# Patient Record
Sex: MALE | Race: WHITE | NOT HISPANIC OR LATINO | Employment: FULL TIME | URBAN - METROPOLITAN AREA
[De-identification: names, ages, dates, MRNs, and addresses within clinical notes are randomized per-mention and may not be internally consistent; named-entity substitution may affect disease eponyms.]

---

## 2018-05-07 ENCOUNTER — OFFICE VISIT (OUTPATIENT)
Dept: OCCUPATIONAL MEDICINE | Age: 38
End: 2018-05-07

## 2018-05-07 DIAGNOSIS — Z00.8 HEALTH EXAMINATION IN POPULATION SURVEY: Primary | ICD-10-CM

## 2018-05-07 PROCEDURE — OH035 DOT/N-DOT DS COLLECTION ONLY (ALL TYPES): Performed by: PHYSICIAN ASSISTANT

## 2018-10-23 ENCOUNTER — HOSPITAL ENCOUNTER (EMERGENCY)
Age: 38
Discharge: HOME | End: 2018-10-23
Attending: STUDENT IN AN ORGANIZED HEALTH CARE EDUCATION/TRAINING PROGRAM

## 2018-10-23 VITALS
WEIGHT: 250 LBS | BODY MASS INDEX: 31.08 KG/M2 | SYSTOLIC BLOOD PRESSURE: 151 MMHG | OXYGEN SATURATION: 97 % | TEMPERATURE: 97.4 F | HEART RATE: 88 BPM | HEIGHT: 75 IN | DIASTOLIC BLOOD PRESSURE: 99 MMHG | RESPIRATION RATE: 16 BRPM

## 2018-10-23 DIAGNOSIS — R11.2 NAUSEA AND VOMITING, INTRACTABILITY OF VOMITING NOT SPECIFIED, UNSPECIFIED VOMITING TYPE: Primary | ICD-10-CM

## 2018-10-23 LAB
ALBUMIN SERPL-MCNC: 4.1 G/DL (ref 3.6–5.1)
ALP SERPL-CCNC: 95 UNITS/L (ref 45–117)
ALT SERPL-CCNC: 107 UNITS/L
ANION GAP BLD CALC-SCNC: 20 MMOL/L
APPEARANCE UR: ABNORMAL
AST SERPL-CCNC: 274 UNITS/L
BILIRUB CONJ SERPL-MCNC: 0.3 MG/DL (ref 0–0.2)
BILIRUB SERPL-MCNC: 0.8 MG/DL (ref 0.2–1)
BILIRUB UR QL STRIP: NEGATIVE
BUN BLD-MCNC: 13 MG/DL (ref 6–20)
CA-I BLD ISE-SCNC: 1.06 MMOL/L (ref 1.15–1.29)
CHLORIDE BLD-SCNC: 100 MMOL/L (ref 98–107)
CO2 BLD-SCNC: 23 MMOL/L (ref 19–24)
COLOR UR: YELLOW
CREAT BLD-MCNC: 1.1 MG/DL (ref 0.67–1.17)
CREAT BLD-MCNC: >90 MG/DL
ETHANOL SERPL-MCNC: 133 MG/DL
GLUCOSE BLD-MCNC: 108 MG/DL (ref 65–99)
GLUCOSE UR STRIP-MCNC: NEGATIVE MG/DL
HCT VFR BLD CALC: 49 % (ref 39–51)
HGB BLD-MCNC: 16.7 G/DL (ref 13–17)
HGB UR QL STRIP: NEGATIVE
KETONES UR STRIP-MCNC: ABNORMAL MG/DL
LEUKOCYTE ESTERASE UR QL STRIP: NEGATIVE
LIPASE SERPL-CCNC: 109 UNITS/L (ref 73–393)
NITRITE UR QL STRIP: NEGATIVE
PH UR STRIP: 5.5 UNITS (ref 5–7)
POTASSIUM BLD-SCNC: 4 MMOL/L (ref 3.4–5.1)
PROT SERPL-MCNC: 8.1 G/DL (ref 6.4–8.2)
PROT UR STRIP-MCNC: NEGATIVE MG/DL
SODIUM BLD-SCNC: 138 MMOL/L (ref 135–145)
SP GR UR STRIP: 1.02 (ref 1–1.03)
SPECIMEN SOURCE: ABNORMAL
TROPONIN I BLD-MCNC: <0.1 NG/ML
UROBILINOGEN UR STRIP-MCNC: 0.2 MG/DL (ref 0–1)

## 2018-10-23 PROCEDURE — 93005 ELECTROCARDIOGRAM TRACING: CPT | Performed by: PHYSICIAN ASSISTANT

## 2018-10-23 PROCEDURE — 80076 HEPATIC FUNCTION PANEL: CPT

## 2018-10-23 PROCEDURE — C9113 INJ PANTOPRAZOLE SODIUM, VIA: HCPCS | Performed by: PHYSICIAN ASSISTANT

## 2018-10-23 PROCEDURE — 80320 DRUG SCREEN QUANTALCOHOLS: CPT

## 2018-10-23 PROCEDURE — 81003 URINALYSIS AUTO W/O SCOPE: CPT

## 2018-10-23 PROCEDURE — 10002800 HB RX 250 W HCPCS: Performed by: PHYSICIAN ASSISTANT

## 2018-10-23 PROCEDURE — 96361 HYDRATE IV INFUSION ADD-ON: CPT

## 2018-10-23 PROCEDURE — 10004651 HB RX, NO CHARGE ITEM: Performed by: STUDENT IN AN ORGANIZED HEALTH CARE EDUCATION/TRAINING PROGRAM

## 2018-10-23 PROCEDURE — 83690 ASSAY OF LIPASE: CPT

## 2018-10-23 PROCEDURE — 10002807 HB RX 258: Performed by: PHYSICIAN ASSISTANT

## 2018-10-23 PROCEDURE — 96374 THER/PROPH/DIAG INJ IV PUSH: CPT

## 2018-10-23 PROCEDURE — 99284 EMERGENCY DEPT VISIT MOD MDM: CPT

## 2018-10-23 PROCEDURE — 96375 TX/PRO/DX INJ NEW DRUG ADDON: CPT

## 2018-10-23 PROCEDURE — 80047 BASIC METABLC PNL IONIZED CA: CPT

## 2018-10-23 PROCEDURE — 84484 ASSAY OF TROPONIN QUANT: CPT

## 2018-10-23 RX ORDER — PANTOPRAZOLE SODIUM 40 MG/10ML
40 INJECTION, POWDER, LYOPHILIZED, FOR SOLUTION INTRAVENOUS ONCE
Status: COMPLETED | OUTPATIENT
Start: 2018-10-23 | End: 2018-10-23

## 2018-10-23 RX ORDER — ONDANSETRON 4 MG/1
4 TABLET, ORALLY DISINTEGRATING ORAL EVERY 8 HOURS PRN
Qty: 20 TABLET | Refills: 0 | Status: SHIPPED | OUTPATIENT
Start: 2018-10-23

## 2018-10-23 RX ORDER — ACETAMINOPHEN 500 MG
1000 TABLET ORAL ONCE
Status: COMPLETED | OUTPATIENT
Start: 2018-10-23 | End: 2018-10-23

## 2018-10-23 RX ORDER — ONDANSETRON 2 MG/ML
4 INJECTION INTRAMUSCULAR; INTRAVENOUS ONCE
Status: COMPLETED | OUTPATIENT
Start: 2018-10-23 | End: 2018-10-23

## 2018-10-23 RX ADMIN — SODIUM CHLORIDE 1000 ML: 9 INJECTION, SOLUTION INTRAVENOUS at 20:25

## 2018-10-23 RX ADMIN — ACETAMINOPHEN 1000 MG: 500 TABLET ORAL at 21:04

## 2018-10-23 RX ADMIN — ONDANSETRON 4 MG: 2 INJECTION INTRAMUSCULAR; INTRAVENOUS at 20:25

## 2018-10-23 RX ADMIN — PANTOPRAZOLE SODIUM 40 MG: 40 INJECTION, POWDER, FOR SOLUTION INTRAVENOUS at 20:25

## 2018-10-23 SDOH — HEALTH STABILITY: MENTAL HEALTH: HOW OFTEN DO YOU HAVE A DRINK CONTAINING ALCOHOL?: NEVER

## 2018-10-23 ASSESSMENT — ENCOUNTER SYMPTOMS
SHORTNESS OF BREATH: 0
FATIGUE: 0
CHILLS: 0
NAUSEA: 1
ABDOMINAL PAIN: 1
VOMITING: 1
EYE REDNESS: 0
WEAKNESS: 0
FEVER: 0
DIARRHEA: 0
SORE THROAT: 0
HEADACHES: 0
COUGH: 1
EYE PAIN: 0
COLOR CHANGE: 0
UNEXPECTED WEIGHT CHANGE: 1

## 2018-10-23 ASSESSMENT — PAIN SCALES - GENERAL
PAINLEVEL_OUTOF10: 8
PAINLEVEL_OUTOF10: 4
PAINLEVEL_OUTOF10: 8
PAINLEVEL_OUTOF10: 8
PAINLEVEL_OUTOF10: 7
PAINLEVEL_OUTOF10: 0
PAINLEVEL_OUTOF10: 0

## 2018-10-24 LAB
ATRIAL RATE (BPM): 98
DIASTOLIC BLOOD PRESSURE: 126
P AXIS (DEGREES): 55
PR-INTERVAL (MSEC): 148
QRS-INTERVAL (MSEC): 86
QT-INTERVAL (MSEC): 350
QTC: 446
R AXIS (DEGREES): 73
REPORT TEXT: NORMAL
SYSTOLIC BLOOD PRESSURE: 174
T AXIS (DEGREES): 75
VENTRICULAR RATE EKG/MIN (BPM): 98

## 2019-07-10 ENCOUNTER — WALK IN (OUTPATIENT)
Dept: URGENT CARE | Age: 39
End: 2019-07-10

## 2019-07-10 VITALS
DIASTOLIC BLOOD PRESSURE: 86 MMHG | RESPIRATION RATE: 12 BRPM | HEART RATE: 85 BPM | TEMPERATURE: 98.7 F | SYSTOLIC BLOOD PRESSURE: 137 MMHG

## 2019-07-10 DIAGNOSIS — B00.1 HERPES LABIALIS: ICD-10-CM

## 2019-07-10 DIAGNOSIS — K13.0 ANGULAR CHEILITIS: Primary | ICD-10-CM

## 2019-07-10 PROCEDURE — 99202 OFFICE O/P NEW SF 15 MIN: CPT | Performed by: FAMILY MEDICINE

## 2019-07-10 RX ORDER — CLOTRIMAZOLE AND BETAMETHASONE DIPROPIONATE 10; .64 MG/G; MG/G
CREAM TOPICAL
Qty: 60 G | Refills: 0 | Status: SHIPPED | OUTPATIENT
Start: 2019-07-10

## 2019-07-10 RX ORDER — VALACYCLOVIR HYDROCHLORIDE 1 G/1
1000 TABLET, FILM COATED ORAL EVERY 12 HOURS
Qty: 4 TABLET | Refills: 0 | Status: SHIPPED | OUTPATIENT
Start: 2019-07-10 | End: 2019-07-12

## 2019-07-10 SDOH — HEALTH STABILITY: MENTAL HEALTH: HOW OFTEN DO YOU HAVE A DRINK CONTAINING ALCOHOL?: NEVER

## 2019-07-23 ENCOUNTER — APPOINTMENT (OUTPATIENT)
Dept: FAMILY MEDICINE | Age: 39
End: 2019-07-23

## 2021-12-02 ENCOUNTER — APPOINTMENT (OUTPATIENT)
Dept: RADIOLOGY | Facility: MEDICAL CENTER | Age: 41
DRG: 894 | End: 2021-12-02
Attending: EMERGENCY MEDICINE
Payer: MEDICAID

## 2021-12-02 ENCOUNTER — HOSPITAL ENCOUNTER (INPATIENT)
Facility: MEDICAL CENTER | Age: 41
LOS: 1 days | DRG: 894 | End: 2021-12-03
Attending: EMERGENCY MEDICINE | Admitting: STUDENT IN AN ORGANIZED HEALTH CARE EDUCATION/TRAINING PROGRAM
Payer: MEDICAID

## 2021-12-02 PROBLEM — F10.939 ALCOHOL WITHDRAWAL SYNDROME WITH COMPLICATION (HCC): Status: ACTIVE | Noted: 2021-12-02

## 2021-12-02 LAB
ALBUMIN SERPL BCP-MCNC: 3.5 G/DL (ref 3.2–4.9)
ALBUMIN/GLOB SERPL: 0.9 G/DL
ALP SERPL-CCNC: 213 U/L (ref 30–99)
ALT SERPL-CCNC: 40 U/L (ref 2–50)
AMMONIA PLAS-SCNC: 48 UMOL/L (ref 11–45)
AMPHET UR QL SCN: NEGATIVE
ANION GAP SERPL CALC-SCNC: 13 MMOL/L (ref 7–16)
AST SERPL-CCNC: 167 U/L (ref 12–45)
BARBITURATES UR QL SCN: NEGATIVE
BASOPHILS # BLD AUTO: 0.4 % (ref 0–1.8)
BASOPHILS # BLD: 0.02 K/UL (ref 0–0.12)
BENZODIAZ UR QL SCN: NEGATIVE
BILIRUB SERPL-MCNC: 3.8 MG/DL (ref 0.1–1.5)
BUN SERPL-MCNC: 4 MG/DL (ref 8–22)
BZE UR QL SCN: NEGATIVE
CALCIUM SERPL-MCNC: 8.7 MG/DL (ref 8.5–10.5)
CANNABINOIDS UR QL SCN: NEGATIVE
CHLORIDE SERPL-SCNC: 101 MMOL/L (ref 96–112)
CO2 SERPL-SCNC: 25 MMOL/L (ref 20–33)
COMMENT 1642: NORMAL
CREAT SERPL-MCNC: 0.51 MG/DL (ref 0.5–1.4)
EKG IMPRESSION: NORMAL
EOSINOPHIL # BLD AUTO: 0 K/UL (ref 0–0.51)
EOSINOPHIL NFR BLD: 0 % (ref 0–6.9)
ERYTHROCYTE [DISTWIDTH] IN BLOOD BY AUTOMATED COUNT: 54.9 FL (ref 35.9–50)
ETHANOL BLD-MCNC: 31.2 MG/DL (ref 0–10)
FLUAV RNA SPEC QL NAA+PROBE: NEGATIVE
FLUBV RNA SPEC QL NAA+PROBE: NEGATIVE
GLOBULIN SER CALC-MCNC: 3.7 G/DL (ref 1.9–3.5)
GLUCOSE SERPL-MCNC: 117 MG/DL (ref 65–99)
HCT VFR BLD AUTO: 38.6 % (ref 42–52)
HGB BLD-MCNC: 13.5 G/DL (ref 14–18)
IMM GRANULOCYTES # BLD AUTO: 0.01 K/UL (ref 0–0.11)
IMM GRANULOCYTES NFR BLD AUTO: 0.2 % (ref 0–0.9)
INR PPP: 1.46 (ref 0.87–1.13)
LIPASE SERPL-CCNC: 36 U/L (ref 11–82)
LYMPHOCYTES # BLD AUTO: 0.91 K/UL (ref 1–4.8)
LYMPHOCYTES NFR BLD: 20.1 % (ref 22–41)
MAGNESIUM SERPL-MCNC: 1.3 MG/DL (ref 1.5–2.5)
MCH RBC QN AUTO: 32.4 PG (ref 27–33)
MCHC RBC AUTO-ENTMCNC: 35 G/DL (ref 33.7–35.3)
MCV RBC AUTO: 92.6 FL (ref 81.4–97.8)
METHADONE UR QL SCN: NEGATIVE
MONOCYTES # BLD AUTO: 0.42 K/UL (ref 0–0.85)
MONOCYTES NFR BLD AUTO: 9.3 % (ref 0–13.4)
MORPHOLOGY BLD-IMP: NORMAL
NEUTROPHILS # BLD AUTO: 3.16 K/UL (ref 1.82–7.42)
NEUTROPHILS NFR BLD: 70 % (ref 44–72)
NRBC # BLD AUTO: 0 K/UL
NRBC BLD-RTO: 0 /100 WBC
OPIATES UR QL SCN: NEGATIVE
OXYCODONE UR QL SCN: NEGATIVE
PCP UR QL SCN: NEGATIVE
PLATELET # BLD AUTO: 23 K/UL (ref 164–446)
PLATELETS.RETICULATED NFR BLD AUTO: 12.1 K/UL (ref 0.6–13.1)
PMV BLD AUTO: 13.3 FL (ref 9–12.9)
POTASSIUM SERPL-SCNC: 3.4 MMOL/L (ref 3.6–5.5)
PROPOXYPH UR QL SCN: NEGATIVE
PROT SERPL-MCNC: 7.2 G/DL (ref 6–8.2)
PROTHROMBIN TIME: 17.3 SEC (ref 12–14.6)
RBC # BLD AUTO: 4.17 M/UL (ref 4.7–6.1)
RSV RNA SPEC QL NAA+PROBE: NEGATIVE
SARS-COV-2 RNA RESP QL NAA+PROBE: NOTDETECTED
SODIUM SERPL-SCNC: 139 MMOL/L (ref 135–145)
SPECIMEN SOURCE: NORMAL
TROPONIN T SERPL-MCNC: 12 NG/L (ref 6–19)
WBC # BLD AUTO: 4.5 K/UL (ref 4.8–10.8)

## 2021-12-02 PROCEDURE — 99223 1ST HOSP IP/OBS HIGH 75: CPT | Performed by: STUDENT IN AN ORGANIZED HEALTH CARE EDUCATION/TRAINING PROGRAM

## 2021-12-02 PROCEDURE — A9270 NON-COVERED ITEM OR SERVICE: HCPCS | Performed by: STUDENT IN AN ORGANIZED HEALTH CARE EDUCATION/TRAINING PROGRAM

## 2021-12-02 PROCEDURE — 0241U HCHG SARS-COV-2 COVID-19 NFCT DS RESP RNA 4 TRGT MIC: CPT

## 2021-12-02 PROCEDURE — 700111 HCHG RX REV CODE 636 W/ 250 OVERRIDE (IP): Performed by: EMERGENCY MEDICINE

## 2021-12-02 PROCEDURE — 93005 ELECTROCARDIOGRAM TRACING: CPT | Performed by: EMERGENCY MEDICINE

## 2021-12-02 PROCEDURE — 94760 N-INVAS EAR/PLS OXIMETRY 1: CPT

## 2021-12-02 PROCEDURE — 85610 PROTHROMBIN TIME: CPT

## 2021-12-02 PROCEDURE — 96375 TX/PRO/DX INJ NEW DRUG ADDON: CPT

## 2021-12-02 PROCEDURE — 82077 ASSAY SPEC XCP UR&BREATH IA: CPT

## 2021-12-02 PROCEDURE — 96367 TX/PROPH/DG ADDL SEQ IV INF: CPT

## 2021-12-02 PROCEDURE — C9803 HOPD COVID-19 SPEC COLLECT: HCPCS | Performed by: STUDENT IN AN ORGANIZED HEALTH CARE EDUCATION/TRAINING PROGRAM

## 2021-12-02 PROCEDURE — 82140 ASSAY OF AMMONIA: CPT

## 2021-12-02 PROCEDURE — 85025 COMPLETE CBC W/AUTO DIFF WBC: CPT

## 2021-12-02 PROCEDURE — 71275 CT ANGIOGRAPHY CHEST: CPT

## 2021-12-02 PROCEDURE — 700102 HCHG RX REV CODE 250 W/ 637 OVERRIDE(OP): Performed by: STUDENT IN AN ORGANIZED HEALTH CARE EDUCATION/TRAINING PROGRAM

## 2021-12-02 PROCEDURE — 99285 EMERGENCY DEPT VISIT HI MDM: CPT

## 2021-12-02 PROCEDURE — 96366 THER/PROPH/DIAG IV INF ADDON: CPT

## 2021-12-02 PROCEDURE — 80307 DRUG TEST PRSMV CHEM ANLYZR: CPT

## 2021-12-02 PROCEDURE — 700117 HCHG RX CONTRAST REV CODE 255: Performed by: EMERGENCY MEDICINE

## 2021-12-02 PROCEDURE — 80053 COMPREHEN METABOLIC PANEL: CPT

## 2021-12-02 PROCEDURE — 700101 HCHG RX REV CODE 250: Performed by: EMERGENCY MEDICINE

## 2021-12-02 PROCEDURE — 96376 TX/PRO/DX INJ SAME DRUG ADON: CPT

## 2021-12-02 PROCEDURE — 83690 ASSAY OF LIPASE: CPT

## 2021-12-02 PROCEDURE — 83735 ASSAY OF MAGNESIUM: CPT

## 2021-12-02 PROCEDURE — 96365 THER/PROPH/DIAG IV INF INIT: CPT

## 2021-12-02 PROCEDURE — 700111 HCHG RX REV CODE 636 W/ 250 OVERRIDE (IP): Performed by: STUDENT IN AN ORGANIZED HEALTH CARE EDUCATION/TRAINING PROGRAM

## 2021-12-02 PROCEDURE — 71045 X-RAY EXAM CHEST 1 VIEW: CPT

## 2021-12-02 PROCEDURE — HZ2ZZZZ DETOXIFICATION SERVICES FOR SUBSTANCE ABUSE TREATMENT: ICD-10-PCS | Performed by: EMERGENCY MEDICINE

## 2021-12-02 PROCEDURE — 84484 ASSAY OF TROPONIN QUANT: CPT

## 2021-12-02 PROCEDURE — 93005 ELECTROCARDIOGRAM TRACING: CPT

## 2021-12-02 PROCEDURE — 770020 HCHG ROOM/CARE - TELE (206)

## 2021-12-02 PROCEDURE — 85055 RETICULATED PLATELET ASSAY: CPT

## 2021-12-02 RX ORDER — AMOXICILLIN 250 MG
2 CAPSULE ORAL 2 TIMES DAILY
Status: DISCONTINUED | OUTPATIENT
Start: 2021-12-02 | End: 2021-12-03 | Stop reason: HOSPADM

## 2021-12-02 RX ORDER — LORAZEPAM 1 MG/1
1 TABLET ORAL EVERY 4 HOURS PRN
Status: DISCONTINUED | OUTPATIENT
Start: 2021-12-02 | End: 2021-12-03

## 2021-12-02 RX ORDER — GAUZE BANDAGE 2" X 2"
100 BANDAGE TOPICAL DAILY
Status: DISCONTINUED | OUTPATIENT
Start: 2021-12-03 | End: 2021-12-03 | Stop reason: HOSPADM

## 2021-12-02 RX ORDER — LORAZEPAM 2 MG/ML
4 INJECTION INTRAMUSCULAR ONCE
Status: COMPLETED | OUTPATIENT
Start: 2021-12-02 | End: 2021-12-02

## 2021-12-02 RX ORDER — PROCHLORPERAZINE EDISYLATE 5 MG/ML
5-10 INJECTION INTRAMUSCULAR; INTRAVENOUS EVERY 4 HOURS PRN
Status: DISCONTINUED | OUTPATIENT
Start: 2021-12-02 | End: 2021-12-03 | Stop reason: HOSPADM

## 2021-12-02 RX ORDER — LORAZEPAM 0.5 MG/1
0.5 TABLET ORAL EVERY 4 HOURS PRN
Status: DISCONTINUED | OUTPATIENT
Start: 2021-12-02 | End: 2021-12-03

## 2021-12-02 RX ORDER — DIAZEPAM 5 MG/1
10 TABLET ORAL EVERY 6 HOURS
Status: DISCONTINUED | OUTPATIENT
Start: 2021-12-02 | End: 2021-12-03 | Stop reason: HOSPADM

## 2021-12-02 RX ORDER — POTASSIUM CHLORIDE 1.5 G/1.58G
20 POWDER, FOR SOLUTION ORAL 2 TIMES DAILY
Status: SHIPPED | COMMUNITY
End: 2021-12-02

## 2021-12-02 RX ORDER — POTASSIUM CHLORIDE 20 MEQ/1
40 TABLET, EXTENDED RELEASE ORAL ONCE
Status: COMPLETED | OUTPATIENT
Start: 2021-12-02 | End: 2021-12-02

## 2021-12-02 RX ORDER — LORAZEPAM 2 MG/1
4 TABLET ORAL
Status: DISCONTINUED | OUTPATIENT
Start: 2021-12-02 | End: 2021-12-03 | Stop reason: HOSPADM

## 2021-12-02 RX ORDER — LORAZEPAM 2 MG/ML
1 INJECTION INTRAMUSCULAR
Status: DISCONTINUED | OUTPATIENT
Start: 2021-12-02 | End: 2021-12-03

## 2021-12-02 RX ORDER — LACTULOSE 10 G/15ML
20 SOLUTION ORAL 2 TIMES DAILY
Status: SHIPPED | COMMUNITY
End: 2021-12-02

## 2021-12-02 RX ORDER — PROMETHAZINE HYDROCHLORIDE 25 MG/1
12.5-25 SUPPOSITORY RECTAL EVERY 4 HOURS PRN
Status: DISCONTINUED | OUTPATIENT
Start: 2021-12-02 | End: 2021-12-03 | Stop reason: HOSPADM

## 2021-12-02 RX ORDER — DIAZEPAM 5 MG/1
5 TABLET ORAL EVERY 6 HOURS
Status: DISCONTINUED | OUTPATIENT
Start: 2021-12-03 | End: 2021-12-03 | Stop reason: HOSPADM

## 2021-12-02 RX ORDER — LORAZEPAM 2 MG/ML
1.5 INJECTION INTRAMUSCULAR
Status: DISCONTINUED | OUTPATIENT
Start: 2021-12-02 | End: 2021-12-03

## 2021-12-02 RX ORDER — FOLIC ACID 1 MG/1
1 TABLET ORAL DAILY
Status: DISCONTINUED | OUTPATIENT
Start: 2021-12-03 | End: 2021-12-03 | Stop reason: HOSPADM

## 2021-12-02 RX ORDER — ACETAMINOPHEN 325 MG/1
650 TABLET ORAL EVERY 6 HOURS PRN
Status: DISCONTINUED | OUTPATIENT
Start: 2021-12-02 | End: 2021-12-03 | Stop reason: HOSPADM

## 2021-12-02 RX ORDER — LORAZEPAM 2 MG/ML
1.5 INJECTION INTRAMUSCULAR
Status: DISCONTINUED | OUTPATIENT
Start: 2021-12-02 | End: 2021-12-03 | Stop reason: HOSPADM

## 2021-12-02 RX ORDER — POLYETHYLENE GLYCOL 3350 17 G/17G
1 POWDER, FOR SOLUTION ORAL
Status: DISCONTINUED | OUTPATIENT
Start: 2021-12-02 | End: 2021-12-03 | Stop reason: HOSPADM

## 2021-12-02 RX ORDER — LABETALOL HYDROCHLORIDE 5 MG/ML
10 INJECTION, SOLUTION INTRAVENOUS
Status: DISCONTINUED | OUTPATIENT
Start: 2021-12-02 | End: 2021-12-03 | Stop reason: HOSPADM

## 2021-12-02 RX ORDER — LORAZEPAM 2 MG/ML
0.5 INJECTION INTRAMUSCULAR EVERY 4 HOURS PRN
Status: DISCONTINUED | OUTPATIENT
Start: 2021-12-02 | End: 2021-12-03 | Stop reason: HOSPADM

## 2021-12-02 RX ORDER — LORAZEPAM 2 MG/ML
2 INJECTION INTRAMUSCULAR
Status: DISCONTINUED | OUTPATIENT
Start: 2021-12-02 | End: 2021-12-03

## 2021-12-02 RX ORDER — PROMETHAZINE HYDROCHLORIDE 25 MG/1
12.5-25 TABLET ORAL EVERY 4 HOURS PRN
Status: DISCONTINUED | OUTPATIENT
Start: 2021-12-02 | End: 2021-12-03 | Stop reason: HOSPADM

## 2021-12-02 RX ORDER — LORAZEPAM 2 MG/1
2 TABLET ORAL
Status: DISCONTINUED | OUTPATIENT
Start: 2021-12-02 | End: 2021-12-03 | Stop reason: HOSPADM

## 2021-12-02 RX ORDER — LORAZEPAM 1 MG/1
1 TABLET ORAL EVERY 4 HOURS PRN
Status: DISCONTINUED | OUTPATIENT
Start: 2021-12-02 | End: 2021-12-03 | Stop reason: HOSPADM

## 2021-12-02 RX ORDER — MAGNESIUM SULFATE HEPTAHYDRATE 40 MG/ML
4 INJECTION, SOLUTION INTRAVENOUS ONCE
Status: COMPLETED | OUTPATIENT
Start: 2021-12-02 | End: 2021-12-03

## 2021-12-02 RX ORDER — LORAZEPAM 0.5 MG/1
0.5 TABLET ORAL EVERY 4 HOURS PRN
Status: DISCONTINUED | OUTPATIENT
Start: 2021-12-02 | End: 2021-12-03 | Stop reason: HOSPADM

## 2021-12-02 RX ORDER — LACTULOSE 20 G/30ML
30 SOLUTION ORAL 3 TIMES DAILY
Status: DISCONTINUED | OUTPATIENT
Start: 2021-12-03 | End: 2021-12-03 | Stop reason: HOSPADM

## 2021-12-02 RX ORDER — FOLIC ACID 1 MG/1
1 TABLET ORAL DAILY
Status: DISCONTINUED | OUTPATIENT
Start: 2021-12-03 | End: 2021-12-02

## 2021-12-02 RX ORDER — LORAZEPAM 2 MG/1
4 TABLET ORAL
Status: DISCONTINUED | OUTPATIENT
Start: 2021-12-02 | End: 2021-12-03

## 2021-12-02 RX ORDER — LORAZEPAM 2 MG/ML
1 INJECTION INTRAMUSCULAR
Status: DISCONTINUED | OUTPATIENT
Start: 2021-12-02 | End: 2021-12-03 | Stop reason: HOSPADM

## 2021-12-02 RX ORDER — ONDANSETRON 2 MG/ML
4 INJECTION INTRAMUSCULAR; INTRAVENOUS EVERY 4 HOURS PRN
Status: DISCONTINUED | OUTPATIENT
Start: 2021-12-02 | End: 2021-12-03 | Stop reason: HOSPADM

## 2021-12-02 RX ORDER — LORAZEPAM 2 MG/1
2 TABLET ORAL
Status: DISCONTINUED | OUTPATIENT
Start: 2021-12-02 | End: 2021-12-03

## 2021-12-02 RX ORDER — BISACODYL 10 MG
10 SUPPOSITORY, RECTAL RECTAL
Status: DISCONTINUED | OUTPATIENT
Start: 2021-12-02 | End: 2021-12-03 | Stop reason: HOSPADM

## 2021-12-02 RX ORDER — LORAZEPAM 2 MG/ML
0.5 INJECTION INTRAMUSCULAR EVERY 4 HOURS PRN
Status: DISCONTINUED | OUTPATIENT
Start: 2021-12-02 | End: 2021-12-03

## 2021-12-02 RX ORDER — LORAZEPAM 2 MG/ML
2 INJECTION INTRAMUSCULAR
Status: DISCONTINUED | OUTPATIENT
Start: 2021-12-02 | End: 2021-12-03 | Stop reason: HOSPADM

## 2021-12-02 RX ORDER — ONDANSETRON 4 MG/1
4 TABLET, ORALLY DISINTEGRATING ORAL EVERY 4 HOURS PRN
Status: DISCONTINUED | OUTPATIENT
Start: 2021-12-02 | End: 2021-12-03 | Stop reason: HOSPADM

## 2021-12-02 RX ADMIN — POTASSIUM CHLORIDE 40 MEQ: 1500 TABLET, EXTENDED RELEASE ORAL at 22:53

## 2021-12-02 RX ADMIN — MAGNESIUM SULFATE HEPTAHYDRATE 4 G: 40 INJECTION, SOLUTION INTRAVENOUS at 22:53

## 2021-12-02 RX ADMIN — DOCUSATE SODIUM 50 MG AND SENNOSIDES 8.6 MG 2 TABLET: 8.6; 5 TABLET, FILM COATED ORAL at 22:53

## 2021-12-02 RX ADMIN — LORAZEPAM 1.5 MG: 2 INJECTION INTRAMUSCULAR; INTRAVENOUS at 20:48

## 2021-12-02 RX ADMIN — THIAMINE HYDROCHLORIDE: 100 INJECTION, SOLUTION INTRAMUSCULAR; INTRAVENOUS at 19:57

## 2021-12-02 RX ADMIN — LORAZEPAM 1 MG: 2 INJECTION INTRAMUSCULAR; INTRAVENOUS at 21:57

## 2021-12-02 RX ADMIN — LORAZEPAM 4 MG: 2 INJECTION INTRAMUSCULAR; INTRAVENOUS at 19:54

## 2021-12-02 RX ADMIN — DIAZEPAM 10 MG: 5 TABLET ORAL at 22:52

## 2021-12-02 RX ADMIN — IOHEXOL 100 ML: 350 INJECTION, SOLUTION INTRAVENOUS at 20:39

## 2021-12-02 RX ADMIN — POTASSIUM CHLORIDE 40 MEQ: 1500 TABLET, EXTENDED RELEASE ORAL at 19:57

## 2021-12-02 ASSESSMENT — LIFESTYLE VARIABLES
PAROXYSMAL SWEATS: NO SWEAT VISIBLE
PAROXYSMAL SWEATS: NO SWEAT VISIBLE
AUDITORY DISTURBANCES: NOT PRESENT
PAROXYSMAL SWEATS: NO SWEAT VISIBLE
AUDITORY DISTURBANCES: NOT PRESENT
ANXIETY: EQUIVALENT TO ACUTE PANIC STATES AS OCCUR IN SEVERE DELIRIUM OR ACUTE SCHIZOPHRENIC REACTIONS
HEADACHE, FULLNESS IN HEAD: MODERATELY SEVERE
AGITATION: SOMEWHAT MORE THAN NORMAL ACTIVITY
ORIENTATION AND CLOUDING OF SENSORIUM: ORIENTED AND CAN DO SERIAL ADDITIONS
AGITATION: NORMAL ACTIVITY
ORIENTATION AND CLOUDING OF SENSORIUM: ORIENTED AND CAN DO SERIAL ADDITIONS
TREMOR: TREMOR NOT VISIBLE BUT CAN BE FELT, FINGERTIP TO FINGERTIP
TOTAL SCORE: 13
AGITATION: NORMAL ACTIVITY
ANXIETY: MODERATELY ANXIOUS OR GUARDED, SO ANXIETY IS INFERRED
VISUAL DISTURBANCES: NOT PRESENT
HEADACHE, FULLNESS IN HEAD: SEVERE
NAUSEA AND VOMITING: INTERMITTENT NAUSEA WITH DRY HEAVES
ANXIETY: MODERATELY ANXIOUS OR GUARDED, SO ANXIETY IS INFERRED
NAUSEA AND VOMITING: NO NAUSEA AND NO VOMITING
VISUAL DISTURBANCES: NOT PRESENT
TREMOR: MODERATE TREMOR WITH ARMS EXTENDED
VISUAL DISTURBANCES: NOT PRESENT
ORIENTATION AND CLOUDING OF SENSORIUM: ORIENTED AND CAN DO SERIAL ADDITIONS
HEADACHE, FULLNESS IN HEAD: SEVERE
TOTAL SCORE: 16
TOTAL SCORE: 14
NAUSEA AND VOMITING: NO NAUSEA AND NO VOMITING
TREMOR: MODERATE TREMOR WITH ARMS EXTENDED
AUDITORY DISTURBANCES: NOT PRESENT

## 2021-12-03 ENCOUNTER — HOSPITAL ENCOUNTER (INPATIENT)
Facility: MEDICAL CENTER | Age: 41
LOS: 1 days | DRG: 894 | End: 2021-12-04
Attending: EMERGENCY MEDICINE | Admitting: INTERNAL MEDICINE
Payer: MEDICAID

## 2021-12-03 ENCOUNTER — APPOINTMENT (OUTPATIENT)
Dept: RADIOLOGY | Facility: MEDICAL CENTER | Age: 41
DRG: 894 | End: 2021-12-03
Attending: EMERGENCY MEDICINE
Payer: MEDICAID

## 2021-12-03 VITALS
RESPIRATION RATE: 17 BRPM | OXYGEN SATURATION: 96 % | WEIGHT: 270.28 LBS | SYSTOLIC BLOOD PRESSURE: 146 MMHG | DIASTOLIC BLOOD PRESSURE: 95 MMHG | TEMPERATURE: 98 F | HEIGHT: 75 IN | HEART RATE: 82 BPM | BODY MASS INDEX: 33.61 KG/M2

## 2021-12-03 DIAGNOSIS — R42 DIZZINESS: ICD-10-CM

## 2021-12-03 DIAGNOSIS — F10.930 ALCOHOL WITHDRAWAL SYNDROME WITHOUT COMPLICATION (HCC): ICD-10-CM

## 2021-12-03 DIAGNOSIS — D69.6 THROMBOCYTOPENIA (HCC): ICD-10-CM

## 2021-12-03 PROBLEM — D68.9 COAGULOPATHY (HCC): Status: ACTIVE | Noted: 2021-12-03

## 2021-12-03 PROBLEM — E83.42 HYPOMAGNESEMIA: Status: ACTIVE | Noted: 2021-12-03

## 2021-12-03 PROBLEM — F10.931 ALCOHOL WITHDRAWAL DELIRIUM (HCC): Status: ACTIVE | Noted: 2021-12-03

## 2021-12-03 PROBLEM — D61.818 PANCYTOPENIA (HCC): Status: ACTIVE | Noted: 2021-12-03

## 2021-12-03 PROBLEM — E87.6 HYPOKALEMIA: Status: ACTIVE | Noted: 2021-12-03

## 2021-12-03 PROBLEM — K70.30 ALCOHOLIC CIRRHOSIS (HCC): Status: ACTIVE | Noted: 2021-12-03

## 2021-12-03 LAB
ALBUMIN SERPL BCP-MCNC: 2.9 G/DL (ref 3.2–4.9)
ALBUMIN SERPL BCP-MCNC: 3.6 G/DL (ref 3.2–4.9)
ALBUMIN/GLOB SERPL: 1 G/DL
ALBUMIN/GLOB SERPL: 1.2 G/DL
ALP SERPL-CCNC: 175 U/L (ref 30–99)
ALP SERPL-CCNC: 201 U/L (ref 30–99)
ALT SERPL-CCNC: 32 U/L (ref 2–50)
ALT SERPL-CCNC: 36 U/L (ref 2–50)
ANION GAP SERPL CALC-SCNC: 12 MMOL/L (ref 7–16)
ANION GAP SERPL CALC-SCNC: 9 MMOL/L (ref 7–16)
AST SERPL-CCNC: 138 U/L (ref 12–45)
AST SERPL-CCNC: 145 U/L (ref 12–45)
BASOPHILS # BLD AUTO: 0.2 % (ref 0–1.8)
BASOPHILS # BLD AUTO: 0.3 % (ref 0–1.8)
BASOPHILS # BLD: 0.01 K/UL (ref 0–0.12)
BASOPHILS # BLD: 0.01 K/UL (ref 0–0.12)
BILIRUB SERPL-MCNC: 3.8 MG/DL (ref 0.1–1.5)
BILIRUB SERPL-MCNC: 5.2 MG/DL (ref 0.1–1.5)
BUN SERPL-MCNC: 3 MG/DL (ref 8–22)
BUN SERPL-MCNC: 4 MG/DL (ref 8–22)
CALCIUM SERPL-MCNC: 7.8 MG/DL (ref 8.5–10.5)
CALCIUM SERPL-MCNC: 8 MG/DL (ref 8.5–10.5)
CHLORIDE SERPL-SCNC: 102 MMOL/L (ref 96–112)
CHLORIDE SERPL-SCNC: 103 MMOL/L (ref 96–112)
CO2 SERPL-SCNC: 21 MMOL/L (ref 20–33)
CO2 SERPL-SCNC: 25 MMOL/L (ref 20–33)
CREAT SERPL-MCNC: 0.47 MG/DL (ref 0.5–1.4)
CREAT SERPL-MCNC: 0.5 MG/DL (ref 0.5–1.4)
EKG IMPRESSION: NORMAL
EOSINOPHIL # BLD AUTO: 0.02 K/UL (ref 0–0.51)
EOSINOPHIL # BLD AUTO: 0.02 K/UL (ref 0–0.51)
EOSINOPHIL NFR BLD: 0.5 % (ref 0–6.9)
EOSINOPHIL NFR BLD: 0.5 % (ref 0–6.9)
ERYTHROCYTE [DISTWIDTH] IN BLOOD BY AUTOMATED COUNT: 55.6 FL (ref 35.9–50)
ERYTHROCYTE [DISTWIDTH] IN BLOOD BY AUTOMATED COUNT: 57.8 FL (ref 35.9–50)
GLOBULIN SER CALC-MCNC: 3 G/DL (ref 1.9–3.5)
GLOBULIN SER CALC-MCNC: 3.1 G/DL (ref 1.9–3.5)
GLUCOSE SERPL-MCNC: 122 MG/DL (ref 65–99)
GLUCOSE SERPL-MCNC: 191 MG/DL (ref 65–99)
HCT VFR BLD AUTO: 33.7 % (ref 42–52)
HCT VFR BLD AUTO: 38.5 % (ref 42–52)
HGB BLD-MCNC: 11.6 G/DL (ref 14–18)
HGB BLD-MCNC: 13.3 G/DL (ref 14–18)
IMM GRANULOCYTES # BLD AUTO: 0.01 K/UL (ref 0–0.11)
IMM GRANULOCYTES # BLD AUTO: 0.02 K/UL (ref 0–0.11)
IMM GRANULOCYTES NFR BLD AUTO: 0.3 % (ref 0–0.9)
IMM GRANULOCYTES NFR BLD AUTO: 0.5 % (ref 0–0.9)
LYMPHOCYTES # BLD AUTO: 0.89 K/UL (ref 1–4.8)
LYMPHOCYTES # BLD AUTO: 1.14 K/UL (ref 1–4.8)
LYMPHOCYTES NFR BLD: 21.1 % (ref 22–41)
LYMPHOCYTES NFR BLD: 28.8 % (ref 22–41)
MAGNESIUM SERPL-MCNC: 1.8 MG/DL (ref 1.5–2.5)
MAGNESIUM SERPL-MCNC: 2.1 MG/DL (ref 1.5–2.5)
MCH RBC QN AUTO: 31.8 PG (ref 27–33)
MCH RBC QN AUTO: 32.5 PG (ref 27–33)
MCHC RBC AUTO-ENTMCNC: 34.4 G/DL (ref 33.7–35.3)
MCHC RBC AUTO-ENTMCNC: 34.5 G/DL (ref 33.7–35.3)
MCV RBC AUTO: 92.3 FL (ref 81.4–97.8)
MCV RBC AUTO: 94.1 FL (ref 81.4–97.8)
MONOCYTES # BLD AUTO: 0.43 K/UL (ref 0–0.85)
MONOCYTES # BLD AUTO: 0.48 K/UL (ref 0–0.85)
MONOCYTES NFR BLD AUTO: 10.2 % (ref 0–13.4)
MONOCYTES NFR BLD AUTO: 12.1 % (ref 0–13.4)
NEUTROPHILS # BLD AUTO: 2.3 K/UL (ref 1.82–7.42)
NEUTROPHILS # BLD AUTO: 2.84 K/UL (ref 1.82–7.42)
NEUTROPHILS NFR BLD: 58 % (ref 44–72)
NEUTROPHILS NFR BLD: 67.5 % (ref 44–72)
NRBC # BLD AUTO: 0 K/UL
NRBC # BLD AUTO: 0 K/UL
NRBC BLD-RTO: 0 /100 WBC
NRBC BLD-RTO: 0 /100 WBC
PHOSPHATE SERPL-MCNC: 2.2 MG/DL (ref 2.5–4.5)
PLATELET # BLD AUTO: 19 K/UL (ref 164–446)
PLATELET # BLD AUTO: 20 K/UL (ref 164–446)
PMV BLD AUTO: 12.1 FL (ref 9–12.9)
PMV BLD AUTO: 14.3 FL (ref 9–12.9)
POTASSIUM SERPL-SCNC: 3.5 MMOL/L (ref 3.6–5.5)
POTASSIUM SERPL-SCNC: 3.5 MMOL/L (ref 3.6–5.5)
PROT SERPL-MCNC: 5.9 G/DL (ref 6–8.2)
PROT SERPL-MCNC: 6.7 G/DL (ref 6–8.2)
RBC # BLD AUTO: 3.65 M/UL (ref 4.7–6.1)
RBC # BLD AUTO: 4.09 M/UL (ref 4.7–6.1)
SODIUM SERPL-SCNC: 136 MMOL/L (ref 135–145)
SODIUM SERPL-SCNC: 136 MMOL/L (ref 135–145)
TROPONIN T SERPL-MCNC: 10 NG/L (ref 6–19)
WBC # BLD AUTO: 4 K/UL (ref 4.8–10.8)
WBC # BLD AUTO: 4.2 K/UL (ref 4.8–10.8)

## 2021-12-03 PROCEDURE — 96365 THER/PROPH/DIAG IV INF INIT: CPT

## 2021-12-03 PROCEDURE — 84484 ASSAY OF TROPONIN QUANT: CPT

## 2021-12-03 PROCEDURE — 700102 HCHG RX REV CODE 250 W/ 637 OVERRIDE(OP): Performed by: STUDENT IN AN ORGANIZED HEALTH CARE EDUCATION/TRAINING PROGRAM

## 2021-12-03 PROCEDURE — 99223 1ST HOSP IP/OBS HIGH 75: CPT | Performed by: INTERNAL MEDICINE

## 2021-12-03 PROCEDURE — 700111 HCHG RX REV CODE 636 W/ 250 OVERRIDE (IP): Performed by: INTERNAL MEDICINE

## 2021-12-03 PROCEDURE — 85025 COMPLETE CBC W/AUTO DIFF WBC: CPT

## 2021-12-03 PROCEDURE — 94760 N-INVAS EAR/PLS OXIMETRY 1: CPT

## 2021-12-03 PROCEDURE — 93005 ELECTROCARDIOGRAM TRACING: CPT

## 2021-12-03 PROCEDURE — A9270 NON-COVERED ITEM OR SERVICE: HCPCS | Performed by: STUDENT IN AN ORGANIZED HEALTH CARE EDUCATION/TRAINING PROGRAM

## 2021-12-03 PROCEDURE — 700105 HCHG RX REV CODE 258: Performed by: INTERNAL MEDICINE

## 2021-12-03 PROCEDURE — 96375 TX/PRO/DX INJ NEW DRUG ADDON: CPT

## 2021-12-03 PROCEDURE — 73562 X-RAY EXAM OF KNEE 3: CPT | Mod: LT

## 2021-12-03 PROCEDURE — 71045 X-RAY EXAM CHEST 1 VIEW: CPT

## 2021-12-03 PROCEDURE — 93005 ELECTROCARDIOGRAM TRACING: CPT | Performed by: EMERGENCY MEDICINE

## 2021-12-03 PROCEDURE — 99239 HOSP IP/OBS DSCHRG MGMT >30: CPT | Performed by: INTERNAL MEDICINE

## 2021-12-03 PROCEDURE — 99285 EMERGENCY DEPT VISIT HI MDM: CPT

## 2021-12-03 PROCEDURE — HZ2ZZZZ DETOXIFICATION SERVICES FOR SUBSTANCE ABUSE TREATMENT: ICD-10-PCS | Performed by: INTERNAL MEDICINE

## 2021-12-03 PROCEDURE — 80053 COMPREHEN METABOLIC PANEL: CPT | Mod: 91

## 2021-12-03 PROCEDURE — 83735 ASSAY OF MAGNESIUM: CPT

## 2021-12-03 PROCEDURE — 84100 ASSAY OF PHOSPHORUS: CPT

## 2021-12-03 PROCEDURE — 85025 COMPLETE CBC W/AUTO DIFF WBC: CPT | Mod: 91

## 2021-12-03 PROCEDURE — 36415 COLL VENOUS BLD VENIPUNCTURE: CPT

## 2021-12-03 PROCEDURE — 700105 HCHG RX REV CODE 258: Performed by: EMERGENCY MEDICINE

## 2021-12-03 PROCEDURE — 700101 HCHG RX REV CODE 250: Performed by: INTERNAL MEDICINE

## 2021-12-03 PROCEDURE — 770020 HCHG ROOM/CARE - TELE (206)

## 2021-12-03 PROCEDURE — 700102 HCHG RX REV CODE 250 W/ 637 OVERRIDE(OP): Performed by: INTERNAL MEDICINE

## 2021-12-03 PROCEDURE — 700111 HCHG RX REV CODE 636 W/ 250 OVERRIDE (IP): Performed by: EMERGENCY MEDICINE

## 2021-12-03 PROCEDURE — 80053 COMPREHEN METABOLIC PANEL: CPT

## 2021-12-03 PROCEDURE — A9270 NON-COVERED ITEM OR SERVICE: HCPCS | Performed by: INTERNAL MEDICINE

## 2021-12-03 PROCEDURE — 83735 ASSAY OF MAGNESIUM: CPT | Mod: 91

## 2021-12-03 PROCEDURE — 700111 HCHG RX REV CODE 636 W/ 250 OVERRIDE (IP): Performed by: STUDENT IN AN ORGANIZED HEALTH CARE EDUCATION/TRAINING PROGRAM

## 2021-12-03 RX ORDER — POLYETHYLENE GLYCOL 3350 17 G/17G
1 POWDER, FOR SOLUTION ORAL
Status: DISCONTINUED | OUTPATIENT
Start: 2021-12-03 | End: 2021-12-03

## 2021-12-03 RX ORDER — LORAZEPAM 2 MG/ML
1 INJECTION INTRAMUSCULAR ONCE
Status: COMPLETED | OUTPATIENT
Start: 2021-12-03 | End: 2021-12-03

## 2021-12-03 RX ORDER — LORAZEPAM 2 MG/ML
1 INJECTION INTRAMUSCULAR
Status: DISCONTINUED | OUTPATIENT
Start: 2021-12-03 | End: 2021-12-04 | Stop reason: HOSPADM

## 2021-12-03 RX ORDER — AMOXICILLIN 250 MG
2 CAPSULE ORAL 2 TIMES DAILY
Status: DISCONTINUED | OUTPATIENT
Start: 2021-12-03 | End: 2021-12-03

## 2021-12-03 RX ORDER — LORAZEPAM 2 MG/1
4 TABLET ORAL
Status: DISCONTINUED | OUTPATIENT
Start: 2021-12-03 | End: 2021-12-03

## 2021-12-03 RX ORDER — CLONIDINE HYDROCHLORIDE 0.1 MG/1
0.1 TABLET ORAL EVERY 6 HOURS PRN
Status: DISCONTINUED | OUTPATIENT
Start: 2021-12-03 | End: 2021-12-04 | Stop reason: HOSPADM

## 2021-12-03 RX ORDER — LORAZEPAM 1 MG/1
1 TABLET ORAL EVERY 4 HOURS PRN
Status: DISCONTINUED | OUTPATIENT
Start: 2021-12-03 | End: 2021-12-03

## 2021-12-03 RX ORDER — DIAZEPAM 5 MG/1
5 TABLET ORAL EVERY 6 HOURS
Status: DISCONTINUED | OUTPATIENT
Start: 2021-12-03 | End: 2021-12-04 | Stop reason: HOSPADM

## 2021-12-03 RX ORDER — ONDANSETRON 2 MG/ML
4 INJECTION INTRAMUSCULAR; INTRAVENOUS EVERY 4 HOURS PRN
Status: DISCONTINUED | OUTPATIENT
Start: 2021-12-03 | End: 2021-12-04 | Stop reason: HOSPADM

## 2021-12-03 RX ORDER — ONDANSETRON 4 MG/1
4 TABLET, ORALLY DISINTEGRATING ORAL EVERY 4 HOURS PRN
Status: DISCONTINUED | OUTPATIENT
Start: 2021-12-03 | End: 2021-12-04 | Stop reason: HOSPADM

## 2021-12-03 RX ORDER — ACETAMINOPHEN 325 MG/1
650 TABLET ORAL EVERY 6 HOURS PRN
Status: DISCONTINUED | OUTPATIENT
Start: 2021-12-03 | End: 2021-12-04 | Stop reason: HOSPADM

## 2021-12-03 RX ORDER — LORAZEPAM 2 MG/ML
0.5 INJECTION INTRAMUSCULAR EVERY 4 HOURS PRN
Status: DISCONTINUED | OUTPATIENT
Start: 2021-12-03 | End: 2021-12-03

## 2021-12-03 RX ORDER — ENALAPRILAT 1.25 MG/ML
1.25 INJECTION INTRAVENOUS EVERY 6 HOURS PRN
Status: DISCONTINUED | OUTPATIENT
Start: 2021-12-03 | End: 2021-12-04 | Stop reason: HOSPADM

## 2021-12-03 RX ORDER — PROMETHAZINE HYDROCHLORIDE 25 MG/1
12.5-25 SUPPOSITORY RECTAL EVERY 4 HOURS PRN
Status: DISCONTINUED | OUTPATIENT
Start: 2021-12-03 | End: 2021-12-04 | Stop reason: HOSPADM

## 2021-12-03 RX ORDER — PROMETHAZINE HYDROCHLORIDE 25 MG/1
12.5-25 TABLET ORAL EVERY 4 HOURS PRN
Status: DISCONTINUED | OUTPATIENT
Start: 2021-12-03 | End: 2021-12-04 | Stop reason: HOSPADM

## 2021-12-03 RX ORDER — FOLIC ACID 1 MG/1
1 TABLET ORAL DAILY
Status: DISCONTINUED | OUTPATIENT
Start: 2021-12-03 | End: 2021-12-03

## 2021-12-03 RX ORDER — LORAZEPAM 2 MG/1
4 TABLET ORAL
Status: DISCONTINUED | OUTPATIENT
Start: 2021-12-03 | End: 2021-12-04 | Stop reason: HOSPADM

## 2021-12-03 RX ORDER — LORAZEPAM 2 MG/ML
1.5 INJECTION INTRAMUSCULAR
Status: DISCONTINUED | OUTPATIENT
Start: 2021-12-03 | End: 2021-12-03

## 2021-12-03 RX ORDER — BISACODYL 10 MG
10 SUPPOSITORY, RECTAL RECTAL
Status: DISCONTINUED | OUTPATIENT
Start: 2021-12-03 | End: 2021-12-03

## 2021-12-03 RX ORDER — LORAZEPAM 2 MG/1
2 TABLET ORAL
Status: DISCONTINUED | OUTPATIENT
Start: 2021-12-03 | End: 2021-12-03

## 2021-12-03 RX ORDER — LABETALOL HYDROCHLORIDE 5 MG/ML
10 INJECTION, SOLUTION INTRAVENOUS EVERY 4 HOURS PRN
Status: DISCONTINUED | OUTPATIENT
Start: 2021-12-03 | End: 2021-12-04 | Stop reason: HOSPADM

## 2021-12-03 RX ORDER — SODIUM CHLORIDE 9 MG/ML
1000 INJECTION, SOLUTION INTRAVENOUS ONCE
Status: COMPLETED | OUTPATIENT
Start: 2021-12-03 | End: 2021-12-03

## 2021-12-03 RX ORDER — GAUZE BANDAGE 2" X 2"
100 BANDAGE TOPICAL DAILY
Status: DISCONTINUED | OUTPATIENT
Start: 2021-12-03 | End: 2021-12-03

## 2021-12-03 RX ORDER — LORAZEPAM 2 MG/ML
1.5 INJECTION INTRAMUSCULAR
Status: DISCONTINUED | OUTPATIENT
Start: 2021-12-03 | End: 2021-12-04 | Stop reason: HOSPADM

## 2021-12-03 RX ORDER — LORAZEPAM 0.5 MG/1
0.5 TABLET ORAL EVERY 4 HOURS PRN
Status: DISCONTINUED | OUTPATIENT
Start: 2021-12-03 | End: 2021-12-04 | Stop reason: HOSPADM

## 2021-12-03 RX ORDER — PROCHLORPERAZINE EDISYLATE 5 MG/ML
5-10 INJECTION INTRAMUSCULAR; INTRAVENOUS EVERY 4 HOURS PRN
Status: DISCONTINUED | OUTPATIENT
Start: 2021-12-03 | End: 2021-12-04 | Stop reason: HOSPADM

## 2021-12-03 RX ORDER — OMEPRAZOLE 20 MG/1
20 CAPSULE, DELAYED RELEASE ORAL DAILY
Status: DISCONTINUED | OUTPATIENT
Start: 2021-12-03 | End: 2021-12-04 | Stop reason: HOSPADM

## 2021-12-03 RX ORDER — LORAZEPAM 2 MG/ML
2 INJECTION INTRAMUSCULAR
Status: DISCONTINUED | OUTPATIENT
Start: 2021-12-03 | End: 2021-12-03

## 2021-12-03 RX ORDER — LORAZEPAM 2 MG/ML
2 INJECTION INTRAMUSCULAR
Status: DISCONTINUED | OUTPATIENT
Start: 2021-12-03 | End: 2021-12-04 | Stop reason: HOSPADM

## 2021-12-03 RX ORDER — LORAZEPAM 2 MG/ML
1 INJECTION INTRAMUSCULAR
Status: DISCONTINUED | OUTPATIENT
Start: 2021-12-03 | End: 2021-12-03

## 2021-12-03 RX ORDER — LORAZEPAM 2 MG/1
2 TABLET ORAL
Status: DISCONTINUED | OUTPATIENT
Start: 2021-12-03 | End: 2021-12-04 | Stop reason: HOSPADM

## 2021-12-03 RX ORDER — LACTULOSE 20 G/30ML
30 SOLUTION ORAL 3 TIMES DAILY
Status: DISCONTINUED | OUTPATIENT
Start: 2021-12-03 | End: 2021-12-04 | Stop reason: HOSPADM

## 2021-12-03 RX ORDER — LABETALOL HYDROCHLORIDE 5 MG/ML
10 INJECTION, SOLUTION INTRAVENOUS
Status: DISCONTINUED | OUTPATIENT
Start: 2021-12-03 | End: 2021-12-03

## 2021-12-03 RX ORDER — OMEPRAZOLE 20 MG/1
20 CAPSULE, DELAYED RELEASE ORAL DAILY
Status: DISCONTINUED | OUTPATIENT
Start: 2021-12-03 | End: 2021-12-03 | Stop reason: HOSPADM

## 2021-12-03 RX ORDER — GAUZE BANDAGE 2" X 2"
100 BANDAGE TOPICAL DAILY
Status: DISCONTINUED | OUTPATIENT
Start: 2021-12-04 | End: 2021-12-04 | Stop reason: HOSPADM

## 2021-12-03 RX ORDER — DIAZEPAM 5 MG/1
10 TABLET ORAL EVERY 6 HOURS
Status: COMPLETED | OUTPATIENT
Start: 2021-12-03 | End: 2021-12-03

## 2021-12-03 RX ORDER — FOLIC ACID 1 MG/1
1 TABLET ORAL DAILY
Status: DISCONTINUED | OUTPATIENT
Start: 2021-12-04 | End: 2021-12-04 | Stop reason: HOSPADM

## 2021-12-03 RX ORDER — POTASSIUM CHLORIDE 20 MEQ/1
20 TABLET, EXTENDED RELEASE ORAL 3 TIMES DAILY
Status: COMPLETED | OUTPATIENT
Start: 2021-12-03 | End: 2021-12-04

## 2021-12-03 RX ORDER — LORAZEPAM 1 MG/1
0.5 TABLET ORAL EVERY 4 HOURS PRN
Status: DISCONTINUED | OUTPATIENT
Start: 2021-12-03 | End: 2021-12-03

## 2021-12-03 RX ADMIN — LACTULOSE 30 ML: 20 SOLUTION ORAL at 11:10

## 2021-12-03 RX ADMIN — LORAZEPAM 2 MG: 1 TABLET ORAL at 00:07

## 2021-12-03 RX ADMIN — DIAZEPAM 10 MG: 5 TABLET ORAL at 18:05

## 2021-12-03 RX ADMIN — DIAZEPAM 10 MG: 5 TABLET ORAL at 11:09

## 2021-12-03 RX ADMIN — PHYTONADIONE 10 MG: 10 INJECTION, EMULSION INTRAMUSCULAR; INTRAVENOUS; SUBCUTANEOUS at 18:48

## 2021-12-03 RX ADMIN — LORAZEPAM 2 MG: 2 TABLET ORAL at 20:25

## 2021-12-03 RX ADMIN — LACTULOSE 30 ML: 20 SOLUTION ORAL at 18:06

## 2021-12-03 RX ADMIN — LORAZEPAM 0.5 MG: 0.5 TABLET ORAL at 08:41

## 2021-12-03 RX ADMIN — OMEPRAZOLE 20 MG: 20 CAPSULE, DELAYED RELEASE ORAL at 18:05

## 2021-12-03 RX ADMIN — LORAZEPAM 0.5 MG: 0.5 TABLET ORAL at 05:12

## 2021-12-03 RX ADMIN — Medication 400 MG: at 18:05

## 2021-12-03 RX ADMIN — LACTULOSE 30 ML: 20 SOLUTION ORAL at 05:12

## 2021-12-03 RX ADMIN — OMEPRAZOLE 20 MG: 20 CAPSULE, DELAYED RELEASE ORAL at 05:12

## 2021-12-03 RX ADMIN — THIAMINE HYDROCHLORIDE: 100 INJECTION, SOLUTION INTRAMUSCULAR; INTRAVENOUS at 18:06

## 2021-12-03 RX ADMIN — SODIUM CHLORIDE 1000 ML: 9 INJECTION, SOLUTION INTRAVENOUS at 17:16

## 2021-12-03 RX ADMIN — FOLIC ACID 1 MG: 1 TABLET ORAL at 05:12

## 2021-12-03 RX ADMIN — ONDANSETRON 4 MG: 4 TABLET, ORALLY DISINTEGRATING ORAL at 00:07

## 2021-12-03 RX ADMIN — DIAZEPAM 5 MG: 5 TABLET ORAL at 22:42

## 2021-12-03 RX ADMIN — Medication 400 MG: at 05:12

## 2021-12-03 RX ADMIN — THERA TABS 1 TABLET: TAB at 05:12

## 2021-12-03 RX ADMIN — LORAZEPAM 1.5 MG: 2 INJECTION INTRAMUSCULAR; INTRAVENOUS at 01:09

## 2021-12-03 RX ADMIN — LORAZEPAM 1 MG: 2 INJECTION INTRAMUSCULAR; INTRAVENOUS at 17:15

## 2021-12-03 RX ADMIN — POTASSIUM CHLORIDE 20 MEQ: 1500 TABLET, EXTENDED RELEASE ORAL at 18:05

## 2021-12-03 RX ADMIN — DIAZEPAM 10 MG: 5 TABLET ORAL at 05:12

## 2021-12-03 RX ADMIN — Medication 100 MG: at 05:12

## 2021-12-03 RX ADMIN — LORAZEPAM 1 MG: 1 TABLET ORAL at 02:13

## 2021-12-03 ASSESSMENT — ENCOUNTER SYMPTOMS
NAUSEA: 1
EYE PAIN: 0
INSOMNIA: 0
FALLS: 1
STRIDOR: 0
SORE THROAT: 0
TREMORS: 1
NAUSEA: 0
HEMOPTYSIS: 0
PHOTOPHOBIA: 0
DIZZINESS: 1
DOUBLE VISION: 0
DIZZINESS: 0
BLURRED VISION: 0
BRUISES/BLEEDS EASILY: 0
MYALGIAS: 0
WEAKNESS: 1
HEADACHES: 0
WEIGHT LOSS: 0
FOCAL WEAKNESS: 0
DEPRESSION: 0
ABDOMINAL PAIN: 0
FEVER: 0
NECK PAIN: 0
NECK PAIN: 0
HEMOPTYSIS: 0
BACK PAIN: 0
CHILLS: 1
NERVOUS/ANXIOUS: 1
NERVOUS/ANXIOUS: 0
PALPITATIONS: 0
COUGH: 0
SHORTNESS OF BREATH: 1
PALPITATIONS: 1
VOMITING: 1
COUGH: 0
FEVER: 0
VOMITING: 0

## 2021-12-03 ASSESSMENT — LIFESTYLE VARIABLES
HAVE PEOPLE ANNOYED YOU BY CRITICIZING YOUR DRINKING: YES
ANXIETY: *
ORIENTATION AND CLOUDING OF SENSORIUM: ORIENTED AND CAN DO SERIAL ADDITIONS
ANXIETY: *
VISUAL DISTURBANCES: NOT PRESENT
ANXIETY: NO ANXIETY (AT EASE)
HAVE YOU EVER FELT YOU SHOULD CUT DOWN ON YOUR DRINKING: YES
AUDITORY DISTURBANCES: NOT PRESENT
AGITATION: SOMEWHAT MORE THAN NORMAL ACTIVITY
AGITATION: NORMAL ACTIVITY
TREMOR: *
AUDITORY DISTURBANCES: NOT PRESENT
HEADACHE, FULLNESS IN HEAD: NOT PRESENT
HEADACHE, FULLNESS IN HEAD: MILD
TOTAL SCORE: 7
VISUAL DISTURBANCES: NOT PRESENT
ANXIETY: NO ANXIETY (AT EASE)
PAROXYSMAL SWEATS: BARELY PERCEPTIBLE SWEATING. PALMS MOIST
TREMOR: TREMOR NOT VISIBLE BUT CAN BE FELT, FINGERTIP TO FINGERTIP
TOTAL SCORE: VERY MILD ITCHING, PINS AND NEEDLES SENSATION, BURNING OR NUMBNESS
TOTAL SCORE: 3
AGITATION: NORMAL ACTIVITY
NAUSEA AND VOMITING: NO NAUSEA AND NO VOMITING
TOTAL SCORE: 4
AUDITORY DISTURBANCES: NOT PRESENT
ON A TYPICAL DAY WHEN YOU DRINK ALCOHOL HOW MANY DRINKS DO YOU HAVE: 5
PAROXYSMAL SWEATS: BARELY PERCEPTIBLE SWEATING. PALMS MOIST
HOW MANY TIMES IN THE PAST YEAR HAVE YOU HAD 5 OR MORE DRINKS IN A DAY: 150
TREMOR: NO TREMOR
AUDITORY DISTURBANCES: NOT PRESENT
NAUSEA AND VOMITING: NO NAUSEA AND NO VOMITING
TOTAL SCORE: 4
ORIENTATION AND CLOUDING OF SENSORIUM: ORIENTED AND CAN DO SERIAL ADDITIONS
CONSUMPTION TOTAL: POSITIVE
TOTAL SCORE: 4
TREMOR: TREMOR NOT VISIBLE BUT CAN BE FELT, FINGERTIP TO FINGERTIP
AUDITORY DISTURBANCES: NOT PRESENT
HEADACHE, FULLNESS IN HEAD: MODERATELY SEVERE
VISUAL DISTURBANCES: MODERATE SENSITIVITY
DOES PATIENT WANT TO TALK TO SOMEONE ABOUT QUITTING: YES
TREMOR: *
PAROXYSMAL SWEATS: BARELY PERCEPTIBLE SWEATING. PALMS MOIST
TOTAL SCORE: 10
ANXIETY: MILDLY ANXIOUS
ANXIETY: NO ANXIETY (AT EASE)
NAUSEA AND VOMITING: *
PAROXYSMAL SWEATS: BARELY PERCEPTIBLE SWEATING. PALMS MOIST
ORIENTATION AND CLOUDING OF SENSORIUM: CANNOT DO SERIAL ADDITIONS OR IS UNCERTAIN ABOUT DATE
TOTAL SCORE: 12
HEADACHE, FULLNESS IN HEAD: MILD
AGITATION: NORMAL ACTIVITY
AUDITORY DISTURBANCES: NOT PRESENT
NAUSEA AND VOMITING: NO NAUSEA AND NO VOMITING
AGITATION: NORMAL ACTIVITY
TREMOR: *
VISUAL DISTURBANCES: VERY MILD SENSITIVITY
PAROXYSMAL SWEATS: BARELY PERCEPTIBLE SWEATING. PALMS MOIST
ORIENTATION AND CLOUDING OF SENSORIUM: CANNOT DO SERIAL ADDITIONS OR IS UNCERTAIN ABOUT DATE
ORIENTATION AND CLOUDING OF SENSORIUM: DATE DISORIENTATION BY MORE THAN TWO CALENDAR DAYS
NAUSEA AND VOMITING: *
AUDITORY DISTURBANCES: NOT PRESENT
HEADACHE, FULLNESS IN HEAD: MODERATELY SEVERE
ORIENTATION AND CLOUDING OF SENSORIUM: ORIENTED AND CAN DO SERIAL ADDITIONS
HEADACHE, FULLNESS IN HEAD: NOT PRESENT
VISUAL DISTURBANCES: NOT PRESENT
TREMOR: *
VISUAL DISTURBANCES: NOT PRESENT
ORIENTATION AND CLOUDING OF SENSORIUM: ORIENTED AND CAN DO SERIAL ADDITIONS
TOTAL SCORE: 7
ALCOHOL_USE: YES
PAROXYSMAL SWEATS: BARELY PERCEPTIBLE SWEATING. PALMS MOIST
TOTAL SCORE: 4
AGITATION: SOMEWHAT MORE THAN NORMAL ACTIVITY
EVER HAD A DRINK FIRST THING IN THE MORNING TO STEADY YOUR NERVES TO GET RID OF A HANGOVER: YES
HEADACHE, FULLNESS IN HEAD: MODERATELY SEVERE
AGITATION: NORMAL ACTIVITY
EVER FELT BAD OR GUILTY ABOUT YOUR DRINKING: YES
NAUSEA AND VOMITING: NO NAUSEA AND NO VOMITING
SUBSTANCE_ABUSE: 1
ANXIETY: NO ANXIETY (AT EASE)
PAROXYSMAL SWEATS: BARELY PERCEPTIBLE SWEATING. PALMS MOIST
AVERAGE NUMBER OF DAYS PER WEEK YOU HAVE A DRINK CONTAINING ALCOHOL: 4
VISUAL DISTURBANCES: NOT PRESENT
NAUSEA AND VOMITING: NO NAUSEA AND NO VOMITING
TOTAL SCORE: 16
DOES PATIENT WANT TO STOP DRINKING: YES

## 2021-12-03 ASSESSMENT — COGNITIVE AND FUNCTIONAL STATUS - GENERAL
WALKING IN HOSPITAL ROOM: A LITTLE
SUGGESTED CMS G CODE MODIFIER DAILY ACTIVITY: CI
DAILY ACTIVITIY SCORE: 23
DAILY ACTIVITIY SCORE: 24
MOBILITY SCORE: 21
DRESSING REGULAR LOWER BODY CLOTHING: A LITTLE
SUGGESTED CMS G CODE MODIFIER DAILY ACTIVITY: CH
SUGGESTED CMS G CODE MODIFIER MOBILITY: CJ
CLIMB 3 TO 5 STEPS WITH RAILING: A LITTLE
WALKING IN HOSPITAL ROOM: A LITTLE
STANDING UP FROM CHAIR USING ARMS: A LITTLE

## 2021-12-03 ASSESSMENT — FIBROSIS 4 INDEX
FIB4 SCORE: 44.77
FIB4 SCORE: 49.61
FIB4 SCORE: 47.57

## 2021-12-03 ASSESSMENT — PATIENT HEALTH QUESTIONNAIRE - PHQ9
1. LITTLE INTEREST OR PLEASURE IN DOING THINGS: NOT AT ALL
2. FEELING DOWN, DEPRESSED, IRRITABLE, OR HOPELESS: NOT AT ALL
SUM OF ALL RESPONSES TO PHQ9 QUESTIONS 1 AND 2: 0

## 2021-12-03 NOTE — ED TRIAGE NOTES
"Chief Complaint   Patient presents with   • Chest Pain   • Palpitations   • Shortness of Breath       42 yo male to triage for above complaint. Patient reports last night substernal chest pain started, non radiating. Patient endorses associated SOB and states he had palpitations as well. Patient states \"I just do not feel good\". Patient states he has hx of liver disease and has not taken meds x5 months. Patient states he does not have liver specialist in [place].    Pt is alert and oriented, speaking in full sentences, follows commands and responds appropriately to questions.     Patient placed back in lobby and educated on triage process. Asked to inform RN of any changes.    BP (!) 183/113   Pulse (!) 110   Temp 36.2 °C (97.1 °F) (Temporal)   Resp 16   Wt 120 kg (263 lb 14.3 oz)   SpO2 95%     "

## 2021-12-03 NOTE — CARE PLAN
Problem: Pain - Standard  Goal: Alleviation of pain or a reduction in pain to the patient’s comfort goal  Outcome: Progressing     Problem: Optimal Care for Alcohol Withdrawal  Goal: Optimal Care for the alcohol withdrawal patient  Outcome: Progressing     Problem: Seizure Precautions  Goal: Implementation of seizure precautions  Outcome: Progressing     Problem: Fall Risk  Goal: Patient will remain free from falls  Outcome: Progressing   The patient is Watcher - Medium risk of patient condition declining or worsening         Progress made toward(s) clinical / shift goals:  Seizure and fall precautions in place.  Ciwa protocol in use, platelets being monitored, chest pain improving    Patient is not progressing towards the following goals: Platelets are critically low and still decreasing

## 2021-12-03 NOTE — PROGRESS NOTES
Pt up to floor with ACLS RN at appx. 0030. Tele box in use, monitor room notified. Assumed care of PT A&O 4. Pt resting in bed with no signs of labored breathing. On RA. Call light within reach, bed in lowest position, upper bed rails up. Pt was updated on plan of care for the day . Will continue to monitor.

## 2021-12-03 NOTE — PROGRESS NOTES
Girlfriend, Marleni, updated via phone. Pt stated he will call her once he is on the floor. Marleni aware.

## 2021-12-03 NOTE — CARE PLAN
Problem: Seizure Precautions  Goal: Implementation of seizure precautions  Outcome: Progressing     Problem: Psychosocial  Goal: Patient's level of anxiety will decrease  Outcome: Progressing  Flowsheets (Taken 12/3/2021 7997)  Decrease Anxiety Level: Pharmacologic management per MD order  Patient Behaviors: Anxious  Note: Patient is assessed appropriately. Patient is helped to identify factors for stress and anxiety secondary to ETOH withdrawl. Patient is taught to deep breathe and other forms of stress/anxiety management. Current medical management of CIWA per scoring. Patient verbalizes understanding. Will continue to monitor.      Problem: Fall Risk  Goal: Patient will remain free from falls  Outcome: Progressing  Note: Pt is A&Ox4. Bed alarm in place. Treaded slipper socks on pt. Educated to call light. Personal belongings within reach. Remains free from falls at this time.    The patient is Watcher - Medium risk of patient condition declining or worsening    Shift Goals  Clinical Goals: CIWA protocol, seizure prevetion, electrolyte repletion  Patient Goals: rest    Progress made toward(s) clinical / shift goals:  ongoing    Patient is not progressing towards the following goals:

## 2021-12-03 NOTE — PROGRESS NOTES
Pt spoke with significant other, Maria Victoria, using RN's phone and updated her on the situation.

## 2021-12-03 NOTE — PROGRESS NOTES
Handoff report received. Assumed patient care. PT is reclined in bed, AAOx 4, complaints of knee discomfort secondary to fall prior to hospital admit. POC discussed with PT and all questions addressed. Safety precautions in place. Call light and personal belongings within reach. Educated to call for assistance if needed.     Covid 19 Surge/Crisis charting in effect

## 2021-12-03 NOTE — ASSESSMENT & PLAN NOTE
MELDNa 16  Does not take any medications, does not follow with any GI or PCP.  Continues to drink.  No history of hematemesis or hematochezia.  Lactulose.

## 2021-12-03 NOTE — PROGRESS NOTES
Patient has chosen to leave the hospital against medical advice. The attending physician has not discharged the patient. Patient is not a risk to himself or others. I have discussed with the patient the following:  Physician has not determined patient is ready for discharge, Risks and consequences of leaving the hospital too soon and Benefit of continued hospitalization.      Discharge against medical advice form has been Patient refused to sign.      Attending physician has been notified.

## 2021-12-03 NOTE — ED PROVIDER NOTES
ED Provider Note    CHIEF COMPLAINT  Chief Complaint   Patient presents with   • Chest Pain   • Palpitations   • Shortness of Breath       HPI  Jalen Vaughn is a 41 y.o. male who presents with chest pain and shortness of breath.  He states that last night around 10 PM he was sitting down resting and experienced 9 out of 10 midsternal nonradiating, nonexertional nonpleuritic chest pain.  He states that chest pain would come and go denied any exacerbating or alleviating factors.  He denied any abdominal pain or acid reflux.  He stated that since morning he began to experience some shortness of breath and chills. He denied any cough or recent sick contacts.  He also stated that he had an episode of vomiting after drinking water today and has not eaten in the past 2 days.  He also reports poor sleep as he has a 1 1/2-year-old at home.  He denied any hematemesis, abdominal pain, diarrhea, constipation, melena, hematochezia.  Said his last bowel movement was this morning.  He states that he was hospitalized several months ago for several symptoms of chest pain shortness of breath and was told that troponins were negative.  He was diagnosed with alcohol induced cirrhosis 1 year ago.  He currently has 3-4 drinks per day and was treated for withdrawal in the past however denies any history of withdrawal seizure. He was prescribed lactulose and Lasix, however has not taking any medications and does not have a PCP or GI at this time.  He denies any history of hematemesis, hemoptysis or any history of paracentesis. Patient also reports orthostatic dizziness, tremor, chills, poor balance and recent fall several days ago.  He does note a bruise along his right calf however denies any unilateral leg swelling or calf pain.  He does ambulate without assistance denies any recent trauma other than a small abrasion to his left knee and denies any recent surgeries.  He denies any known history of DVT or PE.       REVIEW OF  SYSTEMS  ROS       See HPI for further details. All other systems are negative.     PAST MEDICAL HISTORY   has a past medical history of Liver disease.  Prediabetes, alcohol use disorder, hypertension    Family history notable for heart disease in his father.  States his father had a massive MI at age 44    SOCIAL HISTORY  Social History     Tobacco Use   • Smoking status: Never Smoker   • Smokeless tobacco: Current User     Types: Chew   Vaping Use   • Vaping Use: Never used   Substance and Sexual Activity   • Alcohol use: Yes     Comment: daily   • Drug use: Not Currently   • Sexual activity: Not on file     Drinks 3-4 drinks per day last drink was noon today    Patient lives at home with his girlfriend who is currently pregnant and their child.     He works operating heavy machinery.    SURGICAL HISTORY  patient denies any surgical history    CURRENT MEDICATIONS  Home Medications     Reviewed by Reina Dueñas R.N. (Registered Nurse) on 12/02/21 at 1715  Med List Status: Not Addressed   Medication Last Dose Status   lactulose 10 GM/15ML Solution  Active   potassium chloride (KLOR-CON) 20 MEQ Pack  Active                ALLERGIES  No Known Allergies    PHYSICAL EXAM  Vitals:    12/02/21 1700   BP: (!) 183/113   Pulse: (!) 110   Resp: 16   Temp: 36.2 °C (97.1 °F)   SpO2: 95%       Physical Exam  HENT:      Head: Normocephalic and atraumatic.   Eyes:      Extraocular Movements: Extraocular movements intact.      Pupils: Pupils are equal, round, and reactive to light.      Comments: nystagmus when looking laterally   Neck:      Thyroid: No thyromegaly.   Cardiovascular:      Rate and Rhythm: Normal rate.      Heart sounds: No murmur heard.      Pulmonary:      Effort: Pulmonary effort is normal. No respiratory distress.      Breath sounds: Normal breath sounds. No wheezing or rales.   Abdominal:      General: Bowel sounds are normal.      Palpations: Abdomen is soft. There is hepatomegaly. There is no fluid  wave.      Tenderness: There is no abdominal tenderness. There is no guarding.   Musculoskeletal:      Comments: 1+ edema to midshin bilaterally  Bruise along left calf, no varicose veins, no calf tenderness   Skin:     Comments: Abrasion to left knee   Neurological:      Comments: Nystagmus present  Cranial nerves otherwise intact  Intention tremor bilaterally   Psychiatric:         Mood and Affect: Mood is anxious.      Comments: CIWA 13           DIAGNOSTIC STUDIES / PROCEDURES    EKG  EKG Interpretation    Interpreted by emergency department physician    Rhythm: sinus  Rate: 109  Axis: P 67 QRS 50 T52  Ectopy: baseline wander in leads II III AVF  Conduction: no intraventricular conduction delay  ST Segments: No ST abnormalities  T Waves: No T wave abnormalities  Q Waves: No Q waves    Clinical Impression: Sinus tachycardia        LABS  Labs notable for hemoglobin 13.5  WBC count 4.5 platelet count 23, creatinine within normal at 0.51, , ALT 40, alkaline phosphatase 213, T bili 3.8,  albumin 3.5, globulin 3.7, troponin of 12      RADIOLOGY  Chest x-ray with no signs of infiltrate or pneumothorax      COURSE & MEDICAL DECISION MAKING  Pertinent Labs & Imaging studies reviewed. (See chart for details)    Patient presented with left calf bruising sudden onset chest pain shortness of breath with history of cirrhosis.  LA does have thrombocytopenia he is likely in a hypercoagulable state, with high risk for PE.  With no signs of pneumothorax or infiltrate.  Troponins 12, EKG with sinus rhythm and no ST changes, chest pain is nonexertional making cardiac etiology less likely.  CTPE negative for PE.     Pending Covid 19 PCR      Labs notable for mild alcoholic hepatitis and cirrhosis.    CIWA 13 on admission.  Patient was given 4 mg of Ativan and started on CIWA.  He was also given detox bag.     The patient will not drink alcohol nor drive with prescribed medications. The patient will return for worsening  symptoms and is stable at the time of discharge. The patient verbalizes understanding and will comply.    FINAL IMPRESSION  1.  Atypical chest pain  2.  Alcohol use disorder with withdrawal  3.  Mild alcoholic hepatitis in the setting alcohol induced cirrhosis  4.  Thrombocytopenia  5.  Hypokalemia     No signs of gross ascites on exam requiring paracentesis.  Patient will be admitted to telemetry to monitor for hepatic encephalopathy and signs of withdrawal        Electronically signed by: Mann Villegas M.D., 12/2/2021 7:31 PM      Patient here with symptoms that appear most consistent with alcohol withdrawal. Patient with tremor on my exam. Patient with chest pain that is began shortly after spending time with his girlfriend who was hospitalized, this caused patient to drink less, and he only had 1 beer today. Patient's shortness of breath and chest pain do appear likely related to this. Patient troponin is unremarkable, EKG x2 fails to reveal any evidence of acute ischemia. Patient is tachycardic with chest pain, therefore a CTA was checked this is unremarkable for any evidence of pulmonary embolus. Patient's labs are very concerning for developing liver failure. Patient is interested in quitting alcohol, given that he has an alcohol withdrawal that is relatively severe I do believe that admission for further care is necessary. Patient is significantly thrombocytopenic but does not have any bleeding complaints.

## 2021-12-03 NOTE — DISCHARGE SUMMARY
Discharge Summary    CHIEF COMPLAINT ON ADMISSION  Chief Complaint   Patient presents with   • Chest Pain   • Palpitations   • Shortness of Breath       Reason for Admission  CP     Admission Date  12/2/2021    CODE STATUS  Full Code    HPI & HOSPITAL COURSE  This is a 41 y.o. male here with Chest Pain, Palpitations, and Shortness of Breath  Please review Dr. Ruben Gonzalez M.D. notes for further details of history of present illness, past medical/social/family histories, allergies and medications.  He is a current alcohol depnendent and has a history of alcoholism, alcoholic cirrhosis. He continues to drink and has not followed PCP or GI. He complained of chest pain and dizziness. HE had similar presentations in the past but was told his work-up was reassuring.   At the ED, he is tachycardic, tachypneic, hypertensive.   CT chest performed negative for PE, reveals irregular hepatic contour as well as perisplenic varices.    Labs consistent with cirrhosis, pancytopenia, elevated T bili and liver enzymes, elevated INR, diagnostic alcohol 31.2, urine drug screen negative troponin T 12, EKG with sinus rhythm with atrial premature complex, prolonged QTC, no ST T-segment elevation or depression.   He was started on CIWA Ativan protocol. He was still scoring 8-9 on CIWA. He decided to leave AGAINST MEDICAL ADVICE. I have already told him that if he leaves he is at risk for relapsing and dying from alcoholism and cirrhosis. If he does not take alcohol he is also at risk of dying from DTs or alcoholic withdrawal seizures. After noting these risks, he decided to leave AGAINST MEDICAL ADVICE.    Discharge Physical Exam  Still tremulous  Wants to leave AMA    Imaging  CT-CTA CHEST PULMONARY ARTERY W/ RECONS   Final Result         1.  No pulmonary embolus appreciated.   2.  Irregular hepatic contour, compatible changes of cirrhosis. There are perisplenic varices seen.   3.  Low-density hepatic changes, appearance most  compatible with hepatic steatosis.   4.  Atherosclerosis and atherosclerotic coronary artery disease.      DX-CHEST-PORTABLE (1 VIEW)   Final Result         No acute cardiac or pulmonary abnormality is identified.                  Discharge Date  12/3/2021    FOLLOW UP ITEMS POST DISCHARGE      DISCHARGE DIAGNOSES  Principal Problem:    Alcohol withdrawal syndrome with complication, cirrhosis (HCC) POA: Yes  Active Problems:    Alcoholic cirrhosis (HCC) POA: Yes    Pancytopenia (HCC) POA: Yes    Hypokalemia POA: Yes    Hypomagnesemia POA: Yes  Resolved Problems:    * No resolved hospital problems. *      FOLLOW UP    MEDICATIONS ON DISCHARGE     Medication List      You have not been prescribed any medications.         Allergies  No Known Allergies    DIET  Orders Placed This Encounter   Procedures   • Diet Order Diet: Regular     Standing Status:   Standing     Number of Occurrences:   1     Order Specific Question:   Diet:     Answer:   Regular [1]       ACTIVITY    CONSULTATIONS      PROCEDURES  DX-CHEST-PORTABLE (1 VIEW)    Result Date: 12/2/2021 12/2/2021 6:48 PM HISTORY/REASON FOR EXAM:  Chest Pain Shortness of breath TECHNIQUE/EXAM DESCRIPTION AND NUMBER OF VIEWS: Single portable view of the chest. COMPARISON: None FINDINGS: Heart size is within normal limits. No focal infiltrates or consolidations are identified in the lungs. No pleural fluid collections are identified. No pneumothorax is appreciated.     No acute cardiac or pulmonary abnormality is identified.    CT-CTA CHEST PULMONARY ARTERY W/ RECONS    Result Date: 12/2/2021 12/2/2021 8:11 PM HISTORY/REASON FOR EXAM:  Shortness of breath TECHNIQUE/EXAM DESCRIPTION:  CT angiogram of the chest with contrast. Transaxial MDCT scan of chest post bolus 100 cc Omnipaque 350 IV administration. MIP reconstructed images were created and reviewed. Low dose optimization technique was utilized for this CT exam including automated exposure control and adjustment  of the mA and/or kV according to patient size. COMPARISON: None FINDINGS: The visualized portion of the thyroid appear within normal limits.  The trachea and main stem airways are normal in caliber. There are no pathologically enlarged mediastinal lymph nodes. The heart and pericardium appear within normal limits.  Atherosclerotic changes are seen including atherosclerotic coronary artery calcifications. The aorta and its main branch vessels are normal in caliber and configuration.  The pulmonary arteries are well opacified without visualized filling defect. The pulmonary parenchyma demonstrates no acute abnormality. Limited views of the abdomen demonstrate enlargement and diffuse low-attenuation of the liver. Irregular hepatic contour is seen. Perisplenic varices are seen. The bony structures are age appropriate.     1.  No pulmonary embolus appreciated. 2.  Irregular hepatic contour, compatible changes of cirrhosis. There are perisplenic varices seen. 3.  Low-density hepatic changes, appearance most compatible with hepatic steatosis. 4.  Atherosclerosis and atherosclerotic coronary artery disease.      LABORATORY  Lab Results   Component Value Date    SODIUM 136 12/03/2021    POTASSIUM 3.5 (L) 12/03/2021    CHLORIDE 102 12/03/2021    CO2 25 12/03/2021    GLUCOSE 122 (H) 12/03/2021    BUN 4 (L) 12/03/2021    CREATININE 0.47 (L) 12/03/2021        Lab Results   Component Value Date    WBC 4.0 (L) 12/03/2021    HEMOGLOBIN 11.6 (L) 12/03/2021    HEMATOCRIT 33.7 (L) 12/03/2021    PLATELETCT 20 (LL) 12/03/2021        Total time of the discharge process exceeds 35 minutes.

## 2021-12-03 NOTE — ASSESSMENT & PLAN NOTE
[facility] protocol with serial neuro exams  MVI, folate, thiamine   replace magnesium, potassium, phosphorus  Patient expresses desire to quit drinking, social service consult for community resources.

## 2021-12-03 NOTE — ED TRIAGE NOTES
Vitals:    12/03/21 1443   BP: 156/111   Pulse: (!) 104   Resp: 17   Temp: 37.2 °C (99 °F)   SpO2: 92%     Chief Complaint   Patient presents with   • Dizziness   • Tremors   • Chest Pain     Pt left AMA from the telemetry floor for chest pain and alcohol withdrawal. He presents back to the ED for dizziness, tremors, intermittent chest pain, and weakness. Pt is calm and cooperative, alert and oriented x 4. Pt wheeled to and from triage for the above complaints.

## 2021-12-03 NOTE — ASSESSMENT & PLAN NOTE
Likely secondary to continued alcohol abuse and cirrhosis.  Platelets 23 no signs or symptoms of bleeding, transfuse if less than 10,000 or if starting to bleed and less than 50,000.  Continue to monitor

## 2021-12-03 NOTE — ED NOTES
from Lab called with critical result of platelet 23 at 1858. Critical lab result read back to .   Dr. Villegas notified of critical lab result at 1859.  Critical lab result read back by Dr. Villegas.
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
## 2021-12-03 NOTE — PROGRESS NOTES
Monitor Summary     Rhythm ST  HR Range 101  Ectopy None  Measurements 0.14 / 0.09 / 0.33

## 2021-12-03 NOTE — H&P
Hospital Medicine History & Physical Note    Date of Service  12/2/2021    Primary Care Physician  No primary care provider on file.    Consultants  None    Code Status  Full Code    Chief Complaint  Chief Complaint   Patient presents with   • Chest Pain   • Palpitations   • Shortness of Breath       History of Presenting Illness  Jalen Vaughn is a 41 y.o. male past medical history of alcoholic cirrhosis, continued alcohol dependence drinking about a sixpack per day, who presented 12/2/2021 with chest pain, palpitations.  Mr. Vaughn has known alcoholic cirrhosis and continues to drink, he takes no daily medications does not follow with any GI or PCP.  He states evening prior to presentation he experienced midsternal chest pain while resting rated as 9 out of 10 described as pressure-like.  He denied any diaphoresis.  He was experiencing some dizziness and had been vomiting prior to the pain, complained of mild shortness of breath.  Chest pressure has been intermittent and due to the continued nature patient presented for evaluation.  Denies any headache or vision changes, denies recent fevers or chills, cough, abdominal pain, dysuria, hematemesis hematochezia or melena.  He has had similar chest pains in the past and was told that cardiac enzymes and EKG were reassuring.  In the ED the patient was tachycardic, tachypneic, hypertensive, tremulous.  Labs consistent with cirrhosis, pancytopenia, elevated T bili and liver enzymes, elevated INR, diagnostic alcohol 31.2, urine drug screen negative troponin T 12, EKG with sinus rhythm with atrial premature complex, prolonged QTC, no ST T-segment elevation or depression.  CT chest performed negative for PE, reveals irregular hepatic contour as well as perisplenic varices.  Patient was treated for alcohol withdrawal subsequently referred to hospitalist for admission.    I discussed the plan of care with bedside RN, pharmacy and ERP.    Review of Systems  Review of  Systems   Constitutional: Positive for chills. Negative for fever.   HENT: Negative for congestion and nosebleeds.    Eyes: Negative for photophobia and pain.   Respiratory: Positive for shortness of breath. Negative for cough and hemoptysis.    Cardiovascular: Positive for chest pain and palpitations.   Gastrointestinal: Positive for nausea and vomiting. Negative for abdominal pain.   Genitourinary: Negative for dysuria and urgency.   Musculoskeletal: Positive for falls. Negative for back pain and neck pain.   Neurological: Positive for dizziness. Negative for focal weakness.   Psychiatric/Behavioral: Positive for substance abuse. The patient is not nervous/anxious.        Past Medical History   has a past medical history of Liver disease.    Surgical History   has no past surgical history on file.     Family History  Family history reviewed with patient. There is no family history that is pertinent to the chief complaint.     Social History   reports that he has never smoked. His smokeless tobacco use includes chew. He reports current alcohol use. He reports previous drug use.    Allergies  No Known Allergies    Medications  None       Physical Exam  Temp:  [36.2 °C (97.1 °F)-37.6 °C (99.6 °F)] 37.6 °C (99.6 °F)  Pulse:  [] 109  Resp:  [15-32] 22  BP: (147-183)/() 160/105  SpO2:  [90 %-97 %] 91 %  Blood Pressure: 160/105   Temperature: 37.6 °C (99.6 °F)   Pulse: (!) 109   Respiration: (!) 22   Pulse Oximetry: 91 %       Physical Exam  Vitals and nursing note reviewed.   Constitutional:       General: He is not in acute distress.     Appearance: He is obese.      Comments: 41-year-old male appears older than stated age, intoxicated, alert and conversant, tremulous, no acute distress   HENT:      Head: Normocephalic and atraumatic.      Nose: Nose normal.      Mouth/Throat:      Mouth: Mucous membranes are moist.      Pharynx: Oropharynx is clear.   Eyes:      Extraocular Movements: Extraocular movements  intact.      Conjunctiva/sclera: Conjunctivae normal.      Pupils: Pupils are equal, round, and reactive to light.   Cardiovascular:      Rate and Rhythm: Regular rhythm. Tachycardia present.      Pulses: Normal pulses.      Heart sounds: Murmur (soft systolic) heard.       Pulmonary:      Effort: Pulmonary effort is normal.      Breath sounds: No wheezing, rhonchi or rales.   Abdominal:      General: Bowel sounds are normal.      Palpations: Abdomen is soft.      Tenderness: There is no abdominal tenderness. There is no guarding or rebound.   Musculoskeletal:         General: No tenderness. Normal range of motion.      Cervical back: Normal range of motion and neck supple.   Skin:     General: Skin is warm and dry.      Capillary Refill: Capillary refill takes less than 2 seconds.   Neurological:      General: No focal deficit present.      Mental Status: He is alert and oriented to person, place, and time. Mental status is at baseline.      Cranial Nerves: No cranial nerve deficit.      Sensory: No sensory deficit.      Motor: No weakness.   Psychiatric:      Comments: Anxious         Laboratory:  Recent Labs     12/02/21 1812   WBC 4.5*   RBC 4.17*   HEMOGLOBIN 13.5*   HEMATOCRIT 38.6*   MCV 92.6   MCH 32.4   MCHC 35.0   RDW 54.9*   PLATELETCT 23*   MPV 13.3*     Recent Labs     12/02/21  1812   SODIUM 139   POTASSIUM 3.4*   CHLORIDE 101   CO2 25   GLUCOSE 117*   BUN 4*   CREATININE 0.51   CALCIUM 8.7     Recent Labs     12/02/21  1812 12/02/21  1936   ALTSGPT 40  --    ASTSGOT 167*  --    ALKPHOSPHAT 213*  --    TBILIRUBIN 3.8*  --    LIPASE  --  36   GLUCOSE 117*  --      Recent Labs     12/02/21  1936   INR 1.46*     No results for input(s): NTPROBNP in the last 72 hours.      Recent Labs     12/02/21  1812   TROPONINT 12       Imaging:  CT-CTA CHEST PULMONARY ARTERY W/ RECONS   Final Result         1.  No pulmonary embolus appreciated.   2.  Irregular hepatic contour, compatible changes of cirrhosis. There  are perisplenic varices seen.   3.  Low-density hepatic changes, appearance most compatible with hepatic steatosis.   4.  Atherosclerosis and atherosclerotic coronary artery disease.      DX-CHEST-PORTABLE (1 VIEW)   Final Result         No acute cardiac or pulmonary abnormality is identified.          X-Ray:  I have personally reviewed the images and compared with prior images. and My impression is: Clear chest x-ray no consolidations or effusions, no pneumothorax  EKG:  I have personally reviewed the images and compared with prior images. and My impression is: Sinus rhythm, PACs, prolonged QTC, no ST-T segment elevation or depression    Assessment/Plan:  I anticipate this patient will require at least two midnights for appropriate medical management, necessitating inpatient admission.    * Alcohol withdrawal syndrome with complication (HCC)- (present on admission)  Assessment & Plan  [facility] protocol with serial neuro exams  MVI, folate, thiamine   replace magnesium, potassium, phosphorus  Patient expresses desire to quit drinking, social service consult for community resources.    Hypomagnesemia- (present on admission)  Assessment & Plan  Repleted in ED.  Continue to monitor    Hypokalemia- (present on admission)  Assessment & Plan  Repleted in ED.  Continue to monitor    Pancytopenia (HCC)- (present on admission)  Assessment & Plan  Likely secondary to continued alcohol abuse and cirrhosis.  Platelets 23 no signs or symptoms of bleeding, transfuse if less than 10,000 or if starting to bleed and less than 50,000.  Continue to monitor    Alcoholic cirrhosis (HCC)- (present on admission)  Assessment & Plan  MELDNa 16  Does not take any medications, does not follow with any GI or PCP.  Continues to drink.  No history of hematemesis or hematochezia.  Lactulose.          VTE prophylaxis: SCDs/TEDs and enoxaparin ppx

## 2021-12-03 NOTE — PROGRESS NOTES
Lab called with critical Platelet level of 20 at 0331.  Critical lab value read back to .  MD Delgado and MD Gonzalez both notified at 0338.  No new orders at this time, will continue to monitor platelets.

## 2021-12-03 NOTE — PROGRESS NOTES
4 Eyes Skin Assessment Completed by GABRIELE Quiroga and GABRIELE Mena.    Head WDL  Ears WDL  Nose WDL  Mouth WDL  Neck WDL  Breast/Chest WDL  Shoulder Blades WDL  Spine WDL  (R) Arm/Elbow/Hand WDL  (L) Arm/Elbow/Hand WDL  Abdomen WDL  Groin WDL  Scrotum/Coccyx/Buttocks WDL  (R) Leg WDL  (L) Leg Redness, Scab and Swelling, scab with surrounding redness and warmth  (R) Heel/Foot/Toe WDL  (L) Heel/Foot/Toe WDL          Devices In Places Tele Box and Pulse Ox      Interventions In Place N/A    Possible Skin Injury Yes    Pictures Uploaded Into Epic Yes  Wound Consult Placed Yes  RN Wound Prevention Protocol Ordered No

## 2021-12-03 NOTE — ED NOTES
Med Rec completed: per Pt at bedside. Pt reports that he should be taking medications for his blood pressure, but has not been compliant for months. Reports that his physician left for another practice and could not get his prescriptions refilled.      In addition to the medications removed from med rec, pt reports that he should be taking lisinopril of unknown strength.    No ORAL antibiotics in last 30 days    Preferred Pharmacy: [facility]     Pt confirmed following allergies:  No Known Allergies     Pt's home medications:   No current facility-administered medications on file prior to encounter.     Removed medications:   Medication Removal Reason   • [DISCONTINUED] lactulose 10 GM/15ML Solution Pt reports he should be taking this, but has no refills    • [DISCONTINUED] potassium chloride (KLOR-CON) 20 MEQ Pack Pt reports he should be taking this, but has no refills

## 2021-12-04 VITALS
HEART RATE: 96 BPM | RESPIRATION RATE: 18 BRPM | HEIGHT: 75 IN | BODY MASS INDEX: 33.06 KG/M2 | WEIGHT: 265.88 LBS | TEMPERATURE: 99.4 F | DIASTOLIC BLOOD PRESSURE: 104 MMHG | OXYGEN SATURATION: 93 % | SYSTOLIC BLOOD PRESSURE: 160 MMHG

## 2021-12-04 PROBLEM — E66.09 CLASS 1 OBESITY DUE TO EXCESS CALORIES WITHOUT SERIOUS COMORBIDITY WITH BODY MASS INDEX (BMI) OF 33.0 TO 33.9 IN ADULT: Status: ACTIVE | Noted: 2021-12-04

## 2021-12-04 PROBLEM — E66.811 CLASS 1 OBESITY DUE TO EXCESS CALORIES WITHOUT SERIOUS COMORBIDITY WITH BODY MASS INDEX (BMI) OF 33.0 TO 33.9 IN ADULT: Status: ACTIVE | Noted: 2021-12-04

## 2021-12-04 PROBLEM — R74.01 TRANSAMINITIS: Status: ACTIVE | Noted: 2021-12-04

## 2021-12-04 LAB
ALBUMIN SERPL BCP-MCNC: 3 G/DL (ref 3.2–4.9)
ALP SERPL-CCNC: 183 U/L (ref 30–99)
ALT SERPL-CCNC: 29 U/L (ref 2–50)
ANION GAP SERPL CALC-SCNC: 9 MMOL/L (ref 7–16)
AST SERPL-CCNC: 117 U/L (ref 12–45)
BILIRUB CONJ SERPL-MCNC: 2 MG/DL (ref 0.1–0.5)
BILIRUB INDIRECT SERPL-MCNC: 3.1 MG/DL (ref 0–1)
BILIRUB SERPL-MCNC: 5.1 MG/DL (ref 0.1–1.5)
BUN SERPL-MCNC: 2 MG/DL (ref 8–22)
CALCIUM SERPL-MCNC: 7.5 MG/DL (ref 8.5–10.5)
CHLORIDE SERPL-SCNC: 106 MMOL/L (ref 96–112)
CO2 SERPL-SCNC: 21 MMOL/L (ref 20–33)
CREAT SERPL-MCNC: 0.44 MG/DL (ref 0.5–1.4)
ERYTHROCYTE [DISTWIDTH] IN BLOOD BY AUTOMATED COUNT: 57.3 FL (ref 35.9–50)
ERYTHROCYTE [DISTWIDTH] IN BLOOD BY AUTOMATED COUNT: 58.5 FL (ref 35.9–50)
GLUCOSE SERPL-MCNC: 81 MG/DL (ref 65–99)
HCT VFR BLD AUTO: 34.8 % (ref 42–52)
HCT VFR BLD AUTO: 37.5 % (ref 42–52)
HGB BLD-MCNC: 12 G/DL (ref 14–18)
HGB BLD-MCNC: 12.6 G/DL (ref 14–18)
MCH RBC QN AUTO: 31.7 PG (ref 27–33)
MCH RBC QN AUTO: 32.3 PG (ref 27–33)
MCHC RBC AUTO-ENTMCNC: 33.6 G/DL (ref 33.7–35.3)
MCHC RBC AUTO-ENTMCNC: 34.5 G/DL (ref 33.7–35.3)
MCV RBC AUTO: 93.5 FL (ref 81.4–97.8)
MCV RBC AUTO: 94.5 FL (ref 81.4–97.8)
PHOSPHATE SERPL-MCNC: 1.9 MG/DL (ref 2.5–4.5)
PLATELET # BLD AUTO: 19 K/UL (ref 164–446)
PLATELET # BLD AUTO: 23 K/UL (ref 164–446)
PMV BLD AUTO: 10.4 FL (ref 9–12.9)
POTASSIUM SERPL-SCNC: 3.3 MMOL/L (ref 3.6–5.5)
PROT SERPL-MCNC: 5.7 G/DL (ref 6–8.2)
RBC # BLD AUTO: 3.72 M/UL (ref 4.7–6.1)
RBC # BLD AUTO: 3.97 M/UL (ref 4.7–6.1)
SODIUM SERPL-SCNC: 136 MMOL/L (ref 135–145)
WBC # BLD AUTO: 4.5 K/UL (ref 4.8–10.8)
WBC # BLD AUTO: 4.6 K/UL (ref 4.8–10.8)

## 2021-12-04 PROCEDURE — 36415 COLL VENOUS BLD VENIPUNCTURE: CPT

## 2021-12-04 PROCEDURE — 700102 HCHG RX REV CODE 250 W/ 637 OVERRIDE(OP): Performed by: INTERNAL MEDICINE

## 2021-12-04 PROCEDURE — 80048 BASIC METABOLIC PNL TOTAL CA: CPT

## 2021-12-04 PROCEDURE — 85027 COMPLETE CBC AUTOMATED: CPT | Mod: 91

## 2021-12-04 PROCEDURE — 80076 HEPATIC FUNCTION PANEL: CPT

## 2021-12-04 PROCEDURE — 84100 ASSAY OF PHOSPHORUS: CPT

## 2021-12-04 PROCEDURE — 99239 HOSP IP/OBS DSCHRG MGMT >30: CPT | Performed by: STUDENT IN AN ORGANIZED HEALTH CARE EDUCATION/TRAINING PROGRAM

## 2021-12-04 PROCEDURE — A9270 NON-COVERED ITEM OR SERVICE: HCPCS | Performed by: INTERNAL MEDICINE

## 2021-12-04 RX ORDER — POTASSIUM CHLORIDE 20 MEQ/1
40 TABLET, EXTENDED RELEASE ORAL ONCE
Status: DISCONTINUED | OUTPATIENT
Start: 2021-12-04 | End: 2021-12-04 | Stop reason: HOSPADM

## 2021-12-04 RX ADMIN — FOLIC ACID 1 MG: 1 TABLET ORAL at 05:38

## 2021-12-04 RX ADMIN — POTASSIUM CHLORIDE 20 MEQ: 1500 TABLET, EXTENDED RELEASE ORAL at 05:39

## 2021-12-04 RX ADMIN — Medication 100 MG: at 05:38

## 2021-12-04 RX ADMIN — POTASSIUM CHLORIDE 20 MEQ: 1500 TABLET, EXTENDED RELEASE ORAL at 11:32

## 2021-12-04 RX ADMIN — Medication 400 MG: at 05:39

## 2021-12-04 RX ADMIN — LACTULOSE 30 ML: 20 SOLUTION ORAL at 05:38

## 2021-12-04 RX ADMIN — THERA TABS 1 TABLET: TAB at 05:39

## 2021-12-04 RX ADMIN — DIAZEPAM 5 MG: 5 TABLET ORAL at 05:39

## 2021-12-04 RX ADMIN — DIAZEPAM 5 MG: 5 TABLET ORAL at 11:32

## 2021-12-04 RX ADMIN — LORAZEPAM 0.5 MG: 0.5 TABLET ORAL at 09:43

## 2021-12-04 RX ADMIN — OMEPRAZOLE 20 MG: 20 CAPSULE, DELAYED RELEASE ORAL at 05:39

## 2021-12-04 ASSESSMENT — PATIENT HEALTH QUESTIONNAIRE - PHQ9
2. FEELING DOWN, DEPRESSED, IRRITABLE, OR HOPELESS: NOT AT ALL
SUM OF ALL RESPONSES TO PHQ9 QUESTIONS 1 AND 2: 0
1. LITTLE INTEREST OR PLEASURE IN DOING THINGS: NOT AT ALL

## 2021-12-04 ASSESSMENT — LIFESTYLE VARIABLES
DOES PATIENT WANT TO TALK TO SOMEONE ABOUT QUITTING: YES
NAUSEA AND VOMITING: NO NAUSEA AND NO VOMITING
TOTAL SCORE: 4
HAVE YOU EVER FELT YOU SHOULD CUT DOWN ON YOUR DRINKING: YES
AVERAGE NUMBER OF DAYS PER WEEK YOU HAVE A DRINK CONTAINING ALCOHOL: 7
TOTAL SCORE: 4
HEADACHE, FULLNESS IN HEAD: VERY MILD
VISUAL DISTURBANCES: NOT PRESENT
PAROXYSMAL SWEATS: NO SWEAT VISIBLE
ORIENTATION AND CLOUDING OF SENSORIUM: ORIENTED AND CAN DO SERIAL ADDITIONS
AUDITORY DISTURBANCES: NOT PRESENT
TREMOR: NO TREMOR
AGITATION: NORMAL ACTIVITY
HEADACHE, FULLNESS IN HEAD: NOT PRESENT
PAROXYSMAL SWEATS: BARELY PERCEPTIBLE SWEATING. PALMS MOIST
PAROXYSMAL SWEATS: NO SWEAT VISIBLE
AUDITORY DISTURBANCES: NOT PRESENT
HOW MANY TIMES IN THE PAST YEAR HAVE YOU HAD 5 OR MORE DRINKS IN A DAY: 365
AUDITORY DISTURBANCES: NOT PRESENT
AGITATION: NORMAL ACTIVITY
TREMOR: NO TREMOR
TOTAL SCORE: 4
VISUAL DISTURBANCES: NOT PRESENT
DOES PATIENT WANT TO STOP DRINKING: YES
ANXIETY: *
TOTAL SCORE: 4
ORIENTATION AND CLOUDING OF SENSORIUM: ORIENTED AND CAN DO SERIAL ADDITIONS
ANXIETY: *
SUBSTANCE_ABUSE: 1
ALCOHOL_USE: YES
TOTAL SCORE: 7
NAUSEA AND VOMITING: NO NAUSEA AND NO VOMITING
CONSUMPTION TOTAL: POSITIVE
EVER HAD A DRINK FIRST THING IN THE MORNING TO STEADY YOUR NERVES TO GET RID OF A HANGOVER: YES
ANXIETY: *
ON A TYPICAL DAY WHEN YOU DRINK ALCOHOL HOW MANY DRINKS DO YOU HAVE: 6
HEADACHE, FULLNESS IN HEAD: MILD
TOTAL SCORE: 4
EVER FELT BAD OR GUILTY ABOUT YOUR DRINKING: YES
HAVE PEOPLE ANNOYED YOU BY CRITICIZING YOUR DRINKING: YES
ORIENTATION AND CLOUDING OF SENSORIUM: ORIENTED AND CAN DO SERIAL ADDITIONS
VISUAL DISTURBANCES: NOT PRESENT
NAUSEA AND VOMITING: NO NAUSEA AND NO VOMITING
TREMOR: TREMOR NOT VISIBLE BUT CAN BE FELT, FINGERTIP TO FINGERTIP
AGITATION: *

## 2021-12-04 ASSESSMENT — ENCOUNTER SYMPTOMS
CHILLS: 0
NAUSEA: 0
VOMITING: 0
ABDOMINAL PAIN: 0
TREMORS: 1
FEVER: 0
SHORTNESS OF BREATH: 0

## 2021-12-04 NOTE — ASSESSMENT & PLAN NOTE
Supportive care, education, outpatient GI follow-up  Reports that he has been evaluated in the past for liver transplant however because of his alcohol use he is not a candidate.  Patient requesting ammonia level be ordered since he feels slightly confused.

## 2021-12-04 NOTE — ED NOTES
Med Rec completed per patient   Allergies reviewed  No ORAL antibiotics in last 30 days    Patient states that he is not currently taking any daily medications at this time

## 2021-12-04 NOTE — PROGRESS NOTES
Hospital Medicine Daily Progress Note    Date of Service  12/4/2021    Chief Complaint  Jalen Vaughn is a 41 y.o. male admitted 12/3/2021 with alcohol withdrawal    Hospital Course  Jalen Vaughn is a 41 y.o. male with history of alcohol dependence, thrombocytopenia and alcohol withdrawal who presented 12/3/2021 just 6 hours after leaving AGAINST MEDICAL ADVICE with alcohol withdrawal now presenting with difficulty walking, tremor and Palpitations.   Labs reveal thrombocytopenia, LFT elevation and coagulopathy.  Vitals include hypertension and tachycardia.  He receives Ativan and referred to the hospitalist for admission.  At bedside he is tachycardic and sedated.    Interval Problem Update  12/4: Patient seen examined at bedside.  Axial at this morning was 7.  Patient reports that he plans to quit drinking and would like help with medications.  He reports that he does not plan on leaving AGAINST MEDICAL ADVICE this time.  Vitals are stable.  Potassium repleted.  Thrombocytopenia is stable.    I have personally seen and examined the patient at bedside. I discussed the plan of care with patient and bedside RN.    Consultants/Specialty  none    Code Status  Full Code    Disposition  Patient is not medically cleared.   Anticipate discharge to to home with close outpatient follow-up.  I have placed the appropriate orders for post-discharge needs.    Review of Systems  Review of Systems   Constitutional: Positive for malaise/fatigue. Negative for chills and fever.   Respiratory: Negative for shortness of breath.    Cardiovascular: Negative for chest pain and leg swelling.   Gastrointestinal: Negative for abdominal pain, nausea and vomiting.   Neurological: Positive for tremors.   Psychiatric/Behavioral: Positive for substance abuse (Alcohol).   All other systems reviewed and are negative.       Physical Exam  Temp:  [36.6 °C (97.8 °F)-37.2 °C (99 °F)] 37 °C (98.6 °F)  Pulse:  [] 100  Resp:  [17-20]  18  BP: (133-158)/() 140/97  SpO2:  [89 %-95 %] 91 %    Physical Exam  Vitals and nursing note reviewed.   Constitutional:       General: He is not in acute distress.     Appearance: Normal appearance.   HENT:      Head: Normocephalic and atraumatic.   Eyes:      General: No scleral icterus.        Right eye: No discharge.         Left eye: No discharge.   Cardiovascular:      Rate and Rhythm: Normal rate and regular rhythm.      Heart sounds: Normal heart sounds. No murmur heard.  No gallop.    Pulmonary:      Effort: Pulmonary effort is normal. No respiratory distress.      Breath sounds: Normal breath sounds. No wheezing.   Abdominal:      General: Abdomen is flat. Bowel sounds are normal. There is no distension.      Tenderness: There is no abdominal tenderness. There is no guarding.   Musculoskeletal:         General: No tenderness.      Right lower leg: No edema.      Left lower leg: No edema.   Skin:     General: Skin is warm and dry.      Coloration: Skin is not jaundiced.   Neurological:      Mental Status: He is alert and oriented to person, place, and time.      Cranial Nerves: No cranial nerve deficit.      Motor: Tremor (mild) present. No weakness.   Psychiatric:         Mood and Affect: Mood normal.         Behavior: Behavior normal.         Fluids  No intake or output data in the 24 hours ending 12/04/21 1337    Laboratory  Recent Labs     12/03/21  0246 12/03/21  0246 12/03/21  1538 12/04/21  0104 12/04/21  0922   WBC 4.0*   < > 4.2* 4.6* 4.5*   RBC 3.65*   < > 4.09* 3.72* 3.97*   HEMOGLOBIN 11.6*   < > 13.3* 12.0* 12.6*   HEMATOCRIT 33.7*   < > 38.5* 34.8* 37.5*   MCV 92.3   < > 94.1 93.5 94.5   MCH 31.8   < > 32.5 32.3 31.7   MCHC 34.4   < > 34.5 34.5 33.6*   RDW 55.6*   < > 57.8* 57.3* 58.5*   PLATELETCT 20*   < > 19* 19* 23*   MPV 12.1  --  14.3*  --  10.4    < > = values in this interval not displayed.     Recent Labs     12/03/21  0246 12/03/21  1538 12/04/21  0104   SODIUM 136 136 136    POTASSIUM 3.5* 3.5* 3.3*   CHLORIDE 102 103 106   CO2 25 21 21   GLUCOSE 122* 191* 81   BUN 4* 3* 2*   CREATININE 0.47* 0.50 0.44*   CALCIUM 7.8* 8.0* 7.5*     Recent Labs     12/02/21  1936   INR 1.46*               Imaging  DX-KNEE 3 VIEWS LEFT   Final Result      1.  No left knee fracture, dislocation, or joint effusion.      2.  Soft tissue swelling in the subpatellar region.      DX-CHEST-PORTABLE (1 VIEW)   Final Result      No acute cardiopulmonary abnormality identified.           Assessment/Plan  * Alcohol withdrawal delirium (HCC)- (present on admission)  Assessment & Plan  [facility] protocol  Patient reports that he plans on quitting drinking.  Last alcoholic beverage was yesterday.    Class 1 obesity due to excess calories without serious comorbidity with body mass index (BMI) of 33.0 to 33.9 in adult  Assessment & Plan  Diet, exercise and weight loss.    Transaminitis  Assessment & Plan  Due to ETOH  Cessation counseling    Coagulopathy (HCC)  Assessment & Plan  Secondary to alcoholic cirrhosis, trial vitamin K, follow-up INR    Hypomagnesemia- (present on admission)  Assessment & Plan  Secondary to alcoholism, magnesium repletion, follow-up phosphorus, potassium and magnesium    Hypokalemia- (present on admission)  Assessment & Plan  Secondary to alcoholism and hypomagnesemia  Potassium chloride ordered    Pancytopenia (HCC)- (present on admission)  Assessment & Plan  Secondary to alcoholism, follow-up CBC  stable    Alcoholic cirrhosis (HCC)- (present on admission)  Assessment & Plan  Supportive care, education, outpatient GI follow-up  Reports that he has been evaluated in the past for liver transplant however because of his alcohol use he is not a candidate.  Patient requesting ammonia level be ordered since he feels slightly confused.         VTE prophylaxis: SCDs/TEDs and pharmacologic prophylaxis contraindicated due to Thrombocytopenia    I have performed a physical exam and reviewed and updated ROS  and Plan today (12/4/2021). In review of yesterday's note (12/3/2021), there are no changes except as documented above.

## 2021-12-04 NOTE — ED NOTES
Attempted to call report-- RN unavailable. UC asked to notify receiving RN pt is ready to be transported upstairs

## 2021-12-04 NOTE — PROGRESS NOTES
Dr. Delgado notified of critical lab result platelets 19.  New order received to recheck CBC at 0900.

## 2021-12-04 NOTE — H&P
Hospital Medicine History & Physical Note    Date of Service  12/3/2021    Primary Care Physician  No primary care provider on file.      Code Status  Full Code    Chief Complaint  Chief Complaint   Patient presents with   • Dizziness   • Tremors   • Chest Pain       History of Presenting Illness  Jalen Vaughn is a 41 y.o. male with history of alcohol dependence, thrombocytopenia and alcohol withdrawal who presented 12/3/2021 just 6 hours after leaving AGAINST MEDICAL ADVICE with alcohol withdrawal now presenting with difficulty walking, tremor and Palpitations.   Labs reveal thrombocytopenia, LFT elevation and coagulopathy.  Vitals include hypertension and tachycardia.  He receives Ativan and referred to the hospitalist for admission.  At bedside he is tachycardic and sedated.    I discussed the plan of care with patient.    Review of Systems  Review of Systems   Constitutional: Negative for fever, malaise/fatigue and weight loss.   HENT: Negative for sore throat and tinnitus.    Eyes: Negative for blurred vision and double vision.   Respiratory: Negative for cough, hemoptysis and stridor.    Cardiovascular: Negative for chest pain and palpitations.   Gastrointestinal: Negative for nausea and vomiting.   Genitourinary: Negative for dysuria and urgency.   Musculoskeletal: Negative for myalgias and neck pain.   Skin: Negative for itching and rash.   Neurological: Positive for tremors and weakness. Negative for dizziness and headaches.   Endo/Heme/Allergies: Does not bruise/bleed easily.   Psychiatric/Behavioral: Negative for depression. The patient is nervous/anxious. The patient does not have insomnia.        Past Medical History   has a past medical history of Liver disease.    Surgical History   has no past surgical history on file.     Family History  family history is not on file.   Family history reviewed with patient. There is no family history that is pertinent to the chief complaint.     Social  History   reports that he has never smoked. His smokeless tobacco use includes chew. He reports current alcohol use. He reports previous drug use.    Allergies  No Known Allergies    Medications  None       Physical Exam  Temp:  [36.7 °C (98 °F)-37.6 °C (99.6 °F)] 37.2 °C (99 °F)  Pulse:  [] 91  Resp:  [15-32] 17  BP: (146-161)/() 157/95  SpO2:  [90 %-97 %] 95 %  Blood Pressure: 157/95   Temperature: 37.2 °C (99 °F)   Pulse: 91   Respiration: 17   Pulse Oximetry: 95 %       Physical Exam  Vitals and nursing note reviewed.   Constitutional:       General: He is in acute distress.      Appearance: Normal appearance. He is obese. He is ill-appearing. He is not toxic-appearing.   HENT:      Head: Normocephalic and atraumatic.      Nose: Nose normal. No congestion or rhinorrhea.      Mouth/Throat:      Mouth: Mucous membranes are moist.      Pharynx: Oropharynx is clear.   Eyes:      Extraocular Movements: Extraocular movements intact.      Conjunctiva/sclera: Conjunctivae normal.      Pupils: Pupils are equal, round, and reactive to light.   Neck:      Vascular: No carotid bruit.   Cardiovascular:      Rate and Rhythm: Normal rate and regular rhythm.      Pulses: Normal pulses.      Heart sounds: Normal heart sounds. No murmur heard.  No gallop.    Pulmonary:      Effort: No respiratory distress.      Breath sounds: Normal breath sounds. No wheezing or rales.   Abdominal:      General: Abdomen is flat. Bowel sounds are normal. There is no distension.      Palpations: Abdomen is soft. There is no mass.      Tenderness: There is no abdominal tenderness.      Hernia: No hernia is present.   Musculoskeletal:         General: No tenderness or signs of injury.      Cervical back: Normal range of motion and neck supple. No muscular tenderness.   Lymphadenopathy:      Cervical: No cervical adenopathy.   Skin:     Capillary Refill: Capillary refill takes less than 2 seconds.      Coloration: Skin is pale. Skin is not  jaundiced.      Findings: No bruising.   Neurological:      General: No focal deficit present.      Mental Status: He is alert. Mental status is at baseline.      Cranial Nerves: No cranial nerve deficit.      Motor: No weakness.      Coordination: Coordination normal.   Psychiatric:         Mood and Affect: Mood normal.         Laboratory:  Recent Labs     12/02/21  1812 12/03/21  0246 12/03/21  1538   WBC 4.5* 4.0* 4.2*   RBC 4.17* 3.65* 4.09*   HEMOGLOBIN 13.5* 11.6* 13.3*   HEMATOCRIT 38.6* 33.7* 38.5*   MCV 92.6 92.3 94.1   MCH 32.4 31.8 32.5   MCHC 35.0 34.4 34.5   RDW 54.9* 55.6* 57.8*   PLATELETCT 23* 20* 19*   MPV 13.3* 12.1 14.3*     Recent Labs     12/02/21  1812 12/03/21  0246 12/03/21  1538   SODIUM 139 136 136   POTASSIUM 3.4* 3.5* 3.5*   CHLORIDE 101 102 103   CO2 25 25 21   GLUCOSE 117* 122* 191*   BUN 4* 4* 3*   CREATININE 0.51 0.47* 0.50   CALCIUM 8.7 7.8* 8.0*     Recent Labs     12/02/21  1812 12/02/21  1936 12/03/21  0246 12/03/21  1538   ALTSGPT 40  --  32 36   ASTSGOT 167*  --  138* 145*   ALKPHOSPHAT 213*  --  175* 201*   TBILIRUBIN 3.8*  --  3.8* 5.2*   LIPASE  --  36  --   --    GLUCOSE 117*  --  122* 191*     Recent Labs     12/02/21  1936   INR 1.46*     No results for input(s): NTPROBNP in the last 72 hours.      Recent Labs     12/02/21  1812 12/03/21  1538   TROPONINT 12 10       Imaging:  DX-KNEE 3 VIEWS LEFT   Final Result      1.  No left knee fracture, dislocation, or joint effusion.      2.  Soft tissue swelling in the subpatellar region.      DX-CHEST-PORTABLE (1 VIEW)   Final Result      No acute cardiopulmonary abnormality identified.          X-Ray:  I have personally reviewed the images and compared with prior images.    Assessment/Plan:  I anticipate this patient will require at least two midnights for appropriate medical management, necessitating inpatient admission.    * Alcohol withdrawal delirium (HCC)- (present on admission)  Assessment & Plan  CIWA  protocol    Coagulopathy (HCC)  Assessment & Plan  Secondary to alcoholic cirrhosis, trial vitamin K, follow-up INR    Hypomagnesemia- (present on admission)  Assessment & Plan  Secondary to alcoholism, magnesium repletion, follow-up phosphorus, potassium and magnesium    Hypokalemia- (present on admission)  Assessment & Plan  Secondary to alcoholism and hypomagnesemia, replete potassium, magnesium and reevaluate chemistry    Pancytopenia (HCC)- (present on admission)  Assessment & Plan  Secondary to alcoholism, follow-up CBC    Alcoholic cirrhosis (HCC)- (present on admission)  Assessment & Plan  Supportive care, education, outpatient GI follow-up      VTE prophylaxis: SCDs/TEDs

## 2021-12-04 NOTE — ED NOTES
Bedside report to Alton SUAZO.   Pt transported to the floor via gurney by NOHEMY RN on cardiac monitor. Pt alert and oriented at this time. Pt belongings and paper work sent with patient.

## 2021-12-04 NOTE — PROGRESS NOTES
Report received from GABRIELE Reece. Patient is A & O x4, room air, ST with  on the monitor. CIWA protocol in place. Fall precautions in place. Fall risk education provided. POC discussed. Questions answered. Call light is in reach.

## 2021-12-04 NOTE — ED PROVIDER NOTES
"ED Provider Note    CHIEF COMPLAINT  Chief Complaint   Patient presents with   • Dizziness   • Tremors   • Chest Pain       HPI  Jalen Vaughn is a 41 y.o. male who returns with alcohol withdrawal.  He was admitted to the hospital overnight and left AMA a couple hours ago.  He got home and has been unable to walk, he has been vomiting, has been very shaky so his significant other brings him back here for readmission and treatment.  The patient admits that he drank a very small amount of alcohol when he got home.  He states he has had no ongoing chest pain since leaving the hospital.  Otherwise reports that he injured his left knee a couple days ago and has ongoing pain.  No other acute complaints, states this time he will stay in the hospital for treatment.  It was documented that at the time he left AMA his CIWA score was still between 8 and 9    REVIEW OF SYSTEMS  Negative for fever, rash, chest pain, dyspnea headache, focal weakness, focal numbness, focal tingling, back pain. All other systems are negative.     PAST MEDICAL HISTORY  Past Medical History:   Diagnosis Date   • Liver disease        FAMILY HISTORY  No family history on file.    SOCIAL HISTORY  Social History     Tobacco Use   • Smoking status: Never Smoker   • Smokeless tobacco: Current User     Types: Chew   Vaping Use   • Vaping Use: Never used   Substance Use Topics   • Alcohol use: Yes     Comment: daily   • Drug use: Not Currently       SURGICAL HISTORY  History reviewed. No pertinent surgical history.    CURRENT MEDICATIONS  I personally reviewed the medication list in the charting documentation.     ALLERGIES  No Known Allergies    MEDICAL RECORD  I have reviewed patient's medical record and pertinent results are listed above.      PHYSICAL EXAM  VITAL SIGNS: /95   Pulse 101   Temp 37.2 °C (99 °F) (Temporal)   Resp 17   Ht 1.905 m (6' 3\")   Wt 122 kg (270 lb)   SpO2 95%   BMI 33.75 kg/m²    Constitutional: Awake and alert, " tremulous  HENT: Normocephalic, no obvious evidence of acute trauma.  Eyes: No scleral icterus. Normal conjunctiva   Neck: Comfortable movement without any obvious restriction in the range of motion.  Cardiovascular: Upon ascultation I appreciate a regular heart rhythm and a tachycardic rate  Thorax & Lungs: Normal nonlabored respirations.  Upon application of the stethoscope for auscultation I find there to be no associated chest wall tenderness.  I appreciate no wheezing, rhonchi or rales. There is normal air movement.    Abdomen: The abdomen is not visibly distended. Upon palpation, I find it to be without tenderness.  No mass appreciated.  Skin: The exposed portions of skin reveal no obvious rash or other abnormalities.  Extremities/Musculoskeletal: No obvious sign of acute trauma. No asymmetric calf tenderness or edema.   Neurologic: Alert & oriented. No focal deficits observed.   Psychiatric: Anxious appearing    DIAGNOSTIC STUDIES / PROCEDURES    LABS/EKGs  Results for orders placed or performed during the hospital encounter of 12/03/21   CBC with Differential   Result Value Ref Range    WBC 4.2 (L) 4.8 - 10.8 K/uL    RBC 4.09 (L) 4.70 - 6.10 M/uL    Hemoglobin 13.3 (L) 14.0 - 18.0 g/dL    Hematocrit 38.5 (L) 42.0 - 52.0 %    MCV 94.1 81.4 - 97.8 fL    MCH 32.5 27.0 - 33.0 pg    MCHC 34.5 33.7 - 35.3 g/dL    RDW 57.8 (H) 35.9 - 50.0 fL    Platelet Count 19 (LL) 164 - 446 K/uL    MPV 14.3 (H) 9.0 - 12.9 fL    Neutrophils-Polys 67.50 44.00 - 72.00 %    Lymphocytes 21.10 (L) 22.00 - 41.00 %    Monocytes 10.20 0.00 - 13.40 %    Eosinophils 0.50 0.00 - 6.90 %    Basophils 0.20 0.00 - 1.80 %    Immature Granulocytes 0.50 0.00 - 0.90 %    Nucleated RBC 0.00 /100 WBC    Neutrophils (Absolute) 2.84 1.82 - 7.42 K/uL    Lymphs (Absolute) 0.89 (L) 1.00 - 4.80 K/uL    Monos (Absolute) 0.43 0.00 - 0.85 K/uL    Eos (Absolute) 0.02 0.00 - 0.51 K/uL    Baso (Absolute) 0.01 0.00 - 0.12 K/uL    Immature Granulocytes (abs) 0.02  0.00 - 0.11 K/uL    NRBC (Absolute) 0.00 K/uL   Complete Metabolic Panel (CMP)   Result Value Ref Range    Sodium 136 135 - 145 mmol/L    Potassium 3.5 (L) 3.6 - 5.5 mmol/L    Chloride 103 96 - 112 mmol/L    Co2 21 20 - 33 mmol/L    Anion Gap 12.0 7.0 - 16.0    Glucose 191 (H) 65 - 99 mg/dL    Bun 3 (L) 8 - 22 mg/dL    Creatinine 0.50 0.50 - 1.40 mg/dL    Calcium 8.0 (L) 8.5 - 10.5 mg/dL    AST(SGOT) 145 (H) 12 - 45 U/L    ALT(SGPT) 36 2 - 50 U/L    Alkaline Phosphatase 201 (H) 30 - 99 U/L    Total Bilirubin 5.2 (H) 0.1 - 1.5 mg/dL    Albumin 3.6 3.2 - 4.9 g/dL    Total Protein 6.7 6.0 - 8.2 g/dL    Globulin 3.1 1.9 - 3.5 g/dL    A-G Ratio 1.2 g/dL   Troponin   Result Value Ref Range    Troponin T 10 6 - 19 ng/L   ESTIMATED GFR   Result Value Ref Range    GFR If African American >60 >60 mL/min/1.73 m 2    GFR If Non African American >60 >60 mL/min/1.73 m 2   EKG (NOW)   Result Value Ref Range    Report       [facility] Emergency Dept.    Test Date:  2021  Pt Name:    ARSENIO RODGERS               Department: ER  MRN:        [id number]                      Room:  Gender:     Male                         Technician: [id number]  :        1980                   Requested By:ER TRIAGE PROTOCOL  Order #:    [id number]                    Reading MD: SHERRI FELIZ MD    Measurements  Intervals                                Axis  Rate:       97                           P:  ID:                                      QRS:        50  QRSD:       82                           T:          55  QT:         388  QTc:        493    Interpretive Statements  12 Lead EKG interpreted by me to show: -- Rate 97 -- Rhythm: Normal sinus  rhythm  -- Axis: Normal -- ID and QRS Intervals: Normal -- T waves: No acute changes  --  ST segments: No acute changes -- Ectopy: None. My impression of this EKG:  Does  not indicate acute ischemia at this time.  Electronically Signed On 12-3-2021 16:46 :58 PST by SHERRI ALARCON  MD LAILA        COURSE & MEDICAL DECISION MAKING  I have reviewed any medical record information, laboratory studies and radiographic results as noted above.    Encounter Summary: This is a very pleasant 41 y.o. male who unfortunately required evaluation in the emergency department today with acute alcohol withdrawal, left the hospital AGAINST MEDICAL ADVICE a couple hours ago, returns with evidence of ongoing withdrawal, states he drank some alcohol albeit a very small quantity after he left here.  He is tremulous on exam, he is tachycardic.  Will be treated with Ativan and will be admitted to the hospital for ongoing care.      DISPOSITION: Admit in guarded condition      FINAL IMPRESSION  1. Alcohol withdrawal syndrome without complication (HCC)    2. Thrombocytopenia (HCC)    3. Dizziness           This dictation was created using voice recognition software. The accuracy of the dictation is limited to the abilities of the software. I expect there may be some errors of grammar and possibly content. The nursing notes were reviewed and certain aspects of this information were incorporated into this note.    Electronically signed by: Gary Barnett M.D., 12/3/2021 4:47 PM

## 2021-12-04 NOTE — ASSESSMENT & PLAN NOTE
[facility] protocol  Patient reports that he plans on quitting drinking.  Last alcoholic beverage was yesterday.

## 2021-12-04 NOTE — PROGRESS NOTES
4 Eyes Skin Assessment Completed by GABRIELE Osullivan and GABRIELE Dickinson.    Head WDL  Ears Redness and Blanching  Nose WDL  Mouth WDL  Neck WDL  Breast/Chest Scab (scratch on right anterior shoulder)  Shoulder Blades WDL  Spine WDL  (R) Arm/Elbow/Hand WDL  (L) Arm/Elbow/Hand Edema  Abdomen WDL  Groin WDL  Scrotum/Coccyx/Buttocks Redness and Blanching  (R) Leg WDL  (L) Leg WDL, knee Redness, Swelling and Abrasion  (R) Heel/Foot/Toe WDL  (L) Heel/Foot/Toe WDL          Devices In Places Tele Box, Blood Pressure Cuff, Pulse Ox and Nasal Cannula      Interventions In Place Gray Ear Foams and Pillows    Possible Skin Injury Yes (knee abrasion)    Pictures Uploaded Into Epic Yes  Wound Consult Placed N/A  RN Wound Prevention Protocol Ordered No

## 2021-12-04 NOTE — CARE PLAN
The patient is Stable - Low risk of patient condition declining or worsening    Shift Goals  Clinical Goals: CIWA, maintain safety  Patient Goals: Sleep    Progress made toward(s) clinical / shift goals:    Problem: Optimal Care for Alcohol Withdrawal  Goal: Optimal Care for the alcohol withdrawal patient  Outcome: Progressing     Problem: Seizure Precautions  Goal: Implementation of seizure precautions  Outcome: Progressing     Problem: Lifestyle Changes  Goal: Patient's ability to identify lifestyle changes and available resources to help reduce recurrence of condition will improve  Outcome: Progressing     Problem: Psychosocial  Goal: Patient's level of anxiety will decrease  Outcome: Progressing     Problem: Risk for Aspiration  Goal: Patient's risk for aspiration will be absent or decrease  Outcome: Progressing     Problem: Pain - Standard  Goal: Alleviation of pain or a reduction in pain to the patient’s comfort goal  Outcome: Progressing     Problem: Knowledge Deficit - Standard  Goal: Patient and family/care givers will demonstrate understanding of plan of care, disease process/condition, diagnostic tests and medications  Outcome: Progressing     Problem: Fall Risk  Goal: Patient will remain free from falls  Outcome: Progressing       Patient is not progressing towards the following goals:

## 2021-12-05 NOTE — PROGRESS NOTES
Jalen Vaughn patient has chosen to leave the hospital against medical advice. The attending physician has not discharged the patient. Patient is not a risk to himself or others. I have discussed with the patient the following:  Physician has not determined patient is ready for discharge.      Discharge against medical advice form has been Patient refused to sign.

## 2021-12-05 NOTE — DISCHARGE SUMMARY
Discharge Summary    CHIEF COMPLAINT ON ADMISSION  Chief Complaint   Patient presents with   • Dizziness   • Tremors   • Chest Pain       Reason for Admission  Dizziness     Admission Date  12/3/2021    CODE STATUS  Prior    HPI & HOSPITAL COURSE  Jalen Vaughn is a 41 y.o. male with history of alcohol dependence, thrombocytopenia and alcohol withdrawal who presented 12/3/2021 just 6 hours after leaving AGAINST MEDICAL ADVICE with alcohol withdrawal now presenting with difficulty walking, tremor and Palpitations.   Labs reveal thrombocytopenia, LFT elevation and coagulopathy.  Vitals include hypertension and tachycardia.  He receives Ativan and referred to the hospitalist for admission.  At bedside he is tachycardic and sedated.    12/4: Patient seen examined at bedside.  Axial at this morning was 7.  Patient reports that he plans to quit drinking and would like help with medications.  He reports that he does not plan on leaving AGAINST MEDICAL ADVICE this time.  Vitals are stable.  Potassium repleted.  Thrombocytopenia is stable.    Later in the evening on 12/4 patient opted to leave AGAINST MEDICAL ADVICE.  He was encouraged to stay for further treatment however he decided not to.     Therefore, he is discharged in guarded and stable condition against medcial advice.        Discharge Date  12/4/2021    FOLLOW UP ITEMS POST DISCHARGE  Alcohol abuse  Alcohol withdrawal    DISCHARGE DIAGNOSES  Principal Problem:    Alcohol withdrawal delirium (HCC) POA: Yes  Active Problems:    Alcoholic cirrhosis (HCC) POA: Yes    Pancytopenia (HCC) POA: Yes    Hypokalemia POA: Yes    Hypomagnesemia POA: Yes    Coagulopathy (HCC) POA: Unknown    Transaminitis POA: Unknown    Class 1 obesity due to excess calories without serious comorbidity with body mass index (BMI) of 33.0 to 33.9 in adult POA: Unknown  Resolved Problems:    * No resolved hospital problems. *      FOLLOW UP  No future appointments.  No follow-up provider  specified.    MEDICATIONS ON DISCHARGE     Medication List      You have not been prescribed any medications.         Allergies  No Known Allergies    DIET  No orders of the defined types were placed in this encounter.      ACTIVITY  As tolerated.  Weight bearing as tolerated    CONSULTATIONS  none    PROCEDURES  none    LABORATORY  Lab Results   Component Value Date    SODIUM 136 12/04/2021    POTASSIUM 3.3 (L) 12/04/2021    CHLORIDE 106 12/04/2021    CO2 21 12/04/2021    GLUCOSE 81 12/04/2021    BUN 2 (L) 12/04/2021    CREATININE 0.44 (L) 12/04/2021        Lab Results   Component Value Date    WBC 4.5 (L) 12/04/2021    HEMOGLOBIN 12.6 (L) 12/04/2021    HEMATOCRIT 37.5 (L) 12/04/2021    PLATELETCT 23 (LL) 12/04/2021        Total time of the discharge process exceeds 34 minutes.

## 2021-12-14 ENCOUNTER — OFFICE VISIT (OUTPATIENT)
Dept: MEDICAL GROUP | Facility: CLINIC | Age: 41
End: 2021-12-14
Payer: MEDICAID

## 2021-12-14 VITALS
OXYGEN SATURATION: 94 % | DIASTOLIC BLOOD PRESSURE: 85 MMHG | HEIGHT: 75 IN | RESPIRATION RATE: 18 BRPM | TEMPERATURE: 97.4 F | HEART RATE: 107 BPM | SYSTOLIC BLOOD PRESSURE: 132 MMHG | BODY MASS INDEX: 33.07 KG/M2 | WEIGHT: 266 LBS

## 2021-12-14 DIAGNOSIS — D61.818 PANCYTOPENIA (HCC): ICD-10-CM

## 2021-12-14 DIAGNOSIS — R74.01 TRANSAMINITIS: ICD-10-CM

## 2021-12-14 DIAGNOSIS — E83.39 HYPOPHOSPHATASIA: ICD-10-CM

## 2021-12-14 DIAGNOSIS — E87.6 HYPOKALEMIA: ICD-10-CM

## 2021-12-14 DIAGNOSIS — K70.30 ALCOHOLIC CIRRHOSIS OF LIVER WITHOUT ASCITES (HCC): ICD-10-CM

## 2021-12-14 DIAGNOSIS — F41.9 ANXIETY: ICD-10-CM

## 2021-12-14 DIAGNOSIS — F32.89 OTHER DEPRESSION: ICD-10-CM

## 2021-12-14 DIAGNOSIS — G47.00 INSOMNIA, UNSPECIFIED TYPE: ICD-10-CM

## 2021-12-14 DIAGNOSIS — F10.182: ICD-10-CM

## 2021-12-14 PROCEDURE — 99205 OFFICE O/P NEW HI 60 MIN: CPT | Mod: GC | Performed by: STUDENT IN AN ORGANIZED HEALTH CARE EDUCATION/TRAINING PROGRAM

## 2021-12-14 RX ORDER — FUROSEMIDE 20 MG/1
20 TABLET ORAL DAILY
Qty: 30 TABLET | Refills: 0 | Status: SHIPPED | OUTPATIENT
Start: 2021-12-14 | End: 2021-12-28 | Stop reason: SDUPTHER

## 2021-12-14 RX ORDER — POTASSIUM CHLORIDE 20 MEQ/1
20 TABLET, EXTENDED RELEASE ORAL DAILY
Qty: 7 TABLET | Refills: 0 | Status: SHIPPED | OUTPATIENT
Start: 2021-12-14 | End: 2021-12-28

## 2021-12-14 RX ORDER — LACTULOSE 10 G/15ML
20 SOLUTION ORAL 2 TIMES DAILY
Qty: 1800 ML | Refills: 0 | Status: SHIPPED | OUTPATIENT
Start: 2021-12-14 | End: 2021-12-28 | Stop reason: SDUPTHER

## 2021-12-14 RX ORDER — CHLORDIAZEPOXIDE HYDROCHLORIDE 10 MG/1
CAPSULE, GELATIN COATED ORAL
Qty: 38 CAPSULE | Refills: 0 | Status: SHIPPED | OUTPATIENT
Start: 2021-12-14 | End: 2021-12-18

## 2021-12-14 RX ORDER — SPIRONOLACTONE 50 MG/1
50 TABLET, FILM COATED ORAL DAILY
Qty: 30 TABLET | Refills: 0 | Status: SHIPPED | OUTPATIENT
Start: 2021-12-14 | End: 2021-12-28 | Stop reason: SDUPTHER

## 2021-12-14 ASSESSMENT — PATIENT HEALTH QUESTIONNAIRE - PHQ9
5. POOR APPETITE OR OVEREATING: 3 - NEARLY EVERY DAY
SUM OF ALL RESPONSES TO PHQ QUESTIONS 1-9: 24
CLINICAL INTERPRETATION OF PHQ2 SCORE: 3

## 2021-12-14 ASSESSMENT — ANXIETY QUESTIONNAIRES
1. FEELING NERVOUS, ANXIOUS, OR ON EDGE: NEARLY EVERY DAY
7. FEELING AFRAID AS IF SOMETHING AWFUL MIGHT HAPPEN: NEARLY EVERY DAY
5. BEING SO RESTLESS THAT IT IS HARD TO SIT STILL: NEARLY EVERY DAY
3. WORRYING TOO MUCH ABOUT DIFFERENT THINGS: NEARLY EVERY DAY
GAD7 TOTAL SCORE: 21
4. TROUBLE RELAXING: NEARLY EVERY DAY
2. NOT BEING ABLE TO STOP OR CONTROL WORRYING: NEARLY EVERY DAY
6. BECOMING EASILY ANNOYED OR IRRITABLE: NEARLY EVERY DAY

## 2021-12-14 ASSESSMENT — FIBROSIS 4 INDEX: FIB4 SCORE: 36.84

## 2021-12-14 NOTE — LETTER
Chlordiazepoxide: Oral: Day 1: 50 mg every 6 to 12 hours; Day 2: 25 mg every 6 hours; Day 3: 25 mg twice a day; Day 4: 25 mg at bedtime

## 2021-12-15 ENCOUNTER — TELEPHONE (OUTPATIENT)
Dept: SCHEDULING | Facility: IMAGING CENTER | Age: 41
End: 2021-12-15

## 2021-12-15 NOTE — PROGRESS NOTES
"[facility] FAMILY MEDICINE OFFICE VISIT    Date: 12/14/2021    MRN: [id number]  Patient ID: Jalen Vaughn    SUBJECTIVE:  Jalen Vaughn is a 41 y.o. man here to establish care.  Patient was recently discharged from [facility] after leaving AGAINST MEDICAL ADVICE multiple times for alcohol withdrawal.  Patient comes to this visit requesting assistance in cessation of alcohol use.  Patient states that he left AMA due to perceived poor responsiveness of medical providers, at one time having to wait \"5 hours\" for medication.  Patient states that he drinks approximately 9 alcoholic beverages daily.  This confirmed by physician questioning.  Patient states that one time he was able to quit spontaneously, \"cold turkey,\" however this was many months back when patient was involuntarily admitted to [facility].  Patient was able to stay sober following his discharge from correction, however notes that several months ago his father passed away which resulted in patient resuming drinking.  Patient reports significant anxiety and depression which are predominant source for his drinking.  Took fluoxetine in the distant past for one year which he did not feel helped with his symptoms.  Patient notes that in the emergency department, he was told he had multiple lab abnormalities which required treatment.  Patient states he is not willing to return to the emergency department despite repeated physician advised that it may be in his best interest.    PHQ-9 Screening 12/14/2021   Little interest or pleasure in doing things 0 - not at all   Feeling down, depressed, or hopeless 3 - nearly every day   Trouble falling or staying asleep, or sleeping too much 3 - nearly every day   Feeling tired or having little energy 3 - nearly every day   Poor appetite or overeating 3 - nearly every day   Feeling bad about yourself - or that you are a failure or have let yourself or your family down 3 - nearly every day   Trouble concentrating on things, such " as reading the newspaper or watching television 3 - nearly every day   Moving or speaking so slowly that other people could have noticed. Or the opposite - being so fidgety or restless that you have been moving around a lot more than usual 3 - nearly every day   Thoughts that you would be better off dead, or of hurting yourself in some way 3 - nearly every day   PHQ-2 Total Score 3   PHQ-9 Total Score 24       Interpretation of PHQ-9 Total Score   Score Severity   1-4 No Depression   5-9 Mild Depression   10-14 Moderate Depression   15-19 Moderately Severe Depression   20-27 Severe Depression    LISA 7 12/14/2021   LISA-7 Total Score 21       Interpretation of LISA 7 Total Score   Score Severity:  0-4 No Anxiety   5-9 Mild Anxiety  10-14 Moderate Anxiety  15-21 Severe Anxiety    PMHx/PSHx:  Past Medical History:   Diagnosis Date   • Liver disease      Cholecystectomy, appendectomy, left hand surgery    Allergies: Patient has no known allergies.    Family Hx: Unknown    Social Hx: Lives with girlfriend and son. Former . Recently moved to [facility]. Alcohol use as discussed above.     OBJECTIVE:  Vitals:    12/14/21 1600   BP: 132/85   Pulse: (!) 107   Resp: 18   Temp: 36.3 °C (97.4 °F)   SpO2: 94%       Physical Examination:  General: Well appearing man in no acute distress, resting on arrival to room  HEENT: Normocephalic, atraumatic, EOMI, scleral icretus present  Cardiovascular: RRR, no murmurs, gallops, or rubs  Pulmonary: CTAB, symmetrical chest expansion, no rales, rhonchi, or wheezes  Abdominal: Normoactive bowel sounds, non-tender to palpation, no guarding, rigidity, or distension, hepatosplenomegaly   Extremities: Moves all spontaneously, bilateral calves non-tender to palpation, trace bilateral lower extremity edema  Neurological: Alert and oriented    ASSESSMENT & PLAN:  Jalen Vaughn is a 41 y.o. man here to establish care, found to have alcohol abuse disorder with resulting cirrhosis  and multiple medical conditions relating to this.    1. 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 Alcoholic cirrhosis of liver without ascites (HCC)  potassium chloride SA (KDUR) 20 MEQ Tab CR   3. Hypokalemia     4. Hypophosphatasia     5. Transaminitis     6. Anxiety  sertraline (ZOLOFT) 50 MG Tab   7. Other depression     8. Pancytopenia (HCC)     9. Insomnia, unspecified type  sertraline (ZOLOFT) 50 MG Tab       Orders Placed This Encounter   • chlordiazepoxide (LIBRIUM) 10 MG capsule   • spironolactone (ALDACTONE) 50 MG Tab   • furosemide (LASIX) 20 MG Tab   • lactulose 10 GM/15ML Solution   • sertraline (ZOLOFT) 50 MG Tab   • potassium chloride SA (KDUR) 20 MEQ Tab CR       #Alcohol abuse disorder  Patient reporting approximately 9 alcoholic beverages per day.  Reviewed all labs/imaging from [facility] indicative of this ongoing problem.  Discussed with patient that he has very high risk for withdrawal, and would best benefit from treatment at a inpatient setting.  Patient REFUSED HOSPITALIZATION AGAINST MEDICAL ADVICE.  As alcohol abuse disorder is contributing to significant risk for further self injury, at this time will start chlordiazepoxide taper.  Provided patient with written instructions stating taper of 50 mg every 6 hours for first day, 25 mg every 6 hours for second day, 25 mg every 12 hours for third day, and 25 mg once at bedtime on fourth day.  Physician scheduled appointment with patient for 3 days from now to review efficacy of this medication and progression of taper.  Advised patient that he is extremely high risk for withdrawal symptoms during this time.  Discussed strict precautions that would require him to seek emergency department care.  Physician would like to start naltrexone and  acamprosate for continued management and prevention of alcohol use, however we will have to await total cessation/taper as well as repeat of liver enzyme lab values to ensure no ongoing damage which would prevent use of these medications.  We will follow up with patient for these in 3 days.  Patient verbalized agreement and understanding with plan of care.    #Alcoholic cirrhosis of the liver  Reviewed imaging from [facility] which revealed cirrhosis of the liver with splenic varicosities.  Patient with mild fluid overload on his legs.  Patient was advised to start spironolactone, furosemide, and lactulose during his most recent hospitalization.  However left AGAINST MEDICAL ADVICE prior to this being given.  At this time, physician will start furosemide 20 mg p.o. daily, spironolactone 50 mg daily, lactulose 20 g p.o. twice daily, and potassium chloride 20 mEq daily.  MELD score 14, 6% 3-month mortality. Patient is ultimately going to require referral to gastroenterology for management of this as well as endoscopy.  Patient very hesitant to speak to other physicians at this time.  We will discuss need for further management at his next visit.  We will also need to repeat labs due to starting these medications.    #Hypokalemia/hypophosphatemia/transaminitis/pancytopenia  Reviewed patient's most recent laboratory results from [facility] which revealed multiple laboratory abnormalities.  Discussed with patient that many of these are very significant, and could likely progress to serious illness or death if not treated.  Patient REFUSED HOSPITALIZATION despite physician insistence that he seek care.  Discussed with patient that he should seek emergency care aid immediately with any change in mentation, heart palpitations, chest pain, change in behavior, tremors, headaches, or other symptoms.  Patient will ultimately require reevaluation by laboratory testing, though is hesitant to do this today.  Advised patient that we will  need to repeat this at his next visit in 3 days.  Patient will likely also require thiamine and B12 testing at that time.  Patient is extremely high risk for poor outcomes.  Discussed this with patient who refused further hospitalization.  We will follow up with these issues at his next visit.    #Anxiety/depression  Patient reporting chronic history of anxiety and depression, most recently worsened by death of a parent.  Patient denies suicidal ideation at this time.  Discussed with patient that he would likely benefit from initiation of SSRI therapy, and at this time will start sertraline 50 mg p.o. daily.  Discussed with patient this medication takes time for efficacy.  Suspect the patient would benefit greatly from referral to psychiatry, however as patient is very hesitant for any treatment of than that listed above, will wait at this time.  We will discuss this further with patient his next visit.    #Insomnia  Patient reporting sleep difficulties at night.  Very difficult to ascertain etiology for this given concurrent alcohol use disorder, anxiety, depression.  Discussed with patient that he will need to be completely withdrawn from alcohol in order to determine best course of treatment for this.  Suspect that patient would benefit from referral to psychiatry, however at this time patient is very hesitant to proceed with any treatment other than that listed above.  We will discuss this further with patient at his next visit.    Mann Johnson M.D.  Family Medicine Resident  PGY-3

## 2021-12-16 ENCOUNTER — TELEPHONE (OUTPATIENT)
Dept: MEDICAL GROUP | Facility: CLINIC | Age: 41
End: 2021-12-16

## 2021-12-16 NOTE — TELEPHONE ENCOUNTER
Dr. Johnson, the pharmacy is not accepting the sig on the Librium sent in on 12/14. Could you please edit and re-send? The patient is calling very angry. Thank you!

## 2021-12-17 ENCOUNTER — OFFICE VISIT (OUTPATIENT)
Dept: MEDICAL GROUP | Facility: CLINIC | Age: 41
End: 2021-12-17
Payer: MEDICAID

## 2021-12-17 VITALS
RESPIRATION RATE: 18 BRPM | TEMPERATURE: 96.6 F | HEART RATE: 66 BPM | HEIGHT: 75 IN | SYSTOLIC BLOOD PRESSURE: 138 MMHG | BODY MASS INDEX: 32.33 KG/M2 | WEIGHT: 260 LBS | DIASTOLIC BLOOD PRESSURE: 89 MMHG | OXYGEN SATURATION: 90 %

## 2021-12-17 DIAGNOSIS — F10.939 ALCOHOL WITHDRAWAL SYNDROME WITH COMPLICATION (HCC): ICD-10-CM

## 2021-12-17 DIAGNOSIS — I70.90 ATHEROSCLEROSIS: ICD-10-CM

## 2021-12-17 DIAGNOSIS — D61.818 PANCYTOPENIA (HCC): Primary | ICD-10-CM

## 2021-12-17 DIAGNOSIS — I25.118 ATHEROSCLEROSIS OF CORONARY ARTERY OF NATIVE HEART WITH OTHER FORM OF ANGINA PECTORIS, UNSPECIFIED VESSEL OR LESION TYPE (HCC): ICD-10-CM

## 2021-12-17 DIAGNOSIS — R74.01 TRANSAMINITIS: ICD-10-CM

## 2021-12-17 DIAGNOSIS — R07.89 OTHER CHEST PAIN: ICD-10-CM

## 2021-12-17 PROCEDURE — 99214 OFFICE O/P EST MOD 30 MIN: CPT | Mod: GC | Performed by: STUDENT IN AN ORGANIZED HEALTH CARE EDUCATION/TRAINING PROGRAM

## 2021-12-17 PROCEDURE — 93000 ELECTROCARDIOGRAM COMPLETE: CPT | Performed by: FAMILY MEDICINE

## 2021-12-17 RX ORDER — OMEPRAZOLE 20 MG/1
20 CAPSULE, DELAYED RELEASE ORAL 2 TIMES DAILY
Qty: 60 CAPSULE | Refills: 0 | Status: SHIPPED | OUTPATIENT
Start: 2021-12-17 | End: 2022-04-30

## 2021-12-17 RX ORDER — ACAMPROSATE CALCIUM 333 MG/1
666 TABLET, DELAYED RELEASE ORAL 3 TIMES DAILY
Qty: 90 TABLET | Refills: 5 | Status: SHIPPED | OUTPATIENT
Start: 2021-12-17 | End: 2022-02-04

## 2021-12-17 ASSESSMENT — FIBROSIS 4 INDEX: FIB4 SCORE: 36.84

## 2021-12-17 NOTE — TELEPHONE ENCOUNTER
Spoke with pharmacist at [facility] who states that RACHELLE Liu took care of this yesterday. Patient has appointment today, will discuss with him rosalind  Few hours and ensure he received this medication.     Mann Johnson M.D.  Family Medicine Resident  PGY-3

## 2021-12-18 ENCOUNTER — HOSPITAL ENCOUNTER (OUTPATIENT)
Dept: LAB | Facility: MEDICAL CENTER | Age: 41
End: 2021-12-18
Attending: STUDENT IN AN ORGANIZED HEALTH CARE EDUCATION/TRAINING PROGRAM
Payer: MEDICAID

## 2021-12-18 DIAGNOSIS — D61.818 PANCYTOPENIA (HCC): ICD-10-CM

## 2021-12-18 DIAGNOSIS — F10.939 ALCOHOL WITHDRAWAL SYNDROME WITH COMPLICATION (HCC): ICD-10-CM

## 2021-12-18 DIAGNOSIS — R74.01 TRANSAMINITIS: ICD-10-CM

## 2021-12-18 LAB
ALBUMIN SERPL BCP-MCNC: 3 G/DL (ref 3.2–4.9)
ALBUMIN/GLOB SERPL: 0.8 G/DL
ALP SERPL-CCNC: 160 U/L (ref 30–99)
ALT SERPL-CCNC: 31 U/L (ref 2–50)
ANION GAP SERPL CALC-SCNC: 13 MMOL/L (ref 7–16)
AST SERPL-CCNC: 116 U/L (ref 12–45)
BASOPHILS # BLD AUTO: 1 % (ref 0–1.8)
BASOPHILS # BLD: 0.05 K/UL (ref 0–0.12)
BILIRUB SERPL-MCNC: 2.2 MG/DL (ref 0.1–1.5)
BUN SERPL-MCNC: 10 MG/DL (ref 8–22)
CALCIUM SERPL-MCNC: 8.1 MG/DL (ref 8.5–10.5)
CHLORIDE SERPL-SCNC: 106 MMOL/L (ref 96–112)
CO2 SERPL-SCNC: 22 MMOL/L (ref 20–33)
CREAT SERPL-MCNC: 0.49 MG/DL (ref 0.5–1.4)
EOSINOPHIL # BLD AUTO: 0.05 K/UL (ref 0–0.51)
EOSINOPHIL NFR BLD: 1 % (ref 0–6.9)
ERYTHROCYTE [DISTWIDTH] IN BLOOD BY AUTOMATED COUNT: 59.3 FL (ref 35.9–50)
GLOBULIN SER CALC-MCNC: 3.6 G/DL (ref 1.9–3.5)
GLUCOSE SERPL-MCNC: 172 MG/DL (ref 65–99)
HCT VFR BLD AUTO: 39.6 % (ref 42–52)
HGB BLD-MCNC: 13.4 G/DL (ref 14–18)
IMM GRANULOCYTES # BLD AUTO: 0.01 K/UL (ref 0–0.11)
IMM GRANULOCYTES NFR BLD AUTO: 0.2 % (ref 0–0.9)
INR PPP: 1.35 (ref 0.87–1.13)
LYMPHOCYTES # BLD AUTO: 1.56 K/UL (ref 1–4.8)
LYMPHOCYTES NFR BLD: 32.4 % (ref 22–41)
MCH RBC QN AUTO: 32.4 PG (ref 27–33)
MCHC RBC AUTO-ENTMCNC: 33.8 G/DL (ref 33.7–35.3)
MCV RBC AUTO: 95.7 FL (ref 81.4–97.8)
MONOCYTES # BLD AUTO: 0.44 K/UL (ref 0–0.85)
MONOCYTES NFR BLD AUTO: 9.1 % (ref 0–13.4)
NEUTROPHILS # BLD AUTO: 2.71 K/UL (ref 1.82–7.42)
NEUTROPHILS NFR BLD: 56.3 % (ref 44–72)
NRBC # BLD AUTO: 0 K/UL
NRBC BLD-RTO: 0 /100 WBC
PLATELET # BLD AUTO: 75 K/UL (ref 164–446)
PMV BLD AUTO: 13.3 FL (ref 9–12.9)
POTASSIUM SERPL-SCNC: 4.5 MMOL/L (ref 3.6–5.5)
PROT SERPL-MCNC: 6.6 G/DL (ref 6–8.2)
PROTHROMBIN TIME: 16.2 SEC (ref 12–14.6)
RBC # BLD AUTO: 4.14 M/UL (ref 4.7–6.1)
SODIUM SERPL-SCNC: 141 MMOL/L (ref 135–145)
VIT B12 SERPL-MCNC: 1357 PG/ML (ref 211–911)
WBC # BLD AUTO: 4.8 K/UL (ref 4.8–10.8)

## 2021-12-18 PROCEDURE — 85610 PROTHROMBIN TIME: CPT

## 2021-12-18 PROCEDURE — 80053 COMPREHEN METABOLIC PANEL: CPT

## 2021-12-18 PROCEDURE — 82607 VITAMIN B-12: CPT

## 2021-12-18 PROCEDURE — 84425 ASSAY OF VITAMIN B-1: CPT

## 2021-12-18 PROCEDURE — 36415 COLL VENOUS BLD VENIPUNCTURE: CPT

## 2021-12-18 PROCEDURE — 85025 COMPLETE CBC W/AUTO DIFF WBC: CPT

## 2021-12-19 PROBLEM — I70.90 ATHEROSCLEROSIS: Status: ACTIVE | Noted: 2021-12-19

## 2021-12-20 ENCOUNTER — TELEPHONE (OUTPATIENT)
Dept: MEDICAL GROUP | Facility: CLINIC | Age: 41
End: 2021-12-20

## 2021-12-20 NOTE — TELEPHONE ENCOUNTER
Contacted Jalen by telephone after clinic notified physician that patient was having trouble with medication.  Jalen states he began to experience onset of dizziness following his most recent appointment with physician.  Is uncertain which medication has caused this, though states he believes it was the most recently started medication.  Patient states he is too dizzy to drive today and will be calling out sick.  Patient denies any heart palpitations, headaches, tremulousness at this time.    Discussed with patient that it is possible his acamprosate is making him dizzy, as this was most recently started medication.  Advised patient to cut dose by half to 333 mg 3 times daily to see whether this makes significant improvement in extent of dizziness.  Discussed with patient that alternatively this could be due to alcohol withdrawal, and discussed strict precautions that would indicate need to go to emergency department for evaluation.  Discussed with patient that should dizziness significantly worsen, this may also be reason to seek emergency department care.  Discussed with patient the physician has reviewed most of his labs, however awaiting final result of labs.  Discussed with patient that overall labs appear to be improving.  Patient verbalized understanding and agreement with plan of care.

## 2021-12-20 NOTE — PROGRESS NOTES
"[facility] FAMILY MEDICINE OFFICE VISIT    Date: 12/17/2021    MRN: [id number]  Patient ID: Jalen Vaughn    SUBJECTIVE:  Jalen Vaughn is a 41 y.o. man here for follow-up management of alcohol abuse.  Due to pharmacy issues, patient was unable to have his chlordiazepoxide filled until yesterday.  Patient reports he is presently on day 2 of his taper off alcohol though has continued to drink approximately 12 beers as of today.  Patient reports that he is having significant trouble with continued alcohol cravings, resulting in continued use of alcohol.  Patient states he desires medication to help him with his cravings.    Patient somewhat anxious regarding his multiple lab abnormalities that were seen at the time of his recent presentation to [facility] emergency department.  Patient continues to state that he refuses to return to the [facility] emergency department or any hospital for further care.    In passing, patient notes that he has had several months of ongoing chest pain.  Pain reported as 8 out of 10 in severity at present.  Pain described as a \"pressure\" located over mid to left chest.  Patient denies that this is related to acid reflux.  Patient states that pain occurs \"randomly\" with no association whatsoever with activity, positional changes, or food intake.  Pain is described as rather constant with intermittent fluctuations in extent of pain.  Patient reports he was advised in the past to have a cardiologist to evaluate him for a cardiac catheterization, however patient refused this in the past due to concerns about \"getting cut.\"    PMHx/PSHx:  Past Medical History:   Diagnosis Date   • Liver disease      Patient Active Problem List   Diagnosis   • Alcohol withdrawal syndrome with complication, cirrhosis (HCC)   • Alcoholic cirrhosis (HCC)   • Pancytopenia (HCC)   • Hypokalemia   • Hypomagnesemia   • Alcohol withdrawal delirium (HCC)   • Coagulopathy (HCC)   • Transaminitis   • Class 1 obesity due to " excess calories without serious comorbidity with body mass index (BMI) of 33.0 to 33.9 in adult   • Atherosclerosis     Cholecystectomy, appendectomy, hand surgery    Allergies: Patient has no known allergies.    OBJECTIVE:  Vitals:    12/17/21 1655   BP: 138/89   Pulse: 66   Resp: 18   Temp: 35.9 °C (96.6 °F)   SpO2: 90%       Physical Examination:  General: Well appearing man in no acute distress, resting on arrival to room  HEENT: Normocephalic, atraumatic, EOMI, nares patent  Cardiovascular: RRR, no murmurs, gallops, or rubs  Pulmonary: CTAB, symmetrical chest expansion, no rales, rhonchi, or wheezes  Extremities: Moves all spontaneously  Neurological: Alert and oriented, no asterixis or tremor present    ASSESSMENT & PLAN:  Jalen Vaughn is a 41 y.o. man here for follow-up evaluation of alcohol abuse, with multiple lab abnormalities requiring reevaluation as well as reported constant chest pain concerning for gastric ulceration versus stable angina.    1. Pancytopenia (HCC)  CBC WITH DIFFERENTIAL    Comp Metabolic Panel    Prothrombin Time    VITAMIN B1    VITAMIN B12   2. Alcohol withdrawal syndrome with complication, cirrhosis (HCC)  CBC WITH DIFFERENTIAL    Comp Metabolic Panel    Prothrombin Time    VITAMIN B1    VITAMIN B12    Referral to Gastroenterology    REFERRAL TO CARDIOLOGY    acamprosate (CAMPRAL) 333 MG tablet   3. Transaminitis  CBC WITH DIFFERENTIAL    Comp Metabolic Panel    Prothrombin Time    VITAMIN B1    VITAMIN B12   4. Other chest pain  REFERRAL TO CARDIOLOGY    omeprazole (PRILOSEC) 20 MG delayed-release capsule   5. Atherosclerosis of coronary artery of native heart with other form of angina pectoris, unspecified vessel or lesion type (HCC)  REFERRAL TO CARDIOLOGY       Orders Placed This Encounter   • CBC WITH DIFFERENTIAL   • Comp Metabolic Panel   • Prothrombin Time   • VITAMIN B1   • VITAMIN B12   • Referral to Gastroenterology   • REFERRAL TO CARDIOLOGY   • acamprosate  (CAMPRAL) 333 MG tablet   • omeprazole (PRILOSEC) 20 MG delayed-release capsule     #Pancytopenia  Patient with known pancytopenia during most recent [facility] lab check.  Patient's platelet count low enough to require transfusion, however patient continues to refuse hospitalization at this time.  Advised patient that we will recheck CBC, CMP, INR at this time.  Provided patient with laboratory slips and discussed with patient the physician will contact him with all lab results within 1 week of having these performed, and to contact the office if he does not hear back on his results to ensure they were received.  Advised patient to schedule follow-up visit in the next 3 days for review of labs.  Patient verbalized understanding.    #Alcohol withdrawal syndrome  Discussed with patient that current medication regimen certainly does not treat for cravings, and that there are multiple medications available to patient for treatment of this.  Discussed with patient that regrettably his multiple lab abnormalities and known cirrhosis creates significant difficulties for starting some of these medications.  Discussed with patient that once new labs are obtained, may be possible to start new medication.  Discussed with patient that acamprosate 666 mg p.o. 3 times daily is safe to start given no known renal disease, and sent to pharmacy at this time for patient.  Physician also ordered thiamine and B12 levels to ensure no complications related to chronic vitamin deficiencies.  Advised patient to continue his chlordiazepoxide taper, and to cease all use of alcohol.  Advised patient to schedule a follow-up visit in the next 3 days once taper completes.  Strict precautions given.  Patient verbalized agreement and understanding with plan of care.    #Transaminitis  Ordered repeat CMP and INR at this time for recheck.    #Other chest pain  Patient with constant chest pressure, 8 out of 10 in severity which is not associated with any  food, positional, or exertional changes.  Unclear etiology, potentially multifactorial.  Patient does have significant alcohol abuse which places him at high risk for chronic gastric ulceration.  Also has atherosclerotic disease (see below) which places him at risk for stable angina.  Performed in-house EKG which revealed sinus rhythm with modestly high T waves, with minimally intense P wave activity.  At this time will start omeprazole 20 mg p.o. twice daily to rule out gastric ulceration or acid reflux as cause for pain.  Physician also placed referral to cardiology at this time for consideration of cardiac catheterization versus stress test.  Discussed referrals process with patient.  Strict precautions given to patient prior to leaving clinic.  Patient verbalized agreement and understanding with plan of care.    #Coronary atherosclerosis  Atherosclerotic disease visualized on CTA performed at [facility] on 12/2.  Patient would benefit from starting high intensity statin, however due to multiple lab abnormalities, unable to begin this medication at this time.  Discussed with patient that ultimately he will benefit from this.  We will send medication to pharmacy pending review of laboratory data when patient completes (see above).  Physician also referring to cardiology at this time for further management.    Mann Johnson M.D.  Family Medicine Resident  PGY-3

## 2021-12-23 LAB — VIT B1 BLD-MCNC: 113 NMOL/L (ref 70–180)

## 2021-12-28 ENCOUNTER — OFFICE VISIT (OUTPATIENT)
Dept: MEDICAL GROUP | Facility: CLINIC | Age: 41
End: 2021-12-28
Payer: MEDICAID

## 2021-12-28 VITALS
WEIGHT: 257 LBS | OXYGEN SATURATION: 94 % | TEMPERATURE: 98.6 F | SYSTOLIC BLOOD PRESSURE: 136 MMHG | HEIGHT: 75 IN | DIASTOLIC BLOOD PRESSURE: 86 MMHG | BODY MASS INDEX: 31.95 KG/M2 | HEART RATE: 103 BPM | RESPIRATION RATE: 20 BRPM

## 2021-12-28 DIAGNOSIS — G47.00 INSOMNIA, UNSPECIFIED TYPE: ICD-10-CM

## 2021-12-28 DIAGNOSIS — R74.01 TRANSAMINITIS: ICD-10-CM

## 2021-12-28 DIAGNOSIS — D69.59 THROMBOCYTOPENIA DUE TO HYPERSPLENISM: ICD-10-CM

## 2021-12-28 DIAGNOSIS — D73.1 THROMBOCYTOPENIA DUE TO HYPERSPLENISM: ICD-10-CM

## 2021-12-28 DIAGNOSIS — Z13.220 SCREENING FOR LIPID DISORDERS: ICD-10-CM

## 2021-12-28 DIAGNOSIS — E87.6 HYPOKALEMIA: ICD-10-CM

## 2021-12-28 DIAGNOSIS — F10.939 ALCOHOL WITHDRAWAL SYNDROME WITH COMPLICATION (HCC): ICD-10-CM

## 2021-12-28 PROBLEM — F10.931 ALCOHOL WITHDRAWAL DELIRIUM (HCC): Status: RESOLVED | Noted: 2021-12-03 | Resolved: 2021-12-28

## 2021-12-28 PROCEDURE — 99214 OFFICE O/P EST MOD 30 MIN: CPT | Mod: GC | Performed by: STUDENT IN AN ORGANIZED HEALTH CARE EDUCATION/TRAINING PROGRAM

## 2021-12-28 RX ORDER — LACTULOSE 10 G/15ML
20 SOLUTION ORAL DAILY
Qty: 900 ML | Refills: 4 | Status: SHIPPED | OUTPATIENT
Start: 2021-12-28 | End: 2022-02-04 | Stop reason: SDUPTHER

## 2021-12-28 RX ORDER — SPIRONOLACTONE 50 MG/1
50 TABLET, FILM COATED ORAL DAILY
Qty: 30 TABLET | Refills: 4 | Status: SHIPPED | OUTPATIENT
Start: 2021-12-28 | End: 2022-02-04 | Stop reason: SDUPTHER

## 2021-12-28 RX ORDER — TRAZODONE HYDROCHLORIDE 50 MG/1
50 TABLET ORAL
Qty: 30 TABLET | Refills: 4 | Status: SHIPPED | OUTPATIENT
Start: 2021-12-28 | End: 2022-02-04

## 2021-12-28 RX ORDER — FUROSEMIDE 20 MG/1
20 TABLET ORAL DAILY
Qty: 30 TABLET | Refills: 4 | Status: SHIPPED | OUTPATIENT
Start: 2021-12-28 | End: 2022-02-04 | Stop reason: SDUPTHER

## 2021-12-28 ASSESSMENT — FIBROSIS 4 INDEX: FIB4 SCORE: 10.83

## 2021-12-28 ASSESSMENT — PAIN SCALES - GENERAL: PAINLEVEL: NO PAIN

## 2021-12-28 NOTE — PROGRESS NOTES
[facility] FAMILY MEDICINE OFFICE VISIT    Date: 12/28/2021    MRN: [id number]  Patient ID: Jalen Vaughn    SUBJECTIVE:  Jalen Vaughn is a 41 y.o. man here for follow-up of alcohol cessation. Patient has noted a mild tremor since ceasing alcohol. Last drink was once week ago. Patient reports significantly diminished cravings for alcohol with acamprosate use.     Patient reports the primary concern during this visit is insomnia.  Reports that he did not sleep last night.  Reports with difficulty falling and staying asleep.  Patient reports that he previously used to drink in order to fall asleep.  Has not tried medications for this in the past.  Patient reports environment which is dark when sleeping, uses bedroom only for sleep, no television in room.  No other distractions in room which might result in sleep latency.    PMHx/PSHx:  Past Medical History:   Diagnosis Date   • Liver disease      Past Surgical History:   Procedure Laterality Date   • APPENDECTOMY     • CHOLECYSTECTOMY         Allergies: Patient has no known allergies.    OBJECTIVE:  Vitals:    12/28/21 1030   BP: 136/86   Pulse: (!) 103   Resp: 20   Temp: 37 °C (98.6 °F)   SpO2: 94%       Physical Examination:  General: Well appearing man in no acute distress, resting on arrival to room  HEENT: Normocephalic, atraumatic, EOMI  Cardiovascular: RRR, no murmurs, gallops, or rubs  Pulmonary: CTAB, symmetrical chest expansion, no rales, rhonchi, or wheezes  Extremities: Moves all spontaneously  Neurological: Alert and oriented, mild resting tremor, tremor accentuated with rapid alternating movements and intention    ASSESSMENT & PLAN:  Jalen Vaughn is a 41 y.o. man undergoing treatment for alcohol abuse disorder, currently in remission, with insomnia.    1. Insomnia, unspecified type  traZODone (DESYREL) 50 MG Tab   2. Hypokalemia  Comp Metabolic Panel   3. Transaminitis  Comp Metabolic Panel   4. Screening for lipid disorders  Lipid Profile    5. Thrombocytopenia due to hypersplenism  CBC WITHOUT DIFFERENTIAL   6. Alcohol withdrawal syndrome with complication, cirrhosis (HCC)  furosemide (LASIX) 20 MG Tab    lactulose 10 GM/15ML Solution    spironolactone (ALDACTONE) 50 MG Tab       Orders Placed This Encounter   • CBC WITHOUT DIFFERENTIAL   • Comp Metabolic Panel   • Lipid Profile   • furosemide (LASIX) 20 MG Tab   • lactulose 10 GM/15ML Solution   • spironolactone (ALDACTONE) 50 MG Tab   • traZODone (DESYREL) 50 MG Tab       #Insomnia  Discussed general sleep hygiene and recommendations regarding exercise for adults with patient which could result in improvement in sleep.  Discussed with patient that at this time he may benefit from trial of trazodone, though we will have to start low and titrate upwards due to fact that patient has poor hepatic function.  Discussed risks to medication including strict precautions, and that concurrent use of sertraline could predispose him to these conditions more than some.  At this time will start trazodone 50 mg p.o. q. bedtime.  Advised patient to schedule follow-up visit in the next 2 weeks to determine efficacy of medication.    #Hypokalemia/transaminitis/thrombocytopenia  Reviewed past labs from 1 week ago which revealed the above listed conditions.  These do generally appear to be improving.  Discussed with patient that his he has now completed his oral potassium regimen, and is on both Lasix and spironolactone, we will need to monitor his potassium function closely.  Ordered CBC, CMP for evaluation and advised patient to have labs performed in the next few days.  Patient verbalized agreement and understanding of plan of care.    #Screening for lipid disorders  Ordered lipid panel for patient.  Discussed with patient that physician will contact him with all lab results within 1 week of having these performed, and to contact the office if he does not hear back on his results during that time.  Patient  verbalized agreement understanding with plan of care.    #Alcohol withdrawal syndrome with cirrhosis  At this time we ordered furosemide, lactulose, and spironolactone for patient.  Sent these medications to pharmacy.    Mann Johnson M.D.  Family Medicine Resident  PGY-3

## 2022-01-08 ENCOUNTER — HOSPITAL ENCOUNTER (EMERGENCY)
Facility: MEDICAL CENTER | Age: 42
End: 2022-01-08
Attending: EMERGENCY MEDICINE
Payer: MEDICAID

## 2022-01-08 ENCOUNTER — APPOINTMENT (OUTPATIENT)
Dept: RADIOLOGY | Facility: MEDICAL CENTER | Age: 42
End: 2022-01-08
Attending: EMERGENCY MEDICINE
Payer: MEDICAID

## 2022-01-08 VITALS
DIASTOLIC BLOOD PRESSURE: 72 MMHG | RESPIRATION RATE: 16 BRPM | WEIGHT: 249 LBS | OXYGEN SATURATION: 93 % | SYSTOLIC BLOOD PRESSURE: 101 MMHG | BODY MASS INDEX: 30.96 KG/M2 | TEMPERATURE: 97.2 F | HEIGHT: 75 IN | HEART RATE: 86 BPM

## 2022-01-08 DIAGNOSIS — H53.8 BLURRED VISION: ICD-10-CM

## 2022-01-08 DIAGNOSIS — R07.9 CHEST PAIN, UNSPECIFIED TYPE: ICD-10-CM

## 2022-01-08 LAB
ALBUMIN SERPL BCP-MCNC: 3.4 G/DL (ref 3.2–4.9)
ALBUMIN/GLOB SERPL: 0.9 G/DL
ALP SERPL-CCNC: 169 U/L (ref 30–99)
ALT SERPL-CCNC: 32 U/L (ref 2–50)
ANION GAP SERPL CALC-SCNC: 15 MMOL/L (ref 7–16)
AST SERPL-CCNC: 111 U/L (ref 12–45)
BASOPHILS # BLD AUTO: 1.3 % (ref 0–1.8)
BASOPHILS # BLD: 0.08 K/UL (ref 0–0.12)
BILIRUB SERPL-MCNC: 1.9 MG/DL (ref 0.1–1.5)
BUN SERPL-MCNC: 6 MG/DL (ref 8–22)
CALCIUM SERPL-MCNC: 7.9 MG/DL (ref 8.5–10.5)
CHLORIDE SERPL-SCNC: 105 MMOL/L (ref 96–112)
CO2 SERPL-SCNC: 24 MMOL/L (ref 20–33)
CREAT SERPL-MCNC: 0.59 MG/DL (ref 0.5–1.4)
EKG IMPRESSION: NORMAL
EOSINOPHIL # BLD AUTO: 0.15 K/UL (ref 0–0.51)
EOSINOPHIL NFR BLD: 2.4 % (ref 0–6.9)
ERYTHROCYTE [DISTWIDTH] IN BLOOD BY AUTOMATED COUNT: 57 FL (ref 35.9–50)
GLOBULIN SER CALC-MCNC: 3.6 G/DL (ref 1.9–3.5)
GLUCOSE BLD-MCNC: 90 MG/DL (ref 65–99)
GLUCOSE SERPL-MCNC: 93 MG/DL (ref 65–99)
HCT VFR BLD AUTO: 44.8 % (ref 42–52)
HGB BLD-MCNC: 15.3 G/DL (ref 14–18)
IMM GRANULOCYTES # BLD AUTO: 0.01 K/UL (ref 0–0.11)
IMM GRANULOCYTES NFR BLD AUTO: 0.2 % (ref 0–0.9)
LYMPHOCYTES # BLD AUTO: 2.91 K/UL (ref 1–4.8)
LYMPHOCYTES NFR BLD: 46.3 % (ref 22–41)
MCH RBC QN AUTO: 33.3 PG (ref 27–33)
MCHC RBC AUTO-ENTMCNC: 34.2 G/DL (ref 33.7–35.3)
MCV RBC AUTO: 97.6 FL (ref 81.4–97.8)
MONOCYTES # BLD AUTO: 0.5 K/UL (ref 0–0.85)
MONOCYTES NFR BLD AUTO: 8 % (ref 0–13.4)
NEUTROPHILS # BLD AUTO: 2.63 K/UL (ref 1.82–7.42)
NEUTROPHILS NFR BLD: 41.8 % (ref 44–72)
NRBC # BLD AUTO: 0 K/UL
NRBC BLD-RTO: 0 /100 WBC
PLATELET # BLD AUTO: 81 K/UL (ref 164–446)
PMV BLD AUTO: 12 FL (ref 9–12.9)
POTASSIUM SERPL-SCNC: 4 MMOL/L (ref 3.6–5.5)
PROT SERPL-MCNC: 7 G/DL (ref 6–8.2)
RBC # BLD AUTO: 4.59 M/UL (ref 4.7–6.1)
SODIUM SERPL-SCNC: 144 MMOL/L (ref 135–145)
TROPONIN T SERPL-MCNC: 16 NG/L (ref 6–19)
WBC # BLD AUTO: 6.3 K/UL (ref 4.8–10.8)

## 2022-01-08 PROCEDURE — 99284 EMERGENCY DEPT VISIT MOD MDM: CPT

## 2022-01-08 PROCEDURE — 80053 COMPREHEN METABOLIC PANEL: CPT

## 2022-01-08 PROCEDURE — 93005 ELECTROCARDIOGRAM TRACING: CPT | Performed by: EMERGENCY MEDICINE

## 2022-01-08 PROCEDURE — 84484 ASSAY OF TROPONIN QUANT: CPT

## 2022-01-08 PROCEDURE — 85025 COMPLETE CBC W/AUTO DIFF WBC: CPT

## 2022-01-08 PROCEDURE — 82962 GLUCOSE BLOOD TEST: CPT

## 2022-01-08 PROCEDURE — 71045 X-RAY EXAM CHEST 1 VIEW: CPT

## 2022-01-08 ASSESSMENT — FIBROSIS 4 INDEX: FIB4 SCORE: 10.83

## 2022-01-09 NOTE — ED PROVIDER NOTES
"ER Provider Note     Scribed for Martinez Allen M.D. by Luis Wyatt. 1/8/2022, 6:06 PM.    Primary Care Provider: Mann Johnson M.D.  Means of Arrival: Walk in    History obtained from: Patient  History limited by: Alcohol Intoxication     CHIEF COMPLAINT  Chief Complaint   Patient presents with    Chest Pain     central chest pain, pt not answering questions about severity or radiation due to intoxication    Blurred Vision     Pt states \"I think I was told I have diabetes but now I can't see for shit. I saw an eye doctor the other day and she said I have diabetes.\"    Alcohol Intoxication     Pt slurring his words and demonstrating a poor attention span. [facility] checked in triage 90 and pt admits to alochol consumption today.       HPI  Jalen Vaughn is a 41 y.o. male who presents to the Emergency Department for evaluation of chronic bilateral blurred vision onset 6 months. Patient reports that he was seen by an optometrist recently where he was told he has diabetes which is what he thinks is contributing to his vision changes. He adds that he is not on medication for diabetes. Patient has been using prescribed eye drops with no alleviation. He presents associated symptoms of blurred vision for 6 months, alcohol intoxication today, shortness of breath, palpitations, nausea, edema in bilateral lower extremities for the last 6 months, weight loss, constant headache, dizziness, and trouble with ambulation. Per nursing notes, patient is slurring his words and is not attentive to questions. Patient's last drink was at 3 PM today.  Per past medical records from 12/03/21, patient was admitted to the hospital for alcohol withdrawal. He adds that he drinks \"2 beers a day for 20 years.\" Patient denies use of drugs recently. In the ED, patient is complaining of medial chest pain for the last 6 months but is unable to answer questions pertaining to his pain. Denies diarrhea, fever, chills, abdominal pain or " "constipation. Patient has not been working with the last 1.5 years because he \"has been sick.\" Patient has additional history of cirrhosis and takes lactulose and lasix.     HPI limited by alcohol intoxication    REVIEW OF SYSTEMS  See HPI for further details.    ROS limited by alcohol intoxication.     PAST MEDICAL HISTORY   has a past medical history of Liver disease.    SURGICAL HISTORY   has a past surgical history that includes appendectomy and cholecystectomy.    SOCIAL HISTORY  Social History     Tobacco Use    Smoking status: Never Smoker    Smokeless tobacco: Current User     Types: Chew   Vaping Use    Vaping Use: Never used   Substance Use Topics    Alcohol use: Yes     Comment: daily    Drug use: Not Currently      Social History     Substance and Sexual Activity   Drug Use Not Currently       FAMILY HISTORY  None reported     CURRENT MEDICATIONS  Current Outpatient Medications   Medication Instructions    acamprosate (CAMPRAL) 666 mg, Oral, 3 TIMES DAILY    furosemide (LASIX) 20 mg, Oral, DAILY    lactulose 20 g, Oral, DAILY    omeprazole (PRILOSEC) 20 mg, Oral, 2 TIMES DAILY    sertraline (ZOLOFT) 50 mg, Oral, DAILY    spironolactone (ALDACTONE) 50 mg, Oral, DAILY    traZODone (DESYREL) 50 mg, Oral, EVERY BEDTIME       ALLERGIES  No Known Allergies    PHYSICAL EXAM  VITAL SIGNS: /71   Pulse 87   Temp 36.4 °C (97.5 °F) (Temporal)   Resp 16   Ht 1.905 m (6' 3\")   Wt 113 kg (249 lb)   SpO2 92%   BMI 31.12 kg/m²      Constitutional: Alert and intoxicated, slurring words    HENT: No signs of trauma, Bilateral external ears normal, Nose normal.   Eyes: Horizantal Nystagmus,  Conjunctiva normal, Non-icteric.   Neck: Normal range of motion, No tenderness, Supple, No stridor.   Lymphatic: No lymphadenopathy noted.   Cardiovascular: Regular rate and rhythm, no palpable thrill  Thorax & Lungs: No respiratory distress,  No chest tenderness.  CTAB  Abdomen: Bowel sounds normal, Soft, No tenderness, No " masses, No pulsatile masses. No peritoneal signs.  Skin: Warm, Dry, No erythema, No rash.   Back: No bony tenderness, No CVA tenderness.   Extremities: Intact distal pulses, No edema, No tenderness, No cyanosis.  Musculoskeletal: Good range of motion in all major joints. No tenderness to palpation or major deformities noted.   Neurologic: Alert, intoxicated, slurring words,   Psychiatric: Intoxicated, slurring words.     DIAGNOSTIC STUDIES / PROCEDURES    EKG Interpretation:  Interpreted by me    12 Lead EKG interpreted by me to show:  Normal sinus rhythm  Rate 64  Axis: Normal  Intervals: Normal  Normal T waves with no inversion   Benign ST elevation seen from prior  My impression of this EKG: Does not indicate ischemia or arrhythmia at this time.     LABS  Labs Reviewed   CBC WITH DIFFERENTIAL - Abnormal; Notable for the following components:       Result Value    RBC 4.59 (*)     MCH 33.3 (*)     RDW 57.0 (*)     Platelet Count 81 (*)     Neutrophils-Polys 41.80 (*)     Lymphocytes 46.30 (*)     All other components within normal limits    Narrative:     Collected By:   COMP METABOLIC PANEL - Abnormal; Notable for the following components:    Bun 6 (*)     Calcium 7.9 (*)     AST(SGOT) 111 (*)     Alkaline Phosphatase 169 (*)     Total Bilirubin 1.9 (*)     Globulin 3.6 (*)     All other components within normal limits    Narrative:     Collected By:   TROPONIN    Narrative:     Collected By:   ESTIMATED GFR    Narrative:     Collected By:   POCT GLUCOSE DEVICE RESULTS     All labs reviewed by me.    RADIOLOGY  DX-CHEST-PORTABLE (1 VIEW)   Final Result      No acute cardiopulmonary process is seen.         The radiologist's interpretation of all radiological studies have been reviewed by me.    COURSE & MEDICAL DECISION MAKING  Pertinent Labs & Imaging studies reviewed. (See chart for details)    This is a 41 y.o. male that presents with multiple complaints.  All of the complaints appear to be chronic.  The  "vision complaints appear to be going on for 6 months or longer and I will have the patient follow-up with ophthalmology.  Chest pain seems to be intermittent and longstanding therefore I will get basic labs to assure there is no myocardial ischemia.  I also evaluate for pneumonia pneumothorax and then reassess..     6:06 PM - Patient evaluated at bedside. Ordered DX-chest, CBC with diff., CMP, Troponin, GFR, And EKG for further evaluation. Discussed with patient about administering basic labs and imaging for further evaluation. Patient verbalizes understanding and agreement to this plan of care.     7:47 PM Patient was re-evaluated at bedside. Patient feels improved. Discussed labs and radiology as shown above. I discussed with patient care of plan following discharge. Patient was given opportunity to ask questions at this time. Counseled patient on proper OTC medication dosages for pain control and relief. Patient verbalizes understanding and agrees to care of plan.  Patient will be discharged at this time. Patient will be discharged with a referral to Opthalmology. His vital signs prior to discharge: /71   Pulse 87   Temp 36.4 °C (97.5 °F) (Temporal)   Resp 16   Ht 1.905 m (6' 3\")   Wt 113 kg (249 lb)   SpO2 92%   BMI 31.12 kg/m²     The patient will return for new or worsening symptoms and is stable at the time of discharge.    The patient has had a negative chest x-ray and negative troponin.  He has no significant exercise arrangements.  He has no significant anemia.  She was well-appearing at this time.  He is demonstrating ability to read and has at times no difficulty with his vision.  This does seem to be inconsistent.  The patient felt ophthalmology no given strict return precautions and follow-up.    The patient is referred to a primary physician for blood pressure management, diabetic screening, and for all other preventative health concerns.    DISPOSITION:  Patient will be discharged home " in stable condition.    FOLLOW UP:  Saeed Medeiros M.D.  [address] [place] [id number]  [phone]    In 2 days      FINAL IMPRESSION  1. Chest pain, unspecified type    2. Blurred vision          Luis ARIAS (Scribe), am scribing for, and in the presence of, Martinez Allen M.D..    Electronically signed by: Luis Wyatt (Scribe), 1/8/2022    IMartinez M.D. personally performed the services described in this documentation, as scribed by Luis Wyatt in my presence, and it is both accurate and complete.     C    The note accurately reflects work and decisions made by me.  Martinez Allen M.D.  1/8/2022  11:59 PM

## 2022-01-09 NOTE — ED TRIAGE NOTES
"Chief Complaint   Patient presents with   • Chest Pain     central chest pain, pt not answering questions about severity or radiation due to intoxication   • Blurred Vision     Pt states \"I think I was told I have diabetes but now I can't see for shit. I saw an eye doctor the other day and she said I have diabetes.\"   • Alcohol Intoxication     Pt slurring his words and demonstrating a poor attention span. [facility] checked in triage 90 and pt admits to alochol consumption today.       Wheelchair to triage for above complaint.   Educated on triage process, encourage to inform staff of any changes.     /71   Pulse 87   Temp 36.4 °C (97.5 °F) (Temporal)   Resp 16   Ht 1.905 m (6' 3\")   Wt 113 kg (249 lb)   SpO2 92%   BMI 31.12 kg/m²     "

## 2022-01-09 NOTE — ED NOTES
"Pt refusing to get into gown for dr assessment. Pt denies to me chest pain and is c/o \"can't see shit, like I see that trash can there but I can't see it\" pt can not describe his visual disturbance clearly.   "

## 2022-01-09 NOTE — ED NOTES
Patient discharged in stable condition per orders. Wristband removed per protocol. Patient verbalized understanding of all discharge instructions. All belongings accounted for. Pt ambulatory to , wheeled out to lobby by staff.

## 2022-01-10 ENCOUNTER — HOSPITAL ENCOUNTER (OUTPATIENT)
Dept: LAB | Facility: MEDICAL CENTER | Age: 42
End: 2022-01-10
Attending: STUDENT IN AN ORGANIZED HEALTH CARE EDUCATION/TRAINING PROGRAM
Payer: MEDICAID

## 2022-01-10 ENCOUNTER — OFFICE VISIT (OUTPATIENT)
Dept: MEDICAL GROUP | Facility: CLINIC | Age: 42
End: 2022-01-10
Payer: MEDICAID

## 2022-01-10 VITALS
DIASTOLIC BLOOD PRESSURE: 75 MMHG | BODY MASS INDEX: 32.95 KG/M2 | TEMPERATURE: 96.8 F | SYSTOLIC BLOOD PRESSURE: 128 MMHG | RESPIRATION RATE: 16 BRPM | OXYGEN SATURATION: 91 % | HEIGHT: 75 IN | HEART RATE: 73 BPM | WEIGHT: 265 LBS

## 2022-01-10 DIAGNOSIS — H53.8 VISION BLURRING: ICD-10-CM

## 2022-01-10 LAB
EST. AVERAGE GLUCOSE BLD GHB EST-MCNC: 94 MG/DL
HBA1C MFR BLD: 4.9 % (ref 4–5.6)

## 2022-01-10 PROCEDURE — 99213 OFFICE O/P EST LOW 20 MIN: CPT | Mod: GE | Performed by: STUDENT IN AN ORGANIZED HEALTH CARE EDUCATION/TRAINING PROGRAM

## 2022-01-10 PROCEDURE — 36415 COLL VENOUS BLD VENIPUNCTURE: CPT

## 2022-01-10 PROCEDURE — 83036 HEMOGLOBIN GLYCOSYLATED A1C: CPT

## 2022-01-10 ASSESSMENT — FIBROSIS 4 INDEX: FIB4 SCORE: 9.45

## 2022-01-11 ENCOUNTER — TELEPHONE (OUTPATIENT)
Dept: MEDICAL GROUP | Facility: CLINIC | Age: 42
End: 2022-01-11
Payer: MEDICAID

## 2022-01-11 NOTE — PROGRESS NOTES
"Subjective:     CC: Blurry vision    HPI:   Jalen presents today with chief concern of blurry vision.  Patient notes that this has been going on since summer 2021.  Was in care home at that time.  He describes it as trouble seeing near and far and that his left eye is better than his right.  He denies any pain with eye movement.  He denies any \"curtain falling down\".  No trauma to head.  He was seen in the ED on 1/8 and was referred to ophthalmology at that time.    Problem   Vision Blurring       Current Outpatient Medications Ordered in Epic   Medication Sig Dispense Refill   • furosemide (LASIX) 20 MG Tab Take 1 Tablet by mouth every day. 30 Tablet 4   • lactulose 10 GM/15ML Solution Take 30 mL by mouth every day. 900 mL 4   • spironolactone (ALDACTONE) 50 MG Tab Take 1 Tablet by mouth every day. 30 Tablet 4   • traZODone (DESYREL) 50 MG Tab Take 1 Tablet by mouth at bedtime. 30 Tablet 4   • acamprosate (CAMPRAL) 333 MG tablet Take 2 Tablets by mouth 3 times a day. 90 Tablet 5   • omeprazole (PRILOSEC) 20 MG delayed-release capsule Take 1 Capsule by mouth 2 times a day. 60 Capsule 0   • sertraline (ZOLOFT) 50 MG Tab Take 1 Tablet by mouth every day. 30 Tablet 11     No current [facility]-ordered facility-administered medications on file.     ROS:  Gen: no fevers/chills, no changes in weight  Eyes: yes blurry vision, no pain with eye movement  ENT: no sore throat, no hearing loss, no bloody nose  Pulm: no sob, no cough  CV: no chest pain, no palpitations  GI: yes decreased appetite, no nausea/vomiting, no diarrhea  : no dysuria  MSk: no myalgias  Skin: no rash  Neuro: no headaches, no numbness/tingling  Heme/Lymph: no easy bruising      Objective:     Exam:  /75 (BP Location: Right arm, Patient Position: Sitting, BP Cuff Size: Large adult)   Pulse 73   Temp 36 °C (96.8 °F) (Tympanic)   Resp 16   Ht 1.905 m (6' 3\")   Wt 120 kg (265 lb)   SpO2 91%   BMI 33.12 kg/m²  Body mass index is 33.12 kg/m².    Gen: Alert " and oriented, No apparent distress.  HEENT: difficulty with peripheral vision, EOMI, PERRLA  Neck: Neck is supple without lymphadenopathy.  Lungs: Normal effort, CTA bilaterally, no wheezes, rhonchi, or rales  CV: Regular rate and rhythm. No murmurs, rubs, or gallops.  Ext: No clubbing, cyanosis, edema.  Neuro: CN 2-12 grossly intact, 5/5 strength in upper and lower extremities      Assessment & Plan:     41 y.o. male with the following -     Problem List Items Addressed This Visit     Vision blurring     Chronic, uncontrolled  Patient's chief concern today is blurry vision.  Patient has been bothered by this since summer 2021 when he was in retirement.  He denies any acute worsening of this concern.  He was seen in the ED 2 days ago with same concern.  He was referred to ophthalmology at that time.  Likely secondary to diabetes versus alcohol history.  Ophthalmology referral is appropriate and we will follow-up recommendations.  We will check a hemoglobin A1c and if appropriate we will start oral medications over the phone.  Follow-up in 1 month.         Relevant Orders    HEMOGLOBIN A1C          No follow-ups on file.

## 2022-01-11 NOTE — ASSESSMENT & PLAN NOTE
Chronic, uncontrolled  Patient's chief concern today is blurry vision.  Patient has been bothered by this since summer 2021 when he was in long-term.  He denies any acute worsening of this concern.  He was seen in the ED 2 days ago with same concern.  He was referred to ophthalmology at that time.  Likely secondary to diabetes versus alcohol history.  Ophthalmology referral is appropriate and we will follow-up recommendations.  We will check a hemoglobin A1c and if appropriate we will start oral medications over the phone.  Follow-up in 1 month.

## 2022-01-12 NOTE — TELEPHONE ENCOUNTER
STAT    Dr. Guerra patient called and is anxious to know his results from lab work yesterday. Please call pt at [phone] as lab results are in chart now.  Thank you

## 2022-01-14 ENCOUNTER — TELEPHONE (OUTPATIENT)
Dept: CARDIOLOGY | Facility: MEDICAL CENTER | Age: 42
End: 2022-01-14

## 2022-01-14 NOTE — TELEPHONE ENCOUNTER
Called patient to request records for NP appointment with Dr. Fritz. Called to confirm if this will be first time patient is seeing a cardiologist as well as verify all records including labs, imaging and any other cardiac testing. No answer, left voicemail to call back.

## 2022-01-17 ENCOUNTER — TELEPHONE (OUTPATIENT)
Dept: SCHEDULING | Facility: IMAGING CENTER | Age: 42
End: 2022-01-17
Payer: MEDICAID

## 2022-01-17 NOTE — TELEPHONE ENCOUNTER
PACO      Pt was calling back about chart prep. Pt had no prior Cardiology doctor. Pt had a recent EKG completed at [place], [place] at [facility] in September/October 2021. Pt only had lab work done at [facility].      Thank you,  Tatyana STEARNS

## 2022-01-31 ENCOUNTER — TELEPHONE (OUTPATIENT)
Dept: MEDICAL GROUP | Facility: CLINIC | Age: 42
End: 2022-01-31

## 2022-01-31 NOTE — TELEPHONE ENCOUNTER
Patient called stating his medication that was prescribed for alcoholism was making him sick and not working. He is not sure which one it is, but he is requesting a different medication be sent.   He states he received a medication while he was incarcerated which helped but he is not sure what it is called. Pt states he poured his medication down the toilet and he is not taking any of the medications prescribed, only the water pills.

## 2022-02-02 NOTE — TELEPHONE ENCOUNTER
I also received a message from this pt. I called him today to speak with him, he states he would like a new medication to be prescribed.

## 2022-02-04 ENCOUNTER — OFFICE VISIT (OUTPATIENT)
Dept: MEDICAL GROUP | Facility: CLINIC | Age: 42
End: 2022-02-04
Payer: MEDICAID

## 2022-02-04 VITALS
WEIGHT: 253 LBS | HEIGHT: 75 IN | BODY MASS INDEX: 31.46 KG/M2 | HEART RATE: 73 BPM | DIASTOLIC BLOOD PRESSURE: 78 MMHG | RESPIRATION RATE: 16 BRPM | OXYGEN SATURATION: 93 % | SYSTOLIC BLOOD PRESSURE: 122 MMHG | TEMPERATURE: 97.7 F

## 2022-02-04 DIAGNOSIS — F10.939 ALCOHOL WITHDRAWAL SYNDROME WITH COMPLICATION (HCC): ICD-10-CM

## 2022-02-04 DIAGNOSIS — K92.0 HEMATEMESIS WITH NAUSEA: ICD-10-CM

## 2022-02-04 DIAGNOSIS — F10.20 ALCOHOL USE DISORDER, SEVERE, DEPENDENCE (HCC): ICD-10-CM

## 2022-02-04 DIAGNOSIS — R07.9 CHEST PAIN, UNSPECIFIED TYPE: ICD-10-CM

## 2022-02-04 DIAGNOSIS — F41.9 ANXIETY: ICD-10-CM

## 2022-02-04 DIAGNOSIS — F10.920 ALCOHOLIC INTOXICATION WITHOUT COMPLICATION (HCC): ICD-10-CM

## 2022-02-04 DIAGNOSIS — K70.31 ALCOHOLIC CIRRHOSIS OF LIVER WITH ASCITES (HCC): ICD-10-CM

## 2022-02-04 DIAGNOSIS — F10.982 ALCOHOL-INDUCED INSOMNIA (HCC): ICD-10-CM

## 2022-02-04 PROCEDURE — 99214 OFFICE O/P EST MOD 30 MIN: CPT | Mod: GC | Performed by: STUDENT IN AN ORGANIZED HEALTH CARE EDUCATION/TRAINING PROGRAM

## 2022-02-04 RX ORDER — ESCITALOPRAM OXALATE 20 MG/1
10 TABLET ORAL DAILY
Qty: 30 TABLET | Refills: 2 | Status: SHIPPED | OUTPATIENT
Start: 2022-02-04 | End: 2022-02-25

## 2022-02-04 RX ORDER — LACTULOSE 10 G/15ML
20 SOLUTION ORAL DAILY
Qty: 900 ML | Refills: 5 | Status: SHIPPED | OUTPATIENT
Start: 2022-02-04 | End: 2022-04-30

## 2022-02-04 RX ORDER — FUROSEMIDE 20 MG/1
20 TABLET ORAL DAILY
Qty: 90 TABLET | Refills: 2 | Status: SHIPPED | OUTPATIENT
Start: 2022-02-04 | End: 2022-02-25

## 2022-02-04 RX ORDER — TRAZODONE HYDROCHLORIDE 150 MG/1
150 TABLET ORAL NIGHTLY
Qty: 30 TABLET | Refills: 3 | Status: SHIPPED | OUTPATIENT
Start: 2022-02-04 | End: 2022-04-30

## 2022-02-04 RX ORDER — SPIRONOLACTONE 50 MG/1
50 TABLET, FILM COATED ORAL DAILY
Qty: 90 TABLET | Refills: 2 | Status: SHIPPED | OUTPATIENT
Start: 2022-02-04 | End: 2022-02-25

## 2022-02-04 RX ORDER — HYDROXYZINE HYDROCHLORIDE 25 MG/1
25 TABLET, FILM COATED ORAL 3 TIMES DAILY PRN
Qty: 30 TABLET | Refills: 0 | Status: SHIPPED | OUTPATIENT
Start: 2022-02-04 | End: 2022-02-25

## 2022-02-04 ASSESSMENT — FIBROSIS 4 INDEX: FIB4 SCORE: 9.69

## 2022-02-04 NOTE — TELEPHONE ENCOUNTER
Pt called today stating he is waiting for his new medication to be sent to his pharmacy. He stated the  medication made him very sick so he does not have it any longer. I explained that Dr Johnson would like to see him for an appt to discuss. He said he cannot wait because he is feeling like he is going to start drinking beer again.  I am able to get him on the schedule today with Dr Johnson at 3:20.

## 2022-02-05 NOTE — ASSESSMENT & PLAN NOTE
Chronic.  Unclear etiology of anxiety. Could be related to alcohol withdrawal, but patient also has had acute stressors including recent death of father. States that he feels anxious every day and occasionally has what seems like panic attacks. States that the Zoloft was making him sick. Would like to try something new. Does not want to speak to a psychologist at this time.  Plan:  -Stop Zoloft  -Start Escitalopram  -Follow-up in 1 month  -Atarax as needed for acute anxiety.

## 2022-02-05 NOTE — ASSESSMENT & PLAN NOTE
Chronic.  Patient also states that he has had insomnia. States that he has difficulty initiating and maintaining sleep. States that the trazodone 50 mg was not effective.  Plan:  -Increase trazodone to 150 mg every night.  -Follow-up in 1 month

## 2022-02-05 NOTE — ASSESSMENT & PLAN NOTE
"Chronic.  Patient states that he used to drink a 12 pack a day and he has since cut back to three 24 ounce beers a day. Per chart review, patient has been unable to get a liver transplant in the past secondary to inability to abstain from alcohol. At this visit, patient states he drank \"2 beers\" prior to his arrival and appeared acutely intoxicated during interview.  Plan:  -Continue to encourage abstinence from alcohol  -Stop acamprosate as patient states making him sick  -Given history of heavy alcohol use and reported symptoms today of alcohol withdrawal when he tries to sustain from drinking, patient was advised to present to the ER when he tries to abstain as acute hospitalization can help the patient withdrawn a controlled setting and then he can start fresh after that.  "

## 2022-02-05 NOTE — ASSESSMENT & PLAN NOTE
"Chronic.  Patient presents for medication follow-up. States that about 5 days ago he flushed his acamprosate and sertraline. States that it was \"making him sick\". He states that he is still been taking his Lasix, spironolactone, and lactulose. He states he has not noticed any additional abdominal distention. He also states that he has not followed up with GI.  Plan:  -Continue Lasix  -Continue spironolactone  -Continue lactulose  -Follow-up with GI, another referral placed today.  "

## 2022-02-05 NOTE — ASSESSMENT & PLAN NOTE
"Chronic.  Patient states that he has been feeling chest pain intermittently for 2 years. He describes this chest pain as \"getting punched in the chest\". He states he has radiation down both his arms. He was feeling chest pain at this visit. Described it as a 5/10. Was supposed to see cardiology for this matter but has been unable to make his appointments. EKG ordered today and showed no acute changes. Interpretation is as follows: Rate 71, regular rhythm, normal CA interval, normal QT, sinus, no T wave inversion, no ST changes, normal axis, normal ECG. Patient has the following risk factors for ACS: Family history of premature ACS, tobacco use, alcohol use, poor diet, and cirrhosis.  Plan:  -Follow-up with cardiology  "

## 2022-02-05 NOTE — ASSESSMENT & PLAN NOTE
Acute.  Patient states that he has been having small bouts of hematemesis over the past 3 days. He is unsure if this is truly blood or if it is the red drinks that he has been consuming. States this is not happened before. Does have a history of esophageal varices discovered on CT.  Plan:  -Patient advised to go to the ER if had another bout of hematemesis  -Follow-up with GI for potential scope and banding

## 2022-02-05 NOTE — PROGRESS NOTES
"[facility]    Chief Complaint   Patient presents with   • Office Visit     F/U medications       HISTORY OF PRESENT ILLNESS: Patient is a 42 y.o. male established patient who presents today to discuss the medical issues below    #Anorexia   - He states he went 3 days without eating, ate today     #Medications   -He states these are making him ill   -He states that he starting feeling ill the 2nd day after taking the medication  -He is still taking the diuretics and the lactulose     #Chest pain   -States \"it feels like somebody punched him\"   -Feels like an achy, pressure   -Associated symptoms: SOB  -States that he has not seen cardiology yet - \"Did not know there was an appointment\"   -He has had intermittent chest pain for 2 years   -Dad had heart attack at 44 years old, Uncle  from heart disease at 71, Mother had heart attack at 49  -No smoking, but chews tobacco - goes through a can every 3 days     - Been doing this since high school       #Alcohol use   -States that he drinks beer   -Used to drink a 12 pack a day - down to 3, 24 ounce cans  -States he went a month without drinking, had 2 beers today     #Bloody emesis  -States that it has happened intermittently over the last 3 days. No episodes today.       Patient Active Problem List    Diagnosis Date Noted   • Alcohol use disorder, severe, dependence (HCC) 2022   • Chest pain 2022   • Anxiety 2022   • Alcohol-induced insomnia (HCC) 2022   • Alcoholic intoxication without complication (HCC) 2022   • Hematemesis with nausea 2022   • Vision blurring 01/10/2022   • Atherosclerosis 2021   • Transaminitis 2021   • Class 1 obesity due to excess calories without serious comorbidity with body mass index (BMI) of 33.0 to 33.9 in adult 2021   • Alcoholic cirrhosis (HCC) 2021   • Pancytopenia (HCC) 2021   • Hypokalemia 2021   • Hypomagnesemia 2021   • Coagulopathy (HCC) " "12/03/2021   • Alcohol withdrawal syndrome with complication, cirrhosis (HCC) 12/02/2021       Allergies:Patient has no known allergies.    Current Outpatient Medications   Medication Sig Dispense Refill   • furosemide (LASIX) 20 MG Tab Take 1 Tablet by mouth every day. 90 Tablet 2   • spironolactone (ALDACTONE) 50 MG Tab Take 1 Tablet by mouth every day. 90 Tablet 2   • lactulose 10 GM/15ML Solution Take 30 mL by mouth every day. 900 mL 5   • traZODone (DESYREL) 150 MG Tab Take 1 Tablet by mouth every evening. 30 Tablet 3   • escitalopram (LEXAPRO) 20 MG tablet Take 0.5 Tablets by mouth every day. 30 Tablet 2   • hydrOXYzine HCl (ATARAX) 25 MG Tab Take 1 Tablet by mouth 3 times a day as needed for Anxiety. 30 Tablet 0   • omeprazole (PRILOSEC) 20 MG delayed-release capsule Take 1 Capsule by mouth 2 times a day. 60 Capsule 0     No current facility-administered medications for this visit.         Past Medical History:   Diagnosis Date   • Liver disease        Social History     Tobacco Use   • Smoking status: Never Smoker   • Smokeless tobacco: Current User     Types: Chew   Vaping Use   • Vaping Use: Never used   Substance Use Topics   • Alcohol use: Yes     Comment: daily   • Drug use: Not Currently       No family status information on file.   History reviewed. No pertinent family history.    ROS:  Negative except as stated above.       Exam:    /78 (BP Location: Right arm, Patient Position: Sitting, BP Cuff Size: Large adult)   Pulse 73   Temp 36.5 °C (97.7 °F) (Tympanic)   Resp 16   Ht 1.905 m (6' 3\")   Wt 115 kg (253 lb)   SpO2 93%  Body mass index is 31.62 kg/m².  General:  Well nourished, well developed male in NAD.   HENT: Normocephalic, bilateral TMs are intact, nasal and oral mucosa with no lesions,   Neck: Supple without bruit. Thyroid is not enlarged, no nodules palpated. Scleral icterus.   Pulmonary: Clear to ausculation.  Normal effort. No rales, rhonchi, or wheezing.  Cardiovascular: " "Regular rate and rhythm without murmur.   Abdomen: Normal bowel sounds, soft and nontender, no palpable liver, spleen, or masses. Mildly distended abdomen  Extremities: No LE edema noted. 5/5 strength in all extremities  Neuro: Grossly nonfocal.  Psych: Alert and oriented to person, place, and time. Appropriate mood and conversation.            Assessment/Plan:    1. Alcoholic cirrhosis of liver with ascites (HCC)  REFERRAL TO CARDIOLOGY    Referral to Gastroenterology   2. Alcohol use disorder, severe, dependence (HCC)  REFERRAL TO CARDIOLOGY   3. Chest pain, unspecified type  REFERRAL TO CARDIOLOGY   4. Anxiety  escitalopram (LEXAPRO) 20 MG tablet    hydrOXYzine HCl (ATARAX) 25 MG Tab   5. Alcohol-induced insomnia (HCC)  traZODone (DESYREL) 150 MG Tab   6. Alcohol withdrawal syndrome with complication, cirrhosis (HCC)  furosemide (LASIX) 20 MG Tab    spironolactone (ALDACTONE) 50 MG Tab    lactulose 10 GM/15ML Solution   7. Alcoholic intoxication without complication (HCC)     8. Hematemesis with nausea         Orders Placed This Encounter   • REFERRAL TO CARDIOLOGY   • Referral to Gastroenterology   • furosemide (LASIX) 20 MG Tab   • spironolactone (ALDACTONE) 50 MG Tab   • lactulose 10 GM/15ML Solution   • traZODone (DESYREL) 150 MG Tab   • escitalopram (LEXAPRO) 20 MG tablet   • hydrOXYzine HCl (ATARAX) 25 MG Tab       Alcoholic cirrhosis (HCC)  Chronic.  Patient presents for medication follow-up. States that about 5 days ago he flushed his acamprosate and sertraline. States that it was \"making him sick\". He states that he is still been taking his Lasix, spironolactone, and lactulose. He states he has not noticed any additional abdominal distention. He also states that he has not followed up with GI.  Plan:  -Continue Lasix  -Continue spironolactone  -Continue lactulose  -Follow-up with GI, another referral placed today.    Alcohol use disorder, severe, dependence (HCC)  Chronic.  Patient states that he used to " "drink a 12 pack a day and he has since cut back to three 24 ounce beers a day. Per chart review, patient has been unable to get a liver transplant in the past secondary to inability to abstain from alcohol. At this visit, patient states he drank \"2 beers\" prior to his arrival and appeared acutely intoxicated during interview.  Plan:  -Continue to encourage abstinence from alcohol  -Stop acamprosate as patient states making him sick  -Given history of heavy alcohol use and reported symptoms today of alcohol withdrawal when he tries to sustain from drinking, patient was advised to present to the ER when he tries to abstain as acute hospitalization can help the patient withdrawn a controlled setting and then he can start fresh after that.    Chest pain  Chronic.  Patient states that he has been feeling chest pain intermittently for 2 years. He describes this chest pain as \"getting punched in the chest\". He states he has radiation down both his arms. He was feeling chest pain at this visit. Described it as a 5/10. Was supposed to see cardiology for this matter but has been unable to make his appointments. EKG ordered today and showed no acute changes. Interpretation is as follows: Rate 71, regular rhythm, normal WA interval, normal QT, sinus, no T wave inversion, no ST changes, normal axis, normal ECG. Patient has the following risk factors for ACS: Family history of premature ACS, tobacco use, alcohol use, poor diet, and cirrhosis.  Plan:  -Follow-up with cardiology    Anxiety  Chronic.  Unclear etiology of anxiety. Could be related to alcohol withdrawal, but patient also has had acute stressors including recent death of father. States that he feels anxious every day and occasionally has what seems like panic attacks. States that the Zoloft was making him sick. Would like to try something new. Does not want to speak to a psychologist at this time.  Plan:  -Stop Zoloft  -Start Escitalopram  -Follow-up in 1 " month  -Atarax as needed for acute anxiety.    Alcohol-induced insomnia (HCC)  Chronic.  Patient also states that he has had insomnia. States that he has difficulty initiating and maintaining sleep. States that the trazodone 50 mg was not effective.  Plan:  -Increase trazodone to 150 mg every night.  -Follow-up in 1 month    Hematemesis with nausea  Acute.  Patient states that he has been having small bouts of hematemesis over the past 3 days. He is unsure if this is truly blood or if it is the red drinks that he has been consuming. States this is not happened before. Does have a history of esophageal varices discovered on CT.  Plan:  -Patient advised to go to the ER if had another bout of hematemesis  -Follow-up with GI for potential scope and banding    Alcoholic intoxication without complication (HCC)  Acute.  Patient states that he had 2 beers prior to today's visit. Appeared acutely intoxicated.      Followup: Return in about 1 month (around 3/4/2022).     Benigno Ribeiro MD   [facility]   PGY-2

## 2022-02-15 ENCOUNTER — HOSPITAL ENCOUNTER (INPATIENT)
Facility: MEDICAL CENTER | Age: 42
LOS: 1 days | DRG: 894 | End: 2022-02-17
Attending: EMERGENCY MEDICINE | Admitting: FAMILY MEDICINE
Payer: MEDICAID

## 2022-02-15 ENCOUNTER — APPOINTMENT (OUTPATIENT)
Dept: RADIOLOGY | Facility: MEDICAL CENTER | Age: 42
DRG: 894 | End: 2022-02-15
Attending: EMERGENCY MEDICINE
Payer: MEDICAID

## 2022-02-15 DIAGNOSIS — E87.6 HYPOKALEMIA: ICD-10-CM

## 2022-02-15 DIAGNOSIS — F10.930 ALCOHOL WITHDRAWAL SYNDROME WITHOUT COMPLICATION (HCC): ICD-10-CM

## 2022-02-15 DIAGNOSIS — D69.6 THROMBOCYTOPENIA (HCC): ICD-10-CM

## 2022-02-15 DIAGNOSIS — R56.9 SEIZURE (HCC): ICD-10-CM

## 2022-02-15 LAB
BASOPHILS # BLD AUTO: 0.6 % (ref 0–1.8)
BASOPHILS # BLD: 0.04 K/UL (ref 0–0.12)
COMMENT 1642: NORMAL
EKG IMPRESSION: NORMAL
EOSINOPHIL # BLD AUTO: 0.01 K/UL (ref 0–0.51)
EOSINOPHIL NFR BLD: 0.1 % (ref 0–6.9)
ERYTHROCYTE [DISTWIDTH] IN BLOOD BY AUTOMATED COUNT: 51.4 FL (ref 35.9–50)
HCT VFR BLD AUTO: 42.4 % (ref 42–52)
HGB BLD-MCNC: 15.5 G/DL (ref 14–18)
IMM GRANULOCYTES # BLD AUTO: 0.07 K/UL (ref 0–0.11)
IMM GRANULOCYTES NFR BLD AUTO: 1 % (ref 0–0.9)
LYMPHOCYTES # BLD AUTO: 1.14 K/UL (ref 1–4.8)
LYMPHOCYTES NFR BLD: 15.9 % (ref 22–41)
MCH RBC QN AUTO: 33.3 PG (ref 27–33)
MCHC RBC AUTO-ENTMCNC: 36.6 G/DL (ref 33.7–35.3)
MCV RBC AUTO: 91 FL (ref 81.4–97.8)
MONOCYTES # BLD AUTO: 0.8 K/UL (ref 0–0.85)
MONOCYTES NFR BLD AUTO: 11.1 % (ref 0–13.4)
MORPHOLOGY BLD-IMP: NORMAL
NEUTROPHILS # BLD AUTO: 5.13 K/UL (ref 1.82–7.42)
NEUTROPHILS NFR BLD: 71.3 % (ref 44–72)
NRBC # BLD AUTO: 0.02 K/UL
NRBC BLD-RTO: 0.3 /100 WBC
PLATELET # BLD AUTO: 20 K/UL (ref 164–446)
PLATELET BLD QL SMEAR: NORMAL
PLATELETS.RETICULATED NFR BLD AUTO: 22.4 K/UL (ref 0.6–13.1)
RBC # BLD AUTO: 4.66 M/UL (ref 4.7–6.1)
RBC BLD AUTO: NORMAL
WBC # BLD AUTO: 7.2 K/UL (ref 4.8–10.8)

## 2022-02-15 PROCEDURE — 71045 X-RAY EXAM CHEST 1 VIEW: CPT

## 2022-02-15 PROCEDURE — 700101 HCHG RX REV CODE 250: Performed by: EMERGENCY MEDICINE

## 2022-02-15 PROCEDURE — 96366 THER/PROPH/DIAG IV INF ADDON: CPT

## 2022-02-15 PROCEDURE — 70450 CT HEAD/BRAIN W/O DYE: CPT

## 2022-02-15 PROCEDURE — 96375 TX/PRO/DX INJ NEW DRUG ADDON: CPT

## 2022-02-15 PROCEDURE — 96365 THER/PROPH/DIAG IV INF INIT: CPT

## 2022-02-15 PROCEDURE — 85025 COMPLETE CBC W/AUTO DIFF WBC: CPT

## 2022-02-15 PROCEDURE — 99285 EMERGENCY DEPT VISIT HI MDM: CPT

## 2022-02-15 PROCEDURE — 93005 ELECTROCARDIOGRAM TRACING: CPT | Performed by: EMERGENCY MEDICINE

## 2022-02-15 PROCEDURE — 96376 TX/PRO/DX INJ SAME DRUG ADON: CPT

## 2022-02-15 PROCEDURE — 700111 HCHG RX REV CODE 636 W/ 250 OVERRIDE (IP): Performed by: EMERGENCY MEDICINE

## 2022-02-15 PROCEDURE — 36415 COLL VENOUS BLD VENIPUNCTURE: CPT

## 2022-02-15 PROCEDURE — 85055 RETICULATED PLATELET ASSAY: CPT

## 2022-02-15 RX ORDER — ONDANSETRON 2 MG/ML
4 INJECTION INTRAMUSCULAR; INTRAVENOUS ONCE
Status: COMPLETED | OUTPATIENT
Start: 2022-02-15 | End: 2022-02-15

## 2022-02-15 RX ORDER — LORAZEPAM 2 MG/ML
1 INJECTION INTRAMUSCULAR ONCE
Status: COMPLETED | OUTPATIENT
Start: 2022-02-15 | End: 2022-02-15

## 2022-02-15 RX ORDER — LORAZEPAM 2 MG/ML
2 INJECTION INTRAMUSCULAR ONCE
Status: COMPLETED | OUTPATIENT
Start: 2022-02-15 | End: 2022-02-15

## 2022-02-15 RX ADMIN — ONDANSETRON 4 MG: 2 INJECTION INTRAMUSCULAR; INTRAVENOUS at 21:26

## 2022-02-15 RX ADMIN — LORAZEPAM 2 MG: 2 INJECTION INTRAMUSCULAR; INTRAVENOUS at 21:53

## 2022-02-15 RX ADMIN — LORAZEPAM 2 MG: 2 INJECTION INTRAMUSCULAR; INTRAVENOUS at 21:23

## 2022-02-15 RX ADMIN — FENTANYL CITRATE 50 MCG: 50 INJECTION, SOLUTION INTRAMUSCULAR; INTRAVENOUS at 23:29

## 2022-02-15 RX ADMIN — LORAZEPAM 1 MG: 2 INJECTION INTRAMUSCULAR; INTRAVENOUS at 23:29

## 2022-02-15 RX ADMIN — THIAMINE HYDROCHLORIDE: 100 INJECTION, SOLUTION INTRAMUSCULAR; INTRAVENOUS at 21:27

## 2022-02-15 ASSESSMENT — LIFESTYLE VARIABLES
DOES PATIENT WANT TO TALK TO SOMEONE ABOUT QUITTING: YES
TREMOR: SEVERE TREMOR, EVEN WITH ARMS NOT EXTENDED
EVER FELT BAD OR GUILTY ABOUT YOUR DRINKING: YES
HAVE PEOPLE ANNOYED YOU BY CRITICIZING YOUR DRINKING: YES
HOW MANY TIMES IN THE PAST YEAR HAVE YOU HAD 5 OR MORE DRINKS IN A DAY: 5
TOTAL SCORE: 4
HAVE YOU EVER FELT YOU SHOULD CUT DOWN ON YOUR DRINKING: YES
TOTAL SCORE: 27
AUDITORY DISTURBANCES: NOT PRESENT
CONSUMPTION TOTAL: POSITIVE
PAROXYSMAL SWEATS: DRENCHING SWEATS
AGITATION: MODERATELY FIDGETY AND RESTLESS
HEADACHE, FULLNESS IN HEAD: MODERATELY SEVERE
ON A TYPICAL DAY WHEN YOU DRINK ALCOHOL HOW MANY DRINKS DO YOU HAVE: 9
TOTAL SCORE: 4
ORIENTATION AND CLOUDING OF SENSORIUM: ORIENTED AND CAN DO SERIAL ADDITIONS
EVER HAD A DRINK FIRST THING IN THE MORNING TO STEADY YOUR NERVES TO GET RID OF A HANGOVER: YES
NAUSEA AND VOMITING: INTERMITTENT NAUSEA WITH DRY HEAVES
TOTAL SCORE: 4
DO YOU DRINK ALCOHOL: YES
ANXIETY: MILDLY ANXIOUS
DOES PATIENT WANT TO STOP DRINKING: YES
AVERAGE NUMBER OF DAYS PER WEEK YOU HAVE A DRINK CONTAINING ALCOHOL: 7
VISUAL DISTURBANCES: NOT PRESENT

## 2022-02-15 ASSESSMENT — FIBROSIS 4 INDEX: FIB4 SCORE: 9.69

## 2022-02-16 PROBLEM — E87.1 HYPONATREMIA: Status: ACTIVE | Noted: 2022-02-16

## 2022-02-16 PROBLEM — D69.6 THROMBOCYTOPENIA (HCC): Status: ACTIVE | Noted: 2022-02-16

## 2022-02-16 PROBLEM — F10.139 ALCOHOL ABUSE WITH WITHDRAWAL (HCC): Status: ACTIVE | Noted: 2022-02-16

## 2022-02-16 PROBLEM — H04.123 BILATERAL DRY EYES: Status: ACTIVE | Noted: 2022-02-16

## 2022-02-16 LAB
ALBUMIN SERPL BCP-MCNC: 3 G/DL (ref 3.2–4.9)
ALBUMIN/GLOB SERPL: 0.9 G/DL
ALP SERPL-CCNC: 180 U/L (ref 30–99)
ALT SERPL-CCNC: 31 U/L (ref 2–50)
ANION GAP SERPL CALC-SCNC: 11 MMOL/L (ref 7–16)
AST SERPL-CCNC: 131 U/L (ref 12–45)
BILIRUB SERPL-MCNC: 4.8 MG/DL (ref 0.1–1.5)
BUN SERPL-MCNC: 6 MG/DL (ref 8–22)
CALCIUM SERPL-MCNC: 7.8 MG/DL (ref 8.5–10.5)
CHLORIDE SERPL-SCNC: 98 MMOL/L (ref 96–112)
CO2 SERPL-SCNC: 22 MMOL/L (ref 20–33)
CREAT SERPL-MCNC: 0.59 MG/DL (ref 0.5–1.4)
ETHANOL BLD-MCNC: <10.1 MG/DL (ref 0–10)
GLOBULIN SER CALC-MCNC: 3.2 G/DL (ref 1.9–3.5)
GLUCOSE SERPL-MCNC: 199 MG/DL (ref 65–99)
INR PPP: 1.74 (ref 0.87–1.13)
MAGNESIUM SERPL-MCNC: 1.8 MG/DL (ref 1.5–2.5)
POTASSIUM SERPL-SCNC: 3.2 MMOL/L (ref 3.6–5.5)
PROT SERPL-MCNC: 6.2 G/DL (ref 6–8.2)
PROTHROMBIN TIME: 19.8 SEC (ref 12–14.6)
SODIUM SERPL-SCNC: 131 MMOL/L (ref 135–145)
TROPONIN T SERPL-MCNC: 15 NG/L (ref 6–19)

## 2022-02-16 PROCEDURE — 85610 PROTHROMBIN TIME: CPT

## 2022-02-16 PROCEDURE — A9270 NON-COVERED ITEM OR SERVICE: HCPCS

## 2022-02-16 PROCEDURE — A9270 NON-COVERED ITEM OR SERVICE: HCPCS | Performed by: STUDENT IN AN ORGANIZED HEALTH CARE EDUCATION/TRAINING PROGRAM

## 2022-02-16 PROCEDURE — 82077 ASSAY SPEC XCP UR&BREATH IA: CPT

## 2022-02-16 PROCEDURE — 80053 COMPREHEN METABOLIC PANEL: CPT

## 2022-02-16 PROCEDURE — HZ2ZZZZ DETOXIFICATION SERVICES FOR SUBSTANCE ABUSE TREATMENT: ICD-10-PCS | Performed by: FAMILY MEDICINE

## 2022-02-16 PROCEDURE — 99222 1ST HOSP IP/OBS MODERATE 55: CPT | Mod: GC | Performed by: FAMILY MEDICINE

## 2022-02-16 PROCEDURE — 84484 ASSAY OF TROPONIN QUANT: CPT

## 2022-02-16 PROCEDURE — A9270 NON-COVERED ITEM OR SERVICE: HCPCS | Performed by: FAMILY MEDICINE

## 2022-02-16 PROCEDURE — 700102 HCHG RX REV CODE 250 W/ 637 OVERRIDE(OP): Performed by: STUDENT IN AN ORGANIZED HEALTH CARE EDUCATION/TRAINING PROGRAM

## 2022-02-16 PROCEDURE — 700102 HCHG RX REV CODE 250 W/ 637 OVERRIDE(OP)

## 2022-02-16 PROCEDURE — 83735 ASSAY OF MAGNESIUM: CPT

## 2022-02-16 PROCEDURE — 96372 THER/PROPH/DIAG INJ SC/IM: CPT

## 2022-02-16 PROCEDURE — 36415 COLL VENOUS BLD VENIPUNCTURE: CPT

## 2022-02-16 PROCEDURE — 770001 HCHG ROOM/CARE - MED/SURG/GYN PRIV*

## 2022-02-16 PROCEDURE — 700105 HCHG RX REV CODE 258: Performed by: STUDENT IN AN ORGANIZED HEALTH CARE EDUCATION/TRAINING PROGRAM

## 2022-02-16 PROCEDURE — 700102 HCHG RX REV CODE 250 W/ 637 OVERRIDE(OP): Performed by: FAMILY MEDICINE

## 2022-02-16 PROCEDURE — 700111 HCHG RX REV CODE 636 W/ 250 OVERRIDE (IP): Performed by: STUDENT IN AN ORGANIZED HEALTH CARE EDUCATION/TRAINING PROGRAM

## 2022-02-16 RX ORDER — LORAZEPAM 2 MG/ML
1 INJECTION INTRAMUSCULAR
Status: DISCONTINUED | OUTPATIENT
Start: 2022-02-16 | End: 2022-02-17 | Stop reason: HOSPADM

## 2022-02-16 RX ORDER — LORAZEPAM 2 MG/1
2 TABLET ORAL
Status: DISCONTINUED | OUTPATIENT
Start: 2022-02-16 | End: 2022-02-17 | Stop reason: HOSPADM

## 2022-02-16 RX ORDER — SODIUM CHLORIDE, SODIUM LACTATE, POTASSIUM CHLORIDE, CALCIUM CHLORIDE 600; 310; 30; 20 MG/100ML; MG/100ML; MG/100ML; MG/100ML
INJECTION, SOLUTION INTRAVENOUS CONTINUOUS
Status: DISCONTINUED | OUTPATIENT
Start: 2022-02-16 | End: 2022-02-17 | Stop reason: HOSPADM

## 2022-02-16 RX ORDER — LORAZEPAM 0.5 MG/1
0.5 TABLET ORAL EVERY 4 HOURS PRN
Status: DISCONTINUED | OUTPATIENT
Start: 2022-02-16 | End: 2022-02-17 | Stop reason: HOSPADM

## 2022-02-16 RX ORDER — LORAZEPAM 2 MG/ML
2 INJECTION INTRAMUSCULAR
Status: DISCONTINUED | OUTPATIENT
Start: 2022-02-16 | End: 2022-02-17 | Stop reason: HOSPADM

## 2022-02-16 RX ORDER — CARBOXYMETHYLCELLULOSE SODIUM 5 MG/ML
1 SOLUTION/ DROPS OPHTHALMIC
Status: DISCONTINUED | OUTPATIENT
Start: 2022-02-16 | End: 2022-02-17 | Stop reason: HOSPADM

## 2022-02-16 RX ORDER — LACTULOSE 20 G/30ML
30 SOLUTION ORAL DAILY
Status: DISCONTINUED | OUTPATIENT
Start: 2022-02-17 | End: 2022-02-17 | Stop reason: HOSPADM

## 2022-02-16 RX ORDER — NICOTINE 21 MG/24HR
21 PATCH, TRANSDERMAL 24 HOURS TRANSDERMAL
Status: DISCONTINUED | OUTPATIENT
Start: 2022-02-17 | End: 2022-02-17 | Stop reason: HOSPADM

## 2022-02-16 RX ORDER — LORAZEPAM 2 MG/ML
1.5 INJECTION INTRAMUSCULAR
Status: DISCONTINUED | OUTPATIENT
Start: 2022-02-16 | End: 2022-02-17 | Stop reason: HOSPADM

## 2022-02-16 RX ORDER — POTASSIUM CHLORIDE 20 MEQ/1
TABLET, EXTENDED RELEASE ORAL
Status: COMPLETED
Start: 2022-02-16 | End: 2022-02-16

## 2022-02-16 RX ORDER — AMOXICILLIN 250 MG
2 CAPSULE ORAL 2 TIMES DAILY
Status: DISCONTINUED | OUTPATIENT
Start: 2022-02-16 | End: 2022-02-17 | Stop reason: HOSPADM

## 2022-02-16 RX ORDER — FUROSEMIDE 40 MG/1
20 TABLET ORAL DAILY
Status: DISCONTINUED | OUTPATIENT
Start: 2022-02-16 | End: 2022-02-16

## 2022-02-16 RX ORDER — LORAZEPAM 1 MG/1
1 TABLET ORAL EVERY 4 HOURS PRN
Status: DISCONTINUED | OUTPATIENT
Start: 2022-02-16 | End: 2022-02-17 | Stop reason: HOSPADM

## 2022-02-16 RX ORDER — LORAZEPAM 2 MG/1
4 TABLET ORAL
Status: DISCONTINUED | OUTPATIENT
Start: 2022-02-16 | End: 2022-02-17 | Stop reason: HOSPADM

## 2022-02-16 RX ORDER — POTASSIUM CHLORIDE 20 MEQ/1
20 TABLET, EXTENDED RELEASE ORAL 2 TIMES DAILY
Status: COMPLETED | OUTPATIENT
Start: 2022-02-16 | End: 2022-02-17

## 2022-02-16 RX ORDER — BISACODYL 10 MG
10 SUPPOSITORY, RECTAL RECTAL
Status: DISCONTINUED | OUTPATIENT
Start: 2022-02-16 | End: 2022-02-17 | Stop reason: HOSPADM

## 2022-02-16 RX ORDER — POLYETHYLENE GLYCOL 3350 17 G/17G
1 POWDER, FOR SOLUTION ORAL
Status: DISCONTINUED | OUTPATIENT
Start: 2022-02-16 | End: 2022-02-17 | Stop reason: HOSPADM

## 2022-02-16 RX ORDER — LORAZEPAM 2 MG/ML
0.5 INJECTION INTRAMUSCULAR EVERY 4 HOURS PRN
Status: DISCONTINUED | OUTPATIENT
Start: 2022-02-16 | End: 2022-02-17 | Stop reason: HOSPADM

## 2022-02-16 RX ORDER — OMEPRAZOLE 20 MG/1
20 CAPSULE, DELAYED RELEASE ORAL EVERY EVENING
Status: DISCONTINUED | OUTPATIENT
Start: 2022-02-16 | End: 2022-02-17

## 2022-02-16 RX ORDER — GAUZE BANDAGE 2" X 2"
100 BANDAGE TOPICAL DAILY
Status: DISCONTINUED | OUTPATIENT
Start: 2022-02-16 | End: 2022-02-17 | Stop reason: HOSPADM

## 2022-02-16 RX ORDER — ACETAMINOPHEN 500 MG
1000 TABLET ORAL EVERY 6 HOURS PRN
Status: DISCONTINUED | OUTPATIENT
Start: 2022-02-16 | End: 2022-02-17 | Stop reason: HOSPADM

## 2022-02-16 RX ORDER — SPIRONOLACTONE 25 MG/1
50 TABLET ORAL DAILY
Status: DISCONTINUED | OUTPATIENT
Start: 2022-02-16 | End: 2022-02-17 | Stop reason: HOSPADM

## 2022-02-16 RX ADMIN — LORAZEPAM 0.5 MG: 1 TABLET ORAL at 19:47

## 2022-02-16 RX ADMIN — POTASSIUM CHLORIDE 40 MEQ: 20 TABLET, EXTENDED RELEASE ORAL at 02:27

## 2022-02-16 RX ADMIN — ACETAMINOPHEN 1000 MG: 500 TABLET ORAL at 06:14

## 2022-02-16 RX ADMIN — NICOTINE POLACRILEX 2 MG: 2 GUM, CHEWING BUCCAL at 20:02

## 2022-02-16 RX ADMIN — LORAZEPAM 0.5 MG: 1 TABLET ORAL at 12:01

## 2022-02-16 RX ADMIN — ENOXAPARIN SODIUM 40 MG: 40 INJECTION SUBCUTANEOUS at 06:14

## 2022-02-16 RX ADMIN — OMEPRAZOLE 20 MG: 20 CAPSULE, DELAYED RELEASE ORAL at 15:40

## 2022-02-16 RX ADMIN — LORAZEPAM 2 MG: 2 TABLET ORAL at 05:48

## 2022-02-16 RX ADMIN — LORAZEPAM 1 MG: 1 TABLET ORAL at 07:51

## 2022-02-16 RX ADMIN — CARBOXYMETHYLCELLULOSE SODIUM 1 DROP: 5 SOLUTION/ DROPS OPHTHALMIC at 06:14

## 2022-02-16 RX ADMIN — SODIUM CHLORIDE, POTASSIUM CHLORIDE, SODIUM LACTATE AND CALCIUM CHLORIDE: 600; 310; 30; 20 INJECTION, SOLUTION INTRAVENOUS at 15:39

## 2022-02-16 RX ADMIN — Medication 100 MG: at 15:40

## 2022-02-16 RX ADMIN — SPIRONOLACTONE 50 MG: 50 TABLET ORAL at 16:07

## 2022-02-16 RX ADMIN — SERTRALINE 50 MG: 50 TABLET, FILM COATED ORAL at 16:06

## 2022-02-16 RX ADMIN — POTASSIUM CHLORIDE 20 MEQ: 1500 TABLET, EXTENDED RELEASE ORAL at 17:46

## 2022-02-16 RX ADMIN — LORAZEPAM 0.5 MG: 1 TABLET ORAL at 23:37

## 2022-02-16 ASSESSMENT — LIFESTYLE VARIABLES
TOTAL SCORE: 5
NAUSEA AND VOMITING: NO NAUSEA AND NO VOMITING
NAUSEA AND VOMITING: NO NAUSEA AND NO VOMITING
ORIENTATION AND CLOUDING OF SENSORIUM: ORIENTED AND CAN DO SERIAL ADDITIONS
PAROXYSMAL SWEATS: BARELY PERCEPTIBLE SWEATING. PALMS MOIST
TREMOR: TREMOR NOT VISIBLE BUT CAN BE FELT, FINGERTIP TO FINGERTIP
VISUAL DISTURBANCES: NOT PRESENT
HEADACHE, FULLNESS IN HEAD: NOT PRESENT
NAUSEA AND VOMITING: NO NAUSEA AND NO VOMITING
ANXIETY: *
AUDITORY DISTURBANCES: NOT PRESENT
NAUSEA AND VOMITING: NO NAUSEA AND NO VOMITING
TOTAL SCORE: VERY MILD ITCHING, PINS AND NEEDLES SENSATION, BURNING OR NUMBNESS
TACTILE DISTURBANCES: VERY MILD ITCHING, PINS AND NEEDLES SENSATION, BURNING OR NUMBNESS
VISUAL DISTURBANCES: NOT PRESENT
ANXIETY: *
ANXIETY: MILDLY ANXIOUS
AUDITORY DISTURBANCES: NOT PRESENT
HEADACHE, FULLNESS IN HEAD: NOT PRESENT
PAROXYSMAL SWEATS: NO SWEAT VISIBLE
TREMOR: *
AGITATION: SOMEWHAT MORE THAN NORMAL ACTIVITY
VISUAL DISTURBANCES: NOT PRESENT
TREMOR: *
VISUAL DISTURBANCES: NOT PRESENT
AUDITORY DISTURBANCES: NOT PRESENT
TOTAL SCORE: 11
AGITATION: SOMEWHAT MORE THAN NORMAL ACTIVITY
PAROXYSMAL SWEATS: NO SWEAT VISIBLE
ORIENTATION AND CLOUDING OF SENSORIUM: ORIENTED AND CAN DO SERIAL ADDITIONS
VISUAL DISTURBANCES: NOT PRESENT
NAUSEA AND VOMITING: NO NAUSEA AND NO VOMITING
ORIENTATION AND CLOUDING OF SENSORIUM: ORIENTED AND CAN DO SERIAL ADDITIONS
TREMOR: *
TOTAL SCORE: 5
TOTAL SCORE: 5
PAROXYSMAL SWEATS: NO SWEAT VISIBLE
ORIENTATION AND CLOUDING OF SENSORIUM: CANNOT DO SERIAL ADDITIONS OR IS UNCERTAIN ABOUT DATE
AUDITORY DISTURBANCES: NOT PRESENT
AUDITORY DISTURBANCES: NOT PRESENT
AGITATION: SOMEWHAT MORE THAN NORMAL ACTIVITY
AGITATION: SOMEWHAT MORE THAN NORMAL ACTIVITY
ORIENTATION AND CLOUDING OF SENSORIUM: ORIENTED AND CAN DO SERIAL ADDITIONS
TOTAL SCORE: 8
ANXIETY: MILDLY ANXIOUS
HEADACHE, FULLNESS IN HEAD: NOT PRESENT
TREMOR: *
ORIENTATION AND CLOUDING OF SENSORIUM: ORIENTED AND CAN DO SERIAL ADDITIONS
ANXIETY: *
TREMOR: TREMOR NOT VISIBLE BUT CAN BE FELT, FINGERTIP TO FINGERTIP
AGITATION: NORMAL ACTIVITY
HEADACHE, FULLNESS IN HEAD: NOT PRESENT
PAROXYSMAL SWEATS: NO SWEAT VISIBLE
VISUAL DISTURBANCES: NOT PRESENT
NAUSEA AND VOMITING: NO NAUSEA AND NO VOMITING
HEADACHE, FULLNESS IN HEAD: NOT PRESENT
TOTAL SCORE: 1
AUDITORY DISTURBANCES: NOT PRESENT
ANXIETY: NO ANXIETY (AT EASE)
AGITATION: SOMEWHAT MORE THAN NORMAL ACTIVITY
HEADACHE, FULLNESS IN HEAD: SEVERE
PAROXYSMAL SWEATS: BARELY PERCEPTIBLE SWEATING. PALMS MOIST

## 2022-02-16 ASSESSMENT — PAIN DESCRIPTION - PAIN TYPE: TYPE: ACUTE PAIN

## 2022-02-16 NOTE — ASSESSMENT & PLAN NOTE
-Patient reports drinking 12 beers a day  -Reportedly there were 2 witnessed seizures but it was only 24 hours after his last drink  -Head CT in ER negative  -Alcohol level negative in ED  -Received detox bag in ED  Plan:  -Continue CIWA protocol  -Thiamine 100 mg daily  -LR at 75 mL's per hour

## 2022-02-16 NOTE — ASSESSMENT & PLAN NOTE
-Platelets 20 upon admission to ED  -No signs of active bleeding at this time  -In the setting of cirrhosis, this is the likely etiology  Plan:  -Repeat CBC in a.m.  -Replace platelets if drops below 15

## 2022-02-16 NOTE — ASSESSMENT & PLAN NOTE
-He is complaining of dry eyes that burns  -Upon physical exam he does have dry and erythematous skin around his eyes  Plan:  -Trial of petroleum jelly for barrier and moisture

## 2022-02-16 NOTE — ASSESSMENT & PLAN NOTE
-Patient has history of cirrhosis and reportedly takes Lasix, spironolactone, and lactulose at home  -Has hyponatremia of 131, T bili of 4.8, and INR 1.74.  LFTs are not overly elevated, but this is concerning for possible long-term effects of cirrhosis.  -Overall his meld score is 21  -Child Castro release class B, borderline class C  Overall his cirrhosis seems stable, and his vital signs are reassuring.  Plan:  -Continue home Lasix 20 mg daily  -Continue home spironolactone 50 mg daily  -Continue lactulose 20 mg daily

## 2022-02-16 NOTE — PROGRESS NOTES
[facility] FAMILY MEDICINE PROGRESS NOTE     Attending: Bruke Krishnan M.D.  Senior Resident: Kam Quick M.D. (PGY-3)  Tobin Resident: Carmen Wagner M.D. (PGY-1)  PATIENT: Jalen Vaughn; [id number]; 1980    ID: 42 y.o. male with cirrhosis and history of alcohol abuse admitted for acute alcohol withdrawal with possible seizures.     Subjective: Patient reports feeling little bit better than he did when he came in.  He denies any recent seizures.  He also denies any pain, nausea, or vomiting since arriving to the ER.  He does report that he has some burning pain around his eyes.    OBJECTIVE:  Temp:  [36.2 °C (97.1 °F)-37.4 °C (99.4 °F)] 37.3 °C (99.1 °F)  Pulse:  [] 90  Resp:  [11-19] 19  BP: (138-159)/(75-95) 138/92  SpO2:  [87 %-99 %] 92 %    Intake/Output Summary (Last 24 hours) at 2/16/2022 1337  Last data filed at 2/16/2022 0002  Gross per 24 hour   Intake 1000 ml   Output --   Net 1000 ml       PE:  Gen: NAD, laying on the gurney  HEENT: NCAT, EOMI; dry erythematous skin around eyes bilaterally  Pulm: CTA bilaterally, no respiratory distress   Cardio: RRR, normal S1 and S2, no murmurs  Abdom: soft, non-tender, non-distended, normal BS  Ext: No edema, 2+ pulses     LABS:  Recent Labs     02/15/22  2115   WBC 7.2   RBC 4.66*   HEMOGLOBIN 15.5   HEMATOCRIT 42.4   MCV 91.0   MCH 33.3*   RDW 51.4*   PLATELETCT 20*   NEUTSPOLYS 71.30   LYMPHOCYTES 15.90*   MONOCYTES 11.10   EOSINOPHILS 0.10   BASOPHILS 0.60   RBCMORPHOLO #CRI     Recent Labs     02/16/22  0038   SODIUM 131*   POTASSIUM 3.2*   CHLORIDE 98   CO2 22   BUN 6*   CREATININE 0.59   CALCIUM 7.8*   MAGNESIUM 1.8   ALBUMIN 3.0*     Estimated GFR/CRCL = Estimated Creatinine Clearance: 222 mL/min (by C-G formula based on SCr of 0.59 mg/dL).  Recent Labs     02/16/22 0038   GLUCOSE 199*     Recent Labs     02/16/22 0038 02/16/22  0803   ASTSGOT 131*  --    ALTSGPT 31  --    TBILIRUBIN 4.8*  --    ALKPHOSPHAT 180*  --    GLOBULIN 3.2  --    INR  --   1.74*             Recent Labs     02/16/22  0803   INR 1.74*       IMAGING:   CT-HEAD W/O   Final Result      No acute intracranial abnormality.         DX-CHEST-PORTABLE (1 VIEW)   Final Result      1.  No acute cardiac or pulmonary abnormalities are identified.          MEDS:  Current Facility-Administered Medications   Medication Last Admin   • senna-docusate (PERICOLACE or SENOKOT S) 8.6-50 MG per tablet 2 Tablet      And   • polyethylene glycol/lytes (MIRALAX) PACKET 1 Packet      And   • magnesium hydroxide (MILK OF MAGNESIA) suspension 30 mL      And   • bisacodyl (DULCOLAX) suppository 10 mg     • enoxaparin (LOVENOX) inj 40 mg 40 mg at 02/16/22 0614   • LORazepam (ATIVAN) tablet 0.5 mg 0.5 mg at 02/16/22 1201   • LORazepam (ATIVAN) tablet 1 mg 1 mg at 02/16/22 0751    Or   • LORazepam (ATIVAN) injection 0.5 mg     • LORazepam (ATIVAN) tablet 2 mg 2 mg at 02/16/22 0548    Or   • LORazepam (ATIVAN) injection 1 mg     • LORazepam (ATIVAN) tablet 3 mg      Or   • LORazepam (ATIVAN) injection 1.5 mg     • LORazepam (ATIVAN) tablet 4 mg      Or   • LORazepam (ATIVAN) injection 2 mg     • acetaminophen (TYLENOL) tablet 1,000 mg 1,000 mg at 02/16/22 0614   • carboxymethylcellulose (REFRESH TEARS) 0.5 % ophthalmic drops 1 Drop 1 Drop at 02/16/22 0614   • sertraline (Zoloft) tablet 50 mg     • furosemide (LASIX) tablet 20 mg     • spironolactone (ALDACTONE) tablet 50 mg     • [START ON 2/17/2022] lactulose 20 GM/30ML solution 30 mL     • omeprazole (PRILOSEC) capsule 20 mg     • thiamine (Vitamin B-1) tablet 100 mg     • lactated ringers infusion       Current Outpatient Medications   Medication   • sertraline (ZOLOFT) 50 MG Tab   • furosemide (LASIX) 20 MG Tab   • spironolactone (ALDACTONE) 50 MG Tab   • lactulose 10 GM/15ML Solution   • traZODone (DESYREL) 150 MG Tab   • escitalopram (LEXAPRO) 20 MG tablet   • hydrOXYzine HCl (ATARAX) 25 MG Tab   • omeprazole (PRILOSEC) 20 MG delayed-release capsule       PROBLEM  LIST:  Problem Noted   Alcohol Abuse With Withdrawal (Hcc) 2/16/2022   Thrombocytopenia (Hcc) 2/16/2022   Alcoholic Cirrhosis (Hcc) 12/3/2021   Hyponatremia 2/16/2022   Bilateral Dry Eyes 2/16/2022   Hypokalemia 12/3/2021     ASSESSMENT/PLAN: 42 y.o. male with cirrhosis and history of alcohol abuse admitted for acute alcohol withdrawal with possible seizures.     Alcohol abuse with withdrawal (HCC)  -Patient reports drinking 12 beers a day  -Reportedly there were 2 witnessed seizures but it was only 24 hours after his last drink  -Head CT in ER negative  -Alcohol level negative in ED  -Received detox bag in ED  Plan:  -Continue CIWA protocol  -Thiamine 100 mg daily  -LR at 75 mL's per hour    Alcoholic cirrhosis (HCC)  -Patient has history of cirrhosis and reportedly takes Lasix, spironolactone, and lactulose at home  -Has hyponatremia of 131, T bili of 4.8, and INR 1.74.  LFTs are not overly elevated, but this is concerning for possible long-term effects of cirrhosis.  -Overall his meld score is 21  -Child Castro release class B, borderline class C  Overall his cirrhosis seems stable, and his vital signs are reassuring.  Plan:  -Continue home Lasix 20 mg daily  -Continue home spironolactone 50 mg daily  -Continue lactulose 20 mg daily    Thrombocytopenia (HCC)  -Platelets 20 upon admission to ED  -No signs of active bleeding at this time  -In the setting of cirrhosis, this is the likely etiology  Plan:  -Repeat CBC in a.m.  -Replace platelets if drops below 15    Hypokalemia  -Potassium 3.2 upon admission to ED  -Did receive some potassium as well as a detox bag in the ED  Plan:  -Repeat BMP in a.m.    Hyponatremia  -Sodium of 131 upon admission  -This is likely due to cirrhosis, but also could be complicated by amount of beer drinking  Plan:  -Rehydration with IV fluids  -Repeat BMP in a.m.    Bilateral dry eyes  -He is complaining of dry eyes that burns  -Upon physical exam he does have dry and erythematous skin  around his eyes  Plan:  -Trial of petroleum jelly for barrier and moisture      #Disposition    #Core Measures   VTE PPx: None as INR is 1.74  Abx: None  Lines/Tubes: PIV  Fluids: LR at 75ml/hr  Diet: Regular as tolerated  Code Status: Full      Kam Quick M.D.   PGY-3  [facility]   [phone]

## 2022-02-16 NOTE — ED NOTES
Lab called needing another green top as it hemolyzed    Second call from lab; anibal, called of critical platelet result of 20. RN and MD notified

## 2022-02-16 NOTE — ED PROVIDER NOTES
ED Provider Note      ER PROVIDER NOTE        CHIEF COMPLAINT  Chief Complaint   Patient presents with   • Seizure     witnessed x2 at 2030, denies hx   • ETOH Withdrawal     x24 hours, 9 - 12 oz beers daily   • Chest Pain     non radiating L sided CP, intermittent x1 month   • N/V   • T-5000 GLF     reports 2 weeks ago, hit head, -LOC   • Tremors       HPI  Jalen Vaughn is a 42 y.o. male who presents to the emergency department complaining of seizure and alcohol withdrawal.  Patient reports he has history of cirrhosis, and was trying to stop drinking, his last drink was 1 day ago, 24 hours.  He usually drinks 12 beers per day.  He reports has been feeling increasingly anxious and shaky as well as nauseated and has had several episodes of nonbloody emesis.  Then today he had witnessed seizure around 8:00, 2 times lasted for a few minutes each.  Patient does report his head has hurt after the seizure and he thinks he hit his head, additionally fell and hit his head 2 weeks ago.  He reports no focal weakness numbness or tingling.  No recent fevers chills cough or congestion.  He has had some intermittent chest pain to his left side over the last month, describes it more as a tightness, no real alleviating or aggravating factors does not seem to be positional or exertional or pleuritic.  There is no radiation.  No shortness of breath.  No leg pain or swelling.    No prior history of seizure    REVIEW OF SYSTEMS  Pertinent positives include seizure, vomiting. Pertinent negatives include no fever. See HPI for details. All other systems reviewed and are negative.    PAST MEDICAL HISTORY   has a past medical history of Liver disease.    SURGICAL HISTORY   has a past surgical history that includes appendectomy and cholecystectomy.    FAMILY HISTORY  History reviewed. No pertinent family history.    SOCIAL HISTORY  Social History     Socioeconomic History   • Marital status: Single   Tobacco Use   • Smoking status:  "Never Smoker   • Smokeless tobacco: Current User     Types: Chew   Vaping Use   • Vaping Use: Never used   Substance and Sexual Activity   • Alcohol use: Yes     Comment: daily 9 12 oz beers daily   • Drug use: Not Currently      Social History     Substance and Sexual Activity   Drug Use Not Currently       CURRENT MEDICATIONS  Home Medications     Reviewed by Jazmin Carrasco R.N. (Registered Nurse) on 02/15/22 at 2106  Med List Status: Partial   Medication Last Dose Status   escitalopram (LEXAPRO) 20 MG tablet  Active   furosemide (LASIX) 20 MG Tab  Active   hydrOXYzine HCl (ATARAX) 25 MG Tab  Active   lactulose 10 GM/15ML Solution  Active   omeprazole (PRILOSEC) 20 MG delayed-release capsule  Active   spironolactone (ALDACTONE) 50 MG Tab  Active   traZODone (DESYREL) 150 MG Tab  Active                ALLERGIES  No Known Allergies    PHYSICAL EXAM  VITAL SIGNS: /87   Pulse 84   Temp 36.2 °C (97.1 °F) (Oral)   Resp (!) 11   Ht 1.905 m (6' 3\")   Wt 109 kg (240 lb)   SpO2 93%   BMI 30.00 kg/m²   Pulse ox interpretation: I interpret this pulse ox as normal.    Constitutional: Alert, anxious, tremulous, ill-appearing  HENT: No signs of external trauma, Bilateral external ears normal, Nose normal.  Bilateral abrasions to tongue, no lacerations  Eyes: Pupils are equal and reactive, Conjunctiva normal, Non-icteric.   Neck: Normal range of motion, No tenderness, Supple, No stridor.    Cardiovascular: Tachycardic, regular, no murmurs.   Thorax & Lungs: Normal breath sounds, No respiratory distress, No wheezing, No chest tenderness.   Abdomen: Bowel sounds normal, Soft, mild epigastric tenderness, No masses, No pulsatile masses. No peritoneal signs.  Skin: Warm, Dry, No erythema, No rash.   Back: No bony tenderness, No CVA tenderness.   Extremities: Intact distal pulses, No edema, No tenderness, No cyanosis, Negative Genaro's sign.  Musculoskeletal: Good range of motion in all major joints. No tenderness to " palpation or major deformities noted.   Neurologic: Alert , tremulous, tongue fasciculations noted, no drift, equal  strength, sensation is intact to light touch throughout normal motor function, Normal sensory function, Psychiatric: Anxious    DIAGNOSTIC STUDIES / PROCEDURES    Results for orders placed or performed during the hospital encounter of 02/15/22   CBC with Differential   Result Value Ref Range    WBC 7.2 4.8 - 10.8 K/uL    RBC 4.66 (L) 4.70 - 6.10 M/uL    Hemoglobin 15.5 14.0 - 18.0 g/dL    Hematocrit 42.4 42.0 - 52.0 %    MCV 91.0 81.4 - 97.8 fL    MCH 33.3 (H) 27.0 - 33.0 pg    MCHC 36.6 (H) 33.7 - 35.3 g/dL    RDW 51.4 (H) 35.9 - 50.0 fL    Platelet Count 20 (LL) 164 - 446 K/uL    Neutrophils-Polys 71.30 44.00 - 72.00 %    Lymphocytes 15.90 (L) 22.00 - 41.00 %    Monocytes 11.10 0.00 - 13.40 %    Eosinophils 0.10 0.00 - 6.90 %    Basophils 0.60 0.00 - 1.80 %    Immature Granulocytes 1.00 (H) 0.00 - 0.90 %    Nucleated RBC 0.30 /100 WBC    Neutrophils (Absolute) 5.13 1.82 - 7.42 K/uL    Lymphs (Absolute) 1.14 1.00 - 4.80 K/uL    Monos (Absolute) 0.80 0.00 - 0.85 K/uL    Eos (Absolute) 0.01 0.00 - 0.51 K/uL    Baso (Absolute) 0.04 0.00 - 0.12 K/uL    Immature Granulocytes (abs) 0.07 0.00 - 0.11 K/uL    NRBC (Absolute) 0.02 K/uL   PERIPHERAL SMEAR REVIEW   Result Value Ref Range    Peripheral Smear Review see below    IMMATURE PLT FRACTION   Result Value Ref Range    Imm. Plt Fraction 22.4 (H) 0.6 - 13.1 K/uL   MORPHOLOGY   Result Value Ref Range    RBC Morphology #CRI    PLATELET ESTIMATE   Result Value Ref Range    Plt Estimation Marked Decrease    DIFFERENTIAL COMMENT   Result Value Ref Range    Comments-Diff see below    Comp Metabolic Panel   Result Value Ref Range    Sodium 131 (L) 135 - 145 mmol/L    Potassium 3.2 (L) 3.6 - 5.5 mmol/L    Chloride 98 96 - 112 mmol/L    Co2 22 20 - 33 mmol/L    Anion Gap 11.0 7.0 - 16.0    Glucose 199 (H) 65 - 99 mg/dL    Bun 6 (L) 8 - 22 mg/dL    Creatinine  0.59 0.50 - 1.40 mg/dL    Calcium 7.8 (L) 8.5 - 10.5 mg/dL    AST(SGOT) 131 (H) 12 - 45 U/L    ALT(SGPT) 31 2 - 50 U/L    Alkaline Phosphatase 180 (H) 30 - 99 U/L    Total Bilirubin 4.8 (H) 0.1 - 1.5 mg/dL    Albumin 3.0 (L) 3.2 - 4.9 g/dL    Total Protein 6.2 6.0 - 8.2 g/dL    Globulin 3.2 1.9 - 3.5 g/dL    A-G Ratio 0.9 g/dL   TROPONIN   Result Value Ref Range    Troponin T 15 6 - 19 ng/L   DIAGNOSTIC ALCOHOL   Result Value Ref Range    Diagnostic Alcohol <10.1 0.0 - 10.0 mg/dL   MAGNESIUM   Result Value Ref Range    Magnesium 1.8 1.5 - 2.5 mg/dL   ESTIMATED GFR   Result Value Ref Range    GFR If African American >60 >60 mL/min/1.73 m 2    GFR If Non African American >60 >60 mL/min/1.73 m 2   EKG   Result Value Ref Range    Report       [facility] Emergency Dept.    Test Date:  2022-02-15  Pt Name:    ARSENIO RODGERS               Department: ER  MRN:        [id number]                      Room:       GR 24  Gender:     Male                         Technician: [id number]  :        1980                   Requested By:ER TRIAGE PROTOCOL  Order #:    [id number]                    Reading MD: JOANNE PEREZ MD    Measurements  Intervals                                Axis  Rate:       170                          P:  RI:                                      QRS:        49  QRSD:       214                          T:  QT:         424  QTc:        714    Interpretive Statements  Sinus tachycardia rhythm with wandering baseline due to tremor  no ischemia  Electronically Signed On 2- 23:53:54 PST by JOANNE PEREZ MD     EKG (NOW)   Result Value Ref Range    Report       [facility] Emergency Dept.    Test Date:  2022-02-15  Pt Name:    ARSENIO RODGERS               Department: ER  MRN:        [id number]                      Room:       GR 24  Gender:     Male                         Technician: [id number]  :        1980                   Requested By:JOANNE PEREZ  Order  #:    [id number]                    Reading MD:    Measurements  Intervals                                Axis  Rate:       94                           P:          58  IA:         196                          QRS:        40  QRSD:       92                           T:          39  QT:         408  QTc:        511    Interpretive Statements  SINUS RHYTHM  BORDERLINE AV CONDUCTION DELAY  PROLONGED QT INTERVAL  Compared to ECG 02/15/2022 21:43:16  Prolonged QT interval now present  Atrial fibrillation no longer present  Ventricular premature complex(es) no longer present  Right bundle-branch block no longer present           RADIOLOGY  CT-HEAD W/O   Final Result      No acute intracranial abnormality.         DX-CHEST-PORTABLE (1 VIEW)   Final Result      1.  No acute cardiac or pulmonary abnormalities are identified.        The radiologist's interpretation of all radiological studies have been reviewed and images independently viewed by me.    COURSE & MEDICAL DECISION MAKING  Nursing notes, VS, PMSFHx reviewed in chart.    9:15 PM Patient seen and examined at bedside. Patient will be treated with 2 mg IV Ativan, Zofran, detox bag. Ordered for CBC, CMP, lipase, magnesium, troponin, ECG and chest x-ray as well as CT head to evaluate his symptoms.     9:54 PM  patient is reevaluated, he is improved from his nausea, no more vomiting, still tremulous, order for additional Ativan    11:06 PM  Patient reevaluated, heart rate 98, resting comfortably, pending results    11:54 PM  Patient did become tachycardic again, do additional 1 mg of Ativan as well as some fentanyl for his headache    1:11 AM  Patient is reevaluated, resting comfortably, heart rate 89    1:25 AM metabolic panel has returned, ordered 40 mEq of oral potassium,    [facility] case with [facility]    HYDRATION: Based on the patient's presentation of Acute Vomiting and Tachycardia the patient was given IV fluids. IV Hydration was used because  oral hydration was not as rapid as required. Upon recheck following hydration, the patient was improved.    Decision Making:  This is a 42 y.o. male presenting after seizure x2.  This does appear secondary to alcohol withdrawal.  He does have a significant history of alcohol abuse and was trying to stop given his cirrhosis.  He did require benzodiazepines for control of his withdrawal here in the emergency department.  Patient additionally was complain of some headache and did hit his head, CT was obtained which shows no evidence of intracranial bleed.  His platelets are quite low at 20 although there is no evidence of active bleeding.  ECG shows no arrhythmia.  He did have a potassium of 3.2 and was given oral repletion for this    Patient is admitted in guarded condition    This patient counter was during epic downtime for our computer system so Times are approximate    FINAL IMPRESSION  1. Alcohol withdrawal syndrome without complication (HCC)    2. Seizure (HCC)    3. Thrombocytopenia (HCC)    4. Hypokalemia      The total critical care time spent on this patient was 40 minutes, resuscitating patient, speaking with admitting physician, and interpreting test results. This 40 minutes is exclusive of separately billable procedures.         The note accurately reflects work and decisions made by me.  Mann Erickson M.D.  2/16/2022  3:03 AM

## 2022-02-16 NOTE — ED NOTES
Patient medicated per ERP orders, see MAR.  IVF infusing. Patient resting on gurney, respirations even and unlabored.      Report to GABRIELE Mg.

## 2022-02-16 NOTE — ED NOTES
EKG was repeated. Pt was given water and is watching tv. Pt was medicated for tremors and headache, bed is padded for seizures. Will continue to monitor.

## 2022-02-16 NOTE — ASSESSMENT & PLAN NOTE
-Sodium of 131 upon admission  -This is likely due to cirrhosis, but also could be complicated by amount of beer drinking  Plan:  -Rehydration with IV fluids  -Repeat BMP in a.m.

## 2022-02-16 NOTE — ASSESSMENT & PLAN NOTE
-Potassium 3.2 upon admission to ED  -Did receive some potassium as well as a detox bag in the ED  Plan:  -Repeat BMP in a.m.

## 2022-02-16 NOTE — ED TRIAGE NOTES
Chief Complaint   Patient presents with   • Seizure     witnessed x2 at 2030, denies hx   • ETOH Withdrawal     x24 hours, 9 - 12 oz beers daily   • Chest Pain     non radiating L sided CP, intermittent x1 month   • N/V   • T-5000 GLF     reports 2 weeks ago, hit head, -LOC   • Tremors     Patient EMS from home with above complaints, onarrival patient elert to self only.  PTA PIV placed, 250 ml NS and phenergan 12.5 mg administered.     Patient A&O on arrival, answers questions appropriately, cooperative.     Patient denies anticoagulation.  Protocol initiated.     Chart up for ERP.

## 2022-02-16 NOTE — H&P
[facility] MEDICINE HISTORY AND PHYSICAL     PATIENT ID:  NAME:  Jalen Vaughn  MRN:               [id number]  YOB: 1980    Date of Admission: 2/15/2022     Attending: Burke Krishnan MD    Resident: Abdelrahman Guerra DO    Primary Care Physician:  Mann Johnson MD    CC:  Alcohol withdrawal    HPI: Jalen Vaughn is a 42 y.o. male with past medical history of EtOH abuse who presented with seizure and EtOH withdrawal. Patient notes that his last drink was about 24 hours ago. Typically drinks 12 beers per day. Also feels like he bumped his head 2 weeks and was told that he had two seizures today.     ERCourse:  In the ED, was noted to have elevated heart rate. Sodium was low at 131, K was low at 3.2. CXR was unremarkable. ECG showed sinus rhythm. CT head showed no acute abnormalities.     REVIEW OF SYSTEMS:   Ten systems reviewed and were negative except as noted in the HPI.                PAST MEDICAL HISTORY:  Past Medical History:   Diagnosis Date   • Liver disease        PAST SURGICAL HISTORY:  Past Surgical History:   Procedure Laterality Date   • APPENDECTOMY     • CHOLECYSTECTOMY         FAMILY HISTORY:  History reviewed. No pertinent family history.    SOCIAL HISTORY:   Pt lives: at home, has wife and young son  Tobacco use: yes  Etoh use: yes  Drug use: no      ALLERGIES:  No Known Allergies    OUTPATIENT MEDICATIONS:  No current facility-administered medications on file prior to encounter.     Current Outpatient Medications on File Prior to Encounter   Medication Sig Dispense Refill   • sertraline (ZOLOFT) 50 MG Tab Take 50 mg by mouth every day.     • furosemide (LASIX) 20 MG Tab Take 1 Tablet by mouth every day. 90 Tablet 2   • spironolactone (ALDACTONE) 50 MG Tab Take 1 Tablet by mouth every day. 90 Tablet 2   • lactulose 10 GM/15ML Solution Take 30 mL by mouth every day. 900 mL 5   • traZODone (DESYREL) 150 MG Tab Take 1 Tablet by mouth every evening. 30 Tablet 3   • escitalopram  (LEXAPRO) 20 MG tablet Take 0.5 Tablets by mouth every day. 30 Tablet 2   • hydrOXYzine HCl (ATARAX) 25 MG Tab Take 1 Tablet by mouth 3 times a day as needed for Anxiety. 30 Tablet 0   • omeprazole (PRILOSEC) 20 MG delayed-release capsule Take 1 Capsule by mouth 2 times a day. 60 Capsule 0       PHYSICAL EXAM:  Vitals:    02/15/22 2356 22 0000 22 0259 22 0336   BP:  150/87 141/86    Pulse: 83 84 92 97   Resp:       Temp:       TempSrc:       SpO2: 93%  94% 94%   Weight:       Height:       , Temp (24hrs), Av.2 °C (97.1 °F), Min:36.2 °C (97.1 °F), Max:36.2 °C (97.1 °F)  , Pulse Oximetry: 94 %, O2 (LPM): 0, O2 Delivery Device: None - Room Air    General: Pt resting in NAD, fatigued, tremulous  Skin:  Pink, warm and dry.  No rashes  HEENT: NC/AT. PERRL. EOMI. MMM. No nasal discharge. Oropharynx nonerythematous without exudate/plaques  Neck:  Supple without lymphadenopathy or rigidity. No JVD   Lungs:  Symmetrical.  CTAB with no W/R/R.  Good air movement   Cardiovascular:  Tachycardic, no murmur, S1S2  Abdomen:  Abdomen is soft NT/ND.  Extremities:  Full range of motion. No gross deformities noted. 2+ pulses in all extremities. Slight resting tremor bilateral upper extremities    Spine:  Straight without vertebral anomalies.  CNS: Cranial nerves II-XII grossly intact. 2+ DTRs.         LAB TESTS:   Results for orders placed or performed during the hospital encounter of 02/15/22   CBC with Differential   Result Value Ref Range    WBC 7.2 4.8 - 10.8 K/uL    RBC 4.66 (L) 4.70 - 6.10 M/uL    Hemoglobin 15.5 14.0 - 18.0 g/dL    Hematocrit 42.4 42.0 - 52.0 %    MCV 91.0 81.4 - 97.8 fL    MCH 33.3 (H) 27.0 - 33.0 pg    MCHC 36.6 (H) 33.7 - 35.3 g/dL    RDW 51.4 (H) 35.9 - 50.0 fL    Platelet Count 20 (LL) 164 - 446 K/uL    Neutrophils-Polys 71.30 44.00 - 72.00 %    Lymphocytes 15.90 (L) 22.00 - 41.00 %    Monocytes 11.10 0.00 - 13.40 %    Eosinophils 0.10 0.00 - 6.90 %    Basophils 0.60 0.00 - 1.80 %     Immature Granulocytes 1.00 (H) 0.00 - 0.90 %    Nucleated RBC 0.30 /100 WBC    Neutrophils (Absolute) 5.13 1.82 - 7.42 K/uL    Lymphs (Absolute) 1.14 1.00 - 4.80 K/uL    Monos (Absolute) 0.80 0.00 - 0.85 K/uL    Eos (Absolute) 0.01 0.00 - 0.51 K/uL    Baso (Absolute) 0.04 0.00 - 0.12 K/uL    Immature Granulocytes (abs) 0.07 0.00 - 0.11 K/uL    NRBC (Absolute) 0.02 K/uL   PERIPHERAL SMEAR REVIEW   Result Value Ref Range    Peripheral Smear Review see below    IMMATURE PLT FRACTION   Result Value Ref Range    Imm. Plt Fraction 22.4 (H) 0.6 - 13.1 K/uL   MORPHOLOGY   Result Value Ref Range    RBC Morphology #CRI    PLATELET ESTIMATE   Result Value Ref Range    Plt Estimation Marked Decrease    DIFFERENTIAL COMMENT   Result Value Ref Range    Comments-Diff see below    Comp Metabolic Panel   Result Value Ref Range    Sodium 131 (L) 135 - 145 mmol/L    Potassium 3.2 (L) 3.6 - 5.5 mmol/L    Chloride 98 96 - 112 mmol/L    Co2 22 20 - 33 mmol/L    Anion Gap 11.0 7.0 - 16.0    Glucose 199 (H) 65 - 99 mg/dL    Bun 6 (L) 8 - 22 mg/dL    Creatinine 0.59 0.50 - 1.40 mg/dL    Calcium 7.8 (L) 8.5 - 10.5 mg/dL    AST(SGOT) 131 (H) 12 - 45 U/L    ALT(SGPT) 31 2 - 50 U/L    Alkaline Phosphatase 180 (H) 30 - 99 U/L    Total Bilirubin 4.8 (H) 0.1 - 1.5 mg/dL    Albumin 3.0 (L) 3.2 - 4.9 g/dL    Total Protein 6.2 6.0 - 8.2 g/dL    Globulin 3.2 1.9 - 3.5 g/dL    A-G Ratio 0.9 g/dL   TROPONIN   Result Value Ref Range    Troponin T 15 6 - 19 ng/L   DIAGNOSTIC ALCOHOL   Result Value Ref Range    Diagnostic Alcohol <10.1 0.0 - 10.0 mg/dL   MAGNESIUM   Result Value Ref Range    Magnesium 1.8 1.5 - 2.5 mg/dL   ESTIMATED GFR   Result Value Ref Range    GFR If African American >60 >60 mL/min/1.73 m 2    GFR If Non African American >60 >60 mL/min/1.73 m 2   EKG   Result Value Ref Range    Report       [facility] Emergency Dept.    Test Date:  2022-02-15  Pt Name:    ARSENIO RODGERS               Department: ER  MRN:         [id number]                      Room:       GR 24  Gender:     Male                         Technician: [id number]  :        1980                   Requested By:ER TRIAGE PROTOCOL  Order #:    [id number]                    Reading MD: JOANNE PEREZ MD    Measurements  Intervals                                Axis  Rate:       170                          P:  OH:                                      QRS:        49  QRSD:       214                          T:  QT:         424  QTc:        714    Interpretive Statements  Sinus tachycardia rhythm with wandering baseline due to tremor  no ischemia  Electronically Signed On 2- 23:53:54 PST by JOANNE PEREZ MD     EKG (NOW)   Result Value Ref Range    Report       [facility] Emergency Dept.    Test Date:  2022-02-15  Pt Name:    ARSENIO RODGERS               Department: ER  MRN:        [id number]                      Room:       GR 24  Gender:     Male                         Technician: [id number]  :        1980                   Requested By:JOANNE PEREZ  Order #:    [id number]                    Reading MD:    Measurements  Intervals                                Axis  Rate:       94                           P:          58  OH:         196                          QRS:        40  QRSD:       92                           T:          39  QT:         408  QTc:        511    Interpretive Statements  SINUS RHYTHM  BORDERLINE AV CONDUCTION DELAY  PROLONGED QT INTERVAL  Compared to ECG 02/15/2022 21:43:16  Prolonged QT interval now present  Atrial fibrillation no longer present  Ventricular premature complex(es) no longer present  Right bundle-branch block no longer present         CULTURES:   Results     ** No results found for the last 168 hours. **          IMAGES:  CT-HEAD W/O   Final Result      No acute intracranial abnormality.         DX-CHEST-PORTABLE (1 VIEW)   Final Result      1.  No acute cardiac or pulmonary abnormalities are  identified.          CONSULTS:   none    ASSESSMENT/PLAN: 42 y.o. male admitted for EtOH withdrawal.    # EtOH withdrawal  # seizure  Patient apparently had two witsness seizure at home. He has not yet had any in the [facility] ED. He denies every withdrawing in the past and has never had seizures. We will continue CIWA for now.     # fall  Patient had a fall a few weeks ago and hit his head.   - CT head had no acute adnomrmailities.     # hyponatremia  May be secondary to alcohol prior to admission. Repeat labs in the morning.     # thrombocytopenia  Unclear etiology at this time. Continue to monitior    # dispo- inpatient for EtOH withdrawal

## 2022-02-17 ENCOUNTER — APPOINTMENT (OUTPATIENT)
Dept: RADIOLOGY | Facility: MEDICAL CENTER | Age: 42
DRG: 894 | End: 2022-02-17
Attending: FAMILY MEDICINE
Payer: MEDICAID

## 2022-02-17 VITALS
BODY MASS INDEX: 29.84 KG/M2 | WEIGHT: 240 LBS | OXYGEN SATURATION: 93 % | DIASTOLIC BLOOD PRESSURE: 92 MMHG | TEMPERATURE: 98.7 F | SYSTOLIC BLOOD PRESSURE: 143 MMHG | RESPIRATION RATE: 18 BRPM | HEIGHT: 75 IN | HEART RATE: 88 BPM

## 2022-02-17 LAB
ALBUMIN SERPL BCP-MCNC: 3.4 G/DL (ref 3.2–4.9)
ALBUMIN/GLOB SERPL: 1.1 G/DL
ALP SERPL-CCNC: 169 U/L (ref 30–99)
ALT SERPL-CCNC: 31 U/L (ref 2–50)
ANION GAP SERPL CALC-SCNC: 12 MMOL/L (ref 7–16)
AST SERPL-CCNC: 170 U/L (ref 12–45)
BILIRUB SERPL-MCNC: 5.1 MG/DL (ref 0.1–1.5)
BUN SERPL-MCNC: 5 MG/DL (ref 8–22)
CALCIUM SERPL-MCNC: 7.8 MG/DL (ref 8.5–10.5)
CHLORIDE SERPL-SCNC: 104 MMOL/L (ref 96–112)
CO2 SERPL-SCNC: 20 MMOL/L (ref 20–33)
CREAT SERPL-MCNC: 0.49 MG/DL (ref 0.5–1.4)
ERYTHROCYTE [DISTWIDTH] IN BLOOD BY AUTOMATED COUNT: 51 FL (ref 35.9–50)
GLOBULIN SER CALC-MCNC: 3 G/DL (ref 1.9–3.5)
GLUCOSE SERPL-MCNC: 92 MG/DL (ref 65–99)
HCT VFR BLD AUTO: 38.9 % (ref 42–52)
HGB BLD-MCNC: 14.1 G/DL (ref 14–18)
MCH RBC QN AUTO: 32.9 PG (ref 27–33)
MCHC RBC AUTO-ENTMCNC: 36.2 G/DL (ref 33.7–35.3)
MCV RBC AUTO: 90.9 FL (ref 81.4–97.8)
MORPHOLOGY BLD-IMP: NORMAL
PLATELET # BLD AUTO: 23 K/UL (ref 164–446)
PLATELETS.RETICULATED NFR BLD AUTO: 24.3 K/UL (ref 0.6–13.1)
POTASSIUM SERPL-SCNC: 3.6 MMOL/L (ref 3.6–5.5)
PROT SERPL-MCNC: 6.4 G/DL (ref 6–8.2)
RBC # BLD AUTO: 4.28 M/UL (ref 4.7–6.1)
SODIUM SERPL-SCNC: 136 MMOL/L (ref 135–145)
WBC # BLD AUTO: 5.6 K/UL (ref 4.8–10.8)

## 2022-02-17 PROCEDURE — 700102 HCHG RX REV CODE 250 W/ 637 OVERRIDE(OP): Performed by: STUDENT IN AN ORGANIZED HEALTH CARE EDUCATION/TRAINING PROGRAM

## 2022-02-17 PROCEDURE — 36415 COLL VENOUS BLD VENIPUNCTURE: CPT

## 2022-02-17 PROCEDURE — 700111 HCHG RX REV CODE 636 W/ 250 OVERRIDE (IP): Performed by: STUDENT IN AN ORGANIZED HEALTH CARE EDUCATION/TRAINING PROGRAM

## 2022-02-17 PROCEDURE — A9270 NON-COVERED ITEM OR SERVICE: HCPCS | Performed by: STUDENT IN AN ORGANIZED HEALTH CARE EDUCATION/TRAINING PROGRAM

## 2022-02-17 PROCEDURE — 85027 COMPLETE CBC AUTOMATED: CPT

## 2022-02-17 PROCEDURE — 85055 RETICULATED PLATELET ASSAY: CPT

## 2022-02-17 PROCEDURE — 99238 HOSP IP/OBS DSCHRG MGMT 30/<: CPT | Mod: GC | Performed by: FAMILY MEDICINE

## 2022-02-17 PROCEDURE — 80053 COMPREHEN METABOLIC PANEL: CPT

## 2022-02-17 RX ORDER — OMEPRAZOLE 20 MG/1
20 CAPSULE, DELAYED RELEASE ORAL EVERY MORNING
Status: DISCONTINUED | OUTPATIENT
Start: 2022-02-18 | End: 2022-02-17 | Stop reason: HOSPADM

## 2022-02-17 RX ADMIN — SPIRONOLACTONE 50 MG: 50 TABLET ORAL at 05:53

## 2022-02-17 RX ADMIN — LORAZEPAM 0.5 MG: 2 INJECTION INTRAMUSCULAR; INTRAVENOUS at 06:52

## 2022-02-17 RX ADMIN — NICOTINE TRANSDERMAL SYSTEM 21 MG: 21 PATCH, EXTENDED RELEASE TRANSDERMAL at 05:55

## 2022-02-17 RX ADMIN — LACTULOSE 30 ML: 20 SOLUTION ORAL at 05:55

## 2022-02-17 RX ADMIN — DOCUSATE SODIUM 50 MG AND SENNOSIDES 8.6 MG 2 TABLET: 8.6; 5 TABLET, FILM COATED ORAL at 05:54

## 2022-02-17 RX ADMIN — SERTRALINE 50 MG: 50 TABLET, FILM COATED ORAL at 05:51

## 2022-02-17 RX ADMIN — LORAZEPAM 1 MG: 1 TABLET ORAL at 11:15

## 2022-02-17 RX ADMIN — LORAZEPAM 0.5 MG: 1 TABLET ORAL at 03:26

## 2022-02-17 RX ADMIN — LORAZEPAM 2 MG: 2 TABLET ORAL at 15:06

## 2022-02-17 RX ADMIN — Medication 100 MG: at 05:51

## 2022-02-17 RX ADMIN — POTASSIUM CHLORIDE 20 MEQ: 1500 TABLET, EXTENDED RELEASE ORAL at 05:59

## 2022-02-17 RX ADMIN — NICOTINE POLACRILEX 2 MG: 2 GUM, CHEWING BUCCAL at 08:18

## 2022-02-17 RX ADMIN — ENOXAPARIN SODIUM 40 MG: 40 INJECTION SUBCUTANEOUS at 05:54

## 2022-02-17 RX ADMIN — NICOTINE POLACRILEX 2 MG: 2 GUM, CHEWING BUCCAL at 09:27

## 2022-02-17 ASSESSMENT — LIFESTYLE VARIABLES
AUDITORY DISTURBANCES: NOT PRESENT
TOTAL SCORE: 10
AGITATION: MODERATELY FIDGETY AND RESTLESS
AUDITORY DISTURBANCES: NOT PRESENT
NAUSEA AND VOMITING: NO NAUSEA AND NO VOMITING
NAUSEA AND VOMITING: NO NAUSEA AND NO VOMITING
HEADACHE, FULLNESS IN HEAD: VERY MILD
AGITATION: MODERATELY FIDGETY AND RESTLESS
TOTAL SCORE: 13
TOTAL SCORE: 14
TOTAL SCORE: 5
PAROXYSMAL SWEATS: BARELY PERCEPTIBLE SWEATING. PALMS MOIST
VISUAL DISTURBANCES: NOT PRESENT
AUDITORY DISTURBANCES: NOT PRESENT
TREMOR: *
PAROXYSMAL SWEATS: BARELY PERCEPTIBLE SWEATING. PALMS MOIST
AUDITORY DISTURBANCES: NOT PRESENT
ORIENTATION AND CLOUDING OF SENSORIUM: ORIENTED AND CAN DO SERIAL ADDITIONS
VISUAL DISTURBANCES: NOT PRESENT
NAUSEA AND VOMITING: NO NAUSEA AND NO VOMITING
TREMOR: *
VISUAL DISTURBANCES: NOT PRESENT
HEADACHE, FULLNESS IN HEAD: NOT PRESENT
ANXIETY: MODERATELY ANXIOUS OR GUARDED, SO ANXIETY IS INFERRED
HEADACHE, FULLNESS IN HEAD: VERY MILD
VISUAL DISTURBANCES: NOT PRESENT
PAROXYSMAL SWEATS: NO SWEAT VISIBLE
TREMOR: *
VISUAL DISTURBANCES: NOT PRESENT
PAROXYSMAL SWEATS: *
TOTAL SCORE: VERY MILD ITCHING, PINS AND NEEDLES SENSATION, BURNING OR NUMBNESS
NAUSEA AND VOMITING: NO NAUSEA AND NO VOMITING
ORIENTATION AND CLOUDING OF SENSORIUM: ORIENTED AND CAN DO SERIAL ADDITIONS
TOTAL SCORE: 10
TOTAL SCORE: VERY MILD ITCHING, PINS AND NEEDLES SENSATION, BURNING OR NUMBNESS
HEADACHE, FULLNESS IN HEAD: NOT PRESENT
AGITATION: MODERATELY FIDGETY AND RESTLESS
AGITATION: SOMEWHAT MORE THAN NORMAL ACTIVITY
NAUSEA AND VOMITING: NO NAUSEA AND NO VOMITING
HEADACHE, FULLNESS IN HEAD: VERY MILD
ANXIETY: *
ORIENTATION AND CLOUDING OF SENSORIUM: ORIENTED AND CAN DO SERIAL ADDITIONS
ANXIETY: *
TREMOR: *
TREMOR: *
AUDITORY DISTURBANCES: NOT PRESENT
TOTAL SCORE: VERY MILD ITCHING, PINS AND NEEDLES SENSATION, BURNING OR NUMBNESS
AGITATION: MODERATELY FIDGETY AND RESTLESS
PAROXYSMAL SWEATS: NO SWEAT VISIBLE
ORIENTATION AND CLOUDING OF SENSORIUM: ORIENTED AND CAN DO SERIAL ADDITIONS
ANXIETY: *
ORIENTATION AND CLOUDING OF SENSORIUM: ORIENTED AND CAN DO SERIAL ADDITIONS
ANXIETY: MILDLY ANXIOUS

## 2022-02-17 NOTE — PROGRESS NOTES
Assumed care of patient at 1900 from Estephania SUAZO. Patient is A&O x4, states pain level is 0/10. Bed locked in lowest position with 2 rail up. Call light  in place, belongings at bedside. Hourly rounding is in place.Labs reviewed.

## 2022-02-17 NOTE — PROGRESS NOTES
4 Eyes Skin Assessment Completed by GABRIELE Shields and GABRIELE Gaitan.    Head WDL  Ears WDL  Nose WDL  Mouth WDL  Neck WDL  Breast/Chest WDL  Shoulder Blades WDL  Spine WDL  (R) Arm/Elbow/Hand WDL  (L) Arm/Elbow/Hand WDL  Abdomen WDL  Groin WDL  Scrotum/Coccyx/Buttocks WDL  (R) Leg WDL  (L) Leg Scab, Scab left knee  (R) Heel/Foot/Toe WDL  (L) Heel/Foot/Toe WDL          Devices In Places none      Interventions In Place N/A    Possible Skin Injury No    Pictures Uploaded Into Epic N/A  Wound Consult Placed N/A  RN Wound Prevention Protocol Ordered No

## 2022-02-17 NOTE — PROGRESS NOTES
Jordan from Lab called with critical result of platelet of 23 at 0259. Critical lab result read back to Jordan.   Dr. Ribeiro notified of critical lab result at 0303.  Critical lab result read back by Dr. Ribeiro.

## 2022-02-17 NOTE — PROGRESS NOTES
Patient pulled IV tubing in half and is becoming more agitated and impulsive. Pt scored 10 on CIWA scale. RN administer 0.5 IV ativan per mar. Will continue to monitor.

## 2022-02-17 NOTE — PROGRESS NOTES
Assumed care at 0700. Patient A&Ox4. Patient denies pain. Patient stated that he was going to go home and didn't want to remain in the hospital anymore. Dr. Quick updated and came to bedside. After discussion, patient now okay with staying in the hospital. CIWA protocol in place. Bed alarm on. Call light and belongings within reach.

## 2022-02-18 NOTE — PROGRESS NOTES
Jalen Vaughn patient has chosen to leave the hospital against medical advice. The attending physician has not discharged the patient. Patient is not a risk to himself or others. I have discussed with the patient the following:  Physician has not determined patient is ready for discharge, Risks and consequences of leaving the hospital too soon and Benefit of continued hospitalization.      Discharge against medical advice form has been Patient left abruptly - no chance to sign.        Attending physician has been notified.

## 2022-02-18 NOTE — DISCHARGE SUMMARY
"  [facility] FAMILY MEDICINE ADULT DISCHARGE SUMMARY     Admit Date:  2/15/2022       Discharge Date:   2/17/2022 (Left Against Medical Advice)    Service:   [facility] Family Medicine Inpatient Team  Attending Physician(s):   Burke Krishnan MD       Senior Resident(s):   Kam Quick MD  Tobin Resident(s):   Carmen Wagner MD     Primary Care Physician: Mann Johnson MD    Discharge Diagnoses:  Acute alcohol withdrawal  Alcoholic cirrhosis  Tobacco abuse  Severe thrombocytopenia, stable    HPI:     Per admission H&P:  \"Jalen Vaughn is a 42 y.o. male with past medical history of EtOH abuse who presented with seizure and EtOH withdrawal. Patient notes that his last drink was about 24 hours ago. Typically drinks 12 beers per day. Also feels like he bumped his head 2 weeks and was told that he had two seizures today. \"    Hospital Course:  In the ED, was noted to have elevated heart rate. Sodium was low at 131, K was low at 3.2. CXR was unremarkable. ECG showed sinus rhythm. CT head showed no acute abnormalities. He was given a detox bag and he was admitted to the hospital to be started on CIWA protocol.  The first day of admission he was quite tired and slept most of the day.  He was given IV fluids to maintain hydration and was given Ativan as needed for alcohol withdrawal symptoms.  On his day of leaving the hospital, he was anxious and on several occasions reported he is going to leave.  2 attempts by staff and MDs were successful to have him stay in the hospital, but he ultimately decided to leave against our medical advice.  I did have zaid discussions with him about the seriousness of his cirrhosis and the consequences of continued alcohol.  We desired him to withdraw in the hospital where we could manage his symptoms, hopefully allowing him to maintain sobriety upon leaving the hospital.  Ultimately he made the decision to leave despite knowing that consequences.    Consultants:      None    Procedures:      "   None    Labs:  Results for orders placed or performed during the hospital encounter of 02/15/22   CBC with Differential   Result Value Ref Range    WBC 7.2 4.8 - 10.8 K/uL    RBC 4.66 (L) 4.70 - 6.10 M/uL    Hemoglobin 15.5 14.0 - 18.0 g/dL    Hematocrit 42.4 42.0 - 52.0 %    MCV 91.0 81.4 - 97.8 fL    MCH 33.3 (H) 27.0 - 33.0 pg    MCHC 36.6 (H) 33.7 - 35.3 g/dL    RDW 51.4 (H) 35.9 - 50.0 fL    Platelet Count 20 (LL) 164 - 446 K/uL    Neutrophils-Polys 71.30 44.00 - 72.00 %    Lymphocytes 15.90 (L) 22.00 - 41.00 %    Monocytes 11.10 0.00 - 13.40 %    Eosinophils 0.10 0.00 - 6.90 %    Basophils 0.60 0.00 - 1.80 %    Immature Granulocytes 1.00 (H) 0.00 - 0.90 %    Nucleated RBC 0.30 /100 WBC    Neutrophils (Absolute) 5.13 1.82 - 7.42 K/uL    Lymphs (Absolute) 1.14 1.00 - 4.80 K/uL    Monos (Absolute) 0.80 0.00 - 0.85 K/uL    Eos (Absolute) 0.01 0.00 - 0.51 K/uL    Baso (Absolute) 0.04 0.00 - 0.12 K/uL    Immature Granulocytes (abs) 0.07 0.00 - 0.11 K/uL    NRBC (Absolute) 0.02 K/uL   PERIPHERAL SMEAR REVIEW   Result Value Ref Range    Peripheral Smear Review see below    IMMATURE PLT FRACTION   Result Value Ref Range    Imm. Plt Fraction 22.4 (H) 0.6 - 13.1 K/uL   MORPHOLOGY   Result Value Ref Range    RBC Morphology #CRI    PLATELET ESTIMATE   Result Value Ref Range    Plt Estimation Marked Decrease    DIFFERENTIAL COMMENT   Result Value Ref Range    Comments-Diff see below    Comp Metabolic Panel   Result Value Ref Range    Sodium 131 (L) 135 - 145 mmol/L    Potassium 3.2 (L) 3.6 - 5.5 mmol/L    Chloride 98 96 - 112 mmol/L    Co2 22 20 - 33 mmol/L    Anion Gap 11.0 7.0 - 16.0    Glucose 199 (H) 65 - 99 mg/dL    Bun 6 (L) 8 - 22 mg/dL    Creatinine 0.59 0.50 - 1.40 mg/dL    Calcium 7.8 (L) 8.5 - 10.5 mg/dL    AST(SGOT) 131 (H) 12 - 45 U/L    ALT(SGPT) 31 2 - 50 U/L    Alkaline Phosphatase 180 (H) 30 - 99 U/L    Total Bilirubin 4.8 (H) 0.1 - 1.5 mg/dL    Albumin 3.0 (L) 3.2 - 4.9 g/dL    Total Protein 6.2 6.0 -  8.2 g/dL    Globulin 3.2 1.9 - 3.5 g/dL    A-G Ratio 0.9 g/dL   TROPONIN   Result Value Ref Range    Troponin T 15 6 - 19 ng/L   DIAGNOSTIC ALCOHOL   Result Value Ref Range    Diagnostic Alcohol <10.1 0.0 - 10.0 mg/dL   MAGNESIUM   Result Value Ref Range    Magnesium 1.8 1.5 - 2.5 mg/dL   ESTIMATED GFR   Result Value Ref Range    GFR If African American >60 >60 mL/min/1.73 m 2    GFR If Non African American >60 >60 mL/min/1.73 m 2   Prothrombin Time   Result Value Ref Range    PT 19.8 (H) 12.0 - 14.6 sec    INR 1.74 (H) 0.87 - 1.13   CBC without Differential   Result Value Ref Range    WBC 5.6 4.8 - 10.8 K/uL    RBC 4.28 (L) 4.70 - 6.10 M/uL    Hemoglobin 14.1 14.0 - 18.0 g/dL    Hematocrit 38.9 (L) 42.0 - 52.0 %    MCV 90.9 81.4 - 97.8 fL    MCH 32.9 27.0 - 33.0 pg    MCHC 36.2 (H) 33.7 - 35.3 g/dL    RDW 51.0 (H) 35.9 - 50.0 fL    Platelet Count 23 (LL) 164 - 446 K/uL   Comp Metabolic Panel (CMP)   Result Value Ref Range    Sodium 136 135 - 145 mmol/L    Potassium 3.6 3.6 - 5.5 mmol/L    Chloride 104 96 - 112 mmol/L    Co2 20 20 - 33 mmol/L    Anion Gap 12.0 7.0 - 16.0    Glucose 92 65 - 99 mg/dL    Bun 5 (L) 8 - 22 mg/dL    Creatinine 0.49 (L) 0.50 - 1.40 mg/dL    Calcium 7.8 (L) 8.5 - 10.5 mg/dL    AST(SGOT) 170 (H) 12 - 45 U/L    ALT(SGPT) 31 2 - 50 U/L    Alkaline Phosphatase 169 (H) 30 - 99 U/L    Total Bilirubin 5.1 (H) 0.1 - 1.5 mg/dL    Albumin 3.4 3.2 - 4.9 g/dL    Total Protein 6.4 6.0 - 8.2 g/dL    Globulin 3.0 1.9 - 3.5 g/dL    A-G Ratio 1.1 g/dL   ESTIMATED GFR   Result Value Ref Range    GFR If African American >60 >60 mL/min/1.73 m 2    GFR If Non African American >60 >60 mL/min/1.73 m 2   PERIPHERAL SMEAR REVIEW   Result Value Ref Range    Peripheral Smear Review see below    IMMATURE PLT FRACTION   Result Value Ref Range    Imm. Plt Fraction 24.3 (H) 0.6 - 13.1 K/uL   EKG   Result Value Ref Range    Report       [facility] Emergency Dept.    Test Date:  2022-02-15  Pt Name:     ARSENIO RODGERS               Department: ER  MRN:        [id number]                      Room:       GR 24  Gender:     Male                         Technician: [id number]  :        1980                   Requested By:ER TRIAGE PROTOCOL  Order #:    [id number]                    Reading MD: JOANNE PEREZ MD    Measurements  Intervals                                Axis  Rate:       170                          P:  AR:                                      QRS:        49  QRSD:       214                          T:  QT:         424  QTc:        714    Interpretive Statements  Sinus tachycardia rhythm with wandering baseline due to tremor  no ischemia  Electronically Signed On 2- 23:53:54 PST by JOANNE PEREZ MD     EKG (NOW)   Result Value Ref Range    Report       [facility] Emergency Dept.    Test Date:  2022-02-15  Pt Name:    ARSENIO RODGERS               Department: ER  MRN:        [id number]                      Room:       GR 24  Gender:     Male                         Technician: [id number]  :        1980                   Requested By:JOANNE PEREZ  Order #:    [id number]                    Reading MD:    Measurements  Intervals                                Axis  Rate:       94                           P:          58  AR:         196                          QRS:        40  QRSD:       92                           T:          39  QT:         408  QTc:        511    Interpretive Statements  SINUS RHYTHM  BORDERLINE AV CONDUCTION DELAY  PROLONGED QT INTERVAL  Compared to ECG 02/15/2022 21:43:16  Prolonged QT interval now present  Atrial fibrillation no longer present  Ventricular premature complex(es) no longer present  Right bundle-branch block no longer present       Imaging/Testing:      IR-US GUIDED PIV   Final Result    Ultrasound-guided PERIPHERAL IV INSERTION performed by    qualified nursing staff as above.      CT-HEAD W/O   Final Result      No acute intracranial  abnormality.         DX-CHEST-PORTABLE (1 VIEW)   Final Result      1.  No acute cardiac or pulmonary abnormalities are identified.          Discharge Medications:        Current Outpatient Medications   Medication Instructions   • escitalopram (LEXAPRO) 10 mg, Oral, DAILY   • furosemide (LASIX) 20 mg, Oral, DAILY   • hydrOXYzine HCl (ATARAX) 25 mg, Oral, 3 TIMES DAILY PRN   • lactulose 20 g, Oral, DAILY   • omeprazole (PRILOSEC) 20 mg, Oral, 2 TIMES DAILY   • sertraline (ZOLOFT) 50 mg, Oral, DAILY   • spironolactone (ALDACTONE) 50 mg, Oral, DAILY   • traZODone (DESYREL) 150 mg, Oral, NIGHTLY         Disposition:   Left against medical advice  Condition: Fair  Destination: Home  Services: None    Diet:   No ralcohol    Activity:   As tolerated    Instructions:      The patient was instructed to return to the ER in the event of worsening symptoms. I have counseled the patient on the importance of compliance and the patient has agreed to proceed with all medical recommendations and follow up plan indicated above.   The patient understands that all medications come with benefits and risks. Risks may include permanent injury or death and these risks can be minimized with close reassessment and monitoring.        Please CC: Mann Johnson M.D.    Follow up appointment details :      Mann Johnson MD on 03/11/2022    Pending Studies:        None

## 2022-02-18 NOTE — PROGRESS NOTES
[facility] [place] FAMILY MEDICINE PROGRESS NOTE     Attending: Burke Krishnan M.D.  Senior Resident: Kam Quick M.D. (PGY-3)  Tobin Resident: Carmen Wagner M.D. (PGY-1)  PATIENT: Jalen Vaughn; [id number]; 1980    ID: 42 y.o. male with cirrhosis and history of alcohol abuse admitted for acute alcohol withdrawal with possible seizures.     Overnight Events: No acute events overnight     Subjective: Today he really wants to leave.  But the same time he says he is motivated to quit drinking.  He denies any pain, nausea, vomiting, or shortness of breath.  He does feel anxious and feels like he is tremulous.    OBJECTIVE:  Temp:  [36.6 °C (97.8 °F)-37.4 °C (99.3 °F)] 37.1 °C (98.7 °F)  Pulse:  [] 88  Resp:  [18-20] 18  BP: (120-145)/() 143/92  SpO2:  [93 %-96 %] 93 %    Intake/Output Summary (Last 24 hours) at 2/17/2022 1711  Last data filed at 2/17/2022 1300  Gross per 24 hour   Intake 685 ml   Output --   Net 685 ml       PE:  Gen: Intermittently agitated today, up walking around  HEENT: NCAT, EOMI; dry and erythematous skin around eyes bilaterally  Pulm: CTA bilaterally, no respiratory distress   Cardio: RRR, normal S1 and S2, no murmurs  Abdom: soft, non-tender, non-distended, normal BS  Ext: No edema, 2+ pulses     LABS:  Recent Labs     02/15/22  2115 02/17/22  0203   WBC 7.2 5.6   RBC 4.66* 4.28*   HEMOGLOBIN 15.5 14.1   HEMATOCRIT 42.4 38.9*   MCV 91.0 90.9   MCH 33.3* 32.9   RDW 51.4* 51.0*   PLATELETCT 20* 23*   NEUTSPOLYS 71.30  --    LYMPHOCYTES 15.90*  --    MONOCYTES 11.10  --    EOSINOPHILS 0.10  --    BASOPHILS 0.60  --    RBCMORPHOLO #CRI  --      Recent Labs     02/16/22  0038 02/17/22  0203   SODIUM 131* 136   POTASSIUM 3.2* 3.6   CHLORIDE 98 104   CO2 22 20   BUN 6* 5*   CREATININE 0.59 0.49*   CALCIUM 7.8* 7.8*   MAGNESIUM 1.8  --    ALBUMIN 3.0* 3.4     Estimated GFR/CRCL = Estimated Creatinine Clearance: 267.3 mL/min (A) (by C-G formula based on SCr of 0.49 mg/dL (L)).  Recent Labs      02/16/22  0038 02/17/22  0203   GLUCOSE 199* 92     Recent Labs     02/16/22  0038 02/16/22  0803 02/17/22  0203   ASTSGOT 131*  --  170*   ALTSGPT 31  --  31   TBILIRUBIN 4.8*  --  5.1*   ALKPHOSPHAT 180*  --  169*   GLOBULIN 3.2  --  3.0   INR  --  1.74*  --              Recent Labs     02/16/22  0803   INR 1.74*       IMAGING: No new imaging    MEDS:  Current Facility-Administered Medications   Medication Last Admin   • [START ON 2/18/2022] omeprazole (PRILOSEC) capsule 20 mg     • senna-docusate (PERICOLACE or SENOKOT S) 8.6-50 MG per tablet 2 Tablet 2 Tablet at 02/17/22 0554    And   • polyethylene glycol/lytes (MIRALAX) PACKET 1 Packet      And   • magnesium hydroxide (MILK OF MAGNESIA) suspension 30 mL      And   • bisacodyl (DULCOLAX) suppository 10 mg     • enoxaparin (LOVENOX) inj 40 mg 40 mg at 02/17/22 0554   • LORazepam (ATIVAN) tablet 0.5 mg 0.5 mg at 02/17/22 0326   • LORazepam (ATIVAN) tablet 1 mg 1 mg at 02/17/22 1115    Or   • LORazepam (ATIVAN) injection 0.5 mg 0.5 mg at 02/17/22 0652   • LORazepam (ATIVAN) tablet 2 mg 2 mg at 02/17/22 1506    Or   • LORazepam (ATIVAN) injection 1 mg     • LORazepam (ATIVAN) tablet 3 mg      Or   • LORazepam (ATIVAN) injection 1.5 mg     • LORazepam (ATIVAN) tablet 4 mg      Or   • LORazepam (ATIVAN) injection 2 mg     • acetaminophen (TYLENOL) tablet 1,000 mg 1,000 mg at 02/16/22 0614   • carboxymethylcellulose (REFRESH TEARS) 0.5 % ophthalmic drops 1 Drop 1 Drop at 02/16/22 0614   • sertraline (Zoloft) tablet 50 mg 50 mg at 02/17/22 0551   • spironolactone (ALDACTONE) tablet 50 mg 50 mg at 02/17/22 0553   • lactulose 20 GM/30ML solution 30 mL 30 mL at 02/17/22 0555   • thiamine (Vitamin B-1) tablet 100 mg 100 mg at 02/17/22 0551   • lactated ringers infusion Stopped at 02/17/22 1338   • nicotine (NICODERM) 21 MG/24HR 21 mg 21 mg at 02/17/22 0555    And   • nicotine polacrilex (NICORETTE) 2 MG piece 2 mg 2 mg at 02/17/22 0921       PROBLEM LIST:  Problem Noted    Alcohol Abuse With Withdrawal (Hcc) 2/16/2022   Thrombocytopenia (Hcc) 2/16/2022   Alcoholic Cirrhosis (Hcc) 12/3/2021   Hyponatremia 2/16/2022   Bilateral Dry Eyes 2/16/2022   Hypokalemia 12/3/2021     ASSESSMENT/PLAN: 42 y.o. male with cirrhosis and history of alcohol abuse admitted for acute alcohol withdrawal with possible seizures.      Alcohol abuse with withdrawal (HCC)  -Patient reports drinking 12 beers a day  -Reportedly there were 2 witnessed seizures but it was only 24 hours after his last drink  -Head CT in ER negative  -Alcohol level negative in ED  -Received detox bag in ED  -Last for CIWA scores have been 10, 5, 5, and 5  -Received total of 3 mg Ativan last 24 hours  Plan:  -Continue CIWA protocol  -Thiamine 100 mg daily  -LR at 75 mL's per hour  -Patient states she is motivated to quit, but threatened to leave AMA multiple times today.  I had a zaid discussion with him, and he agreed to stay.  I also told him that rehab and upon moving forward will be important for him to stay sober.     Alcoholic cirrhosis (HCC)  -Patient has history of cirrhosis and reportedly takes Lasix, spironolactone, and lactulose at home  -On admission had hyponatremia of 131, T bili of 4.8, and INR 1.74.  LFTs are not overly elevated, but this is concerning for possible long-term effects of cirrhosis.  -Overall his meld score is 21  -Child Castro release class B, borderline class C  Overall his cirrhosis seems stable, and his vital signs are reassuring.  Plan:  -Continue home Lasix 20 mg daily  -Continue home spironolactone 50 mg daily  -Continue lactulose 20 mg daily  -I had a serious discussion with him about his prognosis if he does not stop drinking.  Potentially he could be a candidate for liver transplant 6 months in the future if he does stop.     Thrombocytopenia (HCC)  -Platelets 20 upon admission to ED  -No signs of active bleeding at this time  -In the setting of cirrhosis, this is the likely etiology  -Platelet  count 23 today.  Continues to be no signs of active bleeding.  Plan:  -Continue to monitor  -Replace platelets if drops below 15     Hypokalemia, resolved  -Potassium 3.2 upon admission to ED  -Did receive some potassium as well as a detox bag in the ED  -Potassium 3.6 today     Hyponatremia, resolved  -Sodium of 131 upon admission  -This is likely due to cirrhosis, but also could be complicated by amount of beer drinking  -Sodium up to 136 today  Plan:  -Continue hydration with low maintenance fluids and oral intake     Bilateral dry eyes  -He is complaining of dry eyes that burns  -Upon physical exam he does have dry and erythematous skin around his eyes  Plan:  -Moisturizing lotion as needed for skin around eyes        #Disposition     #Core Measures   VTE PPx: None as INR is 1.74  Abx: None  Lines/Tubes: PIV  Fluids: LR at 75ml/hr  Diet: Regular as tolerated  Code Status: Full      Kam Quick M.D.   PGY-3  [facility]   [phone]

## 2022-02-18 NOTE — CARE PLAN
Called and lm on  to call me back.   The patient is Stable - Low risk of patient condition declining or worsening    Shift Goals  Clinical Goals: CIWA scoring  Patient Goals: rest, dc home  Family Goals: n/a    Progress made toward(s) clinical / shift goals:    Problem: Knowledge Deficit - Standard  Goal: Patient and family/care givers will demonstrate understanding of plan of care, disease process/condition, diagnostic tests and medications  Outcome: Progressing     Problem: Seizure Precautions  Goal: Implementation of seizure precautions  Outcome: Progressing     Problem: Psychosocial  Goal: Patient's level of anxiety will decrease  Outcome: Progressing     Problem: Risk for Aspiration  Goal: Patient's risk for aspiration will be absent or decrease  Outcome: Progressing       Patient is not progressing towards the following goals:      Problem: Optimal Care for Alcohol Withdrawal  Goal: Optimal Care for the alcohol withdrawal patient  Outcome: Not Met   Patient wanting to leave AMA two times during the day and not understanding alcohol withdrawal process.   Problem: Lifestyle Changes  Goal: Patient's ability to identify lifestyle changes and available resources to help reduce recurrence of condition will improve  Outcome: Not Met

## 2022-02-22 ENCOUNTER — TELEPHONE (OUTPATIENT)
Dept: SCHEDULING | Facility: IMAGING CENTER | Age: 42
End: 2022-02-22
Payer: MEDICAID

## 2022-02-23 ENCOUNTER — PATIENT OUTREACH (OUTPATIENT)
Dept: HEALTH INFORMATION MANAGEMENT | Facility: OTHER | Age: 42
End: 2022-02-23
Payer: MEDICAID

## 2022-02-23 NOTE — TELEPHONE ENCOUNTER
Please notify Brian that unfortunately I do not have any available appointment times until his scheduled visit. He can try to schedule a visit with a different provider if he would like to be seen sooner. If Brian is having seizures, I would strongly recommend that he return to the hospital for care. Our team is better able to care for his acute withdrawals and complications related to his drinking there than in the clinic setting.     Mann Johnson M.D.  Family Medicine Resident  PGY-3

## 2022-02-23 NOTE — PROGRESS NOTES
CHW Vinson called the patient who states he has been attempting to reach Dr. Johnson for 3 days in regards to a prescription. GRETEL sent a volte message to Dr. Johnson requesting a call to the patient. GRETEL has also asked a CCM RN to reach out to this patient.     ROBERTW will follow up again later this week.    03-Oct-2019 16:34

## 2022-02-23 NOTE — TELEPHONE ENCOUNTER
Caller Name: Brian  Call Back Number: [phone]    How would the patient prefer to be contacted with a response: Phone call OK to leave a detailed message    Pt called and is asking if Dr. Johnson can get him in sooner for an apt or if he can prescribe him the medication he needs so he can stop drinking and not have seizures. He had two episodes of them and had to go to the hospital by ambulance. He states he has talked to provider about this medication before he just does not remember what it is called but it is suppose to help him. Please advice.

## 2022-02-23 NOTE — PROGRESS NOTES
Community Health Worker Lamine attempted to reach the patient after discharge from the hospital to follow up. Patient did not answer and CHW left a detailed voicemail requesting a call back.     [facility] Rodriguez Raman will attempt again at a later date.

## 2022-02-23 NOTE — PROGRESS NOTES
Patient outreach per request from GRETEL Motta, that patient has a question about his medications.  When talking with Brian he stated that he went to the hospital because he was having seizures related to his ETOH and when tying to place an IV to assist with his withdrawal he stated that after several unsuccessful tries he got up and left.  He stated that the doctor told him he needed to be on Ativan before he left but was not given a prescription.  During the call he was asking for a prescription to be given for ativan as he does not want to go through seizures again and he wants to get off alcohol.  Explained that Ativan is a prescription medication and a controlled substance and he will need to be in the care of a physician while taking this medication whether it be through a rehab program or his PCP.  He stated that he has called his PCP with no response and he has been through a rehab program before, he doesn't wan to do another as he explained he has a family has has to care for and cannot go to rehab. He just needs a prescription.  Assisted with calls, calls were made by this RN and GRETEL Motta to his PCP but only messages were left as the PCP is out of the office today.  Reassured patient that it has been over 3 days of withdrawing that he is mostly through the worst of it and he stated that he started drinking again once he got home to avoid another seizure.  Suggested that he go back to ER to assist with his withdrawals he stated he will not be going back to the hospital.  Informed him the he will have to wait for his PCP to call him back to assist him with his needs.  He stated that he is trying to get off alcohol and no one is helping him.  Explained the kind of help he needs right now needs to come from a doctor and this RN cannot write a prescription for the medication he needs to avoid another seizure during withdrawals.   Patient agreed to wait for a response from the PCP.

## 2022-02-25 ENCOUNTER — OFFICE VISIT (OUTPATIENT)
Dept: MEDICAL GROUP | Facility: CLINIC | Age: 42
End: 2022-02-25
Payer: MEDICAID

## 2022-02-25 VITALS
OXYGEN SATURATION: 95 % | BODY MASS INDEX: 31.83 KG/M2 | DIASTOLIC BLOOD PRESSURE: 95 MMHG | WEIGHT: 256 LBS | HEIGHT: 75 IN | HEART RATE: 90 BPM | SYSTOLIC BLOOD PRESSURE: 141 MMHG | RESPIRATION RATE: 18 BRPM

## 2022-02-25 DIAGNOSIS — F10.939 ALCOHOL WITHDRAWAL SYNDROME WITH COMPLICATION (HCC): ICD-10-CM

## 2022-02-25 DIAGNOSIS — F10.930 ALCOHOL WITHDRAWAL SYNDROME WITHOUT COMPLICATION (HCC): ICD-10-CM

## 2022-02-25 PROCEDURE — 99213 OFFICE O/P EST LOW 20 MIN: CPT | Mod: GE | Performed by: STUDENT IN AN ORGANIZED HEALTH CARE EDUCATION/TRAINING PROGRAM

## 2022-02-25 RX ORDER — CHLORDIAZEPOXIDE HYDROCHLORIDE 25 MG/1
CAPSULE, GELATIN COATED ORAL
Qty: 15 CAPSULE | Refills: 0 | Status: SHIPPED | OUTPATIENT
Start: 2022-02-25 | End: 2022-03-01

## 2022-02-25 ASSESSMENT — FIBROSIS 4 INDEX: FIB4 SCORE: 53.1

## 2022-02-25 ASSESSMENT — PATIENT HEALTH QUESTIONNAIRE - PHQ9: CLINICAL INTERPRETATION OF PHQ2 SCORE: 0

## 2022-02-25 NOTE — ASSESSMENT & PLAN NOTE
"Patient reports he wants to quit drinking.  He tried quitting \"cold turkey\" a few weeks ago and had a seizure at home. He was hospitalized after this and left AMA after multiple failed IV attempts.  I discussed that he is very high risk for repeat seizures and he should go through withdrawal in the hospital setting. He adamantly declines this and states multiple times that he refuses to go to the hospital at this time.   He is requesting ativan. I discussed with him that is a high risk medication that we can use safely in the hospital, but not in the outpatient setting.  I offered a taper with librium outpatient. We will do a 4 day taper of 50mg 4x daily for one day, then 25mg 4x daily for one day, then 25mg 2x daily for one day, and finally 25mg once daily.  Follow up early last week.  If he starts to feel worse, I instructed him to go to the ER.  He will follow up early next week in clinic.   "

## 2022-02-25 NOTE — PROGRESS NOTES
"Subjective:     CC: seizures after stopping drinking     HPI:   Jalen presents today with questions about quitting drinking.    He reports he stopped drinking \"cold turkey\" and he had a seizure a few weeks ago. He was hospitalized at that time and left AMA after he reports that they tried 12 times to get an IV. He wants to stop drinking without being hospitalized. He states he was told that ativan would be helpful for him. He refuses to go to the hospital. He does not have a plan for after he stops drinking and states that he does not want to go to .     He states he has not been drinking much lately and has only had 2 beers in the past 24 hours.     Current Outpatient Medications Ordered in Epic   Medication Sig Dispense Refill   • chlordiazePOXIDE (LIBRIUM) 25 MG Cap Take 2 tabs PO every 6 hours for 1 day, then take 1 tab every 6 hours for 1 day, then take 1 tab every 12 hours for 1 day, then take 1 tab for 1 day. 4 day supply. 15 Capsule 0   • lactulose 10 GM/15ML Solution Take 30 mL by mouth every day. 900 mL 5   • traZODone (DESYREL) 150 MG Tab Take 1 Tablet by mouth every evening. 30 Tablet 3   • omeprazole (PRILOSEC) 20 MG delayed-release capsule Take 1 Capsule by mouth 2 times a day. 60 Capsule 0     No current Epic-ordered facility-administered medications on file.     ROS:  Gen: no fevers/chills, no changes in weight  Pulm: no sob, no cough  Objective:   Exam:  /95 (BP Location: Right arm, Patient Position: Sitting, BP Cuff Size: Adult)   Pulse 90   Resp 18   Ht 1.905 m (6' 3\")   Wt 116 kg (256 lb)   SpO2 95%   BMI 32.00 kg/m²  Body mass index is 32 kg/m².    Gen: Alert and oriented, No apparent distress.  Neck: Neck is supple without lymphadenopathy.  Lungs: Normal effort, CTA bilaterally, no wheezes, rhonchi, or rales  CV: Regular rate and rhythm. No murmurs, rubs, or gallops.  Ext: No clubbing, cyanosis, edema.      Assessment & Plan:     42 y.o. male with the following -     Problem List " "Items Addressed This Visit     Alcohol withdrawal syndrome with complication, cirrhosis (HCC)     Patient reports he wants to quit drinking.  He tried quitting \"cold turkey\" a few weeks ago and had a seizure at home. He was hospitalized after this and left AMA after multiple failed IV attempts.  I discussed that he is very high risk for repeat seizures and he should go through withdrawal in the hospital setting. He adamantly declines this and states multiple times that he refuses to go to the hospital at this time.   He is requesting ativan. I discussed with him that is a high risk medication that we can use safely in the hospital, but not in the outpatient setting.  I offered a taper with librium outpatient. We will do a 4 day taper of 50mg 4x daily for one day, then 25mg 4x daily for one day, then 25mg 2x daily for one day, and finally 25mg once daily.  Follow up early last week.  If he starts to feel worse, I instructed him to go to the ER.  He will follow up early next week in clinic.          Alcohol withdrawal syndrome without complication (HCC)    Relevant Medications    chlordiazePOXIDE (LIBRIUM) 25 MG Cap    Other Relevant Orders    Controlled Substance Treatment Agreement        Return in about 5 days (around 3/2/2022) for Short.    "

## 2022-02-28 ENCOUNTER — PATIENT OUTREACH (OUTPATIENT)
Dept: HEALTH INFORMATION MANAGEMENT | Facility: OTHER | Age: 42
End: 2022-02-28
Payer: MEDICAID

## 2022-02-28 NOTE — PROGRESS NOTES
"CHW Vinson called the patient to follow up after last conversation. Patient states that he will attend his primary care appointment tomorrow and has no concerns about his health at this moment. Patient was making inappropriate remarks over the phone including \"are you single\", \"will my doctors jodie little nurse be there\", and stating that he has no further concerns as \"you are not single\".     CHW will not follow at this time.   "

## 2022-03-01 ENCOUNTER — TELEPHONE (OUTPATIENT)
Dept: CARDIOLOGY | Facility: MEDICAL CENTER | Age: 42
End: 2022-03-01

## 2022-03-01 NOTE — TELEPHONE ENCOUNTER
Requested medical records to [facility]   Phone number [phone]  FAX # [phone]

## 2022-03-08 ENCOUNTER — TELEPHONE (OUTPATIENT)
Dept: CARDIOLOGY | Facility: MEDICAL CENTER | Age: 42
End: 2022-03-08
Payer: MEDICAID

## 2022-03-08 NOTE — TELEPHONE ENCOUNTER
3/8/22 called pt to r/s missed appt w/Dr. Fritz from 3/7/22 but patient will be moving to [place] and didn't want to reschedule at this time. vw

## 2022-04-23 ENCOUNTER — APPOINTMENT (OUTPATIENT)
Dept: RADIOLOGY | Facility: MEDICAL CENTER | Age: 42
End: 2022-04-23
Attending: EMERGENCY MEDICINE
Payer: MEDICAID

## 2022-04-23 ENCOUNTER — HOSPITAL ENCOUNTER (EMERGENCY)
Facility: MEDICAL CENTER | Age: 42
End: 2022-04-23
Attending: EMERGENCY MEDICINE
Payer: MEDICAID

## 2022-04-23 VITALS
BODY MASS INDEX: 30.16 KG/M2 | WEIGHT: 235 LBS | DIASTOLIC BLOOD PRESSURE: 69 MMHG | RESPIRATION RATE: 18 BRPM | OXYGEN SATURATION: 93 % | HEART RATE: 90 BPM | SYSTOLIC BLOOD PRESSURE: 127 MMHG | TEMPERATURE: 98.5 F | HEIGHT: 74 IN

## 2022-04-23 DIAGNOSIS — R10.11 RIGHT UPPER QUADRANT ABDOMINAL PAIN: ICD-10-CM

## 2022-04-23 DIAGNOSIS — E86.0 DEHYDRATION: ICD-10-CM

## 2022-04-23 DIAGNOSIS — F10.920 ALCOHOLIC INTOXICATION WITHOUT COMPLICATION (HCC): ICD-10-CM

## 2022-04-23 LAB
ALBUMIN SERPL BCP-MCNC: 3.1 G/DL (ref 3.2–4.9)
ALBUMIN/GLOB SERPL: 1 G/DL
ALP SERPL-CCNC: 149 U/L (ref 30–99)
ALT SERPL-CCNC: 26 U/L (ref 2–50)
ANION GAP SERPL CALC-SCNC: 10 MMOL/L (ref 7–16)
APPEARANCE UR: ABNORMAL
APTT PPP: 30.9 SEC (ref 24.7–36)
AST SERPL-CCNC: 57 U/L (ref 12–45)
BACTERIA #/AREA URNS HPF: NEGATIVE /HPF
BASOPHILS # BLD AUTO: 0.4 % (ref 0–1.8)
BASOPHILS # BLD: 0.03 K/UL (ref 0–0.12)
BILIRUB SERPL-MCNC: 1.5 MG/DL (ref 0.1–1.5)
BILIRUB UR QL STRIP.AUTO: ABNORMAL
BUN SERPL-MCNC: 10 MG/DL (ref 8–22)
CALCIUM SERPL-MCNC: 8.2 MG/DL (ref 8.5–10.5)
CAOX CRY #/AREA URNS HPF: ABNORMAL /HPF
CHLORIDE SERPL-SCNC: 114 MMOL/L (ref 96–112)
CO2 SERPL-SCNC: 23 MMOL/L (ref 20–33)
COLOR UR: ABNORMAL
CREAT SERPL-MCNC: 0.53 MG/DL (ref 0.5–1.4)
EOSINOPHIL # BLD AUTO: 0.08 K/UL (ref 0–0.51)
EOSINOPHIL NFR BLD: 1 % (ref 0–6.9)
EPI CELLS #/AREA URNS HPF: NEGATIVE /HPF
ERYTHROCYTE [DISTWIDTH] IN BLOOD BY AUTOMATED COUNT: 56.4 FL (ref 35.9–50)
ETHANOL BLD-MCNC: 340.8 MG/DL (ref 0–10)
GFR SERPLBLD CREATININE-BSD FMLA CKD-EPI: 130 ML/MIN/1.73 M 2
GLOBULIN SER CALC-MCNC: 3.2 G/DL (ref 1.9–3.5)
GLUCOSE SERPL-MCNC: 129 MG/DL (ref 65–99)
GLUCOSE UR STRIP.AUTO-MCNC: NEGATIVE MG/DL
HCT VFR BLD AUTO: 40 % (ref 42–52)
HGB BLD-MCNC: 13.7 G/DL (ref 14–18)
HYALINE CASTS #/AREA URNS LPF: ABNORMAL /LPF
IMM GRANULOCYTES # BLD AUTO: 0.01 K/UL (ref 0–0.11)
IMM GRANULOCYTES NFR BLD AUTO: 0.1 % (ref 0–0.9)
INR PPP: 1.27 (ref 0.87–1.13)
KETONES UR STRIP.AUTO-MCNC: 15 MG/DL
LEUKOCYTE ESTERASE UR QL STRIP.AUTO: ABNORMAL
LIPASE SERPL-CCNC: 73 U/L (ref 11–82)
LYMPHOCYTES # BLD AUTO: 3.33 K/UL (ref 1–4.8)
LYMPHOCYTES NFR BLD: 43.6 % (ref 22–41)
MAGNESIUM SERPL-MCNC: 1.8 MG/DL (ref 1.5–2.5)
MCH RBC QN AUTO: 34.5 PG (ref 27–33)
MCHC RBC AUTO-ENTMCNC: 34.3 G/DL (ref 33.7–35.3)
MCV RBC AUTO: 100.8 FL (ref 81.4–97.8)
MICRO URNS: ABNORMAL
MONOCYTES # BLD AUTO: 0.9 K/UL (ref 0–0.85)
MONOCYTES NFR BLD AUTO: 11.8 % (ref 0–13.4)
MUCOUS THREADS #/AREA URNS HPF: ABNORMAL /HPF
NEUTROPHILS # BLD AUTO: 3.28 K/UL (ref 1.82–7.42)
NEUTROPHILS NFR BLD: 43.1 % (ref 44–72)
NITRITE UR QL STRIP.AUTO: NEGATIVE
NRBC # BLD AUTO: 0 K/UL
NRBC BLD-RTO: 0 /100 WBC
PH UR STRIP.AUTO: 6 [PH] (ref 5–8)
PLATELET # BLD AUTO: 72 K/UL (ref 164–446)
PMV BLD AUTO: 11 FL (ref 9–12.9)
POTASSIUM SERPL-SCNC: 4 MMOL/L (ref 3.6–5.5)
PROT SERPL-MCNC: 6.3 G/DL (ref 6–8.2)
PROT UR QL STRIP: 30 MG/DL
PROTHROMBIN TIME: 15.5 SEC (ref 12–14.6)
RBC # BLD AUTO: 3.97 M/UL (ref 4.7–6.1)
RBC # URNS HPF: ABNORMAL /HPF
RBC UR QL AUTO: ABNORMAL
SODIUM SERPL-SCNC: 147 MMOL/L (ref 135–145)
SP GR UR STRIP.AUTO: 1.04
UROBILINOGEN UR STRIP.AUTO-MCNC: 1 MG/DL
WBC # BLD AUTO: 7.6 K/UL (ref 4.8–10.8)
WBC #/AREA URNS HPF: ABNORMAL /HPF

## 2022-04-23 PROCEDURE — 80053 COMPREHEN METABOLIC PANEL: CPT

## 2022-04-23 PROCEDURE — 96365 THER/PROPH/DIAG IV INF INIT: CPT | Mod: XU

## 2022-04-23 PROCEDURE — 36415 COLL VENOUS BLD VENIPUNCTURE: CPT

## 2022-04-23 PROCEDURE — 83690 ASSAY OF LIPASE: CPT

## 2022-04-23 PROCEDURE — 87086 URINE CULTURE/COLONY COUNT: CPT

## 2022-04-23 PROCEDURE — 83735 ASSAY OF MAGNESIUM: CPT

## 2022-04-23 PROCEDURE — 700117 HCHG RX CONTRAST REV CODE 255: Performed by: EMERGENCY MEDICINE

## 2022-04-23 PROCEDURE — 81001 URINALYSIS AUTO W/SCOPE: CPT

## 2022-04-23 PROCEDURE — 99285 EMERGENCY DEPT VISIT HI MDM: CPT

## 2022-04-23 PROCEDURE — 82077 ASSAY SPEC XCP UR&BREATH IA: CPT

## 2022-04-23 PROCEDURE — 85730 THROMBOPLASTIN TIME PARTIAL: CPT

## 2022-04-23 PROCEDURE — 74177 CT ABD & PELVIS W/CONTRAST: CPT

## 2022-04-23 PROCEDURE — 85025 COMPLETE CBC W/AUTO DIFF WBC: CPT

## 2022-04-23 PROCEDURE — 700101 HCHG RX REV CODE 250: Performed by: EMERGENCY MEDICINE

## 2022-04-23 PROCEDURE — 96366 THER/PROPH/DIAG IV INF ADDON: CPT

## 2022-04-23 PROCEDURE — 85610 PROTHROMBIN TIME: CPT

## 2022-04-23 RX ORDER — FAMOTIDINE 20 MG/1
20 TABLET, FILM COATED ORAL ONCE
Status: DISCONTINUED | OUTPATIENT
Start: 2022-04-23 | End: 2022-04-23 | Stop reason: HOSPADM

## 2022-04-23 RX ADMIN — IOHEXOL 90 ML: 350 INJECTION, SOLUTION INTRAVENOUS at 16:00

## 2022-04-23 RX ADMIN — THIAMINE HYDROCHLORIDE: 100 INJECTION, SOLUTION INTRAMUSCULAR; INTRAVENOUS at 14:30

## 2022-04-23 ASSESSMENT — LIFESTYLE VARIABLES
AVERAGE NUMBER OF DAYS PER WEEK YOU HAVE A DRINK CONTAINING ALCOHOL: 7
EVER FELT BAD OR GUILTY ABOUT YOUR DRINKING: YES
EVER HAD A DRINK FIRST THING IN THE MORNING TO STEADY YOUR NERVES TO GET RID OF A HANGOVER: YES
TOTAL SCORE: 4
CONSUMPTION TOTAL: POSITIVE
HOW MANY TIMES IN THE PAST YEAR HAVE YOU HAD 5 OR MORE DRINKS IN A DAY: 365
DO YOU DRINK ALCOHOL: YES
TOTAL SCORE: 4
TOTAL SCORE: 4
HAVE PEOPLE ANNOYED YOU BY CRITICIZING YOUR DRINKING: YES
HAVE YOU EVER FELT YOU SHOULD CUT DOWN ON YOUR DRINKING: YES
DOES PATIENT WANT TO STOP DRINKING: NO
ON A TYPICAL DAY WHEN YOU DRINK ALCOHOL HOW MANY DRINKS DO YOU HAVE: 10

## 2022-04-23 ASSESSMENT — FIBROSIS 4 INDEX: FIB4 SCORE: 53.1

## 2022-04-23 NOTE — ED NOTES
Pt rounded on, provided extra blankets per request. Pt expressing desire to elope again. PT educated that eloping from ER while intoxicated is a safety risk. Pt convinced to stay longer.

## 2022-04-23 NOTE — ED NOTES
PT requesting to leave AMA. Pt is A&O x4, GCS 15, with a strong and steady gait. ERP to re-assess.

## 2022-04-23 NOTE — ED PROVIDER NOTES
"ED Provider Note    Scribed for Annabelle Lindo M.D. by Josue Cortez-Reyes. 4/23/2022  2:05 PM    Primary care provider: Mann Johnson M.D.  Means of arrival: EMS  History obtained from: Patient.  History limited by: None  CHIEF COMPLAINT  Chief Complaint   Patient presents with   • Alcohol Intoxication     Pt endorses history of extensive heavy ETOH use. Currently intoxicated.   • Abdominal Pain     PT c/o diffuse abdominal pain for 3 days that radiates around to his back. Pt states, \"I drank to numb the pain.\" Given 150 ml of PIV fluids PTA by [facility]. Pt states history of \"CHF or kidney issues, I don't know which one.\" Pt feels like his extremities are swollen at this time.       HPI  Jalen Vaughn is a 42 y.o. male who presents to the ED complaining of abdominal pain onset 3 years ago. He reports that his pain increased in severity over the past 3 days prompting him to present to the ED. He notes that the pain radiates to his \"kidneys\". The patient endorses additional swelling in hands, mild burning with urination, and light headedness but denies any nausea, vomiting, diarrhea, or bloody stools. He endorses a history of alcohol abuse and initially declared that he was sober for 6 months up until today when he started drinking again.  However, after discussing that the patient was admitted in February for alcohol withdrawal he states that he consumed alcohol today for the first time in 2 months. He denies history of pancreatitis. Patient states that he is homeless and adds that he has not followed up with GI for his known cirrhosis.  Pain is predominantly right-sided.  No fevers or chills.  No cough or chest pain.  The patient has been evaluated for alcohol intoxication in the past and states that he left because \"all the questions irritate me\".  Patient is a very poor historian.    REVIEW OF SYSTEMS  Pertinent positives include abdominal pain, alcohol intoxication,  swelling in arms, swelling in legs, mild " "burning with urination, and light headedness.   Pertinent negatives include no nausea, vomiting, diarrhea, or bloody stools.   See HPI for further details. All other systems are negative.    PAST MEDICAL HISTORY  Past Medical History:   Diagnosis Date   • Liver disease    • Seizure (HCC)        FAMILY HISTORY  Family History   Problem Relation Age of Onset   • Heart Disease Mother    • Kidney Disease Mother    • Heart Disease Father    • Kidney Disease Father        SOCIAL HISTORY  Social History     Tobacco Use   • Smoking status: Never Smoker   • Smokeless tobacco: Current User     Types: Chew   Vaping Use   • Vaping Use: Never used   Substance Use Topics   • Alcohol use: Yes     Comment: daily 9 12 oz beers daily   • Drug use: Not Currently      Social History     Substance and Sexual Activity   Drug Use Not Currently       SURGICAL HISTORY  Past Surgical History:   Procedure Laterality Date   • APPENDECTOMY     • CHOLECYSTECTOMY         CURRENT MEDICATIONS  Home Medications     Reviewed by Tatiana Nayak R.N. (Registered Nurse) on 04/23/22 at 1358  Med List Status: <None>   Medication Last Dose Status   lactulose 10 GM/15ML Solution  Active   omeprazole (PRILOSEC) 20 MG delayed-release capsule  Active   traZODone (DESYREL) 150 MG Tab  Active                ALLERGIES  No Known Allergies    PHYSICAL EXAM  VITAL SIGNS: /104   Pulse 96   Temp 36.9 °C (98.5 °F) (Temporal)   Resp 18   Ht 1.88 m (6' 2\")   Wt 107 kg (235 lb)   SpO2 94%   BMI 30.17 kg/m²      Constitutional: Well developed, well nourished; No acute distress; Disheveled and unkempt, Smells of alcoholic ketones  HENT: Normocephalic, atraumatic; Bilateral external ears normal; Oropharyngeal exam deferred to COVID-19 outbreak and lack of oropharyngeal complaints.  Eyes: PERRL, EOMI, Conjunctiva normal. No discharge.   Neck:  Supple, nontender midline; No stridor; No nuchal rigidity.   Lymphatic: No cervical lymphadenopathy noted. "   Cardiovascular: Regular rate and rhythm without murmurs, rubs, or gallop.   Thorax & Lungs: No respiratory distress, Occasional course breath sound which cleared with deep breathing, without wheezing, rales or rhonchi. Nontender chest wall. No crepitus or subcutaneous air  Abdomen: Soft, Mild inconsistent tenderness in mid epigastrium and right mid abdomen, bowel sounds normal. No obvious masses; No pulsatile masses; no rebound, guarding, or peritoneal signs.   Skin: Good color; warm and dry without rash or petechia.  Back: Nontender, No CVA tenderness.   Extremities: Distal radial, dorsalis pedis, posterior tibial pulses are equal bilaterally; No edema; Nontender calves or saphenous, No cyanosis, No clubbing.   Musculoskeletal: Good range of motion in all major joints. Trace pretibial edema, No tenderness to palpation or major deformities noted.   Neurologic: Alert & oriented x 4, clear speech.     LABS/RADIOLOGY/PROCEDURES  Results for orders placed or performed during the hospital encounter of 04/23/22   CBC WITH DIFFERENTIAL   Result Value Ref Range    WBC 7.6 4.8 - 10.8 K/uL    RBC 3.97 (L) 4.70 - 6.10 M/uL    Hemoglobin 13.7 (L) 14.0 - 18.0 g/dL    Hematocrit 40.0 (L) 42.0 - 52.0 %    .8 (H) 81.4 - 97.8 fL    MCH 34.5 (H) 27.0 - 33.0 pg    MCHC 34.3 33.7 - 35.3 g/dL    RDW 56.4 (H) 35.9 - 50.0 fL    Platelet Count 72 (L) 164 - 446 K/uL    MPV 11.0 9.0 - 12.9 fL    Neutrophils-Polys 43.10 (L) 44.00 - 72.00 %    Lymphocytes 43.60 (H) 22.00 - 41.00 %    Monocytes 11.80 0.00 - 13.40 %    Eosinophils 1.00 0.00 - 6.90 %    Basophils 0.40 0.00 - 1.80 %    Immature Granulocytes 0.10 0.00 - 0.90 %    Nucleated RBC 0.00 /100 WBC    Neutrophils (Absolute) 3.28 1.82 - 7.42 K/uL    Lymphs (Absolute) 3.33 1.00 - 4.80 K/uL    Monos (Absolute) 0.90 (H) 0.00 - 0.85 K/uL    Eos (Absolute) 0.08 0.00 - 0.51 K/uL    Baso (Absolute) 0.03 0.00 - 0.12 K/uL    Immature Granulocytes (abs) 0.01 0.00 - 0.11 K/uL    NRBC  (Absolute) 0.00 K/uL   COMP METABOLIC PANEL   Result Value Ref Range    Sodium 147 (H) 135 - 145 mmol/L    Potassium 4.0 3.6 - 5.5 mmol/L    Chloride 114 (H) 96 - 112 mmol/L    Co2 23 20 - 33 mmol/L    Anion Gap 10.0 7.0 - 16.0    Glucose 129 (H) 65 - 99 mg/dL    Bun 10 8 - 22 mg/dL    Creatinine 0.53 0.50 - 1.40 mg/dL    Calcium 8.2 (L) 8.5 - 10.5 mg/dL    AST(SGOT) 57 (H) 12 - 45 U/L    ALT(SGPT) 26 2 - 50 U/L    Alkaline Phosphatase 149 (H) 30 - 99 U/L    Total Bilirubin 1.5 0.1 - 1.5 mg/dL    Albumin 3.1 (L) 3.2 - 4.9 g/dL    Total Protein 6.3 6.0 - 8.2 g/dL    Globulin 3.2 1.9 - 3.5 g/dL    A-G Ratio 1.0 g/dL   LIPASE   Result Value Ref Range    Lipase 73 11 - 82 U/L   URINALYSIS    Specimen: Urine   Result Value Ref Range    Color DK Yellow     Character Cloudy (A)     Specific Gravity 1.036 <1.035    Ph 6.0 5.0 - 8.0    Glucose Negative Negative mg/dL    Ketones 15 (A) Negative mg/dL    Protein 30 (A) Negative mg/dL    Bilirubin Moderate (A) Negative    Urobilinogen, Urine 1.0 Negative    Nitrite Negative Negative    Leukocyte Esterase Trace (A) Negative    Occult Blood Small (A) Negative    Micro Urine Req Microscopic    PROTHROMBIN TIME (INR)   Result Value Ref Range    PT 15.5 (H) 12.0 - 14.6 sec    INR 1.27 (H) 0.87 - 1.13   APTT   Result Value Ref Range    APTT 30.9 24.7 - 36.0 sec   DIAGNOSTIC ALCOHOL   Result Value Ref Range    Diagnostic Alcohol 340.8 (H) 0.0 - 10.0 mg/dL   MAGNESIUM   Result Value Ref Range    Magnesium 1.8 1.5 - 2.5 mg/dL   URINE MICROSCOPIC (W/UA)   Result Value Ref Range    WBC 2-5 (A) /hpf    RBC 2-5 (A) /hpf    Bacteria Negative None /hpf    Epithelial Cells Negative /hpf    Mucous Threads Moderate /hpf    Ca Oxalate Crystal Many /hpf    Hyaline Cast 3-5 (A) /lpf   ESTIMATED GFR   Result Value Ref Range    GFR (CKD-EPI) 130 >60 mL/min/1.73 m 2           CT-ABDOMEN-PELVIS WITH   Final Result      1.  Findings consistent with hepatic cirrhosis and portal hypertension.   2.   "Minimal fluid and adjacent stranding in the RIGHT paracolic gutter.   3.  No bowel obstruction or perforation.   4.  No focal mesenteric inflammatory process.   5.  Multiple subacute RIGHT lower fractures.          COURSE & MEDICAL DECISION MAKING  Pertinent Labs & Imaging studies reviewed. (See chart for details)    Reviewed patient's old medical records which showed that the patient was last admitted in February for alcohol withdrawal and alcoholic cirrhosis, the patient left AMA at that time. He was admitted in December of 2021 for the same and also left AMA at that time.    2:05 PM - Patient seen and examined at bedside. Discussed plan of care, including plan to obtain the labs noted above. Patient agrees to the plan of care. The patient will be treated with detox IV. Ordered for labs as shown above to evaluate his symptoms.     2:45 PM - Patient expressing frustration with care and at risk for elopement. Patient provided extensive education about elopement and unsafe discharge while intoxicated. Pt accepting of IV fluids. IV fluids started.     4:45 PM - I reevaluated the patient at bedside. I had a very long talk regarding his results as outlined above. He was very anxious to leave stating that he wanted to go back to his tent at [facility]. He insists that he is sober stating \"I know your numbers don't say I'm sober but I know I'm sober\". After a very long discussion the patient agreed to stay for a few more hours and sober up. He was agreeable to take a breathalizer at that time to ensure that he was safe for discharge. I called Janet () at this time who agreed to have peer recovery consult the patient. I offered the patient detox several times but he reports that he does not wish to stop drinking, adding that if he wishes to do so he could detox on his own.     4:55 PM - Once we had an understanding that the patient would stay, within a couple minutes nursing staff reported that " "patient was not in his room. He eloped from the ER and was seen walking out of the ER by registration who notes that he was accompanied by a women. It is unsure who the women was as the patient stated that he had no friends. Janet our  was going to give the patient a bus pass and the patient had the ability to call a cab if he so wished.  He ate a sandwich without any difficulty.      Patient presents to the ER complaining of abdominal pain for the last 3 years, worse over the last 3 days. He is an alcoholic.  Initially said he been sober for 6 months until today when he started drinking again.  However, review of the records reveal that he was here in February with alcohol intoxication.  The patient had some mild inconsistent tenderness in the midepigastrium and right mid abdomen.  He is a poor historian.  He tells me he \"gets irritated when people ask him questions.\"  Tried to explain the patient that in order to help him I need to ask him questions.  After obtaining history and physical, I ordered blood work and a CT scan of the abdomen pelvis.  Patient was threatening to leave the ER on multiple occasions.  Nursing staff tried very hard to encourage the patient to stay every time he threatened to leave/walk out.  Ultimately he ended up getting his CT scan.  CT scan is remarkable for cirrhosis and portal hypertension.  There was some mild amount of fluid in the paracolic gutter on the right.  Patient has known cirrhosis.  He says he has not followed up with GI despite his [facility] family medicine doctors suggesting he do so.  I have also suggested the patient follow-up with GI.  Labs are fairly unremarkable.  His sodium and chloride are up a little bit.  This could possibly be due to dehydration.  Patient was given a rally bag here in the ER.  When I went back into speak with the patient about the results of his CAT scan, he was already dressed.  He was sitting in the chair.  Eating a turkey sandwich.  He " said he wanted to leave.  I told him that he was intoxicated and  he either needed to call a sober ride/friend to come get him, or he could stay and hang out with us for a few more hours and then go when he was more sober.  The patient insisted he was sober.  However, after quite a lot of time trying to encourage the patient to stay in trying to explain why we were asking him to stay (for his own safety), he agreed to stay here in the ER and watch some TV for a few hours.  As soon as he agreed to stay for a few hours, I left the room to take care of other patients.  Within a few minutes the patient had eloped from the emergency department.  Nursing staff ran out to the front of the department and to the triage and registration area, but the patient was nowhere to be found.      DISPOSITION:  Patient eloped from the ED.    FINAL IMPRESSION  1. Alcoholic intoxication without complication (HCC) Acute   2. Right upper quadrant abdominal pain Acute   3. Dehydration Acute         I, Josue Cortez-Reyes (Scribe), am scribing for, and in the presence of, Annabelle Lindo M.D..    Electronically signed by: Josue Cortez-Reyes (Scribe), 4/23/2022    IAnnabelle M.D. personally performed the services described in this documentation, as scribed by Josue Cortez-Reyes in my presence, and it is both accurate and complete.    This dictation has been created using voice recognition software. The accuracy of the dictation is limited by the abilities of the software. I expect there may be some errors of grammar and possibly content. I made every attempt to manually correct the errors within my dictation. However, errors related to voice recognition software may still exist and should be interpreted within the appropriate context.    The note accurately reflects work and decisions made by me.  Annabelle Lindo M.D.  4/23/2022  8:26 PM

## 2022-04-23 NOTE — ED TRIAGE NOTES
"Chief Complaint   Patient presents with   • Alcohol Intoxication     Pt endorses history of extensive heavy ETOH use. Currently intoxicated.   • Abdominal Pain     PT c/o diffuse abdominal pain for 3 days that radiates around to his back. Pt states, \"I drank to numb the pain.\" Given 150 ml of PIV fluids PTA by [facility]. Pt states history of \"CHF or kidney issues, I don't know which one.\" Pt feels like his extremities are swollen at this time.       /104   Pulse 96   Temp 36.9 °C (98.5 °F) (Temporal)   Resp 18   Ht 1.88 m (6' 2\")   Wt 107 kg (235 lb)   SpO2 94%   BMI 30.17 kg/m²     "

## 2022-04-23 NOTE — ED NOTES
PT expressing frustration with care and at risk for elopement. Pt provided extensive education about elopement and unsafe discharge while intoxicated. Pt accepting of IV fluids. IV fluids started. ERP updated on pt risk for elopement.

## 2022-04-23 NOTE — ED NOTES
Per ERP pt noted to be agreeable to resources and staying to sober up. Verbal order received for repeat breathalyzer from ERP. Pt rounded on for repeat breathalyzer and pt noted not to be in room. Pt checked for in parking lot, back hallways, triage, and rest of ER. Per ER tech, pt noted at 1655 to be walking out of ER with a woman and demonstrating a strong and steady gait. ERP updated on pt elopement from ER. ED charge notified of pt elopement. No action required at this time due to previous documentation of clinical sobriety (neuro status as A&Ox4, GCS 15, and numerous demonstrations of strong and steady gait at 1527).

## 2022-04-26 ENCOUNTER — TELEPHONE (OUTPATIENT)
Dept: MEDICAL GROUP | Facility: CLINIC | Age: 42
End: 2022-04-26
Payer: MEDICAID

## 2022-04-26 LAB
BACTERIA UR CULT: NORMAL
SIGNIFICANT IND 70042: NORMAL
SITE SITE: NORMAL
SOURCE SOURCE: NORMAL

## 2022-04-26 NOTE — TELEPHONE ENCOUNTER
----- Message from Mann Johnson M.D. sent at 4/26/2022  8:04 AM PDT -----  Can we see if Brian would like to schedule an appointment? I saw his recent ED chart and would like to discuss some of his results.

## 2022-04-26 NOTE — TELEPHONE ENCOUNTER
Caller Name: Jaeln Vaughn    Call Back Number: [phone] (home)     How would the patient prefer to be contacted with a response: Phone call do NOT leave a detailed message    Tried to call patient twice to schedule an appointment but the line was busy both times and I was unable to leave a V/M.

## 2022-04-30 ENCOUNTER — HOSPITAL ENCOUNTER (INPATIENT)
Facility: MEDICAL CENTER | Age: 42
LOS: 1 days | DRG: 894 | End: 2022-05-01
Attending: EMERGENCY MEDICINE | Admitting: FAMILY MEDICINE
Payer: MEDICAID

## 2022-04-30 ENCOUNTER — APPOINTMENT (OUTPATIENT)
Dept: RADIOLOGY | Facility: MEDICAL CENTER | Age: 42
DRG: 894 | End: 2022-04-30
Attending: EMERGENCY MEDICINE
Payer: MEDICAID

## 2022-04-30 DIAGNOSIS — R07.9 EXERTIONAL CHEST PAIN: ICD-10-CM

## 2022-04-30 DIAGNOSIS — R06.09 EXERTIONAL DYSPNEA: ICD-10-CM

## 2022-04-30 DIAGNOSIS — K76.9 HEPATIC DISEASE: ICD-10-CM

## 2022-04-30 PROBLEM — F10.10 ALCOHOL ABUSE: Status: ACTIVE | Noted: 2022-04-30

## 2022-04-30 PROBLEM — S22.39XA RIB FRACTURE: Status: ACTIVE | Noted: 2022-04-30

## 2022-04-30 PROBLEM — R10.30 LOWER ABDOMINAL PAIN: Status: ACTIVE | Noted: 2022-04-30

## 2022-04-30 LAB
ALBUMIN SERPL BCP-MCNC: 3.5 G/DL (ref 3.2–4.9)
ALBUMIN/GLOB SERPL: 1.1 G/DL
ALP SERPL-CCNC: 168 U/L (ref 30–99)
ALT SERPL-CCNC: 47 U/L (ref 2–50)
AMMONIA PLAS-SCNC: 124 UMOL/L (ref 11–45)
ANION GAP SERPL CALC-SCNC: 11 MMOL/L (ref 7–16)
AST SERPL-CCNC: 130 U/L (ref 12–45)
BASOPHILS # BLD AUTO: 0.6 % (ref 0–1.8)
BASOPHILS # BLD: 0.04 K/UL (ref 0–0.12)
BILIRUB SERPL-MCNC: 2.6 MG/DL (ref 0.1–1.5)
BUN SERPL-MCNC: 5 MG/DL (ref 8–22)
CALCIUM SERPL-MCNC: 7.9 MG/DL (ref 8.5–10.5)
CHLORIDE SERPL-SCNC: 105 MMOL/L (ref 96–112)
CO2 SERPL-SCNC: 29 MMOL/L (ref 20–33)
CREAT SERPL-MCNC: 0.51 MG/DL (ref 0.5–1.4)
EKG IMPRESSION: NORMAL
EOSINOPHIL # BLD AUTO: 0.04 K/UL (ref 0–0.51)
EOSINOPHIL NFR BLD: 0.6 % (ref 0–6.9)
ERYTHROCYTE [DISTWIDTH] IN BLOOD BY AUTOMATED COUNT: 53.2 FL (ref 35.9–50)
GFR SERPLBLD CREATININE-BSD FMLA CKD-EPI: 131 ML/MIN/1.73 M 2
GLOBULIN SER CALC-MCNC: 3.2 G/DL (ref 1.9–3.5)
GLUCOSE SERPL-MCNC: 116 MG/DL (ref 65–99)
HCT VFR BLD AUTO: 41.9 % (ref 42–52)
HGB BLD-MCNC: 14.1 G/DL (ref 14–18)
IMM GRANULOCYTES # BLD AUTO: 0.01 K/UL (ref 0–0.11)
IMM GRANULOCYTES NFR BLD AUTO: 0.2 % (ref 0–0.9)
LIPASE SERPL-CCNC: 39 U/L (ref 11–82)
LIPASE SERPL-CCNC: 40 U/L (ref 11–82)
LYMPHOCYTES # BLD AUTO: 2.71 K/UL (ref 1–4.8)
LYMPHOCYTES NFR BLD: 43.4 % (ref 22–41)
MAGNESIUM SERPL-MCNC: 1.8 MG/DL (ref 1.5–2.5)
MCH RBC QN AUTO: 33.7 PG (ref 27–33)
MCHC RBC AUTO-ENTMCNC: 33.7 G/DL (ref 33.7–35.3)
MCV RBC AUTO: 100.2 FL (ref 81.4–97.8)
MONOCYTES # BLD AUTO: 0.59 K/UL (ref 0–0.85)
MONOCYTES NFR BLD AUTO: 9.5 % (ref 0–13.4)
NEUTROPHILS # BLD AUTO: 2.85 K/UL (ref 1.82–7.42)
NEUTROPHILS NFR BLD: 45.7 % (ref 44–72)
NRBC # BLD AUTO: 0 K/UL
NRBC BLD-RTO: 0 /100 WBC
NT-PROBNP SERPL IA-MCNC: 18 PG/ML (ref 0–125)
PLATELET # BLD AUTO: 55 K/UL (ref 164–446)
PMV BLD AUTO: 12.2 FL (ref 9–12.9)
POTASSIUM SERPL-SCNC: 3.6 MMOL/L (ref 3.6–5.5)
PROT SERPL-MCNC: 6.7 G/DL (ref 6–8.2)
RBC # BLD AUTO: 4.18 M/UL (ref 4.7–6.1)
SODIUM SERPL-SCNC: 145 MMOL/L (ref 135–145)
TROPONIN T SERPL-MCNC: 16 NG/L (ref 6–19)
WBC # BLD AUTO: 6.2 K/UL (ref 4.8–10.8)

## 2022-04-30 PROCEDURE — 93005 ELECTROCARDIOGRAM TRACING: CPT | Performed by: EMERGENCY MEDICINE

## 2022-04-30 PROCEDURE — A9270 NON-COVERED ITEM OR SERVICE: HCPCS | Performed by: STUDENT IN AN ORGANIZED HEALTH CARE EDUCATION/TRAINING PROGRAM

## 2022-04-30 PROCEDURE — 700102 HCHG RX REV CODE 250 W/ 637 OVERRIDE(OP): Performed by: EMERGENCY MEDICINE

## 2022-04-30 PROCEDURE — 83735 ASSAY OF MAGNESIUM: CPT

## 2022-04-30 PROCEDURE — 700101 HCHG RX REV CODE 250: Performed by: STUDENT IN AN ORGANIZED HEALTH CARE EDUCATION/TRAINING PROGRAM

## 2022-04-30 PROCEDURE — 99222 1ST HOSP IP/OBS MODERATE 55: CPT | Mod: GC | Performed by: FAMILY MEDICINE

## 2022-04-30 PROCEDURE — 700102 HCHG RX REV CODE 250 W/ 637 OVERRIDE(OP): Performed by: STUDENT IN AN ORGANIZED HEALTH CARE EDUCATION/TRAINING PROGRAM

## 2022-04-30 PROCEDURE — 700111 HCHG RX REV CODE 636 W/ 250 OVERRIDE (IP): Performed by: STUDENT IN AN ORGANIZED HEALTH CARE EDUCATION/TRAINING PROGRAM

## 2022-04-30 PROCEDURE — 83690 ASSAY OF LIPASE: CPT

## 2022-04-30 PROCEDURE — 770020 HCHG ROOM/CARE - TELE (206)

## 2022-04-30 PROCEDURE — A9270 NON-COVERED ITEM OR SERVICE: HCPCS

## 2022-04-30 PROCEDURE — 96366 THER/PROPH/DIAG IV INF ADDON: CPT

## 2022-04-30 PROCEDURE — HZ2ZZZZ DETOXIFICATION SERVICES FOR SUBSTANCE ABUSE TREATMENT: ICD-10-PCS | Performed by: STUDENT IN AN ORGANIZED HEALTH CARE EDUCATION/TRAINING PROGRAM

## 2022-04-30 PROCEDURE — 71045 X-RAY EXAM CHEST 1 VIEW: CPT

## 2022-04-30 PROCEDURE — A9270 NON-COVERED ITEM OR SERVICE: HCPCS | Performed by: EMERGENCY MEDICINE

## 2022-04-30 PROCEDURE — 82140 ASSAY OF AMMONIA: CPT

## 2022-04-30 PROCEDURE — 96365 THER/PROPH/DIAG IV INF INIT: CPT

## 2022-04-30 PROCEDURE — 85025 COMPLETE CBC W/AUTO DIFF WBC: CPT

## 2022-04-30 PROCEDURE — 80053 COMPREHEN METABOLIC PANEL: CPT

## 2022-04-30 PROCEDURE — 96375 TX/PRO/DX INJ NEW DRUG ADDON: CPT

## 2022-04-30 PROCEDURE — 36415 COLL VENOUS BLD VENIPUNCTURE: CPT

## 2022-04-30 PROCEDURE — 83880 ASSAY OF NATRIURETIC PEPTIDE: CPT

## 2022-04-30 PROCEDURE — 700102 HCHG RX REV CODE 250 W/ 637 OVERRIDE(OP)

## 2022-04-30 PROCEDURE — 99285 EMERGENCY DEPT VISIT HI MDM: CPT

## 2022-04-30 PROCEDURE — 700102 HCHG RX REV CODE 250 W/ 637 OVERRIDE(OP): Performed by: FAMILY MEDICINE

## 2022-04-30 PROCEDURE — 84484 ASSAY OF TROPONIN QUANT: CPT

## 2022-04-30 PROCEDURE — A9270 NON-COVERED ITEM OR SERVICE: HCPCS | Performed by: FAMILY MEDICINE

## 2022-04-30 PROCEDURE — 94760 N-INVAS EAR/PLS OXIMETRY 1: CPT

## 2022-04-30 RX ORDER — GAUZE BANDAGE 2" X 2"
100 BANDAGE TOPICAL DAILY
Status: DISCONTINUED | OUTPATIENT
Start: 2022-04-30 | End: 2022-05-01 | Stop reason: HOSPADM

## 2022-04-30 RX ORDER — OXYCODONE HYDROCHLORIDE 5 MG/1
5 TABLET ORAL EVERY 4 HOURS PRN
Status: DISCONTINUED | OUTPATIENT
Start: 2022-04-30 | End: 2022-05-01

## 2022-04-30 RX ORDER — LACTULOSE 20 G/30ML
30 SOLUTION ORAL 3 TIMES DAILY
Status: DISCONTINUED | OUTPATIENT
Start: 2022-04-30 | End: 2022-05-01 | Stop reason: HOSPADM

## 2022-04-30 RX ORDER — ASPIRIN 81 MG/1
324 TABLET, CHEWABLE ORAL ONCE
Status: COMPLETED | OUTPATIENT
Start: 2022-04-30 | End: 2022-04-30

## 2022-04-30 RX ORDER — CALCIUM CARBONATE 500 MG/1
500 TABLET, CHEWABLE ORAL
Status: DISCONTINUED | OUTPATIENT
Start: 2022-04-30 | End: 2022-05-01 | Stop reason: HOSPADM

## 2022-04-30 RX ORDER — ENOXAPARIN SODIUM 100 MG/ML
40 INJECTION SUBCUTANEOUS DAILY
Status: DISCONTINUED | OUTPATIENT
Start: 2022-04-30 | End: 2022-04-30

## 2022-04-30 RX ORDER — ASPIRIN 300 MG/1
300 SUPPOSITORY RECTAL ONCE
Status: COMPLETED | OUTPATIENT
Start: 2022-04-30 | End: 2022-04-30

## 2022-04-30 RX ORDER — OXYCODONE HYDROCHLORIDE 5 MG/1
5 TABLET ORAL
Status: DISCONTINUED | OUTPATIENT
Start: 2022-04-30 | End: 2022-04-30

## 2022-04-30 RX ORDER — POLYETHYLENE GLYCOL 3350 17 G/17G
1 POWDER, FOR SOLUTION ORAL
Status: DISCONTINUED | OUTPATIENT
Start: 2022-04-30 | End: 2022-05-01 | Stop reason: HOSPADM

## 2022-04-30 RX ORDER — ACETAMINOPHEN 325 MG/1
650 TABLET ORAL EVERY 6 HOURS PRN
Status: DISCONTINUED | OUTPATIENT
Start: 2022-04-30 | End: 2022-05-01

## 2022-04-30 RX ORDER — HYDROMORPHONE HYDROCHLORIDE 1 MG/ML
0.25 INJECTION, SOLUTION INTRAMUSCULAR; INTRAVENOUS; SUBCUTANEOUS
Status: DISCONTINUED | OUTPATIENT
Start: 2022-04-30 | End: 2022-04-30

## 2022-04-30 RX ORDER — LORAZEPAM 2 MG/ML
1 INJECTION INTRAMUSCULAR
Status: DISCONTINUED | OUTPATIENT
Start: 2022-04-30 | End: 2022-05-01 | Stop reason: HOSPADM

## 2022-04-30 RX ORDER — LORAZEPAM 2 MG/1
2 TABLET ORAL
Status: DISCONTINUED | OUTPATIENT
Start: 2022-04-30 | End: 2022-05-01 | Stop reason: HOSPADM

## 2022-04-30 RX ORDER — LORAZEPAM 2 MG/ML
1.5 INJECTION INTRAMUSCULAR
Status: DISCONTINUED | OUTPATIENT
Start: 2022-04-30 | End: 2022-05-01 | Stop reason: HOSPADM

## 2022-04-30 RX ORDER — LORAZEPAM 2 MG/ML
1 INJECTION INTRAMUSCULAR ONCE
Status: COMPLETED | OUTPATIENT
Start: 2022-04-30 | End: 2022-04-30

## 2022-04-30 RX ORDER — BISACODYL 10 MG
10 SUPPOSITORY, RECTAL RECTAL
Status: DISCONTINUED | OUTPATIENT
Start: 2022-04-30 | End: 2022-05-01 | Stop reason: HOSPADM

## 2022-04-30 RX ORDER — LORAZEPAM 1 MG/1
1 TABLET ORAL EVERY 4 HOURS PRN
Status: DISCONTINUED | OUTPATIENT
Start: 2022-04-30 | End: 2022-05-01 | Stop reason: HOSPADM

## 2022-04-30 RX ORDER — FOLIC ACID 1 MG/1
1 TABLET ORAL DAILY
Status: DISCONTINUED | OUTPATIENT
Start: 2022-04-30 | End: 2022-05-01 | Stop reason: HOSPADM

## 2022-04-30 RX ORDER — AMOXICILLIN 250 MG
2 CAPSULE ORAL 2 TIMES DAILY
Status: DISCONTINUED | OUTPATIENT
Start: 2022-04-30 | End: 2022-05-01 | Stop reason: HOSPADM

## 2022-04-30 RX ORDER — CELECOXIB 200 MG/1
200 CAPSULE ORAL 2 TIMES DAILY PRN
Status: DISCONTINUED | OUTPATIENT
Start: 2022-05-05 | End: 2022-05-01

## 2022-04-30 RX ORDER — LORAZEPAM 2 MG/1
4 TABLET ORAL
Status: DISCONTINUED | OUTPATIENT
Start: 2022-04-30 | End: 2022-05-01 | Stop reason: HOSPADM

## 2022-04-30 RX ORDER — LORAZEPAM 0.5 MG/1
0.5 TABLET ORAL EVERY 4 HOURS PRN
Status: DISCONTINUED | OUTPATIENT
Start: 2022-04-30 | End: 2022-05-01 | Stop reason: HOSPADM

## 2022-04-30 RX ORDER — PERMETHRIN 50 MG/G
CREAM TOPICAL ONCE
Status: COMPLETED | OUTPATIENT
Start: 2022-04-30 | End: 2022-04-30

## 2022-04-30 RX ORDER — LORAZEPAM 2 MG/ML
0.5 INJECTION INTRAMUSCULAR EVERY 4 HOURS PRN
Status: DISCONTINUED | OUTPATIENT
Start: 2022-04-30 | End: 2022-05-01 | Stop reason: HOSPADM

## 2022-04-30 RX ORDER — CELECOXIB 200 MG/1
200 CAPSULE ORAL 2 TIMES DAILY
Status: DISCONTINUED | OUTPATIENT
Start: 2022-04-30 | End: 2022-05-01

## 2022-04-30 RX ORDER — OXYCODONE HYDROCHLORIDE 5 MG/1
2.5 TABLET ORAL
Status: DISCONTINUED | OUTPATIENT
Start: 2022-04-30 | End: 2022-04-30

## 2022-04-30 RX ORDER — LORAZEPAM 2 MG/ML
2 INJECTION INTRAMUSCULAR
Status: DISCONTINUED | OUTPATIENT
Start: 2022-04-30 | End: 2022-05-01 | Stop reason: HOSPADM

## 2022-04-30 RX ADMIN — ACETAMINOPHEN 650 MG: 325 TABLET, FILM COATED ORAL at 19:31

## 2022-04-30 RX ADMIN — PERMETHRIN CREAM 5% W/W: 50 CREAM TOPICAL at 20:56

## 2022-04-30 RX ADMIN — CELECOXIB 200 MG: 200 CAPSULE ORAL at 17:47

## 2022-04-30 RX ADMIN — LORAZEPAM 2 MG: 2 TABLET ORAL at 22:21

## 2022-04-30 RX ADMIN — LORAZEPAM 1 MG: 2 INJECTION INTRAMUSCULAR; INTRAVENOUS at 12:03

## 2022-04-30 RX ADMIN — CALCIUM CARBONATE 500 MG: 500 TABLET, CHEWABLE ORAL at 21:07

## 2022-04-30 RX ADMIN — LIDOCAINE HYDROCHLORIDE 15 ML: 20 SOLUTION OROPHARYNGEAL at 15:05

## 2022-04-30 RX ADMIN — ASPIRIN 81 MG CHEWABLE TABLET 324 MG: 81 TABLET CHEWABLE at 08:45

## 2022-04-30 RX ADMIN — OXYCODONE 5 MG: 5 TABLET ORAL at 20:51

## 2022-04-30 RX ADMIN — LORAZEPAM 3 MG: 1 TABLET ORAL at 19:31

## 2022-04-30 RX ADMIN — LACTULOSE 30 ML: 20 SOLUTION ORAL at 18:16

## 2022-04-30 RX ADMIN — THIAMINE HYDROCHLORIDE: 100 INJECTION, SOLUTION INTRAMUSCULAR; INTRAVENOUS at 12:04

## 2022-04-30 RX ADMIN — LORAZEPAM 0.5 MG: 0.5 TABLET ORAL at 15:05

## 2022-04-30 RX ADMIN — LORAZEPAM 3 MG: 1 TABLET ORAL at 20:53

## 2022-04-30 RX ADMIN — Medication 100 MG: at 12:06

## 2022-04-30 RX ADMIN — FOLIC ACID 1 MG: 1 TABLET ORAL at 12:15

## 2022-04-30 ASSESSMENT — COGNITIVE AND FUNCTIONAL STATUS - GENERAL
SUGGESTED CMS G CODE MODIFIER MOBILITY: CH
SUGGESTED CMS G CODE MODIFIER DAILY ACTIVITY: CH
MOBILITY SCORE: 24
DAILY ACTIVITIY SCORE: 24

## 2022-04-30 ASSESSMENT — LIFESTYLE VARIABLES
HEADACHE, FULLNESS IN HEAD: MODERATE
TOTAL SCORE: MILD ITCHING, PINS AND NEEDLES SENSATION, BURNING OR NUMBNESS
PAROXYSMAL SWEATS: NO SWEAT VISIBLE
TREMOR: *
TOTAL SCORE: 4
AGITATION: NORMAL ACTIVITY
ALCOHOL_USE: YES
ORIENTATION AND CLOUDING OF SENSORIUM: ORIENTED AND CAN DO SERIAL ADDITIONS
AUDITORY DISTURBANCES: MODERATE HARSHNESS OR ABILITY TO FRIGHTEN
AVERAGE NUMBER OF DAYS PER WEEK YOU HAVE A DRINK CONTAINING ALCOHOL: 7
AGITATION: NORMAL ACTIVITY
TREMOR: *
ORIENTATION AND CLOUDING OF SENSORIUM: ORIENTED AND CAN DO SERIAL ADDITIONS
PAROXYSMAL SWEATS: NO SWEAT VISIBLE
TREMOR: *
EVER HAD A DRINK FIRST THING IN THE MORNING TO STEADY YOUR NERVES TO GET RID OF A HANGOVER: YES
HEADACHE, FULLNESS IN HEAD: MODERATELY SEVERE
EVER FELT BAD OR GUILTY ABOUT YOUR DRINKING: YES
TREMOR: *
TOTAL SCORE: 4
ON A TYPICAL DAY WHEN YOU DRINK ALCOHOL HOW MANY DRINKS DO YOU HAVE: 2
TOTAL SCORE: 15
TOTAL SCORE: MILD ITCHING, PINS AND NEEDLES SENSATION, BURNING OR NUMBNESS
ORIENTATION AND CLOUDING OF SENSORIUM: ORIENTED AND CAN DO SERIAL ADDITIONS
DOES PATIENT WANT TO TALK TO SOMEONE ABOUT QUITTING: YES
PAROXYSMAL SWEATS: NO SWEAT VISIBLE
ORIENTATION AND CLOUDING OF SENSORIUM: ORIENTED AND CAN DO SERIAL ADDITIONS
ANXIETY: *
TOTAL SCORE: MILD ITCHING, PINS AND NEEDLES SENSATION, BURNING OR NUMBNESS
ANXIETY: *
TOTAL SCORE: 15
HAVE PEOPLE ANNOYED YOU BY CRITICIZING YOUR DRINKING: YES
VISUAL DISTURBANCES: NOT PRESENT
AUDITORY DISTURBANCES: NOT PRESENT
DOES PATIENT WANT TO STOP DRINKING: YES
TOTAL SCORE: 4
PAROXYSMAL SWEATS: *
AUDITORY DISTURBANCES: VERY MILD HARSHNESS OR ABILITY TO FRIGHTEN
TOTAL SCORE: 7
NAUSEA AND VOMITING: *
NAUSEA AND VOMITING: NO NAUSEA AND NO VOMITING
VISUAL DISTURBANCES: NOT PRESENT
AGITATION: NORMAL ACTIVITY
CONSUMPTION TOTAL: POSITIVE
ANXIETY: *
TOTAL SCORE: 13
AUDITORY DISTURBANCES: MODERATE HARSHNESS OR ABILITY TO FRIGHTEN
AGITATION: NORMAL ACTIVITY
NAUSEA AND VOMITING: *
NAUSEA AND VOMITING: *
HEADACHE, FULLNESS IN HEAD: MODERATELY SEVERE
ANXIETY: *
VISUAL DISTURBANCES: NOT PRESENT
VISUAL DISTURBANCES: NOT PRESENT
HOW MANY TIMES IN THE PAST YEAR HAVE YOU HAD 5 OR MORE DRINKS IN A DAY: 365
HEADACHE, FULLNESS IN HEAD: MILD
HAVE YOU EVER FELT YOU SHOULD CUT DOWN ON YOUR DRINKING: YES

## 2022-04-30 ASSESSMENT — PATIENT HEALTH QUESTIONNAIRE - PHQ9
9. THOUGHTS THAT YOU WOULD BE BETTER OFF DEAD, OR OF HURTING YOURSELF: NEARLY EVERY DAY
SUM OF ALL RESPONSES TO PHQ9 QUESTIONS 1 AND 2: 6
6. FEELING BAD ABOUT YOURSELF - OR THAT YOU ARE A FAILURE OR HAVE LET YOURSELF OR YOUR FAMILY DOWN: NEARLY EVERY DAY
4. FEELING TIRED OR HAVING LITTLE ENERGY: NEARLY EVERY DAY
2. FEELING DOWN, DEPRESSED, IRRITABLE, OR HOPELESS: NEARLY EVERY DAY
SUM OF ALL RESPONSES TO PHQ QUESTIONS 1-9: 26
7. TROUBLE CONCENTRATING ON THINGS, SUCH AS READING THE NEWSPAPER OR WATCHING TELEVISION: NEARLY EVERY DAY
8. MOVING OR SPEAKING SO SLOWLY THAT OTHER PEOPLE COULD HAVE NOTICED. OR THE OPPOSITE, BEING SO FIGETY OR RESTLESS THAT YOU HAVE BEEN MOVING AROUND A LOT MORE THAN USUAL: NEARLY EVERY DAY
1. LITTLE INTEREST OR PLEASURE IN DOING THINGS: NEARLY EVERY DAY
3. TROUBLE FALLING OR STAYING ASLEEP OR SLEEPING TOO MUCH: MORE THAN HALF THE DAYS
5. POOR APPETITE OR OVEREATING: NEARLY EVERY DAY

## 2022-04-30 ASSESSMENT — FIBROSIS 4 INDEX
FIB4 SCORE: 13.79
FIB4 SCORE: 6.21

## 2022-04-30 NOTE — ED PROVIDER NOTES
"ED Provider  Scribed for Alfredito Kruger D.O. by Aurelia Kelly. 4/30/2022  8:17 AM    Means of arrival: Walk in  History obtained from: Patient  History limited by: None    CHIEF COMPLAINT  Chief Complaint   Patient presents with    Abdominal Pain     Hx of cirrhosis, pt reports drinking \"two beers\" throughout the night.     Other     Pt requesting resources for alcohol detox.       HPI  Jalen Vaughn is a 42 y.o. male with a history of cirrhosis, hypertension, and diabetes who presents to the Emergency Department for evaluation of dyspnea on exertion onset one week ago. The patient states that he also experiences upper back pain and dyspnea on exertion, which he states has been worsening over the past week. The patient states he also experiences abdominal pain, which he has been evaluated for multiple times and states that the pain today is similar to previous visits. He also reports that he drank alcohol last night. The patient's father was 44 years old when he had a myocardial infarction. He experiences associated cough and chills. He denies associated fever.     REVIEW OF SYSTEMS  See HPI for further details. All other systems are negative.     PAST MEDICAL HISTORY   has a past medical history of Liver disease and Seizure (HCC).    SOCIAL HISTORY  Social History     Tobacco Use    Smoking status: Never Smoker    Smokeless tobacco: Current User     Types: Chew   Vaping Use    Vaping Use: Never used   Substance and Sexual Activity    Alcohol use: Yes     Comment: pt reports two beers daily    Drug use: Not Currently    Sexual activity: Not on file       SURGICAL HISTORY   has a past surgical history that includes appendectomy and cholecystectomy.    CURRENT MEDICATIONS  Home Medications       Reviewed by Francine Encinas, Pharmacy Intern (Pharmacy Intern) on 04/30/22 at 1050  Med List Status: Complete     Medication Last Dose Status        Patient Robert Taking any Medications                     " "      ALLERGIES  No Known Allergies    PHYSICAL EXAM  VITAL SIGNS: BP (!) 170/100   Pulse 95   Temp 36 °C (96.8 °F) (Temporal)   Resp 16   Ht 1.905 m (6' 3\")   Wt 111 kg (245 lb 9.5 oz)   SpO2 92%   BMI 30.70 kg/m²   Constitutional: Alert in no apparent distress.  HENT: No signs of trauma, mucous membranes are moist  Eyes: Conjunctiva normal, Non-icteric.   Neck: Normal range of motion, No tenderness, Supple.  Lymphatic: No lymphadenopathy noted.   Cardiovascular: Regular rate and rhythm, no murmurs.   Thorax & Lungs: Normal breath sounds, No respiratory distress, No wheezing, No chest tenderness.   Abdomen: Bowel sounds normal, Soft, No tenderness, No masses, No pulsatile masses. No peritoneal signs.  Skin: Warm, Dry, normal color.   Back: No bony tenderness, No CVA tenderness.   Extremities: No edema, No tenderness, No cyanosis  Musculoskeletal: Good range of motion in all major joints. No tenderness to palpation or major deformities noted.   Neurologic: Alert and oriented x4, Normal motor function, Normal sensory function, No focal deficits noted.   Psychiatric: Affect normal, Judgment normal, Mood normal.     DIAGNOSTIC STUDIES / PROCEDURES    EKG  12 Lead EKG interpreted by me shown below.     LABS  Results for orders placed or performed during the hospital encounter of 04/30/22   CBC WITH DIFFERENTIAL   Result Value Ref Range    WBC 6.2 4.8 - 10.8 K/uL    RBC 4.18 (L) 4.70 - 6.10 M/uL    Hemoglobin 14.1 14.0 - 18.0 g/dL    Hematocrit 41.9 (L) 42.0 - 52.0 %    .2 (H) 81.4 - 97.8 fL    MCH 33.7 (H) 27.0 - 33.0 pg    MCHC 33.7 33.7 - 35.3 g/dL    RDW 53.2 (H) 35.9 - 50.0 fL    Platelet Count 55 (L) 164 - 446 K/uL    MPV 12.2 9.0 - 12.9 fL    Neutrophils-Polys 45.70 44.00 - 72.00 %    Lymphocytes 43.40 (H) 22.00 - 41.00 %    Monocytes 9.50 0.00 - 13.40 %    Eosinophils 0.60 0.00 - 6.90 %    Basophils 0.60 0.00 - 1.80 %    Immature Granulocytes 0.20 0.00 - 0.90 %    Nucleated RBC 0.00 /100 WBC    " Neutrophils (Absolute) 2.85 1.82 - 7.42 K/uL    Lymphs (Absolute) 2.71 1.00 - 4.80 K/uL    Monos (Absolute) 0.59 0.00 - 0.85 K/uL    Eos (Absolute) 0.04 0.00 - 0.51 K/uL    Baso (Absolute) 0.04 0.00 - 0.12 K/uL    Immature Granulocytes (abs) 0.01 0.00 - 0.11 K/uL    NRBC (Absolute) 0.00 K/uL   COMP METABOLIC PANEL   Result Value Ref Range    Sodium 145 135 - 145 mmol/L    Potassium 3.6 3.6 - 5.5 mmol/L    Chloride 105 96 - 112 mmol/L    Co2 29 20 - 33 mmol/L    Anion Gap 11.0 7.0 - 16.0    Glucose 116 (H) 65 - 99 mg/dL    Bun 5 (L) 8 - 22 mg/dL    Creatinine 0.51 0.50 - 1.40 mg/dL    Calcium 7.9 (L) 8.5 - 10.5 mg/dL    AST(SGOT) 130 (H) 12 - 45 U/L    ALT(SGPT) 47 2 - 50 U/L    Alkaline Phosphatase 168 (H) 30 - 99 U/L    Total Bilirubin 2.6 (H) 0.1 - 1.5 mg/dL    Albumin 3.5 3.2 - 4.9 g/dL    Total Protein 6.7 6.0 - 8.2 g/dL    Globulin 3.2 1.9 - 3.5 g/dL    A-G Ratio 1.1 g/dL   LIPASE   Result Value Ref Range    Lipase 40 11 - 82 U/L   ESTIMATED GFR   Result Value Ref Range    GFR (CKD-EPI) 131 >60 mL/min/1.73 m 2   Troponin STAT   Result Value Ref Range    Troponin T 16 6 - 19 ng/L   Lipase   Result Value Ref Range    Lipase 39 11 - 82 U/L   Magnesium   Result Value Ref Range    Magnesium 1.8 1.5 - 2.5 mg/dL   proBrain Natriuretic Peptide, NT   Result Value Ref Range    NT-proBNP 18 0 - 125 pg/mL   EKG   Result Value Ref Range    Report       [facility] Emergency [facility].    Test Date:  2022  Pt Name:    ARESNIO RODGERS               Department: ER  MRN:        [id number]                      Room:        23  Gender:     Male                         Technician: [id number]  :        1980                   Requested By:RASHI ÉPREZ  Order #:    [id number]                    Reading MD: RASHI PÉREZ D.O.    Measurements  Intervals                                Axis  Rate:       82                           P:          71  NY:         176                          QRS:         78  QRSD:       94                           T:          67  QT:         420  QTc:        491    Interpretive Statements  SINUS RHYTHM  Compared to ECG 02/15/2022 23:14:15  No significant changes  Electronically Signed On 4- 10:02:14 PDT by RASHI PÉREZ D.O.        All labs reviewed by me.    RADIOLOGY  DX-CHEST-PORTABLE (1 VIEW)   Final Result         2.  Age-indeterminate right lateral ninth rib fracture. Correlate with point tenderness.      EC-ECHOCARDIOGRAM COMPLETE W/O CONT    (Results Pending)     The radiologist's interpretations of all radiological studies have been reviewed by me.    Films have been independently by me      COURSE  Pertinent Labs & Imaging studies reviewed. (See chart for details)    8:17 AM - Patient seen and examined at bedside. Discussed plan of care. The patient will be medicated with aspirin 300 mg and 324 mg. Ordered for DX-Chest, EKG, Troponin, Lipase, CMP, CBC w/ Diff, and Estimated GFR to evaluate his symptoms.     10:01 AM - Paged Hospitalist     10:27 AM - I discussed the patient's case and the above findings with [facility] who agrees to evaluate the patient for hospitalization.     MEDICAL DECISION MAKING  This is a 42 y.o. male who presents with complaints of recurrent/chronic right abdominal pain.  He is also complaining of exertional dyspnea and exertional chest pain.  He is homeless and he walks a lot but now he cannot walk more than several yards before he has to rest.  He does have a history of hypertension he has a strong family history of cardiac disease he has had no cardiac work-up back in fine my reading his chart so the patient will be admitted for further evaluation.    At this time is EKG shows no STEMI.  There is no signs of acute coronary syndrome or heart injury on troponin.      DISPOSITION:  Patient will be hospitalized by [facility] in guarded condition.     FINAL IMPRESSION  1. Exertional dyspnea    2. Exertional chest pain     3. Hepatic disease         Aurelia ARIAS (Scribe), am scribing for, and in the presence of, Alfredito Kruger D.O..    Electronically signed by: Aurelia Kelly (Joanieibtianna), 4/30/2022    Alfredito ARIAS D.O. personally performed the services described in this documentation, as scribed by Aurelia Kelly in my presence, and it is both accurate and complete. C.     The note accurately reflects work and decisions made by me.  Alfredito Kruger D.O.  4/30/2022  3:22 PM

## 2022-04-30 NOTE — ASSESSMENT & PLAN NOTE
Patient desires to quit, presents with macrocytic anemia    Plan:  Continue thiamine supplementation  Monitor CBC and CMP daily  CIWA protocol in place

## 2022-04-30 NOTE — H&P
[facility] MEDIC[facility] HISTORY AND PHYSICAL     PATIENT ID:  NAME:  Jalen Vaughn  MRN:               [id number]  YOB: 1980    Date of Admission: 4/30/2022     Attending: MD Lisseth    Resident: MD Pramod PhD    Primary Care Physician: MD Alex    CC: Right upper quadrant pain, alcohol intoxication and dehydration    HPI: Jalen Vaughn is a 42 y.o. male who presented to the emergency department complaining of abdominal pain, non acute, has been ongoing for the past 3 years however has worsened over the last couple days.  Abdominal pain is associated with right shoulder pain as well as lower extremity pain and whole body itchiness.  Patient is a significant alcohol abuser and is currently drinking.  Patient states he was sober for 7 months however started drinking again, following his divorce and estrangement from his children.  Also his father passed away last year from kidney failure.  Patient states that he has come to the ED today to seek help, he no longer wants to drink but feels he needs help in doing so.  We discussed CIWA protocol, patient amenable and agreeable to start alcohol detoxification in a safe environment.  Patient states he has withdrawn from alcohol before in the past and subsequently had seizures following detoxification, this was outside the hospital setting.    No associated nausea vomiting or diarrhea.  No associated chest pain on my assessment and evaluation.  Patient started on lactulose while in [facility], however has not been taking since his release.  Patient does not appear encephalopathic at this time.    REVIEW OF SYSTEMS:   Ten systems reviewed and were negative except as noted in the HPI.                PAST MEDICAL HISTORY:  Past Medical History:   Diagnosis Date   • Liver disease    • Seizure (HCC)        PAST SURGICAL HISTORY:  Past Surgical History:   Procedure Laterality Date   • APPENDECTOMY     • CHOLECYSTECTOMY         FAMILY HISTORY:  Family History    Problem Relation Age of Onset   • Heart Disease Mother    • Kidney Disease Mother    • Heart Disease Father    • Kidney Disease Father        SOCIAL HISTORY:   Pt is currently homeless.  Patient does not smoke.  Patient is a current alcohol user, would like to withdrawal at this time.  Patient does not use any other drugs.    DIET:   Orders Placed This Encounter   Procedures   • Diet Order Diet: Regular     Standing Status:   Standing     Number of Occurrences:   1     Order Specific Question:   Diet:     Answer:   Regular [1]       ALLERGIES:  No Known Allergies    OUTPATIENT MEDICATIONS:    Current Facility-Administered Medications:   •  senna-docusate (PERICOLACE or SENOKOT S) 8.6-50 MG per tablet 2 Tablet, 2 Tablet, Oral, BID **AND** polyethylene glycol/lytes (MIRALAX) PACKET 1 Packet, 1 Packet, Oral, QDAY PRN **AND** magnesium hydroxide (MILK OF MAGNESIA) suspension 30 mL, 30 mL, Oral, QDAY PRN **AND** bisacodyl (DULCOLAX) suppository 10 mg, 10 mg, Rectal, QDAY PRN, Nuris Benavidez M.D.  •  acetaminophen (Tylenol) tablet 650 mg, 650 mg, Oral, Q6HRS PRN, Nuris Benavidez M.D.  •  LORazepam (ATIVAN) tablet 0.5 mg, 0.5 mg, Oral, Q4HRS PRN, Isabella Montanez M.D., 0.5 mg at 04/30/22 1505  •  LORazepam (ATIVAN) tablet 1 mg, 1 mg, Oral, Q4HRS PRN **OR** LORazepam (ATIVAN) injection 0.5 mg, 0.5 mg, Intravenous, Q4HRS PRN, Isabella Montanez M.D.  •  LORazepam (ATIVAN) tablet 2 mg, 2 mg, Oral, Q2HRS PRN **OR** LORazepam (ATIVAN) injection 1 mg, 1 mg, Intravenous, Q2HRS PRN, Isabella Montanez M.D.  •  LORazepam (ATIVAN) tablet 3 mg, 3 mg, Oral, Q HOUR PRN **OR** LORazepam (ATIVAN) injection 1.5 mg, 1.5 mg, Intravenous, Q HOUR PRN, Isabella Montanez M.D.  •  LORazepam (ATIVAN) tablet 4 mg, 4 mg, Oral, Q15 MIN PRN **OR** LORazepam (ATIVAN) injection 2 mg, 2 mg, Intravenous, Q15 MIN PRN, Isabella Montanez M.D.  •  permethrin (ELIMITE) 5 % cream, , Topical, Once, Elliot Nguyễn M.D.  •  thiamine (Vitamin B-1)  tablet 100 mg, 100 mg, Oral, DAILY, Elliot Nguyễn M.D., 100 mg at 22 1206  •  folic acid (FOLVITE) tablet 1 mg, 1 mg, Oral, DAILY, Elliot Nguyễn M.D., 1 mg at 22 1215  •  Pharmacy Consult Request ...Pain Management Review 1 Each, 1 Each, Other, PHARMACY TO DOSE, Elliot Nguyễn M.D.  •  celecoxib (CELEBREX) capsule 200 mg, 200 mg, Oral, BID **FOLLOWED BY** [START ON 2022] celecoxib (CELEBREX) capsule 200 mg, 200 mg, Oral, BID PRN, Elliot Nguyễn M.D.  •  oxyCODONE immediate-release (ROXICODONE) tablet 5 mg, 5 mg, Oral, Q4HRS PRN, Elliot Nguyễn M.D.    PHYSICAL EXAM:  Vitals:    22 1146 22 1206 22 1309 22 1400   BP: 147/80 149/87 (!) 167/94 153/95   Pulse: 97 89 76 92   Resp:  15 19 20   Temp:       TempSrc:       SpO2: 92% 92% 100% 94%   Weight:       Height:       , Temp (24hrs), Av °C (96.8 °F), Min:36 °C (96.8 °F), Max:36 °C (96.8 °F)  , Pulse Oximetry: 94 %, O2 (LPM): 0, O2 Delivery Device: Nasal Cannula    General: Pt resting in NAD, cooperative.  Endorses whole body itchiness.  Skin:  Pink, warm and dry.  No rashes  HEENT: NC/AT. PERRL. EOMI. MMM. No nasal discharge. Oropharynx nonerythematous without exudate/plaques  Neck:  Supple without lymphadenopathy or rigidity.  Lungs:  Symmetrical.  CTAB with no adventitious breath sounds.  Good air movement   Cardiovascular:  Normal S1/S2, RRR without M/R/G.  Abdomen:  BS+, Soft, NT/ND. No masses noted.  Extremities:  Full range of motion. No gross deformities noted. 2+ pulses in all extremities.  Mild edema, +1 present in bilateral lower extremities.  CNS:  A&Ox4, follows commands, Strength 5/5 in all extremities.         LAB TESTS:   Recent Labs     22  0602   WBC 6.2   RBC 4.18*   HEMOGLOBIN 14.1   HEMATOCRIT 41.9*   .2*   MCH 33.7*   RDW 53.2*   PLATELETCT 55*   MPV 12.2   NEUTSPOLYS 45.70   LYMPHOCYTES 43.40*   MONOCYTES 9.50   EOSINOPHILS 0.60   BASOPHILS 0.60         Recent Labs     22  0602  04/30/22  0905   SODIUM 145  --    POTASSIUM 3.6  --    CHLORIDE 105  --    CO2 29  --    BUN 5*  --    CREATININE 0.51  --    CALCIUM 7.9*  --    MAGNESIUM  --  1.8   ALBUMIN 3.5  --        IMAGES:  CT abdomen pelvis reveals  1.  Findings consistent with hepatic cirrhosis and portal hypertension.  2.  Minimal fluid and adjacent stranding in the RIGHT paracolic gutter.  3.  No bowel obstruction or perforation.  4.  No focal mesenteric inflammatory process.  5.  Multiple subacute RIGHT lower fractures.    Chest x-ray reveals   1.Cardiomediastinal silhouette is normal.   2. No focal consolidation, pleural effusion, pulmonary edema or pneumothorax. Left costophrenic angle is incompletely visualized.  3. Age-indeterminate right lateral ninth rib fracture was not seen on the prior study and could be acute or subacute         ASSESSMENT/PLAN: 42 y.o. male admitted for alcohol withdrawal, with seizure precautions.    Alcohol withdrawal syndrome with complication, cirrhosis (HCC)  Assessment & Plan  Patient currently drinking  Desires to withdraw safely from alcohol  Discussed our plan of care    Plan:  CIWA protocol in place with seizure precautions  Ativan as needed as needed based on CIWA scoring  Given history of seizures with previous withdrawal, seizure precautions in place  Following successful withdrawal; will initiate guideline directed medical therapy for cirrhosis in outpatient setting  Monitoring management to be continued with close outpatient following, provide resources as necessary, patient will be a good candidate for acarbose      * Alcohol abuse  Assessment & Plan  Patient desires to quit, presents with macrocytic anemia    Plan:  Thiamine supplementation  Monitor CBC and CMP daily  CIWA protocol in place      Rib fracture  Assessment & Plan  Incidental finding on CT abdomen pelvis  Appear subacute, right lower rib  This problem is stable, nothing to follow    Lower abdominal pain  Assessment &  Plan  Associated with pain in other palpated parts of the body  CT imaging of pelvis and abdomen did not reveal any significant etiology causing his pain  We will continue to monitor  Tylenol as needed added 2 MAR      CODE STATUS: Full      Nuris Benavidez MD PhD  PGY1  [facility]

## 2022-04-30 NOTE — ASSESSMENT & PLAN NOTE
Incidental finding on CT abdomen pelvis  X-ray reveals an age indeterminant right lateral ninth rib fracture  Appear subacute, right lower rib  This problem is stable, nothing to follow

## 2022-04-30 NOTE — ASSESSMENT & PLAN NOTE
Associated with pain in other palpated parts of the body  CT imaging of pelvis and abdomen did not reveal any significant etiology causing his pain  We will continue to monitor  Tylenol as needed added to MAR

## 2022-04-30 NOTE — ASSESSMENT & PLAN NOTE
Patient currently drinking  Desires to withdraw safely from alcohol  Discussed our plan of care    Plan:  CIWA protocol in place with seizure precautions, in the last 24 hours has required 14 mg of Ativan  Continue Ativan as needed as needed based on CIWA scoring  Given history of seizures with previous withdrawal, seizure precautions in place  Following successful withdrawal; will initiate guideline directed medical therapy for cirrhosis in outpatient setting  Monitoring management to be continued with close outpatient following, provide resources as necessary, patient will be a good candidate for acarbose  Restart treatment with lactulose, ammonia levels high admission

## 2022-04-30 NOTE — PROGRESS NOTES
4 Eyes Skin Assessment Completed by GABRIELE Poole and GABRIELE Martinez.    Head WDL  Ears Redness and Blanching  Nose WDL  Mouth WDL  Neck WDL  Breast/Chest Redness  Shoulder Blades WDL  Spine WDL  (R) Arm/Elbow/Hand Redness and Blanching  (L) Arm/Elbow/Hand Redness and Blanching  Abdomen WDL  Groin Pt refusing assessment   Scrotum/Coccyx/Buttocks Redness and Blanching  (R) Leg Edema  (L) Leg Edema  (R) Heel/Foot/Toe Redness, dry   (L) Heel/Foot/Toe Redness, dry           Devices In Places Tele Box and Pulse Ox      Interventions In Place N/A    Possible Skin Injury No    Pictures Uploaded Into Epic N/A  Wound Consult Placed N/A  RN Wound Prevention Protocol Ordered No

## 2022-04-30 NOTE — ED NOTES
Pt sitting in the lobby the whole time and was incorrectly dismissed. Charge RN aware. Pt to room at this time.

## 2022-04-30 NOTE — CARE PLAN
The patient is Watcher - Medium risk of patient condition declining or worsening         Progress made toward(s) clinical / shift goals:  Progressing     Problem: Depression  Goal: Patient and family/caregiver will verbalize accurate information about at least two of the possible causes of depression, three-four of the signs and symptoms of depression  Outcome: Progressing  Note: Pt encouraged to voice feelings of depression and will understand signs and symptoms

## 2022-05-01 ENCOUNTER — APPOINTMENT (OUTPATIENT)
Dept: CARDIOLOGY | Facility: MEDICAL CENTER | Age: 42
DRG: 894 | End: 2022-05-01
Attending: STUDENT IN AN ORGANIZED HEALTH CARE EDUCATION/TRAINING PROGRAM
Payer: MEDICAID

## 2022-05-01 ENCOUNTER — APPOINTMENT (OUTPATIENT)
Dept: RADIOLOGY | Facility: MEDICAL CENTER | Age: 42
DRG: 894 | End: 2022-05-01
Attending: STUDENT IN AN ORGANIZED HEALTH CARE EDUCATION/TRAINING PROGRAM
Payer: MEDICAID

## 2022-05-01 VITALS
DIASTOLIC BLOOD PRESSURE: 98 MMHG | OXYGEN SATURATION: 94 % | RESPIRATION RATE: 18 BRPM | HEART RATE: 98 BPM | SYSTOLIC BLOOD PRESSURE: 139 MMHG | TEMPERATURE: 97.3 F | WEIGHT: 254.63 LBS | BODY MASS INDEX: 31.66 KG/M2 | HEIGHT: 75 IN

## 2022-05-01 LAB
ALBUMIN SERPL BCP-MCNC: 2.7 G/DL (ref 3.2–4.9)
ALBUMIN/GLOB SERPL: 1 G/DL
ALP SERPL-CCNC: 148 U/L (ref 30–99)
ALT SERPL-CCNC: 34 U/L (ref 2–50)
ANION GAP SERPL CALC-SCNC: 9 MMOL/L (ref 7–16)
APPEARANCE UR: CLEAR
AST SERPL-CCNC: 93 U/L (ref 12–45)
BACTERIA #/AREA URNS HPF: NEGATIVE /HPF
BILIRUB SERPL-MCNC: 1.8 MG/DL (ref 0.1–1.5)
BILIRUB UR QL STRIP.AUTO: NEGATIVE
BUN SERPL-MCNC: 7 MG/DL (ref 8–22)
CALCIUM SERPL-MCNC: 8 MG/DL (ref 8.5–10.5)
CHLORIDE SERPL-SCNC: 102 MMOL/L (ref 96–112)
CO2 SERPL-SCNC: 26 MMOL/L (ref 20–33)
COLOR UR: YELLOW
CREAT SERPL-MCNC: 0.38 MG/DL (ref 0.5–1.4)
D DIMER PPP IA.FEU-MCNC: 1.27 UG/ML (FEU) (ref 0–0.5)
EPI CELLS #/AREA URNS HPF: NEGATIVE /HPF
ERYTHROCYTE [DISTWIDTH] IN BLOOD BY AUTOMATED COUNT: 51.6 FL (ref 35.9–50)
GFR SERPLBLD CREATININE-BSD FMLA CKD-EPI: 143 ML/MIN/1.73 M 2
GLOBULIN SER CALC-MCNC: 2.7 G/DL (ref 1.9–3.5)
GLUCOSE SERPL-MCNC: 100 MG/DL (ref 65–99)
GLUCOSE UR STRIP.AUTO-MCNC: NEGATIVE MG/DL
HCT VFR BLD AUTO: 32.6 % (ref 42–52)
HGB BLD-MCNC: 11.4 G/DL (ref 14–18)
HYALINE CASTS #/AREA URNS LPF: ABNORMAL /LPF
KETONES UR STRIP.AUTO-MCNC: NEGATIVE MG/DL
LEUKOCYTE ESTERASE UR QL STRIP.AUTO: NEGATIVE
LV EJECT FRACT  99904: 55
LV EJECT FRACT MOD 2C 99903: 44.72
LV EJECT FRACT MOD 4C 99902: 59.39
LV EJECT FRACT MOD BP 99901: 54.26
MAGNESIUM SERPL-MCNC: 1.6 MG/DL (ref 1.5–2.5)
MCH RBC QN AUTO: 34.5 PG (ref 27–33)
MCHC RBC AUTO-ENTMCNC: 35 G/DL (ref 33.7–35.3)
MCV RBC AUTO: 98.8 FL (ref 81.4–97.8)
MICRO URNS: ABNORMAL
NITRITE UR QL STRIP.AUTO: NEGATIVE
PH UR STRIP.AUTO: 8 [PH] (ref 5–8)
PLATELET # BLD AUTO: 32 K/UL (ref 164–446)
PMV BLD AUTO: 12.6 FL (ref 9–12.9)
POTASSIUM SERPL-SCNC: 3.4 MMOL/L (ref 3.6–5.5)
PROT SERPL-MCNC: 5.4 G/DL (ref 6–8.2)
PROT UR QL STRIP: NEGATIVE MG/DL
RBC # BLD AUTO: 3.3 M/UL (ref 4.7–6.1)
RBC # URNS HPF: ABNORMAL /HPF
RBC UR QL AUTO: ABNORMAL
SODIUM SERPL-SCNC: 137 MMOL/L (ref 135–145)
SP GR UR STRIP.AUTO: 1.01
UROBILINOGEN UR STRIP.AUTO-MCNC: 1 MG/DL
WBC # BLD AUTO: 4.5 K/UL (ref 4.8–10.8)
WBC #/AREA URNS HPF: ABNORMAL /HPF

## 2022-05-01 PROCEDURE — A9270 NON-COVERED ITEM OR SERVICE: HCPCS | Performed by: STUDENT IN AN ORGANIZED HEALTH CARE EDUCATION/TRAINING PROGRAM

## 2022-05-01 PROCEDURE — 700102 HCHG RX REV CODE 250 W/ 637 OVERRIDE(OP): Performed by: STUDENT IN AN ORGANIZED HEALTH CARE EDUCATION/TRAINING PROGRAM

## 2022-05-01 PROCEDURE — 80053 COMPREHEN METABOLIC PANEL: CPT

## 2022-05-01 PROCEDURE — 700117 HCHG RX CONTRAST REV CODE 255: Performed by: STUDENT IN AN ORGANIZED HEALTH CARE EDUCATION/TRAINING PROGRAM

## 2022-05-01 PROCEDURE — 93306 TTE W/DOPPLER COMPLETE: CPT

## 2022-05-01 PROCEDURE — 83735 ASSAY OF MAGNESIUM: CPT

## 2022-05-01 PROCEDURE — 93306 TTE W/DOPPLER COMPLETE: CPT | Mod: 26 | Performed by: INTERNAL MEDICINE

## 2022-05-01 PROCEDURE — 81001 URINALYSIS AUTO W/SCOPE: CPT

## 2022-05-01 PROCEDURE — 99232 SBSQ HOSP IP/OBS MODERATE 35: CPT | Mod: GC | Performed by: FAMILY MEDICINE

## 2022-05-01 PROCEDURE — A9270 NON-COVERED ITEM OR SERVICE: HCPCS | Performed by: FAMILY MEDICINE

## 2022-05-01 PROCEDURE — 700102 HCHG RX REV CODE 250 W/ 637 OVERRIDE(OP): Performed by: FAMILY MEDICINE

## 2022-05-01 PROCEDURE — 71045 X-RAY EXAM CHEST 1 VIEW: CPT

## 2022-05-01 PROCEDURE — 36415 COLL VENOUS BLD VENIPUNCTURE: CPT

## 2022-05-01 PROCEDURE — 85027 COMPLETE CBC AUTOMATED: CPT

## 2022-05-01 PROCEDURE — 700111 HCHG RX REV CODE 636 W/ 250 OVERRIDE (IP): Performed by: STUDENT IN AN ORGANIZED HEALTH CARE EDUCATION/TRAINING PROGRAM

## 2022-05-01 PROCEDURE — 71275 CT ANGIOGRAPHY CHEST: CPT

## 2022-05-01 PROCEDURE — 85379 FIBRIN DEGRADATION QUANT: CPT

## 2022-05-01 PROCEDURE — 700111 HCHG RX REV CODE 636 W/ 250 OVERRIDE (IP): Performed by: FAMILY MEDICINE

## 2022-05-01 RX ORDER — MAGNESIUM SULFATE HEPTAHYDRATE 40 MG/ML
2 INJECTION, SOLUTION INTRAVENOUS ONCE
Status: COMPLETED | OUTPATIENT
Start: 2022-05-01 | End: 2022-05-01

## 2022-05-01 RX ORDER — SPIRONOLACTONE 25 MG/1
100 TABLET ORAL
Status: DISCONTINUED | OUTPATIENT
Start: 2022-05-01 | End: 2022-05-01 | Stop reason: HOSPADM

## 2022-05-01 RX ORDER — POTASSIUM CHLORIDE 20 MEQ/1
40 TABLET, EXTENDED RELEASE ORAL ONCE
Status: COMPLETED | OUTPATIENT
Start: 2022-05-01 | End: 2022-05-01

## 2022-05-01 RX ORDER — ACETAMINOPHEN 500 MG
500 TABLET ORAL 4 TIMES DAILY
Status: DISCONTINUED | OUTPATIENT
Start: 2022-05-01 | End: 2022-05-01

## 2022-05-01 RX ORDER — HYDROCODONE BITARTRATE AND ACETAMINOPHEN 5; 325 MG/1; MG/1
1 TABLET ORAL EVERY 6 HOURS PRN
Status: DISCONTINUED | OUTPATIENT
Start: 2022-05-01 | End: 2022-05-01 | Stop reason: HOSPADM

## 2022-05-01 RX ORDER — FUROSEMIDE 40 MG/1
40 TABLET ORAL
Status: DISCONTINUED | OUTPATIENT
Start: 2022-05-01 | End: 2022-05-01 | Stop reason: HOSPADM

## 2022-05-01 RX ORDER — ACETAMINOPHEN 500 MG
500 TABLET ORAL EVERY 6 HOURS PRN
Status: DISCONTINUED | OUTPATIENT
Start: 2022-05-06 | End: 2022-05-01

## 2022-05-01 RX ORDER — ENALAPRILAT 1.25 MG/ML
1.25 INJECTION INTRAVENOUS EVERY 6 HOURS PRN
Status: DISCONTINUED | OUTPATIENT
Start: 2022-05-01 | End: 2022-05-01 | Stop reason: HOSPADM

## 2022-05-01 RX ADMIN — LACTULOSE 30 ML: 20 SOLUTION ORAL at 05:04

## 2022-05-01 RX ADMIN — LORAZEPAM 1 MG: 1 TABLET ORAL at 14:28

## 2022-05-01 RX ADMIN — LORAZEPAM 1 MG: 1 TABLET ORAL at 07:52

## 2022-05-01 RX ADMIN — IOHEXOL 50 ML: 350 INJECTION, SOLUTION INTRAVENOUS at 16:42

## 2022-05-01 RX ADMIN — LACTULOSE 30 ML: 20 SOLUTION ORAL at 11:17

## 2022-05-01 RX ADMIN — SPIRONOLACTONE 100 MG: 25 TABLET, FILM COATED ORAL at 09:58

## 2022-05-01 RX ADMIN — CALCIUM CARBONATE 500 MG: 500 TABLET, CHEWABLE ORAL at 07:51

## 2022-05-01 RX ADMIN — ACETAMINOPHEN 500 MG: 500 TABLET ORAL at 09:58

## 2022-05-01 RX ADMIN — FUROSEMIDE 40 MG: 40 TABLET ORAL at 09:58

## 2022-05-01 RX ADMIN — LORAZEPAM 1.5 MG: 2 INJECTION INTRAMUSCULAR; INTRAVENOUS at 00:32

## 2022-05-01 RX ADMIN — LORAZEPAM 2 MG: 2 TABLET ORAL at 05:04

## 2022-05-01 RX ADMIN — OXYCODONE 5 MG: 5 TABLET ORAL at 00:32

## 2022-05-01 RX ADMIN — FOLIC ACID 1 MG: 1 TABLET ORAL at 05:03

## 2022-05-01 RX ADMIN — OXYCODONE 5 MG: 5 TABLET ORAL at 05:04

## 2022-05-01 RX ADMIN — LACTULOSE 30 ML: 20 SOLUTION ORAL at 16:14

## 2022-05-01 RX ADMIN — Medication 100 MG: at 05:04

## 2022-05-01 RX ADMIN — ACETAMINOPHEN 500 MG: 500 TABLET ORAL at 11:17

## 2022-05-01 RX ADMIN — CALCIUM CARBONATE 500 MG: 500 TABLET, CHEWABLE ORAL at 11:30

## 2022-05-01 RX ADMIN — MAGNESIUM SULFATE HEPTAHYDRATE 2 G: 40 INJECTION, SOLUTION INTRAVENOUS at 11:47

## 2022-05-01 RX ADMIN — POTASSIUM CHLORIDE 40 MEQ: 1500 TABLET, EXTENDED RELEASE ORAL at 11:17

## 2022-05-01 RX ADMIN — CALCIUM CARBONATE 500 MG: 500 TABLET, CHEWABLE ORAL at 16:13

## 2022-05-01 RX ADMIN — LORAZEPAM 2 MG: 2 TABLET ORAL at 18:18

## 2022-05-01 RX ADMIN — CELECOXIB 200 MG: 200 CAPSULE ORAL at 05:07

## 2022-05-01 ASSESSMENT — LIFESTYLE VARIABLES
AGITATION: NORMAL ACTIVITY
NAUSEA AND VOMITING: *
TOTAL SCORE: 16
TREMOR: MODERATE TREMOR WITH ARMS EXTENDED
PAROXYSMAL SWEATS: *
AUDITORY DISTURBANCES: VERY MILD HARSHNESS OR ABILITY TO FRIGHTEN
VISUAL DISTURBANCES: VERY MILD SENSITIVITY
HEADACHE, FULLNESS IN HEAD: MODERATE
TREMOR: *
TOTAL SCORE: MILD ITCHING, PINS AND NEEDLES SENSATION, BURNING OR NUMBNESS
TOTAL SCORE: VERY MILD ITCHING, PINS AND NEEDLES SENSATION, BURNING OR NUMBNESS
NAUSEA AND VOMITING: NO NAUSEA AND NO VOMITING
HEADACHE, FULLNESS IN HEAD: VERY MILD
ANXIETY: *
NAUSEA AND VOMITING: *
TOTAL SCORE: MILD ITCHING, PINS AND NEEDLES SENSATION, BURNING OR NUMBNESS
AUDITORY DISTURBANCES: VERY MILD HARSHNESS OR ABILITY TO FRIGHTEN
NAUSEA AND VOMITING: NO NAUSEA AND NO VOMITING
VISUAL DISTURBANCES: MILD SENSITIVITY
PAROXYSMAL SWEATS: BARELY PERCEPTIBLE SWEATING. PALMS MOIST
AUDITORY DISTURBANCES: VERY MILD HARSHNESS OR ABILITY TO FRIGHTEN
ANXIETY: MILDLY ANXIOUS
HEADACHE, FULLNESS IN HEAD: MODERATE
TOTAL SCORE: 10
AGITATION: NORMAL ACTIVITY
TOTAL SCORE: VERY MILD ITCHING, PINS AND NEEDLES SENSATION, BURNING OR NUMBNESS
HEADACHE, FULLNESS IN HEAD: VERY MILD
VISUAL DISTURBANCES: VERY MILD SENSITIVITY
TOTAL SCORE: 9
AGITATION: NORMAL ACTIVITY
NAUSEA AND VOMITING: NO NAUSEA AND NO VOMITING
TREMOR: TREMOR NOT VISIBLE BUT CAN BE FELT, FINGERTIP TO FINGERTIP
AUDITORY DISTURBANCES: MILD HARSHNESS OR ABILITY TO FRIGHTEN
ORIENTATION AND CLOUDING OF SENSORIUM: ORIENTED AND CAN DO SERIAL ADDITIONS
HEADACHE, FULLNESS IN HEAD: MILD
NAUSEA AND VOMITING: NO NAUSEA AND NO VOMITING
PAROXYSMAL SWEATS: *
ANXIETY: MILDLY ANXIOUS
TOTAL SCORE: 11
AUDITORY DISTURBANCES: VERY MILD HARSHNESS OR ABILITY TO FRIGHTEN
TREMOR: TREMOR NOT VISIBLE BUT CAN BE FELT, FINGERTIP TO FINGERTIP
ANXIETY: MILDLY ANXIOUS
ORIENTATION AND CLOUDING OF SENSORIUM: ORIENTED AND CAN DO SERIAL ADDITIONS
VISUAL DISTURBANCES: VERY MILD SENSITIVITY
ORIENTATION AND CLOUDING OF SENSORIUM: ORIENTED AND CAN DO SERIAL ADDITIONS
ORIENTATION AND CLOUDING OF SENSORIUM: ORIENTED AND CAN DO SERIAL ADDITIONS
TOTAL SCORE: VERY MILD ITCHING, PINS AND NEEDLES SENSATION, BURNING OR NUMBNESS
TOTAL SCORE: 7
ANXIETY: MILDLY ANXIOUS
TREMOR: MODERATE TREMOR WITH ARMS EXTENDED
ANXIETY: MILDLY ANXIOUS
PAROXYSMAL SWEATS: BARELY PERCEPTIBLE SWEATING. PALMS MOIST
ORIENTATION AND CLOUDING OF SENSORIUM: ORIENTED AND CAN DO SERIAL ADDITIONS
AGITATION: NORMAL ACTIVITY
VISUAL DISTURBANCES: MILD SENSITIVITY
PAROXYSMAL SWEATS: BARELY PERCEPTIBLE SWEATING. PALMS MOIST
HEADACHE, FULLNESS IN HEAD: VERY MILD
PAROXYSMAL SWEATS: BARELY PERCEPTIBLE SWEATING. PALMS MOIST
VISUAL DISTURBANCES: VERY MILD SENSITIVITY
AGITATION: NORMAL ACTIVITY
ORIENTATION AND CLOUDING OF SENSORIUM: ORIENTED AND CAN DO SERIAL ADDITIONS
TREMOR: *
AGITATION: NORMAL ACTIVITY
TOTAL SCORE: 14
AUDITORY DISTURBANCES: VERY MILD HARSHNESS OR ABILITY TO FRIGHTEN
TOTAL SCORE: VERY MILD ITCHING, PINS AND NEEDLES SENSATION, BURNING OR NUMBNESS

## 2022-05-01 NOTE — PROGRESS NOTES
Lab called with critical ammonia level 124, critical ammonia reported to Dr. Guerra. Will watch for new orders.

## 2022-05-01 NOTE — CARE PLAN
The patient is Watcher - Medium risk of patient condition declining or worsening         Progress made toward(s) clinical / shift goals:    Problem: Optimal Care for Alcohol Withdrawal  Goal: Optimal Care for the alcohol withdrawal patient  Outcome: Progressing  Note: Pt medicated prn per ciwa       Problem: Seizure Precautions  Goal: Implementation of seizure precautions  Outcome: Met     Problem: Lifestyle Changes  Goal: Patient's ability to identify lifestyle changes and available resources to help reduce recurrence of condition will improve  Outcome: Progressing     Problem: Psychosocial  Goal: Patient's level of anxiety will decrease  Outcome: Progressing     Problem: Risk for Aspiration  Goal: Patient's risk for aspiration will be absent or decrease  Outcome: Progressing  Note: Aspiration precautions in place       Problem: Knowledge Deficit - Standard  Goal: Patient and family/care givers will demonstrate understanding of plan of care, disease process/condition, diagnostic tests and medications  Outcome: Progressing  Note: White board updated with POC and care team information during bedside report.      Problem: Pain - Standard  Goal: Alleviation of pain or a reduction in pain to the patient’s comfort goal  Outcome: Progressing  Note: Pt assessed for pain regularly and medicated PRN per MAR.

## 2022-05-01 NOTE — NON-PROVIDER
[facility] [place] - Student Progress Note    Date of Service: 5/1/2022  Date of Admission: 4/30/2022     Attending:   Isabella Montanez MD    Resident:   MD Benigno Desir MD    Student:  Dorene Bui MS3    PATIENT: Jalen Vaughn; [id number]; 1980    ID: 42 y.o. male on hospital day 1 who was admitted for alcohol withdrawal.    SUBJECTIVE: Overnight, patient became more jittery. In the last 24 hours, he has required 14mg of ativan with his highest CIWA score of 16.    This morning, patient is alert and oriented. He continues to feel jittery. He is experiencing a little SOB but denies any chest pain. He does have some abdominal pain diffusely. Also mentioned left calf was tender.    OBJECTIVE:  Temp:  [36.8 °C (98.2 °F)-38.4 °C (101.1 °F)] 36.8 °C (98.2 °F)  Pulse:  [] 101  Resp:  [13-20] 16  BP: (120-173)/(74-98) 173/98  SpO2:  [90 %-100 %] 90 %    No intake or output data in the 24 hours ending 05/01/22 0735    PE:   General: Male in no acute distress, resting on arrival to room  HEENT: Normocephalic, atraumatic  Cardiovascular: Tachycardic, no murmurs, gallops, or rubs  Pulmonary: CTAB, symmetrical chest expansion  Abdominal: Bowel sounds heard, diffuse tenderness to palpation, no guarding, rigidity, or distension  Extremities: Moves all spontaneously, left calf tender to palpation, nonpitting pedal edema bilaterally  Neurological: Alert and oriented    LABS:  Recent Labs     04/30/22  0602 05/01/22  0234   WBC 6.2 4.5*   RBC 4.18* 3.30*   HEMOGLOBIN 14.1 11.4*   HEMATOCRIT 41.9* 32.6*   .2* 98.8*   MCH 33.7* 34.5*   RDW 53.2* 51.6*   PLATELETCT 55* 32*   MPV 12.2 12.6   NEUTSPOLYS 45.70  --    LYMPHOCYTES 43.40*  --    MONOCYTES 9.50  --    EOSINOPHILS 0.60  --    BASOPHILS 0.60  --      Recent Labs     04/30/22  0602 04/30/22  0905 05/01/22  0234   SODIUM 145  --  137   POTASSIUM 3.6  --  3.4*   CHLORIDE 105  --  102   CO2 29  --  26   BUN 5*  --  7*   CREATININE 0.51   --  0.38*   CALCIUM 7.9*  --  8.0*   MAGNESIUM  --  1.8  --    ALBUMIN 3.5  --  2.7*     Estimated GFR/CRCL = Estimated Creatinine Clearance: 354.9 mL/min (A) (by C-G formula based on SCr of 0.38 mg/dL (L)).  Recent Labs     04/30/22  0602 05/01/22  0234   GLUCOSE 116* 100*     Recent Labs     04/30/22  0602 04/30/22  1643 05/01/22  0234   ASTSGOT 130*  --  93*   ALTSGPT 47  --  34   TBILIRUBIN 2.6*  --  1.8*   ALKPHOSPHAT 168*  --  148*   GLOBULIN 3.2  --  2.7   AMMONIA  --  124*  --              No results for input(s): INR, APTT, FIBRINOGEN in the last 72 hours.    Invalid input(s): DIMER    MICROBIOLOGY:  Results     ** No results found for the last 168 hours. **          IMAGING:  DX-CHEST-PORTABLE (1 VIEW)   Final Result      Mild hypoinflation and left basilar atelectasis.      DX-CHEST-PORTABLE (1 VIEW)   Final Result         2.  Age-indeterminate right lateral ninth rib fracture. Correlate with point tenderness.      EC-ECHOCARDIOGRAM COMPLETE W/O CONT    (Results Pending)   US-EXTREMITY VENOUS LOWER UNILAT LEFT    (Results Pending)   CT-CTA CHEST PULMONARY ARTERY W/ RECONS    (Results Pending)       MEDS:  Current Facility-Administered Medications   Medication Last Admin   • senna-docusate (PERICOLACE or SENOKOT S) 8.6-50 MG per tablet 2 Tablet      And   • polyethylene glycol/lytes (MIRALAX) PACKET 1 Packet      And   • magnesium hydroxide (MILK OF MAGNESIA) suspension 30 mL      And   • bisacodyl (DULCOLAX) suppository 10 mg     • acetaminophen (Tylenol) tablet 650 mg 650 mg at 04/30/22 1931   • LORazepam (ATIVAN) tablet 0.5 mg 0.5 mg at 04/30/22 1505   • LORazepam (ATIVAN) tablet 1 mg      Or   • LORazepam (ATIVAN) injection 0.5 mg     • LORazepam (ATIVAN) tablet 2 mg 2 mg at 05/01/22 0504    Or   • LORazepam (ATIVAN) injection 1 mg     • LORazepam (ATIVAN) tablet 3 mg 3 mg at 04/30/22 2053    Or   • LORazepam (ATIVAN) injection 1.5 mg 1.5 mg at 05/01/22 0032   • LORazepam (ATIVAN) tablet 4 mg      Or    • LORazepam (ATIVAN) injection 2 mg     • thiamine (Vitamin B-1) tablet 100 mg 100 mg at 05/01/22 0504   • folic acid (FOLVITE) tablet 1 mg 1 mg at 05/01/22 0503   • Pharmacy Consult Request ...Pain Management Review 1 Each     • celecoxib (CELEBREX) capsule 200 mg 200 mg at 05/01/22 0507    Followed by   • [START ON 5/5/2022] celecoxib (CELEBREX) capsule 200 mg     • oxyCODONE immediate-release (ROXICODONE) tablet 5 mg 5 mg at 05/01/22 0504   • lactulose 20 GM/30ML solution 30 mL 30 mL at 05/01/22 0504   • calcium carbonate (TUMS) chewable tab 500 mg 500 mg at 04/30/22 2107       PROBLEM LIST:    Patient Active Problem List    Diagnosis Date Noted   • Alcohol abuse [F10.10] 04/30/2022     Assessment & Plan Note:     Patient desires to quit, presents with macrocytic anemia    Plan:  Continue thiamine supplementation  Monitor CBC and CMP daily  CIWA protocol in place       • Lower abdominal pain [R10.30] 04/30/2022     Assessment & Plan Note:     Associated with pain in other palpated parts of the body  CT imaging of pelvis and abdomen did not reveal any significant etiology causing his pain  We will continue to monitor  Tylenol as needed added to MAR     • Rib fracture [S22.39XA] 04/30/2022     Assessment & Plan Note:     Incidental finding on CT abdomen pelvis  X-ray reveals an age indeterminant right lateral ninth rib fracture  Appear subacute, right lower rib  This problem is stable, nothing to follow     • Alcohol withdrawal syndrome without complication (HCC) [F10.230] 02/16/2022   • Hyponatremia [E87.1] 02/16/2022   • Thrombocytopenia (HCC) [D69.6] 02/16/2022   • Bilateral dry eyes [H04.123] 02/16/2022   • Alcohol use disorder, severe, dependence (HCC) [F10.20] 02/04/2022   • Chest pain [R07.9] 02/04/2022   • Anxiety [F41.9] 02/04/2022   • Alcohol-induced insomnia (HCC) [F10.982] 02/04/2022   • Alcoholic intoxication without complication (HCC) [F10.920] 02/04/2022   • Hematemesis with nausea [K92.0]  02/04/2022   • Vision blurring [H53.8] 01/10/2022   • Atherosclerosis [I70.90] 12/19/2021   • Transaminitis [R74.01] 12/04/2021   • Class 1 obesity due to excess calories without serious comorbidity with body mass index (BMI) of 33.0 to 33.9 in adult [E66.09, Z68.33] 12/04/2021   • Alcoholic cirrhosis (HCC) [K70.30] 12/03/2021   • Pancytopenia (HCC) [D61.818] 12/03/2021   • Hypokalemia [E87.6] 12/03/2021   • Hypomagnesemia [E83.42] 12/03/2021   • Coagulopathy (HCC) [D68.9] 12/03/2021   • Alcohol withdrawal syndrome with complication, cirrhosis (HCC) [F10.239] 12/02/2021     Assessment & Plan Note:     Patient currently drinking  Desires to withdraw safely from alcohol  Discussed our plan of care    Plan:  CIWA protocol in place with seizure precautions, in the last 24 hours has required 9 mg of Ativan  Continue Ativan as needed as needed based on CIWA scoring  Given history of seizures with previous withdrawal, seizure precautions in place  Following successful withdrawal; will initiate guideline directed medical therapy for cirrhosis in outpatient setting  Monitoring management to be continued with close outpatient following, provide resources as necessary, patient will be a good candidate for acarbose  Restart treatment with lactulose, ammonia levels high admission            ASSESSMENT/PLAN: Jalen Vaughn is a 42 y.o. male with a PMHx of cirrhosis who was admitted for alcohol withdrawal.      #Alcohol withdrawal  #Cirrhosis  On admission, had been heavily drinking and wanted to withdraw safely.  -Continue CIWA protocol and seizure protocol  -Last night, became febrile at 101.1, if becomes febrile again will draw blood cultures. WBC normal this am but UA ordered and chest xray done showing mild hypoinflation and left basilar atelectasis.  -Continue continuous telemetry monitoring  -Ammonia elevated today at 124, restart lactulose 20mg/30ml TID  -Continue thiamine 100mg daily    #Left calf tenderness  LLE tender  to palpation in physical exam. Early this morning started requiring 2L of O2 and saturating 90-91%. Well's score indicated moderate risk  -D dimer elevated at 1.27 this am. A pulmonary CTA ordered  -LLE ultrasound ordered    #Rib fracture  Chest xray on admission showed right lateral ninth rib fracture  -Continue pain control with tylenol and celebrex    #Bilateral lower extremity edema  -Echo pending      #Disposition: Inpatient for alcohol withdrawal treatment and monitoring    Core Measures:  Fluids: n/a  Lines: peripheral IV  Abx: n/a  Diet: regular  PPX: SCDs    CODE STATUS: FULL    Dorene Bui, MS3  [facility]  Family Medicine [facility]

## 2022-05-01 NOTE — PROGRESS NOTES
[facility] MEDICINE PROGRESS NOTE     Attending: MD Lisseth     Resident: MD Pramod PhD    PATIENT: Jalen Vaughn; [id number]; 1980    ID: 42 y.o. male admitted for alcohol withdrawal, with seizure precautions.      SUBJECTIVE: 24-hour events significant for patient requiring 14mg of Ativan per [facility] protocol.  Ammonia levels elevated at 124.  This a.m. patient does appear confused he is A/O x4 however is slow to respond and sometimes cannot complete full sentences.  He denies any acute pain at this time.  Patient's boss informed of patient's hospitalization    OBJECTIVE:     Vitals:    04/30/22 1900 05/01/22 0006 05/01/22 0343 05/01/22 0731   BP:  141/86 159/83 (!) 173/98   Pulse:  98 95 (!) 101   Resp:  20 18 16   Temp:  37.3 °C (99.1 °F) 37.6 °C (99.7 °F) 36.8 °C (98.2 °F)   TempSrc:  Temporal Temporal Temporal   SpO2:  91% 90% 90%   Weight: 115 kg (254 lb 10.1 oz)      Height:         No intake or output data in the 24 hours ending 05/01/22 0718    PE:  General: No acute distress, resting comfortably in bed.  Slow to respond to questions.  HEENT: Normocephalic, atraumatic  Cardiovascular: Normal S1/S2, RRR, no m/r/g  Respiratory: Symmetric inspiratory effort. CTAB with no adventitious sounds  Abdomen: BS+, soft, NT/ND   EXT:  2+ pulses, no rashes, bruising, or bleeding.  Nonpitting edematous lower extremities  Neuro: Non focal with no numbness, tingling or changes in sensation    LABS:  Recent Labs     04/30/22  0602 05/01/22  0234   WBC 6.2 4.5*   RBC 4.18* 3.30*   HEMOGLOBIN 14.1 11.4*   HEMATOCRIT 41.9* 32.6*   .2* 98.8*   MCH 33.7* 34.5*   RDW 53.2* 51.6*   PLATELETCT 55* 32*   MPV 12.2 12.6   NEUTSPOLYS 45.70  --    LYMPHOCYTES 43.40*  --    MONOCYTES 9.50  --    EOSINOPHILS 0.60  --    BASOPHILS 0.60  --      Recent Labs     04/30/22  0602 04/30/22  0905 05/01/22  0234   SODIUM 145  --  137   POTASSIUM 3.6  --  3.4*   CHLORIDE 105  --  102   CO2 29  --  26   BUN 5*  --  7*   CREATININE  0.51  --  0.38*   CALCIUM 7.9*  --  8.0*   MAGNESIUM  --  1.8  --    ALBUMIN 3.5  --  2.7*     Estimated GFR/CRCL = Estimated Creatinine Clearance: 354.9 mL/min (A) (by C-G formula based on SCr of 0.38 mg/dL (L)).  Recent Labs     04/30/22  0602 05/01/22  0234   GLUCOSE 116* 100*     Recent Labs     04/30/22  0602 04/30/22  1643 05/01/22  0234   ASTSGOT 130*  --  93*   ALTSGPT 47  --  34   TBILIRUBIN 2.6*  --  1.8*   ALKPHOSPHAT 168*  --  148*   GLOBULIN 3.2  --  2.7   AMMONIA  --  124*  --              No results for input(s): INR, APTT, FIBRINOGEN in the last 72 hours.    Invalid input(s): DIMER     IMAGING:   DX-CHEST-PORTABLE (1 VIEW)   Final Result         2.  Age-indeterminate right lateral ninth rib fracture. Correlate with point tenderness.      EC-ECHOCARDIOGRAM COMPLETE W/O CONT    (Results Pending)   US-EXTREMITY VENOUS LOWER UNILAT LEFT    (Results Pending)       MEDS:  Current Facility-Administered Medications   Medication Last Admin   • acetaminophen (TYLENOL) tablet 500 mg 500 mg at 05/01/22 0958    Followed by   • [START ON 5/6/2022] acetaminophen (TYLENOL) tablet 500 mg     • enalaprilat (Vasotec) injection 1.25 mg 1 mL     • furosemide (LASIX) tablet 40 mg 40 mg at 05/01/22 0958   • spironolactone (ALDACTONE) tablet 100 mg 100 mg at 05/01/22 0958   • senna-docusate (PERICOLACE or SENOKOT S) 8.6-50 MG per tablet 2 Tablet      And   • polyethylene glycol/lytes (MIRALAX) PACKET 1 Packet      And   • magnesium hydroxide (MILK OF MAGNESIA) suspension 30 mL      And   • bisacodyl (DULCOLAX) suppository 10 mg     • LORazepam (ATIVAN) tablet 0.5 mg 0.5 mg at 04/30/22 1505   • LORazepam (ATIVAN) tablet 1 mg 1 mg at 05/01/22 0752    Or   • LORazepam (ATIVAN) injection 0.5 mg     • LORazepam (ATIVAN) tablet 2 mg 2 mg at 05/01/22 0504    Or   • LORazepam (ATIVAN) injection 1 mg     • LORazepam (ATIVAN) tablet 3 mg 3 mg at 04/30/22 2053    Or   • LORazepam (ATIVAN) injection 1.5 mg 1.5 mg at 05/01/22 0032   •  LORazepam (ATIVAN) tablet 4 mg      Or   • LORazepam (ATIVAN) injection 2 mg     • thiamine (Vitamin B-1) tablet 100 mg 100 mg at 05/01/22 0504   • folic acid (FOLVITE) tablet 1 mg 1 mg at 05/01/22 0503   • Pharmacy Consult Request ...Pain Management Review 1 Each     • lactulose 20 GM/30ML solution 30 mL 30 mL at 05/01/22 0504   • calcium carbonate (TUMS) chewable tab 500 mg 500 mg at 05/01/22 0751         ASSESSMENT/PLAN: 42 y.o. male admitted for alcohol withdrawal, with seizure precautions.      Alcohol withdrawal syndrome with complication, cirrhosis (HCC)  Assessment & Plan  Patient currently drinking  Desires to withdraw safely from alcohol  Discussed our plan of care    Plan:  CIWA protocol in place with seizure precautions, in the last 24 hours has required 14 mg of Ativan  Continue Ativan as needed as needed based on CIWA scoring  Given history of seizures with previous withdrawal, seizure precautions in place  Following successful withdrawal; will initiate guideline directed medical therapy for cirrhosis in outpatient setting  Monitoring management to be continued with close outpatient following, provide resources as necessary, patient will be a good candidate for acarbose  Restart treatment with lactulose, ammonia levels high admission      * Alcohol abuse  Assessment & Plan  Patient desires to quit, presents with macrocytic anemia    Plan:  Continue thiamine supplementation  Monitor CBC and CMP daily  CIWA protocol in place      Rib fracture  Assessment & Plan  Incidental finding on CT abdomen pelvis  X-ray reveals an age indeterminant right lateral ninth rib fracture  Appear subacute, right lower rib  This problem is stable, nothing to follow    Lower abdominal pain  Assessment & Plan  Associated with pain in other palpated parts of the body  CT imaging of pelvis and abdomen did not reveal any significant etiology causing his pain  We will continue to monitor  Tylenol as needed added to MAR      This  Patient is a Continuity patient - [facility] will continue to follow throughout hospitalization      CODE STATUS: Full  Nuris Benavidez MD PhD  PGY1  [facility]

## 2022-05-02 NOTE — PROGRESS NOTES
0710- beginning of shift patient requested to leave AMA saying he doesn't like it here, he received ativan at 1818 from the RN on Day shift. Doctor was notified and deemed the patient alert and able to leave. Patient packed up his belongings and was escorted out by Flex RN to the exit by his choice by 1945.

## 2022-05-02 NOTE — PROGRESS NOTES
7:32 PM    Called by RN. Patient requesting to leave AMA. Spoke to patient at bedside. He is alert and oriented. Discussed that the risks of leaving AMA include worsening of current medical conditions and is not limited to death. Patient verbalized understanding and requested to be discharged.     Patient left AMA and escorted out of the hospital. Attempted to call patient's significant other, Maria Victoria, listed in the chart but the phone number is incorrect.       Felipe Alvarez D.O.

## 2022-05-03 ENCOUNTER — APPOINTMENT (OUTPATIENT)
Dept: RADIOLOGY | Facility: MEDICAL CENTER | Age: 42
DRG: 897 | End: 2022-05-03
Attending: STUDENT IN AN ORGANIZED HEALTH CARE EDUCATION/TRAINING PROGRAM
Payer: MEDICAID

## 2022-05-03 ENCOUNTER — HOSPITAL ENCOUNTER (INPATIENT)
Facility: MEDICAL CENTER | Age: 42
LOS: 2 days | DRG: 897 | End: 2022-05-05
Attending: EMERGENCY MEDICINE | Admitting: FAMILY MEDICINE
Payer: MEDICAID

## 2022-05-03 DIAGNOSIS — F10.930 ALCOHOL WITHDRAWAL SYNDROME WITHOUT COMPLICATION (HCC): ICD-10-CM

## 2022-05-03 DIAGNOSIS — K74.60 CIRRHOSIS OF LIVER WITHOUT ASCITES, UNSPECIFIED HEPATIC CIRRHOSIS TYPE (HCC): ICD-10-CM

## 2022-05-03 DIAGNOSIS — M62.82 NON-TRAUMATIC RHABDOMYOLYSIS: Primary | ICD-10-CM

## 2022-05-03 LAB
ALBUMIN SERPL BCP-MCNC: 3.5 G/DL (ref 3.2–4.9)
ALBUMIN/GLOB SERPL: 1.1 G/DL
ALP SERPL-CCNC: 167 U/L (ref 30–99)
ALT SERPL-CCNC: 43 U/L (ref 2–50)
ANION GAP SERPL CALC-SCNC: 10 MMOL/L (ref 7–16)
ANION GAP SERPL CALC-SCNC: 14 MMOL/L (ref 7–16)
ANION GAP SERPL CALC-SCNC: 9 MMOL/L (ref 7–16)
ANISOCYTOSIS BLD QL SMEAR: ABNORMAL
APPEARANCE UR: CLEAR
APTT PPP: 32.8 SEC (ref 24.7–36)
AST SERPL-CCNC: 121 U/L (ref 12–45)
BACTERIA #/AREA URNS HPF: NEGATIVE /HPF
BASOPHILS # BLD AUTO: 0.3 % (ref 0–1.8)
BASOPHILS # BLD: 0.02 K/UL (ref 0–0.12)
BILIRUB SERPL-MCNC: 3.2 MG/DL (ref 0.1–1.5)
BILIRUB UR QL STRIP.AUTO: NEGATIVE
BUN SERPL-MCNC: 8 MG/DL (ref 8–22)
BUN SERPL-MCNC: 9 MG/DL (ref 8–22)
BUN SERPL-MCNC: 9 MG/DL (ref 8–22)
CALCIUM SERPL-MCNC: 8.1 MG/DL (ref 8.5–10.5)
CALCIUM SERPL-MCNC: 8.3 MG/DL (ref 8.5–10.5)
CALCIUM SERPL-MCNC: 9.1 MG/DL (ref 8.5–10.5)
CAOX CRY #/AREA URNS HPF: ABNORMAL /HPF
CHLORIDE SERPL-SCNC: 103 MMOL/L (ref 96–112)
CHLORIDE SERPL-SCNC: 105 MMOL/L (ref 96–112)
CHLORIDE SERPL-SCNC: 99 MMOL/L (ref 96–112)
CK SERPL-CCNC: 1597 U/L (ref 0–154)
CO2 SERPL-SCNC: 25 MMOL/L (ref 20–33)
COLOR UR: ABNORMAL
COMMENT 1642: NORMAL
CREAT SERPL-MCNC: 0.34 MG/DL (ref 0.5–1.4)
CREAT SERPL-MCNC: 0.46 MG/DL (ref 0.5–1.4)
CREAT SERPL-MCNC: 0.49 MG/DL (ref 0.5–1.4)
EKG IMPRESSION: NORMAL
EOSINOPHIL # BLD AUTO: 0.04 K/UL (ref 0–0.51)
EOSINOPHIL NFR BLD: 0.6 % (ref 0–6.9)
EPI CELLS #/AREA URNS HPF: NEGATIVE /HPF
ERYTHROCYTE [DISTWIDTH] IN BLOOD BY AUTOMATED COUNT: 51.3 FL (ref 35.9–50)
FLUAV RNA SPEC QL NAA+PROBE: NEGATIVE
FLUBV RNA SPEC QL NAA+PROBE: NEGATIVE
GFR SERPLBLD CREATININE-BSD FMLA CKD-EPI: 133 ML/MIN/1.73 M 2
GFR SERPLBLD CREATININE-BSD FMLA CKD-EPI: 135 ML/MIN/1.73 M 2
GFR SERPLBLD CREATININE-BSD FMLA CKD-EPI: 148 ML/MIN/1.73 M 2
GLOBULIN SER CALC-MCNC: 3.2 G/DL (ref 1.9–3.5)
GLUCOSE SERPL-MCNC: 105 MG/DL (ref 65–99)
GLUCOSE SERPL-MCNC: 172 MG/DL (ref 65–99)
GLUCOSE SERPL-MCNC: 85 MG/DL (ref 65–99)
GLUCOSE UR STRIP.AUTO-MCNC: NEGATIVE MG/DL
HCT VFR BLD AUTO: 37.7 % (ref 42–52)
HCV AB SER QL: NORMAL
HGB BLD-MCNC: 13.3 G/DL (ref 14–18)
HYALINE CASTS #/AREA URNS LPF: ABNORMAL /LPF
IMM GRANULOCYTES # BLD AUTO: 0.03 K/UL (ref 0–0.11)
IMM GRANULOCYTES NFR BLD AUTO: 0.4 % (ref 0–0.9)
INR PPP: 1.49 (ref 0.87–1.13)
KETONES UR STRIP.AUTO-MCNC: ABNORMAL MG/DL
LEUKOCYTE ESTERASE UR QL STRIP.AUTO: NEGATIVE
LIPASE SERPL-CCNC: 38 U/L (ref 11–82)
LYMPHOCYTES # BLD AUTO: 1.39 K/UL (ref 1–4.8)
LYMPHOCYTES NFR BLD: 19.9 % (ref 22–41)
MACROCYTES BLD QL SMEAR: ABNORMAL
MCH RBC QN AUTO: 35.2 PG (ref 27–33)
MCHC RBC AUTO-ENTMCNC: 35.3 G/DL (ref 33.7–35.3)
MCV RBC AUTO: 99.7 FL (ref 81.4–97.8)
MICRO URNS: ABNORMAL
MONOCYTES # BLD AUTO: 0.68 K/UL (ref 0–0.85)
MONOCYTES NFR BLD AUTO: 9.7 % (ref 0–13.4)
MORPHOLOGY BLD-IMP: NORMAL
NEUTROPHILS # BLD AUTO: 4.83 K/UL (ref 1.82–7.42)
NEUTROPHILS NFR BLD: 69.1 % (ref 44–72)
NITRITE UR QL STRIP.AUTO: NEGATIVE
NRBC # BLD AUTO: 0 K/UL
NRBC BLD-RTO: 0 /100 WBC
PH UR STRIP.AUTO: 6.5 [PH] (ref 5–8)
PLATELET # BLD AUTO: 43 K/UL (ref 164–446)
PLATELET BLD QL SMEAR: NORMAL
PLATELETS.RETICULATED NFR BLD AUTO: 15.8 K/UL (ref 0.6–13.1)
PMV BLD AUTO: 13.6 FL (ref 9–12.9)
POTASSIUM SERPL-SCNC: 3.4 MMOL/L (ref 3.6–5.5)
POTASSIUM SERPL-SCNC: 3.5 MMOL/L (ref 3.6–5.5)
POTASSIUM SERPL-SCNC: 3.6 MMOL/L (ref 3.6–5.5)
PROT SERPL-MCNC: 6.7 G/DL (ref 6–8.2)
PROT UR QL STRIP: NEGATIVE MG/DL
PROTHROMBIN TIME: 17.5 SEC (ref 12–14.6)
RBC # BLD AUTO: 3.78 M/UL (ref 4.7–6.1)
RBC # URNS HPF: ABNORMAL /HPF
RBC BLD AUTO: PRESENT
RBC UR QL AUTO: ABNORMAL
RSV RNA SPEC QL NAA+PROBE: NEGATIVE
SARS-COV-2 RNA RESP QL NAA+PROBE: NOTDETECTED
SODIUM SERPL-SCNC: 138 MMOL/L (ref 135–145)
SODIUM SERPL-SCNC: 138 MMOL/L (ref 135–145)
SODIUM SERPL-SCNC: 139 MMOL/L (ref 135–145)
SP GR UR STRIP.AUTO: 1.02
SPECIMEN SOURCE: NORMAL
TROPONIN T SERPL-MCNC: 16 NG/L (ref 6–19)
UROBILINOGEN UR STRIP.AUTO-MCNC: 1 MG/DL
WBC # BLD AUTO: 7 K/UL (ref 4.8–10.8)
WBC #/AREA URNS HPF: ABNORMAL /HPF

## 2022-05-03 PROCEDURE — 96372 THER/PROPH/DIAG INJ SC/IM: CPT

## 2022-05-03 PROCEDURE — 96375 TX/PRO/DX INJ NEW DRUG ADDON: CPT

## 2022-05-03 PROCEDURE — 82550 ASSAY OF CK (CPK): CPT

## 2022-05-03 PROCEDURE — 0241U HCHG SARS-COV-2 COVID-19 NFCT DS RESP RNA 4 TRGT MIC: CPT

## 2022-05-03 PROCEDURE — 700102 HCHG RX REV CODE 250 W/ 637 OVERRIDE(OP): Performed by: STUDENT IN AN ORGANIZED HEALTH CARE EDUCATION/TRAINING PROGRAM

## 2022-05-03 PROCEDURE — 36415 COLL VENOUS BLD VENIPUNCTURE: CPT

## 2022-05-03 PROCEDURE — 700105 HCHG RX REV CODE 258: Performed by: STUDENT IN AN ORGANIZED HEALTH CARE EDUCATION/TRAINING PROGRAM

## 2022-05-03 PROCEDURE — 80048 BASIC METABOLIC PNL TOTAL CA: CPT

## 2022-05-03 PROCEDURE — A9270 NON-COVERED ITEM OR SERVICE: HCPCS | Performed by: EMERGENCY MEDICINE

## 2022-05-03 PROCEDURE — 93005 ELECTROCARDIOGRAM TRACING: CPT | Performed by: EMERGENCY MEDICINE

## 2022-05-03 PROCEDURE — 81001 URINALYSIS AUTO W/SCOPE: CPT

## 2022-05-03 PROCEDURE — 86803 HEPATITIS C AB TEST: CPT

## 2022-05-03 PROCEDURE — 85055 RETICULATED PLATELET ASSAY: CPT

## 2022-05-03 PROCEDURE — A9270 NON-COVERED ITEM OR SERVICE: HCPCS | Performed by: STUDENT IN AN ORGANIZED HEALTH CARE EDUCATION/TRAINING PROGRAM

## 2022-05-03 PROCEDURE — 700102 HCHG RX REV CODE 250 W/ 637 OVERRIDE(OP): Performed by: EMERGENCY MEDICINE

## 2022-05-03 PROCEDURE — 85610 PROTHROMBIN TIME: CPT

## 2022-05-03 PROCEDURE — 93005 ELECTROCARDIOGRAM TRACING: CPT

## 2022-05-03 PROCEDURE — 96366 THER/PROPH/DIAG IV INF ADDON: CPT

## 2022-05-03 PROCEDURE — 80053 COMPREHEN METABOLIC PANEL: CPT

## 2022-05-03 PROCEDURE — 700101 HCHG RX REV CODE 250: Performed by: STUDENT IN AN ORGANIZED HEALTH CARE EDUCATION/TRAINING PROGRAM

## 2022-05-03 PROCEDURE — HZ2ZZZZ DETOXIFICATION SERVICES FOR SUBSTANCE ABUSE TREATMENT: ICD-10-PCS | Performed by: FAMILY MEDICINE

## 2022-05-03 PROCEDURE — 85025 COMPLETE CBC W/AUTO DIFF WBC: CPT

## 2022-05-03 PROCEDURE — 85730 THROMBOPLASTIN TIME PARTIAL: CPT

## 2022-05-03 PROCEDURE — 96365 THER/PROPH/DIAG IV INF INIT: CPT

## 2022-05-03 PROCEDURE — 83690 ASSAY OF LIPASE: CPT

## 2022-05-03 PROCEDURE — 99222 1ST HOSP IP/OBS MODERATE 55: CPT | Mod: GC | Performed by: FAMILY MEDICINE

## 2022-05-03 PROCEDURE — 99285 EMERGENCY DEPT VISIT HI MDM: CPT

## 2022-05-03 PROCEDURE — 700105 HCHG RX REV CODE 258: Performed by: EMERGENCY MEDICINE

## 2022-05-03 PROCEDURE — C9803 HOPD COVID-19 SPEC COLLECT: HCPCS | Performed by: EMERGENCY MEDICINE

## 2022-05-03 PROCEDURE — 700111 HCHG RX REV CODE 636 W/ 250 OVERRIDE (IP): Performed by: STUDENT IN AN ORGANIZED HEALTH CARE EDUCATION/TRAINING PROGRAM

## 2022-05-03 PROCEDURE — 84484 ASSAY OF TROPONIN QUANT: CPT

## 2022-05-03 PROCEDURE — 770020 HCHG ROOM/CARE - TELE (206)

## 2022-05-03 RX ORDER — POLYETHYLENE GLYCOL 3350 17 G/17G
1 POWDER, FOR SOLUTION ORAL
Status: DISCONTINUED | OUTPATIENT
Start: 2022-05-03 | End: 2022-05-05 | Stop reason: HOSPADM

## 2022-05-03 RX ORDER — PROMETHAZINE HYDROCHLORIDE 25 MG/1
12.5-25 SUPPOSITORY RECTAL EVERY 4 HOURS PRN
Status: DISCONTINUED | OUTPATIENT
Start: 2022-05-03 | End: 2022-05-05 | Stop reason: HOSPADM

## 2022-05-03 RX ORDER — ACETAMINOPHEN 325 MG/1
650 TABLET ORAL EVERY 6 HOURS PRN
Status: DISCONTINUED | OUTPATIENT
Start: 2022-05-03 | End: 2022-05-05 | Stop reason: HOSPADM

## 2022-05-03 RX ORDER — SODIUM CHLORIDE 9 MG/ML
1000 INJECTION, SOLUTION INTRAVENOUS ONCE
Status: COMPLETED | OUTPATIENT
Start: 2022-05-03 | End: 2022-05-03

## 2022-05-03 RX ORDER — GAUZE BANDAGE 2" X 2"
100 BANDAGE TOPICAL DAILY
Status: DISCONTINUED | OUTPATIENT
Start: 2022-05-04 | End: 2022-05-05 | Stop reason: HOSPADM

## 2022-05-03 RX ORDER — LORAZEPAM 2 MG/ML
0.5 INJECTION INTRAMUSCULAR EVERY 4 HOURS PRN
Status: DISCONTINUED | OUTPATIENT
Start: 2022-05-03 | End: 2022-05-05 | Stop reason: HOSPADM

## 2022-05-03 RX ORDER — LORAZEPAM 2 MG/1
2 TABLET ORAL
Status: DISCONTINUED | OUTPATIENT
Start: 2022-05-03 | End: 2022-05-05 | Stop reason: HOSPADM

## 2022-05-03 RX ORDER — PROCHLORPERAZINE EDISYLATE 5 MG/ML
5-10 INJECTION INTRAMUSCULAR; INTRAVENOUS EVERY 4 HOURS PRN
Status: DISCONTINUED | OUTPATIENT
Start: 2022-05-03 | End: 2022-05-05 | Stop reason: HOSPADM

## 2022-05-03 RX ORDER — LORAZEPAM 2 MG/1
4 TABLET ORAL
Status: DISCONTINUED | OUTPATIENT
Start: 2022-05-03 | End: 2022-05-05 | Stop reason: HOSPADM

## 2022-05-03 RX ORDER — DIAZEPAM 5 MG/1
5 TABLET ORAL ONCE
Status: COMPLETED | OUTPATIENT
Start: 2022-05-03 | End: 2022-05-03

## 2022-05-03 RX ORDER — SODIUM CHLORIDE 9 MG/ML
INJECTION, SOLUTION INTRAVENOUS CONTINUOUS
Status: DISCONTINUED | OUTPATIENT
Start: 2022-05-03 | End: 2022-05-05 | Stop reason: HOSPADM

## 2022-05-03 RX ORDER — LABETALOL HYDROCHLORIDE 5 MG/ML
10 INJECTION, SOLUTION INTRAVENOUS
Status: DISCONTINUED | OUTPATIENT
Start: 2022-05-03 | End: 2022-05-05 | Stop reason: HOSPADM

## 2022-05-03 RX ORDER — LABETALOL HYDROCHLORIDE 5 MG/ML
10 INJECTION, SOLUTION INTRAVENOUS EVERY 4 HOURS PRN
Status: DISCONTINUED | OUTPATIENT
Start: 2022-05-03 | End: 2022-05-03

## 2022-05-03 RX ORDER — PROMETHAZINE HYDROCHLORIDE 25 MG/1
12.5-25 TABLET ORAL EVERY 4 HOURS PRN
Status: DISCONTINUED | OUTPATIENT
Start: 2022-05-03 | End: 2022-05-05 | Stop reason: HOSPADM

## 2022-05-03 RX ORDER — LABETALOL 100 MG/1
200 TABLET, FILM COATED ORAL EVERY 6 HOURS PRN
Status: DISCONTINUED | OUTPATIENT
Start: 2022-05-03 | End: 2022-05-05 | Stop reason: HOSPADM

## 2022-05-03 RX ORDER — LORAZEPAM 2 MG/ML
2 INJECTION INTRAMUSCULAR
Status: DISCONTINUED | OUTPATIENT
Start: 2022-05-03 | End: 2022-05-05 | Stop reason: HOSPADM

## 2022-05-03 RX ORDER — LORAZEPAM 1 MG/1
1 TABLET ORAL EVERY 4 HOURS PRN
Status: DISCONTINUED | OUTPATIENT
Start: 2022-05-03 | End: 2022-05-05 | Stop reason: HOSPADM

## 2022-05-03 RX ORDER — LORAZEPAM 2 MG/ML
1.5 INJECTION INTRAMUSCULAR
Status: DISCONTINUED | OUTPATIENT
Start: 2022-05-03 | End: 2022-05-05 | Stop reason: HOSPADM

## 2022-05-03 RX ORDER — LORAZEPAM 2 MG/ML
1 INJECTION INTRAMUSCULAR
Status: DISCONTINUED | OUTPATIENT
Start: 2022-05-03 | End: 2022-05-05 | Stop reason: HOSPADM

## 2022-05-03 RX ORDER — AMOXICILLIN 250 MG
2 CAPSULE ORAL 2 TIMES DAILY
Status: DISCONTINUED | OUTPATIENT
Start: 2022-05-03 | End: 2022-05-05 | Stop reason: HOSPADM

## 2022-05-03 RX ORDER — BISACODYL 10 MG
10 SUPPOSITORY, RECTAL RECTAL
Status: DISCONTINUED | OUTPATIENT
Start: 2022-05-03 | End: 2022-05-05 | Stop reason: HOSPADM

## 2022-05-03 RX ORDER — FOLIC ACID 1 MG/1
1 TABLET ORAL DAILY
Status: DISCONTINUED | OUTPATIENT
Start: 2022-05-04 | End: 2022-05-05 | Stop reason: HOSPADM

## 2022-05-03 RX ORDER — ENOXAPARIN SODIUM 100 MG/ML
40 INJECTION SUBCUTANEOUS DAILY
Status: DISCONTINUED | OUTPATIENT
Start: 2022-05-03 | End: 2022-05-05 | Stop reason: HOSPADM

## 2022-05-03 RX ORDER — LORAZEPAM 0.5 MG/1
0.5 TABLET ORAL EVERY 4 HOURS PRN
Status: DISCONTINUED | OUTPATIENT
Start: 2022-05-03 | End: 2022-05-05 | Stop reason: HOSPADM

## 2022-05-03 RX ADMIN — DIAZEPAM 5 MG: 5 TABLET ORAL at 03:00

## 2022-05-03 RX ADMIN — ENOXAPARIN SODIUM 40 MG: 40 INJECTION SUBCUTANEOUS at 08:38

## 2022-05-03 RX ADMIN — LORAZEPAM 0.5 MG: 0.5 TABLET ORAL at 13:37

## 2022-05-03 RX ADMIN — ACETAMINOPHEN 650 MG: 325 TABLET, FILM COATED ORAL at 13:38

## 2022-05-03 RX ADMIN — SODIUM CHLORIDE 1000 ML: 9 INJECTION, SOLUTION INTRAVENOUS at 06:00

## 2022-05-03 RX ADMIN — ACETAMINOPHEN 650 MG: 325 TABLET, FILM COATED ORAL at 21:53

## 2022-05-03 RX ADMIN — THIAMINE HYDROCHLORIDE: 100 INJECTION, SOLUTION INTRAMUSCULAR; INTRAVENOUS at 08:27

## 2022-05-03 RX ADMIN — SODIUM CHLORIDE: 9 INJECTION, SOLUTION INTRAVENOUS at 08:30

## 2022-05-03 RX ADMIN — SENNOSIDES AND DOCUSATE SODIUM 2 TABLET: 50; 8.6 TABLET ORAL at 16:43

## 2022-05-03 ASSESSMENT — LIFESTYLE VARIABLES
ANXIETY: MILDLY ANXIOUS
PAROXYSMAL SWEATS: NO SWEAT VISIBLE
TREMOR: TREMOR NOT VISIBLE BUT CAN BE FELT, FINGERTIP TO FINGERTIP
VISUAL DISTURBANCES: VERY MILD SENSITIVITY
ORIENTATION AND CLOUDING OF SENSORIUM: ORIENTED AND CAN DO SERIAL ADDITIONS
AGITATION: NORMAL ACTIVITY
TREMOR: NO TREMOR
AGITATION: NORMAL ACTIVITY
DO YOU DRINK ALCOHOL: YES
PAROXYSMAL SWEATS: NO SWEAT VISIBLE
ORIENTATION AND CLOUDING OF SENSORIUM: ORIENTED AND CAN DO SERIAL ADDITIONS
PAROXYSMAL SWEATS: BARELY PERCEPTIBLE SWEATING. PALMS MOIST
HAVE PEOPLE ANNOYED YOU BY CRITICIZING YOUR DRINKING: YES
AUDITORY DISTURBANCES: NOT PRESENT
AUDITORY DISTURBANCES: NOT PRESENT
HEADACHE, FULLNESS IN HEAD: VERY MILD
TOTAL SCORE: 4
EVER FELT BAD OR GUILTY ABOUT YOUR DRINKING: YES
TOTAL SCORE: 4
AUDITORY DISTURBANCES: NOT PRESENT
AGITATION: NORMAL ACTIVITY
ANXIETY: MILDLY ANXIOUS
HAVE YOU EVER FELT YOU SHOULD CUT DOWN ON YOUR DRINKING: YES
VISUAL DISTURBANCES: VERY MILD SENSITIVITY
TOTAL SCORE: 4
TOTAL SCORE: 4
SUBSTANCE_ABUSE: 1
ORIENTATION AND CLOUDING OF SENSORIUM: ORIENTED AND CAN DO SERIAL ADDITIONS
HEADACHE, FULLNESS IN HEAD: VERY MILD
NAUSEA AND VOMITING: NO NAUSEA AND NO VOMITING
ON A TYPICAL DAY WHEN YOU DRINK ALCOHOL HOW MANY DRINKS DO YOU HAVE: 1
AVERAGE NUMBER OF DAYS PER WEEK YOU HAVE A DRINK CONTAINING ALCOHOL: 7
TOTAL SCORE: 5
TOTAL SCORE: 3
TREMOR: NO TREMOR
CONSUMPTION TOTAL: POSITIVE
DOES PATIENT WANT TO TALK TO SOMEONE ABOUT QUITTING: YES
ANXIETY: MILDLY ANXIOUS
NAUSEA AND VOMITING: NO NAUSEA AND NO VOMITING
HOW MANY TIMES IN THE PAST YEAR HAVE YOU HAD 5 OR MORE DRINKS IN A DAY: 50
NAUSEA AND VOMITING: NO NAUSEA AND NO VOMITING
DOES PATIENT WANT TO STOP DRINKING: YES
HEADACHE, FULLNESS IN HEAD: MILD
EVER HAD A DRINK FIRST THING IN THE MORNING TO STEADY YOUR NERVES TO GET RID OF A HANGOVER: YES
VISUAL DISTURBANCES: VERY MILD SENSITIVITY

## 2022-05-03 ASSESSMENT — ENCOUNTER SYMPTOMS
FEVER: 0
COUGH: 0
VOMITING: 0
SORE THROAT: 0
DIZZINESS: 0
CHILLS: 0
SHORTNESS OF BREATH: 0
DIARRHEA: 0
HEADACHES: 0
NAUSEA: 0
BLOOD IN STOOL: 0
ABDOMINAL PAIN: 1
CONSTIPATION: 0
FLANK PAIN: 0

## 2022-05-03 ASSESSMENT — COGNITIVE AND FUNCTIONAL STATUS - GENERAL
MOBILITY SCORE: 24
DAILY ACTIVITIY SCORE: 24
SUGGESTED CMS G CODE MODIFIER MOBILITY: CH
SUGGESTED CMS G CODE MODIFIER DAILY ACTIVITY: CH

## 2022-05-03 ASSESSMENT — PATIENT HEALTH QUESTIONNAIRE - PHQ9
5. POOR APPETITE OR OVEREATING: SEVERAL DAYS
4. FEELING TIRED OR HAVING LITTLE ENERGY: SEVERAL DAYS
6. FEELING BAD ABOUT YOURSELF - OR THAT YOU ARE A FAILURE OR HAVE LET YOURSELF OR YOUR FAMILY DOWN: NEARLY EVERY DAY
7. TROUBLE CONCENTRATING ON THINGS, SUCH AS READING THE NEWSPAPER OR WATCHING TELEVISION: NEARLY EVERY DAY
3. TROUBLE FALLING OR STAYING ASLEEP OR SLEEPING TOO MUCH: MORE THAN HALF THE DAYS
9. THOUGHTS THAT YOU WOULD BE BETTER OFF DEAD, OR OF HURTING YOURSELF: NEARLY EVERY DAY
SUM OF ALL RESPONSES TO PHQ QUESTIONS 1-9: 22
8. MOVING OR SPEAKING SO SLOWLY THAT OTHER PEOPLE COULD HAVE NOTICED. OR THE OPPOSITE, BEING SO FIGETY OR RESTLESS THAT YOU HAVE BEEN MOVING AROUND A LOT MORE THAN USUAL: NEARLY EVERY DAY
2. FEELING DOWN, DEPRESSED, IRRITABLE, OR HOPELESS: NEARLY EVERY DAY
SUM OF ALL RESPONSES TO PHQ9 QUESTIONS 1 AND 2: 6
1. LITTLE INTEREST OR PLEASURE IN DOING THINGS: NEARLY EVERY DAY

## 2022-05-03 ASSESSMENT — FIBROSIS 4 INDEX: FIB4 SCORE: 19.94

## 2022-05-03 ASSESSMENT — PAIN DESCRIPTION - PAIN TYPE
TYPE: ACUTE PAIN
TYPE: ACUTE PAIN

## 2022-05-03 NOTE — ED NOTES
Urinal emptied of 300ml urine.  Urinal now at BS, side rails up x2, cardiac & VS monitoring in progress; NSR.   Pt sleepy but resp even & unlabored, speech clear, able to speak in complete sentences.  New blankets provided.

## 2022-05-03 NOTE — ED NOTES
Pt asleep w/ side rails up x2.  Resp even & unlabored.  Monitoring continuing: NSR.  NS and Detox IV infusing via pumps.

## 2022-05-03 NOTE — ED NOTES
GABRIELE Soriano here to transport pt to T833-02.  Report called to GABRIELE Martinez. All belongings sent with pt.
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
## 2022-05-03 NOTE — CARE PLAN
The patient is Stable - Low risk of patient condition declining or worsening         Progress made toward(s) clinical / shift goals:  monitor for with drawls     Patient is not progressing towards the following goals:      Problem: Knowledge Deficit - Standard  Goal: Patient and family/care givers will demonstrate understanding of plan of care, disease process/condition, diagnostic tests and medications  Outcome: Progressing     Problem: Pain - Standard  Goal: Alleviation of pain or a reduction in pain to the patient’s comfort goal  Outcome: Progressing
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
## 2022-05-03 NOTE — ED NOTES
Emmy from Lab called with critical result of Platelet 43 at 0205. Critical lab result read back to Emmy.   Dr. de leon notified of critical lab result at 0206.  Critical lab result read back by Dr. De Leon.

## 2022-05-03 NOTE — ED NOTES
Pt awake alert and oriented. Pt reports wanting to detox. Pt reports he feels like his thinking is not clear, no active shaking or tremors noted. Pt denies nausea, but reports discomfort to RLQ/LLQ. Vitals currently stable. Pt cooperative, speech is clear. Pt reports only having 1 beer today.
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
## 2022-05-03 NOTE — ED NOTES
Pt asleep on gurney, resp even & unlabored, Cardiac & VS monitoring in progress, NS bolus infusing (started earlier per Kyle, GABRIELE), IV 20 G LT hand, side rails up x2.

## 2022-05-03 NOTE — ED TRIAGE NOTES
"Chief Complaint   Patient presents with   • Abdominal Pain     RLQ pain that is chronic and non-stop   • Detox     Pt had 1 beer at 1900. Usually drinks 18 beers a day.      Pt ambulated to triage for above complaint in addition to chest pain, SOB, tremors, and confusion.  Pt signed out of this facility AMA on 04/30 after being scared to detox.  Pt is back this evening in hopes to detox. Pt drinks about 18 beers a day and said his last drink was only 1 beer today at 1900.  EKG complete in triage.    Pt educated on triage process and told to alert staff of any change in condition or concerns.    /107   Pulse (!) 115   Temp 36.1 °C (97 °F) (Temporal)   Resp 15   Ht 1.905 m (6' 3\")   Wt 112 kg (247 lb 12.8 oz)   SpO2 97%   BMI 30.97 kg/m²             "

## 2022-05-04 ENCOUNTER — APPOINTMENT (OUTPATIENT)
Dept: RADIOLOGY | Facility: MEDICAL CENTER | Age: 42
DRG: 897 | End: 2022-05-04
Attending: STUDENT IN AN ORGANIZED HEALTH CARE EDUCATION/TRAINING PROGRAM
Payer: MEDICAID

## 2022-05-04 ENCOUNTER — PATIENT OUTREACH (OUTPATIENT)
Dept: HEALTH INFORMATION MANAGEMENT | Facility: OTHER | Age: 42
End: 2022-05-04
Payer: MEDICAID

## 2022-05-04 LAB
ALBUMIN SERPL BCP-MCNC: 2.7 G/DL (ref 3.2–4.9)
ALBUMIN/GLOB SERPL: 1 G/DL
ALP SERPL-CCNC: 132 U/L (ref 30–99)
ALT SERPL-CCNC: 30 U/L (ref 2–50)
ANION GAP SERPL CALC-SCNC: 9 MMOL/L (ref 7–16)
AST SERPL-CCNC: 72 U/L (ref 12–45)
BILIRUB SERPL-MCNC: 2.4 MG/DL (ref 0.1–1.5)
BUN SERPL-MCNC: 6 MG/DL (ref 8–22)
CALCIUM SERPL-MCNC: 7.8 MG/DL (ref 8.5–10.5)
CHLORIDE SERPL-SCNC: 107 MMOL/L (ref 96–112)
CK SERPL-CCNC: 512 U/L (ref 0–154)
CO2 SERPL-SCNC: 23 MMOL/L (ref 20–33)
CREAT SERPL-MCNC: 0.42 MG/DL (ref 0.5–1.4)
ERYTHROCYTE [DISTWIDTH] IN BLOOD BY AUTOMATED COUNT: 51.9 FL (ref 35.9–50)
GFR SERPLBLD CREATININE-BSD FMLA CKD-EPI: 139 ML/MIN/1.73 M 2
GLOBULIN SER CALC-MCNC: 2.8 G/DL (ref 1.9–3.5)
GLUCOSE SERPL-MCNC: 96 MG/DL (ref 65–99)
HCT VFR BLD AUTO: 35.1 % (ref 42–52)
HGB BLD-MCNC: 12.2 G/DL (ref 14–18)
MAGNESIUM SERPL-MCNC: 1.4 MG/DL (ref 1.5–2.5)
MCH RBC QN AUTO: 35.3 PG (ref 27–33)
MCHC RBC AUTO-ENTMCNC: 34.8 G/DL (ref 33.7–35.3)
MCV RBC AUTO: 101.4 FL (ref 81.4–97.8)
PHOSPHATE SERPL-MCNC: 3.9 MG/DL (ref 2.5–4.5)
PLATELET # BLD AUTO: 50 K/UL (ref 164–446)
PMV BLD AUTO: 12.4 FL (ref 9–12.9)
POTASSIUM SERPL-SCNC: 3.3 MMOL/L (ref 3.6–5.5)
PROT SERPL-MCNC: 5.5 G/DL (ref 6–8.2)
RBC # BLD AUTO: 3.46 M/UL (ref 4.7–6.1)
SODIUM SERPL-SCNC: 139 MMOL/L (ref 135–145)
WBC # BLD AUTO: 5.5 K/UL (ref 4.8–10.8)

## 2022-05-04 PROCEDURE — 84100 ASSAY OF PHOSPHORUS: CPT

## 2022-05-04 PROCEDURE — 700102 HCHG RX REV CODE 250 W/ 637 OVERRIDE(OP): Performed by: STUDENT IN AN ORGANIZED HEALTH CARE EDUCATION/TRAINING PROGRAM

## 2022-05-04 PROCEDURE — 71045 X-RAY EXAM CHEST 1 VIEW: CPT

## 2022-05-04 PROCEDURE — 97602 WOUND(S) CARE NON-SELECTIVE: CPT

## 2022-05-04 PROCEDURE — 85027 COMPLETE CBC AUTOMATED: CPT

## 2022-05-04 PROCEDURE — A9270 NON-COVERED ITEM OR SERVICE: HCPCS | Performed by: STUDENT IN AN ORGANIZED HEALTH CARE EDUCATION/TRAINING PROGRAM

## 2022-05-04 PROCEDURE — 71101 X-RAY EXAM UNILAT RIBS/CHEST: CPT | Mod: RT

## 2022-05-04 PROCEDURE — 83735 ASSAY OF MAGNESIUM: CPT

## 2022-05-04 PROCEDURE — 770020 HCHG ROOM/CARE - TELE (206)

## 2022-05-04 PROCEDURE — 80053 COMPREHEN METABOLIC PANEL: CPT

## 2022-05-04 PROCEDURE — 700105 HCHG RX REV CODE 258: Performed by: STUDENT IN AN ORGANIZED HEALTH CARE EDUCATION/TRAINING PROGRAM

## 2022-05-04 PROCEDURE — 82550 ASSAY OF CK (CPK): CPT

## 2022-05-04 PROCEDURE — 99232 SBSQ HOSP IP/OBS MODERATE 35: CPT | Mod: GC | Performed by: FAMILY MEDICINE

## 2022-05-04 PROCEDURE — 700111 HCHG RX REV CODE 636 W/ 250 OVERRIDE (IP): Performed by: STUDENT IN AN ORGANIZED HEALTH CARE EDUCATION/TRAINING PROGRAM

## 2022-05-04 RX ORDER — POTASSIUM CHLORIDE 20 MEQ/1
40 TABLET, EXTENDED RELEASE ORAL ONCE
Status: COMPLETED | OUTPATIENT
Start: 2022-05-04 | End: 2022-05-04

## 2022-05-04 RX ORDER — MAGNESIUM SULFATE HEPTAHYDRATE 40 MG/ML
4 INJECTION, SOLUTION INTRAVENOUS ONCE
Status: COMPLETED | OUTPATIENT
Start: 2022-05-04 | End: 2022-05-04

## 2022-05-04 RX ORDER — TRAZODONE HYDROCHLORIDE 50 MG/1
50 TABLET ORAL
Status: DISCONTINUED | OUTPATIENT
Start: 2022-05-04 | End: 2022-05-05 | Stop reason: HOSPADM

## 2022-05-04 RX ORDER — ACETAMINOPHEN 325 MG/1
650 TABLET ORAL EVERY 6 HOURS PRN
Qty: 30 TABLET | Refills: 0 | COMMUNITY
Start: 2022-05-04

## 2022-05-04 RX ADMIN — POTASSIUM CHLORIDE 40 MEQ: 1500 TABLET, EXTENDED RELEASE ORAL at 07:57

## 2022-05-04 RX ADMIN — THIAMINE HCL TAB 100 MG 100 MG: 100 TAB at 05:50

## 2022-05-04 RX ADMIN — MAGNESIUM SULFATE HEPTAHYDRATE 4 G: 40 INJECTION, SOLUTION INTRAVENOUS at 07:57

## 2022-05-04 RX ADMIN — THERA TABS 1 TABLET: TAB at 05:51

## 2022-05-04 RX ADMIN — FOLIC ACID 1 MG: 1 TABLET ORAL at 05:50

## 2022-05-04 RX ADMIN — ENOXAPARIN SODIUM 40 MG: 40 INJECTION SUBCUTANEOUS at 05:53

## 2022-05-04 RX ADMIN — TRAZODONE HYDROCHLORIDE 50 MG: 50 TABLET ORAL at 21:37

## 2022-05-04 RX ADMIN — SODIUM CHLORIDE: 9 INJECTION, SOLUTION INTRAVENOUS at 07:34

## 2022-05-04 SDOH — ECONOMIC STABILITY: FOOD INSECURITY: WITHIN THE PAST 12 MONTHS, THE FOOD YOU BOUGHT JUST DIDN'T LAST AND YOU DIDN'T HAVE MONEY TO GET MORE.: NEVER TRUE

## 2022-05-04 SDOH — ECONOMIC STABILITY: FOOD INSECURITY: WITHIN THE PAST 12 MONTHS, YOU WORRIED THAT YOUR FOOD WOULD RUN OUT BEFORE YOU GOT MONEY TO BUY MORE.: NEVER TRUE

## 2022-05-04 SDOH — ECONOMIC STABILITY: TRANSPORTATION INSECURITY
IN THE PAST 12 MONTHS, HAS LACK OF TRANSPORTATION KEPT YOU FROM MEETINGS, WORK, OR FROM GETTING THINGS NEEDED FOR DAILY LIVING?: YES

## 2022-05-04 SDOH — ECONOMIC STABILITY: TRANSPORTATION INSECURITY
IN THE PAST 12 MONTHS, HAS THE LACK OF TRANSPORTATION KEPT YOU FROM MEDICAL APPOINTMENTS OR FROM GETTING MEDICATIONS?: YES

## 2022-05-04 ASSESSMENT — LIFESTYLE VARIABLES
TOTAL SCORE: 2
TREMOR: NO TREMOR
NAUSEA AND VOMITING: NO NAUSEA AND NO VOMITING
ORIENTATION AND CLOUDING OF SENSORIUM: ORIENTED AND CAN DO SERIAL ADDITIONS
NAUSEA AND VOMITING: NO NAUSEA AND NO VOMITING
PAROXYSMAL SWEATS: BARELY PERCEPTIBLE SWEATING. PALMS MOIST
NAUSEA AND VOMITING: NO NAUSEA AND NO VOMITING
AGITATION: NORMAL ACTIVITY
VISUAL DISTURBANCES: NOT PRESENT
ORIENTATION AND CLOUDING OF SENSORIUM: ORIENTED AND CAN DO SERIAL ADDITIONS
ANXIETY: MILDLY ANXIOUS
AGITATION: NORMAL ACTIVITY
AGITATION: NORMAL ACTIVITY
NAUSEA AND VOMITING: NO NAUSEA AND NO VOMITING
HEADACHE, FULLNESS IN HEAD: NOT PRESENT
PAROXYSMAL SWEATS: NO SWEAT VISIBLE
VISUAL DISTURBANCES: VERY MILD SENSITIVITY
ANXIETY: NO ANXIETY (AT EASE)
TOTAL SCORE: 2
HEADACHE, FULLNESS IN HEAD: VERY MILD
VISUAL DISTURBANCES: NOT PRESENT
AUDITORY DISTURBANCES: NOT PRESENT
ANXIETY: MILDLY ANXIOUS
AUDITORY DISTURBANCES: NOT PRESENT
PAROXYSMAL SWEATS: NO SWEAT VISIBLE
VISUAL DISTURBANCES: NOT PRESENT
PAROXYSMAL SWEATS: NO SWEAT VISIBLE
HEADACHE, FULLNESS IN HEAD: MILD
TREMOR: NO TREMOR
TREMOR: NO TREMOR
VISUAL DISTURBANCES: NOT PRESENT
ORIENTATION AND CLOUDING OF SENSORIUM: ORIENTED AND CAN DO SERIAL ADDITIONS
TOTAL SCORE: 4
PAROXYSMAL SWEATS: NO SWEAT VISIBLE
AGITATION: NORMAL ACTIVITY
AGITATION: NORMAL ACTIVITY
TOTAL SCORE: 4
TOTAL SCORE: 3
NAUSEA AND VOMITING: NO NAUSEA AND NO VOMITING
ORIENTATION AND CLOUDING OF SENSORIUM: ORIENTED AND CAN DO SERIAL ADDITIONS
VISUAL DISTURBANCES: VERY MILD SENSITIVITY
TREMOR: *
HEADACHE, FULLNESS IN HEAD: MODERATE
ANXIETY: MILDLY ANXIOUS
TREMOR: NO TREMOR
HEADACHE, FULLNESS IN HEAD: NOT PRESENT
AUDITORY DISTURBANCES: NOT PRESENT
ANXIETY: MILDLY ANXIOUS
AUDITORY DISTURBANCES: NOT PRESENT
TREMOR: NO TREMOR
NAUSEA AND VOMITING: NO NAUSEA AND NO VOMITING
PAROXYSMAL SWEATS: BARELY PERCEPTIBLE SWEATING. PALMS MOIST
ORIENTATION AND CLOUDING OF SENSORIUM: ORIENTED AND CAN DO SERIAL ADDITIONS
HEADACHE, FULLNESS IN HEAD: MILD
TOTAL SCORE: 3
ORIENTATION AND CLOUDING OF SENSORIUM: ORIENTED AND CAN DO SERIAL ADDITIONS
ANXIETY: NO ANXIETY (AT EASE)
AGITATION: NORMAL ACTIVITY

## 2022-05-04 ASSESSMENT — SOCIAL DETERMINANTS OF HEALTH (SDOH): HOW HARD IS IT FOR YOU TO PAY FOR THE VERY BASICS LIKE FOOD, HOUSING, MEDICAL CARE, AND HEATING?: VERY HARD

## 2022-05-04 ASSESSMENT — PAIN DESCRIPTION - PAIN TYPE: TYPE: ACUTE PAIN

## 2022-05-04 ASSESSMENT — FIBROSIS 4 INDEX: FIB4 SCORE: 10.52
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
## 2022-05-04 NOTE — PROGRESS NOTES
Monitor Summary:     Rhythm: Sinus Rhythm    Rate: 87-81    Measurement: .14/.09/.45    Ectopy: none    12 hr cc
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
## 2022-05-04 NOTE — CARE PLAN
The patient is Stable - Low risk of patient condition declining or worsening    Shift Goals  Clinical Goals: CIWA, monitor labs  Patient Goals: RestRest, pain relief  Family Goals: n/a    Progress made toward(s) clinical / shift goals:    Problem: Knowledge Deficit - Standard  Goal: Patient and family/care givers will demonstrate understanding of plan of care, disease process/condition, diagnostic tests and medications     Problem: Pain - Standard  Goal: Alleviation of pain or a reduction in pain to the patient’s comfort goal      Patient is not progressing towards the following goals:
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
## 2022-05-04 NOTE — PROGRESS NOTES
Bedside report received from day RN, pt care assumed, assessment completed. Pt is A&O 4, pain 8/10 in abdomen, sinus rhythm on the monitor. Updated on POC, questions answered. Bed in lowest, locked position, treaded socks on, call light and belongings within reach. Fall precautions in place.

## 2022-05-04 NOTE — DISCHARGE PLANNING
Anticipated Discharge Disposition: Home    Action: pt pending medical clearance, pt in CIWA protocol. Pt on room air and 6 clicks are 24, no case management needs identified.    Barriers to Discharge: medical clearance    Plan: f/u with medical team and pt to discuss dc needs and barriers.    1415 - Spoke to pt at bedside and offered List of Affordable Housing Options in [facility] application form. Pt amenable, no questions or concerns, no other case management needs.

## 2022-05-04 NOTE — CARE PLAN
The patient is Watcher - Medium risk of patient condition declining or worsening    Shift Goals  Clinical Goals: Monitor pain/labs  Patient Goals: RestRest, pain relief  Family Goals: n/a    Progress made toward(s) clinical / shift goals:    Problem: Knowledge Deficit - Standard  Goal: Patient and family/care givers will demonstrate understanding of plan of care, disease process/condition, diagnostic tests and medications  Outcome: Progressing     Problem: Pain - Standard  Goal: Alleviation of pain or a reduction in pain to the patient’s comfort goal  Outcome: Progressing

## 2022-05-04 NOTE — PROGRESS NOTES
Received bedside report from NOC RN. Pt is A&Ox4. Pt is resting in bed, no signs of labored breathing or pain. Denies CP/SOB. Pt on RA. Call light & personal belongings within reach, bed in lowest position & locked, and bed alarm is on. Fall precautions in place and education provided on how to use call light, hourly rounding in place. Educated on calling before getting OOB. Pt updated on plan of care for the shift. Pt declines any additional needs at this time.
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
## 2022-05-05 VITALS
RESPIRATION RATE: 18 BRPM | BODY MASS INDEX: 30.81 KG/M2 | TEMPERATURE: 99.7 F | HEIGHT: 75 IN | SYSTOLIC BLOOD PRESSURE: 140 MMHG | DIASTOLIC BLOOD PRESSURE: 74 MMHG | WEIGHT: 247.8 LBS | HEART RATE: 76 BPM | OXYGEN SATURATION: 96 %

## 2022-05-05 PROCEDURE — 700105 HCHG RX REV CODE 258: Performed by: STUDENT IN AN ORGANIZED HEALTH CARE EDUCATION/TRAINING PROGRAM

## 2022-05-05 PROCEDURE — 700102 HCHG RX REV CODE 250 W/ 637 OVERRIDE(OP): Performed by: STUDENT IN AN ORGANIZED HEALTH CARE EDUCATION/TRAINING PROGRAM

## 2022-05-05 PROCEDURE — 99238 HOSP IP/OBS DSCHRG MGMT 30/<: CPT | Mod: GC | Performed by: FAMILY MEDICINE

## 2022-05-05 PROCEDURE — A9270 NON-COVERED ITEM OR SERVICE: HCPCS | Performed by: STUDENT IN AN ORGANIZED HEALTH CARE EDUCATION/TRAINING PROGRAM

## 2022-05-05 RX ADMIN — FOLIC ACID 1 MG: 1 TABLET ORAL at 04:27

## 2022-05-05 RX ADMIN — THIAMINE HCL TAB 100 MG 100 MG: 100 TAB at 04:27

## 2022-05-05 RX ADMIN — THERA TABS 1 TABLET: TAB at 04:27

## 2022-05-05 RX ADMIN — SODIUM CHLORIDE: 9 INJECTION, SOLUTION INTRAVENOUS at 02:08

## 2022-05-05 ASSESSMENT — LIFESTYLE VARIABLES
HEADACHE, FULLNESS IN HEAD: NOT PRESENT
ANXIETY: NO ANXIETY (AT EASE)
TOTAL SCORE: 1
AUDITORY DISTURBANCES: NOT PRESENT
NAUSEA AND VOMITING: NO NAUSEA AND NO VOMITING
NAUSEA AND VOMITING: NO NAUSEA AND NO VOMITING
VISUAL DISTURBANCES: NOT PRESENT
ANXIETY: NO ANXIETY (AT EASE)
AGITATION: NORMAL ACTIVITY
PAROXYSMAL SWEATS: NO SWEAT VISIBLE
TOTAL SCORE: 0
HEADACHE, FULLNESS IN HEAD: NOT PRESENT
AGITATION: NORMAL ACTIVITY
ORIENTATION AND CLOUDING OF SENSORIUM: ORIENTED AND CAN DO SERIAL ADDITIONS
AUDITORY DISTURBANCES: NOT PRESENT
TREMOR: TREMOR NOT VISIBLE BUT CAN BE FELT, FINGERTIP TO FINGERTIP
PAROXYSMAL SWEATS: NO SWEAT VISIBLE
TREMOR: NO TREMOR
ORIENTATION AND CLOUDING OF SENSORIUM: ORIENTED AND CAN DO SERIAL ADDITIONS
VISUAL DISTURBANCES: NOT PRESENT

## 2022-05-05 NOTE — PROGRESS NOTES
Patient discharged home. Patient AOX 4.  IV removed, discharge instructions provided to patient and reviewed with them. All questions answered, patient provided copy of discharge instructions and instructed on when to F/U with MD. Patient walked off unit. Patient discharged into the care of himself.

## 2022-05-05 NOTE — PROGRESS NOTES
Bedside report received from day RN, pt care assumed, assessment completed. Pt is A&O 4, pain at a 0, sinus rhythm on the monitor. Updated on POC, questions answered. Bed in lowest, locked position, treaded socks on, call light and belongings within reach. Fall precautions in place.

## 2022-05-05 NOTE — DISCHARGE INSTRUCTIONS
Discharge Instructions    Discharged to home by car with self. Discharged via wheelchair, hospital escort: Yes.  Special equipment needed: Not Applicable    Be sure to schedule a follow-up appointment with your primary care doctor or any specialists as instructed.     Discharge Plan:   Diet Plan: Discussed  Activity Level: Discussed  Confirmed Follow up Appointment: Patient to Call and Schedule Appointment  Confirmed Symptoms Management: Discussed  Medication Reconciliation Updated: Yes    I understand that a diet low in cholesterol, fat, and sodium is recommended for good health. Unless I have been given specific instructions below for another diet, I accept this instruction as my diet prescription.   Other diet: regular    Special Instructions: None    · Is patient discharged on Warfarin / Coumadin?   No     Depression / Suicide Risk    As you are discharged from this [facility] facility, it is important to learn how to keep safe from harming yourself.    Recognize the warning signs:  · Abrupt changes in personality, positive or negative- including increase in energy   · Giving away possessions  · Change in eating patterns- significant weight changes-  positive or negative  · Change in sleeping patterns- unable to sleep or sleeping all the time   · Unwillingness or inability to communicate  · Depression  · Unusual sadness, discouragement and loneliness  · Talk of wanting to die  · Neglect of personal appearance   · Rebelliousness- reckless behavior  · Withdrawal from people/activities they love  · Confusion- inability to concentrate     If you or a loved one observes any of these behaviors or has concerns about self-harm, here's what you can do:  · Talk about it- your feelings and reasons for harming yourself  · Remove any means that you might use to hurt yourself (examples: pills, rope, extension cords, firearm)  · Get professional help from the community (Mental Health, Substance Abuse, psychological  counseling)  · Do not be alone:Call your Safe Contact- someone whom you trust who will be there for you.  · Call your local CRISIS HOTLINE 622-2595 or 499-541-1837  · Call your local Children's Mobile Crisis Response Team [place] (561) 022-6635 or www.Tinychat  · Call the toll free National Suicide Prevention Hotlines   · National Suicide Prevention Lifeline 976-786-IWMQ (8578)  · National Hope Line Network 800-SUICIDE (087-3581)    Acetaminophen tablets or caplets  What is this medicine?  ACETAMINOPHEN (a set a JUSTO rai fen) is a pain reliever. It is used to treat mild pain and fever.  This medicine may be used for other purposes; ask your health care provider or pharmacist if you have questions.  COMMON BRAND NAME(S): Aceta, Actamin, Anacin Aspirin Free, Genapap, Genebs, Mapap, Pain & Fever, Pain and Fever, PAIN RELIEF, PAIN RELIEF Extra Strength, Pain Reliever, Panadol, PHARBETOL, Q-Pap, Q-Pap Extra Strength, Tylenol, Tylenol CrushableTablet, Tylenol Extra Strength, XS No Aspirin, XS Pain Reliever  What should I tell my health care provider before I take this medicine?  They need to know if you have any of these conditions:  · if you often drink alcohol  · liver disease  · an unusual or allergic reaction to acetaminophen, other medicines, foods, dyes, or preservatives  · pregnant or trying to get pregnant  · breast-feeding  How should I use this medicine?  Take this medicine by mouth with a glass of water. Follow the directions on the package or prescription label. Take your medicine at regular intervals. Do not take your medicine more often than directed.  Talk to your pediatrician regarding the use of this medicine in children. While this drug may be prescribed for children as young as 6 years of age for selected conditions, precautions do apply.  Overdosage: If you think you have taken too much of this medicine contact a poison control center or emergency room at once.  NOTE: This medicine is only  for you. Do not share this medicine with others.  What if I miss a dose?  If you miss a dose, take it as soon as you can. If it is almost time for your next dose, take only that dose. Do not take double or extra doses.  What may interact with this medicine?  · alcohol  · imatinib  · isoniazid  · other medicines with acetaminophen  This list may not describe all possible interactions. Give your health care provider a list of all the medicines, herbs, non-prescription drugs, or dietary supplements you use. Also tell them if you smoke, drink alcohol, or use illegal drugs. Some items may interact with your medicine.  What should I watch for while using this medicine?  Tell your doctor or health care professional if the pain lasts more than 10 days (5 days for children), if it gets worse, or if there is a new or different kind of pain. Also, check with your doctor if a fever lasts for more than 3 days.  Do not take other medicines that contain acetaminophen with this medicine. Always read labels carefully. If you have questions, ask your doctor or pharmacist.  If you take too much acetaminophen get medical help right away. Too much acetaminophen can be very dangerous and cause liver damage. Even if you do not have symptoms, it is important to get help right away.  What side effects may I notice from receiving this medicine?  Side effects that you should report to your doctor or health care professional as soon as possible:  · allergic reactions like skin rash, itching or hives, swelling of the face, lips, or tongue  · breathing problems  · fever or sore throat  · redness, blistering, peeling or loosening of the skin, including inside the mouth  · trouble passing urine or change in the amount of urine  · unusual bleeding or bruising  · unusually weak or tired  · yellowing of the eyes or skin  Side effects that usually do not require medical attention (report to your doctor or health care professional if they continue or  are bothersome):  · headache  · nausea, stomach upset  This list may not describe all possible side effects. Call your doctor for medical advice about side effects. You may report side effects to FDA at 7-030-SKX-9474.  Where should I keep my medicine?  Keep out of reach of children.  Store at room temperature between 20 and 25 degrees C (68 and 77 degrees F). Protect from moisture and heat. Throw away any unused medicine after the expiration date.  NOTE: This sheet is a summary. It may not cover all possible information. If you have questions about this medicine, talk to your doctor, pharmacist, or health care provider.  © 2020 [employer]/Gold Standard (2014-08-11 12:54:16)      Alcohol Abuse and Dependence Information, Adult  Alcohol is a widely available drug. People drink alcohol in different amounts. People who drink alcohol very often and in large amounts often have problems during and after drinking. They may develop what is called an alcohol use disorder. There are two main types of alcohol use disorders:  · Alcohol abuse. This is when you use alcohol too much or too often. You may use alcohol to make yourself feel happy or to reduce stress. You may have a hard time setting a limit on the amount you drink.  · Alcohol dependence. This is when you use alcohol consistently for a period of time, and your body changes as a result. This can make it hard to stop drinking because you may start to feel sick or feel different when you do not use alcohol. These symptoms are known as withdrawal.  How can alcohol abuse and dependence affect me?  Alcohol abuse and dependence can have a negative effect on your life. Drinking too much can lead to addiction. You may feel like you need alcohol to function normally. You may drink alcohol before work in the morning, during the day, or as soon as you get home from work in the evening. These actions can result in:  · Poor work performance.  · Job loss.  · Financial problems.  · Car  crashes or criminal charges from driving after drinking alcohol.  · Problems in your relationships with friends and family.  · Losing the trust and respect of coworkers, friends, and family.  Drinking heavily over a long period of time can permanently damage your body and brain, and can cause lifelong health issues, such as:  · Damage to your liver or pancreas.  · Heart problems, high blood pressure, or stroke.  · Certain cancers.  · Decreased ability to fight infections.  · Brain or nerve damage.  · Depression.  · Early (premature) death.  If you are careless or you crave alcohol, it is easy to drink more than your body can handle (overdose). Alcohol overdose is a serious situation that requires hospitalization. It may lead to permanent injuries or death.  What can increase my risk?  · Having a family history of alcohol abuse.  · Having depression or other mental health conditions.  · Beginning to drink at an early age.  · Binge drinking often.  · Experiencing trauma, stress, and an unstable home life during childhood.  · Spending time with people who drink often.  What actions can I take to prevent or manage alcohol abuse and dependence?  · Do not drink alcohol if:  ? Your health care provider tells you not to drink.  ? You are pregnant, may be pregnant, or are planning to become pregnant.  · If you drink alcohol:  ? Limit how much you use to:  § 0-1 drink a day for women.  § 0-2 drinks a day for men.  ? Be aware of how much alcohol is in your drink. In the [place] one drink equals one 12 oz bottle of beer (355 mL), one 5 oz glass of wine (148 mL), or one 1½ oz glass of hard liquor (44 mL).  · Stop drinking if you have been drinking too much. This can be very hard to do if you are used to abusing alcohol. If you begin to have withdrawal symptoms, talk with your health care provider or a person that you trust. These symptoms may include anxiety, shaky hands, headache, nausea, sweating, or not being able to  sleep.  · Choose to drink nonalcoholic beverages in social gatherings and places where there may be alcohol.  Activity  · Spend more time on activities that you enjoy that do not involve alcohol, like hobbies or exercise.  · Find healthy ways to cope with stress, such as exercise, meditation, or spending time with people you care about.  General information  · Talk to your family, coworkers, and friends about supporting you in your efforts to stop drinking. If they drink, ask them not to drink around you. Spend more time with people who do not drink alcohol.  · If you think that you have an alcohol dependency problem:  ? Tell friends or family about your concerns.  ? Talk with your health care provider or another health professional about where to get help.  ? Work with a therapist and a chemical dependency counselor.  ? Consider joining a support group for people who struggle with alcohol abuse and dependence.  Where to find support    · Your health care provider.  · SMART Book&Table: www.I'mOKrecClarisonicy.org  Therapy and support groups  · Local treatment centers or chemical dependency counselors.  · Local AA groups in your community: www.aa.org  Where to find more information  · Centers for Disease Control and Prevention: www.cdc.gov  · National [place] on Alcohol Abuse and Alcoholism: www.niaaa.nih.gov  · Alcoholics Anonymous (AA): www.aa.org  Contact a health care provider if:  · You drank more or for longer than you intended on more than one occasion.  · You tried to stop drinking or to cut back on how much you drink, but you were not able to.  · You often drink to the point of vomiting or passing out.  · You want to drink so badly that you cannot think about anything else.  · You have problems in your life due to drinking, but you continue to drink.  · You keep drinking even though you feel anxious, depressed, or have experienced memory loss.  · You have stopped doing the things you used to enjoy in order to  drink.  · You have to drink more than you used to in order to get the effect you want.  · You experience anxiety, sweating, nausea, shakiness, and trouble sleeping when you try to stop drinking.  Get help right away if:  · You have thoughts about hurting yourself or others.  · You have serious withdrawal symptoms, including:  ? Confusion.  ? Racing heart.  ? High blood pressure.  ? Fever.  If you ever feel like you may hurt yourself or others, or have thoughts about taking your own life, get help right away. You can go to your nearest emergency department or call:  · Your local emergency services (911 in the [place]  · A suicide crisis helpline, such as the National Suicide Prevention Lifeline at 1-432.655.6720. This is open 24 hours a day.  Summary  · Alcohol abuse and dependence can have a negative effect on your life. Drinking too much or too often can lead to addiction.  · If you drink alcohol, limit how much you use.  · If you are having trouble keeping your drinking under control, find ways to change your behavior. Hobbies, calming activities, exercise, or support groups can help.  · If you feel you need help with changing your drinking habits, talk with your health care provider, a good friend, or a therapist, or go to an AA group.  This information is not intended to replace advice given to you by your health care provider. Make sure you discuss any questions you have with your health care provider.  Document Released: 12/12/2017 Document Revised: 04/07/2020 Document Reviewed: 02/25/2020  Elsevier Patient Education © 2020 [employer].

## 2022-05-05 NOTE — PROGRESS NOTES
Monitor Summary:     Rhythm: Sinus Rhythm    Rate: 71-82    Measurement: .12/.09/.41    Ectopy: none    12 hr cc

## 2022-05-05 NOTE — CARE PLAN
The patient is Watcher - Medium risk of patient condition declining or worsening    Shift Goals  Clinical Goals: CIWA, monitor labs  Patient Goals: RestRest, pain relief  Family Goals: n/a    Progress made toward(s) clinical / shift goals:    Problem: Knowledge Deficit - Standard  Goal: Patient and family/care givers will demonstrate understanding of plan of care, disease process/condition, diagnostic tests and medications  Outcome: Progressing     Problem: Pain - Standard  Goal: Alleviation of pain or a reduction in pain to the patient’s comfort goal  Outcome: Progressing     Problem: Depression  Goal: Patient and family/caregiver will verbalize accurate information about at least two of the possible causes of depression, three-four of the signs and symptoms of depression  Outcome: Progressing       Patient has positive outlook on plan of care, pt has not had any pain during this shift.

## 2022-05-24 ENCOUNTER — TELEPHONE (OUTPATIENT)
Dept: MEDICAL GROUP | Facility: CLINIC | Age: 42
End: 2022-05-24
Payer: MEDICAID
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
## 2024-10-17 ENCOUNTER — APPOINTMENT (OUTPATIENT)
Dept: RADIOLOGY | Facility: MEDICAL CENTER | Age: 44
End: 2024-10-17
Attending: EMERGENCY MEDICINE
Payer: MEDICAID

## 2024-10-17 ENCOUNTER — HOSPITAL ENCOUNTER (INPATIENT)
Facility: MEDICAL CENTER | Age: 44
LOS: 10 days | End: 2024-10-27
Attending: EMERGENCY MEDICINE | Admitting: FAMILY MEDICINE
Payer: MEDICAID

## 2024-10-17 DIAGNOSIS — E87.1 HYPONATREMIA: ICD-10-CM

## 2024-10-17 DIAGNOSIS — R10.11 RIGHT UPPER QUADRANT PAIN: ICD-10-CM

## 2024-10-17 DIAGNOSIS — K76.82 HEPATIC ENCEPHALOPATHY (HCC): ICD-10-CM

## 2024-10-17 DIAGNOSIS — K59.04 CHRONIC IDIOPATHIC CONSTIPATION: ICD-10-CM

## 2024-10-17 DIAGNOSIS — F10.921 ALCOHOL INTOXICATION WITH DELIRIUM (HCC): ICD-10-CM

## 2024-10-17 DIAGNOSIS — D68.9 COAGULOPATHY (HCC): ICD-10-CM

## 2024-10-17 PROBLEM — I10 PRIMARY HYPERTENSION: Status: ACTIVE | Noted: 2024-10-17

## 2024-10-17 PROBLEM — G93.40 ENCEPHALOPATHY: Status: ACTIVE | Noted: 2024-10-17

## 2024-10-17 PROBLEM — R31.1 BENIGN ESSENTIAL MICROSCOPIC HEMATURIA: Status: ACTIVE | Noted: 2024-10-17

## 2024-10-17 LAB
ALBUMIN SERPL BCP-MCNC: 3.3 G/DL (ref 3.2–4.9)
ALBUMIN/GLOB SERPL: 1 G/DL
ALP SERPL-CCNC: 182 U/L (ref 30–99)
ALT SERPL-CCNC: 25 U/L (ref 2–50)
AMMONIA PLAS-SCNC: 86 UMOL/L (ref 11–45)
AMPHET UR QL SCN: NEGATIVE
ANION GAP SERPL CALC-SCNC: 14 MMOL/L (ref 7–16)
APPEARANCE UR: CLEAR
APTT PPP: 36.6 SEC (ref 24.7–36)
AST SERPL-CCNC: 54 U/L (ref 12–45)
BACTERIA #/AREA URNS HPF: NEGATIVE /HPF
BARBITURATES UR QL SCN: NEGATIVE
BASOPHILS # BLD AUTO: 0.3 % (ref 0–1.8)
BASOPHILS # BLD: 0.02 K/UL (ref 0–0.12)
BENZODIAZ UR QL SCN: NEGATIVE
BILIRUB SERPL-MCNC: 3.8 MG/DL (ref 0.1–1.5)
BILIRUB UR QL STRIP.AUTO: NEGATIVE
BUN SERPL-MCNC: 11 MG/DL (ref 8–22)
BZE UR QL SCN: NEGATIVE
CALCIUM ALBUM COR SERPL-MCNC: 9.8 MG/DL (ref 8.5–10.5)
CALCIUM SERPL-MCNC: 9.2 MG/DL (ref 8.5–10.5)
CANNABINOIDS UR QL SCN: NEGATIVE
CHLORIDE SERPL-SCNC: 98 MMOL/L (ref 96–112)
CO2 SERPL-SCNC: 18 MMOL/L (ref 20–33)
COLOR UR: YELLOW
CREAT SERPL-MCNC: 0.73 MG/DL (ref 0.5–1.4)
EKG IMPRESSION: NORMAL
EOSINOPHIL # BLD AUTO: 0.2 K/UL (ref 0–0.51)
EOSINOPHIL NFR BLD: 3.1 % (ref 0–6.9)
EPI CELLS #/AREA URNS HPF: NEGATIVE /HPF
ERYTHROCYTE [DISTWIDTH] IN BLOOD BY AUTOMATED COUNT: 55.3 FL (ref 35.9–50)
ETHANOL BLD-MCNC: 285.8 MG/DL
FENTANYL UR QL: NEGATIVE
GFR SERPLBLD CREATININE-BSD FMLA CKD-EPI: 116 ML/MIN/1.73 M 2
GLOBULIN SER CALC-MCNC: 3.3 G/DL (ref 1.9–3.5)
GLUCOSE SERPL-MCNC: 99 MG/DL (ref 65–99)
GLUCOSE UR STRIP.AUTO-MCNC: NEGATIVE MG/DL
HCT VFR BLD AUTO: 35.5 % (ref 42–52)
HGB BLD-MCNC: 12.5 G/DL (ref 14–18)
HYALINE CASTS #/AREA URNS LPF: ABNORMAL /LPF
IMM GRANULOCYTES # BLD AUTO: 0.02 K/UL (ref 0–0.11)
IMM GRANULOCYTES NFR BLD AUTO: 0.3 % (ref 0–0.9)
INR PPP: 1.55 (ref 0.87–1.13)
KETONES UR STRIP.AUTO-MCNC: NEGATIVE MG/DL
LACTATE SERPL-SCNC: 1.6 MMOL/L (ref 0.5–2)
LEUKOCYTE ESTERASE UR QL STRIP.AUTO: NEGATIVE
LIPASE SERPL-CCNC: 28 U/L (ref 11–82)
LYMPHOCYTES # BLD AUTO: 1.68 K/UL (ref 1–4.8)
LYMPHOCYTES NFR BLD: 25.8 % (ref 22–41)
MAGNESIUM SERPL-MCNC: 1.9 MG/DL (ref 1.5–2.5)
MCH RBC QN AUTO: 32.8 PG (ref 27–33)
MCHC RBC AUTO-ENTMCNC: 35.2 G/DL (ref 32.3–36.5)
MCV RBC AUTO: 93.2 FL (ref 81.4–97.8)
METHADONE UR QL SCN: NEGATIVE
MICRO URNS: ABNORMAL
MONOCYTES # BLD AUTO: 0.87 K/UL (ref 0–0.85)
MONOCYTES NFR BLD AUTO: 13.3 % (ref 0–13.4)
NEUTROPHILS # BLD AUTO: 3.73 K/UL (ref 1.82–7.42)
NEUTROPHILS NFR BLD: 57.2 % (ref 44–72)
NITRITE UR QL STRIP.AUTO: NEGATIVE
NRBC # BLD AUTO: 0 K/UL
NRBC BLD-RTO: 0 /100 WBC (ref 0–0.2)
OPIATES UR QL SCN: NEGATIVE
OXYCODONE UR QL SCN: NEGATIVE
PCP UR QL SCN: NEGATIVE
PH UR STRIP.AUTO: 6.5 [PH] (ref 5–8)
PLATELET # BLD AUTO: 80 K/UL (ref 164–446)
PLATELETS.RETICULATED NFR BLD AUTO: 3.4 % (ref 0.6–13.1)
PMV BLD AUTO: 10.8 FL (ref 9–12.9)
POTASSIUM SERPL-SCNC: 3.7 MMOL/L (ref 3.6–5.5)
PROPOXYPH UR QL SCN: NEGATIVE
PROT SERPL-MCNC: 6.6 G/DL (ref 6–8.2)
PROT UR QL STRIP: NEGATIVE MG/DL
PROTHROMBIN TIME: 18.6 SEC (ref 12–14.6)
RBC # BLD AUTO: 3.81 M/UL (ref 4.7–6.1)
RBC # URNS HPF: ABNORMAL /HPF
RBC UR QL AUTO: ABNORMAL
SODIUM SERPL-SCNC: 130 MMOL/L (ref 135–145)
SP GR UR STRIP.AUTO: 1
UROBILINOGEN UR STRIP.AUTO-MCNC: 0.2 MG/DL
WBC # BLD AUTO: 6.5 K/UL (ref 4.8–10.8)
WBC #/AREA URNS HPF: ABNORMAL /HPF

## 2024-10-17 PROCEDURE — 85730 THROMBOPLASTIN TIME PARTIAL: CPT

## 2024-10-17 PROCEDURE — 72125 CT NECK SPINE W/O DYE: CPT

## 2024-10-17 PROCEDURE — A9270 NON-COVERED ITEM OR SERVICE: HCPCS | Performed by: EMERGENCY MEDICINE

## 2024-10-17 PROCEDURE — 36415 COLL VENOUS BLD VENIPUNCTURE: CPT

## 2024-10-17 PROCEDURE — 71045 X-RAY EXAM CHEST 1 VIEW: CPT

## 2024-10-17 PROCEDURE — 700111 HCHG RX REV CODE 636 W/ 250 OVERRIDE (IP)

## 2024-10-17 PROCEDURE — 83735 ASSAY OF MAGNESIUM: CPT

## 2024-10-17 PROCEDURE — 93005 ELECTROCARDIOGRAM TRACING: CPT | Performed by: EMERGENCY MEDICINE

## 2024-10-17 PROCEDURE — 83605 ASSAY OF LACTIC ACID: CPT

## 2024-10-17 PROCEDURE — 80053 COMPREHEN METABOLIC PANEL: CPT

## 2024-10-17 PROCEDURE — 82077 ASSAY SPEC XCP UR&BREATH IA: CPT

## 2024-10-17 PROCEDURE — 99285 EMERGENCY DEPT VISIT HI MDM: CPT

## 2024-10-17 PROCEDURE — 770020 HCHG ROOM/CARE - TELE (206)

## 2024-10-17 PROCEDURE — 80307 DRUG TEST PRSMV CHEM ANLYZR: CPT

## 2024-10-17 PROCEDURE — 85025 COMPLETE CBC W/AUTO DIFF WBC: CPT

## 2024-10-17 PROCEDURE — 99223 1ST HOSP IP/OBS HIGH 75: CPT | Mod: GC | Performed by: STUDENT IN AN ORGANIZED HEALTH CARE EDUCATION/TRAINING PROGRAM

## 2024-10-17 PROCEDURE — 85055 RETICULATED PLATELET ASSAY: CPT

## 2024-10-17 PROCEDURE — 83690 ASSAY OF LIPASE: CPT

## 2024-10-17 PROCEDURE — 700102 HCHG RX REV CODE 250 W/ 637 OVERRIDE(OP)

## 2024-10-17 PROCEDURE — 74177 CT ABD & PELVIS W/CONTRAST: CPT

## 2024-10-17 PROCEDURE — 70450 CT HEAD/BRAIN W/O DYE: CPT

## 2024-10-17 PROCEDURE — A9270 NON-COVERED ITEM OR SERVICE: HCPCS

## 2024-10-17 PROCEDURE — 82140 ASSAY OF AMMONIA: CPT

## 2024-10-17 PROCEDURE — 700117 HCHG RX CONTRAST REV CODE 255: Mod: UD | Performed by: EMERGENCY MEDICINE

## 2024-10-17 PROCEDURE — 85610 PROTHROMBIN TIME: CPT

## 2024-10-17 PROCEDURE — 700102 HCHG RX REV CODE 250 W/ 637 OVERRIDE(OP): Performed by: EMERGENCY MEDICINE

## 2024-10-17 PROCEDURE — 81001 URINALYSIS AUTO W/SCOPE: CPT

## 2024-10-17 RX ORDER — LORAZEPAM 2 MG/1
2 TABLET ORAL
Status: DISCONTINUED | OUTPATIENT
Start: 2024-10-17 | End: 2024-10-18

## 2024-10-17 RX ORDER — NICOTINE 21 MG/24HR
21 PATCH, TRANSDERMAL 24 HOURS TRANSDERMAL
Status: DISCONTINUED | OUTPATIENT
Start: 2024-10-17 | End: 2024-10-27 | Stop reason: HOSPADM

## 2024-10-17 RX ORDER — OXYCODONE HYDROCHLORIDE 5 MG/1
5 TABLET ORAL EVERY 4 HOURS PRN
Status: DISCONTINUED | OUTPATIENT
Start: 2024-10-17 | End: 2024-10-22

## 2024-10-17 RX ORDER — NIFEDIPINE 30 MG
30 TABLET, EXTENDED RELEASE ORAL DAILY
Status: SHIPPED | COMMUNITY
Start: 2024-10-09 | End: 2024-10-17

## 2024-10-17 RX ORDER — BUMETANIDE 1 MG/1
2 TABLET ORAL DAILY
Status: DISCONTINUED | OUTPATIENT
Start: 2024-10-18 | End: 2024-10-17

## 2024-10-17 RX ORDER — LORAZEPAM 1 MG/1
1 TABLET ORAL EVERY 4 HOURS PRN
Status: DISCONTINUED | OUTPATIENT
Start: 2024-10-17 | End: 2024-10-18

## 2024-10-17 RX ORDER — CEFDINIR 300 MG/1
300 CAPSULE ORAL 2 TIMES DAILY
COMMUNITY
Start: 2024-10-09 | End: 2024-11-01

## 2024-10-17 RX ORDER — PANTOPRAZOLE SODIUM 40 MG/1
40 TABLET, DELAYED RELEASE ORAL DAILY
Status: ON HOLD | COMMUNITY
Start: 2024-10-09 | End: 2024-10-27

## 2024-10-17 RX ORDER — BUMETANIDE 2 MG/1
2 TABLET ORAL DAILY
Status: ON HOLD | COMMUNITY
Start: 2024-10-09 | End: 2024-10-27

## 2024-10-17 RX ORDER — HEPARIN SODIUM 5000 [USP'U]/ML
5000 INJECTION, SOLUTION INTRAVENOUS; SUBCUTANEOUS EVERY 8 HOURS
Status: DISCONTINUED | OUTPATIENT
Start: 2024-10-17 | End: 2024-10-19

## 2024-10-17 RX ORDER — SPIRONOLACTONE 100 MG/1
150 TABLET, FILM COATED ORAL DAILY
Status: ON HOLD | COMMUNITY
Start: 2024-09-16 | End: 2024-10-27

## 2024-10-17 RX ORDER — FOLIC ACID 1 MG/1
1 TABLET ORAL DAILY
Status: DISCONTINUED | OUTPATIENT
Start: 2024-10-18 | End: 2024-10-27 | Stop reason: HOSPADM

## 2024-10-17 RX ORDER — POTASSIUM CHLORIDE 1500 MG/1
20 TABLET, EXTENDED RELEASE ORAL DAILY
Status: ON HOLD | COMMUNITY
Start: 2024-10-09 | End: 2024-10-27

## 2024-10-17 RX ORDER — ACETAMINOPHEN 325 MG/1
650 TABLET ORAL EVERY 6 HOURS PRN
Status: DISCONTINUED | OUTPATIENT
Start: 2024-10-17 | End: 2024-10-27 | Stop reason: HOSPADM

## 2024-10-17 RX ORDER — LORAZEPAM 2 MG/ML
2 INJECTION INTRAMUSCULAR
Status: DISCONTINUED | OUTPATIENT
Start: 2024-10-17 | End: 2024-10-18

## 2024-10-17 RX ORDER — FOLIC ACID 1 MG/1
1 TABLET ORAL ONCE
Status: COMPLETED | OUTPATIENT
Start: 2024-10-17 | End: 2024-10-17

## 2024-10-17 RX ORDER — LORAZEPAM 0.5 MG/1
0.5 TABLET ORAL EVERY 4 HOURS PRN
Status: DISCONTINUED | OUTPATIENT
Start: 2024-10-17 | End: 2024-10-18

## 2024-10-17 RX ORDER — MULTIVITAMIN WITH IRON
1000 TABLET ORAL DAILY
Status: DISCONTINUED | OUTPATIENT
Start: 2024-10-18 | End: 2024-10-23

## 2024-10-17 RX ORDER — OMEPRAZOLE 20 MG/1
20 CAPSULE, DELAYED RELEASE ORAL DAILY
Status: DISCONTINUED | OUTPATIENT
Start: 2024-10-18 | End: 2024-10-27 | Stop reason: HOSPADM

## 2024-10-17 RX ORDER — NEOMYCIN/BACITRACIN/POLYMYXINB 3.5-400-5K
1 OINTMENT (GRAM) TOPICAL 2 TIMES DAILY PRN
Status: ON HOLD | COMMUNITY
End: 2024-10-27

## 2024-10-17 RX ORDER — GAUZE BANDAGE 2" X 2"
100 BANDAGE TOPICAL DAILY
Status: DISCONTINUED | OUTPATIENT
Start: 2024-10-18 | End: 2024-10-18

## 2024-10-17 RX ORDER — LORAZEPAM 2 MG/ML
1 INJECTION INTRAMUSCULAR
Status: DISCONTINUED | OUTPATIENT
Start: 2024-10-17 | End: 2024-10-18

## 2024-10-17 RX ORDER — FOLIC ACID 1 MG/1
1 TABLET ORAL DAILY
COMMUNITY
Start: 2024-10-09 | End: 2024-11-24

## 2024-10-17 RX ORDER — LORAZEPAM 2 MG/1
4 TABLET ORAL
Status: DISCONTINUED | OUTPATIENT
Start: 2024-10-17 | End: 2024-10-18

## 2024-10-17 RX ORDER — LACTULOSE 10 G/15ML
30 SOLUTION ORAL ONCE
Status: COMPLETED | OUTPATIENT
Start: 2024-10-17 | End: 2024-10-17

## 2024-10-17 RX ORDER — LABETALOL HYDROCHLORIDE 5 MG/ML
10 INJECTION, SOLUTION INTRAVENOUS EVERY 4 HOURS PRN
Status: DISCONTINUED | OUTPATIENT
Start: 2024-10-17 | End: 2024-10-27 | Stop reason: HOSPADM

## 2024-10-17 RX ORDER — ESCITALOPRAM OXALATE 10 MG/1
10 TABLET ORAL DAILY
Status: ON HOLD | COMMUNITY
End: 2024-10-27

## 2024-10-17 RX ORDER — NALTREXONE HYDROCHLORIDE 50 MG/1
50 TABLET, FILM COATED ORAL DAILY
Status: ON HOLD | COMMUNITY
Start: 2024-10-09 | End: 2024-11-18

## 2024-10-17 RX ORDER — LACTULOSE 10 G/15ML
30 SOLUTION ORAL 3 TIMES DAILY
Status: DISCONTINUED | OUTPATIENT
Start: 2024-10-18 | End: 2024-10-20

## 2024-10-17 RX ORDER — NIFEDIPINE 30 MG
30 TABLET, EXTENDED RELEASE ORAL DAILY
Status: ON HOLD | COMMUNITY
End: 2024-10-27

## 2024-10-17 RX ORDER — LORAZEPAM 2 MG/ML
0.5 INJECTION INTRAMUSCULAR EVERY 4 HOURS PRN
Status: DISCONTINUED | OUTPATIENT
Start: 2024-10-17 | End: 2024-10-18

## 2024-10-17 RX ORDER — NIFEDIPINE 30 MG/1
30 TABLET, EXTENDED RELEASE ORAL DAILY
Status: DISCONTINUED | OUTPATIENT
Start: 2024-10-18 | End: 2024-10-27 | Stop reason: HOSPADM

## 2024-10-17 RX ORDER — LORAZEPAM 2 MG/ML
1.5 INJECTION INTRAMUSCULAR
Status: DISCONTINUED | OUTPATIENT
Start: 2024-10-17 | End: 2024-10-18

## 2024-10-17 RX ORDER — GINSENG 100 MG
50 CAPSULE ORAL DAILY
Status: ON HOLD | COMMUNITY
End: 2024-10-27

## 2024-10-17 RX ORDER — POTASSIUM CHLORIDE 1500 MG/1
20 TABLET, EXTENDED RELEASE ORAL DAILY
Status: DISCONTINUED | OUTPATIENT
Start: 2024-10-18 | End: 2024-10-17

## 2024-10-17 RX ORDER — ESCITALOPRAM OXALATE 10 MG/1
10 TABLET ORAL DAILY
Status: DISCONTINUED | OUTPATIENT
Start: 2024-10-18 | End: 2024-10-27

## 2024-10-17 RX ADMIN — LORAZEPAM 1 MG: 1 TABLET ORAL at 22:20

## 2024-10-17 RX ADMIN — FOLIC ACID 1 MG: 1 TABLET ORAL at 19:06

## 2024-10-17 RX ADMIN — NICOTINE TRANSDERMAL SYSTEM 21 MG: 21 PATCH, EXTENDED RELEASE TRANSDERMAL at 22:54

## 2024-10-17 RX ADMIN — LACTULOSE 30 ML: 10 SOLUTION ORAL at 19:06

## 2024-10-17 RX ADMIN — HEPARIN SODIUM 5000 UNITS: 5000 INJECTION, SOLUTION INTRAVENOUS; SUBCUTANEOUS at 22:20

## 2024-10-17 RX ADMIN — OXYCODONE 5 MG: 5 TABLET ORAL at 22:54

## 2024-10-17 RX ADMIN — ACETAMINOPHEN 650 MG: 325 TABLET ORAL at 22:54

## 2024-10-17 RX ADMIN — IOHEXOL 100 ML: 350 INJECTION, SOLUTION INTRAVENOUS at 16:24

## 2024-10-17 SDOH — ECONOMIC STABILITY: TRANSPORTATION INSECURITY
IN THE PAST 12 MONTHS, HAS LACK OF RELIABLE TRANSPORTATION KEPT YOU FROM MEDICAL APPOINTMENTS, MEETINGS, WORK OR FROM GETTING THINGS NEEDED FOR DAILY LIVING?: YES

## 2024-10-17 ASSESSMENT — COGNITIVE AND FUNCTIONAL STATUS - GENERAL
STANDING UP FROM CHAIR USING ARMS: A LITTLE
SUGGESTED CMS G CODE MODIFIER MOBILITY: CK
HELP NEEDED FOR BATHING: A LITTLE
SUGGESTED CMS G CODE MODIFIER DAILY ACTIVITY: CJ
CLIMB 3 TO 5 STEPS WITH RAILING: A LOT
MOBILITY SCORE: 19
DRESSING REGULAR LOWER BODY CLOTHING: A LITTLE
MOVING TO AND FROM BED TO CHAIR: A LITTLE
WALKING IN HOSPITAL ROOM: A LITTLE
DAILY ACTIVITIY SCORE: 22

## 2024-10-17 ASSESSMENT — LIFESTYLE VARIABLES
ON A TYPICAL DAY WHEN YOU DRINK ALCOHOL HOW MANY DRINKS DO YOU HAVE: 6
TOTAL SCORE: 4
AGITATION: SOMEWHAT MORE THAN NORMAL ACTIVITY
AVERAGE NUMBER OF DAYS PER WEEK YOU HAVE A DRINK CONTAINING ALCOHOL: 7
VISUAL DISTURBANCES: NOT PRESENT
TOTAL SCORE: 8
AUDITORY DISTURBANCES: NOT PRESENT
PAROXYSMAL SWEATS: NO SWEAT VISIBLE
HEADACHE, FULLNESS IN HEAD: MODERATE
EVER FELT BAD OR GUILTY ABOUT YOUR DRINKING: YES
DOES PATIENT WANT TO TALK TO SOMEONE ABOUT QUITTING: YES
CONSUMPTION TOTAL: POSITIVE
TOTAL SCORE: 4
ORIENTATION AND CLOUDING OF SENSORIUM: ORIENTED AND CAN DO SERIAL ADDITIONS
ALCOHOL_USE: YES
DOES PATIENT WANT TO STOP DRINKING: YES
NAUSEA AND VOMITING: MILD NAUSEA WITH NO VOMITING
TREMOR: *
TOTAL SCORE: 4
HAVE PEOPLE ANNOYED YOU BY CRITICIZING YOUR DRINKING: YES
HAVE YOU EVER FELT YOU SHOULD CUT DOWN ON YOUR DRINKING: YES
EVER HAD A DRINK FIRST THING IN THE MORNING TO STEADY YOUR NERVES TO GET RID OF A HANGOVER: YES
HOW MANY TIMES IN THE PAST YEAR HAVE YOU HAD 5 OR MORE DRINKS IN A DAY: 300
ANXIETY: MILDLY ANXIOUS

## 2024-10-17 ASSESSMENT — PATIENT HEALTH QUESTIONNAIRE - PHQ9
5. POOR APPETITE OR OVEREATING: NOT AT ALL
SUM OF ALL RESPONSES TO PHQ QUESTIONS 1-9: 9
SUM OF ALL RESPONSES TO PHQ9 QUESTIONS 1 AND 2: 4
7. TROUBLE CONCENTRATING ON THINGS, SUCH AS READING THE NEWSPAPER OR WATCHING TELEVISION: NOT AT ALL
2. FEELING DOWN, DEPRESSED, IRRITABLE, OR HOPELESS: MORE THAN HALF THE DAYS
1. LITTLE INTEREST OR PLEASURE IN DOING THINGS: MORE THAN HALF THE DAYS
4. FEELING TIRED OR HAVING LITTLE ENERGY: SEVERAL DAYS
6. FEELING BAD ABOUT YOURSELF - OR THAT YOU ARE A FAILURE OR HAVE LET YOURSELF OR YOUR FAMILY DOWN: MORE THAN HALF THE DAYS
9. THOUGHTS THAT YOU WOULD BE BETTER OFF DEAD, OR OF HURTING YOURSELF: NOT AT ALL
3. TROUBLE FALLING OR STAYING ASLEEP OR SLEEPING TOO MUCH: MORE THAN HALF THE DAYS
8. MOVING OR SPEAKING SO SLOWLY THAT OTHER PEOPLE COULD HAVE NOTICED. OR THE OPPOSITE, BEING SO FIGETY OR RESTLESS THAT YOU HAVE BEEN MOVING AROUND A LOT MORE THAN USUAL: NOT AT ALL

## 2024-10-17 ASSESSMENT — SOCIAL DETERMINANTS OF HEALTH (SDOH)
WITHIN THE LAST YEAR, HAVE YOU BEEN AFRAID OF YOUR PARTNER OR EX-PARTNER?: NO
IN THE PAST 12 MONTHS, HAS THE ELECTRIC, GAS, OIL, OR WATER COMPANY THREATENED TO SHUT OFF SERVICE IN YOUR HOME?: ALREADY SHUT OFF
WITHIN THE LAST YEAR, HAVE TO BEEN RAPED OR FORCED TO HAVE ANY KIND OF SEXUAL ACTIVITY BY YOUR PARTNER OR EX-PARTNER?: NO
WITHIN THE LAST YEAR, HAVE YOU BEEN HUMILIATED OR EMOTIONALLY ABUSED IN OTHER WAYS BY YOUR PARTNER OR EX-PARTNER?: NO
WITHIN THE LAST YEAR, HAVE YOU BEEN KICKED, HIT, SLAPPED, OR OTHERWISE PHYSICALLY HURT BY YOUR PARTNER OR EX-PARTNER?: NO
WITHIN THE PAST 12 MONTHS, THE FOOD YOU BOUGHT JUST DIDN'T LAST AND YOU DIDN'T HAVE MONEY TO GET MORE: OFTEN TRUE
WITHIN THE PAST 12 MONTHS, YOU WORRIED THAT YOUR FOOD WOULD RUN OUT BEFORE YOU GOT THE MONEY TO BUY MORE: OFTEN TRUE

## 2024-10-17 ASSESSMENT — FIBROSIS 4 INDEX: FIB4 SCORE: 5.67

## 2024-10-17 ASSESSMENT — PAIN DESCRIPTION - PAIN TYPE: TYPE: ACUTE PAIN;CHRONIC PAIN

## 2024-10-18 PROBLEM — R17 ELEVATED BILIRUBIN: Status: ACTIVE | Noted: 2024-10-18

## 2024-10-18 PROBLEM — D64.9 NORMOCYTIC ANEMIA: Status: ACTIVE | Noted: 2024-10-18

## 2024-10-18 PROBLEM — R17 ELEVATED BILIRUBIN: Status: RESOLVED | Noted: 2024-10-18 | Resolved: 2024-10-18

## 2024-10-18 LAB
ALBUMIN SERPL BCP-MCNC: 2.2 G/DL (ref 3.2–4.9)
ALBUMIN/GLOB SERPL: 0.8 G/DL
ALP SERPL-CCNC: 138 U/L (ref 30–99)
ALT SERPL-CCNC: 21 U/L (ref 2–50)
ANION GAP SERPL CALC-SCNC: 8 MMOL/L (ref 7–16)
AST SERPL-CCNC: 49 U/L (ref 12–45)
BASOPHILS # BLD AUTO: 0.5 % (ref 0–1.8)
BASOPHILS # BLD: 0.02 K/UL (ref 0–0.12)
BILIRUB SERPL-MCNC: 2.4 MG/DL (ref 0.1–1.5)
BUN SERPL-MCNC: 11 MG/DL (ref 8–22)
CALCIUM ALBUM COR SERPL-MCNC: 9.6 MG/DL (ref 8.5–10.5)
CALCIUM SERPL-MCNC: 8.2 MG/DL (ref 8.5–10.5)
CHLORIDE SERPL-SCNC: 106 MMOL/L (ref 96–112)
CO2 SERPL-SCNC: 20 MMOL/L (ref 20–33)
CREAT SERPL-MCNC: 0.93 MG/DL (ref 0.5–1.4)
EOSINOPHIL # BLD AUTO: 0.09 K/UL (ref 0–0.51)
EOSINOPHIL NFR BLD: 2.1 % (ref 0–6.9)
ERYTHROCYTE [DISTWIDTH] IN BLOOD BY AUTOMATED COUNT: 56.7 FL (ref 35.9–50)
GFR SERPLBLD CREATININE-BSD FMLA CKD-EPI: 105 ML/MIN/1.73 M 2
GLOBULIN SER CALC-MCNC: 2.6 G/DL (ref 1.9–3.5)
GLUCOSE SERPL-MCNC: 122 MG/DL (ref 65–99)
HCT VFR BLD AUTO: 27.7 % (ref 42–52)
HGB BLD-MCNC: 9.7 G/DL (ref 14–18)
IMM GRANULOCYTES # BLD AUTO: 0.01 K/UL (ref 0–0.11)
IMM GRANULOCYTES NFR BLD AUTO: 0.2 % (ref 0–0.9)
LYMPHOCYTES # BLD AUTO: 0.99 K/UL (ref 1–4.8)
LYMPHOCYTES NFR BLD: 22.6 % (ref 22–41)
MCH RBC QN AUTO: 33 PG (ref 27–33)
MCHC RBC AUTO-ENTMCNC: 35 G/DL (ref 32.3–36.5)
MCV RBC AUTO: 94.2 FL (ref 81.4–97.8)
MONOCYTES # BLD AUTO: 0.65 K/UL (ref 0–0.85)
MONOCYTES NFR BLD AUTO: 14.8 % (ref 0–13.4)
NEUTROPHILS # BLD AUTO: 2.63 K/UL (ref 1.82–7.42)
NEUTROPHILS NFR BLD: 59.8 % (ref 44–72)
NRBC # BLD AUTO: 0 K/UL
NRBC BLD-RTO: 0 /100 WBC (ref 0–0.2)
PLATELET # BLD AUTO: 62 K/UL (ref 164–446)
PLATELETS.RETICULATED NFR BLD AUTO: 2 % (ref 0.6–13.1)
PMV BLD AUTO: 11.3 FL (ref 9–12.9)
POTASSIUM SERPL-SCNC: 3.9 MMOL/L (ref 3.6–5.5)
PROT SERPL-MCNC: 4.8 G/DL (ref 6–8.2)
RBC # BLD AUTO: 2.94 M/UL (ref 4.7–6.1)
SODIUM SERPL-SCNC: 134 MMOL/L (ref 135–145)
WBC # BLD AUTO: 4.4 K/UL (ref 4.8–10.8)

## 2024-10-18 PROCEDURE — 700101 HCHG RX REV CODE 250

## 2024-10-18 PROCEDURE — 700102 HCHG RX REV CODE 250 W/ 637 OVERRIDE(OP)

## 2024-10-18 PROCEDURE — 97602 WOUND(S) CARE NON-SELECTIVE: CPT

## 2024-10-18 PROCEDURE — 700102 HCHG RX REV CODE 250 W/ 637 OVERRIDE(OP): Performed by: EMERGENCY MEDICINE

## 2024-10-18 PROCEDURE — HZ2ZZZZ DETOXIFICATION SERVICES FOR SUBSTANCE ABUSE TREATMENT: ICD-10-PCS

## 2024-10-18 PROCEDURE — 80053 COMPREHEN METABOLIC PANEL: CPT

## 2024-10-18 PROCEDURE — 700111 HCHG RX REV CODE 636 W/ 250 OVERRIDE (IP): Performed by: INTERNAL MEDICINE

## 2024-10-18 PROCEDURE — A9270 NON-COVERED ITEM OR SERVICE: HCPCS

## 2024-10-18 PROCEDURE — 85025 COMPLETE CBC W/AUTO DIFF WBC: CPT

## 2024-10-18 PROCEDURE — 700111 HCHG RX REV CODE 636 W/ 250 OVERRIDE (IP)

## 2024-10-18 PROCEDURE — 99233 SBSQ HOSP IP/OBS HIGH 50: CPT | Mod: GC | Performed by: STUDENT IN AN ORGANIZED HEALTH CARE EDUCATION/TRAINING PROGRAM

## 2024-10-18 PROCEDURE — 700101 HCHG RX REV CODE 250: Performed by: INTERNAL MEDICINE

## 2024-10-18 PROCEDURE — 700105 HCHG RX REV CODE 258: Performed by: INTERNAL MEDICINE

## 2024-10-18 PROCEDURE — 770000 HCHG ROOM/CARE - INTERMEDIATE ICU *

## 2024-10-18 PROCEDURE — 85055 RETICULATED PLATELET ASSAY: CPT

## 2024-10-18 PROCEDURE — A9270 NON-COVERED ITEM OR SERVICE: HCPCS | Performed by: EMERGENCY MEDICINE

## 2024-10-18 RX ORDER — LORAZEPAM 2 MG/ML
2 INJECTION INTRAMUSCULAR EVERY 4 HOURS
Status: DISCONTINUED | OUTPATIENT
Start: 2024-10-18 | End: 2024-10-19

## 2024-10-18 RX ORDER — CHLORDIAZEPOXIDE HYDROCHLORIDE 25 MG/1
50 CAPSULE, GELATIN COATED ORAL EVERY 6 HOURS
Status: DISCONTINUED | OUTPATIENT
Start: 2024-10-18 | End: 2024-10-18

## 2024-10-18 RX ORDER — GAUZE BANDAGE 2" X 2"
500 BANDAGE TOPICAL DAILY
Status: DISCONTINUED | OUTPATIENT
Start: 2024-10-18 | End: 2024-10-18

## 2024-10-18 RX ORDER — SPIRONOLACTONE 100 MG/1
100 TABLET, FILM COATED ORAL
Status: DISCONTINUED | OUTPATIENT
Start: 2024-10-18 | End: 2024-10-27 | Stop reason: HOSPADM

## 2024-10-18 RX ORDER — FUROSEMIDE 40 MG/1
40 TABLET ORAL ONCE
Status: COMPLETED | OUTPATIENT
Start: 2024-10-18 | End: 2024-10-18

## 2024-10-18 RX ORDER — LIDOCAINE 4 G/G
1 PATCH TOPICAL EVERY 24 HOURS
Status: DISCONTINUED | OUTPATIENT
Start: 2024-10-18 | End: 2024-10-27 | Stop reason: HOSPADM

## 2024-10-18 RX ORDER — CHLORDIAZEPOXIDE HYDROCHLORIDE 25 MG/1
50 CAPSULE, GELATIN COATED ORAL EVERY 12 HOURS
Status: DISCONTINUED | OUTPATIENT
Start: 2024-10-20 | End: 2024-10-18

## 2024-10-18 RX ORDER — GAUZE BANDAGE 2" X 2"
100 BANDAGE TOPICAL DAILY
Status: COMPLETED | OUTPATIENT
Start: 2024-10-21 | End: 2024-10-24

## 2024-10-18 RX ORDER — DEXMEDETOMIDINE HYDROCHLORIDE 4 UG/ML
.1-1 INJECTION, SOLUTION INTRAVENOUS CONTINUOUS
Status: DISCONTINUED | OUTPATIENT
Start: 2024-10-18 | End: 2024-10-20

## 2024-10-18 RX ORDER — THIAMINE HYDROCHLORIDE 100 MG/ML
250 INJECTION, SOLUTION INTRAMUSCULAR; INTRAVENOUS 3 TIMES DAILY
Status: COMPLETED | OUTPATIENT
Start: 2024-10-18 | End: 2024-10-20

## 2024-10-18 RX ORDER — CHLORDIAZEPOXIDE HYDROCHLORIDE 25 MG/1
50 CAPSULE, GELATIN COATED ORAL EVERY 8 HOURS
Status: DISCONTINUED | OUTPATIENT
Start: 2024-10-19 | End: 2024-10-18

## 2024-10-18 RX ORDER — CHLORDIAZEPOXIDE HYDROCHLORIDE 25 MG/1
50 CAPSULE, GELATIN COATED ORAL NIGHTLY
Status: DISCONTINUED | OUTPATIENT
Start: 2024-10-21 | End: 2024-10-18

## 2024-10-18 RX ORDER — LORAZEPAM 2 MG/ML
1-2 INJECTION INTRAMUSCULAR
Status: DISCONTINUED | OUTPATIENT
Start: 2024-10-18 | End: 2024-10-19

## 2024-10-18 RX ADMIN — CHLORDIAZEPOXIDE HYDROCHLORIDE 50 MG: 25 CAPSULE ORAL at 10:30

## 2024-10-18 RX ADMIN — LORAZEPAM 2 MG: 2 INJECTION INTRAMUSCULAR; INTRAVENOUS at 17:46

## 2024-10-18 RX ADMIN — THIAMINE HYDROCHLORIDE 250 MG: 100 INJECTION, SOLUTION INTRAMUSCULAR; INTRAVENOUS at 20:39

## 2024-10-18 RX ADMIN — ESCITALOPRAM OXALATE 10 MG: 10 TABLET ORAL at 06:04

## 2024-10-18 RX ADMIN — NIFEDIPINE 30 MG: 30 TABLET, FILM COATED, EXTENDED RELEASE ORAL at 06:04

## 2024-10-18 RX ADMIN — CYANOCOBALAMIN TAB 500 MCG 1000 MCG: 500 TAB at 06:04

## 2024-10-18 RX ADMIN — FUROSEMIDE 40 MG: 40 TABLET ORAL at 09:27

## 2024-10-18 RX ADMIN — LORAZEPAM 2 MG: 2 INJECTION INTRAMUSCULAR; INTRAVENOUS at 17:32

## 2024-10-18 RX ADMIN — HEPARIN SODIUM 5000 UNITS: 5000 INJECTION, SOLUTION INTRAVENOUS; SUBCUTANEOUS at 13:47

## 2024-10-18 RX ADMIN — LORAZEPAM 3 MG: 2 TABLET ORAL at 09:27

## 2024-10-18 RX ADMIN — SPIRONOLACTONE 100 MG: 25 TABLET ORAL at 09:27

## 2024-10-18 RX ADMIN — OXYCODONE 5 MG: 5 TABLET ORAL at 06:04

## 2024-10-18 RX ADMIN — LACTULOSE 30 ML: 10 SOLUTION ORAL at 16:52

## 2024-10-18 RX ADMIN — LORAZEPAM 2 MG: 2 INJECTION INTRAMUSCULAR; INTRAVENOUS at 20:39

## 2024-10-18 RX ADMIN — HEPARIN SODIUM 5000 UNITS: 5000 INJECTION, SOLUTION INTRAVENOUS; SUBCUTANEOUS at 06:05

## 2024-10-18 RX ADMIN — LORAZEPAM 2 MG: 2 TABLET ORAL at 10:30

## 2024-10-18 RX ADMIN — LIDOCAINE 1 PATCH: 4 PATCH TOPICAL at 10:31

## 2024-10-18 RX ADMIN — DEXMEDETOMIDINE HYDROCHLORIDE 0.1 MCG/KG/HR: 100 INJECTION, SOLUTION, CONCENTRATE INTRAVENOUS at 20:46

## 2024-10-18 RX ADMIN — LORAZEPAM 2 MG: 2 INJECTION INTRAMUSCULAR; INTRAVENOUS at 16:56

## 2024-10-18 RX ADMIN — OMEPRAZOLE 20 MG: 20 CAPSULE, DELAYED RELEASE ORAL at 06:04

## 2024-10-18 RX ADMIN — LORAZEPAM 2 MG: 2 INJECTION INTRAMUSCULAR; INTRAVENOUS at 17:16

## 2024-10-18 RX ADMIN — FOLIC ACID 1 MG: 1 TABLET ORAL at 06:04

## 2024-10-18 RX ADMIN — LORAZEPAM 2 MG: 2 TABLET ORAL at 13:50

## 2024-10-18 RX ADMIN — LORAZEPAM 1.5 MG: 2 INJECTION INTRAMUSCULAR; INTRAVENOUS at 08:25

## 2024-10-18 RX ADMIN — LACTULOSE 30 ML: 10 SOLUTION ORAL at 06:04

## 2024-10-18 RX ADMIN — HEPARIN SODIUM 5000 UNITS: 5000 INJECTION, SOLUTION INTRAVENOUS; SUBCUTANEOUS at 22:25

## 2024-10-18 RX ADMIN — THIAMINE HYDROCHLORIDE 250 MG: 100 INJECTION, SOLUTION INTRAMUSCULAR; INTRAVENOUS at 14:34

## 2024-10-18 RX ADMIN — CHLORDIAZEPOXIDE HYDROCHLORIDE 50 MG: 25 CAPSULE ORAL at 16:53

## 2024-10-18 RX ADMIN — LACTULOSE 30 ML: 10 SOLUTION ORAL at 12:49

## 2024-10-18 RX ADMIN — LORAZEPAM 3 MG: 2 TABLET ORAL at 12:50

## 2024-10-18 RX ADMIN — LORAZEPAM 2 MG: 2 TABLET ORAL at 06:04

## 2024-10-18 RX ADMIN — LORAZEPAM 3 MG: 2 TABLET ORAL at 18:09

## 2024-10-18 RX ADMIN — Medication 100 MG: at 06:04

## 2024-10-18 ASSESSMENT — LIFESTYLE VARIABLES
HEADACHE, FULLNESS IN HEAD: MODERATELY SEVERE
AUDITORY DISTURBANCES: MILD HARSHNESS OR ABILITY TO FRIGHTEN
TOTAL SCORE: VERY MILD ITCHING, PINS AND NEEDLES SENSATION, BURNING OR NUMBNESS
TOTAL SCORE: 26
PAROXYSMAL SWEATS: BARELY PERCEPTIBLE SWEATING. PALMS MOIST
VISUAL DISTURBANCES: VERY MILD SENSITIVITY
ANXIETY: *
PAROXYSMAL SWEATS: NO SWEAT VISIBLE
NAUSEA AND VOMITING: NO NAUSEA AND NO VOMITING
TREMOR: *
ORIENTATION AND CLOUDING OF SENSORIUM: DISORIENTED FOR PLACE AND / OR PERSON
ORIENTATION AND CLOUDING OF SENSORIUM: ORIENTED AND CAN DO SERIAL ADDITIONS
AUDITORY DISTURBANCES: MODERATELY SEVERE HALLUCINATIONS
NAUSEA AND VOMITING: NO NAUSEA AND NO VOMITING
TREMOR: *
TREMOR: *
NAUSEA AND VOMITING: *
TOTAL SCORE: 19
HEADACHE, FULLNESS IN HEAD: MILD
TREMOR: MODERATE TREMOR WITH ARMS EXTENDED
AUDITORY DISTURBANCES: NOT PRESENT
AGITATION: *
AGITATION: NORMAL ACTIVITY
PAROXYSMAL SWEATS: BARELY PERCEPTIBLE SWEATING. PALMS MOIST
NAUSEA AND VOMITING: *
VISUAL DISTURBANCES: VERY MILD SENSITIVITY
ORIENTATION AND CLOUDING OF SENSORIUM: ORIENTED AND CAN DO SERIAL ADDITIONS
NAUSEA AND VOMITING: *
PAROXYSMAL SWEATS: BARELY PERCEPTIBLE SWEATING. PALMS MOIST
VISUAL DISTURBANCES: SEVERE HALLUCINATIONS
TOTAL SCORE: 14
TOTAL SCORE: 18
ORIENTATION AND CLOUDING OF SENSORIUM: DISORIENTED FOR PLACE AND / OR PERSON
TOTAL SCORE: VERY MILD ITCHING, PINS AND NEEDLES SENSATION, BURNING OR NUMBNESS
AUDITORY DISTURBANCES: SEVERE HALLUCINATIONS
ORIENTATION AND CLOUDING OF SENSORIUM: ORIENTED AND CAN DO SERIAL ADDITIONS
TOTAL SCORE: VERY MILD ITCHING, PINS AND NEEDLES SENSATION, BURNING OR NUMBNESS
TOTAL SCORE: 13
TREMOR: MODERATE TREMOR WITH ARMS EXTENDED
TREMOR: *
AUDITORY DISTURBANCES: MILD HARSHNESS OR ABILITY TO FRIGHTEN
ANXIETY: MILDLY ANXIOUS
TOTAL SCORE: 28
HEADACHE, FULLNESS IN HEAD: MODERATE
ORIENTATION AND CLOUDING OF SENSORIUM: DISORIENTED FOR PLACE AND / OR PERSON
ORIENTATION AND CLOUDING OF SENSORIUM: DISORIENTED FOR PLACE AND / OR PERSON
ORIENTATION AND CLOUDING OF SENSORIUM: DATE DISORIENTATION BY NO MORE THAN TWO CALENDAR DAYS
AGITATION: *
HEADACHE, FULLNESS IN HEAD: MODERATE
VISUAL DISTURBANCES: MODERATELY SEVERE HALLUCINATIONS
AUDITORY DISTURBANCES: SEVERE HALLUCINATIONS
TOTAL SCORE: 27
NAUSEA AND VOMITING: MILD NAUSEA WITH NO VOMITING
PAROXYSMAL SWEATS: BARELY PERCEPTIBLE SWEATING. PALMS MOIST
VISUAL DISTURBANCES: MODERATE SENSITIVITY
PAROXYSMAL SWEATS: NO SWEAT VISIBLE
NAUSEA AND VOMITING: NO NAUSEA AND NO VOMITING
AGITATION: *
AGITATION: *
TOTAL SCORE: 13
ANXIETY: *
ANXIETY: *
PAROXYSMAL SWEATS: BARELY PERCEPTIBLE SWEATING. PALMS MOIST
AGITATION: *
HEADACHE, FULLNESS IN HEAD: MODERATELY SEVERE
AGITATION: SOMEWHAT MORE THAN NORMAL ACTIVITY
ORIENTATION AND CLOUDING OF SENSORIUM: ORIENTED AND CAN DO SERIAL ADDITIONS
NAUSEA AND VOMITING: *
VISUAL DISTURBANCES: SEVERE HALLUCINATIONS
AUDITORY DISTURBANCES: MODERATE HARSHNESS OR ABILITY TO FRIGHTEN
TREMOR: TREMOR NOT VISIBLE BUT CAN BE FELT, FINGERTIP TO FINGERTIP
HEADACHE, FULLNESS IN HEAD: MODERATELY SEVERE
AGITATION: MODERATELY FIDGETY AND RESTLESS
ANXIETY: *
TREMOR: *
AGITATION: *
NAUSEA AND VOMITING: *
TOTAL SCORE: 25
HEADACHE, FULLNESS IN HEAD: MODERATE
HEADACHE, FULLNESS IN HEAD: MODERATE
TOTAL SCORE: 3
PAROXYSMAL SWEATS: *
VISUAL DISTURBANCES: VERY MILD SENSITIVITY
PAROXYSMAL SWEATS: NO SWEAT VISIBLE
HEADACHE, FULLNESS IN HEAD: MILD
TOTAL SCORE: VERY MILD ITCHING, PINS AND NEEDLES SENSATION, BURNING OR NUMBNESS
TREMOR: MODERATE TREMOR WITH ARMS EXTENDED
ANXIETY: *
VISUAL DISTURBANCES: SEVERE HALLUCINATIONS
ANXIETY: *
TREMOR: *
PAROXYSMAL SWEATS: BARELY PERCEPTIBLE SWEATING. PALMS MOIST
NAUSEA AND VOMITING: *
VISUAL DISTURBANCES: MILD SENSITIVITY
TOTAL SCORE: MILD ITCHING, PINS AND NEEDLES SENSATION, BURNING OR NUMBNESS
TOTAL SCORE: MILD ITCHING, PINS AND NEEDLES SENSATION, BURNING OR NUMBNESS
ANXIETY: *
ORIENTATION AND CLOUDING OF SENSORIUM: DISORIENTED FOR PLACE AND / OR PERSON
HEADACHE, FULLNESS IN HEAD: MODERATE
PAROXYSMAL SWEATS: BARELY PERCEPTIBLE SWEATING. PALMS MOIST
VISUAL DISTURBANCES: NOT PRESENT
ORIENTATION AND CLOUDING OF SENSORIUM: ORIENTED AND CAN DO SERIAL ADDITIONS
PAROXYSMAL SWEATS: NO SWEAT VISIBLE
AGITATION: *
TREMOR: TREMOR NOT VISIBLE BUT CAN BE FELT, FINGERTIP TO FINGERTIP
AGITATION: MODERATELY FIDGETY AND RESTLESS
TREMOR: *
NAUSEA AND VOMITING: NO NAUSEA AND NO VOMITING
ANXIETY: *
AUDITORY DISTURBANCES: NOT PRESENT
HEADACHE, FULLNESS IN HEAD: MILD
TOTAL SCORE: 21
AUDITORY DISTURBANCES: MILD HARSHNESS OR ABILITY TO FRIGHTEN
AUDITORY DISTURBANCES: NOT PRESENT
ANXIETY: NO ANXIETY (AT EASE)
VISUAL DISTURBANCES: VERY MILD SENSITIVITY
TREMOR: *
HEADACHE, FULLNESS IN HEAD: MODERATE
VISUAL DISTURBANCES: NOT PRESENT
NAUSEA AND VOMITING: NO NAUSEA AND NO VOMITING
TREMOR: MODERATE TREMOR WITH ARMS EXTENDED
ANXIETY: NO ANXIETY (AT EASE)
ORIENTATION AND CLOUDING OF SENSORIUM: DISORIENTED FOR PLACE AND / OR PERSON
AUDITORY DISTURBANCES: MILD HARSHNESS OR ABILITY TO FRIGHTEN
VISUAL DISTURBANCES: NOT PRESENT
NAUSEA AND VOMITING: MILD NAUSEA WITH NO VOMITING
HEADACHE, FULLNESS IN HEAD: MILD
AGITATION: MODERATELY FIDGETY AND RESTLESS
ANXIETY: *
AUDITORY DISTURBANCES: SEVERE HALLUCINATIONS
ANXIETY: MODERATELY ANXIOUS OR GUARDED, SO ANXIETY IS INFERRED
VISUAL DISTURBANCES: SEVERE HALLUCINATIONS
TOTAL SCORE: 22
TOTAL SCORE: 8
ORIENTATION AND CLOUDING OF SENSORIUM: ORIENTED AND CAN DO SERIAL ADDITIONS
ANXIETY: *
ORIENTATION AND CLOUDING OF SENSORIUM: ORIENTED AND CAN DO SERIAL ADDITIONS
TOTAL SCORE: 34
AGITATION: *
TOTAL SCORE: VERY MILD ITCHING, PINS AND NEEDLES SENSATION, BURNING OR NUMBNESS
PAROXYSMAL SWEATS: BARELY PERCEPTIBLE SWEATING. PALMS MOIST
HEADACHE, FULLNESS IN HEAD: MILD
AUDITORY DISTURBANCES: SEVERE HALLUCINATIONS
PAROXYSMAL SWEATS: BARELY PERCEPTIBLE SWEATING. PALMS MOIST
AGITATION: NORMAL ACTIVITY
NAUSEA AND VOMITING: NO NAUSEA AND NO VOMITING
AUDITORY DISTURBANCES: NOT PRESENT

## 2024-10-18 ASSESSMENT — PAIN DESCRIPTION - PAIN TYPE: TYPE: ACUTE PAIN

## 2024-10-18 ASSESSMENT — FIBROSIS 4 INDEX: FIB4 SCORE: 7.24

## 2024-10-19 PROBLEM — G93.40 ENCEPHALOPATHY: Status: RESOLVED | Noted: 2024-10-17 | Resolved: 2024-10-19

## 2024-10-19 PROBLEM — K76.82 HEPATIC ENCEPHALOPATHY (HCC): Status: ACTIVE | Noted: 2024-10-19

## 2024-10-19 PROCEDURE — 700102 HCHG RX REV CODE 250 W/ 637 OVERRIDE(OP)

## 2024-10-19 PROCEDURE — 700111 HCHG RX REV CODE 636 W/ 250 OVERRIDE (IP): Performed by: INTERNAL MEDICINE

## 2024-10-19 PROCEDURE — A9270 NON-COVERED ITEM OR SERVICE: HCPCS

## 2024-10-19 PROCEDURE — 99291 CRITICAL CARE FIRST HOUR: CPT | Performed by: HOSPITALIST

## 2024-10-19 PROCEDURE — 700101 HCHG RX REV CODE 250

## 2024-10-19 PROCEDURE — A9270 NON-COVERED ITEM OR SERVICE: HCPCS | Performed by: HOSPITALIST

## 2024-10-19 PROCEDURE — 700111 HCHG RX REV CODE 636 W/ 250 OVERRIDE (IP): Mod: JZ | Performed by: STUDENT IN AN ORGANIZED HEALTH CARE EDUCATION/TRAINING PROGRAM

## 2024-10-19 PROCEDURE — 700111 HCHG RX REV CODE 636 W/ 250 OVERRIDE (IP)

## 2024-10-19 PROCEDURE — 770000 HCHG ROOM/CARE - INTERMEDIATE ICU *

## 2024-10-19 PROCEDURE — 700102 HCHG RX REV CODE 250 W/ 637 OVERRIDE(OP): Performed by: HOSPITALIST

## 2024-10-19 PROCEDURE — 700102 HCHG RX REV CODE 250 W/ 637 OVERRIDE(OP): Performed by: EMERGENCY MEDICINE

## 2024-10-19 PROCEDURE — A9270 NON-COVERED ITEM OR SERVICE: HCPCS | Performed by: EMERGENCY MEDICINE

## 2024-10-19 RX ORDER — CHLORDIAZEPOXIDE HYDROCHLORIDE 25 MG/1
25 CAPSULE, GELATIN COATED ORAL EVERY 8 HOURS
Status: DISCONTINUED | OUTPATIENT
Start: 2024-10-19 | End: 2024-10-22

## 2024-10-19 RX ORDER — ONDANSETRON 2 MG/ML
4 INJECTION INTRAMUSCULAR; INTRAVENOUS EVERY 4 HOURS PRN
Status: DISCONTINUED | OUTPATIENT
Start: 2024-10-19 | End: 2024-10-27 | Stop reason: HOSPADM

## 2024-10-19 RX ADMIN — ESCITALOPRAM OXALATE 10 MG: 10 TABLET ORAL at 05:23

## 2024-10-19 RX ADMIN — ACETAMINOPHEN 650 MG: 325 TABLET ORAL at 17:10

## 2024-10-19 RX ADMIN — LORAZEPAM 2 MG: 2 INJECTION INTRAMUSCULAR; INTRAVENOUS at 01:15

## 2024-10-19 RX ADMIN — THIAMINE HYDROCHLORIDE 250 MG: 100 INJECTION, SOLUTION INTRAMUSCULAR; INTRAVENOUS at 21:13

## 2024-10-19 RX ADMIN — SPIRONOLACTONE 100 MG: 25 TABLET ORAL at 05:22

## 2024-10-19 RX ADMIN — NIFEDIPINE 30 MG: 30 TABLET, FILM COATED, EXTENDED RELEASE ORAL at 05:24

## 2024-10-19 RX ADMIN — LORAZEPAM 2 MG: 2 INJECTION INTRAMUSCULAR; INTRAVENOUS at 05:24

## 2024-10-19 RX ADMIN — LACTULOSE 30 ML: 10 SOLUTION ORAL at 05:25

## 2024-10-19 RX ADMIN — NICOTINE TRANSDERMAL SYSTEM 21 MG: 21 PATCH, EXTENDED RELEASE TRANSDERMAL at 05:25

## 2024-10-19 RX ADMIN — HEPARIN SODIUM 5000 UNITS: 5000 INJECTION, SOLUTION INTRAVENOUS; SUBCUTANEOUS at 05:24

## 2024-10-19 RX ADMIN — THIAMINE HYDROCHLORIDE 250 MG: 100 INJECTION, SOLUTION INTRAMUSCULAR; INTRAVENOUS at 05:24

## 2024-10-19 RX ADMIN — OMEPRAZOLE 20 MG: 20 CAPSULE, DELAYED RELEASE ORAL at 05:23

## 2024-10-19 RX ADMIN — OXYCODONE 5 MG: 5 TABLET ORAL at 09:43

## 2024-10-19 RX ADMIN — CHLORDIAZEPOXIDE HYDROCHLORIDE 25 MG: 25 CAPSULE ORAL at 21:13

## 2024-10-19 RX ADMIN — ONDANSETRON 4 MG: 2 INJECTION INTRAMUSCULAR; INTRAVENOUS at 22:39

## 2024-10-19 RX ADMIN — CYANOCOBALAMIN TAB 500 MCG 1000 MCG: 500 TAB at 05:22

## 2024-10-19 RX ADMIN — THIAMINE HYDROCHLORIDE 250 MG: 100 INJECTION, SOLUTION INTRAMUSCULAR; INTRAVENOUS at 13:38

## 2024-10-19 RX ADMIN — OXYCODONE 5 MG: 5 TABLET ORAL at 21:12

## 2024-10-19 RX ADMIN — LACTULOSE 30 ML: 10 SOLUTION ORAL at 13:38

## 2024-10-19 RX ADMIN — FOLIC ACID 1 MG: 1 TABLET ORAL at 05:23

## 2024-10-19 RX ADMIN — LACTULOSE 30 ML: 10 SOLUTION ORAL at 21:06

## 2024-10-19 RX ADMIN — CHLORDIAZEPOXIDE HYDROCHLORIDE 25 MG: 25 CAPSULE ORAL at 13:38

## 2024-10-19 RX ADMIN — LIDOCAINE 1 PATCH: 4 PATCH TOPICAL at 09:43

## 2024-10-19 ASSESSMENT — LIFESTYLE VARIABLES
NAUSEA AND VOMITING: MILD NAUSEA WITH NO VOMITING
AGITATION: SOMEWHAT MORE THAN NORMAL ACTIVITY
PAROXYSMAL SWEATS: NO SWEAT VISIBLE
TOTAL SCORE: VERY MILD ITCHING, PINS AND NEEDLES SENSATION, BURNING OR NUMBNESS
AUDITORY DISTURBANCES: NOT PRESENT
VISUAL DISTURBANCES: VERY MILD SENSITIVITY
TREMOR: *
NAUSEA AND VOMITING: NO NAUSEA AND NO VOMITING
AGITATION: SOMEWHAT MORE THAN NORMAL ACTIVITY
VISUAL DISTURBANCES: MILD SENSITIVITY
TOTAL SCORE: 14
TREMOR: *
HEADACHE, FULLNESS IN HEAD: MILD
TOTAL SCORE: 10
TREMOR: *
TOTAL SCORE: 14
VISUAL DISTURBANCES: VERY MILD SENSITIVITY
AUDITORY DISTURBANCES: NOT PRESENT
PAROXYSMAL SWEATS: NO SWEAT VISIBLE
AUDITORY DISTURBANCES: NOT PRESENT
TOTAL SCORE: 10
AGITATION: NORMAL ACTIVITY
TREMOR: *
TOTAL SCORE: 9
ORIENTATION AND CLOUDING OF SENSORIUM: DATE DISORIENTATION BY MORE THAN TWO CALENDAR DAYS
PAROXYSMAL SWEATS: NO SWEAT VISIBLE
NAUSEA AND VOMITING: NO NAUSEA AND NO VOMITING
ORIENTATION AND CLOUDING OF SENSORIUM: DISORIENTED FOR PLACE AND / OR PERSON
NAUSEA AND VOMITING: NO NAUSEA AND NO VOMITING
HEADACHE, FULLNESS IN HEAD: MILD
ORIENTATION AND CLOUDING OF SENSORIUM: DATE DISORIENTATION BY NO MORE THAN TWO CALENDAR DAYS
PAROXYSMAL SWEATS: BARELY PERCEPTIBLE SWEATING. PALMS MOIST
VISUAL DISTURBANCES: NOT PRESENT
ANXIETY: MILDLY ANXIOUS
NAUSEA AND VOMITING: NO NAUSEA AND NO VOMITING
AGITATION: NORMAL ACTIVITY
ANXIETY: NO ANXIETY (AT EASE)
HEADACHE, FULLNESS IN HEAD: NOT PRESENT
VISUAL DISTURBANCES: MILD SENSITIVITY
TREMOR: TREMOR NOT VISIBLE BUT CAN BE FELT, FINGERTIP TO FINGERTIP
HEADACHE, FULLNESS IN HEAD: MODERATELY SEVERE
HEADACHE, FULLNESS IN HEAD: MODERATE
ORIENTATION AND CLOUDING OF SENSORIUM: DISORIENTED FOR PLACE AND / OR PERSON
ANXIETY: MILDLY ANXIOUS
PAROXYSMAL SWEATS: BARELY PERCEPTIBLE SWEATING. PALMS MOIST
ANXIETY: NO ANXIETY (AT EASE)
AGITATION: SOMEWHAT MORE THAN NORMAL ACTIVITY
AUDITORY DISTURBANCES: VERY MILD HARSHNESS OR ABILITY TO FRIGHTEN
AUDITORY DISTURBANCES: NOT PRESENT
ORIENTATION AND CLOUDING OF SENSORIUM: DISORIENTED FOR PLACE AND / OR PERSON
ANXIETY: MILDLY ANXIOUS

## 2024-10-19 ASSESSMENT — COGNITIVE AND FUNCTIONAL STATUS - GENERAL
WALKING IN HOSPITAL ROOM: A LITTLE
HELP NEEDED FOR BATHING: A LITTLE
DAILY ACTIVITIY SCORE: 17
DRESSING REGULAR LOWER BODY CLOTHING: A LITTLE
CLIMB 3 TO 5 STEPS WITH RAILING: A LITTLE
SUGGESTED CMS G CODE MODIFIER MOBILITY: CK
STANDING UP FROM CHAIR USING ARMS: A LITTLE
SUGGESTED CMS G CODE MODIFIER DAILY ACTIVITY: CK
MOVING FROM LYING ON BACK TO SITTING ON SIDE OF FLAT BED: A LITTLE
EATING MEALS: A LITTLE
MOVING TO AND FROM BED TO CHAIR: A LITTLE
TOILETING: A LITTLE
DRESSING REGULAR UPPER BODY CLOTHING: A LITTLE
PERSONAL GROOMING: A LOT
TURNING FROM BACK TO SIDE WHILE IN FLAT BAD: A LITTLE
MOBILITY SCORE: 18

## 2024-10-19 ASSESSMENT — FIBROSIS 4 INDEX
FIB4 SCORE: 7.24
FIB4 SCORE: 7.24

## 2024-10-19 ASSESSMENT — PAIN DESCRIPTION - PAIN TYPE: TYPE: ACUTE PAIN

## 2024-10-20 LAB
ALBUMIN SERPL BCP-MCNC: 2.3 G/DL (ref 3.2–4.9)
ALBUMIN/GLOB SERPL: 0.9 G/DL
ALP SERPL-CCNC: 124 U/L (ref 30–99)
ALT SERPL-CCNC: 22 U/L (ref 2–50)
AMMONIA PLAS-SCNC: 134 UMOL/L (ref 11–45)
ANION GAP SERPL CALC-SCNC: 6 MMOL/L (ref 7–16)
AST SERPL-CCNC: 64 U/L (ref 12–45)
BASOPHILS # BLD AUTO: 0.2 % (ref 0–1.8)
BASOPHILS # BLD: 0.01 K/UL (ref 0–0.12)
BILIRUB SERPL-MCNC: 3.3 MG/DL (ref 0.1–1.5)
BUN SERPL-MCNC: 14 MG/DL (ref 8–22)
CALCIUM ALBUM COR SERPL-MCNC: 10.1 MG/DL (ref 8.5–10.5)
CALCIUM SERPL-MCNC: 8.7 MG/DL (ref 8.5–10.5)
CHLORIDE SERPL-SCNC: 108 MMOL/L (ref 96–112)
CO2 SERPL-SCNC: 23 MMOL/L (ref 20–33)
CREAT SERPL-MCNC: 1.06 MG/DL (ref 0.5–1.4)
EOSINOPHIL # BLD AUTO: 0.17 K/UL (ref 0–0.51)
EOSINOPHIL NFR BLD: 4 % (ref 0–6.9)
ERYTHROCYTE [DISTWIDTH] IN BLOOD BY AUTOMATED COUNT: 57.4 FL (ref 35.9–50)
GFR SERPLBLD CREATININE-BSD FMLA CKD-EPI: 89 ML/MIN/1.73 M 2
GLOBULIN SER CALC-MCNC: 2.7 G/DL (ref 1.9–3.5)
GLUCOSE SERPL-MCNC: 102 MG/DL (ref 65–99)
HCT VFR BLD AUTO: 27.2 % (ref 42–52)
HGB BLD-MCNC: 9.4 G/DL (ref 14–18)
IMM GRANULOCYTES # BLD AUTO: 0 K/UL (ref 0–0.11)
IMM GRANULOCYTES NFR BLD AUTO: 0 % (ref 0–0.9)
LYMPHOCYTES # BLD AUTO: 1.18 K/UL (ref 1–4.8)
LYMPHOCYTES NFR BLD: 27.5 % (ref 22–41)
MCH RBC QN AUTO: 32.9 PG (ref 27–33)
MCHC RBC AUTO-ENTMCNC: 34.6 G/DL (ref 32.3–36.5)
MCV RBC AUTO: 95.1 FL (ref 81.4–97.8)
MONOCYTES # BLD AUTO: 0.59 K/UL (ref 0–0.85)
MONOCYTES NFR BLD AUTO: 13.8 % (ref 0–13.4)
NEUTROPHILS # BLD AUTO: 2.34 K/UL (ref 1.82–7.42)
NEUTROPHILS NFR BLD: 54.5 % (ref 44–72)
NRBC # BLD AUTO: 0 K/UL
NRBC BLD-RTO: 0 /100 WBC (ref 0–0.2)
PLATELET # BLD AUTO: 56 K/UL (ref 164–446)
PLATELETS.RETICULATED NFR BLD AUTO: 2.3 % (ref 0.6–13.1)
PMV BLD AUTO: 13.3 FL (ref 9–12.9)
POTASSIUM SERPL-SCNC: 4.3 MMOL/L (ref 3.6–5.5)
PROT SERPL-MCNC: 5 G/DL (ref 6–8.2)
RBC # BLD AUTO: 2.86 M/UL (ref 4.7–6.1)
SODIUM SERPL-SCNC: 137 MMOL/L (ref 135–145)
WBC # BLD AUTO: 4.3 K/UL (ref 4.8–10.8)

## 2024-10-20 PROCEDURE — A9270 NON-COVERED ITEM OR SERVICE: HCPCS

## 2024-10-20 PROCEDURE — 99291 CRITICAL CARE FIRST HOUR: CPT | Performed by: HOSPITALIST

## 2024-10-20 PROCEDURE — 80053 COMPREHEN METABOLIC PANEL: CPT

## 2024-10-20 PROCEDURE — 770000 HCHG ROOM/CARE - INTERMEDIATE ICU *

## 2024-10-20 PROCEDURE — 700102 HCHG RX REV CODE 250 W/ 637 OVERRIDE(OP): Performed by: HOSPITALIST

## 2024-10-20 PROCEDURE — 700102 HCHG RX REV CODE 250 W/ 637 OVERRIDE(OP)

## 2024-10-20 PROCEDURE — A9270 NON-COVERED ITEM OR SERVICE: HCPCS | Performed by: STUDENT IN AN ORGANIZED HEALTH CARE EDUCATION/TRAINING PROGRAM

## 2024-10-20 PROCEDURE — 85025 COMPLETE CBC W/AUTO DIFF WBC: CPT

## 2024-10-20 PROCEDURE — 700101 HCHG RX REV CODE 250

## 2024-10-20 PROCEDURE — 700111 HCHG RX REV CODE 636 W/ 250 OVERRIDE (IP)

## 2024-10-20 PROCEDURE — A9270 NON-COVERED ITEM OR SERVICE: HCPCS | Performed by: HOSPITALIST

## 2024-10-20 PROCEDURE — 82140 ASSAY OF AMMONIA: CPT

## 2024-10-20 PROCEDURE — 84132 ASSAY OF SERUM POTASSIUM: CPT

## 2024-10-20 PROCEDURE — 85055 RETICULATED PLATELET ASSAY: CPT

## 2024-10-20 PROCEDURE — 700102 HCHG RX REV CODE 250 W/ 637 OVERRIDE(OP): Performed by: EMERGENCY MEDICINE

## 2024-10-20 PROCEDURE — A9270 NON-COVERED ITEM OR SERVICE: HCPCS | Performed by: EMERGENCY MEDICINE

## 2024-10-20 PROCEDURE — 700102 HCHG RX REV CODE 250 W/ 637 OVERRIDE(OP): Performed by: STUDENT IN AN ORGANIZED HEALTH CARE EDUCATION/TRAINING PROGRAM

## 2024-10-20 RX ORDER — LACTULOSE 10 G/15ML
45 SOLUTION ORAL
Status: DISCONTINUED | OUTPATIENT
Start: 2024-10-20 | End: 2024-10-27 | Stop reason: HOSPADM

## 2024-10-20 RX ADMIN — LACTULOSE 45 ML: 10 SOLUTION ORAL at 08:47

## 2024-10-20 RX ADMIN — OMEPRAZOLE 20 MG: 20 CAPSULE, DELAYED RELEASE ORAL at 05:40

## 2024-10-20 RX ADMIN — CYANOCOBALAMIN TAB 500 MCG 1000 MCG: 500 TAB at 05:40

## 2024-10-20 RX ADMIN — CHLORDIAZEPOXIDE HYDROCHLORIDE 25 MG: 25 CAPSULE ORAL at 22:43

## 2024-10-20 RX ADMIN — LACTULOSE 45 ML: 10 SOLUTION ORAL at 22:43

## 2024-10-20 RX ADMIN — FOLIC ACID 1 MG: 1 TABLET ORAL at 05:40

## 2024-10-20 RX ADMIN — ESCITALOPRAM OXALATE 10 MG: 10 TABLET ORAL at 05:41

## 2024-10-20 RX ADMIN — OXYCODONE 5 MG: 5 TABLET ORAL at 15:33

## 2024-10-20 RX ADMIN — NICOTINE TRANSDERMAL SYSTEM 21 MG: 21 PATCH, EXTENDED RELEASE TRANSDERMAL at 05:41

## 2024-10-20 RX ADMIN — LIDOCAINE 1 PATCH: 4 PATCH TOPICAL at 12:20

## 2024-10-20 RX ADMIN — RIFAXIMIN 550 MG: 550 TABLET ORAL at 08:47

## 2024-10-20 RX ADMIN — NIFEDIPINE 30 MG: 30 TABLET, FILM COATED, EXTENDED RELEASE ORAL at 05:41

## 2024-10-20 RX ADMIN — CHLORDIAZEPOXIDE HYDROCHLORIDE 25 MG: 25 CAPSULE ORAL at 14:19

## 2024-10-20 RX ADMIN — LACTULOSE 45 ML: 10 SOLUTION ORAL at 18:28

## 2024-10-20 RX ADMIN — LACTULOSE 45 ML: 10 SOLUTION ORAL at 12:20

## 2024-10-20 RX ADMIN — SPIRONOLACTONE 100 MG: 25 TABLET ORAL at 05:41

## 2024-10-20 RX ADMIN — RIFAXIMIN 550 MG: 550 TABLET ORAL at 18:28

## 2024-10-20 RX ADMIN — CHLORDIAZEPOXIDE HYDROCHLORIDE 25 MG: 25 CAPSULE ORAL at 05:41

## 2024-10-20 RX ADMIN — THIAMINE HYDROCHLORIDE 250 MG: 100 INJECTION, SOLUTION INTRAMUSCULAR; INTRAVENOUS at 05:40

## 2024-10-20 ASSESSMENT — LIFESTYLE VARIABLES
PAROXYSMAL SWEATS: NO SWEAT VISIBLE
TOTAL SCORE: 5
ANXIETY: NO ANXIETY (AT EASE)
TREMOR: *
PAROXYSMAL SWEATS: NO SWEAT VISIBLE
PAROXYSMAL SWEATS: NO SWEAT VISIBLE
AUDITORY DISTURBANCES: VERY MILD HARSHNESS OR ABILITY TO FRIGHTEN
NAUSEA AND VOMITING: NO NAUSEA AND NO VOMITING
HEADACHE, FULLNESS IN HEAD: VERY MILD
TREMOR: *
TOTAL SCORE: MILD ITCHING, PINS AND NEEDLES SENSATION, BURNING OR NUMBNESS
ORIENTATION AND CLOUDING OF SENSORIUM: DATE DISORIENTATION BY NO MORE THAN TWO CALENDAR DAYS
AGITATION: NORMAL ACTIVITY
AUDITORY DISTURBANCES: NOT PRESENT
AUDITORY DISTURBANCES: NOT PRESENT
HEADACHE, FULLNESS IN HEAD: NOT PRESENT
NAUSEA AND VOMITING: NO NAUSEA AND NO VOMITING
ANXIETY: NO ANXIETY (AT EASE)
ORIENTATION AND CLOUDING OF SENSORIUM: DATE DISORIENTATION BY NO MORE THAN TWO CALENDAR DAYS
VISUAL DISTURBANCES: NOT PRESENT
TOTAL SCORE: 5
NAUSEA AND VOMITING: NO NAUSEA AND NO VOMITING
HEADACHE, FULLNESS IN HEAD: VERY MILD
AGITATION: NORMAL ACTIVITY
TREMOR: NO TREMOR
ORIENTATION AND CLOUDING OF SENSORIUM: DATE DISORIENTATION BY MORE THAN TWO CALENDAR DAYS
AGITATION: NORMAL ACTIVITY
ANXIETY: NO ANXIETY (AT EASE)
VISUAL DISTURBANCES: NOT PRESENT
VISUAL DISTURBANCES: NOT PRESENT
TOTAL SCORE: 6

## 2024-10-20 ASSESSMENT — PAIN DESCRIPTION - PAIN TYPE
TYPE: ACUTE PAIN
TYPE: ACUTE PAIN
TYPE: ACUTE PAIN;CHRONIC PAIN
TYPE: ACUTE PAIN

## 2024-10-21 PROBLEM — Z72.0 TOBACCO ABUSE: Status: ACTIVE | Noted: 2024-10-21

## 2024-10-21 LAB
ALBUMIN SERPL BCP-MCNC: 2.2 G/DL (ref 3.2–4.9)
ALBUMIN/GLOB SERPL: 0.8 G/DL
ALP SERPL-CCNC: 120 U/L (ref 30–99)
ALT SERPL-CCNC: 19 U/L (ref 2–50)
ANION GAP SERPL CALC-SCNC: 10 MMOL/L (ref 7–16)
AST SERPL-CCNC: 32 U/L (ref 12–45)
BASOPHILS # BLD AUTO: 0.2 % (ref 0–1.8)
BASOPHILS # BLD: 0.02 K/UL (ref 0–0.12)
BILIRUB SERPL-MCNC: 2.9 MG/DL (ref 0.1–1.5)
BUN SERPL-MCNC: 15 MG/DL (ref 8–22)
CALCIUM ALBUM COR SERPL-MCNC: 10.4 MG/DL (ref 8.5–10.5)
CALCIUM SERPL-MCNC: 9 MG/DL (ref 8.5–10.5)
CHLORIDE SERPL-SCNC: 110 MMOL/L (ref 96–112)
CO2 SERPL-SCNC: 22 MMOL/L (ref 20–33)
CREAT SERPL-MCNC: 0.9 MG/DL (ref 0.5–1.4)
EOSINOPHIL # BLD AUTO: 0.26 K/UL (ref 0–0.51)
EOSINOPHIL NFR BLD: 2.9 % (ref 0–6.9)
ERYTHROCYTE [DISTWIDTH] IN BLOOD BY AUTOMATED COUNT: 60.3 FL (ref 35.9–50)
GFR SERPLBLD CREATININE-BSD FMLA CKD-EPI: 109 ML/MIN/1.73 M 2
GLOBULIN SER CALC-MCNC: 2.6 G/DL (ref 1.9–3.5)
GLUCOSE SERPL-MCNC: 115 MG/DL (ref 65–99)
HCT VFR BLD AUTO: 26.8 % (ref 42–52)
HGB BLD-MCNC: 8.6 G/DL (ref 14–18)
IMM GRANULOCYTES # BLD AUTO: 0.03 K/UL (ref 0–0.11)
IMM GRANULOCYTES NFR BLD AUTO: 0.3 % (ref 0–0.9)
LYMPHOCYTES # BLD AUTO: 1.45 K/UL (ref 1–4.8)
LYMPHOCYTES NFR BLD: 15.9 % (ref 22–41)
MAGNESIUM SERPL-MCNC: 1.7 MG/DL (ref 1.5–2.5)
MCH RBC QN AUTO: 31.5 PG (ref 27–33)
MCHC RBC AUTO-ENTMCNC: 32.1 G/DL (ref 32.3–36.5)
MCV RBC AUTO: 98.2 FL (ref 81.4–97.8)
MONOCYTES # BLD AUTO: 0.89 K/UL (ref 0–0.85)
MONOCYTES NFR BLD AUTO: 9.8 % (ref 0–13.4)
NEUTROPHILS # BLD AUTO: 6.47 K/UL (ref 1.82–7.42)
NEUTROPHILS NFR BLD: 70.9 % (ref 44–72)
NRBC # BLD AUTO: 0 K/UL
NRBC BLD-RTO: 0 /100 WBC (ref 0–0.2)
PHOSPHATE SERPL-MCNC: 4.5 MG/DL (ref 2.5–4.5)
PLATELET # BLD AUTO: 49 K/UL (ref 164–446)
PLATELETS.RETICULATED NFR BLD AUTO: 2 % (ref 0.6–13.1)
PMV BLD AUTO: 11.8 FL (ref 9–12.9)
POTASSIUM SERPL-SCNC: 4.6 MMOL/L (ref 3.6–5.5)
PROT SERPL-MCNC: 4.8 G/DL (ref 6–8.2)
RBC # BLD AUTO: 2.73 M/UL (ref 4.7–6.1)
SODIUM SERPL-SCNC: 142 MMOL/L (ref 135–145)
WBC # BLD AUTO: 9.1 K/UL (ref 4.8–10.8)

## 2024-10-21 PROCEDURE — A9270 NON-COVERED ITEM OR SERVICE: HCPCS | Performed by: HOSPITALIST

## 2024-10-21 PROCEDURE — 99233 SBSQ HOSP IP/OBS HIGH 50: CPT | Performed by: HOSPITALIST

## 2024-10-21 PROCEDURE — 80053 COMPREHEN METABOLIC PANEL: CPT

## 2024-10-21 PROCEDURE — A9270 NON-COVERED ITEM OR SERVICE: HCPCS

## 2024-10-21 PROCEDURE — 770006 HCHG ROOM/CARE - MED/SURG/GYN SEMI*

## 2024-10-21 PROCEDURE — 700102 HCHG RX REV CODE 250 W/ 637 OVERRIDE(OP): Performed by: EMERGENCY MEDICINE

## 2024-10-21 PROCEDURE — 84100 ASSAY OF PHOSPHORUS: CPT

## 2024-10-21 PROCEDURE — A9270 NON-COVERED ITEM OR SERVICE: HCPCS | Performed by: STUDENT IN AN ORGANIZED HEALTH CARE EDUCATION/TRAINING PROGRAM

## 2024-10-21 PROCEDURE — 700102 HCHG RX REV CODE 250 W/ 637 OVERRIDE(OP): Performed by: STUDENT IN AN ORGANIZED HEALTH CARE EDUCATION/TRAINING PROGRAM

## 2024-10-21 PROCEDURE — A9270 NON-COVERED ITEM OR SERVICE: HCPCS | Performed by: EMERGENCY MEDICINE

## 2024-10-21 PROCEDURE — 700102 HCHG RX REV CODE 250 W/ 637 OVERRIDE(OP): Performed by: HOSPITALIST

## 2024-10-21 PROCEDURE — 700102 HCHG RX REV CODE 250 W/ 637 OVERRIDE(OP)

## 2024-10-21 PROCEDURE — 85025 COMPLETE CBC W/AUTO DIFF WBC: CPT

## 2024-10-21 PROCEDURE — 83735 ASSAY OF MAGNESIUM: CPT

## 2024-10-21 PROCEDURE — 85055 RETICULATED PLATELET ASSAY: CPT

## 2024-10-21 RX ADMIN — OXYCODONE 5 MG: 5 TABLET ORAL at 01:09

## 2024-10-21 RX ADMIN — LACTULOSE 45 ML: 10 SOLUTION ORAL at 21:33

## 2024-10-21 RX ADMIN — OMEPRAZOLE 20 MG: 20 CAPSULE, DELAYED RELEASE ORAL at 05:22

## 2024-10-21 RX ADMIN — RIFAXIMIN 550 MG: 550 TABLET ORAL at 17:29

## 2024-10-21 RX ADMIN — CYANOCOBALAMIN TAB 500 MCG 1000 MCG: 500 TAB at 05:22

## 2024-10-21 RX ADMIN — FOLIC ACID 1 MG: 1 TABLET ORAL at 05:22

## 2024-10-21 RX ADMIN — RIFAXIMIN 550 MG: 550 TABLET ORAL at 05:21

## 2024-10-21 RX ADMIN — Medication 100 MG: at 05:25

## 2024-10-21 RX ADMIN — CHLORDIAZEPOXIDE HYDROCHLORIDE 25 MG: 25 CAPSULE ORAL at 13:48

## 2024-10-21 RX ADMIN — CHLORDIAZEPOXIDE HYDROCHLORIDE 25 MG: 25 CAPSULE ORAL at 05:21

## 2024-10-21 RX ADMIN — NIFEDIPINE 30 MG: 30 TABLET, FILM COATED, EXTENDED RELEASE ORAL at 05:23

## 2024-10-21 RX ADMIN — OXYCODONE 5 MG: 5 TABLET ORAL at 08:38

## 2024-10-21 RX ADMIN — OXYCODONE 5 MG: 5 TABLET ORAL at 23:44

## 2024-10-21 RX ADMIN — OXYCODONE 5 MG: 5 TABLET ORAL at 13:52

## 2024-10-21 RX ADMIN — LACTULOSE 45 ML: 10 SOLUTION ORAL at 08:39

## 2024-10-21 RX ADMIN — ESCITALOPRAM OXALATE 10 MG: 10 TABLET ORAL at 05:21

## 2024-10-21 RX ADMIN — LACTULOSE 45 ML: 10 SOLUTION ORAL at 17:30

## 2024-10-21 RX ADMIN — NICOTINE TRANSDERMAL SYSTEM 21 MG: 21 PATCH, EXTENDED RELEASE TRANSDERMAL at 05:20

## 2024-10-21 RX ADMIN — SPIRONOLACTONE 100 MG: 25 TABLET ORAL at 05:21

## 2024-10-21 RX ADMIN — LACTULOSE 45 ML: 10 SOLUTION ORAL at 13:44

## 2024-10-21 RX ADMIN — CHLORDIAZEPOXIDE HYDROCHLORIDE 25 MG: 25 CAPSULE ORAL at 21:33

## 2024-10-21 ASSESSMENT — LIFESTYLE VARIABLES
ANXIETY: NO ANXIETY (AT EASE)
PAROXYSMAL SWEATS: NO SWEAT VISIBLE
NAUSEA AND VOMITING: NO NAUSEA AND NO VOMITING
ORIENTATION AND CLOUDING OF SENSORIUM: CANNOT DO SERIAL ADDITIONS OR IS UNCERTAIN ABOUT DATE
VISUAL DISTURBANCES: NOT PRESENT
HEADACHE, FULLNESS IN HEAD: NOT PRESENT
TOTAL SCORE: 3
AUDITORY DISTURBANCES: VERY MILD HARSHNESS OR ABILITY TO FRIGHTEN
AGITATION: NORMAL ACTIVITY
TOTAL SCORE: VERY MILD ITCHING, PINS AND NEEDLES SENSATION, BURNING OR NUMBNESS
TREMOR: NO TREMOR

## 2024-10-21 ASSESSMENT — PAIN DESCRIPTION - PAIN TYPE
TYPE: ACUTE PAIN

## 2024-10-21 ASSESSMENT — FIBROSIS 4 INDEX: FIB4 SCORE: 10.23

## 2024-10-22 LAB
ALBUMIN SERPL BCP-MCNC: 2.6 G/DL (ref 3.2–4.9)
ALBUMIN/GLOB SERPL: 0.8 G/DL
ALP SERPL-CCNC: 140 U/L (ref 30–99)
ALT SERPL-CCNC: 19 U/L (ref 2–50)
AMMONIA PLAS-SCNC: 65 UMOL/L (ref 11–45)
ANION GAP SERPL CALC-SCNC: 6 MMOL/L (ref 7–16)
AST SERPL-CCNC: 40 U/L (ref 12–45)
BASOPHILS # BLD AUTO: 0.3 % (ref 0–1.8)
BASOPHILS # BLD: 0.03 K/UL (ref 0–0.12)
BILIRUB SERPL-MCNC: 2.9 MG/DL (ref 0.1–1.5)
BUN SERPL-MCNC: 14 MG/DL (ref 8–22)
CALCIUM ALBUM COR SERPL-MCNC: 9.8 MG/DL (ref 8.5–10.5)
CALCIUM SERPL-MCNC: 8.7 MG/DL (ref 8.5–10.5)
CHLORIDE SERPL-SCNC: 106 MMOL/L (ref 96–112)
CO2 SERPL-SCNC: 23 MMOL/L (ref 20–33)
CREAT SERPL-MCNC: 1.01 MG/DL (ref 0.5–1.4)
EOSINOPHIL # BLD AUTO: 0.39 K/UL (ref 0–0.51)
EOSINOPHIL NFR BLD: 4.5 % (ref 0–6.9)
ERYTHROCYTE [DISTWIDTH] IN BLOOD BY AUTOMATED COUNT: 59.3 FL (ref 35.9–50)
GFR SERPLBLD CREATININE-BSD FMLA CKD-EPI: 95 ML/MIN/1.73 M 2
GLOBULIN SER CALC-MCNC: 3.1 G/DL (ref 1.9–3.5)
GLUCOSE SERPL-MCNC: 100 MG/DL (ref 65–99)
HCT VFR BLD AUTO: 30.2 % (ref 42–52)
HGB BLD-MCNC: 10.1 G/DL (ref 14–18)
IMM GRANULOCYTES # BLD AUTO: 0.02 K/UL (ref 0–0.11)
IMM GRANULOCYTES NFR BLD AUTO: 0.2 % (ref 0–0.9)
LYMPHOCYTES # BLD AUTO: 1.45 K/UL (ref 1–4.8)
LYMPHOCYTES NFR BLD: 16.8 % (ref 22–41)
MCH RBC QN AUTO: 33.2 PG (ref 27–33)
MCHC RBC AUTO-ENTMCNC: 33.4 G/DL (ref 32.3–36.5)
MCV RBC AUTO: 99.3 FL (ref 81.4–97.8)
MONOCYTES # BLD AUTO: 0.78 K/UL (ref 0–0.85)
MONOCYTES NFR BLD AUTO: 9 % (ref 0–13.4)
NEUTROPHILS # BLD AUTO: 5.97 K/UL (ref 1.82–7.42)
NEUTROPHILS NFR BLD: 69.2 % (ref 44–72)
NRBC # BLD AUTO: 0 K/UL
NRBC BLD-RTO: 0 /100 WBC (ref 0–0.2)
PLATELET # BLD AUTO: 64 K/UL (ref 164–446)
PLATELETS.RETICULATED NFR BLD AUTO: 1.9 % (ref 0.6–13.1)
PMV BLD AUTO: 10.8 FL (ref 9–12.9)
POTASSIUM SERPL-SCNC: 4.5 MMOL/L (ref 3.6–5.5)
PROT SERPL-MCNC: 5.7 G/DL (ref 6–8.2)
RBC # BLD AUTO: 3.04 M/UL (ref 4.7–6.1)
SODIUM SERPL-SCNC: 135 MMOL/L (ref 135–145)
TSH SERPL DL<=0.005 MIU/L-ACNC: 2.25 UIU/ML (ref 0.38–5.33)
VIT B12 SERPL-MCNC: 2961 PG/ML (ref 211–911)
WBC # BLD AUTO: 8.6 K/UL (ref 4.8–10.8)

## 2024-10-22 PROCEDURE — 700111 HCHG RX REV CODE 636 W/ 250 OVERRIDE (IP)

## 2024-10-22 PROCEDURE — 84443 ASSAY THYROID STIM HORMONE: CPT

## 2024-10-22 PROCEDURE — A9270 NON-COVERED ITEM OR SERVICE: HCPCS | Performed by: STUDENT IN AN ORGANIZED HEALTH CARE EDUCATION/TRAINING PROGRAM

## 2024-10-22 PROCEDURE — 700102 HCHG RX REV CODE 250 W/ 637 OVERRIDE(OP): Performed by: HOSPITALIST

## 2024-10-22 PROCEDURE — 700102 HCHG RX REV CODE 250 W/ 637 OVERRIDE(OP): Performed by: EMERGENCY MEDICINE

## 2024-10-22 PROCEDURE — 85025 COMPLETE CBC W/AUTO DIFF WBC: CPT

## 2024-10-22 PROCEDURE — 770006 HCHG ROOM/CARE - MED/SURG/GYN SEMI*

## 2024-10-22 PROCEDURE — A9270 NON-COVERED ITEM OR SERVICE: HCPCS

## 2024-10-22 PROCEDURE — 700102 HCHG RX REV CODE 250 W/ 637 OVERRIDE(OP)

## 2024-10-22 PROCEDURE — 80053 COMPREHEN METABOLIC PANEL: CPT

## 2024-10-22 PROCEDURE — 700101 HCHG RX REV CODE 250

## 2024-10-22 PROCEDURE — 85055 RETICULATED PLATELET ASSAY: CPT

## 2024-10-22 PROCEDURE — 82140 ASSAY OF AMMONIA: CPT

## 2024-10-22 PROCEDURE — 700102 HCHG RX REV CODE 250 W/ 637 OVERRIDE(OP): Performed by: STUDENT IN AN ORGANIZED HEALTH CARE EDUCATION/TRAINING PROGRAM

## 2024-10-22 PROCEDURE — A9270 NON-COVERED ITEM OR SERVICE: HCPCS | Performed by: EMERGENCY MEDICINE

## 2024-10-22 PROCEDURE — 82607 VITAMIN B-12: CPT

## 2024-10-22 PROCEDURE — 99233 SBSQ HOSP IP/OBS HIGH 50: CPT | Mod: GC | Performed by: STUDENT IN AN ORGANIZED HEALTH CARE EDUCATION/TRAINING PROGRAM

## 2024-10-22 PROCEDURE — 36415 COLL VENOUS BLD VENIPUNCTURE: CPT

## 2024-10-22 PROCEDURE — A9270 NON-COVERED ITEM OR SERVICE: HCPCS | Performed by: HOSPITALIST

## 2024-10-22 RX ORDER — THIAMINE HYDROCHLORIDE 100 MG/ML
250 INJECTION, SOLUTION INTRAMUSCULAR; INTRAVENOUS DAILY
Status: COMPLETED | OUTPATIENT
Start: 2024-10-22 | End: 2024-10-23

## 2024-10-22 RX ORDER — CHLORDIAZEPOXIDE HYDROCHLORIDE 25 MG/1
25 CAPSULE, GELATIN COATED ORAL EVERY 12 HOURS
Status: DISCONTINUED | OUTPATIENT
Start: 2024-10-22 | End: 2024-10-23

## 2024-10-22 RX ORDER — OXYCODONE HYDROCHLORIDE 5 MG/1
2.5 TABLET ORAL
Status: DISCONTINUED | OUTPATIENT
Start: 2024-10-22 | End: 2024-10-25

## 2024-10-22 RX ADMIN — Medication 100 MG: at 04:17

## 2024-10-22 RX ADMIN — ESCITALOPRAM OXALATE 10 MG: 10 TABLET ORAL at 04:19

## 2024-10-22 RX ADMIN — LACTULOSE 45 ML: 10 SOLUTION ORAL at 18:03

## 2024-10-22 RX ADMIN — CYANOCOBALAMIN TAB 500 MCG 1000 MCG: 500 TAB at 04:17

## 2024-10-22 RX ADMIN — OXYCODONE 5 MG: 5 TABLET ORAL at 04:16

## 2024-10-22 RX ADMIN — ACETAMINOPHEN 650 MG: 325 TABLET ORAL at 15:03

## 2024-10-22 RX ADMIN — THIAMINE HYDROCHLORIDE 250 MG: 100 INJECTION, SOLUTION INTRAMUSCULAR; INTRAVENOUS at 11:52

## 2024-10-22 RX ADMIN — CHLORDIAZEPOXIDE HYDROCHLORIDE 25 MG: 25 CAPSULE ORAL at 04:20

## 2024-10-22 RX ADMIN — LACTULOSE 45 ML: 10 SOLUTION ORAL at 20:22

## 2024-10-22 RX ADMIN — OMEPRAZOLE 20 MG: 20 CAPSULE, DELAYED RELEASE ORAL at 04:18

## 2024-10-22 RX ADMIN — NICOTINE TRANSDERMAL SYSTEM 21 MG: 21 PATCH, EXTENDED RELEASE TRANSDERMAL at 04:20

## 2024-10-22 RX ADMIN — CHLORDIAZEPOXIDE HYDROCHLORIDE 25 MG: 25 CAPSULE ORAL at 18:03

## 2024-10-22 RX ADMIN — NIFEDIPINE 30 MG: 30 TABLET, FILM COATED, EXTENDED RELEASE ORAL at 05:17

## 2024-10-22 RX ADMIN — RIFAXIMIN 550 MG: 550 TABLET ORAL at 18:03

## 2024-10-22 RX ADMIN — LIDOCAINE 1 PATCH: 4 PATCH TOPICAL at 11:52

## 2024-10-22 RX ADMIN — OXYCODONE 2.5 MG: 5 TABLET ORAL at 21:47

## 2024-10-22 RX ADMIN — SPIRONOLACTONE 100 MG: 25 TABLET ORAL at 05:17

## 2024-10-22 RX ADMIN — FOLIC ACID 1 MG: 1 TABLET ORAL at 04:18

## 2024-10-22 RX ADMIN — LACTULOSE 45 ML: 10 SOLUTION ORAL at 08:15

## 2024-10-22 RX ADMIN — LACTULOSE 45 ML: 10 SOLUTION ORAL at 11:52

## 2024-10-22 RX ADMIN — RIFAXIMIN 550 MG: 550 TABLET ORAL at 04:19

## 2024-10-22 RX ADMIN — OXYCODONE 5 MG: 5 TABLET ORAL at 08:15

## 2024-10-22 ASSESSMENT — LIFESTYLE VARIABLES
HEADACHE, FULLNESS IN HEAD: NOT PRESENT
AUDITORY DISTURBANCES: NOT PRESENT
NAUSEA AND VOMITING: NO NAUSEA AND NO VOMITING
ANXIETY: MILDLY ANXIOUS
NAUSEA AND VOMITING: NO NAUSEA AND NO VOMITING
VISUAL DISTURBANCES: NOT PRESENT
ORIENTATION AND CLOUDING OF SENSORIUM: CANNOT DO SERIAL ADDITIONS OR IS UNCERTAIN ABOUT DATE
ANXIETY: MILDLY ANXIOUS
PAROXYSMAL SWEATS: NO SWEAT VISIBLE
TOTAL SCORE: 6
AGITATION: NORMAL ACTIVITY
TOTAL SCORE: 5
VISUAL DISTURBANCES: NOT PRESENT
ANXIETY: NO ANXIETY (AT EASE)
AUDITORY DISTURBANCES: NOT PRESENT
NAUSEA AND VOMITING: NO NAUSEA AND NO VOMITING
TOTAL SCORE: VERY MILD ITCHING, PINS AND NEEDLES SENSATION, BURNING OR NUMBNESS
VISUAL DISTURBANCES: NOT PRESENT
TOTAL SCORE: VERY MILD ITCHING, PINS AND NEEDLES SENSATION, BURNING OR NUMBNESS
HEADACHE, FULLNESS IN HEAD: NOT PRESENT
PAROXYSMAL SWEATS: NO SWEAT VISIBLE
PAROXYSMAL SWEATS: NO SWEAT VISIBLE
AGITATION: NORMAL ACTIVITY
TREMOR: *
TREMOR: *
ORIENTATION AND CLOUDING OF SENSORIUM: CANNOT DO SERIAL ADDITIONS OR IS UNCERTAIN ABOUT DATE
TOTAL SCORE: VERY MILD ITCHING, PINS AND NEEDLES SENSATION, BURNING OR NUMBNESS
HEADACHE, FULLNESS IN HEAD: NOT PRESENT
ORIENTATION AND CLOUDING OF SENSORIUM: CANNOT DO SERIAL ADDITIONS OR IS UNCERTAIN ABOUT DATE
TREMOR: NO TREMOR
TOTAL SCORE: 3
AUDITORY DISTURBANCES: NOT PRESENT
AGITATION: NORMAL ACTIVITY

## 2024-10-22 ASSESSMENT — COGNITIVE AND FUNCTIONAL STATUS - GENERAL
SUGGESTED CMS G CODE MODIFIER MOBILITY: CK
CLIMB 3 TO 5 STEPS WITH RAILING: A LITTLE
SUGGESTED CMS G CODE MODIFIER DAILY ACTIVITY: CK
DRESSING REGULAR LOWER BODY CLOTHING: A LITTLE
HELP NEEDED FOR BATHING: A LOT
WALKING IN HOSPITAL ROOM: A LITTLE
DAILY ACTIVITIY SCORE: 19
MOBILITY SCORE: 19
TOILETING: A LITTLE
MOVING TO AND FROM BED TO CHAIR: A LITTLE
MOVING FROM LYING ON BACK TO SITTING ON SIDE OF FLAT BED: A LITTLE
STANDING UP FROM CHAIR USING ARMS: A LITTLE
DRESSING REGULAR UPPER BODY CLOTHING: A LITTLE

## 2024-10-22 ASSESSMENT — PAIN DESCRIPTION - PAIN TYPE
TYPE: ACUTE PAIN

## 2024-10-23 PROBLEM — K59.00 CONSTIPATION: Status: ACTIVE | Noted: 2024-10-23

## 2024-10-23 LAB
ALBUMIN SERPL BCP-MCNC: 2.6 G/DL (ref 3.2–4.9)
ALBUMIN/GLOB SERPL: 0.9 G/DL
ALP SERPL-CCNC: 149 U/L (ref 30–99)
ALT SERPL-CCNC: 22 U/L (ref 2–50)
AMMONIA PLAS-SCNC: 57 UMOL/L (ref 11–45)
ANION GAP SERPL CALC-SCNC: 9 MMOL/L (ref 7–16)
AST SERPL-CCNC: 38 U/L (ref 12–45)
BILIRUB SERPL-MCNC: 3 MG/DL (ref 0.1–1.5)
BUN SERPL-MCNC: 15 MG/DL (ref 8–22)
CALCIUM ALBUM COR SERPL-MCNC: 10 MG/DL (ref 8.5–10.5)
CALCIUM SERPL-MCNC: 8.9 MG/DL (ref 8.5–10.5)
CHLORIDE SERPL-SCNC: 105 MMOL/L (ref 96–112)
CO2 SERPL-SCNC: 22 MMOL/L (ref 20–33)
CREAT SERPL-MCNC: 1.06 MG/DL (ref 0.5–1.4)
ERYTHROCYTE [DISTWIDTH] IN BLOOD BY AUTOMATED COUNT: 58 FL (ref 35.9–50)
GFR SERPLBLD CREATININE-BSD FMLA CKD-EPI: 89 ML/MIN/1.73 M 2
GLOBULIN SER CALC-MCNC: 2.9 G/DL (ref 1.9–3.5)
GLUCOSE SERPL-MCNC: 88 MG/DL (ref 65–99)
HCT VFR BLD AUTO: 32.1 % (ref 42–52)
HGB BLD-MCNC: 10.6 G/DL (ref 14–18)
MAGNESIUM SERPL-MCNC: 1.6 MG/DL (ref 1.5–2.5)
MCH RBC QN AUTO: 32.1 PG (ref 27–33)
MCHC RBC AUTO-ENTMCNC: 33 G/DL (ref 32.3–36.5)
MCV RBC AUTO: 97.3 FL (ref 81.4–97.8)
PHOSPHATE SERPL-MCNC: 4.3 MG/DL (ref 2.5–4.5)
PLATELET # BLD AUTO: 68 K/UL (ref 164–446)
PLATELETS.RETICULATED NFR BLD AUTO: 1.5 % (ref 0.6–13.1)
PMV BLD AUTO: 9.9 FL (ref 9–12.9)
POTASSIUM SERPL-SCNC: 4.5 MMOL/L (ref 3.6–5.5)
PROT SERPL-MCNC: 5.5 G/DL (ref 6–8.2)
RBC # BLD AUTO: 3.3 M/UL (ref 4.7–6.1)
SODIUM SERPL-SCNC: 136 MMOL/L (ref 135–145)
WBC # BLD AUTO: 6.1 K/UL (ref 4.8–10.8)

## 2024-10-23 PROCEDURE — 700102 HCHG RX REV CODE 250 W/ 637 OVERRIDE(OP): Performed by: STUDENT IN AN ORGANIZED HEALTH CARE EDUCATION/TRAINING PROGRAM

## 2024-10-23 PROCEDURE — 84100 ASSAY OF PHOSPHORUS: CPT

## 2024-10-23 PROCEDURE — 85027 COMPLETE CBC AUTOMATED: CPT

## 2024-10-23 PROCEDURE — 85055 RETICULATED PLATELET ASSAY: CPT

## 2024-10-23 PROCEDURE — 700102 HCHG RX REV CODE 250 W/ 637 OVERRIDE(OP)

## 2024-10-23 PROCEDURE — 700111 HCHG RX REV CODE 636 W/ 250 OVERRIDE (IP)

## 2024-10-23 PROCEDURE — 97166 OT EVAL MOD COMPLEX 45 MIN: CPT

## 2024-10-23 PROCEDURE — 770006 HCHG ROOM/CARE - MED/SURG/GYN SEMI*

## 2024-10-23 PROCEDURE — 700102 HCHG RX REV CODE 250 W/ 637 OVERRIDE(OP): Performed by: HOSPITALIST

## 2024-10-23 PROCEDURE — A9270 NON-COVERED ITEM OR SERVICE: HCPCS

## 2024-10-23 PROCEDURE — 82140 ASSAY OF AMMONIA: CPT

## 2024-10-23 PROCEDURE — A9270 NON-COVERED ITEM OR SERVICE: HCPCS | Performed by: STUDENT IN AN ORGANIZED HEALTH CARE EDUCATION/TRAINING PROGRAM

## 2024-10-23 PROCEDURE — 97162 PT EVAL MOD COMPLEX 30 MIN: CPT

## 2024-10-23 PROCEDURE — 80053 COMPREHEN METABOLIC PANEL: CPT

## 2024-10-23 PROCEDURE — 700102 HCHG RX REV CODE 250 W/ 637 OVERRIDE(OP): Performed by: EMERGENCY MEDICINE

## 2024-10-23 PROCEDURE — 83735 ASSAY OF MAGNESIUM: CPT

## 2024-10-23 PROCEDURE — A9270 NON-COVERED ITEM OR SERVICE: HCPCS | Performed by: EMERGENCY MEDICINE

## 2024-10-23 PROCEDURE — A9270 NON-COVERED ITEM OR SERVICE: HCPCS | Performed by: HOSPITALIST

## 2024-10-23 PROCEDURE — 99232 SBSQ HOSP IP/OBS MODERATE 35: CPT | Mod: GC | Performed by: STUDENT IN AN ORGANIZED HEALTH CARE EDUCATION/TRAINING PROGRAM

## 2024-10-23 PROCEDURE — 36415 COLL VENOUS BLD VENIPUNCTURE: CPT

## 2024-10-23 RX ORDER — CHLORDIAZEPOXIDE HYDROCHLORIDE 5 MG/1
10 CAPSULE, GELATIN COATED ORAL EVERY 12 HOURS
Status: DISCONTINUED | OUTPATIENT
Start: 2024-10-23 | End: 2024-10-25

## 2024-10-23 RX ADMIN — ESCITALOPRAM OXALATE 10 MG: 10 TABLET ORAL at 04:13

## 2024-10-23 RX ADMIN — SPIRONOLACTONE 100 MG: 25 TABLET ORAL at 04:13

## 2024-10-23 RX ADMIN — LACTULOSE 45 ML: 10 SOLUTION ORAL at 12:21

## 2024-10-23 RX ADMIN — LACTULOSE 45 ML: 10 SOLUTION ORAL at 16:49

## 2024-10-23 RX ADMIN — MAGNESIUM HYDROXIDE 30 ML: 1200 LIQUID ORAL at 12:21

## 2024-10-23 RX ADMIN — OMEPRAZOLE 20 MG: 20 CAPSULE, DELAYED RELEASE ORAL at 04:13

## 2024-10-23 RX ADMIN — CHLORDIAZEPOXIDE HYDROCHLORIDE 25 MG: 25 CAPSULE ORAL at 04:13

## 2024-10-23 RX ADMIN — OXYCODONE 2.5 MG: 5 TABLET ORAL at 16:50

## 2024-10-23 RX ADMIN — OXYCODONE 2.5 MG: 5 TABLET ORAL at 20:16

## 2024-10-23 RX ADMIN — NICOTINE TRANSDERMAL SYSTEM 21 MG: 21 PATCH, EXTENDED RELEASE TRANSDERMAL at 04:14

## 2024-10-23 RX ADMIN — CYANOCOBALAMIN TAB 500 MCG 1000 MCG: 500 TAB at 04:13

## 2024-10-23 RX ADMIN — NIFEDIPINE 30 MG: 30 TABLET, FILM COATED, EXTENDED RELEASE ORAL at 04:13

## 2024-10-23 RX ADMIN — CHLORDIAZEPOXIDE HYDROCHLORIDE 10 MG: 5 CAPSULE ORAL at 18:33

## 2024-10-23 RX ADMIN — LACTULOSE 45 ML: 10 SOLUTION ORAL at 08:21

## 2024-10-23 RX ADMIN — OXYCODONE 2.5 MG: 5 TABLET ORAL at 12:33

## 2024-10-23 RX ADMIN — LACTULOSE 45 ML: 10 SOLUTION ORAL at 20:16

## 2024-10-23 RX ADMIN — Medication 100 MG: at 04:12

## 2024-10-23 RX ADMIN — RIFAXIMIN 550 MG: 550 TABLET ORAL at 04:14

## 2024-10-23 RX ADMIN — FOLIC ACID 1 MG: 1 TABLET ORAL at 04:13

## 2024-10-23 RX ADMIN — OXYCODONE 2.5 MG: 5 TABLET ORAL at 03:52

## 2024-10-23 RX ADMIN — RIFAXIMIN 550 MG: 550 TABLET ORAL at 16:49

## 2024-10-23 RX ADMIN — THIAMINE HYDROCHLORIDE 250 MG: 100 INJECTION, SOLUTION INTRAMUSCULAR; INTRAVENOUS at 04:13

## 2024-10-23 ASSESSMENT — PAIN DESCRIPTION - PAIN TYPE
TYPE: ACUTE PAIN

## 2024-10-23 ASSESSMENT — COGNITIVE AND FUNCTIONAL STATUS - GENERAL
SUGGESTED CMS G CODE MODIFIER MOBILITY: CI
SUGGESTED CMS G CODE MODIFIER DAILY ACTIVITY: CJ
HELP NEEDED FOR BATHING: A LITTLE
DRESSING REGULAR UPPER BODY CLOTHING: A LITTLE
DAILY ACTIVITIY SCORE: 20
CLIMB 3 TO 5 STEPS WITH RAILING: A LITTLE
DRESSING REGULAR LOWER BODY CLOTHING: A LITTLE
MOBILITY SCORE: 23
TOILETING: A LITTLE

## 2024-10-23 ASSESSMENT — LIFESTYLE VARIABLES
AUDITORY DISTURBANCES: NOT PRESENT
ANXIETY: MILDLY ANXIOUS
TOTAL SCORE: 1
VISUAL DISTURBANCES: MODERATELY SEVERE HALLUCINATIONS
ORIENTATION AND CLOUDING OF SENSORIUM: ORIENTED AND CAN DO SERIAL ADDITIONS
HEADACHE, FULLNESS IN HEAD: SEVERE
AGITATION: NORMAL ACTIVITY
VISUAL DISTURBANCES: NOT PRESENT
TREMOR: NO TREMOR
AUDITORY DISTURBANCES: NOT PRESENT
ANXIETY: MILDLY ANXIOUS
PAROXYSMAL SWEATS: NO SWEAT VISIBLE
HEADACHE, FULLNESS IN HEAD: NOT PRESENT
TOTAL SCORE: 16
NAUSEA AND VOMITING: NO NAUSEA AND NO VOMITING
AGITATION: NORMAL ACTIVITY
NAUSEA AND VOMITING: MILD NAUSEA WITH NO VOMITING
TREMOR: *
TOTAL SCORE: MILD ITCHING, PINS AND NEEDLES SENSATION, BURNING OR NUMBNESS
ORIENTATION AND CLOUDING OF SENSORIUM: ORIENTED AND CAN DO SERIAL ADDITIONS
PAROXYSMAL SWEATS: NO SWEAT VISIBLE

## 2024-10-23 ASSESSMENT — GAIT ASSESSMENTS
DEVIATION: INCREASED BASE OF SUPPORT
GAIT LEVEL OF ASSIST: SUPERVISED
DISTANCE (FEET): 150

## 2024-10-23 ASSESSMENT — ACTIVITIES OF DAILY LIVING (ADL): TOILETING: INDEPENDENT

## 2024-10-24 ENCOUNTER — APPOINTMENT (OUTPATIENT)
Dept: RADIOLOGY | Facility: MEDICAL CENTER | Age: 44
End: 2024-10-24
Payer: MEDICAID

## 2024-10-24 LAB
ALBUMIN SERPL BCP-MCNC: 2.5 G/DL (ref 3.2–4.9)
ALBUMIN/GLOB SERPL: 0.9 G/DL
ALP SERPL-CCNC: 147 U/L (ref 30–99)
ALT SERPL-CCNC: 17 U/L (ref 2–50)
AMMONIA PLAS-SCNC: 61 UMOL/L (ref 11–45)
ANION GAP SERPL CALC-SCNC: 9 MMOL/L (ref 7–16)
AST SERPL-CCNC: 39 U/L (ref 12–45)
BILIRUB SERPL-MCNC: 2.5 MG/DL (ref 0.1–1.5)
BUN SERPL-MCNC: 15 MG/DL (ref 8–22)
CALCIUM ALBUM COR SERPL-MCNC: 9.4 MG/DL (ref 8.5–10.5)
CALCIUM SERPL-MCNC: 8.2 MG/DL (ref 8.5–10.5)
CHLORIDE SERPL-SCNC: 102 MMOL/L (ref 96–112)
CO2 SERPL-SCNC: 20 MMOL/L (ref 20–33)
CREAT SERPL-MCNC: 1.2 MG/DL (ref 0.5–1.4)
ERYTHROCYTE [DISTWIDTH] IN BLOOD BY AUTOMATED COUNT: 54.1 FL (ref 35.9–50)
GFR SERPLBLD CREATININE-BSD FMLA CKD-EPI: 77 ML/MIN/1.73 M 2
GLOBULIN SER CALC-MCNC: 2.8 G/DL (ref 1.9–3.5)
GLUCOSE SERPL-MCNC: 106 MG/DL (ref 65–99)
HCT VFR BLD AUTO: 27.9 % (ref 42–52)
HGB BLD-MCNC: 9.5 G/DL (ref 14–18)
MCH RBC QN AUTO: 32 PG (ref 27–33)
MCHC RBC AUTO-ENTMCNC: 34.1 G/DL (ref 32.3–36.5)
MCV RBC AUTO: 93.9 FL (ref 81.4–97.8)
PLATELET # BLD AUTO: 63 K/UL (ref 164–446)
PLATELETS.RETICULATED NFR BLD AUTO: 2.4 % (ref 0.6–13.1)
PMV BLD AUTO: 9.6 FL (ref 9–12.9)
POTASSIUM SERPL-SCNC: 4.5 MMOL/L (ref 3.6–5.5)
PROT SERPL-MCNC: 5.3 G/DL (ref 6–8.2)
RBC # BLD AUTO: 2.97 M/UL (ref 4.7–6.1)
SODIUM SERPL-SCNC: 131 MMOL/L (ref 135–145)
WBC # BLD AUTO: 5.6 K/UL (ref 4.8–10.8)

## 2024-10-24 PROCEDURE — 80053 COMPREHEN METABOLIC PANEL: CPT

## 2024-10-24 PROCEDURE — 700102 HCHG RX REV CODE 250 W/ 637 OVERRIDE(OP)

## 2024-10-24 PROCEDURE — A9270 NON-COVERED ITEM OR SERVICE: HCPCS | Performed by: STUDENT IN AN ORGANIZED HEALTH CARE EDUCATION/TRAINING PROGRAM

## 2024-10-24 PROCEDURE — 99232 SBSQ HOSP IP/OBS MODERATE 35: CPT | Mod: GC | Performed by: STUDENT IN AN ORGANIZED HEALTH CARE EDUCATION/TRAINING PROGRAM

## 2024-10-24 PROCEDURE — A9270 NON-COVERED ITEM OR SERVICE: HCPCS

## 2024-10-24 PROCEDURE — 700111 HCHG RX REV CODE 636 W/ 250 OVERRIDE (IP): Mod: JZ

## 2024-10-24 PROCEDURE — 770006 HCHG ROOM/CARE - MED/SURG/GYN SEMI*

## 2024-10-24 PROCEDURE — 85055 RETICULATED PLATELET ASSAY: CPT

## 2024-10-24 PROCEDURE — 700101 HCHG RX REV CODE 250

## 2024-10-24 PROCEDURE — 700102 HCHG RX REV CODE 250 W/ 637 OVERRIDE(OP): Performed by: STUDENT IN AN ORGANIZED HEALTH CARE EDUCATION/TRAINING PROGRAM

## 2024-10-24 PROCEDURE — 36415 COLL VENOUS BLD VENIPUNCTURE: CPT

## 2024-10-24 PROCEDURE — 76705 ECHO EXAM OF ABDOMEN: CPT

## 2024-10-24 PROCEDURE — 700102 HCHG RX REV CODE 250 W/ 637 OVERRIDE(OP): Performed by: HOSPITALIST

## 2024-10-24 PROCEDURE — 99254 IP/OBS CNSLTJ NEW/EST MOD 60: CPT | Performed by: NURSE PRACTITIONER

## 2024-10-24 PROCEDURE — 82140 ASSAY OF AMMONIA: CPT

## 2024-10-24 PROCEDURE — A9270 NON-COVERED ITEM OR SERVICE: HCPCS | Performed by: HOSPITALIST

## 2024-10-24 PROCEDURE — 85027 COMPLETE CBC AUTOMATED: CPT

## 2024-10-24 RX ORDER — POLYETHYLENE GLYCOL 3350 17 G/17G
2 POWDER, FOR SOLUTION ORAL DAILY
Status: DISCONTINUED | OUTPATIENT
Start: 2024-10-24 | End: 2024-10-27 | Stop reason: HOSPADM

## 2024-10-24 RX ORDER — DIPHENHYDRAMINE HYDROCHLORIDE 50 MG/ML
25-50 INJECTION, SOLUTION INTRAMUSCULAR; INTRAVENOUS EVERY 6 HOURS PRN
Status: DISCONTINUED | OUTPATIENT
Start: 2024-10-24 | End: 2024-10-27 | Stop reason: HOSPADM

## 2024-10-24 RX ORDER — CARVEDILOL 3.12 MG/1
3.12 TABLET ORAL 2 TIMES DAILY WITH MEALS
Status: DISCONTINUED | OUTPATIENT
Start: 2024-10-24 | End: 2024-10-27 | Stop reason: HOSPADM

## 2024-10-24 RX ORDER — BUMETANIDE 1 MG/1
2 TABLET ORAL
Status: DISCONTINUED | OUTPATIENT
Start: 2024-10-24 | End: 2024-10-27 | Stop reason: HOSPADM

## 2024-10-24 RX ADMIN — CHLORDIAZEPOXIDE HYDROCHLORIDE 10 MG: 5 CAPSULE ORAL at 08:43

## 2024-10-24 RX ADMIN — NICOTINE TRANSDERMAL SYSTEM 21 MG: 21 PATCH, EXTENDED RELEASE TRANSDERMAL at 05:49

## 2024-10-24 RX ADMIN — POLYETHYLENE GLYCOL-3350 AND ELECTROLYTES 2 L: 236; 6.74; 5.86; 2.97; 22.74 POWDER, FOR SOLUTION ORAL at 15:41

## 2024-10-24 RX ADMIN — RIFAXIMIN 550 MG: 550 TABLET ORAL at 05:49

## 2024-10-24 RX ADMIN — POLYETHYLENE GLYCOL 3350 2 PACKET: 17 POWDER, FOR SOLUTION ORAL at 10:37

## 2024-10-24 RX ADMIN — CHLORDIAZEPOXIDE HYDROCHLORIDE 10 MG: 5 CAPSULE ORAL at 18:02

## 2024-10-24 RX ADMIN — OXYCODONE 2.5 MG: 5 TABLET ORAL at 21:38

## 2024-10-24 RX ADMIN — FOLIC ACID 1 MG: 1 TABLET ORAL at 05:49

## 2024-10-24 RX ADMIN — OXYCODONE 2.5 MG: 5 TABLET ORAL at 15:41

## 2024-10-24 RX ADMIN — CARVEDILOL 3.12 MG: 3.12 TABLET, FILM COATED ORAL at 17:34

## 2024-10-24 RX ADMIN — OMEPRAZOLE 20 MG: 20 CAPSULE, DELAYED RELEASE ORAL at 05:49

## 2024-10-24 RX ADMIN — RIFAXIMIN 550 MG: 550 TABLET ORAL at 17:34

## 2024-10-24 RX ADMIN — LACTULOSE 45 ML: 10 SOLUTION ORAL at 21:37

## 2024-10-24 RX ADMIN — NIFEDIPINE 30 MG: 30 TABLET, FILM COATED, EXTENDED RELEASE ORAL at 05:49

## 2024-10-24 RX ADMIN — ESCITALOPRAM OXALATE 10 MG: 10 TABLET ORAL at 05:49

## 2024-10-24 RX ADMIN — BUMETANIDE 2 MG: 1 TABLET ORAL at 08:43

## 2024-10-24 RX ADMIN — LACTULOSE 45 ML: 10 SOLUTION ORAL at 08:44

## 2024-10-24 RX ADMIN — LACTULOSE 45 ML: 10 SOLUTION ORAL at 17:34

## 2024-10-24 RX ADMIN — LIDOCAINE 1 PATCH: 4 PATCH TOPICAL at 10:37

## 2024-10-24 RX ADMIN — DIPHENHYDRAMINE HYDROCHLORIDE 50 MG: 50 INJECTION, SOLUTION INTRAMUSCULAR; INTRAVENOUS at 06:15

## 2024-10-24 RX ADMIN — LACTULOSE 45 ML: 10 SOLUTION ORAL at 12:47

## 2024-10-24 RX ADMIN — Medication 100 MG: at 05:49

## 2024-10-24 RX ADMIN — SPIRONOLACTONE 100 MG: 25 TABLET ORAL at 05:49

## 2024-10-24 RX ADMIN — ACETAMINOPHEN 650 MG: 325 TABLET ORAL at 14:19

## 2024-10-24 ASSESSMENT — LIFESTYLE VARIABLES
HEADACHE, FULLNESS IN HEAD: NOT PRESENT
AUDITORY DISTURBANCES: NOT PRESENT
AUDITORY DISTURBANCES: NOT PRESENT
ORIENTATION AND CLOUDING OF SENSORIUM: ORIENTED AND CAN DO SERIAL ADDITIONS
AUDITORY DISTURBANCES: NOT PRESENT
NAUSEA AND VOMITING: NO NAUSEA AND NO VOMITING
VISUAL DISTURBANCES: NOT PRESENT
HEADACHE, FULLNESS IN HEAD: NOT PRESENT
TREMOR: NO TREMOR
VISUAL DISTURBANCES: NOT PRESENT
AGITATION: NORMAL ACTIVITY
ANXIETY: MILDLY ANXIOUS
ORIENTATION AND CLOUDING OF SENSORIUM: ORIENTED AND CAN DO SERIAL ADDITIONS
NAUSEA AND VOMITING: NO NAUSEA AND NO VOMITING
AGITATION: NORMAL ACTIVITY
AUDITORY DISTURBANCES: NOT PRESENT
TOTAL SCORE: 2
AGITATION: NORMAL ACTIVITY
HEADACHE, FULLNESS IN HEAD: NOT PRESENT
ANXIETY: MILDLY ANXIOUS
NAUSEA AND VOMITING: NO NAUSEA AND NO VOMITING
HEADACHE, FULLNESS IN HEAD: NOT PRESENT
AGITATION: NORMAL ACTIVITY
HEADACHE, FULLNESS IN HEAD: NOT PRESENT
NAUSEA AND VOMITING: NO NAUSEA AND NO VOMITING
PAROXYSMAL SWEATS: NO SWEAT VISIBLE
VISUAL DISTURBANCES: NOT PRESENT
ANXIETY: MILDLY ANXIOUS
PAROXYSMAL SWEATS: NO SWEAT VISIBLE
PAROXYSMAL SWEATS: NO SWEAT VISIBLE
TREMOR: TREMOR NOT VISIBLE BUT CAN BE FELT, FINGERTIP TO FINGERTIP
PAROXYSMAL SWEATS: NO SWEAT VISIBLE
HEADACHE, FULLNESS IN HEAD: NOT PRESENT
AUDITORY DISTURBANCES: NOT PRESENT
ORIENTATION AND CLOUDING OF SENSORIUM: ORIENTED AND CAN DO SERIAL ADDITIONS
ORIENTATION AND CLOUDING OF SENSORIUM: ORIENTED AND CAN DO SERIAL ADDITIONS
TOTAL SCORE: 2
TREMOR: TREMOR NOT VISIBLE BUT CAN BE FELT, FINGERTIP TO FINGERTIP
ANXIETY: MILDLY ANXIOUS
PAROXYSMAL SWEATS: NO SWEAT VISIBLE
NAUSEA AND VOMITING: NO NAUSEA AND NO VOMITING
AGITATION: NORMAL ACTIVITY
TOTAL SCORE: 1
TOTAL SCORE: 1
VISUAL DISTURBANCES: NOT PRESENT
TREMOR: TREMOR NOT VISIBLE BUT CAN BE FELT, FINGERTIP TO FINGERTIP
TOTAL SCORE: 2
ORIENTATION AND CLOUDING OF SENSORIUM: ORIENTED AND CAN DO SERIAL ADDITIONS
TREMOR: NO TREMOR
NAUSEA AND VOMITING: NO NAUSEA AND NO VOMITING
VISUAL DISTURBANCES: NOT PRESENT
ORIENTATION AND CLOUDING OF SENSORIUM: ORIENTED AND CAN DO SERIAL ADDITIONS
AGITATION: NORMAL ACTIVITY
PAROXYSMAL SWEATS: NO SWEAT VISIBLE
TOTAL SCORE: 2
VISUAL DISTURBANCES: NOT PRESENT
ANXIETY: MILDLY ANXIOUS
TREMOR: TREMOR NOT VISIBLE BUT CAN BE FELT, FINGERTIP TO FINGERTIP
ANXIETY: MILDLY ANXIOUS
AUDITORY DISTURBANCES: NOT PRESENT

## 2024-10-24 ASSESSMENT — PATIENT HEALTH QUESTIONNAIRE - PHQ9
2. FEELING DOWN, DEPRESSED, IRRITABLE, OR HOPELESS: MORE THAN HALF THE DAYS
5. POOR APPETITE OR OVEREATING: NOT AT ALL
3. TROUBLE FALLING OR STAYING ASLEEP OR SLEEPING TOO MUCH: MORE THAN HALF THE DAYS
7. TROUBLE CONCENTRATING ON THINGS, SUCH AS READING THE NEWSPAPER OR WATCHING TELEVISION: NOT AT ALL
SUM OF ALL RESPONSES TO PHQ9 QUESTIONS 1 AND 2: 4
4. FEELING TIRED OR HAVING LITTLE ENERGY: SEVERAL DAYS
8. MOVING OR SPEAKING SO SLOWLY THAT OTHER PEOPLE COULD HAVE NOTICED. OR THE OPPOSITE, BEING SO FIGETY OR RESTLESS THAT YOU HAVE BEEN MOVING AROUND A LOT MORE THAN USUAL: NOT AT ALL
6. FEELING BAD ABOUT YOURSELF - OR THAT YOU ARE A FAILURE OR HAVE LET YOURSELF OR YOUR FAMILY DOWN: MORE THAN HALF THE DAYS
SUM OF ALL RESPONSES TO PHQ QUESTIONS 1-9: 9
9. THOUGHTS THAT YOU WOULD BE BETTER OFF DEAD, OR OF HURTING YOURSELF: NOT AT ALL
1. LITTLE INTEREST OR PLEASURE IN DOING THINGS: MORE THAN HALF THE DAYS

## 2024-10-24 ASSESSMENT — PAIN DESCRIPTION - PAIN TYPE
TYPE: ACUTE PAIN

## 2024-10-25 ENCOUNTER — APPOINTMENT (OUTPATIENT)
Dept: RADIOLOGY | Facility: MEDICAL CENTER | Age: 44
End: 2024-10-25
Attending: NURSE PRACTITIONER
Payer: MEDICAID

## 2024-10-25 LAB
ALBUMIN SERPL BCP-MCNC: 2.3 G/DL (ref 3.2–4.9)
ALBUMIN/GLOB SERPL: 0.8 G/DL
ALP SERPL-CCNC: 136 U/L (ref 30–99)
ALT SERPL-CCNC: 15 U/L (ref 2–50)
AMMONIA PLAS-SCNC: 94 UMOL/L (ref 11–45)
ANION GAP SERPL CALC-SCNC: 9 MMOL/L (ref 7–16)
AST SERPL-CCNC: 34 U/L (ref 12–45)
BILIRUB SERPL-MCNC: 2.1 MG/DL (ref 0.1–1.5)
BUN SERPL-MCNC: 16 MG/DL (ref 8–22)
CALCIUM ALBUM COR SERPL-MCNC: 9.1 MG/DL (ref 8.5–10.5)
CALCIUM SERPL-MCNC: 7.7 MG/DL (ref 8.5–10.5)
CHLORIDE SERPL-SCNC: 103 MMOL/L (ref 96–112)
CO2 SERPL-SCNC: 22 MMOL/L (ref 20–33)
CREAT SERPL-MCNC: 0.99 MG/DL (ref 0.5–1.4)
ERYTHROCYTE [DISTWIDTH] IN BLOOD BY AUTOMATED COUNT: 55.8 FL (ref 35.9–50)
GFR SERPLBLD CREATININE-BSD FMLA CKD-EPI: 97 ML/MIN/1.73 M 2
GLOBULIN SER CALC-MCNC: 2.9 G/DL (ref 1.9–3.5)
GLUCOSE SERPL-MCNC: 131 MG/DL (ref 65–99)
HCT VFR BLD AUTO: 30.4 % (ref 42–52)
HGB BLD-MCNC: 10.2 G/DL (ref 14–18)
INR PPP: 1.63 (ref 0.87–1.13)
MCH RBC QN AUTO: 32 PG (ref 27–33)
MCHC RBC AUTO-ENTMCNC: 33.6 G/DL (ref 32.3–36.5)
MCV RBC AUTO: 95.3 FL (ref 81.4–97.8)
PLATELET # BLD AUTO: 77 K/UL (ref 164–446)
PLATELETS.RETICULATED NFR BLD AUTO: 1.5 % (ref 0.6–13.1)
PMV BLD AUTO: 11.8 FL (ref 9–12.9)
POTASSIUM SERPL-SCNC: 3.9 MMOL/L (ref 3.6–5.5)
PROT SERPL-MCNC: 5.2 G/DL (ref 6–8.2)
PROTHROMBIN TIME: 19.4 SEC (ref 12–14.6)
RBC # BLD AUTO: 3.19 M/UL (ref 4.7–6.1)
SODIUM SERPL-SCNC: 134 MMOL/L (ref 135–145)
WBC # BLD AUTO: 5.5 K/UL (ref 4.8–10.8)

## 2024-10-25 PROCEDURE — 700102 HCHG RX REV CODE 250 W/ 637 OVERRIDE(OP)

## 2024-10-25 PROCEDURE — 80053 COMPREHEN METABOLIC PANEL: CPT

## 2024-10-25 PROCEDURE — 700102 HCHG RX REV CODE 250 W/ 637 OVERRIDE(OP): Performed by: HOSPITALIST

## 2024-10-25 PROCEDURE — 700102 HCHG RX REV CODE 250 W/ 637 OVERRIDE(OP): Performed by: STUDENT IN AN ORGANIZED HEALTH CARE EDUCATION/TRAINING PROGRAM

## 2024-10-25 PROCEDURE — 770006 HCHG ROOM/CARE - MED/SURG/GYN SEMI*

## 2024-10-25 PROCEDURE — A9270 NON-COVERED ITEM OR SERVICE: HCPCS | Performed by: STUDENT IN AN ORGANIZED HEALTH CARE EDUCATION/TRAINING PROGRAM

## 2024-10-25 PROCEDURE — 85055 RETICULATED PLATELET ASSAY: CPT

## 2024-10-25 PROCEDURE — A9270 NON-COVERED ITEM OR SERVICE: HCPCS

## 2024-10-25 PROCEDURE — 700101 HCHG RX REV CODE 250

## 2024-10-25 PROCEDURE — 85027 COMPLETE CBC AUTOMATED: CPT

## 2024-10-25 PROCEDURE — 82140 ASSAY OF AMMONIA: CPT

## 2024-10-25 PROCEDURE — 36415 COLL VENOUS BLD VENIPUNCTURE: CPT

## 2024-10-25 PROCEDURE — 74018 RADEX ABDOMEN 1 VIEW: CPT

## 2024-10-25 PROCEDURE — 99232 SBSQ HOSP IP/OBS MODERATE 35: CPT | Performed by: NURSE PRACTITIONER

## 2024-10-25 PROCEDURE — 85610 PROTHROMBIN TIME: CPT

## 2024-10-25 PROCEDURE — 99232 SBSQ HOSP IP/OBS MODERATE 35: CPT | Mod: GC | Performed by: STUDENT IN AN ORGANIZED HEALTH CARE EDUCATION/TRAINING PROGRAM

## 2024-10-25 PROCEDURE — A9270 NON-COVERED ITEM OR SERVICE: HCPCS | Performed by: HOSPITALIST

## 2024-10-25 PROCEDURE — 700101 HCHG RX REV CODE 250: Performed by: NURSE PRACTITIONER

## 2024-10-25 RX ORDER — CHLORDIAZEPOXIDE HYDROCHLORIDE 5 MG/1
5 CAPSULE, GELATIN COATED ORAL 2 TIMES DAILY
Status: DISCONTINUED | OUTPATIENT
Start: 2024-10-26 | End: 2024-10-27

## 2024-10-25 RX ORDER — HYDROXYZINE HYDROCHLORIDE 50 MG/1
25 TABLET, FILM COATED ORAL 3 TIMES DAILY PRN
Status: DISCONTINUED | OUTPATIENT
Start: 2024-10-25 | End: 2024-10-27 | Stop reason: HOSPADM

## 2024-10-25 RX ORDER — SODIUM PHOSPHATE,MONO-DIBASIC 19G-7G/118
1 ENEMA (ML) RECTAL ONCE
Status: COMPLETED | OUTPATIENT
Start: 2024-10-25 | End: 2024-10-25

## 2024-10-25 RX ADMIN — SPIRONOLACTONE 100 MG: 25 TABLET ORAL at 05:36

## 2024-10-25 RX ADMIN — NICOTINE TRANSDERMAL SYSTEM 21 MG: 21 PATCH, EXTENDED RELEASE TRANSDERMAL at 05:37

## 2024-10-25 RX ADMIN — CHLORDIAZEPOXIDE HYDROCHLORIDE 10 MG: 5 CAPSULE ORAL at 05:36

## 2024-10-25 RX ADMIN — FOLIC ACID 1 MG: 1 TABLET ORAL at 05:36

## 2024-10-25 RX ADMIN — RIFAXIMIN 550 MG: 550 TABLET ORAL at 18:02

## 2024-10-25 RX ADMIN — OMEPRAZOLE 20 MG: 20 CAPSULE, DELAYED RELEASE ORAL at 05:37

## 2024-10-25 RX ADMIN — HYDROXYZINE HYDROCHLORIDE 25 MG: 50 TABLET, FILM COATED ORAL at 18:02

## 2024-10-25 RX ADMIN — LIDOCAINE 1 PATCH: 4 PATCH TOPICAL at 11:44

## 2024-10-25 RX ADMIN — BUMETANIDE 2 MG: 1 TABLET ORAL at 05:36

## 2024-10-25 RX ADMIN — NIFEDIPINE 30 MG: 30 TABLET, FILM COATED, EXTENDED RELEASE ORAL at 05:36

## 2024-10-25 RX ADMIN — LACTULOSE 45 ML: 10 SOLUTION ORAL at 18:02

## 2024-10-25 RX ADMIN — CARVEDILOL 3.12 MG: 3.12 TABLET, FILM COATED ORAL at 18:02

## 2024-10-25 RX ADMIN — LACTULOSE 45 ML: 10 SOLUTION ORAL at 08:21

## 2024-10-25 RX ADMIN — SODIUM PHOSPHATE 1 ENEMA: 7; 19 ENEMA RECTAL at 13:51

## 2024-10-25 RX ADMIN — ESCITALOPRAM OXALATE 10 MG: 10 TABLET ORAL at 05:36

## 2024-10-25 RX ADMIN — LACTULOSE 45 ML: 10 SOLUTION ORAL at 20:09

## 2024-10-25 RX ADMIN — LACTULOSE 45 ML: 10 SOLUTION ORAL at 11:44

## 2024-10-25 RX ADMIN — RIFAXIMIN 550 MG: 550 TABLET ORAL at 05:36

## 2024-10-25 RX ADMIN — POLYETHYLENE GLYCOL 3350 2 PACKET: 17 POWDER, FOR SOLUTION ORAL at 05:34

## 2024-10-25 RX ADMIN — CARVEDILOL 3.12 MG: 3.12 TABLET, FILM COATED ORAL at 08:21

## 2024-10-25 RX ADMIN — POLYETHYLENE GLYCOL-3350 AND ELECTROLYTES 2 L: 236; 6.74; 5.86; 2.97; 22.74 POWDER, FOR SOLUTION ORAL at 03:14

## 2024-10-25 ASSESSMENT — LIFESTYLE VARIABLES
ORIENTATION AND CLOUDING OF SENSORIUM: ORIENTED AND CAN DO SERIAL ADDITIONS
TOTAL SCORE: 2
VISUAL DISTURBANCES: NOT PRESENT
ANXIETY: MILDLY ANXIOUS
AUDITORY DISTURBANCES: NOT PRESENT
VISUAL DISTURBANCES: NOT PRESENT
NAUSEA AND VOMITING: NO NAUSEA AND NO VOMITING
PAROXYSMAL SWEATS: NO SWEAT VISIBLE
AGITATION: NORMAL ACTIVITY
PAROXYSMAL SWEATS: NO SWEAT VISIBLE
ORIENTATION AND CLOUDING OF SENSORIUM: ORIENTED AND CAN DO SERIAL ADDITIONS
AGITATION: NORMAL ACTIVITY
ANXIETY: MILDLY ANXIOUS
PAROXYSMAL SWEATS: NO SWEAT VISIBLE
AUDITORY DISTURBANCES: NOT PRESENT
ANXIETY: MILDLY ANXIOUS
NAUSEA AND VOMITING: NO NAUSEA AND NO VOMITING
TREMOR: TREMOR NOT VISIBLE BUT CAN BE FELT, FINGERTIP TO FINGERTIP
ORIENTATION AND CLOUDING OF SENSORIUM: ORIENTED AND CAN DO SERIAL ADDITIONS
TOTAL SCORE: 1
VISUAL DISTURBANCES: NOT PRESENT
HEADACHE, FULLNESS IN HEAD: NOT PRESENT
AUDITORY DISTURBANCES: NOT PRESENT
HEADACHE, FULLNESS IN HEAD: NOT PRESENT
AGITATION: NORMAL ACTIVITY
SUBSTANCE_ABUSE: 1
AUDITORY DISTURBANCES: NOT PRESENT
TREMOR: TREMOR NOT VISIBLE BUT CAN BE FELT, FINGERTIP TO FINGERTIP
TOTAL SCORE: 2
NAUSEA AND VOMITING: NO NAUSEA AND NO VOMITING
NAUSEA AND VOMITING: NO NAUSEA AND NO VOMITING
HEADACHE, FULLNESS IN HEAD: NOT PRESENT
AUDITORY DISTURBANCES: NOT PRESENT
VISUAL DISTURBANCES: NOT PRESENT
NAUSEA AND VOMITING: NO NAUSEA AND NO VOMITING
PAROXYSMAL SWEATS: NO SWEAT VISIBLE
ANXIETY: NO ANXIETY (AT EASE)
AGITATION: NORMAL ACTIVITY
HEADACHE, FULLNESS IN HEAD: NOT PRESENT
AGITATION: NORMAL ACTIVITY
ANXIETY: NO ANXIETY (AT EASE)
VISUAL DISTURBANCES: NOT PRESENT
ORIENTATION AND CLOUDING OF SENSORIUM: ORIENTED AND CAN DO SERIAL ADDITIONS
ORIENTATION AND CLOUDING OF SENSORIUM: ORIENTED AND CAN DO SERIAL ADDITIONS
HEADACHE, FULLNESS IN HEAD: NOT PRESENT
PAROXYSMAL SWEATS: NO SWEAT VISIBLE
TOTAL SCORE: 1
TOTAL SCORE: 2

## 2024-10-25 ASSESSMENT — PAIN DESCRIPTION - PAIN TYPE: TYPE: ACUTE PAIN

## 2024-10-25 ASSESSMENT — ENCOUNTER SYMPTOMS
BLOOD IN STOOL: 0
DIZZINESS: 0
CHILLS: 0
FEVER: 0
NAUSEA: 0
BACK PAIN: 0
DEPRESSION: 0
SHORTNESS OF BREATH: 0
ABDOMINAL PAIN: 1
DIARRHEA: 0
VOMITING: 0
HEARTBURN: 0
COUGH: 0
CONSTIPATION: 1
NERVOUS/ANXIOUS: 1
WEAKNESS: 1
BLURRED VISION: 0

## 2024-10-26 PROBLEM — R10.11 RIGHT UPPER QUADRANT PAIN: Status: ACTIVE | Noted: 2024-10-26

## 2024-10-26 LAB
ALBUMIN SERPL BCP-MCNC: 2.2 G/DL (ref 3.2–4.9)
ALP SERPL-CCNC: 126 U/L (ref 30–99)
ALT SERPL-CCNC: 13 U/L (ref 2–50)
AMMONIA PLAS-SCNC: 59 UMOL/L (ref 11–45)
ANION GAP SERPL CALC-SCNC: 12 MMOL/L (ref 7–16)
AST SERPL-CCNC: 32 U/L (ref 12–45)
BASOPHILS # BLD AUTO: 0.4 % (ref 0–1.8)
BASOPHILS # BLD: 0.02 K/UL (ref 0–0.12)
BILIRUB CONJ SERPL-MCNC: 1.2 MG/DL (ref 0.1–0.5)
BILIRUB INDIRECT SERPL-MCNC: 0.9 MG/DL (ref 0–1)
BILIRUB SERPL-MCNC: 2.1 MG/DL (ref 0.1–1.5)
BUN SERPL-MCNC: 13 MG/DL (ref 8–22)
CALCIUM SERPL-MCNC: 7.4 MG/DL (ref 8.5–10.5)
CHLORIDE SERPL-SCNC: 102 MMOL/L (ref 96–112)
CO2 SERPL-SCNC: 23 MMOL/L (ref 20–33)
CREAT SERPL-MCNC: 0.91 MG/DL (ref 0.5–1.4)
EOSINOPHIL # BLD AUTO: 0.27 K/UL (ref 0–0.51)
EOSINOPHIL NFR BLD: 4.8 % (ref 0–6.9)
ERYTHROCYTE [DISTWIDTH] IN BLOOD BY AUTOMATED COUNT: 53.9 FL (ref 35.9–50)
GFR SERPLBLD CREATININE-BSD FMLA CKD-EPI: 107 ML/MIN/1.73 M 2
GLUCOSE SERPL-MCNC: 91 MG/DL (ref 65–99)
HCT VFR BLD AUTO: 27.7 % (ref 42–52)
HGB BLD-MCNC: 9.6 G/DL (ref 14–18)
IMM GRANULOCYTES # BLD AUTO: 0.01 K/UL (ref 0–0.11)
IMM GRANULOCYTES NFR BLD AUTO: 0.2 % (ref 0–0.9)
LYMPHOCYTES # BLD AUTO: 1.63 K/UL (ref 1–4.8)
LYMPHOCYTES NFR BLD: 29.2 % (ref 22–41)
MCH RBC QN AUTO: 32.1 PG (ref 27–33)
MCHC RBC AUTO-ENTMCNC: 34.7 G/DL (ref 32.3–36.5)
MCV RBC AUTO: 92.6 FL (ref 81.4–97.8)
MONOCYTES # BLD AUTO: 0.87 K/UL (ref 0–0.85)
MONOCYTES NFR BLD AUTO: 15.6 % (ref 0–13.4)
NEUTROPHILS # BLD AUTO: 2.78 K/UL (ref 1.82–7.42)
NEUTROPHILS NFR BLD: 49.8 % (ref 44–72)
NRBC # BLD AUTO: 0 K/UL
NRBC BLD-RTO: 0 /100 WBC (ref 0–0.2)
PLATELET # BLD AUTO: 77 K/UL (ref 164–446)
PLATELETS.RETICULATED NFR BLD AUTO: 1.5 % (ref 0.6–13.1)
PMV BLD AUTO: 11.2 FL (ref 9–12.9)
POTASSIUM SERPL-SCNC: 3.6 MMOL/L (ref 3.6–5.5)
PROT SERPL-MCNC: 4.8 G/DL (ref 6–8.2)
RBC # BLD AUTO: 2.99 M/UL (ref 4.7–6.1)
SODIUM SERPL-SCNC: 137 MMOL/L (ref 135–145)
WBC # BLD AUTO: 5.6 K/UL (ref 4.8–10.8)

## 2024-10-26 PROCEDURE — 700101 HCHG RX REV CODE 250

## 2024-10-26 PROCEDURE — 770006 HCHG ROOM/CARE - MED/SURG/GYN SEMI*

## 2024-10-26 PROCEDURE — A9270 NON-COVERED ITEM OR SERVICE: HCPCS

## 2024-10-26 PROCEDURE — 80048 BASIC METABOLIC PNL TOTAL CA: CPT

## 2024-10-26 PROCEDURE — 700102 HCHG RX REV CODE 250 W/ 637 OVERRIDE(OP)

## 2024-10-26 PROCEDURE — 85025 COMPLETE CBC W/AUTO DIFF WBC: CPT

## 2024-10-26 PROCEDURE — A9270 NON-COVERED ITEM OR SERVICE: HCPCS | Performed by: HOSPITALIST

## 2024-10-26 PROCEDURE — 36415 COLL VENOUS BLD VENIPUNCTURE: CPT

## 2024-10-26 PROCEDURE — 99232 SBSQ HOSP IP/OBS MODERATE 35: CPT | Mod: GC | Performed by: FAMILY MEDICINE

## 2024-10-26 PROCEDURE — 82140 ASSAY OF AMMONIA: CPT

## 2024-10-26 PROCEDURE — 99232 SBSQ HOSP IP/OBS MODERATE 35: CPT | Performed by: NURSE PRACTITIONER

## 2024-10-26 PROCEDURE — 80076 HEPATIC FUNCTION PANEL: CPT

## 2024-10-26 PROCEDURE — 700102 HCHG RX REV CODE 250 W/ 637 OVERRIDE(OP): Performed by: HOSPITALIST

## 2024-10-26 PROCEDURE — A9270 NON-COVERED ITEM OR SERVICE: HCPCS | Performed by: STUDENT IN AN ORGANIZED HEALTH CARE EDUCATION/TRAINING PROGRAM

## 2024-10-26 PROCEDURE — 700102 HCHG RX REV CODE 250 W/ 637 OVERRIDE(OP): Performed by: STUDENT IN AN ORGANIZED HEALTH CARE EDUCATION/TRAINING PROGRAM

## 2024-10-26 PROCEDURE — 85055 RETICULATED PLATELET ASSAY: CPT

## 2024-10-26 RX ORDER — HYDROXYZINE HYDROCHLORIDE 50 MG/1
25 TABLET, FILM COATED ORAL 3 TIMES DAILY PRN
Status: DISCONTINUED | OUTPATIENT
Start: 2024-10-26 | End: 2024-10-27 | Stop reason: HOSPADM

## 2024-10-26 RX ORDER — OXYCODONE HYDROCHLORIDE 5 MG/1
5 TABLET ORAL ONCE
Status: COMPLETED | OUTPATIENT
Start: 2024-10-26 | End: 2024-10-26

## 2024-10-26 RX ORDER — CAPSAICIN 0.025 %
CREAM (GRAM) TOPICAL 3 TIMES DAILY
Status: DISCONTINUED | OUTPATIENT
Start: 2024-10-26 | End: 2024-10-27 | Stop reason: HOSPADM

## 2024-10-26 RX ORDER — TRAZODONE HYDROCHLORIDE 50 MG/1
50 TABLET ORAL
Status: DISCONTINUED | OUTPATIENT
Start: 2024-10-26 | End: 2024-10-27 | Stop reason: HOSPADM

## 2024-10-26 RX ADMIN — LACTULOSE 45 ML: 10 SOLUTION ORAL at 20:55

## 2024-10-26 RX ADMIN — OMEPRAZOLE 20 MG: 20 CAPSULE, DELAYED RELEASE ORAL at 06:00

## 2024-10-26 RX ADMIN — CHLORDIAZEPOXIDE HYDROCHLORIDE 5 MG: 5 CAPSULE ORAL at 17:12

## 2024-10-26 RX ADMIN — LACTULOSE 45 ML: 10 SOLUTION ORAL at 12:46

## 2024-10-26 RX ADMIN — TRAZODONE HYDROCHLORIDE 50 MG: 50 TABLET ORAL at 22:59

## 2024-10-26 RX ADMIN — OXYCODONE 5 MG: 5 TABLET ORAL at 17:31

## 2024-10-26 RX ADMIN — CAPSAICIN: 0.25 CREAM TOPICAL at 13:49

## 2024-10-26 RX ADMIN — ESCITALOPRAM OXALATE 10 MG: 10 TABLET ORAL at 06:00

## 2024-10-26 RX ADMIN — CHLORDIAZEPOXIDE HYDROCHLORIDE 5 MG: 5 CAPSULE ORAL at 05:59

## 2024-10-26 RX ADMIN — BUMETANIDE 2 MG: 1 TABLET ORAL at 05:59

## 2024-10-26 RX ADMIN — CARVEDILOL 3.12 MG: 3.12 TABLET, FILM COATED ORAL at 17:11

## 2024-10-26 RX ADMIN — SPIRONOLACTONE 100 MG: 25 TABLET ORAL at 06:00

## 2024-10-26 RX ADMIN — LACTULOSE 45 ML: 10 SOLUTION ORAL at 17:12

## 2024-10-26 RX ADMIN — RIFAXIMIN 550 MG: 550 TABLET ORAL at 05:59

## 2024-10-26 RX ADMIN — NIFEDIPINE 30 MG: 30 TABLET, FILM COATED, EXTENDED RELEASE ORAL at 05:59

## 2024-10-26 RX ADMIN — RIFAXIMIN 550 MG: 550 TABLET ORAL at 17:11

## 2024-10-26 RX ADMIN — ACETAMINOPHEN 650 MG: 325 TABLET ORAL at 08:42

## 2024-10-26 RX ADMIN — FOLIC ACID 1 MG: 1 TABLET ORAL at 06:00

## 2024-10-26 RX ADMIN — POLYETHYLENE GLYCOL 3350 2 PACKET: 17 POWDER, FOR SOLUTION ORAL at 06:00

## 2024-10-26 RX ADMIN — CARVEDILOL 3.12 MG: 3.12 TABLET, FILM COATED ORAL at 08:42

## 2024-10-26 RX ADMIN — LACTULOSE 45 ML: 10 SOLUTION ORAL at 08:42

## 2024-10-26 RX ADMIN — NICOTINE TRANSDERMAL SYSTEM 21 MG: 21 PATCH, EXTENDED RELEASE TRANSDERMAL at 06:00

## 2024-10-26 ASSESSMENT — ENCOUNTER SYMPTOMS
FEVER: 0
BLURRED VISION: 0
BACK PAIN: 0
DIARRHEA: 0
CHILLS: 0
ABDOMINAL PAIN: 1
CONSTIPATION: 1
DIZZINESS: 0
BLOOD IN STOOL: 0
VOMITING: 0
NAUSEA: 0
DEPRESSION: 0
HEARTBURN: 0
NERVOUS/ANXIOUS: 1
SHORTNESS OF BREATH: 0
WEAKNESS: 1
COUGH: 0

## 2024-10-26 ASSESSMENT — PAIN DESCRIPTION - PAIN TYPE
TYPE: ACUTE PAIN

## 2024-10-26 ASSESSMENT — LIFESTYLE VARIABLES: SUBSTANCE_ABUSE: 1

## 2024-10-26 ASSESSMENT — FIBROSIS 4 INDEX: FIB4 SCORE: 4.79

## 2024-10-27 ENCOUNTER — ANESTHESIA (OUTPATIENT)
Dept: SURGERY | Facility: MEDICAL CENTER | Age: 44
End: 2024-10-27
Payer: MEDICAID

## 2024-10-27 ENCOUNTER — PHARMACY VISIT (OUTPATIENT)
Dept: PHARMACY | Facility: MEDICAL CENTER | Age: 44
End: 2024-10-27
Payer: MEDICARE

## 2024-10-27 ENCOUNTER — ANESTHESIA EVENT (OUTPATIENT)
Dept: SURGERY | Facility: MEDICAL CENTER | Age: 44
End: 2024-10-27
Payer: MEDICAID

## 2024-10-27 VITALS
DIASTOLIC BLOOD PRESSURE: 56 MMHG | BODY MASS INDEX: 33.77 KG/M2 | SYSTOLIC BLOOD PRESSURE: 109 MMHG | OXYGEN SATURATION: 97 % | TEMPERATURE: 97 F | WEIGHT: 277.34 LBS | RESPIRATION RATE: 18 BRPM | HEART RATE: 62 BPM | HEIGHT: 76 IN

## 2024-10-27 LAB
ALBUMIN SERPL BCP-MCNC: 2.4 G/DL (ref 3.2–4.9)
ALBUMIN/GLOB SERPL: 0.8 G/DL
ALP SERPL-CCNC: 134 U/L (ref 30–99)
ALT SERPL-CCNC: 17 U/L (ref 2–50)
ANION GAP SERPL CALC-SCNC: 9 MMOL/L (ref 7–16)
ANION GAP SERPL CALC-SCNC: 9 MMOL/L (ref 7–16)
AST SERPL-CCNC: 37 U/L (ref 12–45)
BILIRUB SERPL-MCNC: 2.5 MG/DL (ref 0.1–1.5)
BUN SERPL-MCNC: 15 MG/DL (ref 8–22)
BUN SERPL-MCNC: 15 MG/DL (ref 8–22)
CALCIUM ALBUM COR SERPL-MCNC: 9.2 MG/DL (ref 8.5–10.5)
CALCIUM SERPL-MCNC: 7.9 MG/DL (ref 8.5–10.5)
CALCIUM SERPL-MCNC: 7.9 MG/DL (ref 8.5–10.5)
CHLORIDE SERPL-SCNC: 104 MMOL/L (ref 96–112)
CHLORIDE SERPL-SCNC: 104 MMOL/L (ref 96–112)
CO2 SERPL-SCNC: 24 MMOL/L (ref 20–33)
CO2 SERPL-SCNC: 24 MMOL/L (ref 20–33)
CREAT SERPL-MCNC: 1.1 MG/DL (ref 0.5–1.4)
CREAT SERPL-MCNC: 1.1 MG/DL (ref 0.5–1.4)
ERYTHROCYTE [DISTWIDTH] IN BLOOD BY AUTOMATED COUNT: 54.4 FL (ref 35.9–50)
GFR SERPLBLD CREATININE-BSD FMLA CKD-EPI: 86 ML/MIN/1.73 M 2
GLOBULIN SER CALC-MCNC: 3 G/DL (ref 1.9–3.5)
GLUCOSE SERPL-MCNC: 93 MG/DL (ref 65–99)
GLUCOSE SERPL-MCNC: 93 MG/DL (ref 65–99)
HCT VFR BLD AUTO: 31.7 % (ref 42–52)
HGB BLD-MCNC: 10.7 G/DL (ref 14–18)
MCH RBC QN AUTO: 31.8 PG (ref 27–33)
MCHC RBC AUTO-ENTMCNC: 33.8 G/DL (ref 32.3–36.5)
MCV RBC AUTO: 94.3 FL (ref 81.4–97.8)
PLATELET # BLD AUTO: 83 K/UL (ref 164–446)
PLATELETS.RETICULATED NFR BLD AUTO: 2.2 % (ref 0.6–13.1)
PMV BLD AUTO: 10.2 FL (ref 9–12.9)
POTASSIUM SERPL-SCNC: 3.8 MMOL/L (ref 3.6–5.5)
POTASSIUM SERPL-SCNC: 3.8 MMOL/L (ref 3.6–5.5)
PROT SERPL-MCNC: 5.4 G/DL (ref 6–8.2)
RBC # BLD AUTO: 3.36 M/UL (ref 4.7–6.1)
SODIUM SERPL-SCNC: 137 MMOL/L (ref 135–145)
SODIUM SERPL-SCNC: 137 MMOL/L (ref 135–145)
WBC # BLD AUTO: 6.3 K/UL (ref 4.8–10.8)

## 2024-10-27 PROCEDURE — 85027 COMPLETE CBC AUTOMATED: CPT

## 2024-10-27 PROCEDURE — 160009 HCHG ANES TIME/MIN: Performed by: INTERNAL MEDICINE

## 2024-10-27 PROCEDURE — 700105 HCHG RX REV CODE 258: Performed by: STUDENT IN AN ORGANIZED HEALTH CARE EDUCATION/TRAINING PROGRAM

## 2024-10-27 PROCEDURE — 700102 HCHG RX REV CODE 250 W/ 637 OVERRIDE(OP): Performed by: HOSPITALIST

## 2024-10-27 PROCEDURE — 700111 HCHG RX REV CODE 636 W/ 250 OVERRIDE (IP): Performed by: STUDENT IN AN ORGANIZED HEALTH CARE EDUCATION/TRAINING PROGRAM

## 2024-10-27 PROCEDURE — 85055 RETICULATED PLATELET ASSAY: CPT

## 2024-10-27 PROCEDURE — 0DJ08ZZ INSPECTION OF UPPER INTESTINAL TRACT, VIA NATURAL OR ARTIFICIAL OPENING ENDOSCOPIC: ICD-10-PCS | Performed by: INTERNAL MEDICINE

## 2024-10-27 PROCEDURE — 700101 HCHG RX REV CODE 250: Performed by: STUDENT IN AN ORGANIZED HEALTH CARE EDUCATION/TRAINING PROGRAM

## 2024-10-27 PROCEDURE — 700102 HCHG RX REV CODE 250 W/ 637 OVERRIDE(OP)

## 2024-10-27 PROCEDURE — A9270 NON-COVERED ITEM OR SERVICE: HCPCS

## 2024-10-27 PROCEDURE — 99238 HOSP IP/OBS DSCHRG MGMT 30/<: CPT | Performed by: FAMILY MEDICINE

## 2024-10-27 PROCEDURE — 80053 COMPREHEN METABOLIC PANEL: CPT

## 2024-10-27 PROCEDURE — 700102 HCHG RX REV CODE 250 W/ 637 OVERRIDE(OP): Performed by: STUDENT IN AN ORGANIZED HEALTH CARE EDUCATION/TRAINING PROGRAM

## 2024-10-27 PROCEDURE — 160002 HCHG RECOVERY MINUTES (STAT): Performed by: INTERNAL MEDICINE

## 2024-10-27 PROCEDURE — 160035 HCHG PACU - 1ST 60 MINS PHASE I: Performed by: INTERNAL MEDICINE

## 2024-10-27 PROCEDURE — 43235 EGD DIAGNOSTIC BRUSH WASH: CPT | Performed by: INTERNAL MEDICINE

## 2024-10-27 PROCEDURE — RXMED WILLOW AMBULATORY MEDICATION CHARGE

## 2024-10-27 PROCEDURE — 160048 HCHG OR STATISTICAL LEVEL 1-5: Performed by: INTERNAL MEDICINE

## 2024-10-27 PROCEDURE — 160202 HCHG ENDO MINUTES - 1ST 30 MINS LEVEL 3: Performed by: INTERNAL MEDICINE

## 2024-10-27 PROCEDURE — A9270 NON-COVERED ITEM OR SERVICE: HCPCS | Performed by: HOSPITALIST

## 2024-10-27 PROCEDURE — A9270 NON-COVERED ITEM OR SERVICE: HCPCS | Performed by: STUDENT IN AN ORGANIZED HEALTH CARE EDUCATION/TRAINING PROGRAM

## 2024-10-27 PROCEDURE — 36415 COLL VENOUS BLD VENIPUNCTURE: CPT

## 2024-10-27 RX ORDER — TRAZODONE HYDROCHLORIDE 50 MG/1
50 TABLET ORAL
Qty: 30 TABLET | Refills: 3 | Status: SHIPPED | OUTPATIENT
Start: 2024-10-27 | End: 2024-11-01

## 2024-10-27 RX ORDER — LIDOCAINE 4 G/G
1 PATCH TOPICAL EVERY 24 HOURS
Qty: 30 PATCH | Refills: 0 | Status: SHIPPED | OUTPATIENT
Start: 2024-10-27 | End: 2024-11-01

## 2024-10-27 RX ORDER — HALOPERIDOL 5 MG/ML
1 INJECTION INTRAMUSCULAR
Status: CANCELLED | OUTPATIENT
Start: 2024-10-27

## 2024-10-27 RX ORDER — HYDROXYZINE HYDROCHLORIDE 25 MG/1
25 TABLET, FILM COATED ORAL 3 TIMES DAILY PRN
Qty: 30 TABLET | Refills: 0 | Status: SHIPPED | OUTPATIENT
Start: 2024-10-27 | End: 2024-11-01

## 2024-10-27 RX ORDER — NIFEDIPINE 30 MG/1
30 TABLET, EXTENDED RELEASE ORAL DAILY
Qty: 30 TABLET | Refills: 0 | Status: SHIPPED | OUTPATIENT
Start: 2024-10-28 | End: 2024-11-01

## 2024-10-27 RX ORDER — CARVEDILOL 3.12 MG/1
3.12 TABLET ORAL 2 TIMES DAILY WITH MEALS
Qty: 60 TABLET | Refills: 0 | Status: SHIPPED | OUTPATIENT
Start: 2024-10-27 | End: 2024-11-10

## 2024-10-27 RX ORDER — POLYETHYLENE GLYCOL 3350 17 G/17G
17 POWDER, FOR SOLUTION ORAL DAILY
Qty: 30 PACKET | Refills: 3 | Status: SHIPPED | OUTPATIENT
Start: 2024-10-27 | End: 2024-11-01

## 2024-10-27 RX ORDER — LIDOCAINE HYDROCHLORIDE 20 MG/ML
INJECTION, SOLUTION EPIDURAL; INFILTRATION; INTRACAUDAL; PERINEURAL PRN
Status: DISCONTINUED | OUTPATIENT
Start: 2024-10-27 | End: 2024-10-27 | Stop reason: SURG

## 2024-10-27 RX ORDER — LACTULOSE 10 G/15ML
45 SOLUTION ORAL 4 TIMES DAILY
Qty: 473 ML | Refills: 0 | Status: SHIPPED | OUTPATIENT
Start: 2024-10-27 | End: 2024-11-10

## 2024-10-27 RX ORDER — ESCITALOPRAM OXALATE 10 MG/1
20 TABLET ORAL DAILY
Status: DISCONTINUED | OUTPATIENT
Start: 2024-10-28 | End: 2024-10-27 | Stop reason: HOSPADM

## 2024-10-27 RX ORDER — ONDANSETRON 2 MG/ML
4 INJECTION INTRAMUSCULAR; INTRAVENOUS
Status: CANCELLED | OUTPATIENT
Start: 2024-10-27

## 2024-10-27 RX ORDER — NICOTINE 21 MG/24HR
1 PATCH, TRANSDERMAL 24 HOURS TRANSDERMAL EVERY 24 HOURS
Qty: 28 PATCH | Refills: 0 | Status: SHIPPED | OUTPATIENT
Start: 2024-10-27 | End: 2024-11-01

## 2024-10-27 RX ORDER — BUMETANIDE 2 MG/1
2 TABLET ORAL DAILY
Qty: 30 TABLET | Refills: 0 | Status: SHIPPED | OUTPATIENT
Start: 2024-10-28 | End: 2024-11-01

## 2024-10-27 RX ORDER — MIDAZOLAM HYDROCHLORIDE 1 MG/ML
1 INJECTION INTRAMUSCULAR; INTRAVENOUS
Status: CANCELLED | OUTPATIENT
Start: 2024-10-27

## 2024-10-27 RX ORDER — ESCITALOPRAM OXALATE 20 MG/1
20 TABLET ORAL DAILY
Qty: 30 TABLET | Refills: 0 | Status: SHIPPED | OUTPATIENT
Start: 2024-10-28 | End: 2024-11-10

## 2024-10-27 RX ORDER — SODIUM CHLORIDE 9 MG/ML
INJECTION, SOLUTION INTRAVENOUS
Status: DISCONTINUED | OUTPATIENT
Start: 2024-10-27 | End: 2024-10-27 | Stop reason: SURG

## 2024-10-27 RX ORDER — SPIRONOLACTONE 100 MG/1
100 TABLET, FILM COATED ORAL DAILY
Qty: 30 TABLET | Refills: 3 | Status: SHIPPED | OUTPATIENT
Start: 2024-10-28 | End: 2024-11-01

## 2024-10-27 RX ORDER — ACETAMINOPHEN 325 MG/1
650 TABLET ORAL EVERY 6 HOURS PRN
COMMUNITY
Start: 2024-10-27 | End: 2024-11-01

## 2024-10-27 RX ORDER — DIPHENHYDRAMINE HYDROCHLORIDE 50 MG/ML
12.5 INJECTION, SOLUTION INTRAMUSCULAR; INTRAVENOUS
Status: CANCELLED | OUTPATIENT
Start: 2024-10-27

## 2024-10-27 RX ADMIN — LACTULOSE 45 ML: 10 SOLUTION ORAL at 12:09

## 2024-10-27 RX ADMIN — PROPOFOL 50 MG: 10 INJECTION, EMULSION INTRAVENOUS at 09:47

## 2024-10-27 RX ADMIN — RIFAXIMIN 550 MG: 550 TABLET ORAL at 05:51

## 2024-10-27 RX ADMIN — SPIRONOLACTONE 100 MG: 25 TABLET ORAL at 05:51

## 2024-10-27 RX ADMIN — SODIUM CHLORIDE: 9 INJECTION, SOLUTION INTRAVENOUS at 09:43

## 2024-10-27 RX ADMIN — FOLIC ACID 1 MG: 1 TABLET ORAL at 05:51

## 2024-10-27 RX ADMIN — OMEPRAZOLE 20 MG: 20 CAPSULE, DELAYED RELEASE ORAL at 05:52

## 2024-10-27 RX ADMIN — NIFEDIPINE 30 MG: 30 TABLET, FILM COATED, EXTENDED RELEASE ORAL at 05:51

## 2024-10-27 RX ADMIN — LACTULOSE 45 ML: 10 SOLUTION ORAL at 08:31

## 2024-10-27 RX ADMIN — LIDOCAINE HYDROCHLORIDE 100 MG: 20 INJECTION, SOLUTION EPIDURAL; INFILTRATION; INTRACAUDAL at 09:45

## 2024-10-27 RX ADMIN — NICOTINE TRANSDERMAL SYSTEM 21 MG: 21 PATCH, EXTENDED RELEASE TRANSDERMAL at 05:52

## 2024-10-27 RX ADMIN — PROPOFOL 50 MG: 10 INJECTION, EMULSION INTRAVENOUS at 09:52

## 2024-10-27 RX ADMIN — BUMETANIDE 2 MG: 1 TABLET ORAL at 05:51

## 2024-10-27 RX ADMIN — ESCITALOPRAM OXALATE 10 MG: 10 TABLET ORAL at 05:51

## 2024-10-27 RX ADMIN — PROPOFOL 50 MG: 10 INJECTION, EMULSION INTRAVENOUS at 09:46

## 2024-10-27 RX ADMIN — CARVEDILOL 3.12 MG: 3.12 TABLET, FILM COATED ORAL at 08:31

## 2024-10-27 RX ADMIN — PROPOFOL 100 MG: 10 INJECTION, EMULSION INTRAVENOUS at 09:45

## 2024-10-27 RX ADMIN — CHLORDIAZEPOXIDE HYDROCHLORIDE 5 MG: 5 CAPSULE ORAL at 05:51

## 2024-10-27 ASSESSMENT — PAIN DESCRIPTION - PAIN TYPE
TYPE: ACUTE PAIN
TYPE: ACUTE PAIN
TYPE: SURGICAL PAIN

## 2024-10-28 ENCOUNTER — HOSPITAL ENCOUNTER (EMERGENCY)
Facility: MEDICAL CENTER | Age: 44
End: 2024-10-28
Attending: STUDENT IN AN ORGANIZED HEALTH CARE EDUCATION/TRAINING PROGRAM
Payer: COMMERCIAL

## 2024-10-28 ENCOUNTER — APPOINTMENT (OUTPATIENT)
Dept: RADIOLOGY | Facility: MEDICAL CENTER | Age: 44
End: 2024-10-28
Attending: STUDENT IN AN ORGANIZED HEALTH CARE EDUCATION/TRAINING PROGRAM
Payer: COMMERCIAL

## 2024-10-28 ENCOUNTER — TELEPHONE (OUTPATIENT)
Dept: HEALTH INFORMATION MANAGEMENT | Facility: OTHER | Age: 44
End: 2024-10-28

## 2024-10-28 VITALS
SYSTOLIC BLOOD PRESSURE: 147 MMHG | DIASTOLIC BLOOD PRESSURE: 79 MMHG | OXYGEN SATURATION: 95 % | BODY MASS INDEX: 33.49 KG/M2 | WEIGHT: 275 LBS | HEART RATE: 64 BPM | HEIGHT: 76 IN | TEMPERATURE: 98.4 F | RESPIRATION RATE: 18 BRPM

## 2024-10-28 DIAGNOSIS — R26.2 IMPAIRED AMBULATION: ICD-10-CM

## 2024-10-28 DIAGNOSIS — Z87.19 HISTORY OF CIRRHOSIS: ICD-10-CM

## 2024-10-28 DIAGNOSIS — F10.920 ALCOHOLIC INTOXICATION WITHOUT COMPLICATION (HCC): ICD-10-CM

## 2024-10-28 LAB
ALBUMIN SERPL BCP-MCNC: 2.8 G/DL (ref 3.2–4.9)
ALBUMIN/GLOB SERPL: 0.8 G/DL
ALP SERPL-CCNC: 146 U/L (ref 30–99)
ALT SERPL-CCNC: 21 U/L (ref 2–50)
AMMONIA PLAS-SCNC: 50 UMOL/L (ref 11–45)
ANION GAP SERPL CALC-SCNC: 10 MMOL/L (ref 7–16)
AST SERPL-CCNC: 57 U/L (ref 12–45)
BILIRUB SERPL-MCNC: 2.9 MG/DL (ref 0.1–1.5)
BUN SERPL-MCNC: 15 MG/DL (ref 8–22)
CALCIUM ALBUM COR SERPL-MCNC: 9.2 MG/DL (ref 8.5–10.5)
CALCIUM SERPL-MCNC: 8.2 MG/DL (ref 8.5–10.5)
CHLORIDE SERPL-SCNC: 105 MMOL/L (ref 96–112)
CO2 SERPL-SCNC: 19 MMOL/L (ref 20–33)
CREAT SERPL-MCNC: 1.02 MG/DL (ref 0.5–1.4)
ETHANOL BLD-MCNC: 183 MG/DL
GFR SERPLBLD CREATININE-BSD FMLA CKD-EPI: 94 ML/MIN/1.73 M 2
GLOBULIN SER CALC-MCNC: 3.5 G/DL (ref 1.9–3.5)
GLUCOSE SERPL-MCNC: 87 MG/DL (ref 65–99)
POC BREATHALIZER: 0.15 PERCENT (ref 0–0.01)
POTASSIUM SERPL-SCNC: 4.2 MMOL/L (ref 3.6–5.5)
PROT SERPL-MCNC: 6.3 G/DL (ref 6–8.2)
SODIUM SERPL-SCNC: 134 MMOL/L (ref 135–145)

## 2024-10-28 PROCEDURE — 99285 EMERGENCY DEPT VISIT HI MDM: CPT

## 2024-10-28 PROCEDURE — 36415 COLL VENOUS BLD VENIPUNCTURE: CPT

## 2024-10-28 PROCEDURE — 80053 COMPREHEN METABOLIC PANEL: CPT

## 2024-10-28 PROCEDURE — A9270 NON-COVERED ITEM OR SERVICE: HCPCS | Mod: UD | Performed by: STUDENT IN AN ORGANIZED HEALTH CARE EDUCATION/TRAINING PROGRAM

## 2024-10-28 PROCEDURE — 302970 POC BREATHALIZER: Performed by: STUDENT IN AN ORGANIZED HEALTH CARE EDUCATION/TRAINING PROGRAM

## 2024-10-28 PROCEDURE — 82077 ASSAY SPEC XCP UR&BREATH IA: CPT

## 2024-10-28 PROCEDURE — 700102 HCHG RX REV CODE 250 W/ 637 OVERRIDE(OP): Mod: UD | Performed by: STUDENT IN AN ORGANIZED HEALTH CARE EDUCATION/TRAINING PROGRAM

## 2024-10-28 PROCEDURE — 82140 ASSAY OF AMMONIA: CPT

## 2024-10-28 RX ORDER — LACTULOSE 10 G/15ML
30 SOLUTION ORAL 3 TIMES DAILY
Status: DISCONTINUED | OUTPATIENT
Start: 2024-10-29 | End: 2024-10-28

## 2024-10-28 RX ORDER — LACTULOSE 10 G/15ML
30 SOLUTION ORAL ONCE
Status: COMPLETED | OUTPATIENT
Start: 2024-10-28 | End: 2024-10-28

## 2024-10-28 RX ADMIN — LACTULOSE 30 ML: 10 SOLUTION ORAL at 22:39

## 2024-10-28 ASSESSMENT — FIBROSIS 4 INDEX: FIB4 SCORE: 4.54

## 2024-11-01 ENCOUNTER — APPOINTMENT (OUTPATIENT)
Dept: RADIOLOGY | Facility: MEDICAL CENTER | Age: 44
DRG: 433 | End: 2024-11-01
Attending: EMERGENCY MEDICINE
Payer: COMMERCIAL

## 2024-11-01 ENCOUNTER — HOSPITAL ENCOUNTER (INPATIENT)
Facility: MEDICAL CENTER | Age: 44
LOS: 4 days | DRG: 433 | End: 2024-11-05
Attending: EMERGENCY MEDICINE | Admitting: FAMILY MEDICINE
Payer: COMMERCIAL

## 2024-11-01 DIAGNOSIS — F10.20 ALCOHOL USE DISORDER, SEVERE, DEPENDENCE (HCC): ICD-10-CM

## 2024-11-01 DIAGNOSIS — K76.82 HEPATIC ENCEPHALOPATHY (HCC): ICD-10-CM

## 2024-11-01 LAB
ALBUMIN SERPL BCP-MCNC: 2.7 G/DL (ref 3.2–4.9)
ALBUMIN/GLOB SERPL: 0.9 G/DL
ALP SERPL-CCNC: 166 U/L (ref 30–99)
ALT SERPL-CCNC: 16 U/L (ref 2–50)
AMMONIA PLAS-SCNC: 71 UMOL/L (ref 11–45)
AMPHET UR QL SCN: NEGATIVE
ANION GAP SERPL CALC-SCNC: 9 MMOL/L (ref 7–16)
APPEARANCE UR: CLEAR
AST SERPL-CCNC: 36 U/L (ref 12–45)
BACTERIA #/AREA URNS HPF: ABNORMAL /HPF
BARBITURATES UR QL SCN: NEGATIVE
BASOPHILS # BLD AUTO: 0.4 % (ref 0–1.8)
BASOPHILS # BLD: 0.03 K/UL (ref 0–0.12)
BENZODIAZ UR QL SCN: POSITIVE
BILIRUB SERPL-MCNC: 2.3 MG/DL (ref 0.1–1.5)
BILIRUB UR QL STRIP.AUTO: NEGATIVE
BUN SERPL-MCNC: 20 MG/DL (ref 8–22)
BZE UR QL SCN: NEGATIVE
CALCIUM ALBUM COR SERPL-MCNC: 9.4 MG/DL (ref 8.5–10.5)
CALCIUM SERPL-MCNC: 8.4 MG/DL (ref 8.5–10.5)
CANNABINOIDS UR QL SCN: NEGATIVE
CASTS URNS QL MICRO: ABNORMAL /LPF (ref 0–2)
CHLORIDE SERPL-SCNC: 106 MMOL/L (ref 96–112)
CO2 SERPL-SCNC: 23 MMOL/L (ref 20–33)
COLOR UR: YELLOW
CREAT SERPL-MCNC: 1.17 MG/DL (ref 0.5–1.4)
EKG IMPRESSION: NORMAL
EOSINOPHIL # BLD AUTO: 0.26 K/UL (ref 0–0.51)
EOSINOPHIL NFR BLD: 3.2 % (ref 0–6.9)
EPITHELIAL CELLS 1715: ABNORMAL /HPF (ref 0–5)
ERYTHROCYTE [DISTWIDTH] IN BLOOD BY AUTOMATED COUNT: 55 FL (ref 35.9–50)
ETHANOL BLD-MCNC: <10.1 MG/DL
FENTANYL UR QL: NEGATIVE
GFR SERPLBLD CREATININE-BSD FMLA CKD-EPI: 79 ML/MIN/1.73 M 2
GLOBULIN SER CALC-MCNC: 3.1 G/DL (ref 1.9–3.5)
GLUCOSE SERPL-MCNC: 82 MG/DL (ref 65–99)
GLUCOSE UR STRIP.AUTO-MCNC: NEGATIVE MG/DL
HCT VFR BLD AUTO: 31.4 % (ref 42–52)
HGB BLD-MCNC: 10.5 G/DL (ref 14–18)
IMM GRANULOCYTES # BLD AUTO: 0.03 K/UL (ref 0–0.11)
IMM GRANULOCYTES NFR BLD AUTO: 0.4 % (ref 0–0.9)
KETONES UR STRIP.AUTO-MCNC: NEGATIVE MG/DL
LEUKOCYTE ESTERASE UR QL STRIP.AUTO: NEGATIVE
LIPASE SERPL-CCNC: 42 U/L (ref 11–82)
LYMPHOCYTES # BLD AUTO: 1.77 K/UL (ref 1–4.8)
LYMPHOCYTES NFR BLD: 22 % (ref 22–41)
MAGNESIUM SERPL-MCNC: 1.8 MG/DL (ref 1.5–2.5)
MCH RBC QN AUTO: 32 PG (ref 27–33)
MCHC RBC AUTO-ENTMCNC: 33.4 G/DL (ref 32.3–36.5)
MCV RBC AUTO: 95.7 FL (ref 81.4–97.8)
METHADONE UR QL SCN: NEGATIVE
MICRO URNS: ABNORMAL
MONOCYTES # BLD AUTO: 0.81 K/UL (ref 0–0.85)
MONOCYTES NFR BLD AUTO: 10.1 % (ref 0–13.4)
NEUTROPHILS # BLD AUTO: 5.15 K/UL (ref 1.82–7.42)
NEUTROPHILS NFR BLD: 63.9 % (ref 44–72)
NITRITE UR QL STRIP.AUTO: NEGATIVE
NRBC # BLD AUTO: 0 K/UL
NRBC BLD-RTO: 0 /100 WBC (ref 0–0.2)
OPIATES UR QL SCN: NEGATIVE
OXYCODONE UR QL SCN: NEGATIVE
PCP UR QL SCN: NEGATIVE
PH UR STRIP.AUTO: 7 [PH] (ref 5–8)
PLATELET # BLD AUTO: 94 K/UL (ref 164–446)
PLATELETS.RETICULATED NFR BLD AUTO: 2 % (ref 0.6–13.1)
PMV BLD AUTO: 10.2 FL (ref 9–12.9)
POTASSIUM SERPL-SCNC: 4.4 MMOL/L (ref 3.6–5.5)
PROCALCITONIN SERPL-MCNC: 0.07 NG/ML
PROPOXYPH UR QL SCN: NEGATIVE
PROT SERPL-MCNC: 5.8 G/DL (ref 6–8.2)
PROT UR QL STRIP: NEGATIVE MG/DL
RBC # BLD AUTO: 3.28 M/UL (ref 4.7–6.1)
RBC # URNS HPF: ABNORMAL /HPF (ref 0–2)
RBC UR QL AUTO: ABNORMAL
SODIUM SERPL-SCNC: 138 MMOL/L (ref 135–145)
SP GR UR STRIP.AUTO: 1
UROBILINOGEN UR STRIP.AUTO-MCNC: 1 EU/DL
WBC # BLD AUTO: 8.1 K/UL (ref 4.8–10.8)
WBC #/AREA URNS HPF: ABNORMAL /HPF

## 2024-11-01 PROCEDURE — 770020 HCHG ROOM/CARE - TELE (206)

## 2024-11-01 PROCEDURE — 36415 COLL VENOUS BLD VENIPUNCTURE: CPT

## 2024-11-01 PROCEDURE — 80307 DRUG TEST PRSMV CHEM ANLYZR: CPT

## 2024-11-01 PROCEDURE — 99222 1ST HOSP IP/OBS MODERATE 55: CPT | Performed by: FAMILY MEDICINE

## 2024-11-01 PROCEDURE — 83690 ASSAY OF LIPASE: CPT

## 2024-11-01 PROCEDURE — 85025 COMPLETE CBC W/AUTO DIFF WBC: CPT

## 2024-11-01 PROCEDURE — A9270 NON-COVERED ITEM OR SERVICE: HCPCS

## 2024-11-01 PROCEDURE — 81015 MICROSCOPIC EXAM OF URINE: CPT

## 2024-11-01 PROCEDURE — 93005 ELECTROCARDIOGRAM TRACING: CPT

## 2024-11-01 PROCEDURE — 84145 PROCALCITONIN (PCT): CPT

## 2024-11-01 PROCEDURE — 700102 HCHG RX REV CODE 250 W/ 637 OVERRIDE(OP)

## 2024-11-01 PROCEDURE — 93005 ELECTROCARDIOGRAM TRACING: CPT | Performed by: EMERGENCY MEDICINE

## 2024-11-01 PROCEDURE — 82140 ASSAY OF AMMONIA: CPT

## 2024-11-01 PROCEDURE — 83735 ASSAY OF MAGNESIUM: CPT

## 2024-11-01 PROCEDURE — 70450 CT HEAD/BRAIN W/O DYE: CPT

## 2024-11-01 PROCEDURE — 99285 EMERGENCY DEPT VISIT HI MDM: CPT

## 2024-11-01 PROCEDURE — 71045 X-RAY EXAM CHEST 1 VIEW: CPT

## 2024-11-01 PROCEDURE — 81001 URINALYSIS AUTO W/SCOPE: CPT

## 2024-11-01 PROCEDURE — 700111 HCHG RX REV CODE 636 W/ 250 OVERRIDE (IP)

## 2024-11-01 PROCEDURE — 85055 RETICULATED PLATELET ASSAY: CPT

## 2024-11-01 PROCEDURE — 82077 ASSAY SPEC XCP UR&BREATH IA: CPT

## 2024-11-01 PROCEDURE — 80053 COMPREHEN METABOLIC PANEL: CPT

## 2024-11-01 PROCEDURE — 81003 URINALYSIS AUTO W/O SCOPE: CPT

## 2024-11-01 RX ORDER — AMOXICILLIN 250 MG
2 CAPSULE ORAL EVERY EVENING
Status: DISCONTINUED | OUTPATIENT
Start: 2024-11-02 | End: 2024-11-03

## 2024-11-01 RX ORDER — FUROSEMIDE 20 MG/1
80 TABLET ORAL DAILY
COMMUNITY
End: 2024-11-13

## 2024-11-01 RX ORDER — LORAZEPAM 0.5 MG/1
0.5 TABLET ORAL EVERY 4 HOURS PRN
Status: DISCONTINUED | OUTPATIENT
Start: 2024-11-01 | End: 2024-11-03

## 2024-11-01 RX ORDER — TRIAMCINOLONE ACETONIDE 1 MG/G
1 CREAM TOPICAL 2 TIMES DAILY
Status: DISCONTINUED | OUTPATIENT
Start: 2024-11-01 | End: 2024-11-05 | Stop reason: HOSPADM

## 2024-11-01 RX ORDER — GAUZE BANDAGE 2" X 2"
100 BANDAGE TOPICAL DAILY
COMMUNITY
End: 2024-11-13

## 2024-11-01 RX ORDER — DIAZEPAM 5 MG/1
5 TABLET ORAL
Status: ON HOLD | COMMUNITY
End: 2024-11-05

## 2024-11-01 RX ORDER — CYCLOBENZAPRINE HCL 10 MG
10 TABLET ORAL 2 TIMES DAILY
Status: DISCONTINUED | OUTPATIENT
Start: 2024-11-01 | End: 2024-11-05 | Stop reason: HOSPADM

## 2024-11-01 RX ORDER — HYDROXYZINE PAMOATE 50 MG/1
50 CAPSULE ORAL EVERY 4 HOURS PRN
Status: DISCONTINUED | OUTPATIENT
Start: 2024-11-01 | End: 2024-11-01

## 2024-11-01 RX ORDER — CYCLOBENZAPRINE HCL 10 MG
10 TABLET ORAL 2 TIMES DAILY
Status: ON HOLD | COMMUNITY
End: 2024-11-05

## 2024-11-01 RX ORDER — ESCITALOPRAM OXALATE 10 MG/1
20 TABLET ORAL DAILY
Status: DISCONTINUED | OUTPATIENT
Start: 2024-11-02 | End: 2024-11-05 | Stop reason: HOSPADM

## 2024-11-01 RX ORDER — PANTOPRAZOLE SODIUM 40 MG/1
40 TABLET, DELAYED RELEASE ORAL
Status: DISCONTINUED | OUTPATIENT
Start: 2024-11-02 | End: 2024-11-01

## 2024-11-01 RX ORDER — HYDROXYZINE PAMOATE 50 MG/1
50 CAPSULE ORAL EVERY 4 HOURS PRN
COMMUNITY
End: 2024-11-13

## 2024-11-01 RX ORDER — POLYETHYLENE GLYCOL 3350 17 G/17G
34 POWDER, FOR SOLUTION ORAL DAILY
Status: ON HOLD | COMMUNITY
End: 2024-11-05

## 2024-11-01 RX ORDER — M-VIT,TX,IRON,MINS/CALC/FOLIC 27MG-0.4MG
1 TABLET ORAL DAILY
COMMUNITY
End: 2024-11-13

## 2024-11-01 RX ORDER — CARVEDILOL 3.12 MG/1
3.12 TABLET ORAL 2 TIMES DAILY WITH MEALS
Status: DISCONTINUED | OUTPATIENT
Start: 2024-11-01 | End: 2024-11-05 | Stop reason: HOSPADM

## 2024-11-01 RX ORDER — LORAZEPAM 2 MG/1
2 TABLET ORAL
Status: DISCONTINUED | OUTPATIENT
Start: 2024-11-01 | End: 2024-11-03

## 2024-11-01 RX ORDER — HYDROXYZINE HYDROCHLORIDE 25 MG/1
50 TABLET, FILM COATED ORAL EVERY 4 HOURS PRN
Status: DISCONTINUED | OUTPATIENT
Start: 2024-11-01 | End: 2024-11-05 | Stop reason: HOSPADM

## 2024-11-01 RX ORDER — ACETAMINOPHEN 325 MG/1
650 TABLET ORAL EVERY 6 HOURS PRN
Status: DISCONTINUED | OUTPATIENT
Start: 2024-11-01 | End: 2024-11-03

## 2024-11-01 RX ORDER — FUROSEMIDE 40 MG/1
80 TABLET ORAL DAILY
Status: DISCONTINUED | OUTPATIENT
Start: 2024-11-02 | End: 2024-11-05 | Stop reason: HOSPADM

## 2024-11-01 RX ORDER — TRAZODONE HYDROCHLORIDE 50 MG/1
50 TABLET, FILM COATED ORAL
Status: DISCONTINUED | OUTPATIENT
Start: 2024-11-01 | End: 2024-11-05 | Stop reason: HOSPADM

## 2024-11-01 RX ORDER — LORAZEPAM 2 MG/ML
0.5 INJECTION INTRAMUSCULAR EVERY 4 HOURS PRN
Status: DISCONTINUED | OUTPATIENT
Start: 2024-11-01 | End: 2024-11-03

## 2024-11-01 RX ORDER — AMOXICILLIN 250 MG
2 CAPSULE ORAL EVERY EVENING
Status: DISCONTINUED | OUTPATIENT
Start: 2024-11-01 | End: 2024-11-01

## 2024-11-01 RX ORDER — POLYETHYLENE GLYCOL 3350 17 G/17G
1 POWDER, FOR SOLUTION ORAL
Status: DISCONTINUED | OUTPATIENT
Start: 2024-11-01 | End: 2024-11-01

## 2024-11-01 RX ORDER — LORAZEPAM 2 MG/ML
1 INJECTION INTRAMUSCULAR
Status: DISCONTINUED | OUTPATIENT
Start: 2024-11-01 | End: 2024-11-03

## 2024-11-01 RX ORDER — GAUZE BANDAGE 2" X 2"
100 BANDAGE TOPICAL DAILY
Status: COMPLETED | OUTPATIENT
Start: 2024-11-02 | End: 2024-11-05

## 2024-11-01 RX ORDER — LORAZEPAM 1 MG/1
1 TABLET ORAL EVERY 4 HOURS PRN
Status: DISCONTINUED | OUTPATIENT
Start: 2024-11-01 | End: 2024-11-03

## 2024-11-01 RX ORDER — LORAZEPAM 2 MG/ML
2 INJECTION INTRAMUSCULAR
Status: DISCONTINUED | OUTPATIENT
Start: 2024-11-01 | End: 2024-11-03

## 2024-11-01 RX ORDER — FOLIC ACID 1 MG/1
1 TABLET ORAL DAILY
Status: COMPLETED | OUTPATIENT
Start: 2024-11-02 | End: 2024-11-05

## 2024-11-01 RX ORDER — SPIRONOLACTONE 25 MG/1
100 TABLET ORAL DAILY
COMMUNITY
End: 2024-11-13

## 2024-11-01 RX ORDER — TRAZODONE HYDROCHLORIDE 50 MG/1
50 TABLET, FILM COATED ORAL
COMMUNITY
End: 2024-11-10

## 2024-11-01 RX ORDER — POLYETHYLENE GLYCOL 3350 17 G/17G
1 POWDER, FOR SOLUTION ORAL DAILY
Status: DISCONTINUED | OUTPATIENT
Start: 2024-11-02 | End: 2024-11-03

## 2024-11-01 RX ORDER — ACETAMINOPHEN 500 MG
500 TABLET ORAL EVERY 4 HOURS PRN
COMMUNITY
End: 2024-11-13

## 2024-11-01 RX ORDER — SPIRONOLACTONE 25 MG/1
100 TABLET ORAL DAILY
Status: DISCONTINUED | OUTPATIENT
Start: 2024-11-02 | End: 2024-11-05 | Stop reason: HOSPADM

## 2024-11-01 RX ORDER — PANTOPRAZOLE SODIUM 40 MG/1
40 TABLET, DELAYED RELEASE ORAL
Status: ON HOLD | COMMUNITY
End: 2024-11-18

## 2024-11-01 RX ORDER — CLONIDINE HYDROCHLORIDE 0.1 MG/1
0.1 TABLET ORAL EVERY 6 HOURS PRN
Status: ON HOLD | COMMUNITY
End: 2024-11-05

## 2024-11-01 RX ORDER — DIAZEPAM 10 MG/1
10 TABLET ORAL
Status: ON HOLD | COMMUNITY
End: 2024-11-05

## 2024-11-01 RX ORDER — TRIAMCINOLONE ACETONIDE 1 MG/G
1 CREAM TOPICAL 2 TIMES DAILY
COMMUNITY
End: 2024-11-13

## 2024-11-01 RX ORDER — HEPARIN SODIUM 5000 [USP'U]/ML
5000 INJECTION, SOLUTION INTRAVENOUS; SUBCUTANEOUS EVERY 8 HOURS
Status: DISCONTINUED | OUTPATIENT
Start: 2024-11-01 | End: 2024-11-05 | Stop reason: HOSPADM

## 2024-11-01 RX ORDER — LACTULOSE 10 G/15ML
45 SOLUTION ORAL 4 TIMES DAILY
Status: DISCONTINUED | OUTPATIENT
Start: 2024-11-01 | End: 2024-11-05 | Stop reason: HOSPADM

## 2024-11-01 RX ORDER — LORAZEPAM 2 MG/ML
1.5 INJECTION INTRAMUSCULAR
Status: DISCONTINUED | OUTPATIENT
Start: 2024-11-01 | End: 2024-11-03

## 2024-11-01 RX ORDER — LORAZEPAM 2 MG/1
4 TABLET ORAL
Status: DISCONTINUED | OUTPATIENT
Start: 2024-11-01 | End: 2024-11-03

## 2024-11-01 RX ADMIN — LACTULOSE 45 ML: 10 SOLUTION ORAL at 22:16

## 2024-11-01 RX ADMIN — RIFAXIMIN 550 MG: 550 TABLET ORAL at 22:16

## 2024-11-01 RX ADMIN — OMEPRAZOLE 20 MG: 20 CAPSULE, DELAYED RELEASE ORAL at 22:17

## 2024-11-01 RX ADMIN — CYCLOBENZAPRINE 10 MG: 10 TABLET, FILM COATED ORAL at 22:15

## 2024-11-01 RX ADMIN — SENNOSIDES AND DOCUSATE SODIUM 2 TABLET: 50; 8.6 TABLET ORAL at 20:00

## 2024-11-01 RX ADMIN — HEPARIN SODIUM 5000 UNITS: 5000 INJECTION, SOLUTION INTRAVENOUS; SUBCUTANEOUS at 22:16

## 2024-11-01 RX ADMIN — LORAZEPAM 1 MG: 1 TABLET ORAL at 22:57

## 2024-11-01 ASSESSMENT — FIBROSIS 4 INDEX
FIB4 SCORE: 4.02
FIB4 SCORE: 6.29

## 2024-11-01 ASSESSMENT — PATIENT HEALTH QUESTIONNAIRE - PHQ9
7. TROUBLE CONCENTRATING ON THINGS, SUCH AS READING THE NEWSPAPER OR WATCHING TELEVISION: SEVERAL DAYS
SUM OF ALL RESPONSES TO PHQ9 QUESTIONS 1 AND 2: 4
SUM OF ALL RESPONSES TO PHQ QUESTIONS 1-9: 11
6. FEELING BAD ABOUT YOURSELF - OR THAT YOU ARE A FAILURE OR HAVE LET YOURSELF OR YOUR FAMILY DOWN: MORE THAN HALF THE DAYS
3. TROUBLE FALLING OR STAYING ASLEEP OR SLEEPING TOO MUCH: SEVERAL DAYS
1. LITTLE INTEREST OR PLEASURE IN DOING THINGS: MORE THAN HALF THE DAYS
5. POOR APPETITE OR OVEREATING: SEVERAL DAYS
9. THOUGHTS THAT YOU WOULD BE BETTER OFF DEAD, OR OF HURTING YOURSELF: NOT AT ALL
2. FEELING DOWN, DEPRESSED, IRRITABLE, OR HOPELESS: MORE THAN HALF THE DAYS
4. FEELING TIRED OR HAVING LITTLE ENERGY: SEVERAL DAYS
8. MOVING OR SPEAKING SO SLOWLY THAT OTHER PEOPLE COULD HAVE NOTICED. OR THE OPPOSITE, BEING SO FIGETY OR RESTLESS THAT YOU HAVE BEEN MOVING AROUND A LOT MORE THAN USUAL: SEVERAL DAYS

## 2024-11-01 ASSESSMENT — LIFESTYLE VARIABLES
TREMOR: MODERATE TREMOR WITH ARMS EXTENDED
ORIENTATION AND CLOUDING OF SENSORIUM: CANNOT DO SERIAL ADDITIONS OR IS UNCERTAIN ABOUT DATE
TOTAL SCORE: 10
ANXIETY: MODERATELY ANXIOUS OR GUARDED, SO ANXIETY IS INFERRED
HEADACHE, FULLNESS IN HEAD: NOT PRESENT
PAROXYSMAL SWEATS: NO SWEAT VISIBLE
NAUSEA AND VOMITING: NO NAUSEA AND NO VOMITING
VISUAL DISTURBANCES: NOT PRESENT
AUDITORY DISTURBANCES: NOT PRESENT
AGITATION: SOMEWHAT MORE THAN NORMAL ACTIVITY

## 2024-11-01 ASSESSMENT — PAIN DESCRIPTION - PAIN TYPE: TYPE: ACUTE PAIN

## 2024-11-01 NOTE — ED PROVIDER NOTES
ED Provider Note    CHIEF COMPLAINT  Chief Complaint   Patient presents with    Dizziness     Since this morning            HPI/ROS      Jalen Brian Vaughn is a 44 y.o. male who [facility] and felt very dizzy and fell and hit his head.  The patient has loss of conscious, loss of sensation or strength arms or legs, neck pain, back pain, abdominal pain.  He does take medications for his chronic cirrhosis and formu lactulose and states last time he took was earlier today.    PAST MEDICAL HISTORY   has a past medical history of Cirrhosis of liver (HCC), Hypertension, Liver disease, and Seizure (HCC).    SURGICAL HISTORY   has a past surgical history that includes appendectomy; cholecystectomy; and upper gi endoscopy,diagnosis (N/A, 10/27/2024).    FAMILY HISTORY  Family History   Problem Relation Age of Onset    Heart Disease Mother     Kidney Disease Mother     Heart Disease Father     Kidney Disease Father        SOCIAL HISTORY  Social History     Tobacco Use    Smoking status: Never    Smokeless tobacco: Current     Types: Chew   Vaping Use    Vaping status: Never Used   Substance and Sexual Activity    Alcohol use: Yes     Comment: pt reports two beers daily    Drug use: Not Currently    Sexual activity: Not on file       CURRENT MEDICATIONS  Home Medications       Reviewed by Lo Xie (Pharmacy Tech) on 11/01/24 at 1747  Med List Status: Complete     Medication Last Dose Status   acetaminophen (TYLENOL) 500 MG Tab 11/1/2024 Active   carvedilol (COREG) 3.125 MG Tab 11/1/2024 Active   cloNIDine (CATAPRES) 0.1 MG Tab 10/31/2024 Active   cyclobenzaprine (FLEXERIL) 10 mg Tab 11/1/2024 Active   diazepam (VALIUM) 10 MG tablet 11/1/2024 Active   diazePAM (VALIUM) 5 MG Tab 11/1/2024 Active   escitalopram (LEXAPRO) 20 MG tablet 11/1/2024 Active   folic acid (FOLVITE) 1 MG Tab 11/1/2024 Active   furosemide (LASIX) 20 MG Tab 11/1/2024 Active   hydrOXYzine  "pamoate (VISTARIL) 50 MG Cap 11/1/2024 Active   lactulose 20 GM/30ML Solution 11/1/2024 Active   naltrexone (DEPADE) 50 MG Tab 11/1/2024 Active   pantoprazole (PROTONIX) 40 MG Tablet Delayed Response 11/1/2024 Active   polyethylene glycol/lytes (MIRALAX) Pack 11/1/2024 Active   spironolactone (ALDACTONE) 25 MG Tab 11/1/2024 Active   therapeutic multivitamin-minerals (THERAGRAN-M) Tab 11/1/2024 Active   thiamine (VITAMIN B-1) 100 MG Tab 11/1/2024 Active   traZODone (DESYREL) 50 MG Tab 10/31/2024 Active   triamcinolone acetonide (KENALOG) 0.1 % Cream 11/1/2024 Active                  Audit from Redirected Encounters    **Home medications have not yet been reviewed for this encounter**         ALLERGIES  Allergies   Allergen Reactions    Latex Itching    Morphine Itching       PHYSICAL EXAM  VITAL SIGNS: /75   Pulse (!) 55   Temp 36.5 °C (97.7 °F) (Temporal)   Resp 19   Ht 1.93 m (6' 4\")   Wt (!) 128 kg (281 lb 4.9 oz)   SpO2 92%   BMI 34.24 kg/m²      Nursing notes and vitals reviewed.  Constitutional: Well developed, Well nourished, No acute distress, Non-toxic appearance.   Eyes: PERRLA, EOMI, Conjunctiva normal, No discharge.   HENT: Normocephalic, Atraumatic, moist mucous membranes, no facial edema Cardiovascular: Normal heart rate, Normal rhythm, No murmurs, No rubs, No gallops.   Thorax & Lungs: No respiratory distress, No rales, No rhonchi, No wheezing, No chest tenderness.   Abdomen: Bowel sounds normal, Soft, No tenderness, No guarding, No rebound, No masses, No pulsatile masses.   Skin: Warm, Dry, ecchymosis anterior abdominal wall extremities: No deformity, no edema, good range of motion range of motion upper lower extremes bilaterally  Neurologic: Somnolent, asterixis, alert & oriented x 3, slow on movement but we without difficulty, sensation and strength intact upper lower extremes bilaterally   psychiatric: Affect normal for clinical presentation.      EKG/LABS  Normal sinus rhythm on " monitor  I have independently interpreted this EKG  Results for orders placed or performed during the hospital encounter of 24   EKG    Collection Time: 24  4:08 PM   Result Value Ref Range    Report       [facility] Emergency Dept.    Test Date:  2024  Pt Name:    ARSENIO RODGERS               Department: ER  MRN:        [id number]                      Room:       [facility]  Gender:     Male                         Technician: [id number]  :        1980                   Requested By:ER TRIAGE PROTOCOL  Order #:    [id number]                    Reading MD:    Measurements  Intervals                                Axis  Rate:       52                           P:          6  TX:         180                          QRS:        43  QRSD:       100                          T:          71  QT:         518  QTc:        482    Interpretive Statements  Sinus bradycardia  Borderline prolonged QT interval  Compared to ECG 10/17/2024 16:20:32  Sinus rhythm no longer present  Left bundle-branch block no longer present     CBC WITH DIFFERENTIAL    Collection Time: 24  5:54 PM   Result Value Ref Range    WBC 8.1 4.8 - 10.8 K/uL    RBC 3.28 (L) 4.70 - 6.10 M/uL    Hemoglobin 10.5 (L) 14.0 - 18.0 g/dL    Hematocrit 31.4 (L) 42.0 - 52.0 %    MCV 95.7 81.4 - 97.8 fL    MCH 32.0 27.0 - 33.0 pg    MCHC 33.4 32.3 - 36.5 g/dL    RDW 55.0 (H) 35.9 - 50.0 fL    Platelet Count 94 (L) 164 - 446 K/uL    MPV 10.2 9.0 - 12.9 fL    Neutrophils-Polys 63.90 44.00 - 72.00 %    Lymphocytes 22.00 22.00 - 41.00 %    Monocytes 10.10 0.00 - 13.40 %    Eosinophils 3.20 0.00 - 6.90 %    Basophils 0.40 0.00 - 1.80 %    Immature Granulocytes 0.40 0.00 - 0.90 %    Nucleated RBC 0.00 0.00 - 0.20 /100 WBC    Neutrophils (Absolute) 5.15 1.82 - 7.42 K/uL    Lymphs (Absolute) 1.77 1.00 - 4.80 K/uL    Monos (Absolute) 0.81 0.00 - 0.85 K/uL    Eos (Absolute) 0.26 0.00 - 0.51 K/uL    Baso (Absolute) 0.03 0.00 - 0.12 K/uL     Immature Granulocytes (abs) 0.03 0.00 - 0.11 K/uL    NRBC (Absolute) 0.00 K/uL   COMP METABOLIC PANEL    Collection Time: 11/01/24  5:54 PM   Result Value Ref Range    Sodium 138 135 - 145 mmol/L    Potassium 4.4 3.6 - 5.5 mmol/L    Chloride 106 96 - 112 mmol/L    Co2 23 20 - 33 mmol/L    Anion Gap 9.0 7.0 - 16.0    Glucose 82 65 - 99 mg/dL    Bun 20 8 - 22 mg/dL    Creatinine 1.17 0.50 - 1.40 mg/dL    Calcium 8.4 (L) 8.5 - 10.5 mg/dL    Correct Calcium 9.4 8.5 - 10.5 mg/dL    AST(SGOT) 36 12 - 45 U/L    ALT(SGPT) 16 2 - 50 U/L    Alkaline Phosphatase 166 (H) 30 - 99 U/L    Total Bilirubin 2.3 (H) 0.1 - 1.5 mg/dL    Albumin 2.7 (L) 3.2 - 4.9 g/dL    Total Protein 5.8 (L) 6.0 - 8.2 g/dL    Globulin 3.1 1.9 - 3.5 g/dL    A-G Ratio 0.9 g/dL   LIPASE    Collection Time: 11/01/24  5:54 PM   Result Value Ref Range    Lipase 42 11 - 82 U/L   URINE DRUG SCREEN (TRIAGE)    Collection Time: 11/01/24  5:54 PM   Result Value Ref Range    Amphetamines Urine Negative Negative    Barbiturates Negative Negative    Benzodiazepines Positive (A) Negative    Cocaine Metabolite Negative Negative    Fentanyl, Urine Negative Negative    Methadone Negative Negative    Opiates Negative Negative    Oxycodone Negative Negative    Phencyclidine -Pcp Negative Negative    Propoxyphene Negative Negative    Cannabinoid Metab Negative Negative   DIAGNOSTIC ALCOHOL    Collection Time: 11/01/24  5:54 PM   Result Value Ref Range    Diagnostic Alcohol <10.1 <10.1 mg/dL   MAGNESIUM    Collection Time: 11/01/24  5:54 PM   Result Value Ref Range    Magnesium 1.8 1.5 - 2.5 mg/dL   AMMONIA    Collection Time: 11/01/24  5:54 PM   Result Value Ref Range    Ammonia 71 (H) 11 - 45 umol/L   PROCALCITONIN    Collection Time: 11/01/24  5:54 PM   Result Value Ref Range    Procalcitonin 0.07 <0.25 ng/mL   URINALYSIS    Collection Time: 11/01/24  5:54 PM    Specimen: Blood   Result Value Ref Range    Color Yellow     Character Clear     Specific Gravity 1.005 <1.035     Ph 7.0 5.0 - 8.0    Glucose Negative Negative mg/dL    Ketones Negative Negative mg/dL    Protein Negative Negative mg/dL    Bilirubin Negative Negative    Urobilinogen, Urine 1.0 <=1.0 EU/dL    Nitrite Negative Negative    Leukocyte Esterase Negative Negative    Occult Blood Large (A) Negative    Micro Urine Req Microscopic    URINE MICROSCOPIC (W/UA)    Collection Time: 11/01/24  5:54 PM   Result Value Ref Range    WBC 0-2 /hpf    RBC 21-50 (A) 0 - 2 /hpf    Bacteria None Seen None /hpf    Epithelial Cells 0-2 0 - 5 /hpf    Urine Casts 0-2 0 - 2 /lpf   IMMATURE PLT FRACTION    Collection Time: 11/01/24  5:54 PM   Result Value Ref Range    Imm. Plt Fraction 2.0 0.6 - 13.1 %   ESTIMATED GFR    Collection Time: 11/01/24  5:54 PM   Result Value Ref Range    GFR (CKD-EPI) 79 >60 mL/min/1.73 m 2       RADIOLOGY/PROCEDURES   I have independently interpreted the diagnostic imaging associated with this visit and am waiting the final reading from the radiologist.   My preliminary interpretation is as follows: CT head negative for acute intracranial hemorrhage    Radiologist interpretation:  DX-CHEST-PORTABLE (1 VIEW)   Final Result      Patchy bibasilar atelectasis.      CT-HEAD W/O   Final Result      1.  No evidence of acute intracranial process.      2.  Cerebral atrophy.                   COURSE & MEDICAL DECISION MAKING    ASSESSMENT, COURSE AND PLAN  Care Narrative: This is a pleasant 44-year-old gentleman presents with a hepatic encephalopathy.  He has no evidence of severe toxicity and I do believe patient requires hospitalization secondary to his fatigue and falling.  The patient does have a ammonia of 71.  For this reason due to the patient require hospitalization I discussed the patient [facility] for further evaluation and management.      ADDITIONAL PROBLEMS MANAGED  Chronic cirrhosis with acute hepatic       DISPOSITION AND DISCUSSIONS  I have discussed management of the patient with the following physicians and  ELISSA's: [facility]  FINAL DIAGNOSIS  Hepatic encephalopathy  Ground-level fall     Electronically signed by: Miki Jean-Baptiste D.O., 11/1/2024 4:57 PM

## 2024-11-01 NOTE — ED TRIAGE NOTES
Chief Complaint   Patient presents with    Dizziness     Since this morning      Pt coming from [facility] for dizziness. [facility] reports seeing patient stumble but not fall. Pt is jaundice in color hx of liver disease. Patient is Ax0x4. EKG completed. PT reprots dizziness since the 28th when he was here last.

## 2024-11-02 ENCOUNTER — APPOINTMENT (OUTPATIENT)
Dept: RADIOLOGY | Facility: MEDICAL CENTER | Age: 44
DRG: 433 | End: 2024-11-02
Payer: COMMERCIAL

## 2024-11-02 LAB
ALBUMIN SERPL BCP-MCNC: 2.1 G/DL (ref 3.2–4.9)
ALBUMIN/GLOB SERPL: 0.8 G/DL
ALP SERPL-CCNC: 127 U/L (ref 30–99)
ALT SERPL-CCNC: 14 U/L (ref 2–50)
AMMONIA PLAS-SCNC: 92 UMOL/L (ref 11–45)
ANION GAP SERPL CALC-SCNC: 9 MMOL/L (ref 7–16)
AST SERPL-CCNC: 32 U/L (ref 12–45)
BILIRUB SERPL-MCNC: 1.9 MG/DL (ref 0.1–1.5)
BUN SERPL-MCNC: 19 MG/DL (ref 8–22)
CALCIUM ALBUM COR SERPL-MCNC: 9.4 MG/DL (ref 8.5–10.5)
CALCIUM SERPL-MCNC: 7.9 MG/DL (ref 8.5–10.5)
CHLORIDE SERPL-SCNC: 109 MMOL/L (ref 96–112)
CO2 SERPL-SCNC: 21 MMOL/L (ref 20–33)
CREAT SERPL-MCNC: 0.91 MG/DL (ref 0.5–1.4)
ERYTHROCYTE [DISTWIDTH] IN BLOOD BY AUTOMATED COUNT: 54.3 FL (ref 35.9–50)
GFR SERPLBLD CREATININE-BSD FMLA CKD-EPI: 107 ML/MIN/1.73 M 2
GLOBULIN SER CALC-MCNC: 2.7 G/DL (ref 1.9–3.5)
GLUCOSE SERPL-MCNC: 93 MG/DL (ref 65–99)
HCT VFR BLD AUTO: 28.1 % (ref 42–52)
HGB BLD-MCNC: 9.5 G/DL (ref 14–18)
MAGNESIUM SERPL-MCNC: 1.8 MG/DL (ref 1.5–2.5)
MCH RBC QN AUTO: 32.2 PG (ref 27–33)
MCHC RBC AUTO-ENTMCNC: 33.8 G/DL (ref 32.3–36.5)
MCV RBC AUTO: 95.3 FL (ref 81.4–97.8)
PHOSPHATE SERPL-MCNC: 4.4 MG/DL (ref 2.5–4.5)
PLATELET # BLD AUTO: 76 K/UL (ref 164–446)
PLATELETS.RETICULATED NFR BLD AUTO: 2.2 % (ref 0.6–13.1)
PMV BLD AUTO: 10.3 FL (ref 9–12.9)
POTASSIUM SERPL-SCNC: 4 MMOL/L (ref 3.6–5.5)
PROT SERPL-MCNC: 4.8 G/DL (ref 6–8.2)
RBC # BLD AUTO: 2.95 M/UL (ref 4.7–6.1)
SODIUM SERPL-SCNC: 139 MMOL/L (ref 135–145)
WBC # BLD AUTO: 6.2 K/UL (ref 4.8–10.8)

## 2024-11-02 PROCEDURE — 85055 RETICULATED PLATELET ASSAY: CPT

## 2024-11-02 PROCEDURE — 80053 COMPREHEN METABOLIC PANEL: CPT

## 2024-11-02 PROCEDURE — 770020 HCHG ROOM/CARE - TELE (206)

## 2024-11-02 PROCEDURE — 700102 HCHG RX REV CODE 250 W/ 637 OVERRIDE(OP)

## 2024-11-02 PROCEDURE — 36415 COLL VENOUS BLD VENIPUNCTURE: CPT

## 2024-11-02 PROCEDURE — 99222 1ST HOSP IP/OBS MODERATE 55: CPT | Mod: GC | Performed by: FAMILY MEDICINE

## 2024-11-02 PROCEDURE — A9270 NON-COVERED ITEM OR SERVICE: HCPCS

## 2024-11-02 PROCEDURE — 74018 RADEX ABDOMEN 1 VIEW: CPT

## 2024-11-02 PROCEDURE — 84100 ASSAY OF PHOSPHORUS: CPT

## 2024-11-02 PROCEDURE — 700111 HCHG RX REV CODE 636 W/ 250 OVERRIDE (IP)

## 2024-11-02 PROCEDURE — 85027 COMPLETE CBC AUTOMATED: CPT

## 2024-11-02 PROCEDURE — 83735 ASSAY OF MAGNESIUM: CPT

## 2024-11-02 PROCEDURE — 82140 ASSAY OF AMMONIA: CPT

## 2024-11-02 RX ORDER — CHLORDIAZEPOXIDE HYDROCHLORIDE 25 MG/1
25 CAPSULE, GELATIN COATED ORAL 2 TIMES DAILY
Status: COMPLETED | OUTPATIENT
Start: 2024-11-02 | End: 2024-11-03

## 2024-11-02 RX ORDER — CHLORDIAZEPOXIDE HYDROCHLORIDE 25 MG/1
25 CAPSULE, GELATIN COATED ORAL 2 TIMES DAILY
Status: DISCONTINUED | OUTPATIENT
Start: 2024-11-02 | End: 2024-11-02

## 2024-11-02 RX ORDER — CHLORDIAZEPOXIDE HYDROCHLORIDE 25 MG/1
50 CAPSULE, GELATIN COATED ORAL EVERY 6 HOURS
Status: DISCONTINUED | OUTPATIENT
Start: 2024-11-02 | End: 2024-11-02

## 2024-11-02 RX ORDER — CHLORDIAZEPOXIDE HYDROCHLORIDE 25 MG/1
25 CAPSULE, GELATIN COATED ORAL EVERY 6 HOURS
Status: DISCONTINUED | OUTPATIENT
Start: 2024-11-03 | End: 2024-11-02

## 2024-11-02 RX ADMIN — FUROSEMIDE 80 MG: 40 TABLET ORAL at 05:48

## 2024-11-02 RX ADMIN — TRIAMCINOLONE ACETONIDE 1 APPLICATION: 1 CREAM TOPICAL at 08:14

## 2024-11-02 RX ADMIN — CHLORDIAZEPOXIDE HYDROCHLORIDE 50 MG: 25 CAPSULE ORAL at 03:50

## 2024-11-02 RX ADMIN — LACTULOSE 45 ML: 10 SOLUTION ORAL at 13:42

## 2024-11-02 RX ADMIN — HEPARIN SODIUM 5000 UNITS: 5000 INJECTION, SOLUTION INTRAVENOUS; SUBCUTANEOUS at 05:53

## 2024-11-02 RX ADMIN — CHLORDIAZEPOXIDE HYDROCHLORIDE 25 MG: 25 CAPSULE ORAL at 16:51

## 2024-11-02 RX ADMIN — OMEPRAZOLE 20 MG: 20 CAPSULE, DELAYED RELEASE ORAL at 17:50

## 2024-11-02 RX ADMIN — SENNOSIDES AND DOCUSATE SODIUM 2 TABLET: 50; 8.6 TABLET ORAL at 17:50

## 2024-11-02 RX ADMIN — FOLIC ACID 1 MG: 1 TABLET ORAL at 06:00

## 2024-11-02 RX ADMIN — RIFAXIMIN 550 MG: 550 TABLET ORAL at 05:52

## 2024-11-02 RX ADMIN — LACTULOSE 45 ML: 10 SOLUTION ORAL at 16:51

## 2024-11-02 RX ADMIN — CYCLOBENZAPRINE 10 MG: 10 TABLET, FILM COATED ORAL at 20:12

## 2024-11-02 RX ADMIN — TRIAMCINOLONE ACETONIDE 1 APPLICATION: 1 CREAM TOPICAL at 20:13

## 2024-11-02 RX ADMIN — ESCITALOPRAM OXALATE 20 MG: 10 TABLET ORAL at 05:50

## 2024-11-02 RX ADMIN — LACTULOSE 45 ML: 10 SOLUTION ORAL at 20:12

## 2024-11-02 RX ADMIN — SPIRONOLACTONE 100 MG: 25 TABLET ORAL at 05:50

## 2024-11-02 RX ADMIN — Medication 100 MG: at 05:51

## 2024-11-02 RX ADMIN — CARVEDILOL 3.12 MG: 3.12 TABLET, FILM COATED ORAL at 16:51

## 2024-11-02 RX ADMIN — CYCLOBENZAPRINE 10 MG: 10 TABLET, FILM COATED ORAL at 05:52

## 2024-11-02 RX ADMIN — HEPARIN SODIUM 5000 UNITS: 5000 INJECTION, SOLUTION INTRAVENOUS; SUBCUTANEOUS at 13:41

## 2024-11-02 RX ADMIN — LORAZEPAM 0.5 MG: 0.5 TABLET ORAL at 08:09

## 2024-11-02 RX ADMIN — LORAZEPAM 2 MG: 2 TABLET ORAL at 00:51

## 2024-11-02 RX ADMIN — RIFAXIMIN 550 MG: 550 TABLET ORAL at 17:50

## 2024-11-02 RX ADMIN — HEPARIN SODIUM 5000 UNITS: 5000 INJECTION, SOLUTION INTRAVENOUS; SUBCUTANEOUS at 21:00

## 2024-11-02 RX ADMIN — THERA TABS 1 TABLET: TAB at 05:51

## 2024-11-02 RX ADMIN — CARVEDILOL 3.12 MG: 3.12 TABLET, FILM COATED ORAL at 08:09

## 2024-11-02 RX ADMIN — OMEPRAZOLE 20 MG: 20 CAPSULE, DELAYED RELEASE ORAL at 05:51

## 2024-11-02 RX ADMIN — LACTULOSE 45 ML: 10 SOLUTION ORAL at 08:10

## 2024-11-02 RX ADMIN — POLYETHYLENE GLYCOL 3350 1 PACKET: 17 POWDER, FOR SOLUTION ORAL at 05:52

## 2024-11-02 ASSESSMENT — LIFESTYLE VARIABLES
TREMOR: *
AVERAGE NUMBER OF DAYS PER WEEK YOU HAVE A DRINK CONTAINING ALCOHOL: 7
TREMOR: MODERATE TREMOR WITH ARMS EXTENDED
AGITATION: NORMAL ACTIVITY
AUDITORY DISTURBANCES: NOT PRESENT
HEADACHE, FULLNESS IN HEAD: NOT PRESENT
VISUAL DISTURBANCES: NOT PRESENT
TOTAL SCORE: 5
AGITATION: NORMAL ACTIVITY
ALCOHOL_USE: YES
TREMOR: *
ORIENTATION AND CLOUDING OF SENSORIUM: CANNOT DO SERIAL ADDITIONS OR IS UNCERTAIN ABOUT DATE
HEADACHE, FULLNESS IN HEAD: VERY MILD
VISUAL DISTURBANCES: NOT PRESENT
AUDITORY DISTURBANCES: NOT PRESENT
VISUAL DISTURBANCES: NOT PRESENT
TOTAL SCORE: 4
HEADACHE, FULLNESS IN HEAD: VERY MILD
PAROXYSMAL SWEATS: NO SWEAT VISIBLE
NAUSEA AND VOMITING: NO NAUSEA AND NO VOMITING
HEADACHE, FULLNESS IN HEAD: MILD
EVER FELT BAD OR GUILTY ABOUT YOUR DRINKING: YES
CONSUMPTION TOTAL: POSITIVE
HAVE YOU EVER FELT YOU SHOULD CUT DOWN ON YOUR DRINKING: YES
AGITATION: NORMAL ACTIVITY
PAROXYSMAL SWEATS: NO SWEAT VISIBLE
PAROXYSMAL SWEATS: NO SWEAT VISIBLE
AUDITORY DISTURBANCES: NOT PRESENT
ANXIETY: NO ANXIETY (AT EASE)
TOTAL SCORE: 4
TOTAL SCORE: 5
HEADACHE, FULLNESS IN HEAD: NOT PRESENT
NAUSEA AND VOMITING: NO NAUSEA AND NO VOMITING
ANXIETY: *
PAROXYSMAL SWEATS: NO SWEAT VISIBLE
ANXIETY: MILDLY ANXIOUS
HEADACHE, FULLNESS IN HEAD: NOT PRESENT
AUDITORY DISTURBANCES: NOT PRESENT
TOTAL SCORE: 12
ORIENTATION AND CLOUDING OF SENSORIUM: CANNOT DO SERIAL ADDITIONS OR IS UNCERTAIN ABOUT DATE
TREMOR: *
TOTAL SCORE: 5
TOTAL SCORE: 4
ORIENTATION AND CLOUDING OF SENSORIUM: CANNOT DO SERIAL ADDITIONS OR IS UNCERTAIN ABOUT DATE
AGITATION: NORMAL ACTIVITY
TREMOR: *
HOW MANY TIMES IN THE PAST YEAR HAVE YOU HAD 5 OR MORE DRINKS IN A DAY: 365
NAUSEA AND VOMITING: NO NAUSEA AND NO VOMITING
ANXIETY: *
VISUAL DISTURBANCES: NOT PRESENT
AGITATION: NORMAL ACTIVITY
ORIENTATION AND CLOUDING OF SENSORIUM: CANNOT DO SERIAL ADDITIONS OR IS UNCERTAIN ABOUT DATE
ANXIETY: MILDLY ANXIOUS
EVER HAD A DRINK FIRST THING IN THE MORNING TO STEADY YOUR NERVES TO GET RID OF A HANGOVER: YES
ORIENTATION AND CLOUDING OF SENSORIUM: CANNOT DO SERIAL ADDITIONS OR IS UNCERTAIN ABOUT DATE
AUDITORY DISTURBANCES: NOT PRESENT
NAUSEA AND VOMITING: NO NAUSEA AND NO VOMITING
PAROXYSMAL SWEATS: NO SWEAT VISIBLE
TOTAL SCORE: 4
PAROXYSMAL SWEATS: NO SWEAT VISIBLE
NAUSEA AND VOMITING: NO NAUSEA AND NO VOMITING
ON A TYPICAL DAY WHEN YOU DRINK ALCOHOL HOW MANY DRINKS DO YOU HAVE: -15
TOTAL SCORE: 3
AGITATION: NORMAL ACTIVITY
VISUAL DISTURBANCES: NOT PRESENT
TREMOR: *
AUDITORY DISTURBANCES: NOT PRESENT
HAVE PEOPLE ANNOYED YOU BY CRITICIZING YOUR DRINKING: YES
ORIENTATION AND CLOUDING OF SENSORIUM: CANNOT DO SERIAL ADDITIONS OR IS UNCERTAIN ABOUT DATE
NAUSEA AND VOMITING: NO NAUSEA AND NO VOMITING
VISUAL DISTURBANCES: NOT PRESENT
ANXIETY: MILDLY ANXIOUS

## 2024-11-02 ASSESSMENT — SOCIAL DETERMINANTS OF HEALTH (SDOH)
WITHIN THE PAST 12 MONTHS, YOU WORRIED THAT YOUR FOOD WOULD RUN OUT BEFORE YOU GOT THE MONEY TO BUY MORE: OFTEN TRUE
WITHIN THE PAST 12 MONTHS, THE FOOD YOU BOUGHT JUST DIDN'T LAST AND YOU DIDN'T HAVE MONEY TO GET MORE: OFTEN TRUE
WITHIN THE PAST 12 MONTHS, THE FOOD YOU BOUGHT JUST DIDN'T LAST AND YOU DIDN'T HAVE MONEY TO GET MORE: OFTEN TRUE
IN THE PAST 12 MONTHS, HAS THE ELECTRIC, GAS, OIL, OR WATER COMPANY THREATENED TO SHUT OFF SERVICE IN YOUR HOME?: ALREADY SHUT OFF
WITHIN THE LAST YEAR, HAVE TO BEEN RAPED OR FORCED TO HAVE ANY KIND OF SEXUAL ACTIVITY BY YOUR PARTNER OR EX-PARTNER?: NO
WITHIN THE PAST 12 MONTHS, YOU WORRIED THAT YOUR FOOD WOULD RUN OUT BEFORE YOU GOT THE MONEY TO BUY MORE: OFTEN TRUE
WITHIN THE LAST YEAR, HAVE YOU BEEN HUMILIATED OR EMOTIONALLY ABUSED IN OTHER WAYS BY YOUR PARTNER OR EX-PARTNER?: NO
WITHIN THE LAST YEAR, HAVE YOU BEEN KICKED, HIT, SLAPPED, OR OTHERWISE PHYSICALLY HURT BY YOUR PARTNER OR EX-PARTNER?: NO
WITHIN THE LAST YEAR, HAVE YOU BEEN AFRAID OF YOUR PARTNER OR EX-PARTNER?: NO
WITHIN THE LAST YEAR, HAVE YOU BEEN HUMILIATED OR EMOTIONALLY ABUSED IN OTHER WAYS BY YOUR PARTNER OR EX-PARTNER?: NO
WITHIN THE LAST YEAR, HAVE YOU BEEN AFRAID OF YOUR PARTNER OR EX-PARTNER?: NO
WITHIN THE LAST YEAR, HAVE TO BEEN RAPED OR FORCED TO HAVE ANY KIND OF SEXUAL ACTIVITY BY YOUR PARTNER OR EX-PARTNER?: NO
WITHIN THE LAST YEAR, HAVE YOU BEEN KICKED, HIT, SLAPPED, OR OTHERWISE PHYSICALLY HURT BY YOUR PARTNER OR EX-PARTNER?: NO
IN THE PAST 12 MONTHS, HAS THE ELECTRIC, GAS, OIL, OR WATER COMPANY THREATENED TO SHUT OFF SERVICE IN YOUR HOME?: ALREADY SHUT OFF

## 2024-11-02 ASSESSMENT — COGNITIVE AND FUNCTIONAL STATUS - GENERAL
CLIMB 3 TO 5 STEPS WITH RAILING: A LOT
MOVING TO AND FROM BED TO CHAIR: A LITTLE
CLIMB 3 TO 5 STEPS WITH RAILING: A LOT
DAILY ACTIVITIY SCORE: 24
SUGGESTED CMS G CODE MODIFIER MOBILITY: CK
SUGGESTED CMS G CODE MODIFIER MOBILITY: CK
WALKING IN HOSPITAL ROOM: A LOT
WALKING IN HOSPITAL ROOM: A LOT
MOBILITY SCORE: 18
STANDING UP FROM CHAIR USING ARMS: A LITTLE
SUGGESTED CMS G CODE MODIFIER DAILY ACTIVITY: CH
MOBILITY SCORE: 18
STANDING UP FROM CHAIR USING ARMS: A LITTLE
MOVING TO AND FROM BED TO CHAIR: A LITTLE

## 2024-11-02 ASSESSMENT — PAIN DESCRIPTION - PAIN TYPE
TYPE: ACUTE PAIN
TYPE: ACUTE PAIN

## 2024-11-02 ASSESSMENT — FIBROSIS 4 INDEX: FIB4 SCORE: 4.97

## 2024-11-02 NOTE — ASSESSMENT & PLAN NOTE
Patient with documented hypertension was hypertensive slightly in the ED.  Patient was on nifedipine, Aldactone in view pneumatics on last admission.  Appears that medication was changed to carvedilol 3.125 mg, furosemide 80 mg and spironolactone during his admission at [facility] has been on these medications since 10/28/2024.    Continue carvedilol 3.125 mg twice daily hold for heart rate less than 55  Continue furosemide 80 mg daily hold for SBP less than 100  Continue spironolactone 100 mg daily hold for SBP less than 100

## 2024-11-02 NOTE — PROGRESS NOTES
[facility] PROGRESS NOTE     Attending:   Martinez Maharaj M.d.    Resident:   Preston Garcia MD    PATIENT:   Jalen Vaughn; [id number]; 1980    ID:   44 y.o. male with PMHx alcohol use disorder, cirrhosis status post TIPS and hypertension who presented from [facility] with dizziness and confusion, admitted for hepatic encephalopathy and alcohol withdrawal.    SUBJECTIVE:   No acute events overnight.  Patient states he feels moderately improved this morning.  Has no acute complaints today.  Does state he has not had a bowel movement since his last hospitalization when he was discharged on 10/27.  Later in the afternoon RN reports patient did have a large bowel movement today 11/2.    OBJECTIVE:  Vitals:    11/02/24 0033 11/02/24 0523 11/02/24 0744 11/02/24 1333   BP: 129/70 139/79 123/71 (!) 139/90   Pulse: (!) 58 (!) 59 (!) 58 76   Resp: 20 19 20 20   Temp: 36.8 °C (98.2 °F) 36.9 °C (98.4 °F) 36.9 °C (98.4 °F) 36.6 °C (97.9 °F)   TempSrc: Temporal Temporal Temporal Temporal   SpO2: 95% 91% 94% 92%   Weight:  (!) 126 kg (278 lb 3.5 oz)     Height:         No intake or output data in the 24 hours ending 11/02/24 0708    PHYSICAL EXAM:  General: Pt resting in NAD, cooperative,  slightly confused, lethargic  Skin:  Pink, warm and dry.  No rashes.  Mildly jaundiced   HEENT: NC/AT. PERRL. EOMI. MMM. No nasal discharge. Oropharynx nonerythematous without exudate/plaques, mild scleral icterus  Neck:  Supple without lymphadenopathy or rigidity.  Lungs:  Symmetrical.  CTAB with no adventitious breath sounds.  Good air movement   Cardiovascular:  Normal S1/S2, RRR without M/R/G.  Abdomen:  BS+, Soft, NT/ND. No masses noted. Bruising RLQ present   Extremities:  Full range of motion. No gross deformities noted. 2+ pulses in all extremities. Hand tremor present bilaterally.  1-2+ pitting edema in bilateral lower extremities  Spine:  Straight without vertebral anomalies.  CNS:  A&Ox3, slurred speech, follows commands,  "Strength 5/5 in all extremities.        LABS:  Recent Labs     11/01/24  1754 11/02/24  0414   WBC 8.1 6.2   RBC 3.28* 2.95*   HEMOGLOBIN 10.5* 9.5*   HEMATOCRIT 31.4* 28.1*   MCV 95.7 95.3   MCH 32.0 32.2   RDW 55.0* 54.3*   PLATELETCT 94* 76*   MPV 10.2 10.3   NEUTSPOLYS 63.90  --    LYMPHOCYTES 22.00  --    MONOCYTES 10.10  --    EOSINOPHILS 3.20  --    BASOPHILS 0.40  --      Recent Labs     11/01/24  1754 11/02/24 0414   SODIUM 138 139   POTASSIUM 4.4 4.0   CHLORIDE 106 109   CO2 23 21   BUN 20 19   CREATININE 1.17 0.91   CALCIUM 8.4* 7.9*   MAGNESIUM 1.8 1.8   PHOSPHORUS  --  4.4   ALBUMIN 2.7* 2.1*     Estimated GFR/CRCL = Estimated Creatinine Clearance: 152.6 mL/min (by C-G formula based on SCr of 0.91 mg/dL).  Recent Labs     11/01/24 1754 11/02/24 0414   GLUCOSE 82 93     Recent Labs     11/01/24  1754 11/02/24 0414 11/02/24  0850   ASTSGOT 36 32  --    ALTSGPT 16 14  --    TBILIRUBIN 2.3* 1.9*  --    ALKPHOSPHAT 166* 127*  --    GLOBULIN 3.1 2.7  --    AMMONIA 71*  --  92*             No results for input(s): \"INR\", \"APTT\", \"FIBRINOGEN\" in the last 72 hours.    Invalid input(s): \"DIMER\"      IMAGING:  [id number] VIEW   Final Result      No acute process.      DX-CHEST-PORTABLE (1 VIEW)   Final Result      Patchy bibasilar atelectasis.      CT-HEAD W/O   Final Result      1.  No evidence of acute intracranial process.      2.  Cerebral atrophy.                   MEDS:  Current Facility-Administered Medications   Medication Last Admin    chlordiazePOXIDE (Librium) capsule 25 mg      heparin injection 5,000 Units 5,000 Units at 11/02/24 1341    acetaminophen (Tylenol) tablet 650 mg      LORazepam (Ativan) tablet 0.5 mg 0.5 mg at 11/02/24 0809    LORazepam (Ativan) tablet 1 mg 1 mg at 11/01/24 2257    Or    LORazepam (Ativan) injection 0.5 mg      LORazepam (Ativan) tablet 2 mg 2 mg at 11/02/24 0051    Or    LORazepam (Ativan) injection 1 mg      LORazepam (Ativan) tablet 3 mg      Or    LORazepam " (Ativan) injection 1.5 mg      LORazepam (Ativan) tablet 4 mg      Or    LORazepam (Ativan) injection 2 mg      thiamine (Vitamin B-1) tablet 100 mg 100 mg at 11/02/24 0551    And    multivitamin tablet 1 Tablet 1 Tablet at 11/02/24 0551    And    folic acid (Folvite) tablet 1 mg 1 mg at 11/02/24 0600    carvedilol (Coreg) tablet 3.125 mg 3.125 mg at 11/02/24 0809    cyclobenzaprine (Flexeril) tablet 10 mg 10 mg at 11/02/24 0552    escitalopram (Lexapro) tablet 20 mg 20 mg at 11/02/24 0550    furosemide (Lasix) tablet 80 mg 80 mg at 11/02/24 0548    lactulose 20 GM/30ML solution 45 mL 45 mL at 11/02/24 1342    spironolactone (Aldactone) tablet 100 mg 100 mg at 11/02/24 0550    traZODone (Desyrel) tablet 50 mg      triamcinolone acetonide (Kenalog) 0.1 % cream 1 Application 1 Application at 11/02/24 0814    riFAXIMin (Xifaxan) tablet 550 mg 550 mg at 11/02/24 0552    omeprazole (PriLOSEC) capsule 20 mg 20 mg at 11/02/24 0551    hydrOXYzine HCl (Atarax) tablet 50 mg      senna-docusate (Pericolace Or Senokot S) 8.6-50 MG per tablet 2 Tablet      And    polyethylene glycol/lytes (Miralax) Packet 1 Packet 1 Packet at 11/02/24 0552       ASSESSMENT/PLAN:    * Hepatic encephalopathy (HCC)- (present on admission)  Assessment & Plan  #Alcoholic cirrhosis     Admission ammonia of 71.  Patient has been taking lactulose 45 mL 4 times a day since his discharge on 10/27 per chart review was continued during his stay at [facility].  Patient was also to be discharged on rifaximin however this was not continued at [facility] which patient has been at since 10/28 for alcohol withdrawal.  Per chart review during his last hospitalization patient was constipated requiring MiraLAX, GoLytely and enemas on top of his lactulose.  Constipation might be contributing to his hepatic encephalopathy at this time as well as sedate of use with his current alcohol withdrawal management at [facility].  Patient states his last bowel  movement was the day of discharge on 10/27.  Is currently jaundiced as well.  Afternoon of 11/2 patient had large bowel movement.  Negative abdominal x-ray.    Continue lactulose 45 mL times daily  Will add senna and MiraLAX daily  Consider adding enemas to promote bowel movements if constipation persists  Neuro checks q4 hours   Ammonia level in a.m.  Rifaximin 550 mg twice daily  Monitor patient with goal intake of 1.2 to 1.5 g/kg/day and energy intake of 35-40 kcals per kilogram's per day   consider dietary/nutrition consult if needed   Consider supplementation with Ensure shakes if needed         Normocytic anemia- (present on admission)  Assessment & Plan  Normocytic anemia.  Was present during his previous hospitalization with an A1c of 10.1.  A1c now 10.5.  No sign of acute bleeding at this time.    CBC in a.m.    Primary hypertension- (present on admission)  Assessment & Plan  Patient with documented hypertension was hypertensive slightly in the ED.  Patient was on nifedipine, Aldactone in view pneumatics on last admission.  Appears that medication was changed to carvedilol 3.125 mg, furosemide 80 mg and spironolactone during his admission at [facility] has been on these medications since 10/28/2024.    Continue carvedilol 3.125 mg twice daily hold for heart rate less than 55  Continue furosemide 80 mg daily hold for SBP less than 100  Continue spironolactone 100 mg daily hold for SBP less than 100    Benign essential microscopic hematuria- (present on admission)  Assessment & Plan  Patient with 21-50 RBCs and large occult blood on UA.  Appears to have been present during his previous hospitalization.  Does have stable anemia.    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  Has been taking trazodone outpatient.    Trazodone 50 mg as needed at hour of sleep.      Anxiety- (present on admission)  Assessment & Plan  Patient with anxiety he is taking Lexapro outpatient as well as hydroxyzine.    Continue Lexapro 20 mg  Hydroxyzine 50 mg every 4 hours as needed    Alcohol use disorder, severe, dependence (HCC)- (present on admission)  Assessment & Plan  #alcohol withdrawal     Patient was recently treated for alcohol withdrawal wearing a short stay in the IMCU for 2 days during his previous hospitalization.  He was discharged on 10/27 and returned to the ED on 10/28 intoxicated after drinking several drinks.  He was then transferred to [facility] for further alcohol withdrawal at that facility was being treated with Valium.  CIWA scores were 10-12 on night of 11/1, down to 5 morning of 11/2.    Telemetry  CIWA protocol  Decrease Librium to 25 mg twice daily on 11/2  Case management consulted to provide further resources at time of discharge as well as potential returning to [facility] once medically cleared    Alcoholic cirrhosis (HCC)- (present on admission)  Assessment & Plan  Known history of his condition.  Status post TIPS procedure.  CT imaging during last hospitalization confirms cirrhotic appearance of the liver.  Bilirubin has been elevated since his last admission now is downtrending.     See assessment and plan for hepatic encephalopathy  Has referral to GI already placed at last admission and is authorized, will need assistance in scheduling appointment with GI at time of discharge        Lines: PIV  Lasix: 80 mg daily  Diet: Regular  Abx: None  DVT prophylaxis: Heparin 5000 units every 8hrs  Code Status: Full    Dispo: Inpatient for management of hepatic encephalopathy and alcohol withdrawal    Preston Garcia M.D.   PGY1

## 2024-11-02 NOTE — ED NOTES
Pt transported to T707 with RN via gurney.  Pt transported with all belongings, on monitor and RA oxygen. Pt awake and oriented.

## 2024-11-02 NOTE — ED NOTES
"Med rec completed per MAR sent with patient from [facility]. Patient was admitted at [facility] 10/17/2024 - 10/27/2024, discharged to [facility], returned to [facility] on 10/28/2024, and was subsequently discharged to [facility] on 10/28/2024. Bumetanide, lidocaine patches, nicotine patches, and Xifaxan were all ordered for patient upon discharge from [facility], however MAR from [facility] indicates patient has not been receiving any of these mediations at their facility; these medications have been removed from the med rec at this time.    Allergies reviewed per paperwork from facility.    No antibiotics noted on MAR from admission at [facility]. Per historical med rec on 10/17/2024:   \"Patient was prescribed a 7 day course of Cefdinir 300mg bid on 10/9/24- last dose is unknown, patients states he has not taken any medication \"in a few days\".  \"    ANTICOAGULANTS: none.  "

## 2024-11-02 NOTE — ASSESSMENT & PLAN NOTE
Normocytic anemia.  Was present during his previous hospitalization with an hemoglobin of 10.1.  Hemoglobin at 10.5 upon admission.  No sign of acute bleeding at this time.    CBC in a.m.  Iron panel showing elevated iron and normal ferritin.  TIBC and percent saturation or unable to be calculated.  Unclear results but suspect anemia related to cirrhosis.  B6 and thiamine pending

## 2024-11-02 NOTE — ASSESSMENT & PLAN NOTE
Patient with anxiety he is taking Lexapro outpatient as well as hydroxyzine.    Continue Lexapro 20 mg  Hydroxyzine 50 mg every 4 hours as needed

## 2024-11-02 NOTE — ASSESSMENT & PLAN NOTE
Patient with 21-50 RBCs and large occult blood on UA.  Appears to have been present during his previous hospitalization.  Does have stable anemia.    Placed urology referral upon discharge.

## 2024-11-02 NOTE — ASSESSMENT & PLAN NOTE
Chronic problem for patient difficulty initiating and maintaining sleep.  Has been taking trazodone outpatient.    Trazodone 50 mg as needed at hour of sleep.

## 2024-11-02 NOTE — ASSESSMENT & PLAN NOTE
#alcohol withdrawal     Patient was recently treated for alcohol withdrawal wearing a short stay in the IMCU for 2 days during his previous hospitalization.  He was discharged on 10/27 and returned to the ED on 10/28 intoxicated after drinking several drinks.  He was then transferred to [facility] for further alcohol withdrawal at that facility was being treated with Valium.  CIWA scores were 10-12 on night of 11/1, down to 5 morning of 11/2.  Continue to be 5 morning of 11/3.  CIWA discontinued 11/4    Discontinue telemetry  Off Librium  Case management consulted to provide further resources at time of discharge as well as potential returning to [facility], PT initially recommended postacute placement on 11/3.  Now 11/4 reevaluation by PT is recommending placement back to [facility].

## 2024-11-02 NOTE — ASSESSMENT & PLAN NOTE
#Alcoholic cirrhosis     Admission ammonia of 71.  Patient has been taking lactulose 45 mL 4 times a day since his discharge on 10/27 per chart review was continued during his stay at [facility].  Patient was also to be discharged on rifaximin however this was not continued at [facility] which patient has been at since 10/28 for alcohol withdrawal.  Per chart review during his last hospitalization patient was constipated requiring MiraLAX, GoLytely and enemas on top of his lactulose.  Constipation might be contributing to his hepatic encephalopathy at this time as well as sedate of use with his current alcohol withdrawal management at [facility].  Patient states his last bowel movement was the day of discharge on 10/27.  Is currently jaundiced as well.  Afternoon of 11/2 patient had large bowel movement.  Has had 2 large bowel movements on 11/3.  Negative abdominal x-ray.    Continue lactulose 45 mL times daily  Continue senna and MiraLAX as needed  Neuro checks q4 hours   Ammonia level in a.m.  Rifaximin 550 mg twice daily  Monitor patient with goal intake of 1.2 to 1.5 g/kg/day and energy intake of 35-40 kcals per kilogram's per day   Nutrition consult placed  Consider supplementation with Ensure shakes if needed   PT/OT consult placed  PT eval on 11/3 recommending discharge with front wheel walker and postacute placement.  On 11/4 PT reporting improvement is now recommending discharge back to [facility].

## 2024-11-02 NOTE — DISCHARGE PLANNING
"Case Management Discharge Planning    Admission Date: 11/1/2024  GMLOS: 3.3  ALOS: 1    6-Clicks ADL Score: 24  6-Clicks Mobility Score: 18      Anticipated Discharge Dispo: Discharge Disposition: Discharged to home/self care (01)    DME Needed: No    Action(s) Taken: DC Assessment Complete (See below)    Escalations Completed: None    Medically Clear: No    Next Steps: CM to follow up with IDT regarding DCP needs/barriers    Barriers to Discharge: Medical clearance    Is the patient up for discharge tomorrow: No        Care Transition Team Assessment    Patient oriented to person when bedside assessment was attempted; information for assessment obtained through chart review (patient recently discharged on 10/28)    RNCM attempted to call patient's emergency contact \"Megan\"; VM left  When RNCM asked about \"Maria Victoria\" patient stated \"no\"    Case management role discussed; understanding of case management role verbalized   Demographics on facesheet verified  Please see H&P for pertinent PMH and reason for admission  Patient's PCP is: Mann Johnson  Patient's preferred pharmacy is: [facility]  Housing: Patient was previously at [facility] s/p last hospitalization on 10/28 for tx of ETOH; prior to previous hospitalization, chart review indicates patient was homeless  IADLS/ADLS: Independent per chart review  Mental Health Concerns: Unknown  Substance Abuse: ETOH; patient was receiving tx at [facility]  Monthly Income/employment status: Unknown/unemployed  DME: None at baseline per chart review  O2: None at baseline per chart review  Prior services: Inpatient rehab  Discharge support: None   Transportation: Will need assistance  Discharge planning: Uncertain-pending IDT collaboration and recommendations      Information Source  Orientation Level: Oriented to person  Information Given By: Patient, Other (Comments) (chart review)  Who is responsible for making decisions for patient? : Legal next of kin  Name(s) of Primary Decision " "Maker: Megan-relative; VM left    Readmission Evaluation  Is this a readmission?: Yes - unplanned readmission  Why do you think you were readmitted?: \"not doing well\"  Was an appointment arranged for you prior to discharge?: Yes, attended appointment  Were there new prescriptions you were supposed to fill after you were discharged?: Yes, prescriptions filled  Did you understand your discharge instructions?: Yes  Did you have enough support after your last discharge?: Yes    Elopement Risk  Legal Hold: No  Ambulatory or Self Mobile in Wheelchair: Yes  Disoriented: No  Psychiatric Symptoms: None  History of Wandering: No  Elopement this Admit: No  Vocalizing Wanting to Leave: No  Displays Behaviors, Body Language Wanting to Leave: No-Not at Risk for Elopement  Elopement Risk: Not at Risk for Elopement    Interdisciplinary Discharge Planning  Does Admitting Nurse Feel This Could be a Complex Discharge?: No  Primary Care Physician: Mann Johnson  Lives with - Patient's Self Care Capacity:  (per chart review, patient is homeless)  Patient or legal guardian wants to designate a caregiver: No  Support Systems: None  Housing / Facility: Homeless (was at [facility])  Do You Take your Prescribed Medications Regularly: Yes  Able to Return to Previous ADL's: Future Time w/Therapy  Mobility Issues: No  Prior Services: Other (Comments) ([facility] for ETOH cessation)  Assistance Needed: No    Discharge Preparedness  What is your plan after discharge?: Uncertain - pending medical team collaboration  Prior Functional Level: Ambulatory, Needs Assist with Activities of Daily Living, Needs Assist with Medication Management    Functional Assesment  Prior Functional Level: Ambulatory, Needs Assist with Activities of Daily Living, Needs Assist with Medication Management    Finances  Financial Barriers to Discharge: No  Prescription Coverage: Yes    Vision / Hearing Impairment  Vision Impairment : No  Hearing Impairment : No    Values / Beliefs / " Concerns  Values / Beliefs Concerns : No    Advance Directive  Advance Directive?: None  Advance Directive offered?: AD Booklet refused    Domestic Abuse  Have you ever been the victim of abuse or violence?: No  Physical Abuse or Sexual Abuse: No  Verbal Abuse or Emotional Abuse: No  Possible Abuse/Neglect Reported to:: Not Applicable    Psychological Assessment  History of Substance Abuse: Alcohol  History of Psychiatric Problems: No  Newly Diagnosed Illness: Yes    Discharge Risks or Barriers  Discharge risks or barriers?: Transportation, Substance abuse, Lives alone, no community support, Complex medical needs, Homeless / couch surfing  Patient risk factors: Cognitive / sensory / physical deficit, Complex medical needs, Homeless, Lack of outside supports, Lives alone and no community support, Multiple ED visits, Multiple organizational systems involved, Readmission, Vulnerable adult, Substance abuse    Anticipated Discharge Information  Discharge Disposition: Discharged to home/self care (01)

## 2024-11-02 NOTE — PROGRESS NOTES
4 Eyes Skin Assessment Completed by GABRIELE Gonzalez and GABRIELE Green.    Head WDL  Ears WDL  Nose WDL  Mouth WDL  Neck WDL  Breast/Chest Scar  Shoulder Blades WDL  Spine WDL  (R) Arm/Elbow/Hand Scar  (L) Arm/Elbow/Hand WDL  Abdomen Bruising  Groin WDL  Scrotum/Coccyx/Buttocks WDL  (R) Leg WDL  (L) Leg Scab  (R) Heel/Foot/Toe Redness, Blanching, Bruising, and Scab  (L) Heel/Foot/Toe Redness, Blanching, Bruising, and Scab          Devices In Places Tele Box, Blood Pressure Cuff, and Pulse Ox      Interventions In Place Pillows, Dri-Ron Pads, and Heels Loaded W/Pillows    Possible Skin Injury No    Pictures Uploaded Into Epic Yes  Wound Consult Placed N/A  RN Wound Prevention Protocol Ordered No

## 2024-11-02 NOTE — PROGRESS NOTES
Monitor Summary  Rhythm: SB  Rate: 52-57  Ectopy: N/A  Measurements: .28/.11/.52  ---12 hr Chart Review---

## 2024-11-02 NOTE — CARE PLAN
Problem: Knowledge Deficit - Standard  Goal: Patient and family/care givers will demonstrate understanding of plan of care, disease process/condition, diagnostic tests and medications  Description: Target End Date:  1-3 days or as soon as patient condition allows    Document in Patient Education    1.  Patient and family/caregiver oriented to unit, equipment, visitation policy and means for communicating concern  2.  Complete/review Learning Assessment  3.  Assess knowledge level of disease process/condition, treatment plan, diagnostic tests and medications  4.  Explain disease process/condition, treatment plan, diagnostic tests and medications  Outcome: Progressing     Problem: Optimal Care for Alcohol Withdrawal  Goal: Optimal Care for the alcohol withdrawal patient  Description: Target End Date:  1 to 3 days    1.  Alcohol history screening done on admission  2.  CIWA-AR score assessment (includes assessment of nausea/vomiting, tremor, sweats, anxiety, agitation, tactile, visual and auditory disturbances, headache and orientation/sensorium).  Document on CIWA flowsheet.  3.  Follow CIWA-AR score protocol  4.  Frequent reorientation  5.  Monitor for fluid and electrolyte imbalance.  6.  Assess for respiratory depression due to sedation (pulse ox)  7.  Consider thiamine, multivitamins, folic acid and magnesium sulfate per provider order  8.  Collaborate with Social Workers/Case Management  9.  Collaborate with mental health  Outcome: Progressing     Problem: Seizure Precautions  Goal: Implementation of seizure precautions  Description: Target End Date:  Prior to discharge or change in level of care    1.  Padded side rails up at all times  2.  Suction equipment and oxygen delivery system at bedside  3.  Continuous pulse oximeter in use  4.  Implement fall precautions, bed alarm on, bed in lowest position  5.  IV access (per order)  6.  Provide low stimulus environment, avoid exposure to triggers  7.  Instruct  patient to use call light/seizure button if having warning signs of impending seizure  Outcome: Progressing     Problem: Lifestyle Changes  Goal: Patient's ability to identify lifestyle changes and available resources to help reduce recurrence of condition will improve  Description: Target End Date:  1 to 3 days    1.  Discuss recommended lifestyle changes  2.  Identify available resources and support systems  3.  Consider referral to substance abuse program  Outcome: Progressing     Problem: Psychosocial  Goal: Patient's level of anxiety will decrease  Description: Target End Date:  1-3 days or as soon as patient condition allows    1.  Collaborate with patient and family/caregiver to identify triggers and develop strategies to cope with anxiety  2.  Implement stimuli reduction, calming techniques  3.  Pharmacologic management per provider order  4.  Encourage patient/family/care giver participation  5.  Collaborate with interdisciplinary team including Psychologist or Behavioral Health Team as needed  Outcome: Progressing  Goal: Spiritual and cultural needs incorporated into hospitalization  Description: Target End Date:  End of day 1    1.  Encourage verbalization of feelings, concerns, expectations and needs  2.  Collaborate with Case Management/  3.  Collaborate with Pastoral Care to meet spiritual needs  Outcome: Progressing     Problem: Risk for Aspiration  Goal: Patient's risk for aspiration will be absent or decrease  Description: Target End Date:  Prior to discharge or change in level of care    1.   Complete dysphagia screening on admission  2.   NPO until dysphagia screening complete or medically cleared  3.   Collaborate with Speech Therapy, Clinical Dietitian and interdisciplinary team  4.   Implement aspiration precautions  5.   Assist patient up to chair for meals  6.   Elevate head of bed 90 degrees if patient is unable to get out of bed  7.   Encourage small bites  8.   Ensure  foods/liquids are of appropriate consistency  9.   Assess for any signs/symptoms of aspiration  10. Assess breath sounds and vital signs after oral intake  Outcome: Progressing     Problem: Fall Risk  Goal: Patient will remain free from falls  Description: Target End Date:  Prior to discharge or change in level of care    Document interventions on the Kelly Farrar Fall Risk Assessment    1.  Assess for fall risk factors  2.  Implement fall precautions  Outcome: Progressing     Problem: Depression  Goal: Patient and family/caregiver will verbalize accurate information about at least two of the possible causes of depression, three-four of the signs and symptoms of depression  Description: Target End Date:  1 to 3 days    1.  Assess the patient's and family/caregiver's knowledge regarding depression and its causes.  2.  Explain to the patient and family/caregiver regarding the major symptoms of depression.  3.  Inform the patient and family/caregiver that depression can be treated through medications and psychotherapy.  4.  Allow the patient to express feelings and perceptions  5.  Express hope to the patient with realistic comments about the patient's strengths and resources.  5.  Give positive feedback after a tasks are achieved.  6.  Encourage identification of positive aspects of self.  7.  Educate the patient about crisis intervention services such as suicide hotlines and other resources.  Outcome: Progressing     The patient is Watcher - Medium risk of patient condition declining or worsening

## 2024-11-02 NOTE — H&P
"[facility] HISTORY AND PHYSICAL     PATIENT ID:  NAME:  Jalen Vaughn  MRN:               [id number]  YOB: 1980    Date of Admission: 11/1/2024     Attending: Martinez Maharaj M.d.     Resident: Tatyana Quick M.D.    Primary Care Physician:  Mann Johnson M.D.     CC:    Chief Complaint   Patient presents with    Dizziness     Since this morning         HPI: Jalen Vauhgn is a 44 y.o. male with PMHx alcohol use disorder, cirrhosis status post TIPS and hypertension who presented from [facility] with dizziness and confusion since this morning.    Patient was at [facility] since 10/28/2024 for alcohol withdrawal and treatment as patient wants to become sober.  Per chart review [facility] saw patient stumbling but did not fall.  Patient states that he does believe he fell into a chair denies any head strike.  Patient states that he was at [facility] because he would like to quit drinking.  Reports that he drinks anywhere from 12-30 beers daily.  Patient is alert and oriented to person, place and year.  However patient has confusion and tangential thought process.  He does report that he has tremors and feels that he is still going through alcohol withdrawal.  Patient denies any auditory or visual hallucinations however did ask if he could have \"popcorn from the popcorn machine in the hallway\".  He reports that he has had a mild headache and does have some right lower abdominal pain lifting his shirt and showing bruising at various stages of healing.  He denies any trauma to his abdomen and per chart review patient had bruising to his right lower quadrant after Lovenox injections during his previous hospitalization.      Patient was recently hospitalized 10/17/2024 until 10/27/2024 for alcohol withdrawal syndrome.  During this time he had a stay in the [facility] for 2 days and was tapered off of Librium time of discharge.  Patient was also seen by GI during this " hospitalization and an EGD was performed without signs of bleeding. He also had constipation despite maximum dose of lactulose, requiring miralax, golytely and an enema.     ER COURSE:  The patient is bradycardic and slightly hypertensive 145/88.  EKG with borderline prolonged QT interval 482 and sinus bradycardia.  Urinalysis with large occult blood and 21-50 red blood cells  CBC hemoglobin of 10.5 hematocrit 31.4.  CMP with alkaline phosphatase 166, bilirubin 2.3 lipase 42 diagnostic alcohol less than 10.1.  Magnesium 1.8.  Pro-Volodymyr 0.07.  UDS positive for benzodiazepines.  Ammonia 71.  CT head no evidence of acute intracranial process cerebral atrophy present.  Chest x-ray with patchy bibasilar atelectasis    REVIEW OF SYSTEMS:   Ten systems reviewed and were negative except as noted in the HPI.                PAST MEDICAL HISTORY:  Past Medical History:   Diagnosis Date    Cirrhosis of liver (HCC)     Hypertension     Liver disease     Seizure (HCC)        PAST SURGICAL HISTORY:  Past Surgical History:   Procedure Laterality Date    HI UPPER GI ENDOSCOPY,DIAGNOSIS N/A 10/27/2024    Procedure: GASTROSCOPY;  Surgeon: Treva Lopez M.D.;  Location: SURGERY [facility];  Service: Gastroenterology    APPENDECTOMY      CHOLECYSTECTOMY         FAMILY HISTORY:  Family History   Problem Relation Age of Onset    Heart Disease Mother     Kidney Disease Mother     Heart Disease Father     Kidney Disease Father        SOCIAL HISTORY:   Pt lives in [place]   Smoking denies   Etoh use last drink 10/28 reports 12-30 beers a day   Drug use denies     DIET:   Orders Placed This Encounter   Procedures    Diet Order Diet: Regular     Standing Status:   Standing     Number of Occurrences:   1     Order Specific Question:   Diet:     Answer:   Regular [1]       ALLERGIES:  Allergies   Allergen Reactions    Latex Itching    Morphine Itching       OUTPATIENT MEDICATIONS:    Current Facility-Administered Medications:     heparin  injection 5,000 Units, 5,000 Units, Subcutaneous, Q8HRS, Tatyana Quick M.D., 5,000 Units at 11/01/24 2216    acetaminophen (Tylenol) tablet 650 mg, 650 mg, Oral, Q6HRS PRN, Tatyana Quick M.D.    LORazepam (Ativan) tablet 0.5 mg, 0.5 mg, Oral, Q4HRS PRN, Tatyana Quick M.D.    LORazepam (Ativan) tablet 1 mg, 1 mg, Oral, Q4HRS PRN **OR** LORazepam (Ativan) injection 0.5 mg, 0.5 mg, Intravenous, Q4HRS PRN, Tatyana Quick M.D.    LORazepam (Ativan) tablet 2 mg, 2 mg, Oral, Q2HRS PRN **OR** LORazepam (Ativan) injection 1 mg, 1 mg, Intravenous, Q2HRS PRN, Tatyana Quick M.D.    LORazepam (Ativan) tablet 3 mg, 3 mg, Oral, Q HOUR PRN **OR** LORazepam (Ativan) injection 1.5 mg, 1.5 mg, Intravenous, Q HOUR PRN, Tatyana Quick M.D.    LORazepam (Ativan) tablet 4 mg, 4 mg, Oral, Q15 MIN PRN **OR** LORazepam (Ativan) injection 2 mg, 2 mg, Intravenous, Q15 MIN PRN, Tatyana Quick M.D.    [START ON 11/2/2024] thiamine (Vitamin B-1) tablet 100 mg, 100 mg, Oral, DAILY **AND** [START ON 11/2/2024] multivitamin tablet 1 Tablet, 1 Tablet, Oral, DAILY **AND** [START ON 11/2/2024] folic acid (Folvite) tablet 1 mg, 1 mg, Oral, DAILY, Tatyana Quick M.D.    carvedilol (Coreg) tablet 3.125 mg, 3.125 mg, Oral, BID WITH MEALS, Tatyana Quick M.D.    cyclobenzaprine (Flexeril) tablet 10 mg, 10 mg, Oral, BID, Tatyana Quick M.D., 10 mg at 11/01/24 2215    [START ON 11/2/2024] escitalopram (Lexapro) tablet 20 mg, 20 mg, Oral, DAILY, Tatyana Quick M.D.    [START ON 11/2/2024] furosemide (Lasix) tablet 80 mg, 80 mg, Oral, DAILY, Tatyana Quick M.D.    lactulose 20 GM/30ML solution 45 mL, 45 mL, Oral, 4X/DAY, Tatyana Quick M.D., 45 mL at 11/01/24 2216    [START ON 11/2/2024] spironolactone (Aldactone) tablet 100 mg, 100 mg, Oral, DAILY, Tatyana Quick M.D.    traZODone (Desyrel) tablet 50 mg, 50 mg, Oral, QHS PRN, Tatyana Quick M.D.    triamcinolone acetonide (Kenalog) 0.1 % cream 1 Application, 1 Application,  Topical, BID, Tatyana Quick M.D.    riFAXIMin (Xifaxan) tablet 550 mg, 550 mg, Oral, BID, Tatyana Quick M.D., 550 mg at 24    omeprazole (PriLOSEC) capsule 20 mg, 20 mg, Oral, BID, Tatyana Quick M.D., 20 mg at 24    hydrOXYzine HCl (Atarax) tablet 50 mg, 50 mg, Oral, Q4HRS PRN, Tatyana Quick M.D.    [START ON 2024] senna-docusate (Pericolace Or Senokot S) 8.6-50 MG per tablet 2 Tablet, 2 Tablet, Oral, Q EVENING **AND** [START ON 2024] polyethylene glycol/lytes (Miralax) Packet 1 Packet, 1 Packet, Oral, DAILY, Tatyana Quick M.D.    PHYSICAL EXAM:  Vitals:    24 1608 24 1700 24 1824 24   BP: 137/88 (!) 145/88 (!) 140/71 131/75   Pulse: (!) 54 (!) 58 (!) 50 (!) 55   Resp: 18 18 18 19   Temp: 36.2 °C (97.2 °F)   36.5 °C (97.7 °F)   TempSrc: Temporal   Temporal   SpO2: 98% 98% 98% 92%   Weight:    (!) 128 kg (281 lb 4.9 oz)   Height:       , Temp (24hrs), Av.4 °C (97.5 °F), Min:36.2 °C (97.2 °F), Max:36.5 °C (97.7 °F)  , Pulse Oximetry: 92 %, O2 (LPM): 0, O2 Delivery Device: None - Room Air    General: Pt resting in NAD, cooperative,  slightly confused   Skin:  Pink, warm and dry.  No rashes. Jaundiced   HEENT: NC/AT. PERRL. EOMI. MMM. No nasal discharge. Oropharynx nonerythematous without exudate/plaques  Neck:  Supple without lymphadenopathy or rigidity.  Lungs:  Symmetrical.  CTAB with no adventitious breath sounds.  Good air movement   Cardiovascular:  Normal S1/S2, RRR without M/R/G.  Abdomen:  BS+, Soft, NT/ND. No masses noted. Bruising RLQ present   Extremities:  Full range of motion. No gross deformities noted. 2+ pulses in all extremities. Non pitting edema in bilateral legs to shin. Hand tremor present bilaterally   Spine:  Straight without vertebral anomalies.  CNS:  A&Ox3, follows commands, Strength 5/5 in all extremities.  CN II-XII intact.       LAB TESTS:   Recent Labs     24  1754   WBC 8.1   RBC 3.28*   HEMOGLOBIN 10.5*    HEMATOCRIT 31.4*   MCV 95.7   MCH 32.0   RDW 55.0*   PLATELETCT 94*   MPV 10.2   NEUTSPOLYS 63.90   LYMPHOCYTES 22.00   MONOCYTES 10.10   EOSINOPHILS 3.20   BASOPHILS 0.40         Recent Labs     11/01/24  1754   SODIUM 138   POTASSIUM 4.4   CHLORIDE 106   CO2 23   BUN 20   CREATININE 1.17   CALCIUM 8.4*   MAGNESIUM 1.8   ALBUMIN 2.7*       CULTURES:   Results       Procedure Component Value Units Date/Time    URINALYSIS [[id number]]  (Abnormal) Collected: 11/01/24 1754    Order Status: Completed Specimen: Blood Updated: 11/01/24 1821     Color Yellow     Character Clear     Specific Gravity 1.005     Ph 7.0     Glucose Negative mg/dL      Ketones Negative mg/dL      Protein Negative mg/dL      Bilirubin Negative     Urobilinogen, Urine 1.0 EU/dL      Nitrite Negative     Leukocyte Esterase Negative     Occult Blood Large     Micro Urine Req Microscopic            IMAGES:  DX-CHEST-PORTABLE (1 VIEW)   Final Result      Patchy bibasilar atelectasis.      CT-HEAD W/O   Final Result      1.  No evidence of acute intracranial process.      2.  Cerebral atrophy.                    CONSULTS:   None     ASSESSMENT/PLAN: 44 y.o. male admitted for .    * Hepatic encephalopathy (HCC)- (present on admission)  Assessment & Plan  #Alcoholic cirrhosis       Admission of 71.  Patient has been taking lactulose 45 mL 4 times a day since his discharge on 10/27 per chart review was continued during his stay at [facility].  Patient was also to be discharged on rifaximin however this was not continued at [facility] which patient has been at since 10/28 for alcohol withdrawal.  Unclear when last bowel movement occurred.  Per chart review during his last hospitalization patient was constipated requiring MiraLAX, GoLytely and enemas on top of his lactulose.  Constipation might be contributing to his hepatic encephalopathy at this time as well as sedate of use with his current alcohol withdrawal management at [facility].   Is currently jaundiced as well.    Continue lactulose 45 mL times daily  Will add senna and MiraLAX daily  Consider adding enemas to promote bowel movements if patient does not have a BM  Neuro checks q4 hours   Ammonia level in a.m.  Rifaximin 550 mg twice daily  Monitor patient with goal intake of 1.2 to 1.5 g/kg/day and energy intake of 35-40 kcals per kilogram's per day   consider dietary/nutrition consult if needed   Consider supplementation with Ensure shakes if needed         Alcohol use disorder, severe, dependence (HCC)- (present on admission)  Assessment & Plan  #alcohol withdrawal     Patient was recently treated for alcohol withdrawal wearing a short stay in the IMCU for 2 days during his previous hospitalization.  He was discharged on 10/27 and returned to the ED on 10/28 intoxicated after drinking several drinks.  He was then transferred to [facility] for further alcohol withdrawal at that facility was being treated with Valium.    Telemetry  WA protocol  Case management consulted to provide further resources at time of discharge as well as potential returning to [facility] once medically cleared    Alcoholic cirrhosis (HCC)- (present on admission)  Assessment & Plan  Known history of his condition.  Status post TIPS procedure.  CT imaging during last hospitalization confirms cirrhotic appearance of the liver.  Bilirubin has been elevated since his last admission now is downtrending.     See assessment and plan for hepatic encephalopathy  Has referral to GI already placed at last admission and is authorized, will need assistance in scheduling appointment with GI at time of discharge    Normocytic anemia- (present on admission)  Assessment & Plan  Normocytic anemia.  Was present during his previous hospitalization with an A1c of 10.1.  A1c now 10.5.  No sign of acute bleeding at this time.    CBC in a.m.    Primary hypertension- (present on admission)  Assessment & Plan  Patient with documented hypertension was  hypertensive slightly in the ED.  Patient was on nifedipine, Aldactone in view pneumatics on last admission.  Appears that medication was changed to carvedilol 3.125 mg, furosemide 80 mg and spironolactone during his admission at [facility] has been on these medications since 10/28/2024.    Continue carvedilol 3.125 mg twice daily hold for heart rate less than 55  Continue furosemide 80 mg daily hold for SBP less than 100  Continue spironolactone 100 mg daily hold for SBP less than 100    Benign essential microscopic hematuria- (present on admission)  Assessment & Plan  Patient with 21-50 RBCs and large occult blood on UA.  Appears to have been present during his previous hospitalization.  Does have stable anemia.    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  Has been taking trazodone outpatient.    Trazodone 50 mg as needed at hour of sleep.     Anxiety- (present on admission)  Assessment & Plan  Patient with anxiety he is taking Lexapro outpatient as well as hydroxyzine.    Continue Lexapro 20 mg  Hydroxyzine 50 mg every 4 hours as needed          #Disposition: inpatient for management of hepatic encephalopathy and alcohol withdrawal     Core Measures:  Fluids: None   Lines: PIV   Abx: None   Diet: Regular   PPX: SCD and heparin       CODE STATUS: FULL       [facility]   PGY-2

## 2024-11-02 NOTE — ASSESSMENT & PLAN NOTE
Known history of his condition.  Status post TIPS procedure.  CT imaging during last hospitalization confirms cirrhotic appearance of the liver.  Bilirubin has been elevated since his last admission now is downtrending.     See assessment and plan for hepatic encephalopathy  Has referral to GI already placed at last admission and is authorized, will need assistance in scheduling appointment with GI at time of discharge

## 2024-11-02 NOTE — PROGRESS NOTES
At 0714 bed alarm sounded. Ran into room to find patient unsteadily walking to restroom. Attempted to redirect pt to bed to call to call for assistance but pt kept trying to walk. Made it to the toilet where pt attempted to have BM, but unable only passed gas. CNA with pt in restroom.     0729: called for telesitter spoke to ashutosh, screening complete, telesitter ordered. Dr. Mckeon at bedside assessing pt discussing plan of care with pt, goal is to have BM and work with PT. Dr Mckeon does not think pt is actively CIWA as last drink greater than 4 days ago. Hep encephalopathy seems to be more of issue. The will be rounding with pt again later.

## 2024-11-02 NOTE — CARE PLAN
The patient is Stable - Low risk of patient condition declining or worsening         Progress made toward(s) clinical / shift goals:      Problem: Knowledge Deficit - Standard  Goal: Patient and family/care givers will demonstrate understanding of plan of care, disease process/condition, diagnostic tests and medications  Outcome: Progressing  Note: Patient educated of disease process and plan of care despite mentation. Treatment team has included patient in plan of care. All medications indications and side effects were explained. Patient encouraged to ask questions. Patient verbalizes understanding.      Problem: Optimal Care for Alcohol Withdrawal  Goal: Optimal Care for the alcohol withdrawal patient  Outcome: Progressing  Note: CIWA assessed Q4 and prn. Lactulose administered per MAR for hepatic encephalopathy. Lab values trended.      Problem: Fall Risk  Goal: Patient will remain free from falls  Outcome: Progressing  Note: Patient's risk for injury and falls assessed using Mendoza Charan scale. Appropriate safety precautions in place including bed alarm. Patient educated to utilize call light for needs. Telesitter at bedside due to impulsivity.        Patient is not progressing towards the following goals: N/A

## 2024-11-03 ENCOUNTER — APPOINTMENT (OUTPATIENT)
Dept: RADIOLOGY | Facility: MEDICAL CENTER | Age: 44
DRG: 433 | End: 2024-11-03
Payer: COMMERCIAL

## 2024-11-03 LAB
ALBUMIN SERPL BCP-MCNC: 2.2 G/DL (ref 3.2–4.9)
ALBUMIN/GLOB SERPL: 0.8 G/DL
ALP SERPL-CCNC: 124 U/L (ref 30–99)
ALT SERPL-CCNC: 12 U/L (ref 2–50)
AMMONIA PLAS-SCNC: 84 UMOL/L (ref 11–45)
ANION GAP SERPL CALC-SCNC: 9 MMOL/L (ref 7–16)
AST SERPL-CCNC: 33 U/L (ref 12–45)
BILIRUB SERPL-MCNC: 2.4 MG/DL (ref 0.1–1.5)
BUN SERPL-MCNC: 18 MG/DL (ref 8–22)
CALCIUM ALBUM COR SERPL-MCNC: 9.4 MG/DL (ref 8.5–10.5)
CALCIUM SERPL-MCNC: 8 MG/DL (ref 8.5–10.5)
CHLORIDE SERPL-SCNC: 106 MMOL/L (ref 96–112)
CO2 SERPL-SCNC: 21 MMOL/L (ref 20–33)
CREAT SERPL-MCNC: 1 MG/DL (ref 0.5–1.4)
ERYTHROCYTE [DISTWIDTH] IN BLOOD BY AUTOMATED COUNT: 53.4 FL (ref 35.9–50)
FERRITIN SERPL-MCNC: 74.4 NG/ML (ref 22–322)
FOLATE SERPL-MCNC: 22 NG/ML
GFR SERPLBLD CREATININE-BSD FMLA CKD-EPI: 96 ML/MIN/1.73 M 2
GLOBULIN SER CALC-MCNC: 2.7 G/DL (ref 1.9–3.5)
GLUCOSE SERPL-MCNC: 96 MG/DL (ref 65–99)
HCT VFR BLD AUTO: 28.6 % (ref 42–52)
HGB BLD-MCNC: 9.8 G/DL (ref 14–18)
IRON SATN MFR SERPL: ABNORMAL % (ref 15–55)
IRON SERPL-MCNC: 243 UG/DL (ref 50–180)
MCH RBC QN AUTO: 32.1 PG (ref 27–33)
MCHC RBC AUTO-ENTMCNC: 34.3 G/DL (ref 32.3–36.5)
MCV RBC AUTO: 93.8 FL (ref 81.4–97.8)
PLATELET # BLD AUTO: 80 K/UL (ref 164–446)
PLATELETS.RETICULATED NFR BLD AUTO: 2.4 % (ref 0.6–13.1)
PMV BLD AUTO: 11.2 FL (ref 9–12.9)
POTASSIUM SERPL-SCNC: 4.1 MMOL/L (ref 3.6–5.5)
PROT SERPL-MCNC: 4.9 G/DL (ref 6–8.2)
RBC # BLD AUTO: 3.05 M/UL (ref 4.7–6.1)
SODIUM SERPL-SCNC: 136 MMOL/L (ref 135–145)
TIBC SERPL-MCNC: ABNORMAL UG/DL (ref 250–450)
UIBC SERPL-MCNC: <17 UG/DL (ref 110–370)
WBC # BLD AUTO: 6.5 K/UL (ref 4.8–10.8)

## 2024-11-03 PROCEDURE — 97535 SELF CARE MNGMENT TRAINING: CPT

## 2024-11-03 PROCEDURE — 700102 HCHG RX REV CODE 250 W/ 637 OVERRIDE(OP)

## 2024-11-03 PROCEDURE — 82140 ASSAY OF AMMONIA: CPT

## 2024-11-03 PROCEDURE — 83550 IRON BINDING TEST: CPT

## 2024-11-03 PROCEDURE — 83540 ASSAY OF IRON: CPT

## 2024-11-03 PROCEDURE — 85055 RETICULATED PLATELET ASSAY: CPT

## 2024-11-03 PROCEDURE — 97162 PT EVAL MOD COMPLEX 30 MIN: CPT

## 2024-11-03 PROCEDURE — A9270 NON-COVERED ITEM OR SERVICE: HCPCS

## 2024-11-03 PROCEDURE — 770020 HCHG ROOM/CARE - TELE (206)

## 2024-11-03 PROCEDURE — 74018 RADEX ABDOMEN 1 VIEW: CPT

## 2024-11-03 PROCEDURE — 700111 HCHG RX REV CODE 636 W/ 250 OVERRIDE (IP)

## 2024-11-03 PROCEDURE — 85027 COMPLETE CBC AUTOMATED: CPT

## 2024-11-03 PROCEDURE — 80053 COMPREHEN METABOLIC PANEL: CPT

## 2024-11-03 PROCEDURE — 82746 ASSAY OF FOLIC ACID SERUM: CPT

## 2024-11-03 PROCEDURE — 82728 ASSAY OF FERRITIN: CPT

## 2024-11-03 PROCEDURE — 36415 COLL VENOUS BLD VENIPUNCTURE: CPT

## 2024-11-03 PROCEDURE — 99232 SBSQ HOSP IP/OBS MODERATE 35: CPT | Mod: GC | Performed by: FAMILY MEDICINE

## 2024-11-03 RX ORDER — AMOXICILLIN 250 MG
2 CAPSULE ORAL
Status: DISCONTINUED | OUTPATIENT
Start: 2024-11-03 | End: 2024-11-05 | Stop reason: HOSPADM

## 2024-11-03 RX ORDER — ACETAMINOPHEN 500 MG
1000 TABLET ORAL EVERY 6 HOURS PRN
Status: DISCONTINUED | OUTPATIENT
Start: 2024-11-03 | End: 2024-11-05 | Stop reason: HOSPADM

## 2024-11-03 RX ORDER — POLYETHYLENE GLYCOL 3350 17 G/17G
1 POWDER, FOR SOLUTION ORAL
Status: DISCONTINUED | OUTPATIENT
Start: 2024-11-03 | End: 2024-11-05 | Stop reason: HOSPADM

## 2024-11-03 RX ORDER — POLYETHYLENE GLYCOL 3350 17 G/17G
1 POWDER, FOR SOLUTION ORAL
Status: DISCONTINUED | OUTPATIENT
Start: 2024-11-03 | End: 2024-11-03

## 2024-11-03 RX ORDER — AMOXICILLIN 250 MG
2 CAPSULE ORAL EVERY EVENING
Status: DISCONTINUED | OUTPATIENT
Start: 2024-11-03 | End: 2024-11-03

## 2024-11-03 RX ADMIN — SPIRONOLACTONE 100 MG: 25 TABLET ORAL at 05:35

## 2024-11-03 RX ADMIN — ESCITALOPRAM OXALATE 20 MG: 10 TABLET ORAL at 05:35

## 2024-11-03 RX ADMIN — HEPARIN SODIUM 5000 UNITS: 5000 INJECTION, SOLUTION INTRAVENOUS; SUBCUTANEOUS at 20:05

## 2024-11-03 RX ADMIN — HEPARIN SODIUM 5000 UNITS: 5000 INJECTION, SOLUTION INTRAVENOUS; SUBCUTANEOUS at 05:37

## 2024-11-03 RX ADMIN — HEPARIN SODIUM 5000 UNITS: 5000 INJECTION, SOLUTION INTRAVENOUS; SUBCUTANEOUS at 13:07

## 2024-11-03 RX ADMIN — CYCLOBENZAPRINE 10 MG: 10 TABLET, FILM COATED ORAL at 17:11

## 2024-11-03 RX ADMIN — CHLORDIAZEPOXIDE HYDROCHLORIDE 25 MG: 25 CAPSULE ORAL at 05:36

## 2024-11-03 RX ADMIN — CARVEDILOL 3.12 MG: 3.12 TABLET, FILM COATED ORAL at 08:37

## 2024-11-03 RX ADMIN — LACTULOSE 45 ML: 10 SOLUTION ORAL at 08:38

## 2024-11-03 RX ADMIN — CARVEDILOL 3.12 MG: 3.12 TABLET, FILM COATED ORAL at 17:11

## 2024-11-03 RX ADMIN — OMEPRAZOLE 20 MG: 20 CAPSULE, DELAYED RELEASE ORAL at 17:11

## 2024-11-03 RX ADMIN — Medication 100 MG: at 05:36

## 2024-11-03 RX ADMIN — LACTULOSE 45 ML: 10 SOLUTION ORAL at 13:05

## 2024-11-03 RX ADMIN — THERA TABS 1 TABLET: TAB at 06:00

## 2024-11-03 RX ADMIN — RIFAXIMIN 550 MG: 550 TABLET ORAL at 17:11

## 2024-11-03 RX ADMIN — OMEPRAZOLE 20 MG: 20 CAPSULE, DELAYED RELEASE ORAL at 06:00

## 2024-11-03 RX ADMIN — CYCLOBENZAPRINE 10 MG: 10 TABLET, FILM COATED ORAL at 05:37

## 2024-11-03 RX ADMIN — FOLIC ACID 1 MG: 1 TABLET ORAL at 05:36

## 2024-11-03 RX ADMIN — RIFAXIMIN 550 MG: 550 TABLET ORAL at 05:36

## 2024-11-03 RX ADMIN — HYDROXYZINE HYDROCHLORIDE 50 MG: 25 TABLET, FILM COATED ORAL at 02:02

## 2024-11-03 RX ADMIN — FUROSEMIDE 80 MG: 40 TABLET ORAL at 05:36

## 2024-11-03 RX ADMIN — ACETAMINOPHEN 1000 MG: 500 TABLET ORAL at 17:11

## 2024-11-03 RX ADMIN — LACTULOSE 45 ML: 10 SOLUTION ORAL at 20:04

## 2024-11-03 RX ADMIN — POLYETHYLENE GLYCOL 3350 1 PACKET: 17 POWDER, FOR SOLUTION ORAL at 05:37

## 2024-11-03 RX ADMIN — LACTULOSE 45 ML: 10 SOLUTION ORAL at 17:11

## 2024-11-03 ASSESSMENT — COGNITIVE AND FUNCTIONAL STATUS - GENERAL
SUGGESTED CMS G CODE MODIFIER MOBILITY: CK
MOBILITY SCORE: 17
MOVING TO AND FROM BED TO CHAIR: A LITTLE
CLIMB 3 TO 5 STEPS WITH RAILING: A LOT
TURNING FROM BACK TO SIDE WHILE IN FLAT BAD: A LITTLE
WALKING IN HOSPITAL ROOM: A LITTLE
STANDING UP FROM CHAIR USING ARMS: A LITTLE
MOVING FROM LYING ON BACK TO SITTING ON SIDE OF FLAT BED: A LITTLE

## 2024-11-03 ASSESSMENT — FIBROSIS 4 INDEX: FIB4 SCORE: 5

## 2024-11-03 ASSESSMENT — LIFESTYLE VARIABLES
ORIENTATION AND CLOUDING OF SENSORIUM: CANNOT DO SERIAL ADDITIONS OR IS UNCERTAIN ABOUT DATE
AUDITORY DISTURBANCES: NOT PRESENT
ANXIETY: *
VISUAL DISTURBANCES: NOT PRESENT
TOTAL SCORE: 3
PAROXYSMAL SWEATS: NO SWEAT VISIBLE
TREMOR: NO TREMOR
HEADACHE, FULLNESS IN HEAD: NOT PRESENT
AGITATION: NORMAL ACTIVITY
NAUSEA AND VOMITING: NO NAUSEA AND NO VOMITING

## 2024-11-03 ASSESSMENT — GAIT ASSESSMENTS
DISTANCE (FEET): 30
ASSISTIVE DEVICE: FRONT WHEEL WALKER
GAIT LEVEL OF ASSIST: MINIMAL ASSIST

## 2024-11-03 ASSESSMENT — PAIN DESCRIPTION - PAIN TYPE: TYPE: ACUTE PAIN

## 2024-11-03 NOTE — PROGRESS NOTES
[facility] [place]GRESS [facility]     Attending:   Martinez Maharaj M.d.    Resident:   Preston Garcia MD    PATIENT:   Jalen Vaughn; [id number]; 1980    ID:   44 y.o. male with PMHx alcohol use disorder, cirrhosis status post TIPS and hypertension who presented from [facility] with dizziness and confusion, admitted for hepatic encephalopathy and alcohol withdrawal.     SUBJECTIVE:   No acute events overnight    OBJECTIVE:  Vitals:    11/03/24 0711 11/03/24 0837 11/03/24 1120 11/03/24 1512   BP: (!) 144/82 122/67 118/57 133/73   Pulse: (!) 56 62 (!) 59 (!) 57   Resp: 17  17 17   Temp: 36.6 °C (97.9 °F)  36.6 °C (97.9 °F) 36.9 °C (98.4 °F)   TempSrc: Temporal  Temporal Temporal   SpO2: 94%  95% 94%   Weight:       Height:           Intake/Output Summary (Last 24 hours) at 11/3/2024 0721  Last data filed at 11/2/2024 1800  Gross per 24 hour   Intake 1600 ml   Output 1100 ml   Net 500 ml       PHYSICAL EXAM:  General: Pt resting in NAD, cooperative,  slightly confused, lethargic  Skin:  Pink, warm and dry.  No rashes.  Mildly jaundiced   HEENT: NC/AT. PERRL. EOMI. MMM. No nasal discharge. Oropharynx nonerythematous without exudate/plaques, mild scleral icterus  Neck:  Supple without lymphadenopathy or rigidity.  Lungs:  Symmetrical.  CTAB with no adventitious breath sounds.  Good air movement   Cardiovascular:  Normal S1/S2, RRR without M/R/G.  Abdomen:  BS+, Soft, NT/ND. No masses noted. Bruising RLQ present   Extremities:  Full range of motion. No gross deformities noted. 2+ pulses in all extremities. Hand tremor present bilaterally.  1-2+ pitting edema in bilateral lower extremities  Spine:  Straight without vertebral anomalies.  CNS:  A&Ox4, slurred speech, follows commands, Strength 5/5 in all extremities.      LABS:  Recent Labs     11/01/24  1754 11/02/24  0414 11/03/24  0136   WBC 8.1 6.2 6.5   RBC 3.28* 2.95* 3.05*   HEMOGLOBIN 10.5* 9.5* 9.8*   HEMATOCRIT 31.4* 28.1* 28.6*   MCV 95.7 95.3 93.8   MCH 32.0 32.2  "32.1   RDW 55.0* 54.3* 53.4*   PLATELETCT 94* 76* 80*   MPV 10.2 10.3 11.2   NEUTSPOLYS 63.90  --   --    LYMPHOCYTES 22.00  --   --    MONOCYTES 10.10  --   --    EOSINOPHILS 3.20  --   --    BASOPHILS 0.40  --   --      Recent Labs     11/01/24 1754 11/02/24 0414 11/03/24  0136   SODIUM 138 139 136   POTASSIUM 4.4 4.0 4.1   CHLORIDE 106 109 106   CO2 23 21 21   BUN 20 19 18   CREATININE 1.17 0.91 1.00   CALCIUM 8.4* 7.9* 8.0*   MAGNESIUM 1.8 1.8  --    PHOSPHORUS  --  4.4  --    ALBUMIN 2.7* 2.1* 2.2*     Estimated GFR/CRCL = Estimated Creatinine Clearance: 140.2 mL/min (by C-G formula based on SCr of 1 mg/dL).  Recent Labs     11/01/24 1754 11/02/24 0414 11/03/24 0136   GLUCOSE 82 93 96     Recent Labs     11/01/24 1754 11/02/24 0414 11/02/24  0850 11/03/24  0136 11/03/24  0919   ASTSGOT 36 32  --  33  --    ALTSGPT 16 14  --  12  --    TBILIRUBIN 2.3* 1.9*  --  2.4*  --    ALKPHOSPHAT 166* 127*  --  124*  --    GLOBULIN 3.1 2.7  --  2.7  --    AMMONIA 71*  --  92*  --  84*             No results for input(s): \"INR\", \"APTT\", \"FIBRINOGEN\" in the last 72 hours.    Invalid input(s): \"DIMER\"      IMAGING:  [id number] VIEW   Final Result      No acute process.      DX-CHEST-PORTABLE (1 VIEW)   Final Result      Patchy bibasilar atelectasis.      CT-HEAD W/O   Final Result      1.  No evidence of acute intracranial process.      2.  Cerebral atrophy.                   MEDS:  Current Facility-Administered Medications   Medication Last Admin    heparin injection 5,000 Units 5,000 Units at 11/03/24 1307    acetaminophen (Tylenol) tablet 650 mg      thiamine (Vitamin B-1) tablet 100 mg 100 mg at 11/03/24 0536    And    multivitamin tablet 1 Tablet 1 Tablet at 11/03/24 0600    And    folic acid (Folvite) tablet 1 mg 1 mg at 11/03/24 0536    carvedilol (Coreg) tablet 3.125 mg 3.125 mg at 11/03/24 0837    cyclobenzaprine (Flexeril) tablet 10 mg 10 mg at 11/03/24 0537    escitalopram (Lexapro) tablet 20 mg 20 mg at " 11/03/24 0535    furosemide (Lasix) tablet 80 mg 80 mg at 11/03/24 0536    lactulose 20 GM/30ML solution 45 mL 45 mL at 11/03/24 1305    spironolactone (Aldactone) tablet 100 mg 100 mg at 11/03/24 0535    traZODone (Desyrel) tablet 50 mg      triamcinolone acetonide (Kenalog) 0.1 % cream 1 Application 1 Application at 11/02/24 2013    riFAXIMin (Xifaxan) tablet 550 mg 550 mg at 11/03/24 0536    omeprazole (PriLOSEC) capsule 20 mg 20 mg at 11/03/24 0600    hydrOXYzine HCl (Atarax) tablet 50 mg 50 mg at 11/03/24 0202    senna-docusate (Pericolace Or Senokot S) 8.6-50 MG per tablet 2 Tablet 2 Tablet at 11/02/24 1750    And    polyethylene glycol/lytes (Miralax) Packet 1 Packet 1 Packet at 11/03/24 0537       ASSESSMENT/PLAN:    * Hepatic encephalopathy (HCC)- (present on admission)  Assessment & Plan  #Alcoholic cirrhosis     Admission ammonia of 71.  Patient has been taking lactulose 45 mL 4 times a day since his discharge on 10/27 per chart review was continued during his stay at [facility].  Patient was also to be discharged on rifaximin however this was not continued at [facility] which patient has been at since 10/28 for alcohol withdrawal.  Per chart review during his last hospitalization patient was constipated requiring MiraLAX, GoLytely and enemas on top of his lactulose.  Constipation might be contributing to his hepatic encephalopathy at this time as well as sedate of use with his current alcohol withdrawal management at [facility].  Patient states his last bowel movement was the day of discharge on 10/27.  Is currently jaundiced as well.  Afternoon of 11/2 patient had large bowel movement.  Has had 2 large bowel movements on 11/3.  Negative abdominal x-ray.    Continue lactulose 45 mL times daily  Continue senna and MiraLAX daily  Consider adding enemas to promote bowel movements if constipation persists  Neuro checks q4 hours   Ammonia level in a.m.  Rifaximin 550 mg twice daily  Monitor  patient with goal intake of 1.2 to 1.5 g/kg/day and energy intake of 35-40 kcals per kilogram's per day   Nutrition consult placed  Consider supplementation with Ensure shakes if needed   PT/OT consult placed  PT recommending discharge with front wheel walker and postacute placement        Normocytic anemia- (present on admission)  Assessment & Plan  Normocytic anemia.  Was present during his previous hospitalization with an hemoglobin of 10.1.  Hemoglobin at 10.5 upon admission.  No sign of acute bleeding at this time.    CBC in a.m.  Iron panel ordered    Primary hypertension- (present on admission)  Assessment & Plan  Patient with documented hypertension was hypertensive slightly in the ED.  Patient was on nifedipine, Aldactone in view pneumatics on last admission.  Appears that medication was changed to carvedilol 3.125 mg, furosemide 80 mg and spironolactone during his admission at [facility] has been on these medications since 10/28/2024.    Continue carvedilol 3.125 mg twice daily hold for heart rate less than 55  Continue furosemide 80 mg daily hold for SBP less than 100  Continue spironolactone 100 mg daily hold for SBP less than 100    Benign essential microscopic hematuria- (present on admission)  Assessment & Plan  Patient with 21-50 RBCs and large occult blood on UA.  Appears to have been present during his previous hospitalization.  Does have stable anemia.    Placed urology referral upon discharge.    Alcohol-induced insomnia (HCC)- (present on admission)  Assessment & Plan  Chronic problem for patient difficulty initiating and maintaining sleep.  Has been taking trazodone outpatient.    Trazodone 50 mg as needed at hour of sleep.     Anxiety- (present on admission)  Assessment & Plan  Patient with anxiety he is taking Lexapro outpatient as well as hydroxyzine.    Continue Lexapro 20 mg  Hydroxyzine 50 mg every 4 hours as needed    Alcohol use disorder, severe, dependence (HCC)- (present on  admission)  Assessment & Plan  #alcohol withdrawal     Patient was recently treated for alcohol withdrawal wearing a short stay in the IMCU for 2 days during his previous hospitalization.  He was discharged on 10/27 and returned to the ED on 10/28 intoxicated after drinking several drinks.  He was then transferred to [facility] for further alcohol withdrawal at that facility was being treated with Valium.  CIWA scores were 10-12 on night of 11/1, down to 5 morning of 11/2.  Continue to be 5 morning of 11/3.    Telemetry  Discontinue CIWA  Discontinue Librium  Case management consulted to provide further resources at time of discharge as well as potential returning to [facility] once medically cleared, however PT now recommending postacute placement    Alcoholic cirrhosis (HCC)- (present on admission)  Assessment & Plan  Known history of his condition.  Status post TIPS procedure.  CT imaging during last hospitalization confirms cirrhotic appearance of the liver.  Bilirubin has been elevated since his last admission now is downtrending.     See assessment and plan for hepatic encephalopathy  Has referral to GI already placed at last admission and is authorized, will need assistance in scheduling appointment with GI at time of discharge        Lines: PIV  Lasix: 80 mg daily  Diet: Regular  Abx: None  DVT prophylaxis: Heparin 5000 units every 8hrs  Code Status: Full     Dispo: Inpatient for management of hepatic encephalopathy, pending postacute placement    Preston Garcia M.D.  PGY1

## 2024-11-03 NOTE — PROGRESS NOTES
Monitor Summary:     Rhythm: SB-SR  Rate: 55-66, HR touched down to 47 when sleeping    Ectopy: (R) PVC  Measurements: 0.19/0.10/0.47              12 Hour Chart Check

## 2024-11-03 NOTE — THERAPY
Physical Therapy   Initial Evaluation     Patient Name: Jalen Vaughn  Age:  44 y.o., Sex:  male  Medical Record #: [id number]  Today's Date: 11/3/2024     Precautions  Precautions: Fall Risk  Comments: ASTRID telesitter    Assessment  Patient is 44 y.o. male admitted from [facility] with dizziness, confusion, diagnosis of hepatic encephalopathy, ETOH w/d. PMHx of alcohol use, cirrhosis, s/p TIPS, HTN, anxiety. Pt mobilized as detailed below. Presents with impaired cognition, safety awareness, balance, and overall mobility from reported baseline of independent. Recommend placement at this time, unless [facility] able to provide assist for all out of bed mobility. Pt would benefit from continued acute IP PT services to address said deficits.     Plan    Physical Therapy Initial Treatment Plan   Treatment Plan : Bed Mobility, Equipment, Family / Caregiver Training, Gait Training, Manual Therapy, Neuro Re-Education / Balance, Self Care / Home Evaluation, Therapeutic Activities, Therapeutic Exercise  Treatment Frequency: 4 Times per Week  Duration: Until Therapy Goals Met    DC Equipment Recommendations: Front-Wheel Walker  Discharge Recommendations: Recommend post-acute placement for additional physical therapy services prior to discharge home (Unless able to recieve assist for all out of bed mobility at [facility])       Subjective    Pt very tangential and bringing up stories from years ago when being asked PLOF questions, redirectable.     Objective     11/03/24 1116   Time In/Time Out   Therapy Start Time 1100   Therapy End Time 1116   Total Therapy Time 16   Charge Group   PT Evaluation PT Evaluation Mod   PT Self Care / Home Evaluation (Units) 1   Total Time Spent   PT Evaluation Time Spent (Mins) 8   PT Self Care/Home Evaluation Time Spent (Mins) 8   PT Total Time Spent (Calculated) 16   Initial Contact Note    Initial Contact Note Order Received and Verified, Physical Therapy Evaluation in Progress with Full Report to Follow.    Precautions   Precautions Fall Risk   Comments CIWA, telesitter   Vitals   O2 Delivery Device None - Room Air   Pain 0 - 10 Group   Therapist Pain Assessment Post Activity Pain Same as Prior to Activity;Nurse Notified;0   Prior Living Situation   Housing / Facility Homeless  (from [facility] prior to admission)   Equipment Owned None   Comments Pt reports he wishes to return to [facility]   Prior Level of Functional Mobility   Bed Mobility Independent   Transfer Status Independent   Ambulation Independent   Ambulation Distance   (community)   Assistive Devices Used None   Stairs Independent   Cognition    Cognition / Consciousness X   Level of Consciousness Alert   Ability To Follow Commands 2 Step   Safety Awareness Impaired   New Learning Impaired   Attention Impaired  (frequent re-direction to ask)   Sequencing Impaired  (difficulty sequencing FWW)   Comments Poor working memory, needing  repeated cues for safety and FWW use throughout session   Strength Upper Body   Comments grossly 4/5   Active ROM Lower Body    Active ROM Lower Body  WDL   Strength Lower Body   Comments BLE grossly 3+/5, knee flexion during ambulation   Other Treatments   Other Treatments Provided Pt requesting to go to bathroom for BM. After few minutes pt reporting some lightheadendess, BM aborted and pt agreeable to return to supine and press call bell when ready to attempt again, BSC in room if needed   Balance Assessment   Sitting Balance (Static) Fair +   Sitting Balance (Dynamic) Fair   Standing Balance (Static) Fair -   Standing Balance (Dynamic) Poor +   Weight Shift Sitting Fair   Weight Shift Standing Fair   Comments w/ FWW   Bed Mobility    Supine to Sit Supervised   Sit to Supine Supervised   Scooting Supervised   Rolling Supervised   Gait Analysis   Gait Level Of Assist Minimal Assist   Assistive Device Front Wheel Walker   Distance (Feet) 30   # of Times Distance was Traveled 4  (standing rest breaks and cues for safety and FWW sequencing  given poor attention to task, almost veering into objects)   Deviation Bradykinetic;Shuffled Gait;Trendelenberg;Decreased Heel Strike;Decreased Toe Off   Comments pt frequently bringing legs outside of walker with max verbal cues to correct. Pt does follow but needs constant cues. Has difficulty sequencing walker but would lose balance without BUE support, heavy reliance on FWW   Functional Mobility   Sit to Stand Contact Guard Assist   Bed, Chair, Wheelchair Transfer Minimal Assist   Toilet Transfers Minimal Assist   Transfer Method Stand Step   Mobility w/ FWW in room, mckeon, bathroom, return supine   6 Clicks Assessment - How much HELP from from another person do you currently need... (If the patient hasn't done an activity recently, how much help from another person do you think he/she would need if he/she tried?)   Turning from your back to your side while in a flat bed without using bedrails? 3   Moving from lying on your back to sitting on the side of a flat bed without using bedrails? 3   Moving to and from a bed to a chair (including a wheelchair)? 3   Standing up from a chair using your arms (e.g., wheelchair, or bedside chair)? 3   Walking in hospital room? 3   Climbing 3-5 steps with a railing? 2   6 clicks Mobility Score 17   Activity Tolerance   Sitting in Chair 5 min toilet   Sitting Edge of Bed 3 min   Standing 10 min   Comments limited by cognition, balance, lightheadendess   Patient / Family Goals    Patient / Family Goal #1 to return to [facility]   Short Term Goals    Short Term Goal # 1 Pt will perform stand step transfers with FWW and SPV in 6 visits to get in/out of chair   Short Term Goal # 2 Pt will ambulate 150ft with FWW and SPV in 6 visits to access home environment   Education Group   Education Provided Role of Physical Therapist;Gait Training;Use of Assistive Device   Role of Physical Therapist Patient Response Patient;Acceptance;Explanation;Verbal Demonstration   Gait Training Patient Response  Patient;Acceptance;Explanation;Demonstration;Verbal Demonstration;Action Demonstration;Reinforcement Needed   Use of Assistive Device Patient Response Patient;Acceptance;Explanation;Demonstration;Verbal Demonstration;Action Demonstration;Reinforcement Needed   Additional Comments Pt receptive to self management and compensatory strategies with mobility   Physical Therapy Initial Treatment Plan    Treatment Plan  Bed Mobility;Equipment;Family / Caregiver Training;Gait Training;Manual Therapy;Neuro Re-Education / Balance;Self Care / Home Evaluation;Therapeutic Activities;Therapeutic Exercise   Treatment Frequency 4 Times per Week   Duration Until Therapy Goals Met   Problem List    Problems Impaired Transfers;Impaired Ambulation;Functional Strength Deficit;Impaired Balance;Decreased Activity Tolerance;Safety Awareness Deficits / Cognition   Anticipated Discharge Equipment and Recommendations   DC Equipment Recommendations Front-Wheel Walker   Discharge Recommendations Recommend post-acute placement for additional physical therapy services prior to discharge home  (Unless able to recieve assist for all out of bed mobility at [facility])   Interdisciplinary Plan of Care Collaboration   IDT Collaboration with  Nursing   Patient Position at End of Therapy In Bed;Bed Alarm On;Call Light within Reach;Tray Table within Reach;Phone within Reach   Collaboration Comments RN updated   Session Information   Date / Session Number  11/3- 1 (1/4, 11/9)

## 2024-11-03 NOTE — CARE PLAN
Problem: Knowledge Deficit - Standard  Goal: Patient and family/care givers will demonstrate understanding of plan of care, disease process/condition, diagnostic tests and medications  Description: Target End Date:  1-3 days or as soon as patient condition allows    Document in Patient Education    1.  Patient and family/caregiver oriented to unit, equipment, visitation policy and means for communicating concern  2.  Complete/review Learning Assessment  3.  Assess knowledge level of disease process/condition, treatment plan, diagnostic tests and medications  4.  Explain disease process/condition, treatment plan, diagnostic tests and medications  Outcome: Progressing     Problem: Seizure Precautions  Goal: Implementation of seizure precautions  Description: Target End Date:  Prior to discharge or change in level of care    1.  Padded side rails up at all times  2.  Suction equipment and oxygen delivery system at bedside  3.  Continuous pulse oximeter in use  4.  Implement fall precautions, bed alarm on, bed in lowest position  5.  IV access (per order)  6.  Provide low stimulus environment, avoid exposure to triggers  7.  Instruct patient to use call light/seizure button if having warning signs of impending seizure  Outcome: Progressing     Problem: Lifestyle Changes  Goal: Patient's ability to identify lifestyle changes and available resources to help reduce recurrence of condition will improve  Description: Target End Date:  1 to 3 days    1.  Discuss recommended lifestyle changes  2.  Identify available resources and support systems  3.  Consider referral to substance abuse program  Outcome: Progressing     Problem: Psychosocial  Goal: Patient's level of anxiety will decrease  Description: Target End Date:  1-3 days or as soon as patient condition allows    1.  Collaborate with patient and family/caregiver to identify triggers and develop strategies to cope with anxiety  2.  Implement stimuli reduction, calming  techniques  3.  Pharmacologic management per provider order  4.  Encourage patient/family/care giver participation  5.  Collaborate with interdisciplinary team including Psychologist or Behavioral Health Team as needed  Outcome: Progressing     Problem: Fall Risk  Goal: Patient will remain free from falls  Description: Target End Date:  Prior to discharge or change in level of care    Document interventions on the [facility] Fall Risk Assessment    1.  Assess for fall risk factors  2.  Implement fall precautions  Outcome: Progressing     Problem: Depression  Goal: Patient and family/caregiver will verbalize accurate information about at least two of the possible causes of depression, three-four of the signs and symptoms of depression  Description: Target End Date:  1 to 3 days    1.  Assess the patient's and family/caregiver's knowledge regarding depression and its causes.  2.  Explain to the patient and family/caregiver regarding the major symptoms of depression.  3.  Inform the patient and family/caregiver that depression can be treated through medications and psychotherapy.  4.  Allow the patient to express feelings and perceptions  5.  Express hope to the patient with realistic comments about the patient's strengths and resources.  5.  Give positive feedback after a tasks are achieved.  6.  Encourage identification of positive aspects of self.  7.  Educate the patient about crisis intervention services such as suicide hotlines and other resources.  Outcome: Progressing     Problem: Pain - Standard  Goal: Alleviation of pain or a reduction in pain to the patient’s comfort goal  Description: Target End Date:  Prior to discharge or change in level of care    Document on Vitals flowsheet    1.  Document pain using the appropriate pain scale per order or unit policy  2.  Educate and implement non-pharmacologic comfort measures (i.e. relaxation, distraction, massage, cold/heat therapy, etc.)  3.  Pain management  medications as ordered  4.  Reassess pain after pain med administration per policy  5.  If opiods administered assess patient's response to pain medication is appropriate per POSS sedation scale  6.  Follow pain management plan developed in collaboration with patient and interdisciplinary team (including palliative care or pain specialists if applicable)  Outcome: Progressing     Problem: Communication  Goal: The ability to communicate needs accurately and effectively will improve  Outcome: Progressing     Problem: Safety  Goal: Will remain free from injury  Outcome: Progressing     Problem: Infection  Goal: Will remain free from infection  Outcome: Progressing     Problem: Venous Thromboembolism (VTW)/Deep Vein Thrombosis (DVT) Prevention:  Goal: Patient will participate in Venous Thrombosis (VTE)/Deep Vein Thrombosis (DVT)Prevention Measures  Outcome: Progressing     Problem: Psychosocial Needs:  Goal: Level of anxiety will decrease  Outcome: Progressing     Problem: Fluid Volume:  Goal: Will maintain balanced intake and output  Outcome: Progressing     Problem: Respiratory:  Goal: Respiratory status will improve  Outcome: Progressing     Problem: Bowel/Gastric:  Goal: Normal bowel function is maintained or improved  Outcome: Progressing     Problem: Skin Integrity  Goal: Risk for impaired skin integrity will decrease  Outcome: Progressing     Problem: Mobility  Goal: Risk for activity intolerance will decrease  Outcome: Progressing     Problem: Knowledge Deficit  Goal: Knowledge of disease process/condition, treatment plan, diagnostic tests, and medications will improve  Outcome: Progressing     The patient is Watcher - Medium risk of patient condition declining or worsening

## 2024-11-03 NOTE — PROGRESS NOTES
Bedside report received, assumed care of patient at change of shift. Chart, labs, and orders reviewed. Pt resting in bed, breathing even and unlabored, denies pain and in no acute distress. A&O x1. Tele monitoring in place. Tele sitter in place. Fall precautions including bed alarm in place and education provided. Call light within reach, bed locked and in lowest position, denies other needs at this time.

## 2024-11-04 ENCOUNTER — APPOINTMENT (OUTPATIENT)
Dept: RADIOLOGY | Facility: MEDICAL CENTER | Age: 44
DRG: 433 | End: 2024-11-04
Payer: COMMERCIAL

## 2024-11-04 LAB
ALBUMIN SERPL BCP-MCNC: 2.6 G/DL (ref 3.2–4.9)
ALBUMIN/GLOB SERPL: 1 G/DL
ALP SERPL-CCNC: 131 U/L (ref 30–99)
ALT SERPL-CCNC: 16 U/L (ref 2–50)
AMMONIA PLAS-SCNC: 66 UMOL/L (ref 11–45)
ANION GAP SERPL CALC-SCNC: 8 MMOL/L (ref 7–16)
APPEARANCE UR: CLEAR
AST SERPL-CCNC: 36 U/L (ref 12–45)
BACTERIA #/AREA URNS HPF: ABNORMAL /HPF
BILIRUB SERPL-MCNC: 2.5 MG/DL (ref 0.1–1.5)
BILIRUB UR QL STRIP.AUTO: ABNORMAL
BUN SERPL-MCNC: 19 MG/DL (ref 8–22)
CALCIUM ALBUM COR SERPL-MCNC: 9.7 MG/DL (ref 8.5–10.5)
CALCIUM SERPL-MCNC: 8.6 MG/DL (ref 8.5–10.5)
CASTS URNS QL MICRO: ABNORMAL /LPF (ref 0–2)
CHLORIDE SERPL-SCNC: 106 MMOL/L (ref 96–112)
CO2 SERPL-SCNC: 21 MMOL/L (ref 20–33)
COLOR UR: ABNORMAL
CREAT SERPL-MCNC: 1.3 MG/DL (ref 0.5–1.4)
EPITHELIAL CELLS 1715: ABNORMAL /HPF (ref 0–5)
ERYTHROCYTE [DISTWIDTH] IN BLOOD BY AUTOMATED COUNT: 54.5 FL (ref 35.9–50)
GFR SERPLBLD CREATININE-BSD FMLA CKD-EPI: 70 ML/MIN/1.73 M 2
GLOBULIN SER CALC-MCNC: 2.7 G/DL (ref 1.9–3.5)
GLUCOSE SERPL-MCNC: 95 MG/DL (ref 65–99)
GLUCOSE UR STRIP.AUTO-MCNC: NEGATIVE MG/DL
HCT VFR BLD AUTO: 29.8 % (ref 42–52)
HGB BLD-MCNC: 10.2 G/DL (ref 14–18)
KETONES UR STRIP.AUTO-MCNC: ABNORMAL MG/DL
LEUKOCYTE ESTERASE UR QL STRIP.AUTO: ABNORMAL
MCH RBC QN AUTO: 32.1 PG (ref 27–33)
MCHC RBC AUTO-ENTMCNC: 34.2 G/DL (ref 32.3–36.5)
MCV RBC AUTO: 93.7 FL (ref 81.4–97.8)
MICRO URNS: ABNORMAL
NITRITE UR QL STRIP.AUTO: NEGATIVE
PH UR STRIP.AUTO: 6 [PH] (ref 5–8)
PLATELET # BLD AUTO: 78 K/UL (ref 164–446)
PLATELETS.RETICULATED NFR BLD AUTO: 2.6 % (ref 0.6–13.1)
PMV BLD AUTO: 10.7 FL (ref 9–12.9)
POTASSIUM SERPL-SCNC: 4.3 MMOL/L (ref 3.6–5.5)
PROT SERPL-MCNC: 5.3 G/DL (ref 6–8.2)
PROT UR QL STRIP: NEGATIVE MG/DL
RBC # BLD AUTO: 3.18 M/UL (ref 4.7–6.1)
RBC # URNS HPF: ABNORMAL /HPF (ref 0–2)
RBC UR QL AUTO: ABNORMAL
SODIUM SERPL-SCNC: 135 MMOL/L (ref 135–145)
SP GR UR STRIP.AUTO: 1.01
UROBILINOGEN UR STRIP.AUTO-MCNC: 1 EU/DL
WBC # BLD AUTO: 6.2 K/UL (ref 4.8–10.8)
WBC #/AREA URNS HPF: ABNORMAL /HPF

## 2024-11-04 PROCEDURE — 97530 THERAPEUTIC ACTIVITIES: CPT

## 2024-11-04 PROCEDURE — 84207 ASSAY OF VITAMIN B-6: CPT

## 2024-11-04 PROCEDURE — 99232 SBSQ HOSP IP/OBS MODERATE 35: CPT | Mod: GC | Performed by: FAMILY MEDICINE

## 2024-11-04 PROCEDURE — 700111 HCHG RX REV CODE 636 W/ 250 OVERRIDE (IP)

## 2024-11-04 PROCEDURE — 770020 HCHG ROOM/CARE - TELE (206)

## 2024-11-04 PROCEDURE — 97535 SELF CARE MNGMENT TRAINING: CPT

## 2024-11-04 PROCEDURE — A9270 NON-COVERED ITEM OR SERVICE: HCPCS

## 2024-11-04 PROCEDURE — 85027 COMPLETE CBC AUTOMATED: CPT

## 2024-11-04 PROCEDURE — 36415 COLL VENOUS BLD VENIPUNCTURE: CPT

## 2024-11-04 PROCEDURE — 97165 OT EVAL LOW COMPLEX 30 MIN: CPT

## 2024-11-04 PROCEDURE — 81001 URINALYSIS AUTO W/SCOPE: CPT

## 2024-11-04 PROCEDURE — 85055 RETICULATED PLATELET ASSAY: CPT

## 2024-11-04 PROCEDURE — 81015 MICROSCOPIC EXAM OF URINE: CPT

## 2024-11-04 PROCEDURE — 80053 COMPREHEN METABOLIC PANEL: CPT

## 2024-11-04 PROCEDURE — 73600 X-RAY EXAM OF ANKLE: CPT | Mod: RT

## 2024-11-04 PROCEDURE — 84425 ASSAY OF VITAMIN B-1: CPT

## 2024-11-04 PROCEDURE — 700102 HCHG RX REV CODE 250 W/ 637 OVERRIDE(OP)

## 2024-11-04 PROCEDURE — 82140 ASSAY OF AMMONIA: CPT

## 2024-11-04 PROCEDURE — 81003 URINALYSIS AUTO W/O SCOPE: CPT

## 2024-11-04 RX ADMIN — ESCITALOPRAM OXALATE 20 MG: 10 TABLET ORAL at 05:36

## 2024-11-04 RX ADMIN — FOLIC ACID 1 MG: 1 TABLET ORAL at 05:36

## 2024-11-04 RX ADMIN — CARVEDILOL 3.12 MG: 3.12 TABLET, FILM COATED ORAL at 17:01

## 2024-11-04 RX ADMIN — LACTULOSE 45 ML: 10 SOLUTION ORAL at 17:01

## 2024-11-04 RX ADMIN — CYCLOBENZAPRINE 10 MG: 10 TABLET, FILM COATED ORAL at 17:02

## 2024-11-04 RX ADMIN — Medication 100 MG: at 05:37

## 2024-11-04 RX ADMIN — LACTULOSE 45 ML: 10 SOLUTION ORAL at 13:36

## 2024-11-04 RX ADMIN — TRAZODONE HYDROCHLORIDE 50 MG: 50 TABLET ORAL at 02:00

## 2024-11-04 RX ADMIN — FUROSEMIDE 80 MG: 40 TABLET ORAL at 05:36

## 2024-11-04 RX ADMIN — LACTULOSE 45 ML: 10 SOLUTION ORAL at 19:58

## 2024-11-04 RX ADMIN — THERA TABS 1 TABLET: TAB at 05:37

## 2024-11-04 RX ADMIN — SPIRONOLACTONE 100 MG: 25 TABLET ORAL at 05:37

## 2024-11-04 RX ADMIN — TRIAMCINOLONE ACETONIDE 1 APPLICATION: 1 CREAM TOPICAL at 08:22

## 2024-11-04 RX ADMIN — OMEPRAZOLE 20 MG: 20 CAPSULE, DELAYED RELEASE ORAL at 05:37

## 2024-11-04 RX ADMIN — HEPARIN SODIUM 5000 UNITS: 5000 INJECTION, SOLUTION INTRAVENOUS; SUBCUTANEOUS at 05:37

## 2024-11-04 RX ADMIN — LACTULOSE 45 ML: 10 SOLUTION ORAL at 08:22

## 2024-11-04 RX ADMIN — CYCLOBENZAPRINE 10 MG: 10 TABLET, FILM COATED ORAL at 05:37

## 2024-11-04 RX ADMIN — RIFAXIMIN 550 MG: 550 TABLET ORAL at 05:36

## 2024-11-04 RX ADMIN — HEPARIN SODIUM 5000 UNITS: 5000 INJECTION, SOLUTION INTRAVENOUS; SUBCUTANEOUS at 13:37

## 2024-11-04 RX ADMIN — OMEPRAZOLE 20 MG: 20 CAPSULE, DELAYED RELEASE ORAL at 17:02

## 2024-11-04 RX ADMIN — RIFAXIMIN 550 MG: 550 TABLET ORAL at 17:02

## 2024-11-04 RX ADMIN — CARVEDILOL 3.12 MG: 3.12 TABLET, FILM COATED ORAL at 08:22

## 2024-11-04 ASSESSMENT — COGNITIVE AND FUNCTIONAL STATUS - GENERAL
SUGGESTED CMS G CODE MODIFIER MOBILITY: CK
CLIMB 3 TO 5 STEPS WITH RAILING: A LITTLE
MOBILITY SCORE: 18
SUGGESTED CMS G CODE MODIFIER DAILY ACTIVITY: CH
DAILY ACTIVITIY SCORE: 24
WALKING IN HOSPITAL ROOM: A LITTLE
TURNING FROM BACK TO SIDE WHILE IN FLAT BAD: A LITTLE
MOVING FROM LYING ON BACK TO SITTING ON SIDE OF FLAT BED: A LITTLE
MOVING TO AND FROM BED TO CHAIR: A LITTLE
STANDING UP FROM CHAIR USING ARMS: A LITTLE

## 2024-11-04 ASSESSMENT — PAIN DESCRIPTION - PAIN TYPE: TYPE: ACUTE PAIN

## 2024-11-04 ASSESSMENT — FIBROSIS 4 INDEX: FIB4 SCORE: 4.85

## 2024-11-04 ASSESSMENT — GAIT ASSESSMENTS
DISTANCE (FEET): 225
DEVIATION: BRADYKINETIC;INCREASED BASE OF SUPPORT
GAIT LEVEL OF ASSIST: SUPERVISED

## 2024-11-04 ASSESSMENT — ACTIVITIES OF DAILY LIVING (ADL): TOILETING: INDEPENDENT

## 2024-11-04 NOTE — PROGRESS NOTES
Monitor Summary:     Rhythm: SB-SR  Rate: 57-67  Ectopy: n/a  Measurements: 0.14/0.10/0.46              12 Hour Chart Check

## 2024-11-04 NOTE — DIETARY
"Nutrition Services: Initial Assessment     Day 3 of admit. Jalen Vaughn is a 44 y.o. male with admitting DX of Hepatic encephalopathy.     Consult received for  hepatic encephalopathy and cirrhosis .    Hospital Problem List:  Principal Problem:    Hepatic encephalopathy (HCC) (POA: Yes)  Active Problems:    Alcoholic cirrhosis (HCC) (POA: Yes)    Alcohol use disorder, severe, dependence (HCC) (POA: Yes)    Anxiety (POA: Yes)    Alcohol-induced insomnia (HCC) (POA: Yes)    Benign essential microscopic hematuria (POA: Yes)    Primary hypertension (POA: Yes)    Normocytic anemia (POA: Yes)  Resolved Problems:    * No resolved hospital problems. *     Nutrition Assessment:      Height: 193 cm (6' 3.98\")  Weight: (!) 126 kg (276 lb 10.8 oz)  Weight taken via standing scale  Body mass index is 33.69 kg/m². BMI classification: Obesity Class I.  Wt Readings from Last 6 Encounters:   11/04/24 (!) 126 kg (276 lb 10.8 oz)   10/28/24 125 kg (275 lb)   10/26/24 (!) 126 kg (277 lb 5.4 oz)   05/04/22 112 kg (247 lb 12.8 oz)   04/30/22 115 kg (254 lb 10.1 oz)   04/23/22 107 kg (235 lb)      Objective:  Pertinent medical hx:   Past Medical History:   Diagnosis Date    Cirrhosis of liver (HCC)     Hypertension     Liver disease     Seizure (HCC)      Pertinent labs 11/4: Alb 2.6 (L), Ammonia 66 (H) - improved from 92 on 11/2.  Pertinent meds: Lasix, Lactulose, Rifaximin, Thiamine, Folic Acid, MVI  Skin/wounds: No skin injury per RN skin assessment   Food Allergies: No known food allergies.  Last BM: 11/03/24 per flowsheets  Per MD note: goal intake of 1.2 - 1.5 gm protein/kg/day and energy intake of 35-40 kcal/kg/day. Of note, recommendation for 35-40 kcal/kg/day is for NON-obese patients. For obese patients, recommend lifestyle changes for weight loss including moderate energy reduction and physical activity.     Current diet order:   Regular diet. Recorded PO intake 50-75% and % of meals.    Subjective:   Patient " reports eating well and states he eats at least 3/4 of his meals. Reviewed food preferences.    Nutrition Focused Physical Exam (NFPE)   Weight loss: Weight has increased since 2022.  Muscle mass: No loss noted.  Subcutaneous fat: No loss noted  Fluid Accumulation: 1+ generalized BLE  Reduced  Strength: N/A in acute care setting.     Nutrition Diagnosis:      Based on RD assessment at this time, pt does not meet criteria in congruence with ASPEN/Academy guidelines for malnutrition.     Nutrition Interventions:      Encourage continued intake >75% of meals.   Consider addition of oral nutrition supplements if PO intake worsens.  Patient aware of active plan of care as appropriate.     Nutrition Monitoring and Evaluation:     RD to monitor per department policy.

## 2024-11-04 NOTE — PROGRESS NOTES
Bedside report received, assumed care of patient at change of shift. Chart, labs, and orders reviewed. Pt resting in bed, breathing even and unlabored, abd pain comes and goes but in no acute distress. A&O x4. Tele monitoring in place. Tele sitter in place. Fall precautions including bed alarm in place and education provided. Call light within reach, bed locked and in lowest position, no other needs at this time.

## 2024-11-04 NOTE — PROGRESS NOTES
"Pt complaining of increased right sided abdominal pain, worse with inspiration and palpation. Pain comes and goes but is sharp when it does occur. Offered tylenol but pt states \"it's not gonna do anything.\" MD Garcia notified. Orders for abd XR acknowledged. Because of pt cirrhosis no NSAIDs and last hospitalization pt was so constipated so opiates not appropriate. Modified tylenol order acknowledged. Pt agreeable to taking.   "

## 2024-11-04 NOTE — PROGRESS NOTES
[facility] [place] [facility] [place]     Attending:   Martinez Maharaj M.d.    Resident:   Preston Garcia MD    PATIENT:   Jalen Vaughn; [id number]; 1980    ID:   44 y.o. male with PMHx alcohol use disorder, cirrhosis status post TIPS and hypertension who presented from [facility] with dizziness and confusion, admitted for hepatic encephalopathy and alcohol withdrawal.     SUBJECTIVE:   No acute events overnight.  Has no complaints this morning.    OBJECTIVE:  Vitals:    11/04/24 0534 11/04/24 0731 11/04/24 0821 11/04/24 1114   BP: 139/77 132/79 137/79 129/76   Pulse: (!) 57 (!) 55 (!) 59 (!) 57   Resp: 16 17  17   Temp: 36.5 °C (97.7 °F) 36.5 °C (97.7 °F)  36.7 °C (98 °F)   TempSrc: Temporal Temporal  Temporal   SpO2: 91% 93%  94%   Weight: (!) 126 kg (276 lb 10.8 oz)      Height:           Intake/Output Summary (Last 24 hours) at 11/4/2024 0630  Last data filed at 11/3/2024 2153  Gross per 24 hour   Intake 1720 ml   Output --   Net 1720 ml       PHYSICAL EXAM:  General: Pt resting in NAD, cooperative, lethargic  Skin:  Pink, warm and dry.  No rashes.  Mildly jaundiced   HEENT: NC/AT. PERRL. EOMI. MMM. No nasal discharge. Oropharynx nonerythematous without exudate/plaques, mild scleral icterus  Neck:  Supple without lymphadenopathy or rigidity.  Lungs:  Symmetrical.  CTAB with no adventitious breath sounds.  Good air movement   Cardiovascular:  Normal S1/S2, RRR without M/R/G.  Abdomen:  BS+, Soft, NT/ND. No masses noted. Bruising RLQ present   Extremities:  Full range of motion. No gross deformities noted. 2+ pulses in all extremities. Slight hand tremor present bilaterally.  1+ pitting edema in bilateral lower extremities  Spine:  Straight without vertebral anomalies.  CNS:  A&Ox4, slurred speech, follows commands, Strength 5/5 in all extremities    LABS:  Recent Labs     11/01/24  1754 11/02/24  0414 11/03/24  0136 11/04/24  0034   WBC 8.1 6.2 6.5 6.2   RBC 3.28* 2.95* 3.05* 3.18*   HEMOGLOBIN 10.5* 9.5* 9.8* 10.2*  "  HEMATOCRIT 31.4* 28.1* 28.6* 29.8*   MCV 95.7 95.3 93.8 93.7   MCH 32.0 32.2 32.1 32.1   RDW 55.0* 54.3* 53.4* 54.5*   PLATELETCT 94* 76* 80* 78*   MPV 10.2 10.3 11.2 10.7   NEUTSPOLYS 63.90  --   --   --    LYMPHOCYTES 22.00  --   --   --    MONOCYTES 10.10  --   --   --    EOSINOPHILS 3.20  --   --   --    BASOPHILS 0.40  --   --   --      Recent Labs     11/01/24  1754 11/02/24 0414 11/03/24  0136 11/04/24  0034   SODIUM 138 139 136 135   POTASSIUM 4.4 4.0 4.1 4.3   CHLORIDE 106 109 106 106   CO2 23 21 21 21   BUN 20 19 18 19   CREATININE 1.17 0.91 1.00 1.30   CALCIUM 8.4* 7.9* 8.0* 8.6   MAGNESIUM 1.8 1.8  --   --    PHOSPHORUS  --  4.4  --   --    ALBUMIN 2.7* 2.1* 2.2* 2.6*     Estimated GFR/CRCL = Estimated Creatinine Clearance: 106.8 mL/min (by C-G formula based on SCr of 1.3 mg/dL).  Recent Labs     11/02/24 0414 11/03/24 0136 11/04/24  0034   GLUCOSE 93 96 95     Recent Labs     11/02/24  0414 11/02/24  0850 11/03/24  0136 11/03/24  0919 11/04/24  0034 11/04/24  0823   ASTSGOT 32  --  33  --  36  --    ALTSGPT 14  --  12  --  16  --    TBILIRUBIN 1.9*  --  2.4*  --  2.5*  --    ALKPHOSPHAT 127*  --  124*  --  131*  --    GLOBULIN 2.7  --  2.7  --  2.7  --    AMMONIA  --  92*  --  84*  --  66*             No results for input(s): \"INR\", \"APTT\", \"FIBRINOGEN\" in the last 72 hours.    Invalid input(s): \"DIMER\"      IMAGING:  DX-ANKLE 2- VIEWS RIGHT   Final Result      Normal ankle series.      [id number] VIEW   Final Result      1.  Increased bowel gas without definite obstruction.   2.  Tips shunt present.      [id number] VIEW   Final Result      No acute process.      DX-CHEST-PORTABLE (1 VIEW)   Final Result      Patchy bibasilar atelectasis.      CT-HEAD W/O   Final Result      1.  No evidence of acute intracranial process.      2.  Cerebral atrophy.                   MEDS:  Current Facility-Administered Medications   Medication Last Admin    senna-docusate (Pericolace Or Senokot S) 8.6-50 MG per " tablet 2 Tablet      And    polyethylene glycol/lytes (Miralax) Packet 1 Packet      acetaminophen (Tylenol) tablet 1,000 mg 1,000 mg at 11/03/24 1711    heparin injection 5,000 Units 5,000 Units at 11/04/24 1337    thiamine (Vitamin B-1) tablet 100 mg 100 mg at 11/04/24 0537    And    multivitamin tablet 1 Tablet 1 Tablet at 11/04/24 0537    And    folic acid (Folvite) tablet 1 mg 1 mg at 11/04/24 0536    carvedilol (Coreg) tablet 3.125 mg 3.125 mg at 11/04/24 0822    cyclobenzaprine (Flexeril) tablet 10 mg 10 mg at 11/04/24 0537    escitalopram (Lexapro) tablet 20 mg 20 mg at 11/04/24 0536    furosemide (Lasix) tablet 80 mg 80 mg at 11/04/24 0536    lactulose 20 GM/30ML solution 45 mL 45 mL at 11/04/24 1336    spironolactone (Aldactone) tablet 100 mg 100 mg at 11/04/24 0537    traZODone (Desyrel) tablet 50 mg 50 mg at 11/04/24 0200    triamcinolone acetonide (Kenalog) 0.1 % cream 1 Application 1 Application at 11/04/24 0822    riFAXIMin (Xifaxan) tablet 550 mg 550 mg at 11/04/24 0536    omeprazole (PriLOSEC) capsule 20 mg 20 mg at 11/04/24 0537    hydrOXYzine HCl (Atarax) tablet 50 mg 50 mg at 11/03/24 0202       ASSESSMENT/PLAN:    * Hepatic encephalopathy (HCC)- (present on admission)  Assessment & Plan  #Alcoholic cirrhosis     Admission ammonia of 71.  Patient has been taking lactulose 45 mL 4 times a day since his discharge on 10/27 per chart review was continued during his stay at [facility].  Patient was also to be discharged on rifaximin however this was not continued at [facility] which patient has been at since 10/28 for alcohol withdrawal.  Per chart review during his last hospitalization patient was constipated requiring MiraLAX, GoLytely and enemas on top of his lactulose.  Constipation might be contributing to his hepatic encephalopathy at this time as well as sedate of use with his current alcohol withdrawal management at [facility].  Patient states his last bowel movement was the  day of discharge on 10/27.  Is currently jaundiced as well.  Afternoon of 11/2 patient had large bowel movement.  Has had 2 large bowel movements on 11/3.  Negative abdominal x-ray.    Continue lactulose 45 mL times daily  Continue senna and MiraLAX as needed  Neuro checks q4 hours   Ammonia level in a.m.  Rifaximin 550 mg twice daily  Monitor patient with goal intake of 1.2 to 1.5 g/kg/day and energy intake of 35-40 kcals per kilogram's per day   Nutrition consult placed  Consider supplementation with Ensure shakes if needed   PT/OT consult placed  PT eval on 11/3 recommending discharge with front wheel walker and postacute placement.  On 11/4 PT reporting improvement is now recommending discharge back to [facility].        Normocytic anemia- (present on admission)  Assessment & Plan  Normocytic anemia.  Was present during his previous hospitalization with an hemoglobin of 10.1.  Hemoglobin at 10.5 upon admission.  No sign of acute bleeding at this time.    CBC in a.m.  Iron panel showing elevated iron and normal ferritin.  TIBC and percent saturation or unable to be calculated.  Unclear results but suspect anemia related to cirrhosis.  B6 and thiamine pending    Primary hypertension- (present on admission)  Assessment & Plan  Patient with documented hypertension was hypertensive slightly in the ED.  Patient was on nifedipine, Aldactone in view pneumatics on last admission.  Appears that medication was changed to carvedilol 3.125 mg, furosemide 80 mg and spironolactone during his admission at [facility] has been on these medications since 10/28/2024.    Continue carvedilol 3.125 mg twice daily hold for heart rate less than 55  Continue furosemide 80 mg daily hold for SBP less than 100  Continue spironolactone 100 mg daily hold for SBP less than 100    Benign essential microscopic hematuria- (present on admission)  Assessment & Plan  Patient with 21-50 RBCs and large occult blood on UA.  Appears to have been present during his  previous hospitalization.  Does have stable anemia.    Placed urology referral upon discharge.    Alcohol-induced insomnia (HCC)- (present on admission)  Assessment & Plan  Chronic problem for patient difficulty initiating and maintaining sleep.  Has been taking trazodone outpatient.    Trazodone 50 mg as needed at hour of sleep.     Anxiety- (present on admission)  Assessment & Plan  Patient with anxiety he is taking Lexapro outpatient as well as hydroxyzine.    Continue Lexapro 20 mg  Hydroxyzine 50 mg every 4 hours as needed    Alcohol use disorder, severe, dependence (HCC)- (present on admission)  Assessment & Plan  #alcohol withdrawal     Patient was recently treated for alcohol withdrawal wearing a short stay in the IM for 2 days during his previous hospitalization.  He was discharged on 10/27 and returned to the ED on 10/28 intoxicated after drinking several drinks.  He was then transferred to [facility] for further alcohol withdrawal at that facility was being treated with Valium.  CIWA scores were 10-12 on night of 11/1, down to 5 morning of 11/2.  Continue to be 5 morning of 11/3.  CIWA discontinued 11/4    Discontinue telemetry  Off Librium  Case management consulted to provide further resources at time of discharge as well as potential returning to [facility], PT initially recommended postacute placement on 11/3.  Now 11/4 reevaluation by PT is recommending placement back to [facility].    Alcoholic cirrhosis (HCC)- (present on admission)  Assessment & Plan  Known history of his condition.  Status post TIPS procedure.  CT imaging during last hospitalization confirms cirrhotic appearance of the liver.  Bilirubin has been elevated since his last admission now is downtrending.     See assessment and plan for hepatic encephalopathy  Has referral to GI already placed at last admission and is authorized, will need assistance in scheduling appointment with GI at time of discharge        Lines: PIV  Lasix: 80 mg daily  Diet:  Regular  Abx: None  DVT prophylaxis: Heparin 5000 units every 8hrs  Code Status: Full     Dispo: Medically clear. Pending placement to SNF or back to [facility].    Preston Garcia M.D.  PGY1

## 2024-11-04 NOTE — CARE PLAN
The patient is Stable - Low risk of patient condition declining or worsening    Shift Goals  Clinical Goals: Neuro checks, safety, have BMs  Patient Goals: go back to facility  Family Goals: unable to assess    Progress made toward(s) clinical / shift goals:      Problem: Knowledge Deficit - Standard  Goal: Patient and family/care givers will demonstrate understanding of plan of care, disease process/condition, diagnostic tests and medications  Outcome: Progressing  Note: Patient educated of disease process and plan of care. Treatment team has included patient in plan of care. All medications indications and side effects were explained. Patient encouraged to ask questions. Patient verbalizes understanding.      Problem: Fall Risk  Goal: Patient will remain free from falls  Outcome: Progressing  Note: Patient's risk for injury and falls assessed using Mendoza Charan scale. Appropriate safety precautions in place including bed alarm. Patient educated to utilize call light for needs. Patient demonstrates compliance with fall precautions. Telesitter in place.      Problem: Bowel/Gastric:  Goal: Normal bowel function is maintained or improved  Outcome: Progressing  Note: Lactulose administered. Multiple BM's. Ammonia level trended.      Problem: Mobility  Goal: Risk for activity intolerance will decrease  Outcome: Progressing  Note: Ambulated twice on unit.        Patient is not progressing towards the following goals: N/A

## 2024-11-04 NOTE — DISCHARGE PLANNING
"Received message from resident earlier in the day that pt will likely need SNF placement. I was not able to get to pt for assessment before being notified that pt back to baseline and has no skilled needs. Resident requesting pt be transferred back to [facility]. Called [facility], spoke to Alyce. She states that pt was admitted there for detox, was not on a Legal Hold. If he wishes to go back, he can \"walk-in for eval.\"  "

## 2024-11-04 NOTE — THERAPY
Physical Therapy   Daily Treatment     Patient Name: Jalen Vaughn  Age:  44 y.o., Sex:  male  Medical Record #: [id number]  Today's Date: 11/4/2024     Precautions  Precautions: Fall Risk  Comments: diogo RESENDIZ    Assessment    Pt with improved mobility today, supervised with transfers and amb. Gait deviations which are chronic but no LOB. No further acute PT needs.     Plan    Treatment Plan Status: Modify Current Treatment Plan  Type of Treatment: Bed Mobility, Equipment, Family / Caregiver Training, Gait Training, Manual Therapy, Neuro Re-Education / Balance, Self Care / Home Evaluation, Therapeutic Activities, Therapeutic Exercise  Treatment Frequency: 4 Times per Week  Treatment Duration: Until Therapy Goals Met    DC Equipment Recommendations: None  Discharge Recommendations: Other - (return to [facility])      Subjective    Pt in bathroom upon arrival with nurse outside of bathroom. Pt agreed to PT.      Objective       11/04/24 1138   Precautions   Precautions Fall Risk   Comments diogo RESENDIZ   Pain 0 - 10 Group   Therapist Pain Assessment 0   Cognition    Cognition / Consciousness X   Speech/ Communication Slurred   Level of Consciousness Alert   Ability To Follow Commands 2 Step   New Learning Impaired   Attention Impaired   Comments pleasantly confused, cooperative, makes needs known   Balance   Sitting Balance (Static) Good   Sitting Balance (Dynamic) Fair +   Standing Balance (Static) Fair +   Standing Balance (Dynamic) Fair +   Weight Shift Sitting Good   Weight Shift Standing Good   Skilled Intervention Verbal Cuing   Comments no AD, stands with a WBOS   Bed Mobility    Supine to Sit Supervised   Sit to Supine Supervised   Scooting Supervised   Skilled Intervention Verbal Cuing   Gait Analysis   Gait Level Of Assist Supervised   Assistive Device None   Distance (Feet) 225   # of Times Distance was Traveled 1   Deviation Bradykinetic;Increased Base Of Support  (lateral lean translation bilat)    Functional Mobility   Sit to Stand Supervised   Bed, Chair, Wheelchair Transfer Supervised   Toilet Transfers Supervised   6 Clicks Assessment - How much HELP from from another person do you currently need... (If the patient hasn't done an activity recently, how much help from another person do you think he/she would need if he/she tried?)   Turning from your back to your side while in a flat bed without using bedrails? 3   Moving from lying on your back to sitting on the side of a flat bed without using bedrails? 3   Moving to and from a bed to a chair (including a wheelchair)? 3   Standing up from a chair using your arms (e.g., wheelchair, or bedside chair)? 3   Walking in hospital room? 3   Climbing 3-5 steps with a railing? 3   6 clicks Mobility Score 18   Activity Tolerance   Comments no functional limitations only limitations is due to cognitive impairments   Patient / Family Goals    Patient / Family Goal #1 to return to [facility]   Short Term Goals    Short Term Goal # 1 Pt will perform stand step transfers with FWW and SPV in 6 visits to get in/out of chair   Goal Outcome # 1 Goal met   Short Term Goal # 2 Pt will ambulate 150ft with FWW and SPV in 6 visits to access home environment  (no AD)   Goal Outcome # 2 Goal met   Education Group   Education Provided Role of Physical Therapist   Role of Physical Therapist Patient Response Patient;Acceptance;Explanation;Verbal Demonstration   Additional Comments pt with decreased STM and cognitive impairment   Physical Therapy Treatment Plan   Physical Therapy Treatment Plan Modify Current Treatment Plan   Reason For Discharge Discharge Secondary to Goals Met   Anticipated Discharge Equipment and Recommendations   DC Equipment Recommendations None   Discharge Recommendations Other -  (return to [facility])   Interdisciplinary Plan of Care Collaboration   IDT Collaboration with  Nursing;Occupational Therapist  (OT notified PT that pt's mobility has improved and would benefit  from an updataed note)   Patient Position at End of Therapy Seated;Chair Alarm On;Call Light within Reach;Tray Table within Reach   Collaboration Comments RNupdated   OTHER   Modified Plan Discharge Secondary to Goals Met

## 2024-11-04 NOTE — PROGRESS NOTES
Monitor Summary  Rhythm: SB-SR  Rate: 54-64  Ectopy: PVC  Measurements: .13/.12/.55  ---12 hr Chart Review---

## 2024-11-04 NOTE — CARE PLAN
The patient is Stable - Low risk of patient condition declining or worsening    Shift Goals  Clinical Goals: Neuro checks, safety, have BMs  Patient Goals: go back to facility  Family Goals: unable to assess    Progress made toward(s) clinical / shift goals:    Problem: Knowledge Deficit - Standard  Goal: Patient and family/care givers will demonstrate understanding of plan of care, disease process/condition, diagnostic tests and medications  Outcome: Progressing     Problem: Seizure Precautions  Goal: Implementation of seizure precautions  Outcome: Progressing     Problem: Risk for Aspiration  Goal: Patient's risk for aspiration will be absent or decrease  Outcome: Progressing     Problem: Fall Risk  Goal: Patient will remain free from falls  Outcome: Progressing     Problem: Pain - Standard  Goal: Alleviation of pain or a reduction in pain to the patient’s comfort goal  Outcome: Progressing     Problem: Communication  Goal: The ability to communicate needs accurately and effectively will improve  Outcome: Progressing     Problem: Safety  Goal: Will remain free from injury  Outcome: Progressing       Patient is not progressing towards the following goals:

## 2024-11-04 NOTE — THERAPY
"Occupational Therapy   Initial Evaluation     Patient Name: Jalen Vaughn  Age:  44 y.o., Sex:  male  Medical Record #: [id number]  Today's Date: 11/4/2024       Precautions: Fall Risk  Comments: ASTRID telesitter    Assessment  Patient is 44 y.o. male admitted from [facility] with dizziness, confusion, diagnosis of hepatic encephalopathy, ETOH w/d. PMHx of alcohol use, cirrhosis, s/p TIPS, HTN, anxiety.  Today pt was pleasant, some confusion but able to complete basic ADL's & functional mobility with supervision.   Pt declined need for FWW.  Pt has good tolerance for OOB ADL's.  Encouraged to be OOB for meals.  Pt appears to be functioning at/close to his baseline.  Recommend pt D/C back to [facility]    Patient will not be actively followed for occupational therapy services at this time, however may be seen if requested by physician for 1 more visit within 30 days to address any discharge or equipment needs.     Plan    Occupational Therapy Initial Treatment Plan   Duration: Discharge Needs Only    DC Equipment Recommendations: None  Discharge Recommendations: Other - (return to [facility])     Subjective    \"Can I talk to the doctor about getting out of here?\"     Objective      Initial Contact Note    Initial Contact Note Order Received and Verified, Evaluation Only - Patient Does Not Require Further Acute Occupational Therapy at this Time.  However, May Benefit from Post Acute Therapy for Higher Level Functional Deficits.   Prior Living Situation   Prior Services Other (Comments)  ([facility] for ETOH)   Housing / Facility Homeless  (pt recently moved here from [facility])   Equipment Owned None   Comments pt not a relaible historian but stated no family in area   Prior Level of ADL Function   Self Feeding Independent   Grooming / Hygiene Independent   Bathing Independent   Dressing Independent   Toileting Independent   Prior Level of IADL Function   Medication Management Independent   Laundry Independent   Kitchen " Mobility Independent   Finances Independent   Home Management Independent   Shopping Independent   Prior Level Of Mobility Independent Without Device in Community   Precautions   Precautions Fall Risk   Vitals   O2 Delivery Device None - Room Air   Pain   Pain Scales 0 to 10 Scale    Intervention Ambulation / Increased Activity   Pain 0 - 10 Group   Therapist Pain Assessment During Activity;Nurse Notified;0   Cognition    Cognition / Consciousness X   Speech/ Communication Delayed Responses;Dysarthric;Slurred   Level of Consciousness Alert   Ability To Follow Commands 2 Step   Safety Awareness Impaired   New Learning Impaired   Attention Impaired   Comments pleasant, confused follows directions   Passive ROM Upper Body   Passive ROM Upper Body WDL   Active ROM Upper Body   Active ROM Upper Body  WDL   Strength Upper Body   Upper Body Strength  WDL   Sensation Upper Body   Upper Extremity Sensation  WDL   Coordination Upper Body   Coordination WDL   Balance Assessment   Sitting Balance (Static) Good   Sitting Balance (Dynamic) Fair +   Standing Balance (Static) Fair   Standing Balance (Dynamic) Fair   Weight Shift Sitting Good   Weight Shift Standing Good   Bed Mobility    Supine to Sit Supervised   Sit to Supine Supervised   Scooting Supervised   Rolling Supervised   ADL Assessment   Eating Modified Independent   Grooming Supervision;Standing   Upper Body Dressing Supervision   Lower Body Dressing Supervision   Toileting Supervision   Functional Mobility   Sit to Stand Supervised   Bed, Chair, Wheelchair Transfer Supervised   Toilet Transfers Supervised   Transfer Method Stand Step   Comments pt declined need for FWW   Activity Tolerance   Sitting in Chair 35   Sitting Edge of Bed 5   Standing 12   Education Group   Education Provided Activities of Daily Living   Role of Occupational Therapist Patient Response Patient;Acceptance;Explanation;Verbal Demonstration   ADL Patient Response  Patient;Acceptance;Explanation;Demonstration;Verbal Demonstration;Action Demonstration   Occupational Therapy Initial Treatment Plan    Duration Discharge Needs Only   Anticipated Discharge Equipment and Recommendations   DC Equipment Recommendations None   Discharge Recommendations Other -  (return to [facility])   Interdisciplinary Plan of Care Collaboration   IDT Collaboration with  Nursing;Physical Therapist   Patient Position at End of Therapy Seated;Chair Alarm On;Call Light within Reach;Tray Table within Reach;Other (Comments)  (telesitter)   Collaboration Comments Nsg notified of OT findings   Session Information   Date / Session Number  11/4- D/C needs only

## 2024-11-04 NOTE — CARE PLAN
The patient is Stable - Low risk of patient condition declining or worsening    Shift Goals  Clinical Goals: Neuro checks, safety, have BMs  Patient Goals: go back to facility  Family Goals: unable to assess    Progress made toward(s) clinical / shift goals:      Problem: Knowledge Deficit - Standard  Goal: Patient and family/care givers will demonstrate understanding of plan of care, disease process/condition, diagnostic tests and medications  Outcome: Progressing     Problem: Fall Risk  Goal: Patient will remain free from falls  Outcome: Progressing     Problem: Bowel/Gastric:  Goal: Normal bowel function is maintained or improved  Outcome: Progressing     Problem: Mobility  Goal: Risk for activity intolerance will decrease  Outcome: Progressing       Patient is not progressing towards the following goals:

## 2024-11-05 ENCOUNTER — PATIENT OUTREACH (OUTPATIENT)
Dept: HEALTH INFORMATION MANAGEMENT | Facility: OTHER | Age: 44
End: 2024-11-05
Payer: COMMERCIAL

## 2024-11-05 ENCOUNTER — PHARMACY VISIT (OUTPATIENT)
Dept: PHARMACY | Facility: MEDICAL CENTER | Age: 44
End: 2024-11-05
Payer: COMMERCIAL

## 2024-11-05 VITALS
HEIGHT: 76 IN | DIASTOLIC BLOOD PRESSURE: 79 MMHG | TEMPERATURE: 98.1 F | WEIGHT: 277.78 LBS | OXYGEN SATURATION: 95 % | BODY MASS INDEX: 33.83 KG/M2 | HEART RATE: 63 BPM | SYSTOLIC BLOOD PRESSURE: 145 MMHG | RESPIRATION RATE: 17 BRPM

## 2024-11-05 PROBLEM — K76.82 HEPATIC ENCEPHALOPATHY (HCC): Status: RESOLVED | Noted: 2024-10-19 | Resolved: 2024-11-05

## 2024-11-05 LAB
ALBUMIN SERPL BCP-MCNC: 2.3 G/DL (ref 3.2–4.9)
ALBUMIN/GLOB SERPL: 0.9 G/DL
ALP SERPL-CCNC: 123 U/L (ref 30–99)
ALT SERPL-CCNC: 13 U/L (ref 2–50)
AMMONIA PLAS-SCNC: 59 UMOL/L (ref 11–45)
ANION GAP SERPL CALC-SCNC: 5 MMOL/L (ref 7–16)
AST SERPL-CCNC: 30 U/L (ref 12–45)
BASOPHILS # BLD AUTO: 0.6 % (ref 0–1.8)
BASOPHILS # BLD: 0.03 K/UL (ref 0–0.12)
BILIRUB SERPL-MCNC: 1.8 MG/DL (ref 0.1–1.5)
BUN SERPL-MCNC: 19 MG/DL (ref 8–22)
CALCIUM ALBUM COR SERPL-MCNC: 9.6 MG/DL (ref 8.5–10.5)
CALCIUM SERPL-MCNC: 8.2 MG/DL (ref 8.5–10.5)
CHLORIDE SERPL-SCNC: 107 MMOL/L (ref 96–112)
CO2 SERPL-SCNC: 21 MMOL/L (ref 20–33)
CREAT SERPL-MCNC: 1.11 MG/DL (ref 0.5–1.4)
EOSINOPHIL # BLD AUTO: 0.24 K/UL (ref 0–0.51)
EOSINOPHIL NFR BLD: 4.4 % (ref 0–6.9)
ERYTHROCYTE [DISTWIDTH] IN BLOOD BY AUTOMATED COUNT: 51.8 FL (ref 35.9–50)
GFR SERPLBLD CREATININE-BSD FMLA CKD-EPI: 85 ML/MIN/1.73 M 2
GLOBULIN SER CALC-MCNC: 2.5 G/DL (ref 1.9–3.5)
GLUCOSE SERPL-MCNC: 95 MG/DL (ref 65–99)
HCT VFR BLD AUTO: 28.1 % (ref 42–52)
HGB BLD-MCNC: 9.4 G/DL (ref 14–18)
IMM GRANULOCYTES # BLD AUTO: 0.01 K/UL (ref 0–0.11)
IMM GRANULOCYTES NFR BLD AUTO: 0.2 % (ref 0–0.9)
LYMPHOCYTES # BLD AUTO: 1.61 K/UL (ref 1–4.8)
LYMPHOCYTES NFR BLD: 29.8 % (ref 22–41)
MAGNESIUM SERPL-MCNC: 1.8 MG/DL (ref 1.5–2.5)
MCH RBC QN AUTO: 31 PG (ref 27–33)
MCHC RBC AUTO-ENTMCNC: 33.5 G/DL (ref 32.3–36.5)
MCV RBC AUTO: 92.7 FL (ref 81.4–97.8)
MONOCYTES # BLD AUTO: 0.74 K/UL (ref 0–0.85)
MONOCYTES NFR BLD AUTO: 13.7 % (ref 0–13.4)
NEUTROPHILS # BLD AUTO: 2.78 K/UL (ref 1.82–7.42)
NEUTROPHILS NFR BLD: 51.3 % (ref 44–72)
NRBC # BLD AUTO: 0 K/UL
NRBC BLD-RTO: 0 /100 WBC (ref 0–0.2)
PHOSPHATE SERPL-MCNC: 4.5 MG/DL (ref 2.5–4.5)
PLATELET # BLD AUTO: 71 K/UL (ref 164–446)
PLATELETS.RETICULATED NFR BLD AUTO: 2.9 % (ref 0.6–13.1)
PMV BLD AUTO: 11.5 FL (ref 9–12.9)
POTASSIUM SERPL-SCNC: 4.2 MMOL/L (ref 3.6–5.5)
PROT SERPL-MCNC: 4.8 G/DL (ref 6–8.2)
RBC # BLD AUTO: 3.03 M/UL (ref 4.7–6.1)
SODIUM SERPL-SCNC: 133 MMOL/L (ref 135–145)
WBC # BLD AUTO: 5.4 K/UL (ref 4.8–10.8)

## 2024-11-05 PROCEDURE — 83735 ASSAY OF MAGNESIUM: CPT

## 2024-11-05 PROCEDURE — 700102 HCHG RX REV CODE 250 W/ 637 OVERRIDE(OP)

## 2024-11-05 PROCEDURE — 700111 HCHG RX REV CODE 636 W/ 250 OVERRIDE (IP)

## 2024-11-05 PROCEDURE — 85025 COMPLETE CBC W/AUTO DIFF WBC: CPT

## 2024-11-05 PROCEDURE — 85055 RETICULATED PLATELET ASSAY: CPT

## 2024-11-05 PROCEDURE — RXMED WILLOW AMBULATORY MEDICATION CHARGE

## 2024-11-05 PROCEDURE — 84100 ASSAY OF PHOSPHORUS: CPT

## 2024-11-05 PROCEDURE — 80053 COMPREHEN METABOLIC PANEL: CPT

## 2024-11-05 PROCEDURE — 36415 COLL VENOUS BLD VENIPUNCTURE: CPT

## 2024-11-05 PROCEDURE — 99238 HOSP IP/OBS DSCHRG MGMT 30/<: CPT | Mod: GC | Performed by: FAMILY MEDICINE

## 2024-11-05 PROCEDURE — 82140 ASSAY OF AMMONIA: CPT

## 2024-11-05 PROCEDURE — A9270 NON-COVERED ITEM OR SERVICE: HCPCS

## 2024-11-05 RX ORDER — AMOXICILLIN 250 MG
2 CAPSULE ORAL
Qty: 30 TABLET | Refills: 0 | Status: ON HOLD | OUTPATIENT
Start: 2024-11-05 | End: 2024-11-21

## 2024-11-05 RX ORDER — POLYETHYLENE GLYCOL 3350 17 G/17G
17 POWDER, FOR SOLUTION ORAL
Qty: 30 PACKET | Refills: 0 | Status: ON HOLD | OUTPATIENT
Start: 2024-11-05 | End: 2024-11-18

## 2024-11-05 RX ADMIN — RIFAXIMIN 550 MG: 550 TABLET ORAL at 16:31

## 2024-11-05 RX ADMIN — THERA TABS 1 TABLET: TAB at 05:49

## 2024-11-05 RX ADMIN — Medication 100 MG: at 05:48

## 2024-11-05 RX ADMIN — RIFAXIMIN 550 MG: 550 TABLET ORAL at 05:48

## 2024-11-05 RX ADMIN — TRAZODONE HYDROCHLORIDE 50 MG: 50 TABLET ORAL at 00:18

## 2024-11-05 RX ADMIN — LACTULOSE 45 ML: 10 SOLUTION ORAL at 13:40

## 2024-11-05 RX ADMIN — TRIAMCINOLONE ACETONIDE 1 APPLICATION: 1 CREAM TOPICAL at 08:00

## 2024-11-05 RX ADMIN — LACTULOSE 45 ML: 10 SOLUTION ORAL at 07:43

## 2024-11-05 RX ADMIN — CYCLOBENZAPRINE 10 MG: 10 TABLET, FILM COATED ORAL at 16:31

## 2024-11-05 RX ADMIN — CARVEDILOL 3.12 MG: 3.12 TABLET, FILM COATED ORAL at 16:31

## 2024-11-05 RX ADMIN — CYCLOBENZAPRINE 10 MG: 10 TABLET, FILM COATED ORAL at 05:48

## 2024-11-05 RX ADMIN — OMEPRAZOLE 20 MG: 20 CAPSULE, DELAYED RELEASE ORAL at 16:31

## 2024-11-05 RX ADMIN — OMEPRAZOLE 20 MG: 20 CAPSULE, DELAYED RELEASE ORAL at 05:48

## 2024-11-05 RX ADMIN — FUROSEMIDE 80 MG: 40 TABLET ORAL at 05:48

## 2024-11-05 RX ADMIN — SPIRONOLACTONE 100 MG: 25 TABLET ORAL at 05:47

## 2024-11-05 RX ADMIN — HEPARIN SODIUM 5000 UNITS: 5000 INJECTION, SOLUTION INTRAVENOUS; SUBCUTANEOUS at 00:04

## 2024-11-05 RX ADMIN — FOLIC ACID 1 MG: 1 TABLET ORAL at 05:48

## 2024-11-05 RX ADMIN — CARVEDILOL 3.12 MG: 3.12 TABLET, FILM COATED ORAL at 07:42

## 2024-11-05 RX ADMIN — ESCITALOPRAM OXALATE 20 MG: 10 TABLET ORAL at 05:48

## 2024-11-05 RX ADMIN — LACTULOSE 45 ML: 10 SOLUTION ORAL at 16:31

## 2024-11-05 ASSESSMENT — FIBROSIS 4 INDEX: FIB4 SCORE: 4.92

## 2024-11-05 NOTE — DISCHARGE SUMMARY
"  [facility]    Admit Date:  11/1/2024       Discharge Date:  11/05/2024    Service:   [facility] Inpatient Team  Attending Physician(s):   Martinez Maharaj M.D.        Senior Resident(s):   Pedro Lott M.D.  [place] Resident(s):   Preston Garcia M.D.     Primary Diagnosis:   Hepatic encephalopathy    Secondary Diagnoses:                Alcoholic cirrhosis  Alcohol use disorder, severe dependence  Hypertension  Normocytic anemia  Anxiety  Alcohol-induced insomnia    HPI (Per Dr. Quick' Admission H&P):     \"Jalen Vaughn is a 44 y.o. male with PMHx alcohol use disorder, cirrhosis status post TIPS and hypertension who presented from [facility] with dizziness and confusion since this morning.     Patient was at [facility] since 10/28/2024 for alcohol withdrawal and treatment as patient wants to become sober.  Per chart review [facility] saw patient stumbling but did not fall.  Patient states that he does believe he fell into a chair denies any head strike.  Patient states that he was at [facility] because he would like to quit drinking.  Reports that he drinks anywhere from 12-30 beers daily.  Patient is alert and oriented to person, place and year.  However patient has confusion and tangential thought process.  He does report that he has tremors and feels that he is still going through alcohol withdrawal.  Patient denies any auditory or visual hallucinations however did ask if he could have \"popcorn from the popcorn machine in the hallway\".  He reports that he has had a mild headache and does have some right lower abdominal pain lifting his shirt and showing bruising at various stages of healing.  He denies any trauma to his abdomen and per chart review patient had bruising to his right lower quadrant after Lovenox injections during his previous hospitalization.       Patient was recently hospitalized 10/17/2024 until 10/27/2024 for alcohol withdrawal syndrome.  During " "this time he had a stay in the IMCU for 2 days and was tapered off of Librium time of discharge.  Patient was also seen by GI during this hospitalization and an EGD was performed without signs of bleeding. He also had constipation despite maximum dose of lactulose, requiring miralax, golytely and an enema.\"      Hospital Summary (Brief Narrative):       Jalen Vaughn was admitted to [facility] on 11/1/2024 for management of hepatic encephalopathy.  In the ED, vital signs notable for bradycardia and hypertension.  Labs notable for hemoglobin 10.5, elevated alkaline phosphatase 166 with elevated bilirubin 2.3, lipase 42; BAL negative.  UDS positive for benzodiazepines.  Ammonia 71.  Imaging: CT head showed no evidence of acute intracranial process.  Chest x-ray showed patchy bibasilar atelectasis.  Patient was started on lactulose 45 mL 4 times daily with rifaximin 550 mg twice daily, to which he responded well, with complete resolution of encephalopathy.  CIWA protocol had been initiated however, due to consistent low scores, CIWA protocol was discontinued.  Primary hospital team investigated other potential causes for cirrhosis and have excluded hepatitis C and hemochromatosis.  Autoimmune etiologies have not been worked up however, less likely at this time but should be considered for outpatient workup.  Patient otherwise remained afebrile and hemodynamically stable.  Adequate blood pressure control was maintained with his home regimen which included carvedilol 3.125 mg twice daily, furosemide 80 mg daily and spironolactone 100 mg daily.  Patient is deemed medically cleared for discharge to [facility] transitional housing and recommend ongoing therapy for alcohol use disorder.  Patient remains motivated at this time to stop drinking and understands that his health has reached a critical point.    Consultants:      Psychiatry    Procedures:        None    Labs:  Results for orders placed or performed during the hospital " encounter of 24   EKG    Collection Time: 24  4:08 PM   Result Value Ref Range    Report       [facility]    Test Date:  2024  Pt Name:    ARSENIO RODGERS               Department: ER  MRN:        [id number]                      Room:       T707  Gender:     Male                         Technician: [id number]  :        1980                   Requested By:ER TRIAGE PROTOCOL  Order #:    [id number]                    Reading MD:    Measurements  Intervals                                Axis  Rate:       52                           P:          6  PA:         180                          QRS:        43  QRSD:       100                          T:          71  QT:         518  QTc:        482    Interpretive Statements  Sinus bradycardia  Borderline prolonged QT interval  Compared to ECG 10/17/2024 16:20:32  Sinus rhythm no longer present  Left bundle-branch block no longer present     CBC WITH DIFFERENTIAL    Collection Time: 24  5:54 PM   Result Value Ref Range    WBC 8.1 4.8 - 10.8 K/uL    RBC 3.28 (L) 4.70 - 6.10 M/uL    Hemoglobin 10.5 (L) 14.0 - 18.0 g/dL    Hematocrit 31.4 (L) 42.0 - 52.0 %    MCV 95.7 81.4 - 97.8 fL    MCH 32.0 27.0 - 33.0 pg    MCHC 33.4 32.3 - 36.5 g/dL    RDW 55.0 (H) 35.9 - 50.0 fL    Platelet Count 94 (L) 164 - 446 K/uL    MPV 10.2 9.0 - 12.9 fL    Neutrophils-Polys 63.90 44.00 - 72.00 %    Lymphocytes 22.00 22.00 - 41.00 %    Monocytes 10.10 0.00 - 13.40 %    Eosinophils 3.20 0.00 - 6.90 %    Basophils 0.40 0.00 - 1.80 %    Immature Granulocytes 0.40 0.00 - 0.90 %    Nucleated RBC 0.00 0.00 - 0.20 /100 WBC    Neutrophils (Absolute) 5.15 1.82 - 7.42 K/uL    Lymphs (Absolute) 1.77 1.00 - 4.80 K/uL    Monos (Absolute) 0.81 0.00 - 0.85 K/uL    Eos (Absolute) 0.26 0.00 - 0.51 K/uL    Baso (Absolute) 0.03 0.00 - 0.12 K/uL    Immature Granulocytes (abs) 0.03 0.00 - 0.11 K/uL    NRBC (Absolute) 0.00 K/uL   COMP METABOLIC PANEL     Collection Time: 11/01/24  5:54 PM   Result Value Ref Range    Sodium 138 135 - 145 mmol/L    Potassium 4.4 3.6 - 5.5 mmol/L    Chloride 106 96 - 112 mmol/L    Co2 23 20 - 33 mmol/L    Anion Gap 9.0 7.0 - 16.0    Glucose 82 65 - 99 mg/dL    Bun 20 8 - 22 mg/dL    Creatinine 1.17 0.50 - 1.40 mg/dL    Calcium 8.4 (L) 8.5 - 10.5 mg/dL    Correct Calcium 9.4 8.5 - 10.5 mg/dL    AST(SGOT) 36 12 - 45 U/L    ALT(SGPT) 16 2 - 50 U/L    Alkaline Phosphatase 166 (H) 30 - 99 U/L    Total Bilirubin 2.3 (H) 0.1 - 1.5 mg/dL    Albumin 2.7 (L) 3.2 - 4.9 g/dL    Total Protein 5.8 (L) 6.0 - 8.2 g/dL    Globulin 3.1 1.9 - 3.5 g/dL    A-G Ratio 0.9 g/dL   LIPASE    Collection Time: 11/01/24  5:54 PM   Result Value Ref Range    Lipase 42 11 - 82 U/L   URINE DRUG SCREEN (TRIAGE)    Collection Time: 11/01/24  5:54 PM   Result Value Ref Range    Amphetamines Urine Negative Negative    Barbiturates Negative Negative    Benzodiazepines Positive (A) Negative    Cocaine Metabolite Negative Negative    Fentanyl, Urine Negative Negative    Methadone Negative Negative    Opiates Negative Negative    Oxycodone Negative Negative    Phencyclidine -Pcp Negative Negative    Propoxyphene Negative Negative    Cannabinoid Metab Negative Negative   DIAGNOSTIC ALCOHOL    Collection Time: 11/01/24  5:54 PM   Result Value Ref Range    Diagnostic Alcohol <10.1 <10.1 mg/dL   MAGNESIUM    Collection Time: 11/01/24  5:54 PM   Result Value Ref Range    Magnesium 1.8 1.5 - 2.5 mg/dL   AMMONIA    Collection Time: 11/01/24  5:54 PM   Result Value Ref Range    Ammonia 71 (H) 11 - 45 umol/L   PROCALCITONIN    Collection Time: 11/01/24  5:54 PM   Result Value Ref Range    Procalcitonin 0.07 <0.25 ng/mL   URINALYSIS    Collection Time: 11/01/24  5:54 PM    Specimen: Blood   Result Value Ref Range    Color Yellow     Character Clear     Specific Gravity 1.005 <1.035    Ph 7.0 5.0 - 8.0    Glucose Negative Negative mg/dL    Ketones Negative Negative mg/dL    Protein  Negative Negative mg/dL    Bilirubin Negative Negative    Urobilinogen, Urine 1.0 <=1.0 EU/dL    Nitrite Negative Negative    Leukocyte Esterase Negative Negative    Occult Blood Large (A) Negative    Micro Urine Req Microscopic    URINE MICROSCOPIC (W/UA)    Collection Time: 11/01/24  5:54 PM   Result Value Ref Range    WBC 0-2 /hpf    RBC 21-50 (A) 0 - 2 /hpf    Bacteria None Seen None /hpf    Epithelial Cells 0-2 0 - 5 /hpf    Urine Casts 0-2 0 - 2 /lpf   IMMATURE PLT FRACTION    Collection Time: 11/01/24  5:54 PM   Result Value Ref Range    Imm. Plt Fraction 2.0 0.6 - 13.1 %   ESTIMATED GFR    Collection Time: 11/01/24  5:54 PM   Result Value Ref Range    GFR (CKD-EPI) 79 >60 mL/min/1.73 m 2   CBC without Differential    Collection Time: 11/02/24  4:14 AM   Result Value Ref Range    WBC 6.2 4.8 - 10.8 K/uL    RBC 2.95 (L) 4.70 - 6.10 M/uL    Hemoglobin 9.5 (L) 14.0 - 18.0 g/dL    Hematocrit 28.1 (L) 42.0 - 52.0 %    MCV 95.3 81.4 - 97.8 fL    MCH 32.2 27.0 - 33.0 pg    MCHC 33.8 32.3 - 36.5 g/dL    RDW 54.3 (H) 35.9 - 50.0 fL    Platelet Count 76 (L) 164 - 446 K/uL    MPV 10.3 9.0 - 12.9 fL   Comp Metabolic Panel (CMP)    Collection Time: 11/02/24  4:14 AM   Result Value Ref Range    Sodium 139 135 - 145 mmol/L    Potassium 4.0 3.6 - 5.5 mmol/L    Chloride 109 96 - 112 mmol/L    Co2 21 20 - 33 mmol/L    Anion Gap 9.0 7.0 - 16.0    Glucose 93 65 - 99 mg/dL    Bun 19 8 - 22 mg/dL    Creatinine 0.91 0.50 - 1.40 mg/dL    Calcium 7.9 (L) 8.5 - 10.5 mg/dL    Correct Calcium 9.4 8.5 - 10.5 mg/dL    AST(SGOT) 32 12 - 45 U/L    ALT(SGPT) 14 2 - 50 U/L    Alkaline Phosphatase 127 (H) 30 - 99 U/L    Total Bilirubin 1.9 (H) 0.1 - 1.5 mg/dL    Albumin 2.1 (L) 3.2 - 4.9 g/dL    Total Protein 4.8 (L) 6.0 - 8.2 g/dL    Globulin 2.7 1.9 - 3.5 g/dL    A-G Ratio 0.8 g/dL   Magnesium    Collection Time: 11/02/24  4:14 AM   Result Value Ref Range    Magnesium 1.8 1.5 - 2.5 mg/dL   Phosphorus    Collection Time: 11/02/24  4:14 AM    Result Value Ref Range    Phosphorus 4.4 2.5 - 4.5 mg/dL   IMMATURE PLT FRACTION    Collection Time: 11/02/24  4:14 AM   Result Value Ref Range    Imm. Plt Fraction 2.2 0.6 - 13.1 %   ESTIMATED GFR    Collection Time: 11/02/24  4:14 AM   Result Value Ref Range    GFR (CKD-EPI) 107 >60 mL/min/1.73 m 2   AMMONIA    Collection Time: 11/02/24  8:50 AM   Result Value Ref Range    Ammonia 92 (H) 11 - 45 umol/L   Comp Metabolic Panel    Collection Time: 11/03/24  1:36 AM   Result Value Ref Range    Sodium 136 135 - 145 mmol/L    Potassium 4.1 3.6 - 5.5 mmol/L    Chloride 106 96 - 112 mmol/L    Co2 21 20 - 33 mmol/L    Anion Gap 9.0 7.0 - 16.0    Glucose 96 65 - 99 mg/dL    Bun 18 8 - 22 mg/dL    Creatinine 1.00 0.50 - 1.40 mg/dL    Calcium 8.0 (L) 8.5 - 10.5 mg/dL    Correct Calcium 9.4 8.5 - 10.5 mg/dL    AST(SGOT) 33 12 - 45 U/L    ALT(SGPT) 12 2 - 50 U/L    Alkaline Phosphatase 124 (H) 30 - 99 U/L    Total Bilirubin 2.4 (H) 0.1 - 1.5 mg/dL    Albumin 2.2 (L) 3.2 - 4.9 g/dL    Total Protein 4.9 (L) 6.0 - 8.2 g/dL    Globulin 2.7 1.9 - 3.5 g/dL    A-G Ratio 0.8 g/dL   CBC WITHOUT DIFFERENTIAL    Collection Time: 11/03/24  1:36 AM   Result Value Ref Range    WBC 6.5 4.8 - 10.8 K/uL    RBC 3.05 (L) 4.70 - 6.10 M/uL    Hemoglobin 9.8 (L) 14.0 - 18.0 g/dL    Hematocrit 28.6 (L) 42.0 - 52.0 %    MCV 93.8 81.4 - 97.8 fL    MCH 32.1 27.0 - 33.0 pg    MCHC 34.3 32.3 - 36.5 g/dL    RDW 53.4 (H) 35.9 - 50.0 fL    Platelet Count 80 (L) 164 - 446 K/uL    MPV 11.2 9.0 - 12.9 fL   IMMATURE PLT FRACTION    Collection Time: 11/03/24  1:36 AM   Result Value Ref Range    Imm. Plt Fraction 2.4 0.6 - 13.1 %   ESTIMATED GFR    Collection Time: 11/03/24  1:36 AM   Result Value Ref Range    GFR (CKD-EPI) 96 >60 mL/min/1.73 m 2   AMMONIA    Collection Time: 11/03/24  9:19 AM   Result Value Ref Range    Ammonia 84 (H) 11 - 45 umol/L   FOLATE    Collection Time: 11/03/24  5:58 PM   Result Value Ref Range    Folate -Folic Acid 22.0 >4.0 ng/mL    IRON/TOTAL IRON BIND    Collection Time: 11/03/24  5:58 PM   Result Value Ref Range    Iron 243 (H) 50 - 180 ug/dL    Total Iron Binding see below 250 - 450 ug/dL    Unsat Iron Binding <17 (L) 110 - 370 ug/dL    % Saturation see below 15 - 55 %   FERRITIN    Collection Time: 11/03/24  5:58 PM   Result Value Ref Range    Ferritin 74.4 22.0 - 322.0 ng/mL   CBC WITHOUT DIFFERENTIAL    Collection Time: 11/04/24 12:34 AM   Result Value Ref Range    WBC 6.2 4.8 - 10.8 K/uL    RBC 3.18 (L) 4.70 - 6.10 M/uL    Hemoglobin 10.2 (L) 14.0 - 18.0 g/dL    Hematocrit 29.8 (L) 42.0 - 52.0 %    MCV 93.7 81.4 - 97.8 fL    MCH 32.1 27.0 - 33.0 pg    MCHC 34.2 32.3 - 36.5 g/dL    RDW 54.5 (H) 35.9 - 50.0 fL    Platelet Count 78 (L) 164 - 446 K/uL    MPV 10.7 9.0 - 12.9 fL   Comp Metabolic Panel    Collection Time: 11/04/24 12:34 AM   Result Value Ref Range    Sodium 135 135 - 145 mmol/L    Potassium 4.3 3.6 - 5.5 mmol/L    Chloride 106 96 - 112 mmol/L    Co2 21 20 - 33 mmol/L    Anion Gap 8.0 7.0 - 16.0    Glucose 95 65 - 99 mg/dL    Bun 19 8 - 22 mg/dL    Creatinine 1.30 0.50 - 1.40 mg/dL    Calcium 8.6 8.5 - 10.5 mg/dL    Correct Calcium 9.7 8.5 - 10.5 mg/dL    AST(SGOT) 36 12 - 45 U/L    ALT(SGPT) 16 2 - 50 U/L    Alkaline Phosphatase 131 (H) 30 - 99 U/L    Total Bilirubin 2.5 (H) 0.1 - 1.5 mg/dL    Albumin 2.6 (L) 3.2 - 4.9 g/dL    Total Protein 5.3 (L) 6.0 - 8.2 g/dL    Globulin 2.7 1.9 - 3.5 g/dL    A-G Ratio 1.0 g/dL   IMMATURE PLT FRACTION    Collection Time: 11/04/24 12:34 AM   Result Value Ref Range    Imm. Plt Fraction 2.6 0.6 - 13.1 %   ESTIMATED GFR    Collection Time: 11/04/24 12:34 AM   Result Value Ref Range    GFR (CKD-EPI) 70 >60 mL/min/1.73 m 2   URINALYSIS    Collection Time: 11/04/24  7:10 AM    Specimen: Urine, Clean Catch   Result Value Ref Range    Color Dark Yellow     Character Clear     Specific Gravity 1.015 <1.035    Ph 6.0 5.0 - 8.0    Glucose Negative Negative mg/dL    Ketones Trace (A) Negative mg/dL     Protein Negative Negative mg/dL    Bilirubin Small (A) Negative    Urobilinogen, Urine 1.0 <=1.0 EU/dL    Nitrite Negative Negative    Leukocyte Esterase Trace (A) Negative    Occult Blood Small (A) Negative    Micro Urine Req Microscopic    URINE MICROSCOPIC (W/UA)    Collection Time: 11/04/24  7:10 AM   Result Value Ref Range    WBC 0-2 /hpf    RBC 11-20 (A) 0 - 2 /hpf    Bacteria None Seen None /hpf    Epithelial Cells 3-5 0 - 5 /hpf    Urine Casts 3-5 (A) 0 - 2 /lpf   AMMONIA    Collection Time: 11/04/24  8:23 AM   Result Value Ref Range    Ammonia 66 (H) 11 - 45 umol/L   CBC WITH DIFFERENTIAL    Collection Time: 11/05/24  2:17 AM   Result Value Ref Range    WBC 5.4 4.8 - 10.8 K/uL    RBC 3.03 (L) 4.70 - 6.10 M/uL    Hemoglobin 9.4 (L) 14.0 - 18.0 g/dL    Hematocrit 28.1 (L) 42.0 - 52.0 %    MCV 92.7 81.4 - 97.8 fL    MCH 31.0 27.0 - 33.0 pg    MCHC 33.5 32.3 - 36.5 g/dL    RDW 51.8 (H) 35.9 - 50.0 fL    Platelet Count 71 (L) 164 - 446 K/uL    MPV 11.5 9.0 - 12.9 fL    Neutrophils-Polys 51.30 44.00 - 72.00 %    Lymphocytes 29.80 22.00 - 41.00 %    Monocytes 13.70 (H) 0.00 - 13.40 %    Eosinophils 4.40 0.00 - 6.90 %    Basophils 0.60 0.00 - 1.80 %    Immature Granulocytes 0.20 0.00 - 0.90 %    Nucleated RBC 0.00 0.00 - 0.20 /100 WBC    Neutrophils (Absolute) 2.78 1.82 - 7.42 K/uL    Lymphs (Absolute) 1.61 1.00 - 4.80 K/uL    Monos (Absolute) 0.74 0.00 - 0.85 K/uL    Eos (Absolute) 0.24 0.00 - 0.51 K/uL    Baso (Absolute) 0.03 0.00 - 0.12 K/uL    Immature Granulocytes (abs) 0.01 0.00 - 0.11 K/uL    NRBC (Absolute) 0.00 K/uL   Comp Metabolic Panel    Collection Time: 11/05/24  2:17 AM   Result Value Ref Range    Sodium 133 (L) 135 - 145 mmol/L    Potassium 4.2 3.6 - 5.5 mmol/L    Chloride 107 96 - 112 mmol/L    Co2 21 20 - 33 mmol/L    Anion Gap 5.0 (L) 7.0 - 16.0    Glucose 95 65 - 99 mg/dL    Bun 19 8 - 22 mg/dL    Creatinine 1.11 0.50 - 1.40 mg/dL    Calcium 8.2 (L) 8.5 - 10.5 mg/dL    Correct Calcium 9.6 8.5  - 10.5 mg/dL    AST(SGOT) 30 12 - 45 U/L    ALT(SGPT) 13 2 - 50 U/L    Alkaline Phosphatase 123 (H) 30 - 99 U/L    Total Bilirubin 1.8 (H) 0.1 - 1.5 mg/dL    Albumin 2.3 (L) 3.2 - 4.9 g/dL    Total Protein 4.8 (L) 6.0 - 8.2 g/dL    Globulin 2.5 1.9 - 3.5 g/dL    A-G Ratio 0.9 g/dL   MAGNESIUM    Collection Time: 11/05/24  2:17 AM   Result Value Ref Range    Magnesium 1.8 1.5 - 2.5 mg/dL   PHOSPHORUS    Collection Time: 11/05/24  2:17 AM   Result Value Ref Range    Phosphorus 4.5 2.5 - 4.5 mg/dL   IMMATURE PLT FRACTION    Collection Time: 11/05/24  2:17 AM   Result Value Ref Range    Imm. Plt Fraction 2.9 0.6 - 13.1 %   ESTIMATED GFR    Collection Time: 11/05/24  2:17 AM   Result Value Ref Range    GFR (CKD-EPI) 85 >60 mL/min/1.73 m 2   AMMONIA    Collection Time: 11/05/24  9:02 AM   Result Value Ref Range    Ammonia 59 (H) 11 - 45 umol/L       Imaging/ Testing:      DX-ANKLE 2- VIEWS RIGHT   Final Result      Normal ankle series.      [id number] VIEW   Final Result      1.  Increased bowel gas without definite obstruction.   2.  Tips shunt present.      [id number] VIEW   Final Result      No acute process.      DX-CHEST-PORTABLE (1 VIEW)   Final Result      Patchy bibasilar atelectasis.      CT-HEAD W/O   Final Result      1.  No evidence of acute intracranial process.      2.  Cerebral atrophy.                   Physical Exam:  Constitutional: Pleasant, cooperative, disheveled appearance, appears older than stated age  Skin: Warm, dry, good turgor, no rashes in visible areas.  HEENT: Normocephalic, atraumatic, EOMI, neck supple, moist mucous membranes  Cardiovascular: Regular rate and rhythm, no murmurs rubs or gallops  Respiratory: Unlabored respiratory effort, no cough, CTAB  MSK: Moves all extremities.  Neuro: Normal gait with good balance, no asterixis, intention tremor  Psych: Normal affect and mood.    Discharge Medications:           Medication List        START taking these medications         Instructions   riFAXIMin 550 MG Tabs tablet  Commonly known as: Xifaxan   Take 1 Tablet by mouth 2 times a day.  Dose: 550 mg     senna-docusate 8.6-50 MG Tabs  Commonly known as: Pericolace Or Senokot S   Take 2 Tablets by mouth 1 time a day as needed for Constipation.  Dose: 2 Tablet            CHANGE how you take these medications        Instructions   polyethylene glycol/lytes Pack  What changed:   how much to take  when to take this  reasons to take this  additional instructions  Commonly known as: Miralax   Take 1 Packet by mouth 1 time a day as needed (for constipation).  Dose: 17 g            CONTINUE taking these medications        Instructions   acetaminophen 500 MG Tabs  Commonly known as: Tylenol   Take 500 mg by mouth every four hours as needed for Mild Pain.  Dose: 500 mg     carvedilol 3.125 MG Tabs  Commonly known as: Coreg   Take 1 Tablet by mouth 2 times a day with meals.  Dose: 3.125 mg     escitalopram 20 MG tablet  Commonly known as: Lexapro   Take 1 Tablet by mouth every day.  Dose: 20 mg     folic acid 1 MG Tabs  Commonly known as: Folvite   Take 1 mg by mouth every day.  Dose: 1 mg     furosemide 20 MG Tabs  Commonly known as: Lasix   Take 80 mg by mouth every day. 4 tablets = 80 mg.  Dose: 80 mg     hydrOXYzine pamoate 50 MG Caps  Commonly known as: Vistaril   Take 50 mg by mouth every four hours as needed for Anxiety.  Dose: 50 mg     lactulose 10 GM/15ML Soln   Take 45 mL by mouth 4 times a day.  Dose: 45 mL     naltrexone 50 MG Tabs  Commonly known as: Depade   Take 50 mg by mouth every day.  Dose: 50 mg     pantoprazole 40 MG Tbec  Commonly known as: Protonix   Take 40 mg by mouth 2 times daily, before breakfast and dinner.  Dose: 40 mg     spironolactone 25 MG Tabs  Commonly known as: Aldactone   Take 100 mg by mouth every day. 4 tablets = 100 mg.  Dose: 100 mg     therapeutic multivitamin-minerals Tabs   Take 1 Tablet by mouth every day.  Dose: 1 Tablet     thiamine 100 MG  Tabs  Commonly known as: Vitamin B-1   Take 100 mg by mouth every day.  Dose: 100 mg     traZODone 50 MG Tabs  Commonly known as: Desyrel   Take 50 mg by mouth at bedtime as needed for Sleep.  Dose: 50 mg     triamcinolone acetonide 0.1 % Crea  Commonly known as: Kenalog   Apply 1 Application topically 2 times a day. Apply to affected area(s) of neck.  Indications: Skin Inflammation  Dose: 1 Application            STOP taking these medications      cloNIDine 0.1 MG Tabs  Commonly known as: Catapres     cyclobenzaprine 10 mg Tabs  Commonly known as: Flexeril     diazepam 10 MG tablet  Commonly known as: Valium     diazePAM 5 MG Tabs  Commonly known as: Valium                Disposition: Medically cleared for discharge to [facility] one transitional housing    Diet: Regular, healthy     Activity:   As tolerated no restrictions    Instructions:      -Take all medications as prescribed, do not skip doses     The patient was instructed to return to the ER in the event of worsening symptoms. I have counseled the patient on the importance of compliance and the patient has agreed to proceed with all medical recommendations and follow up plan indicated above.   The patient understands that all medications come with benefits and risks. Risks may include permanent injury or death and these risks can be minimized with close reassessment and monitoring.        Please CC: Mann Johnson M.D.    Follow up appointment details :        Future Appointments   Date Time Provider Department Center   11/13/2024  9:00 AM [facility] Paulino       Follow up with Primary Care Physician within 7 days of discharge for further evaluation and care     Pending Studies:        None    The patient met the 2-midnight criteria for an inpatient stay at the time of discharge.

## 2024-11-05 NOTE — DISCHARGE PLANNING
Community Care workerLeticia met with pt and provided resources. At discharge, pt will be transported to [facility] transitional housing [address] [facility]. Pt has MTM benefits and will request 1330 .

## 2024-11-05 NOTE — DISCHARGE PLANNING
"In the case of an emergency, pt's legal NOK is friend, Megan Patel     RNCM met with pt at bedside and obtained the information used in this assessment. Pt is homeless, is aware of [facility].  Prior to current hospitalization, pt was completely independent in ADLS/IADLS. Pt is unemployed and has no income.Pt has no support system. Pt /admits any hx of substance use, (beer) and denies any dx of mh.        Care Transition Team Assessment    Information Source:pt  Orientation Level: Oriented X4  Information Given By: Patient  Informant's Name: Brian  Who is responsible for making decisions for patient? : Patient  Name(s) of Primary Decision Maker: Megan-relative; VM left    Readmission Evaluation  Is this a readmission?: Yes - unplanned readmission  Why do you think you were readmitted?: \"not doing well\"  Was an appointment arranged for you prior to discharge?: Yes, attended appointment  Were there new prescriptions you were supposed to fill after you were discharged?: Yes, prescriptions filled  Did you understand your discharge instructions?: Yes  Did you have enough support after your last discharge?: Yes    Elopement Risk  Legal Hold: No  Ambulatory or Self Mobile in Wheelchair: Yes  Disoriented: No  Psychiatric Symptoms: None  History of Wandering: No  Elopement this Admit: No  Vocalizing Wanting to Leave: No  Displays Behaviors, Body Language Wanting to Leave: No-Not at Risk for Elopement  Elopement Risk: Not at Risk for Elopement    Interdisciplinary Discharge Planning  Does Admitting Nurse Feel This Could be a Complex Discharge?: No  Primary Care Physician: Alex  Lives with - Patient's Self Care Capacity: Alone and Able to Care For Self  Patient or legal guardian wants to designate a caregiver: No  Support Systems: None  Housing / Facility: Homeless  Do You Take your Prescribed Medications Regularly: Yes  Able to Return to Previous ADL's: Future Time w/Therapy  Mobility Issues: No  Prior " Services: None  Assistance Needed: No    Discharge Preparedness  What is your plan after discharge?: Uncertain - pending medical team collaboration  Prior Functional Level: Ambulatory, Independent with Activities of Daily Living, Independent with Medication Management  Difficulity with ADLs: None  Difficulity with IADLs: None    Functional Assesment  Prior Functional Level: Ambulatory, Independent with Activities of Daily Living, Independent with Medication Management    Finances  Financial Barriers to Discharge: No  Prescription Coverage: Yes    Vision / Hearing Impairment  Vision Impairment : No  Hearing Impairment : No    Values / Beliefs / Concerns  Values / Beliefs Concerns : No    Advance Directive  Advance Directive?: None  Advance Directive offered?: AD Booklet refused    Domestic Abuse  Have you ever been the victim of abuse or violence?: No  Physical Abuse or Sexual Abuse: No  Verbal Abuse or Emotional Abuse: No  Possible Abuse/Neglect Reported to:: Not Applicable    Psychological Assessment  History of Substance Abuse: Alcohol  History of Psychiatric Problems: No  Newly Diagnosed Illness: Yes    Discharge Risks or Barriers  Discharge risks or barriers?: Substance abuse, Homeless / couch surfing  Patient risk factors: Homeless, Lack of outside supports, Substance abuse    Anticipated Discharge Information  Discharge Disposition: Discharged to home/self care (01)

## 2024-11-05 NOTE — DISCHARGE PLANNING
Case Management Discharge Planning    Admission Date: 11/1/2024  GMLOS: 3.3  ALOS: 4    6-Clicks ADL Score: 24  6-Clicks Mobility Score: 18      Anticipated Discharge Dispo: Discharge Disposition: Discharged to home/self care (01)    DME Needed: No    Action(s) Taken: Updated Provider/Nurse on Discharge Plan and DC Assessment Complete (See below)    Escalations Completed: None    Medically Clear: No    Next Steps: Pt was at [facility] prior to transfer to [facility]. IF he chooses, can go to [facility] for eval. Leticia to see pt and provide resources, Anticipate discharge as soon as seen by Community Care worker.    Barriers to Discharge: Medical clearance, Transportation, No Social Support, and Homelessness    Is the patient up for discharge tomorrow: No

## 2024-11-05 NOTE — DISCHARGE PLANNING
Community Health Worker Intake    Identified barriers to housing, alcohol dependence, employment, transportation, and food.  Resources provided to Step 1, MTM, employment resources, [facility] [employer] locations.  Contact information provided to Jalen Vaughn   Has PCP appointment scheduled for 11/13/24  Inpatient assessment completed.    CHW met with pt at bedside to introduce Community Care Management per referral from LILLIANA Ghotra. Pt states he has been living on the streets, states he does not want to go to [facility]. Pt states he is active with a PCP, but is unsure of the doctors name. Pt does not have a reliable form of transportation and walks wherever he needs to go. Pt states he has no support in town as all his family lives in [place]. Pt is interested In getting a new job and requested employment resources. 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 Pt states he was sober for a year and a half before he began drinking again. Pt is interested in going to a sober living facility as long as he is aloud to still contact his children, pt refused any facility that has a 30+ day lock down. Pt states he does not use any forms of DME, nor does he face any barriers to medications. Pt states he does receive SNAP benefits, and that does last him through the month most of the time.     Plan:  Pt provided with resources for Step 1 sober living, MTM, employment assistance programs, and [facility] [employer] locations.  Pt has a follow up doctors appointment scheduled for 11/13/24  Appointment reminder put in follow up section of AVS.  CHW will follow up with patient post discharge.

## 2024-11-05 NOTE — CARE PLAN
The patient is Stable - Low risk of patient condition declining or worsening    Shift Goals  Clinical Goals: Neuro checks, safety, have BMs  Patient Goals: go back to facility  Family Goals: unable to assess    Progress made toward(s) clinical / shift goals:    Problem: Knowledge Deficit - Standard  Goal: Patient and family/care givers will demonstrate understanding of plan of care, disease process/condition, diagnostic tests and medications  Outcome: Progressing     Problem: Seizure Precautions  Goal: Implementation of seizure precautions  Outcome: Progressing     Problem: Risk for Aspiration  Goal: Patient's risk for aspiration will be absent or decrease  Outcome: Progressing     Problem: Fall Risk  Goal: Patient will remain free from falls  Outcome: Progressing     Problem: Pain - Standard  Goal: Alleviation of pain or a reduction in pain to the patient’s comfort goal  Outcome: Progressing     Problem: Communication  Goal: The ability to communicate needs accurately and effectively will improve  Outcome: Progressing     Problem: Safety  Goal: Will remain free from injury  Outcome: Progressing     Problem: Urinary Elimination:  Goal: Ability to reestablish a normal urinary elimination pattern will improve  Outcome: Progressing     Problem: Skin Integrity  Goal: Risk for impaired skin integrity will decrease  Outcome: Progressing     Problem: Mobility  Goal: Risk for activity intolerance will decrease  Outcome: Progressing       Patient is not progressing towards the following goals:

## 2024-11-05 NOTE — PROGRESS NOTES
Monitor Summary  Rhythm: SR  Rate: 63-85  Ectopy: /  Measurements: .17/.10/.47  ---12 hr Chart Review---

## 2024-11-05 NOTE — DISCHARGE PLANNING
Take home meds obtained from [facility] and given to bedside RN.Have requested Goldie to  re-schedule  transport for 1630.

## 2024-11-05 NOTE — PROGRESS NOTES
Patient d/c step 1, [facility] will be his transportation removed iv nad tele box notified monitor room. Helped patient dressed and declined shower. Eating lunch ride should be here 130. Waiting for meds to bed

## 2024-11-05 NOTE — CARE PLAN
The patient is Stable - Low risk of patient condition declining or worsening    Shift Goals  Clinical Goals: Neuro checks, safety, have BMs  Patient Goals: go back to facility  Family Goals: unable to assess    Progress made toward(s) clinical / shift goa:   Problem: Knowledge Deficit - Standard  Goal: Patient and family/care givers will demonstrate understanding of plan of care, disease process/condition, diagnostic tests and medications  Outcome: Progressing     Problem: Optimal Care for Alcohol Withdrawal  Goal: Optimal Care for the alcohol withdrawal patient  Outcome: Progressing     Problem: Seizure Precautions  Goal: Implementation of seizure precautions  Outcome: Progressing     Problem: Lifestyle Changes  Goal: Patient's ability to identify lifestyle changes and available resources to help reduce recurrence of condition will improve  Outcome: Progressing     Problem: Psychosocial  Goal: Patient's level of anxiety will decrease  Outcome: Progressing  Goal: Spiritual and cultural needs incorporated into hospitalization  Outcome: Progressing     Problem: Risk for Aspiration  Goal: Patient's risk for aspiration will be absent or decrease  Outcome: Progressing     Problem: Fall Risk  Goal: Patient will remain free from falls  Outcome: Progressing     Problem: Depression  Goal: Patient and family/caregiver will verbalize accurate information about at least two of the possible causes of depression, three-four of the signs and symptoms of depression  Outcome: Progressing     Problem: Pain - Standard  Goal: Alleviation of pain or a reduction in pain to the patient’s comfort goal  Outcome: Progressing     Problem: Communication  Goal: The ability to communicate needs accurately and effectively will improve  Outcome: Progressing     Problem: Safety  Goal: Will remain free from injury  Outcome: Progressing  Goal: Will remain free from falls  Outcome: Progressing     Problem: Pain Management  Goal: Pain level will  decrease to patient's comfort goal  Outcome: Progressing     Problem: Infection  Goal: Will remain free from infection  Outcome: Progressing     Problem: Venous Thromboembolism (VTW)/Deep Vein Thrombosis (DVT) Prevention:  Goal: Patient will participate in Venous Thrombosis (VTE)/Deep Vein Thrombosis (DVT)Prevention Measures  Outcome: Progressing     Problem: Psychosocial Needs:  Goal: Level of anxiety will decrease  Outcome: Progressing     Problem: Fluid Volume:  Goal: Will maintain balanced intake and output  Outcome: Progressing     Problem: Respiratory:  Goal: Respiratory status will improve  Outcome: Progressing     Problem: Bowel/Gastric:  Goal: Normal bowel function is maintained or improved  Outcome: Progressing  Goal: Will not experience complications related to bowel motility  Outcome: Progressing     Problem: Urinary Elimination:  Goal: Ability to reestablish a normal urinary elimination pattern will improve  Outcome: Progressing     Problem: Skin Integrity  Goal: Risk for impaired skin integrity will decrease  Outcome: Progressing     Problem: Mobility  Goal: Risk for activity intolerance will decrease  Outcome: Progressing     Problem: Medication  Goal: Compliance with prescribed medication will improve  Outcome: Progressing     Problem: Knowledge Deficit  Goal: Knowledge of disease process/condition, treatment plan, diagnostic tests, and medications will improve  Outcome: Progressing  Goal: Knowledge of the prescribed therapeutic regimen will improve  Outcome: Progressing     Problem: Discharge Barriers/Planning  Goal: Patient's continuum of care needs will be met  Outcome: Progressing   yes    Patient is not progressing towards the following goals:

## 2024-11-05 NOTE — DISCHARGE PLANNING
DC Transport Scheduled    Transport Company Scheduled:  [employer]  Spoke with MTM   MT Trip #: [id number]     Scheduled Date: 11/5/2024  Scheduled Time: 1330    Transport Type: Self  Destination: HOME  [address]  [place]    Notified care team of scheduled transport via Voalte.     If there are any changes needed to the DC transportation scheduled, please contact [facility] at ext. [phone] between the hours of 8602-0802 Mon-Fri. If outside those hours, contact the ED Case Manager at ext. [phone].

## 2024-11-05 NOTE — DISCHARGE PLANNING
Pt has RX for Rifaxamin, miralax, and senna. [facility] requires PA for Rifaxamin and does not cover any OTC meds. Attempted PA through [employer], rejected needs to go thru [facility]. Called [facility], they also rejected and redirected to [facility]  PA. To facilitate D/C, [facility] authorized I month AS for Rifaxamin and 2 OTC meds: miralx and Senna. Have faxed PA form to Horacio at .

## 2024-11-05 NOTE — DISCHARGE PLANNING
Step 1 only open until 5 pm. Have messaged Domonique to change destination to [facility] [address] if ride not available before 4:30.

## 2024-11-06 ENCOUNTER — HOSPITAL ENCOUNTER (EMERGENCY)
Facility: MEDICAL CENTER | Age: 44
End: 2024-11-06
Attending: EMERGENCY MEDICINE
Payer: COMMERCIAL

## 2024-11-06 VITALS
SYSTOLIC BLOOD PRESSURE: 111 MMHG | BODY MASS INDEX: 34.31 KG/M2 | WEIGHT: 281.75 LBS | OXYGEN SATURATION: 97 % | HEIGHT: 76 IN | TEMPERATURE: 97.5 F | DIASTOLIC BLOOD PRESSURE: 57 MMHG | RESPIRATION RATE: 18 BRPM | HEART RATE: 73 BPM

## 2024-11-06 DIAGNOSIS — R10.11 RIGHT UPPER QUADRANT ABDOMINAL PAIN: ICD-10-CM

## 2024-11-06 LAB
ALBUMIN SERPL BCP-MCNC: 3 G/DL (ref 3.2–4.9)
ALBUMIN/GLOB SERPL: 0.9 G/DL
ALP SERPL-CCNC: 143 U/L (ref 30–99)
ALT SERPL-CCNC: 18 U/L (ref 2–50)
ANION GAP SERPL CALC-SCNC: 8 MMOL/L (ref 7–16)
APPEARANCE UR: CLEAR
AST SERPL-CCNC: 48 U/L (ref 12–45)
BACTERIA #/AREA URNS HPF: ABNORMAL /HPF
BASOPHILS # BLD AUTO: 0.3 % (ref 0–1.8)
BASOPHILS # BLD: 0.02 K/UL (ref 0–0.12)
BILIRUB SERPL-MCNC: 2.9 MG/DL (ref 0.1–1.5)
BILIRUB UR QL STRIP.AUTO: NEGATIVE
BUN SERPL-MCNC: 24 MG/DL (ref 8–22)
CALCIUM ALBUM COR SERPL-MCNC: 9.7 MG/DL (ref 8.5–10.5)
CALCIUM SERPL-MCNC: 8.9 MG/DL (ref 8.5–10.5)
CASTS URNS QL MICRO: ABNORMAL /LPF (ref 0–2)
CHLORIDE SERPL-SCNC: 100 MMOL/L (ref 96–112)
CO2 SERPL-SCNC: 20 MMOL/L (ref 20–33)
COLOR UR: YELLOW
CREAT SERPL-MCNC: 1.32 MG/DL (ref 0.5–1.4)
EOSINOPHIL # BLD AUTO: 0.16 K/UL (ref 0–0.51)
EOSINOPHIL NFR BLD: 2.7 % (ref 0–6.9)
EPITHELIAL CELLS 1715: ABNORMAL /HPF (ref 0–5)
ERYTHROCYTE [DISTWIDTH] IN BLOOD BY AUTOMATED COUNT: 52.6 FL (ref 35.9–50)
GFR SERPLBLD CREATININE-BSD FMLA CKD-EPI: 69 ML/MIN/1.73 M 2
GLOBULIN SER CALC-MCNC: 3.4 G/DL (ref 1.9–3.5)
GLUCOSE SERPL-MCNC: 139 MG/DL (ref 65–99)
GLUCOSE UR STRIP.AUTO-MCNC: NEGATIVE MG/DL
HCT VFR BLD AUTO: 33.5 % (ref 42–52)
HGB BLD-MCNC: 11.2 G/DL (ref 14–18)
IMM GRANULOCYTES # BLD AUTO: 0.02 K/UL (ref 0–0.11)
IMM GRANULOCYTES NFR BLD AUTO: 0.3 % (ref 0–0.9)
KETONES UR STRIP.AUTO-MCNC: NEGATIVE MG/DL
LEUKOCYTE ESTERASE UR QL STRIP.AUTO: NEGATIVE
LIPASE SERPL-CCNC: 20 U/L (ref 11–82)
LYMPHOCYTES # BLD AUTO: 1.27 K/UL (ref 1–4.8)
LYMPHOCYTES NFR BLD: 21.3 % (ref 22–41)
MCH RBC QN AUTO: 31.5 PG (ref 27–33)
MCHC RBC AUTO-ENTMCNC: 33.4 G/DL (ref 32.3–36.5)
MCV RBC AUTO: 94.4 FL (ref 81.4–97.8)
MICRO URNS: ABNORMAL
MONOCYTES # BLD AUTO: 0.76 K/UL (ref 0–0.85)
MONOCYTES NFR BLD AUTO: 12.8 % (ref 0–13.4)
NEUTROPHILS # BLD AUTO: 3.72 K/UL (ref 1.82–7.42)
NEUTROPHILS NFR BLD: 62.6 % (ref 44–72)
NITRITE UR QL STRIP.AUTO: NEGATIVE
NRBC # BLD AUTO: 0 K/UL
NRBC BLD-RTO: 0 /100 WBC (ref 0–0.2)
PH UR STRIP.AUTO: 6 [PH] (ref 5–8)
PLATELET # BLD AUTO: 74 K/UL (ref 164–446)
PLATELETS.RETICULATED NFR BLD AUTO: 2.8 % (ref 0.6–13.1)
PMV BLD AUTO: 12.5 FL (ref 9–12.9)
POTASSIUM SERPL-SCNC: 4.8 MMOL/L (ref 3.6–5.5)
PROT SERPL-MCNC: 6.4 G/DL (ref 6–8.2)
PROT UR QL STRIP: NEGATIVE MG/DL
RBC # BLD AUTO: 3.55 M/UL (ref 4.7–6.1)
RBC # URNS HPF: ABNORMAL /HPF (ref 0–2)
RBC UR QL AUTO: ABNORMAL
SODIUM SERPL-SCNC: 128 MMOL/L (ref 135–145)
SP GR UR STRIP.AUTO: 1.01
UROBILINOGEN UR STRIP.AUTO-MCNC: 1 EU/DL
WBC # BLD AUTO: 6 K/UL (ref 4.8–10.8)
WBC #/AREA URNS HPF: ABNORMAL /HPF

## 2024-11-06 PROCEDURE — 99284 EMERGENCY DEPT VISIT MOD MDM: CPT

## 2024-11-06 PROCEDURE — 85055 RETICULATED PLATELET ASSAY: CPT

## 2024-11-06 PROCEDURE — A9270 NON-COVERED ITEM OR SERVICE: HCPCS | Mod: UD | Performed by: EMERGENCY MEDICINE

## 2024-11-06 PROCEDURE — 81001 URINALYSIS AUTO W/SCOPE: CPT

## 2024-11-06 PROCEDURE — 700102 HCHG RX REV CODE 250 W/ 637 OVERRIDE(OP): Mod: UD | Performed by: EMERGENCY MEDICINE

## 2024-11-06 PROCEDURE — 80053 COMPREHEN METABOLIC PANEL: CPT

## 2024-11-06 PROCEDURE — 85025 COMPLETE CBC W/AUTO DIFF WBC: CPT

## 2024-11-06 PROCEDURE — 36415 COLL VENOUS BLD VENIPUNCTURE: CPT

## 2024-11-06 PROCEDURE — 83690 ASSAY OF LIPASE: CPT

## 2024-11-06 RX ORDER — OXYCODONE HYDROCHLORIDE 5 MG/1
5 TABLET ORAL EVERY 4 HOURS PRN
Status: DISCONTINUED | OUTPATIENT
Start: 2024-11-06 | End: 2024-11-06 | Stop reason: HOSPADM

## 2024-11-06 RX ADMIN — OXYCODONE 5 MG: 5 TABLET ORAL at 14:32

## 2024-11-06 ASSESSMENT — FIBROSIS 4 INDEX: FIB4 SCORE: 4.92

## 2024-11-06 ASSESSMENT — PAIN DESCRIPTION - PAIN TYPE
TYPE: ACUTE PAIN
TYPE: ACUTE PAIN

## 2024-11-06 NOTE — DISCHARGE PLANNING
DC Transport Scheduled    Transport Company Scheduled:  [facility]  Spoke with Kulwant at [facility] to schedule transport.  [facility] Trip #: [id number]    Scheduled Date: 11/5/2024  Scheduled Time: 1630    Transport Type: Self  Destination: Step 1  [address]  [place]    Notified care team of scheduled transport via Voalte.     If there are any changes needed to the DC transportation scheduled, please contact [facility] at ext. [phone] between the hours of [phone] Mon-Fri. If outside those hours, contact the ED Case Manager at ext. [phone].

## 2024-11-06 NOTE — PROGRESS NOTES
Patient is A&Ox4. Discharge instructions. Personal belongings in possession with patient. PIV and tele monitor removed. Copy of discharge instructions in patient chart, signed and reviewed. Patient verbalizes the understanding of the discharge instructions. Questions and concerns addressed prior to leaving the unit. Transported via wheelchair by CCT. Patient discharged to step 1.

## 2024-11-06 NOTE — ED TRIAGE NOTES
Chief Complaint   Patient presents with    Medication Refill    Abdominal Pain     Patient ambulatory to triage with steady gait. Patient states he has been having abdominal for the last month. Patient also would like some medications refilled but does not know what ones he needs. Patient states he is trying to quite drinking and last drink was 1 week ago.   Patient is a poor historian. States he has issues with his liver.

## 2024-11-06 NOTE — DOCUMENTATION QUERY
"                                                                         [facility]                                                                       Query Response Note      PATIENT:               ARSENIO RODGERS  ACCT #:                  [id number]  MRN:                     [id number]  :                      1980  ADMIT DATE:       2024 4:06 PM  DISCH DATE:        2024 5:15 PM  RESPONDING  PROVIDER #:        [id number]           QUERY TEXT:    Please clarify in documentation the relationship, if any, between hepatic encephalopathy and alcoholic cirrhosis:    Note: If a query response diagnosis is provided, please include it in your daily notes and discharge summary.    The patient's Clinical Indicators include:  Clinical Findings:    - Dr. Maharaj discharge summary:  \"Primary Diagnosis:  Hepatic encephalopathy\"  \"Secondary Diagnoses:                 Alcoholic cirrhosis  Alcohol use disorder, severe dependence\"  \" He does report that he has tremors and feels that he is still going through alcohol withdrawal.\"  \"elevated alkaline phosphatase 166 with elevated bilirubin  2.3, lipase 42;\" \"Ammonia 71\"  \"Patient was started on lactulose 45 mL 4 times daily with rifaximin 550 mg twice daily, to which he responded well, with complete resolution of encephalopathy. \"    Labs:  Ammonia: [id number]  Alk phos: [phone]  Bilirubin: 2.3-1.9-2.4-2.5-1.8  PLT: [id number]    Risk factors: Hepatic encephalopathy; alcoholic cirrhosis, alcohol dependence, alcohol withdrawal, anemia, HTN, anxiety; recent hospitalization for alcohol withdrawal    Treatment:  lactulose; rifaximin; lasix; spironolactone; Librium; thiamine; MVI, folic acid; ativan; labs; CT head    Please contact me with any questions:    Venice DOSHI RN CCDS  CDI [facility]  [email or web address]  Venice Jimenez via Voalte  Options provided:   -- Hepatic encephalopathy is due to or associated with alcoholic cirrhosis   -- Hepatic " encephalopathy is not due to or associated with alcoholic cirrhosis   -- Hepatic encephalopathy is due to or associated with other cause, Please specify other cause   -- Other explanation, Please specify other explanation   -- Unable to determine      Query created by: Venice Jimenez on 11/5/2024 6:41 PM    RESPONSE TEXT:    Hepatic encephalopathy is due to or associated with alcoholic cirrhosis          Electronically signed by:  MITALI KESSLER MD 11/6/2024 9:00 AM

## 2024-11-06 NOTE — ED PROVIDER NOTES
ED Provider Note    Scribed for Erick Del Valle M.D. by Erick Del Valle M.D.. 11/6/2024  11:17 AM    Primary care provider: Mann Johnson M.D.  Means of arrival: Walk-in    CHIEF COMPLAINT  Chief Complaint   Patient presents with    Medication Refill    Abdominal Pain       EXTERNAL RECORDS REVIEWED  GI op note from October 27 reviewed.  Patient has history of cirrhosis right upper quadrant pain TIPS for GI bleed of the GE junction and these varices extended into the retroperitoneum and splenorenal varices.  He had a small hiatal hernia. CT-Abdomen and Pelvis on 10/17/24 showed no acute abnormalities, cirrhosis, varices, portal hypertension in tips. Liver US preformed 10/24/24. History of Cholecystectomy. Mild right hydronephrosis. EGD reveals abnormal appearing inlet patch.     SHUKRI Vaughn is a 44 y.o. male who presents to the Emergency Department for abdominal pain onset 8 months ago. The patient reports that he has been experiencing abdominal pain for the last 8 months. The patient has been seen here 3 times for similar symptoms and notes that previous physicians have thought his abdominal pain was due to cirrhosis. He describes his abdominal pain as intermittent but daily and located on the right side. He has had associated diarrhea ever since he got a fluid enema. Denies nausea, vomiting, constipation, hematuria, dysuria, or burning with urination. However, he notes that when he has a bowel movement he also has the urge to urinate. He explains that he has not drank alcohol in the past week. He adds that about 1.5 months ago he woke up with a bloody mouth. The patient has a history of an appendectomy. Denies history of diverticulitis or pancreatitis.     LIMITATION TO HISTORY   None    OUTSIDE HISTORIAN(S):  None     PAST MEDICAL HISTORY  Past Medical History:   Diagnosis Date    Cirrhosis of liver (HCC)     Hypertension     Liver disease     Seizure (HCC)        FAMILY HISTORY  Family History    Problem Relation Age of Onset    Heart Disease Mother     Kidney Disease Mother     Heart Disease Father     Kidney Disease Father        SOCIAL HISTORY  Social History     Tobacco Use    Smoking status: Never    Smokeless tobacco: Current     Types: Chew   Vaping Use    Vaping status: Never Used   Substance Use Topics    Alcohol use: Yes     Comment: pt reports two beers daily    Drug use: Not Currently     Social History     Substance and Sexual Activity   Drug Use Not Currently       SURGICAL HISTORY  Past Surgical History:   Procedure Laterality Date    [place] UPPER GI ENDOSCOPY,DIAGNOSIS N/A 10/27/2024    Procedure: GASTROSCOPY;  Surgeon: Treva Lopez M.D.;  Location: SURGERY [facility];  Service: Gastroenterology    APPENDECTOMY      CHOLECYSTECTOMY         CURRENT MEDICATIONS  Current Outpatient Medications   Medication Instructions    acetaminophen (TYLENOL) 500 mg, Oral, EVERY 4 HOURS PRN    carvedilol (COREG) 3.125 mg, Oral, 2 TIMES DAILY WITH MEALS    escitalopram (LEXAPRO) 20 mg, Oral, DAILY    folic acid (FOLVITE) 1 mg, Oral, DAILY    furosemide (LASIX) 80 mg, Oral, DAILY, 4 tablets = 80 mg.    hydrOXYzine pamoate (VISTARIL) 50 mg, Oral, EVERY 4 HOURS PRN    lactulose 20 GM/30ML Solution 45 mL, Oral, 4 TIMES DAILY    naltrexone (DEPADE) 50 mg, Oral, DAILY    pantoprazole (PROTONIX) 40 mg, Oral, 2 TIMES DAILY BEFORE MEALS    polyethylene glycol/lytes (MIRALAX) Pack 1 Packet, Oral, 1 TIME DAILY PRN    senna-docusate (PERICOLACE OR SENOKOT S) 8.6-50 MG Tab 2 Tablets, Oral, 1 TIME DAILY PRN    spironolactone (ALDACTONE) 100 mg, Oral, DAILY, 4 tablets = 100 mg.    therapeutic multivitamin-minerals (THERAGRAN-M) Tab 1 Tablet, Oral, DAILY    thiamine (VITAMIN B-1) 100 mg, Oral, DAILY    traZODone (DESYREL) 50 mg, Oral, EVERY BEDTIME PRN    triamcinolone acetonide (KENALOG) 0.1 % Cream 1 Application, Topical, 2 TIMES DAILY, Apply to affected area(s) of neck.    Xifaxan 550 mg, Oral, 2 TIMES DAILY     "    ALLERGIES  Allergies   Allergen Reactions    Latex Itching    Morphine Itching       PHYSICAL EXAM  VITAL SIGNS: BP (!) 143/89   Pulse 62   Temp 36.1 °C (96.9 °F) (Temporal)   Resp 18   Ht 1.93 m (6' 4\")   Wt (!) 128 kg (281 lb 12 oz)   SpO2 98%   BMI 34.30 kg/m²   Reviewed and hypertensive afebrile  Constitutional: Well developed, Well nourished, elevated BMI.  HENT: Normocephalic, atraumatic, bilateral external ears normal, No intraoral erythema, edema, exudate  Eyes: PERRLA, conjunctiva pink, no scleral icterus.   Cardiovascular: Regular rate and rhythm. No murmurs, rubs or gallops.  No dependent edema or calf tenderness  Respiratory: Lungs clear to auscultation bilaterally. No wheezes, rales, or rhonchi.  Abdominal:  Abdomen soft, non-tender, non distended. No rebound, or guarding.    Skin: No erythema, no rash. No wounds or bruising.  Genitourinary: No costovertebral angle tenderness.   Musculoskeletal: no deformities.   Neurologic: Alert & oriented x 3, cranial nerves 2-12 intact by passive exam.  No focal deficit noted.  Psychiatric: Affect normal, Judgment normal, Mood normal.     LABS Ordered and Reviewed by Me:  Results for orders placed or performed during the hospital encounter of 11/06/24   CBC WITH DIFFERENTIAL    Collection Time: 11/06/24 10:14 AM   Result Value Ref Range    WBC 6.0 4.8 - 10.8 K/uL    RBC 3.55 (L) 4.70 - 6.10 M/uL    Hemoglobin 11.2 (L) 14.0 - 18.0 g/dL    Hematocrit 33.5 (L) 42.0 - 52.0 %    MCV 94.4 81.4 - 97.8 fL    MCH 31.5 27.0 - 33.0 pg    MCHC 33.4 32.3 - 36.5 g/dL    RDW 52.6 (H) 35.9 - 50.0 fL    Platelet Count 74 (L) 164 - 446 K/uL    MPV 12.5 9.0 - 12.9 fL    Neutrophils-Polys 62.60 44.00 - 72.00 %    Lymphocytes 21.30 (L) 22.00 - 41.00 %    Monocytes 12.80 0.00 - 13.40 %    Eosinophils 2.70 0.00 - 6.90 %    Basophils 0.30 0.00 - 1.80 %    Immature Granulocytes 0.30 0.00 - 0.90 %    Nucleated RBC 0.00 0.00 - 0.20 /100 WBC    Neutrophils (Absolute) 3.72 1.82 - " 7.42 K/uL    Lymphs (Absolute) 1.27 1.00 - 4.80 K/uL    Monos (Absolute) 0.76 0.00 - 0.85 K/uL    Eos (Absolute) 0.16 0.00 - 0.51 K/uL    Baso (Absolute) 0.02 0.00 - 0.12 K/uL    Immature Granulocytes (abs) 0.02 0.00 - 0.11 K/uL    NRBC (Absolute) 0.00 K/uL   COMP METABOLIC PANEL    Collection Time: 11/06/24 10:14 AM   Result Value Ref Range    Sodium 128 (L) 135 - 145 mmol/L    Potassium 4.8 3.6 - 5.5 mmol/L    Chloride 100 96 - 112 mmol/L    Co2 20 20 - 33 mmol/L    Anion Gap 8.0 7.0 - 16.0    Glucose 139 (H) 65 - 99 mg/dL    Bun 24 (H) 8 - 22 mg/dL    Creatinine 1.32 0.50 - 1.40 mg/dL    Calcium 8.9 8.5 - 10.5 mg/dL    Correct Calcium 9.7 8.5 - 10.5 mg/dL    AST(SGOT) 48 (H) 12 - 45 U/L    ALT(SGPT) 18 2 - 50 U/L    Alkaline Phosphatase 143 (H) 30 - 99 U/L    Total Bilirubin 2.9 (H) 0.1 - 1.5 mg/dL    Albumin 3.0 (L) 3.2 - 4.9 g/dL    Total Protein 6.4 6.0 - 8.2 g/dL    Globulin 3.4 1.9 - 3.5 g/dL    A-G Ratio 0.9 g/dL   LIPASE    Collection Time: 11/06/24 10:14 AM   Result Value Ref Range    Lipase 20 11 - 82 U/L   ESTIMATED GFR    Collection Time: 11/06/24 10:14 AM   Result Value Ref Range    GFR (CKD-EPI) 69 >60 mL/min/1.73 m 2   IMMATURE PLT FRACTION    Collection Time: 11/06/24 10:14 AM   Result Value Ref Range    Imm. Plt Fraction 2.8 0.6 - 13.1 %   URINALYSIS    Collection Time: 11/06/24  2:27 PM    Specimen: Urine, Clean Catch   Result Value Ref Range    Color Yellow     Character Clear     Specific Gravity 1.008 <1.035    Ph 6.0 5.0 - 8.0    Glucose Negative Negative mg/dL    Ketones Negative Negative mg/dL    Protein Negative Negative mg/dL    Bilirubin Negative Negative    Urobilinogen, Urine 1.0 <=1.0 EU/dL    Nitrite Negative Negative    Leukocyte Esterase Negative Negative    Occult Blood Small (A) Negative    Micro Urine Req Microscopic    URINE MICROSCOPIC (W/UA)    Collection Time: 11/06/24  2:27 PM   Result Value Ref Range    WBC 0-2 /hpf    RBC 3-5 (A) 0 - 2 /hpf    Bacteria None Seen None /hpf     Epithelial Cells 0-2 0 - 5 /hpf    Urine Casts 0-2 0 - 2 /lpf       COURSE & MEDICAL DECISION MAKING  11:17 AM - Patient seen and examined at bedside.     INTERVENTIONS BY ME:  Medications   oxyCODONE immediate-release (Roxicodone) tablet 5 mg (5 mg Oral Given 11/6/24 5392)   oxyCODONE immediate-release (Roxicodone) tablet 5 mg (has no administration in time range)       2:19 PM - Patient was reevaluated at bedside. Discussed lab results with the patient. Still awaiting urine results. The patient will be medicated with 5 mg Roxicodone.     4:35 PM - Patient reevaluated at bedside. He feels improved following medication administration. I discussed the patient's diagnostic study results. Discussed plan for discharge, including plan for follow-up, and informed them to return to the [facility] ED with any new or worsening symptoms. Patient was given the opportunity for questions, and I addressed all questions or concerns. She is stable for discharge at this time. Patient verbalizes understanding and support with my plan for discharge.    ASSESSMENT, COURSE AND PLAN:  PROBLEMS EVALUATED THIS VISIT:    This patient presents with more than a month of periumbilical and right upper quadrant abdominal pain.  Has been to the ER 3 times, admitted twice and has had an unremarkable ultrasound of the liver, CT of the abdomen and pelvis and an EGD.  He is status post cholecystectomy and appendectomy.  He did not have gastritis or ulcer on his EGD.  He does have known cirrhosis without significant ascites.  He does not have pancreatitis.  His pain may be related to adhesions.  There is no evidence of kidney stones or pyelonephritis.  Patient also has cirrhosis and alcohol abuse which is a chronic condition.  His cirrhosis appears stable although his alcohol use is unstable.    DISPOSITION AND DISCUSSIONS      RISK:  Moderate given need for oral opiate analgesic    PLAN:  Tylenol for pain limit to 2 g daily    Abdominal pain handout  given    Return for severe pain distention fever GI bleed    Followup:  Mann Johnson M.D.  [address]  [place] [id number] [phone]    Schedule an appointment as soon as possible for a visit       Kev Goetz M.D.  [address]  [address]  [place] [address] [phone]      for a consultation for possible adhesion or scar tissue pain      CONDITION: Good.     FINAL IMPRESSION  1. Right upper quadrant abdominal pain            IBrisa (Joanieibtianna), am scribing for, and in the presence of, Erick Del Valle M.D..    Electronically signed by: Brisa Sequeira (Sagar), 11/6/2024    IErick M.D. personally performed the services described in this documentation, as scribed by Brisa Sequeira in my presence, and it is both accurate and complete.     The note accurately reflects work and decisions made by me.  Erick Del Valle M.D.  11/7/2024  9:15 AM

## 2024-11-07 NOTE — DISCHARGE INSTRUCTIONS
Could not find the cause of your abdominal pain.  It may be that you have adhesions in the abdomen after prior surgeries.  Please make a follow-up appointment with Dr. Goetz for a consultation.  Take Tylenol for pain but limited to 2 g a day.  Stop drinking all alcohol.  Return to the ER for severe pain pain and fever pain and bleeding.

## 2024-11-07 NOTE — ED NOTES
Patient discharged home per ER MD.   Discharge instructions reviewed and discussed with patient. Patient verbalized understanding of discharge teaching and education. POC discussed.       Patient alert and oriented x 4. VSS. Patient able to ambulate off unit with steady gait. All belongings with patient at time of discharge.  Pt provided MTM information in lobby with food and drink. Pt provided with housing information.

## 2024-11-08 LAB — VIT B1 BLD-MCNC: 156 NMOL/L (ref 70–180)

## 2024-11-09 LAB — VIT B6 SERPL-MCNC: 65.4 NMOL/L (ref 20–125)

## 2024-11-10 ENCOUNTER — APPOINTMENT (OUTPATIENT)
Dept: RADIOLOGY | Facility: MEDICAL CENTER | Age: 44
End: 2024-11-10
Attending: EMERGENCY MEDICINE
Payer: COMMERCIAL

## 2024-11-10 ENCOUNTER — PHARMACY VISIT (OUTPATIENT)
Dept: PHARMACY | Facility: MEDICAL CENTER | Age: 44
End: 2024-11-10
Payer: COMMERCIAL

## 2024-11-10 ENCOUNTER — HOSPITAL ENCOUNTER (EMERGENCY)
Facility: MEDICAL CENTER | Age: 44
End: 2024-11-10
Attending: EMERGENCY MEDICINE
Payer: COMMERCIAL

## 2024-11-10 VITALS
TEMPERATURE: 98.6 F | BODY MASS INDEX: 34.1 KG/M2 | HEIGHT: 76 IN | SYSTOLIC BLOOD PRESSURE: 131 MMHG | WEIGHT: 280 LBS | RESPIRATION RATE: 16 BRPM | HEART RATE: 87 BPM | DIASTOLIC BLOOD PRESSURE: 80 MMHG | OXYGEN SATURATION: 97 %

## 2024-11-10 DIAGNOSIS — S09.90XA CLOSED HEAD INJURY, INITIAL ENCOUNTER: ICD-10-CM
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
## 2024-11-10 DIAGNOSIS — Z76.0 MEDICATION REFILL: ICD-10-CM

## 2024-11-10 DIAGNOSIS — K74.60 CIRRHOSIS OF LIVER WITHOUT ASCITES, UNSPECIFIED HEPATIC CIRRHOSIS TYPE (HCC): ICD-10-CM

## 2024-11-10 LAB — POC BREATHALIZER: 0.08 PERCENT (ref 0–0.01)

## 2024-11-10 PROCEDURE — 73610 X-RAY EXAM OF ANKLE: CPT | Mod: RT

## 2024-11-10 PROCEDURE — RXMED WILLOW AMBULATORY MEDICATION CHARGE: Performed by: EMERGENCY MEDICINE

## 2024-11-10 PROCEDURE — 302970 POC BREATHALIZER: Performed by: EMERGENCY MEDICINE

## 2024-11-10 PROCEDURE — 90471 IMMUNIZATION ADMIN: CPT

## 2024-11-10 PROCEDURE — 700111 HCHG RX REV CODE 636 W/ 250 OVERRIDE (IP): Performed by: EMERGENCY MEDICINE

## 2024-11-10 PROCEDURE — 90715 TDAP VACCINE 7 YRS/> IM: CPT | Performed by: EMERGENCY MEDICINE

## 2024-11-10 PROCEDURE — 99284 EMERGENCY DEPT VISIT MOD MDM: CPT

## 2024-11-10 PROCEDURE — 70450 CT HEAD/BRAIN W/O DYE: CPT

## 2024-11-10 RX ORDER — CARVEDILOL 3.12 MG/1
3.12 TABLET ORAL 2 TIMES DAILY WITH MEALS
Qty: 60 TABLET | Refills: 0 | Status: ON HOLD | OUTPATIENT
Start: 2024-11-10 | End: 2024-11-17

## 2024-11-10 RX ORDER — LACTULOSE 10 G/15ML
30 SOLUTION ORAL 4 TIMES DAILY
Qty: 473 ML | Refills: 0 | Status: ON HOLD | OUTPATIENT
Start: 2024-11-10 | End: 2024-11-20

## 2024-11-10 RX ORDER — ESCITALOPRAM OXALATE 20 MG/1
20 TABLET ORAL DAILY
Qty: 30 TABLET | Refills: 0 | Status: ON HOLD | OUTPATIENT
Start: 2024-11-10 | End: 2024-11-17

## 2024-11-10 RX ORDER — TRAZODONE HYDROCHLORIDE 50 MG/1
50 TABLET, FILM COATED ORAL
Qty: 10 TABLET | Refills: 0 | Status: ON HOLD | OUTPATIENT
Start: 2024-11-10 | End: 2024-11-18

## 2024-11-10 RX ADMIN — CLOSTRIDIUM TETANI TOXOID ANTIGEN (FORMALDEHYDE INACTIVATED), CORYNEBACTERIUM DIPHTHERIAE TOXOID ANTIGEN (FORMALDEHYDE INACTIVATED), BORDETELLA PERTUSSIS TOXOID ANTIGEN (GLUTARALDEHYDE INACTIVATED), BORDETELLA PERTUSSIS FILAMENTOUS HEMAGGLUTININ ANTIGEN (FORMALDEHYDE INACTIVATED), BORDETELLA PERTUSSIS PERTACTIN ANTIGEN, AND BORDETELLA PERTUSSIS FIMBRIAE 2/3 ANTIGEN 0.5 ML: 5; 2; 2.5; 5; 3; 5 INJECTION, SUSPENSION INTRAMUSCULAR at 19:52

## 2024-11-10 ASSESSMENT — FIBROSIS 4 INDEX: FIB4 SCORE: 6.32
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
## 2024-11-11 NOTE — ED NOTES
Reviewed discharge paperwork with patient. Patient verbalized understanding of instructions and medication. He was handed his discharge medications. Will f/u accordingly. Denies further questions at this time. Ambulatory out of ER with steady gait.
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
## 2024-11-11 NOTE — ED NOTES
Pt escorted to room via wheelchair. Pt amble to stand and pivot to gurney. Pt educated on ER process and instructed to wait for ER assessment.

## 2024-11-11 NOTE — ED NOTES
Case management contacted and they came to speak with the pt and provide him with resources for follow up, shelter, and food fernandes.

## 2024-11-13 ENCOUNTER — APPOINTMENT (OUTPATIENT)
Dept: RADIOLOGY | Facility: MEDICAL CENTER | Age: 44
DRG: 442 | End: 2024-11-13
Attending: STUDENT IN AN ORGANIZED HEALTH CARE EDUCATION/TRAINING PROGRAM
Payer: COMMERCIAL

## 2024-11-13 ENCOUNTER — HOSPITAL ENCOUNTER (INPATIENT)
Facility: MEDICAL CENTER | Age: 44
LOS: 5 days | DRG: 442 | End: 2024-11-18
Attending: STUDENT IN AN ORGANIZED HEALTH CARE EDUCATION/TRAINING PROGRAM | Admitting: STUDENT IN AN ORGANIZED HEALTH CARE EDUCATION/TRAINING PROGRAM
Payer: COMMERCIAL

## 2024-11-13 DIAGNOSIS — K76.82 HEPATIC ENCEPHALOPATHY (HCC): ICD-10-CM

## 2024-11-13 DIAGNOSIS — Z76.0 MEDICATION REFILL: ICD-10-CM

## 2024-11-13 PROBLEM — R01.1 MURMUR: Status: ACTIVE | Noted: 2024-11-13

## 2024-11-13 PROBLEM — K74.60 CIRRHOSIS (HCC): Status: ACTIVE | Noted: 2024-11-13

## 2024-11-13 PROBLEM — G47.00 INSOMNIA: Status: ACTIVE | Noted: 2024-11-13
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
## 2024-11-13 LAB
ALBUMIN SERPL BCP-MCNC: 3.1 G/DL (ref 3.2–4.9)
ALBUMIN/GLOB SERPL: 1 G/DL
ALP SERPL-CCNC: 182 U/L (ref 30–99)
ALT SERPL-CCNC: 24 U/L (ref 2–50)
AMMONIA PLAS-SCNC: 124 UMOL/L (ref 11–45)
ANION GAP SERPL CALC-SCNC: 11 MMOL/L (ref 7–16)
APTT PPP: 35 SEC (ref 24.7–36)
AST SERPL-CCNC: 67 U/L (ref 12–45)
BASOPHILS # BLD AUTO: 0.4 % (ref 0–1.8)
BASOPHILS # BLD: 0.02 K/UL (ref 0–0.12)
BILIRUB SERPL-MCNC: 2.6 MG/DL (ref 0.1–1.5)
BUN SERPL-MCNC: 13 MG/DL (ref 8–22)
CALCIUM ALBUM COR SERPL-MCNC: 9.3 MG/DL (ref 8.5–10.5)
CALCIUM SERPL-MCNC: 8.6 MG/DL (ref 8.5–10.5)
CHLORIDE SERPL-SCNC: 106 MMOL/L (ref 96–112)
CO2 SERPL-SCNC: 19 MMOL/L (ref 20–33)
CREAT SERPL-MCNC: 0.75 MG/DL (ref 0.5–1.4)
EKG IMPRESSION: NORMAL
EOSINOPHIL # BLD AUTO: 0.1 K/UL (ref 0–0.51)
EOSINOPHIL NFR BLD: 1.9 % (ref 0–6.9)
ERYTHROCYTE [DISTWIDTH] IN BLOOD BY AUTOMATED COUNT: 53.5 FL (ref 35.9–50)
ETHANOL BLD-MCNC: 118.5 MG/DL
GFR SERPLBLD CREATININE-BSD FMLA CKD-EPI: 115 ML/MIN/1.73 M 2
GLOBULIN SER CALC-MCNC: 3.1 G/DL (ref 1.9–3.5)
GLUCOSE SERPL-MCNC: 87 MG/DL (ref 65–99)
HCT VFR BLD AUTO: 29.9 % (ref 42–52)
HGB BLD-MCNC: 10.1 G/DL (ref 14–18)
IMM GRANULOCYTES # BLD AUTO: 0.02 K/UL (ref 0–0.11)
IMM GRANULOCYTES NFR BLD AUTO: 0.4 % (ref 0–0.9)
INR PPP: 2.96 (ref 0.87–1.13)
LIPASE SERPL-CCNC: 45 U/L (ref 11–82)
LYMPHOCYTES # BLD AUTO: 1.27 K/UL (ref 1–4.8)
LYMPHOCYTES NFR BLD: 24.3 % (ref 22–41)
MAGNESIUM SERPL-MCNC: 2 MG/DL (ref 1.5–2.5)
MCH RBC QN AUTO: 32.1 PG (ref 27–33)
MCHC RBC AUTO-ENTMCNC: 33.8 G/DL (ref 32.3–36.5)
MCV RBC AUTO: 94.9 FL (ref 81.4–97.8)
MONOCYTES # BLD AUTO: 0.68 K/UL (ref 0–0.85)
MONOCYTES NFR BLD AUTO: 13 % (ref 0–13.4)
NEUTROPHILS # BLD AUTO: 3.13 K/UL (ref 1.82–7.42)
NEUTROPHILS NFR BLD: 60 % (ref 44–72)
NRBC # BLD AUTO: 0 K/UL
NRBC BLD-RTO: 0 /100 WBC (ref 0–0.2)
PLATELET # BLD AUTO: 63 K/UL (ref 164–446)
PLATELETS.RETICULATED NFR BLD AUTO: 3.3 % (ref 0.6–13.1)
PMV BLD AUTO: 11.2 FL (ref 9–12.9)
POTASSIUM SERPL-SCNC: 3.8 MMOL/L (ref 3.6–5.5)
PROT SERPL-MCNC: 6.2 G/DL (ref 6–8.2)
PROTHROMBIN TIME: 31 SEC (ref 12–14.6)
RBC # BLD AUTO: 3.15 M/UL (ref 4.7–6.1)
SODIUM SERPL-SCNC: 136 MMOL/L (ref 135–145)
WBC # BLD AUTO: 5.2 K/UL (ref 4.8–10.8)

## 2024-11-13 PROCEDURE — 770001 HCHG ROOM/CARE - MED/SURG/GYN PRIV*

## 2024-11-13 PROCEDURE — 85610 PROTHROMBIN TIME: CPT

## 2024-11-13 PROCEDURE — 36415 COLL VENOUS BLD VENIPUNCTURE: CPT

## 2024-11-13 PROCEDURE — 83735 ASSAY OF MAGNESIUM: CPT

## 2024-11-13 PROCEDURE — 85025 COMPLETE CBC W/AUTO DIFF WBC: CPT

## 2024-11-13 PROCEDURE — 80053 COMPREHEN METABOLIC PANEL: CPT

## 2024-11-13 PROCEDURE — 82077 ASSAY SPEC XCP UR&BREATH IA: CPT

## 2024-11-13 PROCEDURE — 85730 THROMBOPLASTIN TIME PARTIAL: CPT

## 2024-11-13 PROCEDURE — 82140 ASSAY OF AMMONIA: CPT

## 2024-11-13 PROCEDURE — 83690 ASSAY OF LIPASE: CPT

## 2024-11-13 PROCEDURE — 85055 RETICULATED PLATELET ASSAY: CPT

## 2024-11-13 PROCEDURE — 99285 EMERGENCY DEPT VISIT HI MDM: CPT

## 2024-11-13 PROCEDURE — 84100 ASSAY OF PHOSPHORUS: CPT

## 2024-11-13 PROCEDURE — 70450 CT HEAD/BRAIN W/O DYE: CPT

## 2024-11-13 PROCEDURE — 93005 ELECTROCARDIOGRAM TRACING: CPT | Performed by: STUDENT IN AN ORGANIZED HEALTH CARE EDUCATION/TRAINING PROGRAM

## 2024-11-13 RX ORDER — LORAZEPAM 2 MG/ML
1.5 INJECTION INTRAMUSCULAR
Status: DISCONTINUED | OUTPATIENT
Start: 2024-11-13 | End: 2024-11-18 | Stop reason: HOSPADM

## 2024-11-13 RX ORDER — LACTULOSE 10 G/15ML
30 SOLUTION ORAL 3 TIMES DAILY
Status: SHIPPED | COMMUNITY
Start: 2024-11-10 | End: 2024-11-13

## 2024-11-13 RX ORDER — LORAZEPAM 1 MG/1
3 TABLET ORAL
Status: DISCONTINUED | OUTPATIENT
Start: 2024-11-13 | End: 2024-11-18 | Stop reason: HOSPADM

## 2024-11-13 RX ORDER — LORAZEPAM 2 MG/ML
2 INJECTION INTRAMUSCULAR
Status: DISCONTINUED | OUTPATIENT
Start: 2024-11-13 | End: 2024-11-18 | Stop reason: HOSPADM

## 2024-11-13 RX ORDER — TRAZODONE HYDROCHLORIDE 50 MG/1
50 TABLET ORAL
Status: DISCONTINUED | OUTPATIENT
Start: 2024-11-13 | End: 2024-11-18 | Stop reason: HOSPADM

## 2024-11-13 RX ORDER — SPIRONOLACTONE 100 MG/1
100 TABLET, FILM COATED ORAL DAILY
Status: ON HOLD | COMMUNITY
End: 2024-11-17

## 2024-11-13 RX ORDER — OMEPRAZOLE 20 MG/1
20 CAPSULE, DELAYED RELEASE ORAL
Status: DISCONTINUED | OUTPATIENT
Start: 2024-11-14 | End: 2024-11-18 | Stop reason: HOSPADM

## 2024-11-13 RX ORDER — FUROSEMIDE 20 MG/1
80 TABLET ORAL DAILY
Status: DISCONTINUED | OUTPATIENT
Start: 2024-11-14 | End: 2024-11-18 | Stop reason: HOSPADM

## 2024-11-13 RX ORDER — GAUZE BANDAGE 2" X 2"
100 BANDAGE TOPICAL DAILY
Status: DISCONTINUED | OUTPATIENT
Start: 2024-11-18 | End: 2024-11-18 | Stop reason: HOSPADM

## 2024-11-13 RX ORDER — FOLIC ACID 1 MG/1
1 TABLET ORAL DAILY
Status: DISCONTINUED | OUTPATIENT
Start: 2024-11-18 | End: 2024-11-18 | Stop reason: HOSPADM

## 2024-11-13 RX ORDER — HYDROXYZINE HYDROCHLORIDE 25 MG/1
25 TABLET, FILM COATED ORAL 3 TIMES DAILY PRN
Status: ON HOLD | COMMUNITY
End: 2024-11-18

## 2024-11-13 RX ORDER — MAGNESIUM OXIDE 400 MG/1
400 TABLET ORAL 2 TIMES DAILY
Status: SHIPPED | COMMUNITY
Start: 2024-11-10 | End: 2024-11-13

## 2024-11-13 RX ORDER — LORAZEPAM 1 MG/1
1 TABLET ORAL EVERY 4 HOURS PRN
Status: DISCONTINUED | OUTPATIENT
Start: 2024-11-13 | End: 2024-11-18 | Stop reason: HOSPADM

## 2024-11-13 RX ORDER — SPIRONOLACTONE 100 MG/1
100 TABLET, FILM COATED ORAL DAILY
Status: DISCONTINUED | OUTPATIENT
Start: 2024-11-14 | End: 2024-11-18 | Stop reason: HOSPADM

## 2024-11-13 RX ORDER — AMLODIPINE BESYLATE 5 MG/1
5 TABLET ORAL DAILY
Status: ON HOLD | COMMUNITY
End: 2024-11-18

## 2024-11-13 RX ORDER — LORAZEPAM 2 MG/ML
1 INJECTION INTRAMUSCULAR
Status: DISCONTINUED | OUTPATIENT
Start: 2024-11-13 | End: 2024-11-18 | Stop reason: HOSPADM

## 2024-11-13 RX ORDER — LORAZEPAM 1 MG/1
4 TABLET ORAL
Status: DISCONTINUED | OUTPATIENT
Start: 2024-11-13 | End: 2024-11-18 | Stop reason: HOSPADM

## 2024-11-13 RX ORDER — PANTOPRAZOLE SODIUM 40 MG/1
40 TABLET, DELAYED RELEASE ORAL DAILY
Status: SHIPPED | COMMUNITY
Start: 2024-11-11 | End: 2024-11-13

## 2024-11-13 RX ORDER — CARVEDILOL 6.25 MG/1
3.12 TABLET ORAL 2 TIMES DAILY WITH MEALS
Status: DISCONTINUED | OUTPATIENT
Start: 2024-11-14 | End: 2024-11-18 | Stop reason: HOSPADM

## 2024-11-13 RX ORDER — HYDROXYZINE HYDROCHLORIDE 25 MG/1
50 TABLET, FILM COATED ORAL EVERY 4 HOURS PRN
Status: DISCONTINUED | OUTPATIENT
Start: 2024-11-13 | End: 2024-11-18 | Stop reason: HOSPADM

## 2024-11-13 RX ORDER — HEPARIN SODIUM 5000 [USP'U]/ML
5000 INJECTION, SOLUTION INTRAVENOUS; SUBCUTANEOUS EVERY 8 HOURS
Status: DISCONTINUED | OUTPATIENT
Start: 2024-11-13 | End: 2024-11-18 | Stop reason: HOSPADM

## 2024-11-13 RX ORDER — ESCITALOPRAM OXALATE 10 MG/1
20 TABLET ORAL DAILY
Status: DISCONTINUED | OUTPATIENT
Start: 2024-11-14 | End: 2024-11-18 | Stop reason: HOSPADM

## 2024-11-13 RX ORDER — M-VIT,TX,IRON,MINS/CALC/FOLIC 27MG-0.4MG
1 TABLET ORAL DAILY
Status: DISCONTINUED | OUTPATIENT
Start: 2024-11-18 | End: 2024-11-18 | Stop reason: HOSPADM

## 2024-11-13 RX ORDER — GAUZE BANDAGE 2" X 2"
100 BANDAGE TOPICAL DAILY
Status: COMPLETED | OUTPATIENT
Start: 2024-11-14 | End: 2024-11-17

## 2024-11-13 RX ORDER — LORAZEPAM 1 MG/1
0.5 TABLET ORAL EVERY 4 HOURS PRN
Status: DISCONTINUED | OUTPATIENT
Start: 2024-11-13 | End: 2024-11-18 | Stop reason: HOSPADM

## 2024-11-13 RX ORDER — ACAMPROSATE CALCIUM 333 MG/1
333 TABLET, DELAYED RELEASE ORAL 3 TIMES DAILY
Status: ON HOLD | COMMUNITY
Start: 2024-11-10 | End: 2024-11-18

## 2024-11-13 RX ORDER — LACTULOSE 10 G/15ML
30 SOLUTION ORAL 4 TIMES DAILY
Status: DISCONTINUED | OUTPATIENT
Start: 2024-11-14 | End: 2024-11-14

## 2024-11-13 RX ORDER — FOLIC ACID 1 MG/1
1 TABLET ORAL DAILY
Status: COMPLETED | OUTPATIENT
Start: 2024-11-14 | End: 2024-11-17

## 2024-11-13 RX ORDER — LORAZEPAM 2 MG/ML
0.5 INJECTION INTRAMUSCULAR EVERY 4 HOURS PRN
Status: DISCONTINUED | OUTPATIENT
Start: 2024-11-13 | End: 2024-11-18 | Stop reason: HOSPADM

## 2024-11-13 RX ORDER — LORAZEPAM 1 MG/1
2 TABLET ORAL
Status: DISCONTINUED | OUTPATIENT
Start: 2024-11-13 | End: 2024-11-18 | Stop reason: HOSPADM

## 2024-11-13 ASSESSMENT — LIFESTYLE VARIABLES
EVER HAD A DRINK FIRST THING IN THE MORNING TO STEADY YOUR NERVES TO GET RID OF A HANGOVER: NO
AUDITORY DISTURBANCES: NOT PRESENT
HEADACHE, FULLNESS IN HEAD: MILD
TREMOR: MODERATE TREMOR WITH ARMS EXTENDED
EVER FELT BAD OR GUILTY ABOUT YOUR DRINKING: YES
AGITATION: NORMAL ACTIVITY
VISUAL DISTURBANCES: NOT PRESENT
NAUSEA AND VOMITING: NO NAUSEA AND NO VOMITING
ORIENTATION AND CLOUDING OF SENSORIUM: ORIENTED AND CAN DO SERIAL ADDITIONS
CONSUMPTION TOTAL: POSITIVE
TOTAL SCORE: 7
ANXIETY: NO ANXIETY (AT EASE)
PAROXYSMAL SWEATS: NO SWEAT VISIBLE
HOW MANY TIMES IN THE PAST YEAR HAVE YOU HAD 5 OR MORE DRINKS IN A DAY: 5
DO YOU DRINK ALCOHOL: YES
ON A TYPICAL DAY WHEN YOU DRINK ALCOHOL HOW MANY DRINKS DO YOU HAVE: 6
TACTILE DISTURBANCES: VERY MILD ITCHING, PINS AND NEEDLES SENSATION, BURNING OR NUMBNESS
HAVE PEOPLE ANNOYED YOU BY CRITICIZING YOUR DRINKING: NO
AVERAGE NUMBER OF DAYS PER WEEK YOU HAVE A DRINK CONTAINING ALCOHOL: 4
TOTAL SCORE: 2
HAVE YOU EVER FELT YOU SHOULD CUT DOWN ON YOUR DRINKING: YES

## 2024-11-13 ASSESSMENT — FIBROSIS 4 INDEX: FIB4 SCORE: 6.32

## 2024-11-13 ASSESSMENT — PAIN DESCRIPTION - PAIN TYPE: TYPE: ACUTE PAIN

## 2024-11-14 LAB
ALBUMIN SERPL BCP-MCNC: 3.1 G/DL (ref 3.2–4.9)
ALBUMIN/GLOB SERPL: 1 G/DL
ALP SERPL-CCNC: 187 U/L (ref 30–99)
ALT SERPL-CCNC: 26 U/L (ref 2–50)
ANION GAP SERPL CALC-SCNC: 10 MMOL/L (ref 7–16)
AST SERPL-CCNC: 70 U/L (ref 12–45)
BASOPHILS # BLD AUTO: 0.6 % (ref 0–1.8)
BASOPHILS # BLD: 0.03 K/UL (ref 0–0.12)
BILIRUB SERPL-MCNC: 2.6 MG/DL (ref 0.1–1.5)
BUN SERPL-MCNC: 13 MG/DL (ref 8–22)
CALCIUM ALBUM COR SERPL-MCNC: 9.1 MG/DL (ref 8.5–10.5)
CALCIUM SERPL-MCNC: 8.4 MG/DL (ref 8.5–10.5)
CHLORIDE SERPL-SCNC: 107 MMOL/L (ref 96–112)
CO2 SERPL-SCNC: 18 MMOL/L (ref 20–33)
CREAT SERPL-MCNC: 0.95 MG/DL (ref 0.5–1.4)
EOSINOPHIL # BLD AUTO: 0.15 K/UL (ref 0–0.51)
EOSINOPHIL NFR BLD: 2.9 % (ref 0–6.9)
ERYTHROCYTE [DISTWIDTH] IN BLOOD BY AUTOMATED COUNT: 49.8 FL (ref 35.9–50)
GFR SERPLBLD CREATININE-BSD FMLA CKD-EPI: 102 ML/MIN/1.73 M 2
GLOBULIN SER CALC-MCNC: 3.2 G/DL (ref 1.9–3.5)
GLUCOSE SERPL-MCNC: 88 MG/DL (ref 65–99)
HCT VFR BLD AUTO: 26.7 % (ref 42–52)
HGB BLD-MCNC: 9.4 G/DL (ref 14–18)
IMM GRANULOCYTES # BLD AUTO: 0.07 K/UL (ref 0–0.11)
IMM GRANULOCYTES NFR BLD AUTO: 1.4 % (ref 0–0.9)
LYMPHOCYTES # BLD AUTO: 1.36 K/UL (ref 1–4.8)
LYMPHOCYTES NFR BLD: 26.3 % (ref 22–41)
MCH RBC QN AUTO: 32.2 PG (ref 27–33)
MCHC RBC AUTO-ENTMCNC: 35.2 G/DL (ref 32.3–36.5)
MCV RBC AUTO: 91.4 FL (ref 81.4–97.8)
MONOCYTES # BLD AUTO: 0.8 K/UL (ref 0–0.85)
MONOCYTES NFR BLD AUTO: 15.5 % (ref 0–13.4)
NEUTROPHILS # BLD AUTO: 2.76 K/UL (ref 1.82–7.42)
NEUTROPHILS NFR BLD: 53.3 % (ref 44–72)
NRBC # BLD AUTO: 0 K/UL
NRBC BLD-RTO: 0 /100 WBC (ref 0–0.2)
PHOSPHATE SERPL-MCNC: 3 MG/DL (ref 2.5–4.5)
PLATELET # BLD AUTO: 36 K/UL (ref 164–446)
PLATELETS.RETICULATED NFR BLD AUTO: 3.8 % (ref 0.6–13.1)
POTASSIUM SERPL-SCNC: 3.9 MMOL/L (ref 3.6–5.5)
PROT SERPL-MCNC: 6.3 G/DL (ref 6–8.2)
RBC # BLD AUTO: 2.92 M/UL (ref 4.7–6.1)
SODIUM SERPL-SCNC: 135 MMOL/L (ref 135–145)
WBC # BLD AUTO: 5.2 K/UL (ref 4.8–10.8)

## 2024-11-14 PROCEDURE — A9270 NON-COVERED ITEM OR SERVICE: HCPCS

## 2024-11-14 PROCEDURE — 85025 COMPLETE CBC W/AUTO DIFF WBC: CPT

## 2024-11-14 PROCEDURE — 85055 RETICULATED PLATELET ASSAY: CPT

## 2024-11-14 PROCEDURE — 770020 HCHG ROOM/CARE - TELE (206)

## 2024-11-14 PROCEDURE — 700111 HCHG RX REV CODE 636 W/ 250 OVERRIDE (IP)

## 2024-11-14 PROCEDURE — 700102 HCHG RX REV CODE 250 W/ 637 OVERRIDE(OP)

## 2024-11-14 PROCEDURE — 99222 1ST HOSP IP/OBS MODERATE 55: CPT | Mod: GC | Performed by: STUDENT IN AN ORGANIZED HEALTH CARE EDUCATION/TRAINING PROGRAM

## 2024-11-14 RX ORDER — POLYETHYLENE GLYCOL 3350 17 G/17G
1 POWDER, FOR SOLUTION ORAL
Status: DISCONTINUED | OUTPATIENT
Start: 2024-11-14 | End: 2024-11-16

## 2024-11-14 RX ORDER — LACTULOSE 10 G/15ML
45 SOLUTION ORAL 4 TIMES DAILY
Status: DISCONTINUED | OUTPATIENT
Start: 2024-11-14 | End: 2024-11-18 | Stop reason: HOSPADM

## 2024-11-14 RX ORDER — LACTULOSE 10 G/15ML
45 SOLUTION ORAL ONCE
Status: COMPLETED | OUTPATIENT
Start: 2024-11-14 | End: 2024-11-14

## 2024-11-14 RX ORDER — CHLORDIAZEPOXIDE HYDROCHLORIDE 25 MG/1
25 CAPSULE, GELATIN COATED ORAL EVERY 6 HOURS
Status: COMPLETED | OUTPATIENT
Start: 2024-11-15 | End: 2024-11-17

## 2024-11-14 RX ORDER — CHLORDIAZEPOXIDE HYDROCHLORIDE 25 MG/1
50 CAPSULE, GELATIN COATED ORAL EVERY 6 HOURS
Status: COMPLETED | OUTPATIENT
Start: 2024-11-14 | End: 2024-11-15

## 2024-11-14 RX ORDER — AMOXICILLIN 250 MG
2 CAPSULE ORAL EVERY EVENING
Status: DISCONTINUED | OUTPATIENT
Start: 2024-11-14 | End: 2024-11-16

## 2024-11-14 RX ORDER — ACETAMINOPHEN 325 MG/1
650 TABLET ORAL EVERY 6 HOURS PRN
Status: DISCONTINUED | OUTPATIENT
Start: 2024-11-14 | End: 2024-11-18 | Stop reason: HOSPADM

## 2024-11-14 RX ADMIN — CHLORDIAZEPOXIDE HYDROCHLORIDE 50 MG: 25 CAPSULE ORAL at 17:11

## 2024-11-14 RX ADMIN — LORAZEPAM 3 MG: 1 TABLET ORAL at 04:00

## 2024-11-14 RX ADMIN — LACTULOSE 45 ML: 20 SOLUTION ORAL at 20:53

## 2024-11-14 RX ADMIN — FOLIC ACID 1 MG: 1 TABLET ORAL at 06:21

## 2024-11-14 RX ADMIN — LORAZEPAM 2 MG: 1 TABLET ORAL at 01:22

## 2024-11-14 RX ADMIN — RIFAXIMIN 550 MG: 550 TABLET ORAL at 00:58

## 2024-11-14 RX ADMIN — LACTULOSE 45 ML: 10 SOLUTION ORAL at 06:03

## 2024-11-14 RX ADMIN — CARVEDILOL 3.12 MG: 6.25 TABLET, FILM COATED ORAL at 08:47

## 2024-11-14 RX ADMIN — AMOXICILLIN AND CLAVULANATE POTASSIUM 1 TABLET: 875; 125 TABLET, FILM COATED ORAL at 17:12

## 2024-11-14 RX ADMIN — THERA TABS 1 TABLET: TAB at 06:20

## 2024-11-14 RX ADMIN — Medication 100 MG: at 06:21

## 2024-11-14 RX ADMIN — ACETAMINOPHEN 650 MG: 325 TABLET ORAL at 17:10

## 2024-11-14 RX ADMIN — AMOXICILLIN AND CLAVULANATE POTASSIUM 1 TABLET: 875; 125 TABLET, FILM COATED ORAL at 00:58

## 2024-11-14 RX ADMIN — SPIRONOLACTONE 100 MG: 100 TABLET ORAL at 08:50

## 2024-11-14 RX ADMIN — CHLORDIAZEPOXIDE HYDROCHLORIDE 50 MG: 25 CAPSULE ORAL at 08:53

## 2024-11-14 RX ADMIN — HEPARIN SODIUM 5000 UNITS: 5000 INJECTION, SOLUTION INTRAVENOUS; SUBCUTANEOUS at 08:46

## 2024-11-14 RX ADMIN — LORAZEPAM 1 MG: 1 TABLET ORAL at 12:00

## 2024-11-14 RX ADMIN — LACTULOSE 45 ML: 20 SOLUTION ORAL at 12:23

## 2024-11-14 RX ADMIN — HEPARIN SODIUM 5000 UNITS: 5000 INJECTION, SOLUTION INTRAVENOUS; SUBCUTANEOUS at 00:58

## 2024-11-14 RX ADMIN — OMEPRAZOLE 20 MG: 20 CAPSULE, DELAYED RELEASE ORAL at 17:11

## 2024-11-14 RX ADMIN — LACTULOSE 45 ML: 20 SOLUTION ORAL at 17:15

## 2024-11-14 RX ADMIN — HEPARIN SODIUM 5000 UNITS: 5000 INJECTION, SOLUTION INTRAVENOUS; SUBCUTANEOUS at 14:41

## 2024-11-14 RX ADMIN — ESCITALOPRAM OXALATE 20 MG: 10 TABLET ORAL at 08:48

## 2024-11-14 RX ADMIN — CHLORDIAZEPOXIDE HYDROCHLORIDE 50 MG: 25 CAPSULE ORAL at 12:23

## 2024-11-14 RX ADMIN — OMEPRAZOLE 20 MG: 20 CAPSULE, DELAYED RELEASE ORAL at 08:48

## 2024-11-14 RX ADMIN — HEPARIN SODIUM 5000 UNITS: 5000 INJECTION, SOLUTION INTRAVENOUS; SUBCUTANEOUS at 20:53

## 2024-11-14 RX ADMIN — CARVEDILOL 3.12 MG: 6.25 TABLET, FILM COATED ORAL at 17:12

## 2024-11-14 RX ADMIN — RIFAXIMIN 550 MG: 550 TABLET ORAL at 17:11

## 2024-11-14 SDOH — ECONOMIC STABILITY: TRANSPORTATION INSECURITY
IN THE PAST 12 MONTHS, HAS THE LACK OF TRANSPORTATION KEPT YOU FROM MEDICAL APPOINTMENTS OR FROM GETTING MEDICATIONS?: PATIENT DECLINED

## 2024-11-14 SDOH — ECONOMIC STABILITY: TRANSPORTATION INSECURITY
IN THE PAST 12 MONTHS, HAS LACK OF RELIABLE TRANSPORTATION KEPT YOU FROM MEDICAL APPOINTMENTS, MEETINGS, WORK OR FROM GETTING THINGS NEEDED FOR DAILY LIVING?: PATIENT DECLINED

## 2024-11-14 ASSESSMENT — LIFESTYLE VARIABLES
AGITATION: NORMAL ACTIVITY
HEADACHE, FULLNESS IN HEAD: MILD
PAROXYSMAL SWEATS: BARELY PERCEPTIBLE SWEATING. PALMS MOIST
ANXIETY: MILDLY ANXIOUS
EVER FELT BAD OR GUILTY ABOUT YOUR DRINKING: YES
ORIENTATION AND CLOUDING OF SENSORIUM: ORIENTED AND CAN DO SERIAL ADDITIONS
TOTAL SCORE: VERY MILD ITCHING, PINS AND NEEDLES SENSATION, BURNING OR NUMBNESS
TOTAL SCORE: 8
HAVE YOU EVER FELT YOU SHOULD CUT DOWN ON YOUR DRINKING: YES
VISUAL DISTURBANCES: NOT PRESENT
NAUSEA AND VOMITING: NO NAUSEA AND NO VOMITING
VISUAL DISTURBANCES: NOT PRESENT
TREMOR: *
NAUSEA AND VOMITING: NO NAUSEA AND NO VOMITING
VISUAL DISTURBANCES: NOT PRESENT
ORIENTATION AND CLOUDING OF SENSORIUM: ORIENTED AND CAN DO SERIAL ADDITIONS
TOTAL SCORE: 3
AUDITORY DISTURBANCES: VERY MILD HARSHNESS OR ABILITY TO FRIGHTEN
TOTAL SCORE: MILD ITCHING, PINS AND NEEDLES SENSATION, BURNING OR NUMBNESS
HEADACHE, FULLNESS IN HEAD: NOT PRESENT
HEADACHE, FULLNESS IN HEAD: NOT PRESENT
VISUAL DISTURBANCES: NOT PRESENT
AGITATION: SOMEWHAT MORE THAN NORMAL ACTIVITY
ON A TYPICAL DAY WHEN YOU DRINK ALCOHOL HOW MANY DRINKS DO YOU HAVE: 8
PAROXYSMAL SWEATS: NO SWEAT VISIBLE
TOTAL SCORE: 3
AGITATION: NORMAL ACTIVITY
ANXIETY: MILDLY ANXIOUS
TOTAL SCORE: 10
HEADACHE, FULLNESS IN HEAD: VERY MILD
TREMOR: MODERATE TREMOR WITH ARMS EXTENDED
AGITATION: SOMEWHAT MORE THAN NORMAL ACTIVITY
TREMOR: MODERATE TREMOR WITH ARMS EXTENDED
ORIENTATION AND CLOUDING OF SENSORIUM: CANNOT DO SERIAL ADDITIONS OR IS UNCERTAIN ABOUT DATE
ANXIETY: *
HEADACHE, FULLNESS IN HEAD: NOT PRESENT
EVER HAD A DRINK FIRST THING IN THE MORNING TO STEADY YOUR NERVES TO GET RID OF A HANGOVER: YES
CONSUMPTION TOTAL: POSITIVE
AUDITORY DISTURBANCES: MILD HARSHNESS OR ABILITY TO FRIGHTEN
NAUSEA AND VOMITING: MILD NAUSEA WITH NO VOMITING
NAUSEA AND VOMITING: NO NAUSEA AND NO VOMITING
ORIENTATION AND CLOUDING OF SENSORIUM: ORIENTED AND CAN DO SERIAL ADDITIONS
HAVE PEOPLE ANNOYED YOU BY CRITICIZING YOUR DRINKING: NO
PAROXYSMAL SWEATS: NO SWEAT VISIBLE
TREMOR: MODERATE TREMOR WITH ARMS EXTENDED
NAUSEA AND VOMITING: MILD NAUSEA WITH NO VOMITING
TOTAL SCORE: 16
TOTAL SCORE: MODERATE ITCHING, PINS AND NEEDLES SENSATION, BURNING OR NUMBNESS
HEADACHE, FULLNESS IN HEAD: NOT PRESENT
ANXIETY: MILDLY ANXIOUS
ORIENTATION AND CLOUDING OF SENSORIUM: ORIENTED AND CAN DO SERIAL ADDITIONS
PAROXYSMAL SWEATS: NO SWEAT VISIBLE
AGITATION: SOMEWHAT MORE THAN NORMAL ACTIVITY
AVERAGE NUMBER OF DAYS PER WEEK YOU HAVE A DRINK CONTAINING ALCOHOL: 7
ALCOHOL_USE: YES
AUDITORY DISTURBANCES: NOT PRESENT
TREMOR: MODERATE TREMOR WITH ARMS EXTENDED
NAUSEA AND VOMITING: NO NAUSEA AND NO VOMITING
DOES PATIENT WANT TO STOP DRINKING: YES
AGITATION: SOMEWHAT MORE THAN NORMAL ACTIVITY
TOTAL SCORE: 7
ANXIETY: MILDLY ANXIOUS
PAROXYSMAL SWEATS: NO SWEAT VISIBLE
VISUAL DISTURBANCES: MILD SENSITIVITY
ORIENTATION AND CLOUDING OF SENSORIUM: CANNOT DO SERIAL ADDITIONS OR IS UNCERTAIN ABOUT DATE
TOTAL SCORE: 8
TOTAL SCORE: 3
ANXIETY: MILDLY ANXIOUS
HOW MANY TIMES IN THE PAST YEAR HAVE YOU HAD 5 OR MORE DRINKS IN A DAY: 100
DOES PATIENT WANT TO TALK TO SOMEONE ABOUT QUITTING: YES
AUDITORY DISTURBANCES: NOT PRESENT
TOTAL SCORE: 13
VISUAL DISTURBANCES: NOT PRESENT
AUDITORY DISTURBANCES: MILD HARSHNESS OR ABILITY TO FRIGHTEN
TREMOR: *
PAROXYSMAL SWEATS: NO SWEAT VISIBLE
AUDITORY DISTURBANCES: NOT PRESENT

## 2024-11-14 ASSESSMENT — PATIENT HEALTH QUESTIONNAIRE - PHQ9
3. TROUBLE FALLING OR STAYING ASLEEP OR SLEEPING TOO MUCH: NEARLY EVERY DAY
SUM OF ALL RESPONSES TO PHQ9 QUESTIONS 1 AND 2: 6
5. POOR APPETITE OR OVEREATING: MORE THAN HALF THE DAYS
7. TROUBLE CONCENTRATING ON THINGS, SUCH AS READING THE NEWSPAPER OR WATCHING TELEVISION: MORE THAN HALF THE DAYS
9. THOUGHTS THAT YOU WOULD BE BETTER OFF DEAD, OR OF HURTING YOURSELF: SEVERAL DAYS
4. FEELING TIRED OR HAVING LITTLE ENERGY: NEARLY EVERY DAY
6. FEELING BAD ABOUT YOURSELF - OR THAT YOU ARE A FAILURE OR HAVE LET YOURSELF OR YOUR FAMILY DOWN: NEARLY EVERY DAY
8. MOVING OR SPEAKING SO SLOWLY THAT OTHER PEOPLE COULD HAVE NOTICED. OR THE OPPOSITE, BEING SO FIGETY OR RESTLESS THAT YOU HAVE BEEN MOVING AROUND A LOT MORE THAN USUAL: SEVERAL DAYS
2. FEELING DOWN, DEPRESSED, IRRITABLE, OR HOPELESS: NEARLY EVERY DAY
1. LITTLE INTEREST OR PLEASURE IN DOING THINGS: NEARLY EVERY DAY
SUM OF ALL RESPONSES TO PHQ QUESTIONS 1-9: 21

## 2024-11-14 ASSESSMENT — COGNITIVE AND FUNCTIONAL STATUS - GENERAL
DAILY ACTIVITIY SCORE: 24
MOVING TO AND FROM BED TO CHAIR: A LITTLE
MOBILITY SCORE: 18
SUGGESTED CMS G CODE MODIFIER DAILY ACTIVITY: CH
WALKING IN HOSPITAL ROOM: A LITTLE
SUGGESTED CMS G CODE MODIFIER MOBILITY: CK
TURNING FROM BACK TO SIDE WHILE IN FLAT BAD: A LITTLE
CLIMB 3 TO 5 STEPS WITH RAILING: A LITTLE
STANDING UP FROM CHAIR USING ARMS: A LITTLE
MOVING FROM LYING ON BACK TO SITTING ON SIDE OF FLAT BED: A LITTLE

## 2024-11-14 ASSESSMENT — SOCIAL DETERMINANTS OF HEALTH (SDOH)
WITHIN THE LAST YEAR, HAVE YOU BEEN KICKED, HIT, SLAPPED, OR OTHERWISE PHYSICALLY HURT BY YOUR PARTNER OR EX-PARTNER?: NO
WITHIN THE LAST YEAR, HAVE YOU BEEN HUMILIATED OR EMOTIONALLY ABUSED IN OTHER WAYS BY YOUR PARTNER OR EX-PARTNER?: NO
WITHIN THE LAST YEAR, HAVE TO BEEN RAPED OR FORCED TO HAVE ANY KIND OF SEXUAL ACTIVITY BY YOUR PARTNER OR EX-PARTNER?: NO
WITHIN THE PAST 12 MONTHS, THE FOOD YOU BOUGHT JUST DIDN'T LAST AND YOU DIDN'T HAVE MONEY TO GET MORE: PATIENT DECLINED
WITHIN THE PAST 12 MONTHS, YOU WORRIED THAT YOUR FOOD WOULD RUN OUT BEFORE YOU GOT THE MONEY TO BUY MORE: PATIENT DECLINED
WITHIN THE LAST YEAR, HAVE YOU BEEN AFRAID OF YOUR PARTNER OR EX-PARTNER?: NO
IN THE PAST 12 MONTHS, HAS THE ELECTRIC, GAS, OIL, OR WATER COMPANY THREATENED TO SHUT OFF SERVICE IN YOUR HOME?: PATIENT DECLINED

## 2024-11-14 ASSESSMENT — PAIN DESCRIPTION - PAIN TYPE
TYPE: ACUTE PAIN

## 2024-11-14 NOTE — WOUND TEAM
[facility]  Inpatient Services  Wound and Skin Care Brief Evaluation    Admission Date: 11/13/2024     Last order of IP CONSULT TO WOUND CARE was found on 11/13/2024 from Hospital Encounter on 11/13/2024     HPI, PMH, SH: Reviewed    Chief Complaint   Patient presents with    Alcohol Intoxication    Weakness     Diagnosis: Hepatic encephalopathy (HCC) [K76.82]    Unit where seen by Wound Team: [id number]     Wound consult placed regarding right ankle. Chart and images reviewed. This discussed with bedside RN Sera. This clinician in to assess patient. Patient not very pleasant at this time, recently given Librium and Ativan. Patient right ankle with dried and intact small scabs noted, no drainage. Patient right knee with intact abrasion, no drainage. No open wounds at this time, no advanced wound care needs. Unable to assess sacrum and heels due to patient uncooperative behavior. All areas documented intact.     No pressure injuries or advanced wound care needs identified. Wound consult completed. No further follow up unless indicated and consulted.              PREVENTATIVE INTERVENTIONS:    Q shift Jaison - performed per nursing policy  Q shift pressure point assessments - performed per nursing policy    Surface/Positioning  Standard/trauma mattress - Currently in Place    Mobilization      Ambulating at Baseline
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
## 2024-11-14 NOTE — H&P
PATIENT ID:  NAME:  Jalen Vaughn  MRN:               [id number]  YOB: 1980    Date of Admission: 2024     Attending: Savanna Quinteros M.d.    Resident: Keshawn Scott M.D.    Primary Care Physician:  Mann Johnson M.D.    CC: Confusion    HPI: Jalen Vaughn is a 44 y.o. male with past medical history of alcohol abuse, hypertension, decompensated cirrhosis status post TIPS who presented with confusion starting this morning.  Patient is A&O x 4 but mildly confused and difficult to understand.  Patient has been hospitalized twice in the past 2 weeks for hepatic encephalopathy.  Patient states he is homeless but has been at [facility] since 10/28 as patient desires to become sober.  Patient states he drinks about 18 beers daily.  States he drank 2-24 ounce beers this morning.  He checked out of [facility] this morning and states he drank because his father recently .  States he did quit cold turkey for about 1.5 years. He currently endorses tremors and diaphoresis, feels that he may be withdrawing.  Patient denies any visual hallucinations but does endorse tactile hallucinations.  Patient states he does have auditory hallucinations most days but denies at this time.  He reports that he has had a mild headache.  Patient states he has needed paracentesis x 2 in the past and has had esophageal bleeding varices s/p ligation.      Patient does report he was attacked by a pit bull and its owner 3 days ago.  States the dog bit his right ankle. He presented to the ED at that time.  CT head and ankle x-rays were negative.  Patient received tetanus vaccination and was sent home with Augmentin.  Reports he thinks his ankle is still swollen but denies any fevers, chills or increased redness around the wounds.    REVIEW OF SYSTEMS:   Ten systems reviewed and were negative except as noted in the HPI.                PAST MEDICAL HISTORY:  Past Medical History:   Diagnosis  Date    Cirrhosis of liver (HCC)     Hypertension     Liver disease     Seizure (HCC)        PAST SURGICAL HISTORY:  Past Surgical History:   Procedure Laterality Date    DE UPPER GI ENDOSCOPY,DIAGNOSIS N/A 10/27/2024    Procedure: GASTROSCOPY;  Surgeon: Treva Lopez M.D.;  Location: SURGERY [facility];  Service: Gastroenterology    APPENDECTOMY      CHOLECYSTECTOMY         FAMILY HISTORY:  Family History   Problem Relation Age of Onset    Heart Disease Mother     Kidney Disease Mother     Heart Disease Father     Kidney Disease Father        SOCIAL HISTORY:   Pt lives: [facility]  Tobacco use: Chewing tobacco daily  Etoh use: 18 beers daily  Drug use: Denies      ALLERGIES:  Allergies   Allergen Reactions    Latex Itching    Morphine Itching       OUTPATIENT MEDICATIONS:  No current facility-administered medications on file prior to encounter.     Current Outpatient Medications on File Prior to Encounter   Medication Sig Dispense Refill    escitalopram (LEXAPRO) 20 MG tablet Take 1 Tablet by mouth every day. 30 Tablet 0    acamprosate (CAMPRAL) 333 MG tablet Take 333 mg by mouth 3 times a day.      hydrOXYzine HCl (ATARAX) 25 MG Tab Take 25 mg by mouth 3 times a day as needed for Anxiety.      spironolactone (ALDACTONE) 100 MG Tab Take 100 mg by mouth every day.      amLODIPine (NORVASC) 5 MG Tab Take 5 mg by mouth every day.      carvedilol (COREG) 3.125 MG Tab Take 1 Tablet by mouth 2 times a day with meals. 60 Tablet 0    lactulose 20 GM/30ML Solution Take 30 mL by mouth 4 times a day. 473 mL 0    riFAXIMin (XIFAXAN) 550 MG Tab tablet Take 1 Tablet by mouth 2 times a day. 60 Tablet 0    traZODone (DESYREL) 50 MG Tab Take 1 Tablet by mouth at bedtime as needed for Sleep. 10 Tablet 0    amoxicillin-clavulanate (AUGMENTIN) 875-125 MG Tab Take 1 Tablet by mouth 2 times a day for 7 days. 14 Tablet 0    senna-docusate (PERICOLACE OR SENOKOT S) 8.6-50 MG Tab Take 2 Tablets by mouth 1 time a day as  needed for Constipation. 30 Tablet 0    polyethylene glycol/lytes (MIRALAX) Pack Take 1 Packet by mouth 1 time a day as needed (for constipation). 30 Packet 0    pantoprazole (PROTONIX) 40 MG Tablet Delayed Response Take 40 mg by mouth 2 times daily, before breakfast and dinner.      folic acid (FOLVITE) 1 MG Tab Take 1 mg by mouth every day.      naltrexone (DEPADE) 50 MG Tab Take 50 mg by mouth every day.         PHYSICAL EXAM:  Vitals:    24 2203 24 2240 24 2301 24 2331   BP: (!) 156/86 (!) 171/90 (!) 158/76 119/62   Pulse: 71 71 76 76   Resp: 15 18 18 16   Temp:       TempSrc:       SpO2: 97% 97% 96% 94%   Weight:       , Temp (24hrs), Av.7 °C (98 °F), Min:36.7 °C (98 °F), Max:36.7 °C (98 °F)  , Pulse Oximetry: 94 %, O2 (LPM): 0, O2 Delivery Device: None - Room Air    General: Pt cooperative, mildly anxious, slow speech   Skin: Abdomen with scattered bruising.  Right ankle with minor abrasions and small puncture wounds.  No purulent drainage or surrounding erythema.  HEENT: +Scleral icterus. Contusion to the left periorbital area. Pupils equally dilated with minimal reactivity to light. EOMI. No nasal discharge. Dried blood on corners of mouth. Dry mucous membranes  Neck:  Supple without lymphadenopathy or rigidity. No JVD   Lungs:  Symmetrical.  CTAB with no W/R/R.  Good air movement   Cardiovascular:  S1/S2 +systolic murmur  Abdomen:  Abdomen is soft, non-distended. Mild TTP over right flank with bruising. +BS. No masses noted.  Extremities:  Full range of motion. No gross deformities noted. 2+ pulses in all extremities. No C/C/E   CNS:  Muscle tone is normal. Cranial nerves II-XII grossly intact. Sensation intact throughout. +Asterixis and tremors with extension of hands bilaterally       LAB TESTS:   Results for orders placed or performed during the hospital encounter of 24   CBC WITH DIFFERENTIAL    Collection Time: 24  7:06 PM   Result Value Ref Range    WBC 5.2 4.8  - 10.8 K/uL    RBC 3.15 (L) 4.70 - 6.10 M/uL    Hemoglobin 10.1 (L) 14.0 - 18.0 g/dL    Hematocrit 29.9 (L) 42.0 - 52.0 %    MCV 94.9 81.4 - 97.8 fL    MCH 32.1 27.0 - 33.0 pg    MCHC 33.8 32.3 - 36.5 g/dL    RDW 53.5 (H) 35.9 - 50.0 fL    Platelet Count 63 (L) 164 - 446 K/uL    MPV 11.2 9.0 - 12.9 fL    Neutrophils-Polys 60.00 44.00 - 72.00 %    Lymphocytes 24.30 22.00 - 41.00 %    Monocytes 13.00 0.00 - 13.40 %    Eosinophils 1.90 0.00 - 6.90 %    Basophils 0.40 0.00 - 1.80 %    Immature Granulocytes 0.40 0.00 - 0.90 %    Nucleated RBC 0.00 0.00 - 0.20 /100 WBC    Neutrophils (Absolute) 3.13 1.82 - 7.42 K/uL    Lymphs (Absolute) 1.27 1.00 - 4.80 K/uL    Monos (Absolute) 0.68 0.00 - 0.85 K/uL    Eos (Absolute) 0.10 0.00 - 0.51 K/uL    Baso (Absolute) 0.02 0.00 - 0.12 K/uL    Immature Granulocytes (abs) 0.02 0.00 - 0.11 K/uL    NRBC (Absolute) 0.00 K/uL   COMP METABOLIC PANEL    Collection Time: 11/13/24  7:06 PM   Result Value Ref Range    Sodium 136 135 - 145 mmol/L    Potassium 3.8 3.6 - 5.5 mmol/L    Chloride 106 96 - 112 mmol/L    Co2 19 (L) 20 - 33 mmol/L    Anion Gap 11.0 7.0 - 16.0    Glucose 87 65 - 99 mg/dL    Bun 13 8 - 22 mg/dL    Creatinine 0.75 0.50 - 1.40 mg/dL    Calcium 8.6 8.5 - 10.5 mg/dL    Correct Calcium 9.3 8.5 - 10.5 mg/dL    AST(SGOT) 67 (H) 12 - 45 U/L    ALT(SGPT) 24 2 - 50 U/L    Alkaline Phosphatase 182 (H) 30 - 99 U/L    Total Bilirubin 2.6 (H) 0.1 - 1.5 mg/dL    Albumin 3.1 (L) 3.2 - 4.9 g/dL    Total Protein 6.2 6.0 - 8.2 g/dL    Globulin 3.1 1.9 - 3.5 g/dL    A-G Ratio 1.0 g/dL   LIPASE    Collection Time: 11/13/24  7:06 PM   Result Value Ref Range    Lipase 45 11 - 82 U/L   AMMONIA    Collection Time: 11/13/24  7:06 PM   Result Value Ref Range    Ammonia 124 (HH) 11 - 45 umol/L   PROTHROMBIN TIME (INR)    Collection Time: 11/13/24  7:06 PM   Result Value Ref Range    PT 31.0 (H) 12.0 - 14.6 sec    INR 2.96 (H) 0.87 - 1.13   APTT    Collection Time: 11/13/24  7:06 PM   Result Value  Ref Range    APTT 35.0 24.7 - 36.0 sec   DIAGNOSTIC ALCOHOL    Collection Time: 24  7:06 PM   Result Value Ref Range    Diagnostic Alcohol 118.5 (H) <10.1 mg/dL   IMMATURE PLT FRACTION    Collection Time: 24  7:06 PM   Result Value Ref Range    Imm. Plt Fraction 3.3 0.6 - 13.1 %   ESTIMATED GFR    Collection Time: 24  7:06 PM   Result Value Ref Range    GFR (CKD-EPI) 115 >60 mL/min/1.73 m 2   MAGNESIUM    Collection Time: 24  7:06 PM   Result Value Ref Range    Magnesium 2.0 1.5 - 2.5 mg/dL   EKG    Collection Time: 24  7:42 PM   Result Value Ref Range    Report       [facility]    Test Date:  2024  Pt Name:    ARSENIO RODGERS               Department: ER  MRN:        [id number]                      Room:       [facility]  Gender:     Male                         Technician: [id number]  :        1980                   Requested By:JAMES TALLEY  Order #:    [id number]                    Reading MD:    Measurements  Intervals                                Axis  Rate:       74                           P:          113  AL:         147                          QRS:        66  QRSD:       106                          T:          54  QT:         457  QTc:        507    Interpretive Statements  Sinus rhythm  Prolonged QT interval  Compared to ECG 2024 16:08:59  Sinus bradycardia no longer present         CULTURES:   Results       ** No results found for the last 168 hours. **            IMAGES:  CT-HEAD W/O   Final Result         1.  No acute intracranial abnormality is identified, there are nonspecific white matter changes, commonly associated with small vessel ischemic disease.  Associated mild cerebral atrophy is noted.               EC-ECHOCARDIOGRAM COMPLETE W/O CONT    (Results Pending)       CONSULTS:   None    ASSESSMENT/PLAN: 44 y.o. male admitted for hepatic encephalopathy.        * Hepatic encephalopathy (HCC)- (present on  admission)  Assessment & Plan  Patient with mild confusion and slurred speech upon presentation.  Ammonia level 124 and alcohol level 118.  Patient's home medications include lactulose and rifaximin, he is unsure if he has been taking these medications at home. Unclear of last BM. During previous hospitalization patient was constipated requiring MiraLAX, GoLytely and enemas on top of his lactulose.      Plan:  -Lactulose 30 mL 4 times daily, titrate for 3 loose stools  -May need to add additional modalities to help patient have a bowel movement  -Rifaximin 550 mg twice daily  -Neurochecks q4    Cirrhosis (HCC)  Assessment & Plan  Decompensated given patient has history of ascites, hepatic encephalopathy, hyperbilirubinemia, elevated INR. Status post TIPS procedure.  CT ab/pel from 10/17 shows cirrhosis with portal hypertension and numerous varices.    > MELD-NA of 23, 19.6% estimated 3-month mortality.   > EGD from 10/27 shows a large inlet patch with irregular borders distally - could be biopsied for Patel's as an outpatient.  Did not appreciate esophageal varices or gastric varices.       Plan:  - Cont home lasix  - Has referral to GI already placed but has not seen them outpatient yet    Murmur  Assessment & Plan  +Systolic murmur on exam.  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  Most recent echo from 2022 shows normal LV systolic function, ascending aorta borderline dilated with a diameter of 4.0 cm, no significant valvular disease.   - Ordered echo     Dog bite  Assessment & Plan  Patient does report he was attacked by a pit bull and its owner 3 days ago.  States the dog bit his right ankle. He presented to the ED at that time.  CT head and ankle x-rays were negative.  Patient received tetanus vaccination and was sent home with Augmentin.  No evidence of cellulitis or spreading infection at this time.  > CT head negative for acute ICH    Plan:  - Cont. Course of augmentin  - Wound care consulted    Alcohol  withdrawal syndrome without complication (HCC)- (present on admission)  Assessment & Plan  Patient was treated for alcohol withdrawal during his previous 2 admissions.  Has been at [facility] as he would like to become sober.    Plan:  -Hold home naltrexone in the setting of acute withdrawal  -Telemetry  - [facility] protocol  - Case management consulted to provide further resources at time of discharge as well as potential returning to [facility] once medically cleared    Anxiety- (present on admission)  Assessment & Plan  Pt reports chronic anxiety, worsened recently by death of father.    - Continue home Lexapro 20 mg and Hydroxyzine 50 mg every 4 hours as needed    Insomnia  Assessment & Plan  Chronic.  - Cont home Trazodone 50 mg as needed at bedtime    Primary hypertension- (present on admission)  Assessment & Plan  Chronic, slightly hypertensive in the ED.   - Continue home carvedilol and spironolactone          Core Measures:  Fluids: P.o.  Lines: PIV  Abx: Augmentin  Diet: Regular  PPX: SCDs and heparin    DISPO: Inpatient      CODE STATUS: Full code    I anticipate this patient will require at least two midnights for appropriate medical management, necessitating inpatient admission because of hepatic encephalopathy    Keshawn Scott M.D.  PGY-2  [facility]

## 2024-11-14 NOTE — ED NOTES
Bedside report received from off going RN, Arely RN assumed care of patient.  POC discussed with patient. Call light within reach, all needs addressed at this time.       Fall risk interventions in place: Keep floor surfaces clean and dry and Other (comment required) (all applicable per [place] Fall risk assessment)   Continuous monitoring: Cardiac Leads  IVF/IV medications: Not Applicable   Oxygen: Room Air  Bedside sitter: Not Applicable   Isolation: Not Applicable

## 2024-11-14 NOTE — ED NOTES
Art from Lab called with critical result of Ammonia 124 at 1953. Critical lab result read back to 1953.   Dr. Shearer notified of critical lab result at 1956.  Critical lab result read back by Dr. hSearer.

## 2024-11-14 NOTE — ASSESSMENT & PLAN NOTE
Chronic, recently exacerbated by loss of his father, home regimen includes Lexapro 20 mg daily.    Plan:  - Continue home regimen  -Will hold any as needed anxiolytics at this time given active CIWA protocol

## 2024-11-14 NOTE — ASSESSMENT & PLAN NOTE
Chronic, poorly controlled in setting of medication nonadherence and severe alcohol use disorder.    Plan:  - Continue home medications: Coreg 3.125 mg twice daily and spironolactone 100 mg daily  -Deferring starting as needed antihypertensives at this time

## 2024-11-14 NOTE — PROGRESS NOTES
4 Eyes Skin Assessment Completed by GABRIELE Padilla and GABRIELE Roberts.    Head Bruising, L black eye  Ears WDL  Nose WDL  Mouth WDL  Neck WDL  Breast/Chest WDL  Shoulder Blades WDL  Spine Bruising to R flank, scabs  (R) Arm/Elbow/Hand WDL  (L) Arm/Elbow/Hand WDL  Abdomen Bruising  Groin WDL  Scrotum/Coccyx/Buttocks WDL  (R) Leg Scab and Bruising  (L) Leg Scab and Bruising  (R) Heel/Foot/Toe Redness, Blanching, and Scab  (L) Heel/Foot/Toe Redness and Blanching          Devices In Places Blood Pressure Cuff and Pulse Ox, PIV      Interventions In Place Pillows and Pressure Redistribution Mattress    Possible Skin Injury Yes    Pictures Uploaded Into Epic Yes  Wound Consult Placed Yes  RN Wound Prevention Protocol Ordered No

## 2024-11-14 NOTE — ASSESSMENT & PLAN NOTE
Acute, moderate to high risk for escalation of care need.  Last drink on morning of admission.  Patient recently being treated at inpatient rehab through [facility].  CIWA has been 4-5 on 11/15. Ciwa score of 8 11/16, 8 again on repeat.    Plan:  - discontinue telemetry  - Continue CIWA protocol with Librium taper  - Thiamine, folate and multivitamin supplementation  - Daily mag and Phos  - Case management consulted for discharge planning

## 2024-11-14 NOTE — ASSESSMENT & PLAN NOTE
Chronic, secondary to alcoholism, decompensated, complicated by recurrent episodes of hepatic encephalopathy.  Patient is s/p TIPS procedure.  Most recent CT abdomen/pelvis on 10/17 notable for portal hypertension with numerous varices. MELD-NA of 23 on admission, 19.6% estimated 3-month mortality. EGD from 10/27 shows a large inlet patch with irregular borders distally - could be biopsied for Patel's as an outpatient.  Did not appreciate esophageal varices or gastric varices.       Plan:  - Monitor for ascites, high risk for SBP  - Continue Lasix 80 mg daily  - Patient has outstanding referral to GI however, counseled about follow-up before discharge.

## 2024-11-14 NOTE — ASSESSMENT & PLAN NOTE
Acute, approximately 3 days ago, with bite to the right ankle.  Patient sought care at that time and received tetanus vaccine and course of Augmentin.  No evidence of cellulitis or spreading infection at this time.    Plan:  - Continue Augmentin until completion  - Wound care consult

## 2024-11-14 NOTE — ASSESSMENT & PLAN NOTE
Chronic likely secondary to chronic alcohol use.  Home regimen of trazodone 50 mg nightly.    Plan:  - Restart once stable

## 2024-11-14 NOTE — ED NOTES
Assist RN: Pt to T424/02 via Transport. Pt with all belongings in possession. Pt is medical and on room air. Report has been given to T4 RN.

## 2024-11-14 NOTE — CARE PLAN
The patient is Stable - Low risk of patient condition declining or worsening    Shift Goals  Clinical Goals: CIWA, patient safety  Patient Goals: food, pain control  Family Goals: JAMEE    Progress made toward(s) clinical / shift goals:  The patient is scored and medicated per CIWA orders. Patient has bed alarm in place, care AI in place, and patients room is close to nurses station.     Patient is not progressing towards the following goals:

## 2024-11-14 NOTE — PROGRESS NOTES
Bedside report received from night shift nurse. Assumed care at 0645.   Pt A&Ox3-4, patient occasionally gets confused with location.  Tolerating regular diet, denies n/v. Normoactive bowel sounds, + flatus, LBM 11/14. IV access through 20G RFA that is SL.  Telemetry monitoring.   Saturating >90% on RA.  Pt ambulates x2 assist with FWW, patient is unsteady.  Scattered abrasions and bruising throughout all extremities. L eye bruising and swelling.   CIWA per order. Per MD ansari to give 1mg Ativan for CIWA. Continue with librium.   Pain is controlled through medication orders. Updated on plan of care. Safety education provided. Bed locked in low. Call light within reach. Rounding in place.

## 2024-11-14 NOTE — ED NOTES
Pt accidentally spilled his urinal all over himself, RN cleaned pt, provided new linens for bed and a new gown, pt assisted to the bathroom and voided. Pt is a x 1 assist pt is tremorous. Pt assisted back to room, placed back on monitor. Emptied pt's urinal of 800cc of urine.  Pt is A/O x4. Pt has scattered bruising on abdomen, bruising to the R hip, Bruising to the R back/ and flank area. Abrasion to the R knee. Swelling on bilateral extremities.
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
## 2024-11-14 NOTE — ED TRIAGE NOTES
Chief Complaint   Patient presents with    Alcohol Intoxication    Weakness     BIB EMS from [facility]. Reports 2 beers today. Hx liver failure. Also reports generalized weakness and concerned that his ammonia is increased. Pt placed on monitor. Chart up for ERP.

## 2024-11-14 NOTE — PROGRESS NOTES
Pt admitted to room T424 via transport in [facility] from ED at 23:48.      AA&Ox3. Disoriented to date. Denies CP/SOB.  See 2 RN skin note  Tolerating regular diet. Denies N/V.  + void. - BM. Last BM PTA  Pt ambulates X2 w/FWW, unsteady, tremors    Plan of care discussed, all questions answered. Educated on the importance of calling before getting OOB and pt verbalizes understanding. Educated regarding importance of oral care. Oral care kit at bedside. Call light is within reach, treaded slipper socks on, bed in lowest/ locked position, hourly rounding in place, all needs met at this time

## 2024-11-14 NOTE — ASSESSMENT & PLAN NOTE
Systolic murmur, likely cirrhotic cardiomyopathy, Echo 2022 preserved EF and ascending aorta with borderline dilation at 4.0 cm, no significant valvular disease.  Repeat echo showing no changes from prior, no valvular abnormalities.

## 2024-11-14 NOTE — PROGRESS NOTES
MEDICAL STUDENT NOTE FOR EDUCATIONAL PURPOSES ONLY       FAMILY MEDICINE PROGRESS NOTE          PATIENT ID:  NAME:  Jalen Vaughn  MRN:               [id number]  YOB: 1980    Date of Admission: 11/13/2024     Attending: Savanna Quinteros M.d.     Resident: Kalpesh Burgos, Student    Primary Care Physician:  Mann Johnson M.D.    ID: Jalen Vaughn is a 44 y.o. male admitted for hepatic encephalopathy and alcohol withdrawal on hospital day 2.    24 hour events  Patient scored 7 and 16 on CIWA last night requiring 2mg and 3mg doses of ativan. Main symptoms were anxiety and tremulousness.     SUBJECTIVE:   Patient is somnolent but I was able to arouse him without sternal rub. Spoke with nursing staff who had concerns about somnolence. He did receive 3mg of ativan around 0400 hours. He has been receiving rifaximin and received first dose of lactulose this morning. Has not had BM yet. Difficult to obtain ROS due to patient somnolence.     OBJECTIVE:  Temp:  [36.6 °C (97.9 °F)-36.9 °C (98.4 °F)] 36.9 °C (98.4 °F)  Pulse:  [71-84] 84  Resp:  [14-20] 16  BP: (119-171)/(62-90) 145/85  SpO2:  [90 %-97 %] 90 %      Intake/Output Summary (Last 24 hours) at 11/14/2024 0729  Last data filed at 11/13/2024 2247  Gross per 24 hour   Intake --   Output 800 ml   Net -800 ml       PHYSICAL EXAM:  Physical Exam  Constitutional:       Comments: Somnolent bust able to arouse   HENT:      Head: Normocephalic and atraumatic.   Eyes:      General: Scleral icterus present.   Cardiovascular:      Rate and Rhythm: Normal rate and regular rhythm.      Heart sounds: Murmur heard.      Comments: 4/6 systolic murmur at LUSB  Pulmonary:      Effort: Pulmonary effort is normal.      Breath sounds: Normal breath sounds.   Abdominal:      Comments: Diffuse bruising   Neurological:      General: No focal deficit present.      Mental Status: She is disoriented.      Comments: Asterixis present bilaterally          LABS:  Recent Labs      11/13/24 1906 11/14/24  0006   WBC 5.2 5.2   RBC 3.15* 2.92*   HEMOGLOBIN 10.1* 9.4*   HEMATOCRIT 29.9* 26.7*   MCV 94.9 91.4   MCH 32.1 32.2   RDW 53.5* 49.8   PLATELETCT 63* 36*   MPV 11.2  --    NEUTSPOLYS 60.00 53.30   LYMPHOCYTES 24.30 26.30   MONOCYTES 13.00 15.50*   EOSINOPHILS 1.90 2.90   BASOPHILS 0.40 0.60     Recent Labs     11/13/24 1906   SODIUM 135  136   POTASSIUM 3.9  3.8   CHLORIDE 107  106   CO2 18*  19*   BUN 13  13   CREATININE 0.95  0.75   CALCIUM 8.4*  8.6   MAGNESIUM 2.0   PHOSPHORUS 3.0   ALBUMIN 3.1*  3.1*     Estimated GFR/CRCL = Estimated Creatinine Clearance: 186.9 mL/min (by C-G formula based on SCr of 0.75 mg/dL).  Recent Labs     11/13/24 1906   GLUCOSE 88  87     Recent Labs     11/13/24 1906   ASTSGOT 70*  67*   ALTSGPT 26  24   TBILIRUBIN 2.6*  2.6*   ALKPHOSPHAT 187*  182*   GLOBULIN 3.2  3.1   INR 2.96*   AMMONIA 124*             Recent Labs     11/13/24 1906   INR 2.96*   APTT 35.0         IMAGING:  CT-HEAD W/O   Final Result         1.  No acute intracranial abnormality is identified, there are nonspecific white matter changes, commonly associated with small vessel ischemic disease.  Associated mild cerebral atrophy is noted.               EC-ECHOCARDIOGRAM COMPLETE W/O CONT    (Results Pending)       CULTURES:   Results       ** No results found for the last 168 hours. **            MEDS:  Current Facility-Administered Medications   Medication Last Admin    lactulose 20 GM/30ML solution 45 mL      chlordiazePOXIDE (Librium) capsule 50 mg      Followed by    [START ON 11/15/2024] chlordiazePOXIDE (Librium) capsule 25 mg      senna-docusate (Pericolace Or Senokot S) 8.6-50 MG per tablet 2 Tablet      And    polyethylene glycol/lytes (Miralax) Packet 1 Packet      amoxicillin-clavulanate (Augmentin) 875-125 MG per tablet 1 Tablet 1 Tablet at 11/14/24 0058    carvedilol (Coreg) tablet 3.125 mg      escitalopram (Lexapro) tablet 20 mg      [START ON 11/18/2024]  folic acid (Folvite) tablet 1 mg      furosemide (Lasix) tablet 80 mg      [Held by provider] hydrOXYzine HCl (Atarax) tablet 50 mg      omeprazole (PriLOSEC) capsule 20 mg      spironolactone (Aldactone) tablet 100 mg      [START ON 11/18/2024] therapeutic multivitamin-minerals (Theragran-M) tablet 1 Tablet      [START ON 11/18/2024] thiamine (Vitamin B-1) tablet 100 mg      [Held by provider] traZODone (Desyrel) tablet 50 mg      LORazepam (Ativan) tablet 0.5 mg      LORazepam (Ativan) tablet 1 mg      Or    LORazepam (Ativan) injection 0.5 mg      LORazepam (Ativan) tablet 2 mg 2 mg at 11/14/24 0122    Or    LORazepam (Ativan) injection 1 mg      LORazepam (Ativan) tablet 3 mg 3 mg at 11/14/24 0400    Or    LORazepam (Ativan) injection 1.5 mg      LORazepam (Ativan) tablet 4 mg      Or    LORazepam (Ativan) injection 2 mg      thiamine (Vitamin B-1) tablet 100 mg 100 mg at 11/14/24 0621    And    multivitamin tablet 1 Tablet 1 Tablet at 11/14/24 0620    And    folic acid (Folvite) tablet 1 mg 1 mg at 11/14/24 0621    heparin injection 5,000 Units 5,000 Units at 11/14/24 0058    riFAXIMin (Xifaxan) tablet 550 mg 550 mg at 11/14/24 0058       ASSESSMENT/PLAN:  44 y.o. male admitted for hepatic encephalopathy and alcohol withdrawal on hospital day #2.     * Hepatic encephalopathy (HCC)- (present on admission)  Assessment & Plan  Acute, moderate to severe, ammonia 124 on admission with BAL of 118.  Home regimen includes lactulose and rifaximin however, patient unclear if he has been taking him at home and his last bowel movement is unknown.      Plan:  - Lactulose 45 mL 4 times daily, titrate for 3 loose stools daily  - Rifaximin 550 mg twice daily  - Every 4 neurochecks  - Bowel protocol as patient has had difficulty with bowel movements on lactulose in the past     Murmur- (present on admission)  Assessment & Plan  Systolic murmur, likely cirrhotic cardiomyopathy, Echo 2022 preserved EF and ascending aorta with  borderline dilation at 4.0 cm, no significant valvular disease.    Plan:  - Repeat echo ordered on admission, currently pending       Dog bite- (present on admission)  Assessment & Plan  Acute, approximately 3 days ago, with bite to the right ankle.  Patient sought care at that time and received tetanus vaccine and course of Augmentin.  No evidence of cellulitis or spreading infection at this time.    Plan:  - Continue Augmentin  - Wound care consult pending    Insomnia- (present on admission)  Assessment & Plan  Chronic likely secondary to chronic alcohol use.  Home regimen of trazodone 50 mg nightly.    Plan:  - Hold in setting of acute alcohol withdrawal on active CIWA protocol      Cirrhosis (HCC)- (present on admission)  Assessment & Plan  Chronic, secondary to alcoholism, decompensated, complicated by recurrent episodes of hepatic encephalopathy.  Patient is s/p TIPS procedure.  Most recent CT abdomen/pelvis on 10/17 notable for portal hypertension with numerous varices.  -MELD-NA of 23, 19.6% estimated 3-month mortality.    -Platelet count: 36<-63   -T. Bili: 2.6   -Albumin: 3.1  -EGD from 10/27 shows a large inlet patch with irregular borders distally - could be biopsied for Patel's as an outpatient.  Did not appreciate esophageal varices or gastric varices.       Plan:  - Monitor for ascites, high risk for SBP  - Continue Lasix 80 mg daily  - Patient has outstanding referral to GI however, has failed to follow-up, plan to establish patient with GI upon discharge     Primary hypertension- (present on admission)  Assessment & Plan  Chronic, poorly controlled in setting of medication nonadherence and severe alcohol use disorder.    Plan:  - Continue home medications: Coreg 3.125 mg twice daily and spironolactone 100 mg daily  -Deferring starting as needed antihypertensives at this time    Alcohol withdrawal syndrome without complication (HCC)- (present on admission)  Assessment & Plan  Acute, moderate to  high risk for escalation of care need.  Last drink on morning of admission.  Patient recently being treated at inpatient rehab through [facility].     Plan:  - Continue telemetry  - CIWA protocol with Librium taper  - Thiamine, folate and multivitamin supplementation  - Daily mag and Phos  - Case management consulted for discharge planning      Anxiety- (present on admission)  Assessment & Plan  Chronic, recently exacerbated by loss of his father, home regimen includes Lexapro 20 mg daily.    Plan:  - Continue home regimen  -Will hold any as needed anxiolytics at this time given active CIWA protocol           Core Measures:  Fluids: PO  Lines: PIV  Abx: Augmentin  Diet: Regular  PPX: heparin ppx, SCDs    CODE Status: Full Code      Disposition  Patient is not medically cleared for discharge. Condition guarded.     I have placed the appropriate orders for post-discharge needs.      Kalpesh Burgos, Student

## 2024-11-14 NOTE — ASSESSMENT & PLAN NOTE
Acute, moderate to severe, ammonia 124 on admission with BAL of 118.  Home regimen includes lactulose and rifaximin however, patient unclear if he has been taking him at home and his last bowel movement is unknown.  Has had only 2 bowel movements since admission.    Plan:  - Lactulose 45 mL 4 times daily, titrate for 3 loose stools daily  - Rifaximin 550 mg twice daily  - Every 4 neurochecks  - Bowel protocol, added milk of mag

## 2024-11-14 NOTE — ED NOTES
Medication history reviewed with patient at bedside and historical dispense info via [facility].   Med rec is complete  Allergies reviewed.     Patient was prescribed a 7 day course of Augmentin 875/125mg bid on 11/10/24- unknown last dose.   Per records patient is also prescribed a 300 day course of Xifaxan 550mg bid on 11/5/24- unknown last dose.  Anticoagulants: No    Patient unsure of names of medications or when he last took meds. Patient did have home rx bottle of Lexapro 20mg at  bedside. No other rx bottles provided.    Shaun Mayberry PhT

## 2024-11-14 NOTE — PROGRESS NOTES
Attending: Savanna Quinteros M.D.  Senior Resident: Pedro Lott M.D.     PATIENT: Jalen Vaughn; [id number]; 1980    Hospital Day # Hospital Day: 2    ID: 44 y.o. male with past medical history of alcohol abuse, hypertension, decompensated cirrhosis status post TIPS , admitted on 11/13/2024 for hepatic encephalopathy.    24 Hour Events: No acute events overnight     Subjective: This morning, patient's somnolent but easy to arouse, A&O x 2.  Nursing staff at bedside conveyed concern regarding somnolence and confirmed patient's most recent dose of Ativan 3 mg was at approximately 0400 hrs. this morning.  Confirmed that patient did receive rifaximin however, has yet to receive his first dose of lactulose due to ordering on admission.  A stat order of lactulose was ordered at that time.  Patient remains in guarded condition and I have a low threshold to transfer him to higher level of care.      OBJECTIVE:  Temp:  [36.6 °C (97.9 °F)-36.9 °C (98.4 °F)] 36.9 °C (98.4 °F)  Pulse:  [71-84] 84  Resp:  [14-20] 16  BP: (119-171)/(62-90) 145/85  SpO2:  [90 %-97 %] 90 %    Intake/Output Summary (Last 24 hours) at 11/14/2024 0428  Last data filed at 11/13/2024 2247  Gross per 24 hour   Intake --   Output 800 ml   Net -800 ml       PE:  Gen: Somnolent but arousable, A&O x 2  HEENT: Normocephalic, periocular ecchymosis in the left eye, EOMI, scleral icterus  Pulm: clear to auscultation bilaterally, no respiratory distress   Cardio: RRR, systolic murmur  Abdom: soft, nontender, nondistended, normoactive bowel sounds in all quadrants  Ext: No edema, 2+ pulses bilaterally  Neuro: Cranial nerves II through XII grossly intact    LABS:  Recent Labs     11/13/24  1906 11/14/24  0006   WBC 5.2 5.2   RBC 3.15* 2.92*   HEMOGLOBIN 10.1* 9.4*   HEMATOCRIT 29.9* 26.7*   MCV 94.9 91.4   MCH 32.1 32.2   RDW 53.5* 49.8   PLATELETCT 63* 36*   MPV 11.2  --    NEUTSPOLYS 60.00 53.30   LYMPHOCYTES 24.30 26.30   MONOCYTES 13.00 15.50*  "  EOSINOPHILS 1.90 2.90   BASOPHILS 0.40 0.60     Recent Labs     11/13/24 1906   SODIUM 135  136   POTASSIUM 3.9  3.8   CHLORIDE 107  106   CO2 18*  19*   BUN 13  13   CREATININE 0.95  0.75   CALCIUM 8.4*  8.6   MAGNESIUM 2.0   PHOSPHORUS 3.0   ALBUMIN 3.1*  3.1*     Estimated GFR/CRCL = Estimated Creatinine Clearance: 186.9 mL/min (by C-G formula based on SCr of 0.75 mg/dL).  Recent Labs     11/13/24 1906   GLUCOSE 88  87     Recent Labs     11/13/24 1906   ASTSGOT 70*  67*   ALTSGPT 26  24   TBILIRUBIN 2.6*  2.6*   ALKPHOSPHAT 187*  182*   GLOBULIN 3.2  3.1   INR 2.96*   AMMONIA 124*             Recent Labs     11/13/24 1906   INR 2.96*   APTT 35.0       MICROBIOLOGY:   No results found for: \"BLOODCULTU\", \"BLDCULT\", \"BCHOLD\"     IMAGING:   CT-HEAD W/O   Final Result         1.  No acute intracranial abnormality is identified, there are nonspecific white matter changes, commonly associated with small vessel ischemic disease.  Associated mild cerebral atrophy is noted.               EC-ECHOCARDIOGRAM COMPLETE W/O CONT    (Results Pending)       MEDS:  Current Facility-Administered Medications   Medication Last Admin    lactulose 20 GM/30ML solution 45 mL      chlordiazePOXIDE (Librium) capsule 50 mg      Followed by    [START ON 11/15/2024] chlordiazePOXIDE (Librium) capsule 25 mg      senna-docusate (Pericolace Or Senokot S) 8.6-50 MG per tablet 2 Tablet      And    polyethylene glycol/lytes (Miralax) Packet 1 Packet      amoxicillin-clavulanate (Augmentin) 875-125 MG per tablet 1 Tablet 1 Tablet at 11/14/24 0058    carvedilol (Coreg) tablet 3.125 mg      escitalopram (Lexapro) tablet 20 mg      [START ON 11/18/2024] folic acid (Folvite) tablet 1 mg      furosemide (Lasix) tablet 80 mg      [Held by provider] hydrOXYzine HCl (Atarax) tablet 50 mg      omeprazole (PriLOSEC) capsule 20 mg      spironolactone (Aldactone) tablet 100 mg      [START ON 11/18/2024] therapeutic multivitamin-minerals " (Theragran-M) tablet 1 Tablet      [START ON 11/18/2024] thiamine (Vitamin B-1) tablet 100 mg      [Held by provider] traZODone (Desyrel) tablet 50 mg      LORazepam (Ativan) tablet 0.5 mg      LORazepam (Ativan) tablet 1 mg      Or    LORazepam (Ativan) injection 0.5 mg      LORazepam (Ativan) tablet 2 mg 2 mg at 11/14/24 0122    Or    LORazepam (Ativan) injection 1 mg      LORazepam (Ativan) tablet 3 mg 3 mg at 11/14/24 0400    Or    LORazepam (Ativan) injection 1.5 mg      LORazepam (Ativan) tablet 4 mg      Or    LORazepam (Ativan) injection 2 mg      thiamine (Vitamin B-1) tablet 100 mg 100 mg at 11/14/24 0621    And    multivitamin tablet 1 Tablet 1 Tablet at 11/14/24 0620    And    folic acid (Folvite) tablet 1 mg 1 mg at 11/14/24 0621    heparin injection 5,000 Units 5,000 Units at 11/14/24 0058    riFAXIMin (Xifaxan) tablet 550 mg 550 mg at 11/14/24 0058       ASSESSMENT/PLAN: 44 y.o. male with past medical history of alcohol abuse, hypertension, decompensated cirrhosis status post TIPS , admitted on 11/13/2024 for hepatic encephalopathy and now in acute alcohol withdrawal.    * Hepatic encephalopathy (HCC)- (present on admission)  Assessment & Plan  Acute, moderate to severe, ammonia 124 on admission with BAL of 118.  Home regimen includes lactulose and rifaximin however, patient unclear if he has been taking him at home and his last bowel movement is unknown.      Plan:  - Lactulose 45 mL 4 times daily, titrate for 3 loose stools daily  - Rifaximin 550 mg twice daily  - Every 4 neurochecks  - Bowel protocol    Alcohol withdrawal syndrome without complication (HCC)- (present on admission)  Assessment & Plan  Acute, moderate to high risk for escalation of care need.  Last drink on morning of admission.  Patient recently being treated at inpatient rehab through [facility].     Plan:  - Continue telemetry  - CIWA protocol with Librium taper  - Thiamine, folate and multivitamin supplementation  - Daily mag and  Phos  - Case management consulted for discharge planning      Cirrhosis (HCC)- (present on admission)  Assessment & Plan  Chronic, secondary to alcoholism, decompensated, complicated by recurrent episodes of hepatic encephalopathy.  Patient is s/p TIPS procedure.  Most recent CT abdomen/pelvis on 10/17 notable for portal hypertension with numerous varices.  -MELD-NA of 23, 19.6% estimated 3-month mortality.    -Platelet count: 36<-63   -T. Bili: 2.6   -Albumin: 3.1  -EGD from 10/27 shows a large inlet patch with irregular borders distally - could be biopsied for Patel's as an outpatient.  Did not appreciate esophageal varices or gastric varices.       Plan:  - Monitor for ascites, high risk for SBP  - Continue Lasix 80 mg daily  - Patient has outstanding referral to GI however, has failed to follow-up    Murmur- (present on admission)  Assessment & Plan  Systolic murmur, likely cirrhotic cardiomyopathy, Echo 2022 preserved EF and ascending aorta with borderline dilation at 4.0 cm, no significant valvular disease.    Plan:  - Repeat echo ordered on admission, follow-up on results      Dog bite- (present on admission)  Assessment & Plan  Acute, approximately 3 days ago, with bite to the right ankle.  Patient sought care at that time and received tetanus vaccine and course of Augmentin.  No evidence of cellulitis or spreading infection at this time.    Plan:  - Continue Augmentin  - Wound care consult    Insomnia- (present on admission)  Assessment & Plan  Chronic likely secondary to chronic alcohol use.  Home regimen of trazodone 50 mg nightly.    Plan:  - Hold in setting of acute alcohol withdrawal on active CIWA protocol      Primary hypertension- (present on admission)  Assessment & Plan  Chronic, poorly controlled in setting of medication nonadherence and severe alcohol use disorder.    Plan:  - Continue home medications: Coreg 3.125 mg twice daily and spironolactone 100 mg daily  -Deferring starting as needed  antihypertensives at this time    Anxiety- (present on admission)  Assessment & Plan  Chronic, recently exacerbated by loss of his father, home regimen includes Lexapro 20 mg daily.    Plan:  - Continue home regimen  -Will hold any as needed anxiolytics at this time given active CIWA protocol            Core Measures:  Fluids: PO  Lines: PIV  Abx: Augmentin  Diet: Regular  PPX: SCDs/TEDs, heparin  Wound care: Right ankle dog bite      #DISPOSITION  Inpatient for management of hepatic encephalopathy complicated by acute alcohol withdrawal.  Condition guarded.        Pedro Lott M.D.   [facility]

## 2024-11-14 NOTE — PROGRESS NOTES
Rapid RN notified and came to bedside for concern for patient mentation and CIWA scoring. MD came to bedside to assess patient. MD verbalized to RN to hold off on further ativan until patient is less drowsy. New order for Stat lactulose. Rapid came back to bedside to assess persistent drowsiness. Patient difficult to shake awake. Primary RN had difficulty with lactulose administration. Patient struggling to remain awake during PO medication administration.

## 2024-11-15 ENCOUNTER — APPOINTMENT (OUTPATIENT)
Dept: CARDIOLOGY | Facility: MEDICAL CENTER | Age: 44
DRG: 442 | End: 2024-11-15
Payer: COMMERCIAL

## 2024-11-15 LAB
ALBUMIN SERPL BCP-MCNC: 2.7 G/DL (ref 3.2–4.9)
ALBUMIN/GLOB SERPL: 0.9 G/DL
ALP SERPL-CCNC: 144 U/L (ref 30–99)
ALT SERPL-CCNC: 22 U/L (ref 2–50)
ANION GAP SERPL CALC-SCNC: 8 MMOL/L (ref 7–16)
AST SERPL-CCNC: 53 U/L (ref 12–45)
BASOPHILS # BLD AUTO: 0.3 % (ref 0–1.8)
BASOPHILS # BLD: 0.02 K/UL (ref 0–0.12)
BILIRUB SERPL-MCNC: 2.9 MG/DL (ref 0.1–1.5)
BUN SERPL-MCNC: 12 MG/DL (ref 8–22)
CALCIUM ALBUM COR SERPL-MCNC: 9.5 MG/DL (ref 8.5–10.5)
CALCIUM SERPL-MCNC: 8.5 MG/DL (ref 8.5–10.5)
CHLORIDE SERPL-SCNC: 110 MMOL/L (ref 96–112)
CO2 SERPL-SCNC: 20 MMOL/L (ref 20–33)
CREAT SERPL-MCNC: 0.85 MG/DL (ref 0.5–1.4)
EOSINOPHIL # BLD AUTO: 0.17 K/UL (ref 0–0.51)
EOSINOPHIL NFR BLD: 2.9 % (ref 0–6.9)
ERYTHROCYTE [DISTWIDTH] IN BLOOD BY AUTOMATED COUNT: 54.1 FL (ref 35.9–50)
GFR SERPLBLD CREATININE-BSD FMLA CKD-EPI: 111 ML/MIN/1.73 M 2
GLOBULIN SER CALC-MCNC: 2.9 G/DL (ref 1.9–3.5)
GLUCOSE SERPL-MCNC: 107 MG/DL (ref 65–99)
HCT VFR BLD AUTO: 29.7 % (ref 42–52)
HGB BLD-MCNC: 9.8 G/DL (ref 14–18)
IMM GRANULOCYTES # BLD AUTO: 0.02 K/UL (ref 0–0.11)
IMM GRANULOCYTES NFR BLD AUTO: 0.3 % (ref 0–0.9)
LV EJECT FRACT  99904: 65
LV EJECT FRACT MOD 2C 99903: 58.56
LV EJECT FRACT MOD 4C 99902: 62.38
LV EJECT FRACT MOD BP 99901: 60.85
LYMPHOCYTES # BLD AUTO: 1.35 K/UL (ref 1–4.8)
LYMPHOCYTES NFR BLD: 23.4 % (ref 22–41)
MAGNESIUM SERPL-MCNC: 1.7 MG/DL (ref 1.5–2.5)
MCH RBC QN AUTO: 30.9 PG (ref 27–33)
MCHC RBC AUTO-ENTMCNC: 33 G/DL (ref 32.3–36.5)
MCV RBC AUTO: 93.7 FL (ref 81.4–97.8)
MONOCYTES # BLD AUTO: 0.78 K/UL (ref 0–0.85)
MONOCYTES NFR BLD AUTO: 13.5 % (ref 0–13.4)
NEUTROPHILS # BLD AUTO: 3.44 K/UL (ref 1.82–7.42)
NEUTROPHILS NFR BLD: 59.6 % (ref 44–72)
NRBC # BLD AUTO: 0 K/UL
NRBC BLD-RTO: 0 /100 WBC (ref 0–0.2)
PHOSPHATE SERPL-MCNC: 4.1 MG/DL (ref 2.5–4.5)
PLATELET # BLD AUTO: 77 K/UL (ref 164–446)
PLATELETS.RETICULATED NFR BLD AUTO: 1.8 % (ref 0.6–13.1)
PMV BLD AUTO: 10.3 FL (ref 9–12.9)
POTASSIUM SERPL-SCNC: 4 MMOL/L (ref 3.6–5.5)
PROT SERPL-MCNC: 5.6 G/DL (ref 6–8.2)
RBC # BLD AUTO: 3.17 M/UL (ref 4.7–6.1)
SODIUM SERPL-SCNC: 138 MMOL/L (ref 135–145)
WBC # BLD AUTO: 5.8 K/UL (ref 4.8–10.8)

## 2024-11-15 PROCEDURE — 700111 HCHG RX REV CODE 636 W/ 250 OVERRIDE (IP)

## 2024-11-15 PROCEDURE — 85025 COMPLETE CBC W/AUTO DIFF WBC: CPT

## 2024-11-15 PROCEDURE — 85055 RETICULATED PLATELET ASSAY: CPT

## 2024-11-15 PROCEDURE — 770020 HCHG ROOM/CARE - TELE (206)

## 2024-11-15 PROCEDURE — A9270 NON-COVERED ITEM OR SERVICE: HCPCS

## 2024-11-15 PROCEDURE — 700102 HCHG RX REV CODE 250 W/ 637 OVERRIDE(OP)

## 2024-11-15 PROCEDURE — 84100 ASSAY OF PHOSPHORUS: CPT

## 2024-11-15 PROCEDURE — 36415 COLL VENOUS BLD VENIPUNCTURE: CPT

## 2024-11-15 PROCEDURE — 83735 ASSAY OF MAGNESIUM: CPT

## 2024-11-15 PROCEDURE — 80053 COMPREHEN METABOLIC PANEL: CPT

## 2024-11-15 PROCEDURE — 93306 TTE W/DOPPLER COMPLETE: CPT

## 2024-11-15 PROCEDURE — 99232 SBSQ HOSP IP/OBS MODERATE 35: CPT | Mod: GC | Performed by: STUDENT IN AN ORGANIZED HEALTH CARE EDUCATION/TRAINING PROGRAM

## 2024-11-15 PROCEDURE — 93306 TTE W/DOPPLER COMPLETE: CPT | Mod: 26 | Performed by: INTERNAL MEDICINE

## 2024-11-15 RX ORDER — OXYCODONE HYDROCHLORIDE 5 MG/1
5 TABLET ORAL ONCE
Status: COMPLETED | OUTPATIENT
Start: 2024-11-15 | End: 2024-11-15

## 2024-11-15 RX ADMIN — AMOXICILLIN AND CLAVULANATE POTASSIUM 1 TABLET: 875; 125 TABLET, FILM COATED ORAL at 05:58

## 2024-11-15 RX ADMIN — LACTULOSE 45 ML: 20 SOLUTION ORAL at 21:07

## 2024-11-15 RX ADMIN — CHLORDIAZEPOXIDE HYDROCHLORIDE 25 MG: 25 CAPSULE ORAL at 23:58

## 2024-11-15 RX ADMIN — RIFAXIMIN 550 MG: 550 TABLET ORAL at 17:53

## 2024-11-15 RX ADMIN — HEPARIN SODIUM 5000 UNITS: 5000 INJECTION, SOLUTION INTRAVENOUS; SUBCUTANEOUS at 21:06

## 2024-11-15 RX ADMIN — CHLORDIAZEPOXIDE HYDROCHLORIDE 50 MG: 25 CAPSULE ORAL at 00:30

## 2024-11-15 RX ADMIN — HEPARIN SODIUM 5000 UNITS: 5000 INJECTION, SOLUTION INTRAVENOUS; SUBCUTANEOUS at 05:59

## 2024-11-15 RX ADMIN — CHLORDIAZEPOXIDE HYDROCHLORIDE 25 MG: 25 CAPSULE ORAL at 13:15

## 2024-11-15 RX ADMIN — ESCITALOPRAM OXALATE 20 MG: 10 TABLET ORAL at 05:58

## 2024-11-15 RX ADMIN — HEPARIN SODIUM 5000 UNITS: 5000 INJECTION, SOLUTION INTRAVENOUS; SUBCUTANEOUS at 13:18

## 2024-11-15 RX ADMIN — AMOXICILLIN AND CLAVULANATE POTASSIUM 1 TABLET: 875; 125 TABLET, FILM COATED ORAL at 17:53

## 2024-11-15 RX ADMIN — CARVEDILOL 3.12 MG: 6.25 TABLET, FILM COATED ORAL at 08:14

## 2024-11-15 RX ADMIN — LACTULOSE 45 ML: 20 SOLUTION ORAL at 13:16

## 2024-11-15 RX ADMIN — LORAZEPAM 0.5 MG: 1 TABLET ORAL at 21:21

## 2024-11-15 RX ADMIN — RIFAXIMIN 550 MG: 550 TABLET ORAL at 05:58

## 2024-11-15 RX ADMIN — CHLORDIAZEPOXIDE HYDROCHLORIDE 25 MG: 25 CAPSULE ORAL at 05:57

## 2024-11-15 RX ADMIN — FUROSEMIDE 80 MG: 20 TABLET ORAL at 05:58

## 2024-11-15 RX ADMIN — FOLIC ACID 1 MG: 1 TABLET ORAL at 05:58

## 2024-11-15 RX ADMIN — OMEPRAZOLE 20 MG: 20 CAPSULE, DELAYED RELEASE ORAL at 17:53

## 2024-11-15 RX ADMIN — SENNOSIDES AND DOCUSATE SODIUM 2 TABLET: 50; 8.6 TABLET ORAL at 17:54

## 2024-11-15 RX ADMIN — SPIRONOLACTONE 100 MG: 100 TABLET ORAL at 05:58

## 2024-11-15 RX ADMIN — OMEPRAZOLE 20 MG: 20 CAPSULE, DELAYED RELEASE ORAL at 05:58

## 2024-11-15 RX ADMIN — Medication 100 MG: at 05:59

## 2024-11-15 RX ADMIN — LACTULOSE 45 ML: 20 SOLUTION ORAL at 17:52

## 2024-11-15 RX ADMIN — CARVEDILOL 3.12 MG: 6.25 TABLET, FILM COATED ORAL at 17:54

## 2024-11-15 RX ADMIN — LACTULOSE 45 ML: 20 SOLUTION ORAL at 08:15

## 2024-11-15 RX ADMIN — THERA TABS 1 TABLET: TAB at 05:59

## 2024-11-15 RX ADMIN — CHLORDIAZEPOXIDE HYDROCHLORIDE 25 MG: 25 CAPSULE ORAL at 17:52

## 2024-11-15 RX ADMIN — OXYCODONE 5 MG: 5 TABLET ORAL at 08:13

## 2024-11-15 ASSESSMENT — LIFESTYLE VARIABLES
TOTAL SCORE: 4
ANXIETY: MILDLY ANXIOUS
AUDITORY DISTURBANCES: NOT PRESENT
NAUSEA AND VOMITING: NO NAUSEA AND NO VOMITING
VISUAL DISTURBANCES: NOT PRESENT
AUDITORY DISTURBANCES: NOT PRESENT
NAUSEA AND VOMITING: MILD NAUSEA WITH NO VOMITING
ORIENTATION AND CLOUDING OF SENSORIUM: ORIENTED AND CAN DO SERIAL ADDITIONS
ORIENTATION AND CLOUDING OF SENSORIUM: ORIENTED AND CAN DO SERIAL ADDITIONS
VISUAL DISTURBANCES: NOT PRESENT
ANXIETY: MILDLY ANXIOUS
PAROXYSMAL SWEATS: NO SWEAT VISIBLE
HEADACHE, FULLNESS IN HEAD: VERY MILD
NAUSEA AND VOMITING: NO NAUSEA AND NO VOMITING
TREMOR: *
AUDITORY DISTURBANCES: NOT PRESENT
AGITATION: NORMAL ACTIVITY
NAUSEA AND VOMITING: NO NAUSEA AND NO VOMITING
TOTAL SCORE: 6
VISUAL DISTURBANCES: NOT PRESENT
TREMOR: MODERATE TREMOR WITH ARMS EXTENDED
AGITATION: NORMAL ACTIVITY
AUDITORY DISTURBANCES: NOT PRESENT
ORIENTATION AND CLOUDING OF SENSORIUM: ORIENTED AND CAN DO SERIAL ADDITIONS
AGITATION: NORMAL ACTIVITY
AGITATION: NORMAL ACTIVITY
HEADACHE, FULLNESS IN HEAD: NOT PRESENT
VISUAL DISTURBANCES: NOT PRESENT
TOTAL SCORE: 6
ANXIETY: MILDLY ANXIOUS
PAROXYSMAL SWEATS: NO SWEAT VISIBLE
TOTAL SCORE: 5
HEADACHE, FULLNESS IN HEAD: NOT PRESENT
TREMOR: *
ORIENTATION AND CLOUDING OF SENSORIUM: ORIENTED AND CAN DO SERIAL ADDITIONS
ORIENTATION AND CLOUDING OF SENSORIUM: ORIENTED AND CAN DO SERIAL ADDITIONS
NAUSEA AND VOMITING: NO NAUSEA AND NO VOMITING
TOTAL SCORE: 5
VISUAL DISTURBANCES: NOT PRESENT
ORIENTATION AND CLOUDING OF SENSORIUM: ORIENTED AND CAN DO SERIAL ADDITIONS
VISUAL DISTURBANCES: NOT PRESENT
AGITATION: NORMAL ACTIVITY
AUDITORY DISTURBANCES: NOT PRESENT
HEADACHE, FULLNESS IN HEAD: VERY MILD
PAROXYSMAL SWEATS: NO SWEAT VISIBLE
TOTAL SCORE: 5
HEADACHE, FULLNESS IN HEAD: NOT PRESENT
ANXIETY: MILDLY ANXIOUS
AUDITORY DISTURBANCES: VERY MILD HARSHNESS OR ABILITY TO FRIGHTEN
TREMOR: MODERATE TREMOR WITH ARMS EXTENDED
NAUSEA AND VOMITING: NO NAUSEA AND NO VOMITING
PAROXYSMAL SWEATS: NO SWEAT VISIBLE
ANXIETY: MILDLY ANXIOUS
ANXIETY: MILDLY ANXIOUS
HEADACHE, FULLNESS IN HEAD: NOT PRESENT
TREMOR: *
PAROXYSMAL SWEATS: NO SWEAT VISIBLE
TREMOR: *
PAROXYSMAL SWEATS: NO SWEAT VISIBLE
TOTAL SCORE: VERY MILD ITCHING, PINS AND NEEDLES SENSATION, BURNING OR NUMBNESS
AGITATION: NORMAL ACTIVITY

## 2024-11-15 ASSESSMENT — PAIN DESCRIPTION - PAIN TYPE
TYPE: ACUTE PAIN

## 2024-11-15 NOTE — CARE PLAN
The patient is Stable - Low risk of patient condition declining or worsening    Shift Goals  Clinical Goals: CIWA, safety, rest , comfort  Patient Goals: food, rest, pain control  Family Goals: JAMEE    Progress made toward(s) clinical / shift goals:  The patients CIWA is scored per order. No ativan due to lethargy when taking librium. Pain is medicated per MAR.     Patient is not progressing towards the following goals:

## 2024-11-15 NOTE — PROGRESS NOTES
Bedside report received from night shift nurse. Assumed care at 0645.   Pt A&Ox3, patient occasionally gets confused with situation.  Tolerating regular diet, denies n/v. Normoactive bowel sounds, + flatus, LBM 11/14, +Void. IV access through 20G RFA that is SL.  Telemetry monitoring.   Saturating >90% on RA.  Pt ambulates x2 assist with FWW, patient is unsteady. Bed alarm on.   Scattered abrasions and bruising throughout all extremities. L eye bruising and swelling.   CIWA per order. Continue with librium taper.   Pain is controlled through medication orders. Updated on plan of care. Safety education provided. Bed locked in low. Call light within reach. Rounding in place.

## 2024-11-15 NOTE — CARE PLAN
The patient is Watcher - Medium risk of patient condition declining or worsening    Problem: Knowledge Deficit - Standard  Goal: Patient and family/care givers will demonstrate understanding of plan of care, disease process/condition, diagnostic tests and medications  Outcome: Progressing     Problem: Fall Risk  Goal: Patient will remain free from falls  Outcome: Progressing     Shift Goals  Clinical Goals: CIWA, safety, rest, comfort  Patient Goals: food, rest, comfort  Family Goals: JAMEE    Progress made toward(s) clinical / shift goals:  Patient medicated per MAR. CIWA monitored throughout this shift. Care AI for safety. Patient able to rest during this shift.     Patient is not progressing towards the following goals:

## 2024-11-15 NOTE — PROGRESS NOTES
Bedside report received.  Assessment complete.    A&O x 3. Patient calls appropriately.  Patient ambulates with x2 assist with FWW. Bed alarm on.   Patient has 2/10 pain. No pharmacological interventions provided at this time. Patient medicated per MAR.  Denies N&V. Tolerating regular diet.  + void, + flatus, + BM, last BM 11/14.  Patient denies SOB.  SCD's off.    Review plan with of care with patient. Call light and personal belongings within reach. Hourly rounding in place. All needs met at this time.

## 2024-11-15 NOTE — DISCHARGE PLANNING
Case Management Discharge Planning    Admission Date: 11/13/2024  GMLOS: 3.3  ALOS: 2    6-Clicks ADL Score: 24  6-Clicks Mobility Score: 18    Anticipated Discharge Dispo: Discharge Disposition: D/T to another type of health care inst. (70)    DME Needed: No    Action(s) Taken: OTHER  MSW CM met with pt at bedside went over outpatient community resources and encouraged Pt to initiate contact by phone while hospitalized using hospital's phone and or follow-up with agencies after discharge.     Escalations Completed: None    Medically Clear: No    Next Steps: MSW CM will follow pt to assist with needs and discharge barriers.    Barriers to Discharge: Medical clearance and Homelessness

## 2024-11-15 NOTE — PROGRESS NOTES
Attending: Savanna Quinteros M.D.  Senior Resident: Pedro Lott M.D.   Tobin Resident: Preston Garcia M.D.     PATIENT: Jalen Vaughn; [id number]; 1980    Hospital Day # Hospital Day: 3    ID: 44 y.o. male with past medical history of alcohol abuse, hypertension, decompensated cirrhosis status post TIPS , admitted on 11/13/2024 for hepatic encephalopathy.    24 Hour Events: No acute events overnight     Subjective: Patient is feeling improved this morning, ANO x 3 no and not as lethargic as previous.  Does complain of some moderate right upper quadrant abdominal pain.  Otherwise no acute complaints.      OBJECTIVE:  Temp:  [36.1 °C (97 °F)-37.4 °C (99.3 °F)] 37 °C (98.6 °F)  Pulse:  [62-84] 67  Resp:  [13-18] 13  BP: (125-167)/(75-98) 152/90  SpO2:  [91 %-96 %] 92 %    Intake/Output Summary (Last 24 hours) at 11/14/2024 0428  Last data filed at 11/13/2024 2247  Gross per 24 hour   Intake --   Output 800 ml   Net -800 ml       PE:  Gen: Mildly lethargic, A&O x 3  HEENT: Normocephalic, periocular ecchymosis in the left eye, EOMI, scleral icterus  Pulm: clear to auscultation bilaterally, no respiratory distress   Cardio: RRR, systolic murmur  Abdom: soft, nontender, nondistended, normoactive bowel sounds in all quadrants  Ext: No edema, 2+ pulses bilaterally  Neuro: Cranial nerves II through XII grossly intact    LABS:  Recent Labs     11/13/24  1906 11/14/24  0006 11/15/24  1008   WBC 5.2 5.2 5.8   RBC 3.15* 2.92* 3.17*   HEMOGLOBIN 10.1* 9.4* 9.8*   HEMATOCRIT 29.9* 26.7* 29.7*   MCV 94.9 91.4 93.7   MCH 32.1 32.2 30.9   RDW 53.5* 49.8 54.1*   PLATELETCT 63* 36* 77*   MPV 11.2  --  10.3   NEUTSPOLYS 60.00 53.30 59.60   LYMPHOCYTES 24.30 26.30 23.40   MONOCYTES 13.00 15.50* 13.50*   EOSINOPHILS 1.90 2.90 2.90   BASOPHILS 0.40 0.60 0.30     Recent Labs     11/13/24  1906 11/15/24  1008   SODIUM 135  136 138   POTASSIUM 3.9  3.8 4.0   CHLORIDE 107  106 110   CO2 18*  19* 20   BUN 13  13 12  "  CREATININE 0.95  0.75 0.85   CALCIUM 8.4*  8.6 8.5   MAGNESIUM 2.0 1.7   PHOSPHORUS 3.0 4.1   ALBUMIN 3.1*  3.1* 2.7*     Estimated GFR/CRCL = Estimated Creatinine Clearance: 164.9 mL/min (by C-G formula based on SCr of 0.85 mg/dL).  Recent Labs     11/13/24  1906 11/15/24  1008   GLUCOSE 88  87 107*     Recent Labs     11/13/24 1906 11/15/24  1008   ASTSGOT 70*  67* 53*   ALTSGPT 26  24 22   TBILIRUBIN 2.6*  2.6* 2.9*   ALKPHOSPHAT 187*  182* 144*   GLOBULIN 3.2  3.1 2.9   INR 2.96*  --    AMMONIA 124*  --              Recent Labs     11/13/24 1906   INR 2.96*   APTT 35.0       MICROBIOLOGY:   No results found for: \"BLOODCULTU\", \"BLDCULT\", \"BCHOLD\"     IMAGING:   EC-ECHOCARDIOGRAM COMPLETE W/O CONT   Final Result      CT-HEAD W/O   Final Result         1.  No acute intracranial abnormality is identified, there are nonspecific white matter changes, commonly associated with small vessel ischemic disease.  Associated mild cerebral atrophy is noted.                   MEDS:  Current Facility-Administered Medications   Medication Last Admin    lactulose 20 GM/30ML solution 45 mL 45 mL at 11/15/24 1316    chlordiazePOXIDE (Librium) capsule 25 mg 25 mg at 11/15/24 1315    senna-docusate (Pericolace Or Senokot S) 8.6-50 MG per tablet 2 Tablet      And    polyethylene glycol/lytes (Miralax) Packet 1 Packet      acetaminophen (Tylenol) tablet 650 mg 650 mg at 11/14/24 1710    amoxicillin-clavulanate (Augmentin) 875-125 MG per tablet 1 Tablet 1 Tablet at 11/15/24 0558    carvedilol (Coreg) tablet 3.125 mg 3.125 mg at 11/15/24 0814    escitalopram (Lexapro) tablet 20 mg 20 mg at 11/15/24 0558    [START ON 11/18/2024] folic acid (Folvite) tablet 1 mg      furosemide (Lasix) tablet 80 mg 80 mg at 11/15/24 0558    [Held by provider] hydrOXYzine HCl (Atarax) tablet 50 mg      omeprazole (PriLOSEC) capsule 20 mg 20 mg at 11/15/24 0558    spironolactone (Aldactone) tablet 100 mg 100 mg at 11/15/24 0558    [START ON " 11/18/2024] therapeutic multivitamin-minerals (Theragran-M) tablet 1 Tablet      [START ON 11/18/2024] thiamine (Vitamin B-1) tablet 100 mg      [Held by provider] traZODone (Desyrel) tablet 50 mg      LORazepam (Ativan) tablet 0.5 mg      LORazepam (Ativan) tablet 1 mg 1 mg at 11/14/24 1200    Or    LORazepam (Ativan) injection 0.5 mg      LORazepam (Ativan) tablet 2 mg 2 mg at 11/14/24 0122    Or    LORazepam (Ativan) injection 1 mg      LORazepam (Ativan) tablet 3 mg 3 mg at 11/14/24 0400    Or    LORazepam (Ativan) injection 1.5 mg      LORazepam (Ativan) tablet 4 mg      Or    LORazepam (Ativan) injection 2 mg      thiamine (Vitamin B-1) tablet 100 mg 100 mg at 11/15/24 0559    And    multivitamin tablet 1 Tablet 1 Tablet at 11/15/24 0559    And    folic acid (Folvite) tablet 1 mg 1 mg at 11/15/24 0558    heparin injection 5,000 Units 5,000 Units at 11/15/24 1318    riFAXIMin (Xifaxan) tablet 550 mg 550 mg at 11/15/24 0558       ASSESSMENT/PLAN: 44 y.o. male with past medical history of alcohol abuse, hypertension, decompensated cirrhosis status post TIPS , admitted on 11/13/2024 for hepatic encephalopathy and now in acute alcohol withdrawal.    * Hepatic encephalopathy (HCC)- (present on admission)  Assessment & Plan  Acute, moderate to severe, ammonia 124 on admission with BAL of 118.  Home regimen includes lactulose and rifaximin however, patient unclear if he has been taking him at home and his last bowel movement is unknown.      Plan:  - Lactulose 45 mL 4 times daily, titrate for 3 loose stools daily  - Rifaximin 550 mg twice daily  - Every 4 neurochecks  - Bowel protocol    Murmur- (present on admission)  Assessment & Plan  Systolic murmur, likely cirrhotic cardiomyopathy, Echo 2022 preserved EF and ascending aorta with borderline dilation at 4.0 cm, no significant valvular disease.  Repeat echo showing no changes from prior, no valvular abnormalities.        Dog bite- (present on admission)  Assessment  & Plan  Acute, approximately 3 days ago, with bite to the right ankle.  Patient sought care at that time and received tetanus vaccine and course of Augmentin.  No evidence of cellulitis or spreading infection at this time.    Plan:  - Continue Augmentin  - Wound care consult    Insomnia- (present on admission)  Assessment & Plan  Chronic likely secondary to chronic alcohol use.  Home regimen of trazodone 50 mg nightly.    Plan:  - Hold in setting of acute alcohol withdrawal on active CIWA protocol      Cirrhosis (HCC)- (present on admission)  Assessment & Plan  Chronic, secondary to alcoholism, decompensated, complicated by recurrent episodes of hepatic encephalopathy.  Patient is s/p TIPS procedure.  Most recent CT abdomen/pelvis on 10/17 notable for portal hypertension with numerous varices. MELD-NA of 23 on admission, 19.6% estimated 3-month mortality. EGD from 10/27 shows a large inlet patch with irregular borders distally - could be biopsied for Patel's as an outpatient.  Did not appreciate esophageal varices or gastric varices.       Plan:  - Monitor for ascites, high risk for SBP  - Continue Lasix 80 mg daily  - Patient has outstanding referral to GI however, counseled about follow-up before discharge.    Primary hypertension- (present on admission)  Assessment & Plan  Chronic, poorly controlled in setting of medication nonadherence and severe alcohol use disorder.    Plan:  - Continue home medications: Coreg 3.125 mg twice daily and spironolactone 100 mg daily  -Deferring starting as needed antihypertensives at this time    Alcohol withdrawal syndrome without complication (HCC)- (present on admission)  Assessment & Plan  Acute, moderate to high risk for escalation of care need.  Last drink on morning of admission.  Patient recently being treated at inpatient rehab through [facility].  CIWA has been 4-5 on 11/15.    Plan:  - Continue telemetry  - CIWA protocol with Librium taper  - Thiamine, folate and  multivitamin supplementation  - Daily mag and Phos  - Case management consulted for discharge planning      Anxiety- (present on admission)  Assessment & Plan  Chronic, recently exacerbated by loss of his father, home regimen includes Lexapro 20 mg daily.    Plan:  - Continue home regimen  -Will hold any as needed anxiolytics at this time given active CIWA protocol            Core Measures:  Fluids: PO  Lines: PIV  Abx: Augmentin  Diet: Regular  PPX: SCDs/TEDs, heparin  Wound care: Right ankle dog bite      #DISPOSITION  Inpatient for management of hepatic encephalopathy complicated by acute alcohol withdrawal.        Preston Garcia M.D.   PGY1  [facility]

## 2024-11-16 LAB
ALBUMIN SERPL BCP-MCNC: 2.4 G/DL (ref 3.2–4.9)
ALBUMIN/GLOB SERPL: 0.9 G/DL
ALP SERPL-CCNC: 130 U/L (ref 30–99)
ALT SERPL-CCNC: 20 U/L (ref 2–50)
ANION GAP SERPL CALC-SCNC: 7 MMOL/L (ref 7–16)
AST SERPL-CCNC: 44 U/L (ref 12–45)
BILIRUB SERPL-MCNC: 2.5 MG/DL (ref 0.1–1.5)
BUN SERPL-MCNC: 14 MG/DL (ref 8–22)
CALCIUM ALBUM COR SERPL-MCNC: 9.5 MG/DL (ref 8.5–10.5)
CALCIUM SERPL-MCNC: 8.2 MG/DL (ref 8.5–10.5)
CHLORIDE SERPL-SCNC: 111 MMOL/L (ref 96–112)
CO2 SERPL-SCNC: 20 MMOL/L (ref 20–33)
CREAT SERPL-MCNC: 0.88 MG/DL (ref 0.5–1.4)
ERYTHROCYTE [DISTWIDTH] IN BLOOD BY AUTOMATED COUNT: 54.2 FL (ref 35.9–50)
GFR SERPLBLD CREATININE-BSD FMLA CKD-EPI: 110 ML/MIN/1.73 M 2
GLOBULIN SER CALC-MCNC: 2.6 G/DL (ref 1.9–3.5)
GLUCOSE SERPL-MCNC: 89 MG/DL (ref 65–99)
HCT VFR BLD AUTO: 27.7 % (ref 42–52)
HGB BLD-MCNC: 9.2 G/DL (ref 14–18)
MCH RBC QN AUTO: 31.2 PG (ref 27–33)
MCHC RBC AUTO-ENTMCNC: 33.2 G/DL (ref 32.3–36.5)
MCV RBC AUTO: 93.9 FL (ref 81.4–97.8)
PLATELET # BLD AUTO: 68 K/UL (ref 164–446)
PLATELETS.RETICULATED NFR BLD AUTO: 2.4 % (ref 0.6–13.1)
PMV BLD AUTO: 10.4 FL (ref 9–12.9)
POTASSIUM SERPL-SCNC: 4 MMOL/L (ref 3.6–5.5)
PROT SERPL-MCNC: 5 G/DL (ref 6–8.2)
RBC # BLD AUTO: 2.95 M/UL (ref 4.7–6.1)
SODIUM SERPL-SCNC: 138 MMOL/L (ref 135–145)
WBC # BLD AUTO: 4.9 K/UL (ref 4.8–10.8)

## 2024-11-16 PROCEDURE — A9270 NON-COVERED ITEM OR SERVICE: HCPCS

## 2024-11-16 PROCEDURE — 700102 HCHG RX REV CODE 250 W/ 637 OVERRIDE(OP)

## 2024-11-16 PROCEDURE — 770001 HCHG ROOM/CARE - MED/SURG/GYN PRIV*

## 2024-11-16 PROCEDURE — 700111 HCHG RX REV CODE 636 W/ 250 OVERRIDE (IP)

## 2024-11-16 PROCEDURE — 36415 COLL VENOUS BLD VENIPUNCTURE: CPT

## 2024-11-16 PROCEDURE — 80053 COMPREHEN METABOLIC PANEL: CPT

## 2024-11-16 PROCEDURE — 85055 RETICULATED PLATELET ASSAY: CPT

## 2024-11-16 PROCEDURE — 99232 SBSQ HOSP IP/OBS MODERATE 35: CPT | Performed by: FAMILY MEDICINE

## 2024-11-16 PROCEDURE — 97162 PT EVAL MOD COMPLEX 30 MIN: CPT

## 2024-11-16 PROCEDURE — 85027 COMPLETE CBC AUTOMATED: CPT

## 2024-11-16 RX ORDER — OXYCODONE HYDROCHLORIDE 5 MG/1
2.5 TABLET ORAL ONCE
Status: COMPLETED | OUTPATIENT
Start: 2024-11-16 | End: 2024-11-16

## 2024-11-16 RX ORDER — POLYETHYLENE GLYCOL 3350 17 G/17G
1 POWDER, FOR SOLUTION ORAL DAILY
Status: DISCONTINUED | OUTPATIENT
Start: 2024-11-16 | End: 2024-11-18 | Stop reason: HOSPADM

## 2024-11-16 RX ORDER — AMOXICILLIN 250 MG
2 CAPSULE ORAL EVERY EVENING
Status: DISCONTINUED | OUTPATIENT
Start: 2024-11-16 | End: 2024-11-18 | Stop reason: HOSPADM

## 2024-11-16 RX ADMIN — LACTULOSE 45 ML: 20 SOLUTION ORAL at 08:52

## 2024-11-16 RX ADMIN — MAGNESIUM HYDROXIDE 30 ML: 1200 LIQUID ORAL at 10:45

## 2024-11-16 RX ADMIN — FUROSEMIDE 80 MG: 20 TABLET ORAL at 05:14

## 2024-11-16 RX ADMIN — CHLORDIAZEPOXIDE HYDROCHLORIDE 25 MG: 25 CAPSULE ORAL at 17:54

## 2024-11-16 RX ADMIN — CARVEDILOL 3.12 MG: 6.25 TABLET, FILM COATED ORAL at 08:53

## 2024-11-16 RX ADMIN — HEPARIN SODIUM 5000 UNITS: 5000 INJECTION, SOLUTION INTRAVENOUS; SUBCUTANEOUS at 20:53

## 2024-11-16 RX ADMIN — HEPARIN SODIUM 5000 UNITS: 5000 INJECTION, SOLUTION INTRAVENOUS; SUBCUTANEOUS at 05:14

## 2024-11-16 RX ADMIN — LACTULOSE 45 ML: 20 SOLUTION ORAL at 12:03

## 2024-11-16 RX ADMIN — THERA TABS 1 TABLET: TAB at 05:14

## 2024-11-16 RX ADMIN — NICOTINE 7 MG: 7 PATCH TRANSDERMAL at 10:38

## 2024-11-16 RX ADMIN — CHLORDIAZEPOXIDE HYDROCHLORIDE 25 MG: 25 CAPSULE ORAL at 12:02

## 2024-11-16 RX ADMIN — OMEPRAZOLE 20 MG: 20 CAPSULE, DELAYED RELEASE ORAL at 08:53

## 2024-11-16 RX ADMIN — OMEPRAZOLE 20 MG: 20 CAPSULE, DELAYED RELEASE ORAL at 17:49

## 2024-11-16 RX ADMIN — SENNOSIDES AND DOCUSATE SODIUM 2 TABLET: 50; 8.6 TABLET ORAL at 17:50

## 2024-11-16 RX ADMIN — ACETAMINOPHEN 650 MG: 325 TABLET ORAL at 09:17

## 2024-11-16 RX ADMIN — TRAZODONE HYDROCHLORIDE 50 MG: 50 TABLET ORAL at 20:53

## 2024-11-16 RX ADMIN — OXYCODONE 2.5 MG: 5 TABLET ORAL at 10:38

## 2024-11-16 RX ADMIN — RIFAXIMIN 550 MG: 550 TABLET ORAL at 05:13

## 2024-11-16 RX ADMIN — Medication 100 MG: at 05:14

## 2024-11-16 RX ADMIN — FOLIC ACID 1 MG: 1 TABLET ORAL at 05:13

## 2024-11-16 RX ADMIN — AMOXICILLIN AND CLAVULANATE POTASSIUM 1 TABLET: 875; 125 TABLET, FILM COATED ORAL at 17:50

## 2024-11-16 RX ADMIN — AMOXICILLIN AND CLAVULANATE POTASSIUM 1 TABLET: 875; 125 TABLET, FILM COATED ORAL at 05:14

## 2024-11-16 RX ADMIN — LORAZEPAM 0.5 MG: 1 TABLET ORAL at 21:38

## 2024-11-16 RX ADMIN — CHLORDIAZEPOXIDE HYDROCHLORIDE 25 MG: 25 CAPSULE ORAL at 05:13

## 2024-11-16 RX ADMIN — SPIRONOLACTONE 100 MG: 100 TABLET ORAL at 05:13

## 2024-11-16 RX ADMIN — CARVEDILOL 3.12 MG: 6.25 TABLET, FILM COATED ORAL at 17:49

## 2024-11-16 RX ADMIN — RIFAXIMIN 550 MG: 550 TABLET ORAL at 17:50

## 2024-11-16 RX ADMIN — HEPARIN SODIUM 5000 UNITS: 5000 INJECTION, SOLUTION INTRAVENOUS; SUBCUTANEOUS at 14:23

## 2024-11-16 RX ADMIN — ESCITALOPRAM OXALATE 20 MG: 10 TABLET ORAL at 05:13

## 2024-11-16 ASSESSMENT — LIFESTYLE VARIABLES
AUDITORY DISTURBANCES: NOT PRESENT
TOTAL SCORE: 8
NAUSEA AND VOMITING: NO NAUSEA AND NO VOMITING
ANXIETY: NO ANXIETY (AT EASE)
ANXIETY: MILDLY ANXIOUS
HEADACHE, FULLNESS IN HEAD: MILD
NAUSEA AND VOMITING: NO NAUSEA AND NO VOMITING
TOTAL SCORE: 4
VISUAL DISTURBANCES: MODERATE SENSITIVITY
ANXIETY: MILDLY ANXIOUS
HEADACHE, FULLNESS IN HEAD: NOT PRESENT
AUDITORY DISTURBANCES: NOT PRESENT
ORIENTATION AND CLOUDING OF SENSORIUM: ORIENTED AND CAN DO SERIAL ADDITIONS
HEADACHE, FULLNESS IN HEAD: NOT PRESENT
NAUSEA AND VOMITING: NO NAUSEA AND NO VOMITING
PAROXYSMAL SWEATS: NO SWEAT VISIBLE
AGITATION: NORMAL ACTIVITY
TOTAL SCORE: VERY MILD ITCHING, PINS AND NEEDLES SENSATION, BURNING OR NUMBNESS
VISUAL DISTURBANCES: NOT PRESENT
PAROXYSMAL SWEATS: NO SWEAT VISIBLE
ORIENTATION AND CLOUDING OF SENSORIUM: ORIENTED AND CAN DO SERIAL ADDITIONS
TREMOR: *
NAUSEA AND VOMITING: NO NAUSEA AND NO VOMITING
TOTAL SCORE: 8
HEADACHE, FULLNESS IN HEAD: VERY MILD
PAROXYSMAL SWEATS: NO SWEAT VISIBLE
TREMOR: *
TREMOR: *
TOTAL SCORE: 6
ANXIETY: MILDLY ANXIOUS
TREMOR: *
TREMOR: TREMOR NOT VISIBLE BUT CAN BE FELT, FINGERTIP TO FINGERTIP
ORIENTATION AND CLOUDING OF SENSORIUM: ORIENTED AND CAN DO SERIAL ADDITIONS
PAROXYSMAL SWEATS: NO SWEAT VISIBLE
TOTAL SCORE: VERY MILD ITCHING, PINS AND NEEDLES SENSATION, BURNING OR NUMBNESS
HEADACHE, FULLNESS IN HEAD: NOT PRESENT
TOTAL SCORE: 10
AUDITORY DISTURBANCES: NOT PRESENT
TREMOR: *
VISUAL DISTURBANCES: NOT PRESENT
AGITATION: SOMEWHAT MORE THAN NORMAL ACTIVITY
AGITATION: SOMEWHAT MORE THAN NORMAL ACTIVITY
VISUAL DISTURBANCES: NOT PRESENT
AGITATION: NORMAL ACTIVITY
ORIENTATION AND CLOUDING OF SENSORIUM: ORIENTED AND CAN DO SERIAL ADDITIONS
ANXIETY: MILDLY ANXIOUS
TOTAL SCORE: 4
HEADACHE, FULLNESS IN HEAD: NOT PRESENT
PAROXYSMAL SWEATS: NO SWEAT VISIBLE
TOTAL SCORE: VERY MILD ITCHING, PINS AND NEEDLES SENSATION, BURNING OR NUMBNESS
ORIENTATION AND CLOUDING OF SENSORIUM: ORIENTED AND CAN DO SERIAL ADDITIONS
ANXIETY: MILDLY ANXIOUS
NAUSEA AND VOMITING: NO NAUSEA AND NO VOMITING
AUDITORY DISTURBANCES: VERY MILD HARSHNESS OR ABILITY TO FRIGHTEN
AGITATION: NORMAL ACTIVITY
AGITATION: SOMEWHAT MORE THAN NORMAL ACTIVITY
AUDITORY DISTURBANCES: NOT PRESENT
ORIENTATION AND CLOUDING OF SENSORIUM: ORIENTED AND CAN DO SERIAL ADDITIONS
AUDITORY DISTURBANCES: VERY MILD HARSHNESS OR ABILITY TO FRIGHTEN
NAUSEA AND VOMITING: MILD NAUSEA WITH NO VOMITING
VISUAL DISTURBANCES: MILD SENSITIVITY
VISUAL DISTURBANCES: MILD SENSITIVITY
PAROXYSMAL SWEATS: *

## 2024-11-16 ASSESSMENT — PAIN DESCRIPTION - PAIN TYPE
TYPE: ACUTE PAIN

## 2024-11-16 ASSESSMENT — COGNITIVE AND FUNCTIONAL STATUS - GENERAL
WALKING IN HOSPITAL ROOM: A LITTLE
MOBILITY SCORE: 19
STANDING UP FROM CHAIR USING ARMS: A LITTLE
MOVING FROM LYING ON BACK TO SITTING ON SIDE OF FLAT BED: A LITTLE
CLIMB 3 TO 5 STEPS WITH RAILING: A LITTLE
SUGGESTED CMS G CODE MODIFIER MOBILITY: CK
MOVING TO AND FROM BED TO CHAIR: A LITTLE

## 2024-11-16 ASSESSMENT — GAIT ASSESSMENTS
DISTANCE (FEET): 100
GAIT LEVEL OF ASSIST: SUPERVISED
DEVIATION: DECREASED BASE OF SUPPORT

## 2024-11-16 NOTE — PROGRESS NOTES
Attending: Isabella Montanez M.d.   Senior Resident: Pedro Lott M.D.   [place] Resident: Preston Garcia M.D.     PATIENT: Jalen Vaughn; [id number]; 1980    Hospital Day # Hospital Day: 4    ID: 44 y.o. male with past medical history of alcohol abuse, hypertension, decompensated cirrhosis status post TIPS , admitted on 11/13/2024 for hepatic encephalopathy.    24 Hour Events: No acute events overnight     Subjective: Patient is feeling mildly improved this morning, A&O x 4  and less lethargic than previous.  Does complain of some moderate right upper quadrant abdominal pain.  He is distressed about his approximately 20% mortality risk over the next 3 months. States he needs to leave to find housing and a job. Was able to deescalate and he agrees to stay.       OBJECTIVE:  Temp:  [36.6 °C (97.9 °F)-37 °C (98.6 °F)] 36.6 °C (97.9 °F)  Pulse:  [61-68] 61  Resp:  [16-18] 16  BP: (115-167)/(69-97) 133/80  SpO2:  [91 %-97 %] 97 %    Intake/Output Summary (Last 24 hours) at 11/14/2024 0428  Last data filed at 11/13/2024 2247  Gross per 24 hour   Intake --   Output 800 ml   Net -800 ml       PE:  Gen: Mildly lethargic, A&O x 4  HEENT: Normocephalic, periocular ecchymosis in the left eye, EOMI, scleral icterus  Pulm: clear to auscultation bilaterally, no respiratory distress   Cardio: RRR, systolic murmur  Abdom: soft, nontender, nondistended, normoactive bowel sounds in all quadrants  Ext: No edema, 2+ pulses bilaterally  Neuro: Cranial nerves II through XII grossly intact    LABS:  Recent Labs     11/13/24  1906 11/14/24  0006 11/15/24  1008 11/16/24  0223   WBC 5.2 5.2 5.8 4.9   RBC 3.15* 2.92* 3.17* 2.95*   HEMOGLOBIN 10.1* 9.4* 9.8* 9.2*   HEMATOCRIT 29.9* 26.7* 29.7* 27.7*   MCV 94.9 91.4 93.7 93.9   MCH 32.1 32.2 30.9 31.2   RDW 53.5* 49.8 54.1* 54.2*   PLATELETCT 63* 36* 77* 68*   MPV 11.2  --  10.3 10.4   NEUTSPOLYS 60.00 53.30 59.60  --    LYMPHOCYTES 24.30 26.30 23.40  --    MONOCYTES 13.00  "15.50* 13.50*  --    EOSINOPHILS 1.90 2.90 2.90  --    BASOPHILS 0.40 0.60 0.30  --      Recent Labs     11/13/24  1906 11/15/24  1008 11/16/24  0223   SODIUM 135  136 138 138   POTASSIUM 3.9  3.8 4.0 4.0   CHLORIDE 107  106 110 111   CO2 18*  19* 20 20   BUN 13  13 12 14   CREATININE 0.95  0.75 0.85 0.88   CALCIUM 8.4*  8.6 8.5 8.2*   MAGNESIUM 2.0 1.7  --    PHOSPHORUS 3.0 4.1  --    ALBUMIN 3.1*  3.1* 2.7* 2.4*     Estimated GFR/CRCL = Estimated Creatinine Clearance: 159.3 mL/min (by C-G formula based on SCr of 0.88 mg/dL).  Recent Labs     11/13/24  1906 11/15/24  1008 11/16/24  0223   GLUCOSE 88  87 107* 89     Recent Labs     11/13/24  1906 11/15/24  1008 11/16/24  0223   ASTSGOT 70*  67* 53* 44   ALTSGPT 26  24 22 20   TBILIRUBIN 2.6*  2.6* 2.9* 2.5*   ALKPHOSPHAT 187*  182* 144* 130*   GLOBULIN 3.2  3.1 2.9 2.6   INR 2.96*  --   --    AMMONIA 124*  --   --              Recent Labs     11/13/24 1906   INR 2.96*   APTT 35.0       MICROBIOLOGY:   No results found for: \"BLOODCULTU\", \"BLDCULT\", \"BCHOLD\"     IMAGING:   EC-ECHOCARDIOGRAM COMPLETE W/O CONT   Final Result      CT-HEAD W/O   Final Result         1.  No acute intracranial abnormality is identified, there are nonspecific white matter changes, commonly associated with small vessel ischemic disease.  Associated mild cerebral atrophy is noted.                   MEDS:  Current Facility-Administered Medications   Medication Last Admin    nicotine (Nicoderm) 7 MG/24HR 7 mg 7 mg at 11/16/24 1038    lactulose 20 GM/30ML solution 45 mL 45 mL at 11/16/24 1203    chlordiazePOXIDE (Librium) capsule 25 mg 25 mg at 11/16/24 1202    senna-docusate (Pericolace Or Senokot S) 8.6-50 MG per tablet 2 Tablet 2 Tablet at 11/15/24 1754    And    polyethylene glycol/lytes (Miralax) Packet 1 Packet      acetaminophen (Tylenol) tablet 650 mg 650 mg at 11/16/24 0917    amoxicillin-clavulanate (Augmentin) 875-125 MG per tablet 1 Tablet 1 Tablet at 11/16/24 0514    " carvedilol (Coreg) tablet 3.125 mg 3.125 mg at 11/16/24 0853    escitalopram (Lexapro) tablet 20 mg 20 mg at 11/16/24 0513    [START ON 11/18/2024] folic acid (Folvite) tablet 1 mg      furosemide (Lasix) tablet 80 mg 80 mg at 11/16/24 0514    [Held by provider] hydrOXYzine HCl (Atarax) tablet 50 mg      omeprazole (PriLOSEC) capsule 20 mg 20 mg at 11/16/24 0853    spironolactone (Aldactone) tablet 100 mg 100 mg at 11/16/24 0513    [START ON 11/18/2024] therapeutic multivitamin-minerals (Theragran-M) tablet 1 Tablet      [START ON 11/18/2024] thiamine (Vitamin B-1) tablet 100 mg      [Held by provider] traZODone (Desyrel) tablet 50 mg      LORazepam (Ativan) tablet 0.5 mg 0.5 mg at 11/15/24 2121    LORazepam (Ativan) tablet 1 mg 1 mg at 11/14/24 1200    Or    LORazepam (Ativan) injection 0.5 mg      LORazepam (Ativan) tablet 2 mg 2 mg at 11/14/24 0122    Or    LORazepam (Ativan) injection 1 mg      LORazepam (Ativan) tablet 3 mg 3 mg at 11/14/24 0400    Or    LORazepam (Ativan) injection 1.5 mg      LORazepam (Ativan) tablet 4 mg      Or    LORazepam (Ativan) injection 2 mg      thiamine (Vitamin B-1) tablet 100 mg 100 mg at 11/16/24 0514    And    multivitamin tablet 1 Tablet 1 Tablet at 11/16/24 0514    And    folic acid (Folvite) tablet 1 mg 1 mg at 11/16/24 0513    heparin injection 5,000 Units 5,000 Units at 11/16/24 0514    riFAXIMin (Xifaxan) tablet 550 mg 550 mg at 11/16/24 0513       ASSESSMENT/PLAN: 44 y.o. male with past medical history of alcohol abuse, hypertension, decompensated cirrhosis status post TIPS , admitted on 11/13/2024 for hepatic encephalopathy and now in acute alcohol withdrawal.    * Hepatic encephalopathy (HCC)- (present on admission)  Assessment & Plan  Acute, moderate to severe, ammonia 124 on admission with BAL of 118.  Home regimen includes lactulose and rifaximin however, patient unclear if he has been taking him at home and his last bowel movement is unknown.  Has had only 2 bowel  movements since admission.    Plan:  - Lactulose 45 mL 4 times daily, titrate for 3 loose stools daily  - Rifaximin 550 mg twice daily  - Every 4 neurochecks  - Bowel protocol, added milk of mag    Murmur- (present on admission)  Assessment & Plan  Systolic murmur, likely cirrhotic cardiomyopathy, Echo 2022 preserved EF and ascending aorta with borderline dilation at 4.0 cm, no significant valvular disease.  Repeat echo showing no changes from prior, no valvular abnormalities.        Dog bite- (present on admission)  Assessment & Plan  Acute, approximately 3 days ago, with bite to the right ankle.  Patient sought care at that time and received tetanus vaccine and course of Augmentin.  No evidence of cellulitis or spreading infection at this time.    Plan:  - Continue Augmentin until completion  - Wound care consult    Insomnia- (present on admission)  Assessment & Plan  Chronic likely secondary to chronic alcohol use.  Home regimen of trazodone 50 mg nightly.    Plan:  - Restart once stable      Cirrhosis (HCC)- (present on admission)  Assessment & Plan  Chronic, secondary to alcoholism, decompensated, complicated by recurrent episodes of hepatic encephalopathy.  Patient is s/p TIPS procedure.  Most recent CT abdomen/pelvis on 10/17 notable for portal hypertension with numerous varices. MELD-NA of 23 on admission, 19.6% estimated 3-month mortality. EGD from 10/27 shows a large inlet patch with irregular borders distally - could be biopsied for Patel's as an outpatient.  Did not appreciate esophageal varices or gastric varices.       Plan:  - Monitor for ascites, high risk for SBP  - Continue Lasix 80 mg daily  - Patient has outstanding referral to GI however, counseled about follow-up before discharge.    Primary hypertension- (present on admission)  Assessment & Plan  Chronic, poorly controlled in setting of medication nonadherence and severe alcohol use disorder.    Plan:  - Continue home medications: Coreg  3.125 mg twice daily and spironolactone 100 mg daily  -Deferring starting as needed antihypertensives at this time    Alcohol withdrawal syndrome without complication (HCC)- (present on admission)  Assessment & Plan  Acute, moderate to high risk for escalation of care need.  Last drink on morning of admission.  Patient recently being treated at inpatient rehab through [facility].  CIWA has been 4-5 on 11/15. Ciwa score of 8 11/16, 8 again on repeat.    Plan:  - discontinue telemetry  - Continue CIWA protocol with Librium taper  - Thiamine, folate and multivitamin supplementation  - Daily mag and Phos  - Case management consulted for discharge planning      Anxiety- (present on admission)  Assessment & Plan  Chronic, recently exacerbated by loss of his father, home regimen includes Lexapro 20 mg daily.    Plan:  - Continue home regimen  -Will hold any as needed anxiolytics at this time given active CIWA protocol            Core Measures:  Fluids: PO  Lines: PIV  Abx: Augmentin  Diet: Regular  PPX: SCDs/TEDs, heparin  Wound care: Right ankle dog bite      #DISPOSITION  Inpatient for management of hepatic encephalopathy complicated by acute alcohol withdrawal.        Preston Garcia M.D.   PGY1  [facility]

## 2024-11-16 NOTE — CARE PLAN
The patient is Stable - Low risk of patient condition declining or worsening    Shift Goals  Clinical Goals: Safety, CIWA, prevent AMA, neuro checks, rest  Patient Goals: Go home and get a job/place to live  Family Goals: JAMEE    Progress made toward(s) clinical / shift goals:  Low CIWA scores. Impulsivity calmed with reorientation/education. Resident bedside to education patient. Neuro checks improved. AMA threats resolved after education/discussion with rationale for continued admission. Improved oral intake noted. X 4 BMs by end of shift. Discussed  available during weekdays, verbalized understanding.     Patient is not progressing towards the following goals: NA.

## 2024-11-16 NOTE — PROGRESS NOTES
"Bedside report received from [facility] RN; assumed care. Assessment complete. A&O x 3, disoriented to date/time. VSS, intermittent HTN noted. Low CIWA scores noted, nearing baseline per resident. 92% on RA, dizziness reported, 1L NC placed. Patient denying SOB, numbness, tingling, nausea, vomiting. Dizziness reported with standing up in bathroom. Double vision and painful sclera reported, stating \"I can't see straight\". Medicated for right flank pain; see MAR.  Abdomen distended round. Hypoactive BS x 4 noted. Bruising/scabs noted from falling/dog bit to right flank, lower abdominal region, BLE. + void -eructation. + flatus. LBM 11/16, x 2 soft brown BMs noted/reported. Patient tolerating regular diet. Patient reported a weight gait from 236 lbs to 318 lbs. Patient up SBA with steady/unsteady gait noted. Patient removed PIV. MD notified. Discussed plan of care with patient. All questions answered.  High fall risk. Bed/strip alarm engaged. Care AI in place for safety Bed in locked/lowest position.  Call light/personal belongings within reach.  All needs met, patient eating breakfast at present time.  "

## 2024-11-16 NOTE — PROGRESS NOTES
Pt A&O x3, disoriented to situation. Plan of care discussed, all concerns addressed. Complaints of pain, modalities offered such as pain medications, food, rest, reposition. No complaints of pain, chest pain, or SOB. Call light and belongings within reach. Pt educated on fall precautions with verbalized understanding. CIWA assessment in place, medicated per MAR based on score.  in place. Bed locked and in lowest position with bed alarm on. VSS. All needs met at this time.

## 2024-11-16 NOTE — CARE PLAN
The patient is Stable - Low risk of patient condition declining or worsening    Shift Goals  Clinical Goals: Patient will remain free from falls by EOS, CIWA monitoring  Patient Goals: Rest  Family Goals: JAMEE    Progress made toward(s) clinical / shift goals:    Problem: Knowledge Deficit - Standard  Goal: Patient and family/care givers will demonstrate understanding of plan of care, disease process/condition, diagnostic tests and medications  Outcome: Progressing     Problem: Optimal Care for Alcohol Withdrawal  Goal: Optimal Care for the alcohol withdrawal patient  Outcome: Progressing   Note: CIWA assessment in place.   Problem: Seizure Precautions  Goal: Implementation of seizure precautions  Outcome: Progressing   Note: Seizure precautions in place. Bed rail padded x4, suction at bedside, 3 bed rails up. Bed alarm on.   Problem: Fall Risk  Goal: Patient will remain free from falls  Outcome: Progressing     Problem: Pain - Standard  Goal: Alleviation of pain or a reduction in pain to the patient’s comfort goal  Outcome: Progressing       Patient is not progressing towards the following goals:

## 2024-11-17 ENCOUNTER — PHARMACY VISIT (OUTPATIENT)
Dept: PHARMACY | Facility: MEDICAL CENTER | Age: 44
End: 2024-11-17
Payer: COMMERCIAL

## 2024-11-17 PROBLEM — F10.930 ALCOHOL WITHDRAWAL SYNDROME WITHOUT COMPLICATION (HCC): Status: RESOLVED | Noted: 2022-02-16 | Resolved: 2024-11-17

## 2024-11-17 LAB
ALBUMIN SERPL BCP-MCNC: 2.2 G/DL (ref 3.2–4.9)
ALBUMIN/GLOB SERPL: 0.9 G/DL
ALP SERPL-CCNC: 127 U/L (ref 30–99)
ALT SERPL-CCNC: 18 U/L (ref 2–50)
ANION GAP SERPL CALC-SCNC: 8 MMOL/L (ref 7–16)
AST SERPL-CCNC: 37 U/L (ref 12–45)
BILIRUB SERPL-MCNC: 2.2 MG/DL (ref 0.1–1.5)
BUN SERPL-MCNC: 16 MG/DL (ref 8–22)
CALCIUM ALBUM COR SERPL-MCNC: 8.8 MG/DL (ref 8.5–10.5)
CALCIUM SERPL-MCNC: 7.4 MG/DL (ref 8.5–10.5)
CHLORIDE SERPL-SCNC: 106 MMOL/L (ref 96–112)
CO2 SERPL-SCNC: 19 MMOL/L (ref 20–33)
CREAT SERPL-MCNC: 0.69 MG/DL (ref 0.5–1.4)
ERYTHROCYTE [DISTWIDTH] IN BLOOD BY AUTOMATED COUNT: 53 FL (ref 35.9–50)
GFR SERPLBLD CREATININE-BSD FMLA CKD-EPI: 118 ML/MIN/1.73 M 2
GLOBULIN SER CALC-MCNC: 2.4 G/DL (ref 1.9–3.5)
GLUCOSE SERPL-MCNC: 115 MG/DL (ref 65–99)
HCT VFR BLD AUTO: 26 % (ref 42–52)
HGB BLD-MCNC: 8.9 G/DL (ref 14–18)
MCH RBC QN AUTO: 32 PG (ref 27–33)
MCHC RBC AUTO-ENTMCNC: 34.2 G/DL (ref 32.3–36.5)
MCV RBC AUTO: 93.5 FL (ref 81.4–97.8)
PLATELET # BLD AUTO: 62 K/UL (ref 164–446)
PLATELETS.RETICULATED NFR BLD AUTO: 2.8 % (ref 0.6–13.1)
PMV BLD AUTO: 11.5 FL (ref 9–12.9)
POTASSIUM SERPL-SCNC: 3.8 MMOL/L (ref 3.6–5.5)
PROT SERPL-MCNC: 4.6 G/DL (ref 6–8.2)
RBC # BLD AUTO: 2.78 M/UL (ref 4.7–6.1)
SODIUM SERPL-SCNC: 133 MMOL/L (ref 135–145)
WBC # BLD AUTO: 5.1 K/UL (ref 4.8–10.8)

## 2024-11-17 PROCEDURE — A9270 NON-COVERED ITEM OR SERVICE: HCPCS

## 2024-11-17 PROCEDURE — 80053 COMPREHEN METABOLIC PANEL: CPT

## 2024-11-17 PROCEDURE — 700111 HCHG RX REV CODE 636 W/ 250 OVERRIDE (IP)

## 2024-11-17 PROCEDURE — 700102 HCHG RX REV CODE 250 W/ 637 OVERRIDE(OP)

## 2024-11-17 PROCEDURE — RXMED WILLOW AMBULATORY MEDICATION CHARGE

## 2024-11-17 PROCEDURE — 99231 SBSQ HOSP IP/OBS SF/LOW 25: CPT | Performed by: FAMILY MEDICINE

## 2024-11-17 PROCEDURE — 36415 COLL VENOUS BLD VENIPUNCTURE: CPT

## 2024-11-17 PROCEDURE — 85055 RETICULATED PLATELET ASSAY: CPT

## 2024-11-17 PROCEDURE — 770001 HCHG ROOM/CARE - MED/SURG/GYN PRIV*

## 2024-11-17 PROCEDURE — 85027 COMPLETE CBC AUTOMATED: CPT

## 2024-11-17 RX ORDER — LANOLIN ALCOHOL/MO/W.PET/CERES
100 CREAM (GRAM) TOPICAL DAILY
Qty: 30 TABLET | Refills: 0 | Status: SHIPPED | OUTPATIENT
Start: 2024-11-18 | End: 2024-11-24

## 2024-11-17 RX ORDER — CARVEDILOL 3.12 MG/1
3.12 TABLET ORAL 2 TIMES DAILY WITH MEALS
Qty: 60 TABLET | Refills: 0 | Status: SHIPPED | OUTPATIENT
Start: 2024-11-17 | End: 2024-11-18

## 2024-11-17 RX ORDER — LACTULOSE 10 G/15ML
45 SOLUTION ORAL 4 TIMES DAILY
Qty: 473 ML | Refills: 0 | Status: SHIPPED | OUTPATIENT
Start: 2024-11-17 | End: 2024-11-24

## 2024-11-17 RX ORDER — ESCITALOPRAM OXALATE 20 MG/1
20 TABLET ORAL DAILY
Qty: 30 TABLET | Refills: 0 | Status: SHIPPED | OUTPATIENT
Start: 2024-11-17 | End: 2024-11-24

## 2024-11-17 RX ORDER — FUROSEMIDE 80 MG/1
80 TABLET ORAL DAILY
Qty: 60 TABLET | Refills: 0 | Status: SHIPPED | OUTPATIENT
Start: 2024-11-18 | End: 2024-11-24

## 2024-11-17 RX ORDER — SPIRONOLACTONE 100 MG/1
100 TABLET, FILM COATED ORAL DAILY
Qty: 30 TABLET | Refills: 3 | Status: SHIPPED | OUTPATIENT
Start: 2024-11-18 | End: 2024-11-24

## 2024-11-17 RX ADMIN — AMOXICILLIN AND CLAVULANATE POTASSIUM 1 TABLET: 875; 125 TABLET, FILM COATED ORAL at 04:36

## 2024-11-17 RX ADMIN — NICOTINE 7 MG: 7 PATCH TRANSDERMAL at 04:36

## 2024-11-17 RX ADMIN — FOLIC ACID 1 MG: 1 TABLET ORAL at 04:36

## 2024-11-17 RX ADMIN — LACTULOSE 45 ML: 20 SOLUTION ORAL at 20:40

## 2024-11-17 RX ADMIN — LORAZEPAM 0.5 MG: 1 TABLET ORAL at 01:35

## 2024-11-17 RX ADMIN — OMEPRAZOLE 20 MG: 20 CAPSULE, DELAYED RELEASE ORAL at 05:24

## 2024-11-17 RX ADMIN — THERA TABS 1 TABLET: TAB at 04:36

## 2024-11-17 RX ADMIN — SPIRONOLACTONE 100 MG: 100 TABLET ORAL at 04:36

## 2024-11-17 RX ADMIN — ESCITALOPRAM OXALATE 20 MG: 10 TABLET ORAL at 04:36

## 2024-11-17 RX ADMIN — FUROSEMIDE 80 MG: 20 TABLET ORAL at 04:37

## 2024-11-17 RX ADMIN — HEPARIN SODIUM 5000 UNITS: 5000 INJECTION, SOLUTION INTRAVENOUS; SUBCUTANEOUS at 20:42

## 2024-11-17 RX ADMIN — CARVEDILOL 3.12 MG: 6.25 TABLET, FILM COATED ORAL at 16:50

## 2024-11-17 RX ADMIN — RIFAXIMIN 550 MG: 550 TABLET ORAL at 16:51

## 2024-11-17 RX ADMIN — HEPARIN SODIUM 5000 UNITS: 5000 INJECTION, SOLUTION INTRAVENOUS; SUBCUTANEOUS at 13:05

## 2024-11-17 RX ADMIN — HEPARIN SODIUM 5000 UNITS: 5000 INJECTION, SOLUTION INTRAVENOUS; SUBCUTANEOUS at 04:37

## 2024-11-17 RX ADMIN — Medication 100 MG: at 04:44

## 2024-11-17 RX ADMIN — RIFAXIMIN 550 MG: 550 TABLET ORAL at 04:36

## 2024-11-17 RX ADMIN — OMEPRAZOLE 20 MG: 20 CAPSULE, DELAYED RELEASE ORAL at 16:50

## 2024-11-17 RX ADMIN — CARVEDILOL 3.12 MG: 6.25 TABLET, FILM COATED ORAL at 08:03

## 2024-11-17 RX ADMIN — LACTULOSE 45 ML: 20 SOLUTION ORAL at 13:06

## 2024-11-17 RX ADMIN — TRAZODONE HYDROCHLORIDE 50 MG: 50 TABLET ORAL at 19:36

## 2024-11-17 RX ADMIN — ACETAMINOPHEN 650 MG: 325 TABLET ORAL at 08:03

## 2024-11-17 RX ADMIN — LORAZEPAM 0.5 MG: 1 TABLET ORAL at 13:03

## 2024-11-17 RX ADMIN — CHLORDIAZEPOXIDE HYDROCHLORIDE 25 MG: 25 CAPSULE ORAL at 00:21

## 2024-11-17 ASSESSMENT — LIFESTYLE VARIABLES
ANXIETY: NO ANXIETY (AT EASE)
AGITATION: SOMEWHAT MORE THAN NORMAL ACTIVITY
VISUAL DISTURBANCES: NOT PRESENT
TOTAL SCORE: 6
VISUAL DISTURBANCES: NOT PRESENT
TOTAL SCORE: 2
VISUAL DISTURBANCES: VERY MILD SENSITIVITY
TREMOR: *
TREMOR: TREMOR NOT VISIBLE BUT CAN BE FELT, FINGERTIP TO FINGERTIP
VISUAL DISTURBANCES: NOT PRESENT
AUDITORY DISTURBANCES: NOT PRESENT
ANXIETY: *
HEADACHE, FULLNESS IN HEAD: NOT PRESENT
VISUAL DISTURBANCES: NOT PRESENT
ANXIETY: NO ANXIETY (AT EASE)
NAUSEA AND VOMITING: NO NAUSEA AND NO VOMITING
NAUSEA AND VOMITING: NO NAUSEA AND NO VOMITING
TOTAL SCORE: 5
TREMOR: TREMOR NOT VISIBLE BUT CAN BE FELT, FINGERTIP TO FINGERTIP
HEADACHE, FULLNESS IN HEAD: NOT PRESENT
PAROXYSMAL SWEATS: NO SWEAT VISIBLE
AGITATION: NORMAL ACTIVITY
AUDITORY DISTURBANCES: VERY MILD HARSHNESS OR ABILITY TO FRIGHTEN
PAROXYSMAL SWEATS: NO SWEAT VISIBLE
HEADACHE, FULLNESS IN HEAD: NOT PRESENT
TOTAL SCORE: 3
NAUSEA AND VOMITING: MILD NAUSEA WITH NO VOMITING
AUDITORY DISTURBANCES: NOT PRESENT
AGITATION: SOMEWHAT MORE THAN NORMAL ACTIVITY
NAUSEA AND VOMITING: NO NAUSEA AND NO VOMITING
ORIENTATION AND CLOUDING OF SENSORIUM: ORIENTED AND CAN DO SERIAL ADDITIONS
ANXIETY: MILDLY ANXIOUS
AUDITORY DISTURBANCES: VERY MILD HARSHNESS OR ABILITY TO FRIGHTEN
ANXIETY: *
HEADACHE, FULLNESS IN HEAD: NOT PRESENT
ANXIETY: MILDLY ANXIOUS
HEADACHE, FULLNESS IN HEAD: NOT PRESENT
AUDITORY DISTURBANCES: NOT PRESENT
TREMOR: TREMOR NOT VISIBLE BUT CAN BE FELT, FINGERTIP TO FINGERTIP
PAROXYSMAL SWEATS: NO SWEAT VISIBLE
AGITATION: NORMAL ACTIVITY
HEADACHE, FULLNESS IN HEAD: NOT PRESENT
ORIENTATION AND CLOUDING OF SENSORIUM: ORIENTED AND CAN DO SERIAL ADDITIONS
ORIENTATION AND CLOUDING OF SENSORIUM: ORIENTED AND CAN DO SERIAL ADDITIONS
AGITATION: SOMEWHAT MORE THAN NORMAL ACTIVITY
TREMOR: TREMOR NOT VISIBLE BUT CAN BE FELT, FINGERTIP TO FINGERTIP
AGITATION: NORMAL ACTIVITY
ORIENTATION AND CLOUDING OF SENSORIUM: ORIENTED AND CAN DO SERIAL ADDITIONS
ORIENTATION AND CLOUDING OF SENSORIUM: ORIENTED AND CAN DO SERIAL ADDITIONS
AUDITORY DISTURBANCES: VERY MILD HARSHNESS OR ABILITY TO FRIGHTEN
ORIENTATION AND CLOUDING OF SENSORIUM: ORIENTED AND CAN DO SERIAL ADDITIONS
TOTAL SCORE: 5
PAROXYSMAL SWEATS: NO SWEAT VISIBLE
VISUAL DISTURBANCES: NOT PRESENT
PAROXYSMAL SWEATS: NO SWEAT VISIBLE
TOTAL SCORE: 3
NAUSEA AND VOMITING: NO NAUSEA AND NO VOMITING
NAUSEA AND VOMITING: NO NAUSEA AND NO VOMITING
TREMOR: *
PAROXYSMAL SWEATS: NO SWEAT VISIBLE

## 2024-11-17 ASSESSMENT — PAIN DESCRIPTION - PAIN TYPE
TYPE: ACUTE PAIN

## 2024-11-17 NOTE — CARE PLAN
The patient is Stable - Low risk of patient condition declining or worsening    Shift Goals  Clinical Goals: Pain control, CIWA, mobility, rest  Patient Goals: Possible discharge  Family Goals: JAMEE    Progress made toward(s) clinical / shift goals:  Medicated for pain with Tylenol/CIWA score; see MAR. Patient ambulated in hallway twice with RN/CNA; steady gait noted. + void. Patient tolerating meals, snacking frequently.     Patient is not progressing towards the following goals: NA.

## 2024-11-17 NOTE — DISCHARGE SUMMARY
[facility] FAMILY MEDICINE    Admit Date:  2024       Discharge Date: 2024    Service:   [facility] Family Medicine Inpatient Team  Attending Physician(s):   Isabella Montanez M.d.        Senior Resident(s):   Pedro Lott MD  [place] Resident(s):   Preston Garcia M.D.    Primary Diagnosis:   Hepatic encephalopathy  Acute alcohol withdrawal    Secondary Diagnoses:                Cirrhosis  Hypertension  Anxiety  Dog bite    HPI (Per Dr. Scott's Admission H&P):     Jalen Vaughn is a 44 y.o. male with past medical history of alcohol abuse, hypertension, decompensated cirrhosis status post TIPS who presented with confusion starting this morning.  Patient is A&O x 4 but mildly confused and difficult to understand.  Patient has been hospitalized twice in the past 2 weeks for hepatic encephalopathy.  Patient states he is homeless but has been at [facility] since 10/28 as patient desires to become sober.  Patient states he drinks about 18 beers daily.  States he drank 2-24 ounce beers this morning.  He checked out of [facility] this morning and states he drank because his father recently .  States he did quit cold turkey for about 1.5 years. He currently endorses tremors and diaphoresis, feels that he may be withdrawing.  Patient denies any visual hallucinations but does endorse tactile hallucinations.  Patient states he does have auditory hallucinations most days but denies at this time.  He reports that he has had a mild headache.  Patient states he has needed paracentesis x 2 in the past and has had esophageal bleeding varices s/p ligation.      Patient does report he was attacked by a pit bull and its owner 3 days ago.  States the dog bit his right ankle. He presented to the ED at that time.  CT head and ankle x-rays were negative.  Patient received tetanus vaccination and was sent home with Augmentin.  Reports he thinks his ankle is still swollen but denies any fevers,  chills or increased redness around the wounds.      Hospital Summary (Brief Narrative):       Jalen Vaughn was admitted to [facility] on 11/13/2024 for management of hepatic encephalopathy and acute alcohol withdrawal.      Patient was admitted and put on CIWA protocol with Librium taper.  He was also aggressively treated with lactulose and rifaximin.  His mental status improved and he is no longer acutely withdrawing from alcohol.    On the day of discharge, patient is feeling well and has no acute complaints.  He is fully oriented and states he is motivated to stop drinking alcohol.  Case management provided patient with resources for alcohol cessation and housing.  His vitals have been stable and within normal limits.  Labs also stable and within normal limits other than patient's chronic conditions.  He is discharged in stable condition.    Consultants:      None    Procedures:        None    Labs:  Results for orders placed or performed during the hospital encounter of 11/13/24   CBC WITH DIFFERENTIAL    Collection Time: 11/13/24  7:06 PM   Result Value Ref Range    WBC 5.2 4.8 - 10.8 K/uL    RBC 3.15 (L) 4.70 - 6.10 M/uL    Hemoglobin 10.1 (L) 14.0 - 18.0 g/dL    Hematocrit 29.9 (L) 42.0 - 52.0 %    MCV 94.9 81.4 - 97.8 fL    MCH 32.1 27.0 - 33.0 pg    MCHC 33.8 32.3 - 36.5 g/dL    RDW 53.5 (H) 35.9 - 50.0 fL    Platelet Count 63 (L) 164 - 446 K/uL    MPV 11.2 9.0 - 12.9 fL    Neutrophils-Polys 60.00 44.00 - 72.00 %    Lymphocytes 24.30 22.00 - 41.00 %    Monocytes 13.00 0.00 - 13.40 %    Eosinophils 1.90 0.00 - 6.90 %    Basophils 0.40 0.00 - 1.80 %    Immature Granulocytes 0.40 0.00 - 0.90 %    Nucleated RBC 0.00 0.00 - 0.20 /100 WBC    Neutrophils (Absolute) 3.13 1.82 - 7.42 K/uL    Lymphs (Absolute) 1.27 1.00 - 4.80 K/uL    Monos (Absolute) 0.68 0.00 - 0.85 K/uL    Eos (Absolute) 0.10 0.00 - 0.51 K/uL    Baso (Absolute) 0.02 0.00 - 0.12 K/uL    Immature Granulocytes (abs) 0.02 0.00 - 0.11 K/uL    NRBC  (Absolute) 0.00 K/uL   COMP METABOLIC PANEL    Collection Time: 11/13/24  7:06 PM   Result Value Ref Range    Sodium 136 135 - 145 mmol/L    Potassium 3.8 3.6 - 5.5 mmol/L    Chloride 106 96 - 112 mmol/L    Co2 19 (L) 20 - 33 mmol/L    Anion Gap 11.0 7.0 - 16.0    Glucose 87 65 - 99 mg/dL    Bun 13 8 - 22 mg/dL    Creatinine 0.75 0.50 - 1.40 mg/dL    Calcium 8.6 8.5 - 10.5 mg/dL    Correct Calcium 9.3 8.5 - 10.5 mg/dL    AST(SGOT) 67 (H) 12 - 45 U/L    ALT(SGPT) 24 2 - 50 U/L    Alkaline Phosphatase 182 (H) 30 - 99 U/L    Total Bilirubin 2.6 (H) 0.1 - 1.5 mg/dL    Albumin 3.1 (L) 3.2 - 4.9 g/dL    Total Protein 6.2 6.0 - 8.2 g/dL    Globulin 3.1 1.9 - 3.5 g/dL    A-G Ratio 1.0 g/dL   LIPASE    Collection Time: 11/13/24  7:06 PM   Result Value Ref Range    Lipase 45 11 - 82 U/L   AMMONIA    Collection Time: 11/13/24  7:06 PM   Result Value Ref Range    Ammonia 124 (HH) 11 - 45 umol/L   PROTHROMBIN TIME (INR)    Collection Time: 11/13/24  7:06 PM   Result Value Ref Range    PT 31.0 (H) 12.0 - 14.6 sec    INR 2.96 (H) 0.87 - 1.13   APTT    Collection Time: 11/13/24  7:06 PM   Result Value Ref Range    APTT 35.0 24.7 - 36.0 sec   DIAGNOSTIC ALCOHOL    Collection Time: 11/13/24  7:06 PM   Result Value Ref Range    Diagnostic Alcohol 118.5 (H) <10.1 mg/dL   IMMATURE PLT FRACTION    Collection Time: 11/13/24  7:06 PM   Result Value Ref Range    Imm. Plt Fraction 3.3 0.6 - 13.1 %   ESTIMATED GFR    Collection Time: 11/13/24  7:06 PM   Result Value Ref Range    GFR (CKD-EPI) 115 >60 mL/min/1.73 m 2   MAGNESIUM    Collection Time: 11/13/24  7:06 PM   Result Value Ref Range    Magnesium 2.0 1.5 - 2.5 mg/dL   Comp Metabolic Panel (CMP)    Collection Time: 11/13/24  7:06 PM   Result Value Ref Range    Sodium 135 135 - 145 mmol/L    Potassium 3.9 3.6 - 5.5 mmol/L    Chloride 107 96 - 112 mmol/L    Co2 18 (L) 20 - 33 mmol/L    Anion Gap 10.0 7.0 - 16.0    Glucose 88 65 - 99 mg/dL    Bun 13 8 - 22 mg/dL    Creatinine 0.95 0.50 -  1.40 mg/dL    Calcium 8.4 (L) 8.5 - 10.5 mg/dL    Correct Calcium 9.1 8.5 - 10.5 mg/dL    AST(SGOT) 70 (H) 12 - 45 U/L    ALT(SGPT) 26 2 - 50 U/L    Alkaline Phosphatase 187 (H) 30 - 99 U/L    Total Bilirubin 2.6 (H) 0.1 - 1.5 mg/dL    Albumin 3.1 (L) 3.2 - 4.9 g/dL    Total Protein 6.3 6.0 - 8.2 g/dL    Globulin 3.2 1.9 - 3.5 g/dL    A-G Ratio 1.0 g/dL   PHOSPHORUS    Collection Time: 24  7:06 PM   Result Value Ref Range    Phosphorus 3.0 2.5 - 4.5 mg/dL   ESTIMATED GFR    Collection Time: 24  7:06 PM   Result Value Ref Range    GFR (CKD-EPI) 102 >60 mL/min/1.73 m 2   EKG    Collection Time: 24  7:42 PM   Result Value Ref Range    Report       [facility] Emergency Dept.    Test Date:  2024  Pt Name:    ARSENIO RODGERS               Department: ER  MRN:        [id number]                      Room:       [facility]  Gender:     Male                         Technician: [id number]  :        1980                   Requested By:JAMES TALLEY  Order #:    [id number]                    Reading MD:    Measurements  Intervals                                Axis  Rate:       74                           P:          113  LA:         147                          QRS:        66  QRSD:       106                          T:          54  QT:         457  QTc:        507    Interpretive Statements  Sinus rhythm  Prolonged QT interval  Compared to ECG 2024 16:08:59  Sinus bradycardia no longer present     CBC with Differential    Collection Time: 24 12:06 AM   Result Value Ref Range    WBC 5.2 4.8 - 10.8 K/uL    RBC 2.92 (L) 4.70 - 6.10 M/uL    Hemoglobin 9.4 (L) 14.0 - 18.0 g/dL    Hematocrit 26.7 (L) 42.0 - 52.0 %    MCV 91.4 81.4 - 97.8 fL    MCH 32.2 27.0 - 33.0 pg    MCHC 35.2 32.3 - 36.5 g/dL    RDW 49.8 35.9 - 50.0 fL    Platelet Count 36 (L) 164 - 446 K/uL    Neutrophils-Polys 53.30 44.00 - 72.00 %    Lymphocytes 26.30 22.00 - 41.00 %    Monocytes 15.50 (H) 0.00 - 13.40 %     Eosinophils 2.90 0.00 - 6.90 %    Basophils 0.60 0.00 - 1.80 %    Immature Granulocytes 1.40 (H) 0.00 - 0.90 %    Nucleated RBC 0.00 0.00 - 0.20 /100 WBC    Neutrophils (Absolute) 2.76 1.82 - 7.42 K/uL    Lymphs (Absolute) 1.36 1.00 - 4.80 K/uL    Monos (Absolute) 0.80 0.00 - 0.85 K/uL    Eos (Absolute) 0.15 0.00 - 0.51 K/uL    Baso (Absolute) 0.03 0.00 - 0.12 K/uL    Immature Granulocytes (abs) 0.07 0.00 - 0.11 K/uL    NRBC (Absolute) 0.00 K/uL   IMMATURE PLT FRACTION    Collection Time: 11/14/24 12:06 AM   Result Value Ref Range    Imm. Plt Fraction 3.8 0.6 - 13.1 %   CBC WITH DIFFERENTIAL    Collection Time: 11/15/24 10:08 AM   Result Value Ref Range    WBC 5.8 4.8 - 10.8 K/uL    RBC 3.17 (L) 4.70 - 6.10 M/uL    Hemoglobin 9.8 (L) 14.0 - 18.0 g/dL    Hematocrit 29.7 (L) 42.0 - 52.0 %    MCV 93.7 81.4 - 97.8 fL    MCH 30.9 27.0 - 33.0 pg    MCHC 33.0 32.3 - 36.5 g/dL    RDW 54.1 (H) 35.9 - 50.0 fL    Platelet Count 77 (L) 164 - 446 K/uL    MPV 10.3 9.0 - 12.9 fL    Neutrophils-Polys 59.60 44.00 - 72.00 %    Lymphocytes 23.40 22.00 - 41.00 %    Monocytes 13.50 (H) 0.00 - 13.40 %    Eosinophils 2.90 0.00 - 6.90 %    Basophils 0.30 0.00 - 1.80 %    Immature Granulocytes 0.30 0.00 - 0.90 %    Nucleated RBC 0.00 0.00 - 0.20 /100 WBC    Neutrophils (Absolute) 3.44 1.82 - 7.42 K/uL    Lymphs (Absolute) 1.35 1.00 - 4.80 K/uL    Monos (Absolute) 0.78 0.00 - 0.85 K/uL    Eos (Absolute) 0.17 0.00 - 0.51 K/uL    Baso (Absolute) 0.02 0.00 - 0.12 K/uL    Immature Granulocytes (abs) 0.02 0.00 - 0.11 K/uL    NRBC (Absolute) 0.00 K/uL   Comp Metabolic Panel    Collection Time: 11/15/24 10:08 AM   Result Value Ref Range    Sodium 138 135 - 145 mmol/L    Potassium 4.0 3.6 - 5.5 mmol/L    Chloride 110 96 - 112 mmol/L    Co2 20 20 - 33 mmol/L    Anion Gap 8.0 7.0 - 16.0    Glucose 107 (H) 65 - 99 mg/dL    Bun 12 8 - 22 mg/dL    Creatinine 0.85 0.50 - 1.40 mg/dL    Calcium 8.5 8.5 - 10.5 mg/dL    Correct Calcium 9.5 8.5 - 10.5 mg/dL     AST(SGOT) 53 (H) 12 - 45 U/L    ALT(SGPT) 22 2 - 50 U/L    Alkaline Phosphatase 144 (H) 30 - 99 U/L    Total Bilirubin 2.9 (H) 0.1 - 1.5 mg/dL    Albumin 2.7 (L) 3.2 - 4.9 g/dL    Total Protein 5.6 (L) 6.0 - 8.2 g/dL    Globulin 2.9 1.9 - 3.5 g/dL    A-G Ratio 0.9 g/dL   PHOSPHORUS    Collection Time: 11/15/24 10:08 AM   Result Value Ref Range    Phosphorus 4.1 2.5 - 4.5 mg/dL   MAGNESIUM    Collection Time: 11/15/24 10:08 AM   Result Value Ref Range    Magnesium 1.7 1.5 - 2.5 mg/dL   IMMATURE PLT FRACTION    Collection Time: 11/15/24 10:08 AM   Result Value Ref Range    Imm. Plt Fraction 1.8 0.6 - 13.1 %   ESTIMATED GFR    Collection Time: 11/15/24 10:08 AM   Result Value Ref Range    GFR (CKD-EPI) 111 >60 mL/min/1.73 m 2   EC-ECHOCARDIOGRAM COMPLETE W/O CONT    Collection Time: 11/15/24 10:17 AM   Result Value Ref Range    Eject.Frac. MOD BP 60.85     Eject.Frac. MOD 4C 62.38     Eject.Frac. MOD 2C 58.56     Left Ventrical Ejection Fraction 65    CBC WITHOUT DIFFERENTIAL    Collection Time: 11/16/24  2:23 AM   Result Value Ref Range    WBC 4.9 4.8 - 10.8 K/uL    RBC 2.95 (L) 4.70 - 6.10 M/uL    Hemoglobin 9.2 (L) 14.0 - 18.0 g/dL    Hematocrit 27.7 (L) 42.0 - 52.0 %    MCV 93.9 81.4 - 97.8 fL    MCH 31.2 27.0 - 33.0 pg    MCHC 33.2 32.3 - 36.5 g/dL    RDW 54.2 (H) 35.9 - 50.0 fL    Platelet Count 68 (L) 164 - 446 K/uL    MPV 10.4 9.0 - 12.9 fL   Comp Metabolic Panel    Collection Time: 11/16/24  2:23 AM   Result Value Ref Range    Sodium 138 135 - 145 mmol/L    Potassium 4.0 3.6 - 5.5 mmol/L    Chloride 111 96 - 112 mmol/L    Co2 20 20 - 33 mmol/L    Anion Gap 7.0 7.0 - 16.0    Glucose 89 65 - 99 mg/dL    Bun 14 8 - 22 mg/dL    Creatinine 0.88 0.50 - 1.40 mg/dL    Calcium 8.2 (L) 8.5 - 10.5 mg/dL    Correct Calcium 9.5 8.5 - 10.5 mg/dL    AST(SGOT) 44 12 - 45 U/L    ALT(SGPT) 20 2 - 50 U/L    Alkaline Phosphatase 130 (H) 30 - 99 U/L    Total Bilirubin 2.5 (H) 0.1 - 1.5 mg/dL    Albumin 2.4 (L) 3.2 - 4.9 g/dL     Total Protein 5.0 (L) 6.0 - 8.2 g/dL    Globulin 2.6 1.9 - 3.5 g/dL    A-G Ratio 0.9 g/dL   IMMATURE PLT FRACTION    Collection Time: 11/16/24  2:23 AM   Result Value Ref Range    Imm. Plt Fraction 2.4 0.6 - 13.1 %   ESTIMATED GFR    Collection Time: 11/16/24  2:23 AM   Result Value Ref Range    GFR (CKD-EPI) 110 >60 mL/min/1.73 m 2   CBC WITHOUT DIFFERENTIAL    Collection Time: 11/17/24  4:17 AM   Result Value Ref Range    WBC 5.1 4.8 - 10.8 K/uL    RBC 2.78 (L) 4.70 - 6.10 M/uL    Hemoglobin 8.9 (L) 14.0 - 18.0 g/dL    Hematocrit 26.0 (L) 42.0 - 52.0 %    MCV 93.5 81.4 - 97.8 fL    MCH 32.0 27.0 - 33.0 pg    MCHC 34.2 32.3 - 36.5 g/dL    RDW 53.0 (H) 35.9 - 50.0 fL    Platelet Count 62 (L) 164 - 446 K/uL    MPV 11.5 9.0 - 12.9 fL   Comp Metabolic Panel    Collection Time: 11/17/24  4:17 AM   Result Value Ref Range    Sodium 133 (L) 135 - 145 mmol/L    Potassium 3.8 3.6 - 5.5 mmol/L    Chloride 106 96 - 112 mmol/L    Co2 19 (L) 20 - 33 mmol/L    Anion Gap 8.0 7.0 - 16.0    Glucose 115 (H) 65 - 99 mg/dL    Bun 16 8 - 22 mg/dL    Creatinine 0.69 0.50 - 1.40 mg/dL    Calcium 7.4 (L) 8.5 - 10.5 mg/dL    Correct Calcium 8.8 8.5 - 10.5 mg/dL    AST(SGOT) 37 12 - 45 U/L    ALT(SGPT) 18 2 - 50 U/L    Alkaline Phosphatase 127 (H) 30 - 99 U/L    Total Bilirubin 2.2 (H) 0.1 - 1.5 mg/dL    Albumin 2.2 (L) 3.2 - 4.9 g/dL    Total Protein 4.6 (L) 6.0 - 8.2 g/dL    Globulin 2.4 1.9 - 3.5 g/dL    A-G Ratio 0.9 g/dL   IMMATURE PLT FRACTION    Collection Time: 11/17/24  4:17 AM   Result Value Ref Range    Imm. Plt Fraction 2.8 0.6 - 13.1 %   ESTIMATED GFR    Collection Time: 11/17/24  4:17 AM   Result Value Ref Range    GFR (CKD-EPI) 118 >60 mL/min/1.73 m 2       Imaging/ Testing:      EC-ECHOCARDIOGRAM COMPLETE W/O CONT   Final Result      CT-HEAD W/O   Final Result         1.  No acute intracranial abnormality is identified, there are nonspecific white matter changes, commonly associated with small vessel ischemic disease.   Associated mild cerebral atrophy is noted.                   Physical Exam:  Gen: Mildly lethargic, slurred speech  HEENT: Normocephalic, periocular ecchymosis in the left eye, EOMI, scleral icterus  Pulm: clear to auscultation bilaterally, no respiratory distress   Cardio: RRR, systolic murmur  Abdom: soft, nontender, nondistended, normoactive bowel sounds in all quadrants  Ext: No edema, 2+ pulses bilaterally  Neuro: Cranial nerves II through XII grossly intact, A&O x 4  Skin: Mildly jaundiced    Discharge Medications:           Medication List        CHANGE how you take these medications        Instructions   furosemide 80 MG Tabs  Start taking on: November 18, 2024  What changed:   medication strength  additional instructions  Commonly known as: Lasix   Take 1 Tablet by mouth every day.  Dose: 80 mg     * lactulose 10 GM/15ML Soln  What changed: Another medication with the same name was added. Make sure you understand how and when to take each.   Take 30 mL by mouth 4 times a day.  Dose: 30 mL     * lactulose 20 GM/30ML Soln  What changed: You were already taking a medication with the same name, and this prescription was added. Make sure you understand how and when to take each.   Take 45 mL by mouth 4 times a day.  Dose: 45 mL     spironolactone 100 MG Tabs  Start taking on: November 18, 2024  What changed:   medication strength  additional instructions  Another medication with the same name was removed. Continue taking this medication, and follow the directions you see here.  Commonly known as: Aldactone   Take 1 Tablet by mouth every day.  Dose: 100 mg           * This list has 2 medication(s) that are the same as other medications prescribed for you. Read the directions carefully, and ask your doctor or other care provider to review them with you.                CONTINUE taking these medications        Instructions   acamprosate 333 MG tablet  Commonly known as: Campral   Take 333 mg by mouth 3 times a  day.  Dose: 333 mg     amLODIPine 5 MG Tabs  Commonly known as: Norvasc   Take 5 mg by mouth every day.  Dose: 5 mg     amoxicillin-clavulanate 875-125 MG Tabs  Commonly known as: Augmentin   Take 1 Tablet by mouth 2 times a day for 7 days.  Dose: 1 Tablet     carvedilol 3.125 MG Tabs  Commonly known as: Coreg   Take 1 Tablet by mouth 2 times a day with meals.  Dose: 3.125 mg     escitalopram 20 MG tablet  Commonly known as: Lexapro   Take 1 Tablet by mouth every day.  Dose: 20 mg     folic acid 1 MG Tabs  Commonly known as: Folvite   Take 1 mg by mouth every day.  Dose: 1 mg     hydrOXYzine HCl 25 MG Tabs  Commonly known as: Atarax   Take 25 mg by mouth 3 times a day as needed for Anxiety.  Dose: 25 mg     naltrexone 50 MG Tabs  Commonly known as: Depade   Take 50 mg by mouth every day.  Dose: 50 mg     pantoprazole 40 MG Tbec  Commonly known as: Protonix   Take 40 mg by mouth 2 times daily, before breakfast and dinner.  Dose: 40 mg     PEG 3350 Pack   Take 1 Packet by mouth 1 time a day as needed (for constipation).  Dose: 17 g     riFAXIMin 550 MG Tabs tablet  Commonly known as: Xifaxan   Take 1 Tablet by mouth 2 times a day.  Dose: 550 mg     Stimulant Laxative 8.6-50 MG Tabs  Generic drug: senna-docusate   Take 2 Tablets by mouth 1 time a day as needed for Constipation.  Dose: 2 Tablet     thiamine 100 MG tablet  Start taking on: November 18, 2024  Commonly known as: Thiamine   Take 1 Tablet by mouth every day.  Dose: 100 mg     traZODone 50 MG Tabs  Commonly known as: Desyrel   Take 1 Tablet by mouth at bedtime as needed for Sleep.  Dose: 50 mg                Disposition: Discharge with bus pass    Diet: Regular    Activity: As tolerated    Instructions:      -Continue taking lactulose and rifaximin as prescribed  -Follow-up with PCP    The patient was instructed to return to the ER in the event of worsening symptoms. I have counseled the patient on the importance of compliance and the patient has agreed to  proceed with all medical recommendations and follow up plan indicated above.   The patient understands that all medications come with benefits and risks. Risks may include permanent injury or death and these risks can be minimized with close reassessment and monitoring.        Please CC: Mann Johnson M.D.    Follow up appointment details :        No future appointments.    Follow up with Primary Care Physician within 7 days of discharge for further evaluation and care.    Pending Studies:        None      (2) The patient met the 2-midnight criteria for an inpatient stay at the time of discharge.     Preston Garcia M.D.  PGY1  [facility]

## 2024-11-17 NOTE — DISCHARGE PLANNING
Case Management Discharge Planning    Admission Date: 11/13/2024  GMLOS: 3.3  ALOS: 4    6-Clicks ADL Score: 24  6-Clicks Mobility Score: 19      Anticipated Discharge Dispo: Discharge Disposition: Discharged to home/self care (01)     DME Needed: No    Action(s) Taken: Updated Provider/Nurse on Discharge Plan    0832, Per Dr. GILL Garcia, Pt is medically cleared for discharge.    0915, Pt was visited at room. Pt was provided Alcohol treatment program resources. Pt was also provided List of services and programs for persons in need in the [facility]. Pt was provided bus pass for transport.    1522, Prior Auth for Xifaxan initiated and sent to [facility] pending determination.    1533, RN CM called and spoke to Leadership for approved service for Xifaxan amounting to $ 762.04 pending prior Auth determination. Leadership approved Xifaxan.  RN CM fax approved service to [facility]. IDT team informed through [employer].      Escalations Completed: None    Medically Clear: Yes    Next Steps: CM will continue to assist Pt with discharge needs.      Barriers to Discharge: None       none

## 2024-11-17 NOTE — PROGRESS NOTES
"Bedside report received from NOC RN; assumed care. Assessment complete. A&O x 4, mild disoriented to time. VSS, intermittent HTN noted. Patient reported \"Lasix working real well.\" Low CIWA scores noted. 91% on RA, intermittent dizziness reported. Patient denying SOB, numbness, tingling, nausea, vomiting. Improving double vision and improved scleral pain reported. Patient stated, \"I didn't even realize I had a black eye\". Medicated for right flank/lower abdominal, right leg pain; see MAR.  Abdomen round, soft. Hyperactive BS x 4 noted. Bruising/scabs noted from falling/dog bit to right flank, lower abdominal region, BLE. + void +eructation. + flatus. LBM 11/17, x 2. Patient tolerating regular diet, increased intake noted. Patient up SBA with steady/unsteady gait. No PIV, MD aware. Discussed plan of care/potential discharge with patient. All questions answered.  High fall risk. Bed/strip alarm engaged. Care AI in place for safety. Bed in locked/lowest position.  Call light/personal belongings within reach.  All needs met, patient eating breakfast at present time.      "
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
## 2024-11-17 NOTE — CARE PLAN
Problem: Knowledge Deficit - Standard  Goal: Patient and family/care givers will demonstrate understanding of plan of care, disease process/condition, diagnostic tests and medications  Outcome: Progressing     Problem: Optimal Care for Alcohol Withdrawal  Goal: Optimal Care for the alcohol withdrawal patient  Outcome: Progressing     Problem: Seizure Precautions  Goal: Implementation of seizure precautions  Outcome: Progressing     Problem: Lifestyle Changes  Goal: Patient's ability to identify lifestyle changes and available resources to help reduce recurrence of condition will improve  Outcome: Progressing     Problem: Psychosocial  Goal: Patient's level of anxiety will decrease  Outcome: Progressing  Goal: Spiritual and cultural needs incorporated into hospitalization  Outcome: Progressing     Problem: Risk for Aspiration  Goal: Patient's risk for aspiration will be absent or decrease  Outcome: Progressing     Problem: Depression  Goal: Patient and family/caregiver will verbalize accurate information about at least two of the possible causes of depression, three-four of the signs and symptoms of depression  Outcome: Progressing     Problem: Fall Risk  Goal: Patient will remain free from falls  Outcome: Progressing     Problem: Pain - Standard  Goal: Alleviation of pain or a reduction in pain to the patient’s comfort goal  Outcome: Progressing     Problem: Psychosocial  Goal: Patient's level of anxiety will decrease  Outcome: Progressing  Goal: Patient's ability to verbalize feelings about condition will improve  Outcome: Progressing     Problem: Communication  Goal: The ability to communicate needs accurately and effectively will improve  Outcome: Progressing     Problem: Discharge Barriers/Planning  Goal: Patient's continuum of care needs are met  Outcome: Progressing     Problem: Urinary Elimination  Goal: Establish and maintain regular urinary output  Outcome: Progressing     Problem: Bowel  Elimination  Goal: Establish and maintain regular bowel function  Outcome: Progressing     Problem: Self Care  Goal: Patient will have the ability to perform ADLs independently or with assistance (bathe, groom, dress, toilet and feed)  Outcome: Progressing     Problem: Wound/ / Incision Healing  Goal: Patient's wound/surgical incision will decrease in size and heals properly  Outcome: Progressing   The patient is Stable - Low risk of patient condition declining or worsening    Shift Goals  Clinical Goals: CIWA, patient safety, neuro checks  Patient Goals: D/C  Family Goals: JAMEE    Progress made toward(s) clinical / shift goals:  CIWA less than 10 overnight. Patient A&O 3-4. Intermittently confused to time/place or situation. Patient alert and ambulating to restroom stand by assist w/FWW.      Patient is not progressing towards the following goals:

## 2024-11-17 NOTE — PROGRESS NOTES
Bedside report received.  Assessment complete.  A&O x 3-4. Not completely able to verbalize situation. Patient does not always use call light.  Patient ambulates with X1 assist. Bed alarm on. High fall risk. Virtual  for safety.  Patient has 4/10 pain. Declines intervention at this time. Snacks provided.  Denies N&V. Tolerating regular diet. Excellent PO intake.  + void, + flatus, + X3 loose BM 11/16.  Patient denies SOB and chest pain.  SCD's refused.  Review plan with of care with patient. Call light and personal belongings within reach. Hourly rounding in place. All needs met at this time.

## 2024-11-18 ENCOUNTER — PHARMACY VISIT (OUTPATIENT)
Dept: PHARMACY | Facility: MEDICAL CENTER | Age: 44
End: 2024-11-18
Payer: COMMERCIAL

## 2024-11-18 VITALS
SYSTOLIC BLOOD PRESSURE: 134 MMHG | TEMPERATURE: 97.5 F | BODY MASS INDEX: 34.08 KG/M2 | WEIGHT: 280 LBS | DIASTOLIC BLOOD PRESSURE: 78 MMHG | OXYGEN SATURATION: 100 % | HEART RATE: 63 BPM | RESPIRATION RATE: 18 BRPM

## 2024-11-18 PROCEDURE — RXMED WILLOW AMBULATORY MEDICATION CHARGE

## 2024-11-18 PROCEDURE — 99238 HOSP IP/OBS DSCHRG MGMT 30/<: CPT | Performed by: FAMILY MEDICINE

## 2024-11-18 PROCEDURE — 700102 HCHG RX REV CODE 250 W/ 637 OVERRIDE(OP)

## 2024-11-18 PROCEDURE — 700111 HCHG RX REV CODE 636 W/ 250 OVERRIDE (IP)

## 2024-11-18 PROCEDURE — A9270 NON-COVERED ITEM OR SERVICE: HCPCS

## 2024-11-18 RX ORDER — POLYETHYLENE GLYCOL 3350 17 G/17G
17 POWDER, FOR SOLUTION ORAL
Qty: 30 PACKET | Refills: 0 | Status: ON HOLD | OUTPATIENT
Start: 2024-11-18 | End: 2024-11-21

## 2024-11-18 RX ORDER — AMLODIPINE BESYLATE 5 MG/1
5 TABLET ORAL DAILY
Qty: 30 TABLET | Refills: 1 | Status: ON HOLD | OUTPATIENT
Start: 2024-11-18 | End: 2024-11-21

## 2024-11-18 RX ORDER — ACAMPROSATE CALCIUM 333 MG/1
333 TABLET, DELAYED RELEASE ORAL 3 TIMES DAILY
Qty: 90 TABLET | Refills: 0 | Status: ON HOLD | OUTPATIENT
Start: 2024-11-18 | End: 2024-11-20

## 2024-11-18 RX ORDER — PANTOPRAZOLE SODIUM 40 MG/1
40 TABLET, DELAYED RELEASE ORAL
Qty: 30 TABLET | Refills: 1 | Status: ON HOLD | OUTPATIENT
Start: 2024-11-18 | End: 2024-11-21

## 2024-11-18 RX ORDER — NALTREXONE HYDROCHLORIDE 50 MG/1
50 TABLET, FILM COATED ORAL DAILY
Qty: 30 TABLET | Refills: 1 | Status: ON HOLD | OUTPATIENT
Start: 2024-11-18 | End: 2024-11-21

## 2024-11-18 RX ORDER — HYDROXYZINE HYDROCHLORIDE 25 MG/1
25 TABLET, FILM COATED ORAL 3 TIMES DAILY PRN
Qty: 30 TABLET | Refills: 1 | Status: SHIPPED | OUTPATIENT
Start: 2024-11-18 | End: 2024-11-24

## 2024-11-18 RX ORDER — CARVEDILOL 3.12 MG/1
3.12 TABLET ORAL 2 TIMES DAILY WITH MEALS
Qty: 60 TABLET | Refills: 1 | Status: SHIPPED | OUTPATIENT
Start: 2024-11-18 | End: 2024-11-24

## 2024-11-18 RX ORDER — TRAZODONE HYDROCHLORIDE 50 MG/1
50 TABLET ORAL
Qty: 10 TABLET | Refills: 1 | Status: ON HOLD | OUTPATIENT
Start: 2024-11-18 | End: 2024-11-21

## 2024-11-18 RX ADMIN — Medication 100 MG: at 05:35

## 2024-11-18 RX ADMIN — ESCITALOPRAM OXALATE 20 MG: 10 TABLET ORAL at 04:10

## 2024-11-18 RX ADMIN — Medication 1 TABLET: at 05:35

## 2024-11-18 RX ADMIN — FOLIC ACID 1 MG: 1 TABLET ORAL at 05:35

## 2024-11-18 RX ADMIN — SPIRONOLACTONE 100 MG: 100 TABLET ORAL at 04:10

## 2024-11-18 RX ADMIN — OMEPRAZOLE 20 MG: 20 CAPSULE, DELAYED RELEASE ORAL at 05:35

## 2024-11-18 RX ADMIN — RIFAXIMIN 550 MG: 550 TABLET ORAL at 04:10

## 2024-11-18 RX ADMIN — FUROSEMIDE 80 MG: 20 TABLET ORAL at 04:10

## 2024-11-18 RX ADMIN — CARVEDILOL 3.12 MG: 6.25 TABLET, FILM COATED ORAL at 05:40

## 2024-11-18 RX ADMIN — MAGNESIUM HYDROXIDE 30 ML: 1200 LIQUID ORAL at 05:41

## 2024-11-18 RX ADMIN — NICOTINE 7 MG: 7 PATCH TRANSDERMAL at 04:10

## 2024-11-18 RX ADMIN — HEPARIN SODIUM 5000 UNITS: 5000 INJECTION, SOLUTION INTRAVENOUS; SUBCUTANEOUS at 04:10

## 2024-11-18 RX ADMIN — POLYETHYLENE GLYCOL 3350 1 PACKET: 17 POWDER, FOR SOLUTION ORAL at 05:36

## 2024-11-18 ASSESSMENT — LIFESTYLE VARIABLES
TOTAL SCORE: 4
VISUAL DISTURBANCES: NOT PRESENT
TREMOR: *
NAUSEA AND VOMITING: NO NAUSEA AND NO VOMITING
TOTAL SCORE: 3
AUDITORY DISTURBANCES: NOT PRESENT
ORIENTATION AND CLOUDING OF SENSORIUM: ORIENTED AND CAN DO SERIAL ADDITIONS
HEADACHE, FULLNESS IN HEAD: NOT PRESENT
HEADACHE, FULLNESS IN HEAD: NOT PRESENT
PAROXYSMAL SWEATS: NO SWEAT VISIBLE
PAROXYSMAL SWEATS: NO SWEAT VISIBLE
AGITATION: SOMEWHAT MORE THAN NORMAL ACTIVITY
ANXIETY: MILDLY ANXIOUS
AGITATION: SOMEWHAT MORE THAN NORMAL ACTIVITY
NAUSEA AND VOMITING: NO NAUSEA AND NO VOMITING
ORIENTATION AND CLOUDING OF SENSORIUM: ORIENTED AND CAN DO SERIAL ADDITIONS
TREMOR: TREMOR NOT VISIBLE BUT CAN BE FELT, FINGERTIP TO FINGERTIP
ANXIETY: MILDLY ANXIOUS
VISUAL DISTURBANCES: NOT PRESENT
AUDITORY DISTURBANCES: NOT PRESENT

## 2024-11-18 ASSESSMENT — PAIN DESCRIPTION - PAIN TYPE
TYPE: ACUTE PAIN
TYPE: ACUTE PAIN

## 2024-11-18 NOTE — PROGRESS NOTES
Patient discharged home per MD orders. Patient ambulating without assistance, on RA saturating >90%. ETOH withdrawal discharge education provided, follow up appointments discussed, medication safety, patient demonstrated and verbalized understanding. All questions answered. Tolerating diet. Belongings with patient at this time. Patient educated to call MD or return to ED for concerns.     Patient being escorted to ER via WC assistanc, stating he has bags in ED. Patient has been instructed to take bus to the [facility].

## 2024-11-18 NOTE — DISCHARGE INSTRUCTIONS
-Continue taking lactulose and rifaximin as prescribed  -Follow-up with PCP or make an appointment with [facility] within 7 days of discharge for further evaluation and care.      The patient was instructed to return to the ER in the event of worsening symptoms. I have counseled the patient on the importance of compliance and the patient has agreed to proceed with all medical recommendations and follow up plan indicated above.   The patient understands that all medications come with benefits and risks. Risks may include permanent injury or death and these risks can be minimized with close reassessment and monitoring.

## 2024-11-18 NOTE — PROGRESS NOTES
Medication authorization late in shift. Acquired discharge medications with 15 minute before [facility] closed for housing. Formerly prescribed medications presently at [facility]. Discussed with charge RN. Informed resident. Ok'd to stay overnight for safety reasons. To be discharged by 10 am. Patient education provided. Care AI updated patient permitted to ambulate unassisted within room but not into the hallway.

## 2024-11-18 NOTE — THERAPY
Occupational Therapy Contact Note    Patient Name: Jalen Vaughn  Age:  44 y.o., Sex:  male  Medical Record #: [id number]  Today's Date: 11/17/2024    OT orders received. Per RN, pt is actively discharging. Pt was observed by this therapist to be completing functional mobility in his room and around the halls with no AD and was full dressed in personal clothes. Will re-attempt OT eval, should there be problems with pt's DC plan.

## 2024-11-18 NOTE — DISCHARGE PLANNING
Case Management Discharge Planning    Admission Date: 11/13/2024  GMLOS: 3.3  ALOS: 5    6-Clicks ADL Score: 24  6-Clicks Mobility Score: 19      Anticipated Discharge Dispo: Discharge Disposition: Discharged to home/self care (01)    DME Needed: No    Action(s) Taken: Chart reviewed, discussed with MD, patient discharging but needs refills since pt lost his meds. Per pharmacy the carvedilol is not covered by insurance as it is too soon for refills to approved service for $10.71 completed and faxed to pharmacy.    Escalations Completed: None    Medically Clear: Yes    Next Steps: Patient has bus pass and will discharge with meds to beds to shelter    Barriers to Discharge: None    Is the patient up for discharge tomorrow: No

## 2024-11-18 NOTE — CARE PLAN
The patient is Stable - Low risk of patient condition declining or worsening    Shift Goals  Clinical Goals: Safety, discharge medications  Patient Goals: Discharge information  Family Goals: JAMEE    Progress made toward(s) clinical / shift goals:  Patient up self. Medication education provided to patient with discharge medications provided. Patient personal belongings provided. Education provided to go to [facility].     Patient is not progressing towards the following goals: NA.

## 2024-11-18 NOTE — CARE PLAN
Problem: Knowledge Deficit - Standard  Goal: Patient and family/care givers will demonstrate understanding of plan of care, disease process/condition, diagnostic tests and medications  Outcome: Progressing     Problem: Optimal Care for Alcohol Withdrawal  Goal: Optimal Care for the alcohol withdrawal patient  Outcome: Progressing     Problem: Seizure Precautions  Goal: Implementation of seizure precautions  Outcome: Progressing     Problem: Lifestyle Changes  Goal: Patient's ability to identify lifestyle changes and available resources to help reduce recurrence of condition will improve  Outcome: Progressing     Problem: Psychosocial  Goal: Patient's level of anxiety will decrease  Outcome: Progressing  Goal: Spiritual and cultural needs incorporated into hospitalization  Outcome: Progressing     Problem: Risk for Aspiration  Goal: Patient's risk for aspiration will be absent or decrease  Outcome: Progressing     Problem: Depression  Goal: Patient and family/caregiver will verbalize accurate information about at least two of the possible causes of depression, three-four of the signs and symptoms of depression  Outcome: Progressing     Problem: Fall Risk  Goal: Patient will remain free from falls  Outcome: Progressing     Problem: Pain - Standard  Goal: Alleviation of pain or a reduction in pain to the patient’s comfort goal  Outcome: Progressing     Problem: Psychosocial  Goal: Patient's level of anxiety will decrease  Outcome: Progressing  Goal: Patient's ability to verbalize feelings about condition will improve  Outcome: Progressing     Problem: Communication  Goal: The ability to communicate needs accurately and effectively will improve  Outcome: Progressing     Problem: Discharge Barriers/Planning  Goal: Patient's continuum of care needs are met  Outcome: Progressing     Problem: Urinary Elimination  Goal: Establish and maintain regular urinary output  Outcome: Progressing     Problem: Bowel  Elimination  Goal: Establish and maintain regular bowel function  Outcome: Progressing     Problem: Self Care  Goal: Patient will have the ability to perform ADLs independently or with assistance (bathe, groom, dress, toilet and feed)  Outcome: Progressing     Problem: Wound/ / Incision Healing  Goal: Patient's wound/surgical incision will decrease in size and heals properly  Outcome: Progressing   The patient is Stable - Low risk of patient condition declining or worsening    Shift Goals  Clinical Goals: Patient safety, neuro checks, CIWA  Patient Goals: D/C  Family Goals: JAMEE    Progress made toward(s) clinical / shift goals:  Patient ambulating room independently without assistance or assistive device. Significantly more steady on feet, decrease in visible tremors. Patient alert and oriented, occasional confusion in conversation. Patient vocalizing wanting to leave. CIWA per flowsheets. Patient declines interventions for pain.      Patient is not progressing towards the following goals:

## 2024-11-18 NOTE — DISCHARGE PLANNING
1426, RN LILLIANA called and spoke to Gokul BRITO from Pharmacy and he was informed that prior Auth for Xifaxan was approved. He said he will make corrections. Gokul was also informed to discard yesterday's approved service for Xifaxan .

## 2024-11-18 NOTE — DISCHARGE SUMMARY
[facility] FAMILY MEDICINE    Admit Date:  2024       Discharge Date: 2024     Service:   [facility] Family Medicine Inpatient Team  Attending Physician(s):   Dr. Isabella Montanez        Senior Resident(s):   Raulito Rodriguez MD  [place]in Resident(s):   nOi Erickson M.D.    Primary Diagnosis:   Hepatic encephalopathy  Acute alcohol withdrawal    Secondary Diagnoses:                Cirrhosis  Hypertension  Anxiety  Dog bite    HPI (Per Dr. Scott's Admission H&P):     Jalen Vaughn is a 44 y.o. male with past medical history of alcohol abuse, hypertension, decompensated cirrhosis status post TIPS who presented with confusion starting this morning.  Patient is A&O x 4 but mildly confused and difficult to understand.  Patient has been hospitalized twice in the past 2 weeks for hepatic encephalopathy.  Patient states he is homeless but has been at [facility] since 10/28 as patient desires to become sober.  Patient states he drinks about 18 beers daily.  States he drank 2-24 ounce beers this morning.  He checked out of [facility] this morning and states he drank because his father recently .  States he did quit cold turkey for about 1.5 years. He currently endorses tremors and diaphoresis, feels that he may be withdrawing.  Patient denies any visual hallucinations but does endorse tactile hallucinations.  Patient states he does have auditory hallucinations most days but denies at this time.  He reports that he has had a mild headache.  Patient states he has needed paracentesis x 2 in the past and has had esophageal bleeding varices s/p ligation.      Patient does report he was attacked by a pit bull and its owner 3 days ago.  States the dog bit his right ankle. He presented to the ED at that time.  CT head and ankle x-rays were negative.  Patient received tetanus vaccination and was sent home with Augmentin.  Reports he thinks his ankle is still swollen but denies any fevers, chills or  "increased redness around the wounds.      Hospital Summary (Brief Narrative):       Jalen Vaughn was admitted to [facility] on 11/13/2024 for management of hepatic encephalopathy and acute alcohol withdrawal.      Patient was admitted and put on CIWA protocol with Librium taper.  He was also aggressively treated with lactulose and rifaximin.  His mental status improved and he is no longer acutely withdrawing from alcohol.     On the day of discharge, patient is feeling well and has no acute complaints.  He is fully oriented and states he is motivated to try to stop drinking alcohol as well as adhere to liver failure/lactulose and rifaximin regimen.  Case management provided patient with resources for alcohol cessation and housing.  His vitals have been stable and within normal limits.  Labs also stable and within normal limits other than patient's chronic conditions.   Acquired discharge medications with 15 minute before [facility] closed for housing on 11/17 evening. Formerly prescribed medications presently at [facility]. Ok'd to stay overnight for safety reasons due to late night and no secure housing to go to. Kept overnight and discharged  in stable condition 11/18 morning. CCM and pharmacy able to provide his \"continue taking\" home medications at least until he can follow up with PCP. ETOH withdrawal discharge education provided, follow up appointments discussed, medication safety, patient demonstrated and verbalized understanding. Patient has been instructed to take bus to the [facility]    Consultants:      None    Procedures:        None    Labs:  Results for orders placed or performed during the hospital encounter of 11/13/24   CBC WITH DIFFERENTIAL    Collection Time: 11/13/24  7:06 PM   Result Value Ref Range    WBC 5.2 4.8 - 10.8 K/uL    RBC 3.15 (L) 4.70 - 6.10 M/uL    Hemoglobin 10.1 (L) 14.0 - 18.0 g/dL    Hematocrit 29.9 (L) 42.0 - 52.0 %    MCV 94.9 81.4 - 97.8 fL    MCH 32.1 27.0 - 33.0 pg    " MCHC 33.8 32.3 - 36.5 g/dL    RDW 53.5 (H) 35.9 - 50.0 fL    Platelet Count 63 (L) 164 - 446 K/uL    MPV 11.2 9.0 - 12.9 fL    Neutrophils-Polys 60.00 44.00 - 72.00 %    Lymphocytes 24.30 22.00 - 41.00 %    Monocytes 13.00 0.00 - 13.40 %    Eosinophils 1.90 0.00 - 6.90 %    Basophils 0.40 0.00 - 1.80 %    Immature Granulocytes 0.40 0.00 - 0.90 %    Nucleated RBC 0.00 0.00 - 0.20 /100 WBC    Neutrophils (Absolute) 3.13 1.82 - 7.42 K/uL    Lymphs (Absolute) 1.27 1.00 - 4.80 K/uL    Monos (Absolute) 0.68 0.00 - 0.85 K/uL    Eos (Absolute) 0.10 0.00 - 0.51 K/uL    Baso (Absolute) 0.02 0.00 - 0.12 K/uL    Immature Granulocytes (abs) 0.02 0.00 - 0.11 K/uL    NRBC (Absolute) 0.00 K/uL   COMP METABOLIC PANEL    Collection Time: 11/13/24  7:06 PM   Result Value Ref Range    Sodium 136 135 - 145 mmol/L    Potassium 3.8 3.6 - 5.5 mmol/L    Chloride 106 96 - 112 mmol/L    Co2 19 (L) 20 - 33 mmol/L    Anion Gap 11.0 7.0 - 16.0    Glucose 87 65 - 99 mg/dL    Bun 13 8 - 22 mg/dL    Creatinine 0.75 0.50 - 1.40 mg/dL    Calcium 8.6 8.5 - 10.5 mg/dL    Correct Calcium 9.3 8.5 - 10.5 mg/dL    AST(SGOT) 67 (H) 12 - 45 U/L    ALT(SGPT) 24 2 - 50 U/L    Alkaline Phosphatase 182 (H) 30 - 99 U/L    Total Bilirubin 2.6 (H) 0.1 - 1.5 mg/dL    Albumin 3.1 (L) 3.2 - 4.9 g/dL    Total Protein 6.2 6.0 - 8.2 g/dL    Globulin 3.1 1.9 - 3.5 g/dL    A-G Ratio 1.0 g/dL   LIPASE    Collection Time: 11/13/24  7:06 PM   Result Value Ref Range    Lipase 45 11 - 82 U/L   AMMONIA    Collection Time: 11/13/24  7:06 PM   Result Value Ref Range    Ammonia 124 (HH) 11 - 45 umol/L   PROTHROMBIN TIME (INR)    Collection Time: 11/13/24  7:06 PM   Result Value Ref Range    PT 31.0 (H) 12.0 - 14.6 sec    INR 2.96 (H) 0.87 - 1.13   APTT    Collection Time: 11/13/24  7:06 PM   Result Value Ref Range    APTT 35.0 24.7 - 36.0 sec   DIAGNOSTIC ALCOHOL    Collection Time: 11/13/24  7:06 PM   Result Value Ref Range    Diagnostic Alcohol 118.5 (H) <10.1 mg/dL   IMMATURE  PLT FRACTION    Collection Time: 24  7:06 PM   Result Value Ref Range    Imm. Plt Fraction 3.3 0.6 - 13.1 %   ESTIMATED GFR    Collection Time: 24  7:06 PM   Result Value Ref Range    GFR (CKD-EPI) 115 >60 mL/min/1.73 m 2   MAGNESIUM    Collection Time: 24  7:06 PM   Result Value Ref Range    Magnesium 2.0 1.5 - 2.5 mg/dL   Comp Metabolic Panel (CMP)    Collection Time: 24  7:06 PM   Result Value Ref Range    Sodium 135 135 - 145 mmol/L    Potassium 3.9 3.6 - 5.5 mmol/L    Chloride 107 96 - 112 mmol/L    Co2 18 (L) 20 - 33 mmol/L    Anion Gap 10.0 7.0 - 16.0    Glucose 88 65 - 99 mg/dL    Bun 13 8 - 22 mg/dL    Creatinine 0.95 0.50 - 1.40 mg/dL    Calcium 8.4 (L) 8.5 - 10.5 mg/dL    Correct Calcium 9.1 8.5 - 10.5 mg/dL    AST(SGOT) 70 (H) 12 - 45 U/L    ALT(SGPT) 26 2 - 50 U/L    Alkaline Phosphatase 187 (H) 30 - 99 U/L    Total Bilirubin 2.6 (H) 0.1 - 1.5 mg/dL    Albumin 3.1 (L) 3.2 - 4.9 g/dL    Total Protein 6.3 6.0 - 8.2 g/dL    Globulin 3.2 1.9 - 3.5 g/dL    A-G Ratio 1.0 g/dL   PHOSPHORUS    Collection Time: 24  7:06 PM   Result Value Ref Range    Phosphorus 3.0 2.5 - 4.5 mg/dL   ESTIMATED GFR    Collection Time: 24  7:06 PM   Result Value Ref Range    GFR (CKD-EPI) 102 >60 mL/min/1.73 m 2   EKG    Collection Time: 24  7:42 PM   Result Value Ref Range    Report       [facility] Emergency Dept.    Test Date:  2024  Pt Name:    ARSENIO RODGERS               Department: ER  MRN:        [id number]                      Room:       [facility]  Gender:     Male                         Technician: [id number]  :        1980                   Requested By:JAMES TALLEY  Order #:    [id number]                    Reading MD:    Measurements  Intervals                                Axis  Rate:       74                           P:          113  PA:         147                          QRS:        66  QRSD:       106                          T:           54  QT:         457  QTc:        507    Interpretive Statements  Sinus rhythm  Prolonged QT interval  Compared to ECG 11/01/2024 16:08:59  Sinus bradycardia no longer present     CBC with Differential    Collection Time: 11/14/24 12:06 AM   Result Value Ref Range    WBC 5.2 4.8 - 10.8 K/uL    RBC 2.92 (L) 4.70 - 6.10 M/uL    Hemoglobin 9.4 (L) 14.0 - 18.0 g/dL    Hematocrit 26.7 (L) 42.0 - 52.0 %    MCV 91.4 81.4 - 97.8 fL    MCH 32.2 27.0 - 33.0 pg    MCHC 35.2 32.3 - 36.5 g/dL    RDW 49.8 35.9 - 50.0 fL    Platelet Count 36 (L) 164 - 446 K/uL    Neutrophils-Polys 53.30 44.00 - 72.00 %    Lymphocytes 26.30 22.00 - 41.00 %    Monocytes 15.50 (H) 0.00 - 13.40 %    Eosinophils 2.90 0.00 - 6.90 %    Basophils 0.60 0.00 - 1.80 %    Immature Granulocytes 1.40 (H) 0.00 - 0.90 %    Nucleated RBC 0.00 0.00 - 0.20 /100 WBC    Neutrophils (Absolute) 2.76 1.82 - 7.42 K/uL    Lymphs (Absolute) 1.36 1.00 - 4.80 K/uL    Monos (Absolute) 0.80 0.00 - 0.85 K/uL    Eos (Absolute) 0.15 0.00 - 0.51 K/uL    Baso (Absolute) 0.03 0.00 - 0.12 K/uL    Immature Granulocytes (abs) 0.07 0.00 - 0.11 K/uL    NRBC (Absolute) 0.00 K/uL   IMMATURE PLT FRACTION    Collection Time: 11/14/24 12:06 AM   Result Value Ref Range    Imm. Plt Fraction 3.8 0.6 - 13.1 %   CBC WITH DIFFERENTIAL    Collection Time: 11/15/24 10:08 AM   Result Value Ref Range    WBC 5.8 4.8 - 10.8 K/uL    RBC 3.17 (L) 4.70 - 6.10 M/uL    Hemoglobin 9.8 (L) 14.0 - 18.0 g/dL    Hematocrit 29.7 (L) 42.0 - 52.0 %    MCV 93.7 81.4 - 97.8 fL    MCH 30.9 27.0 - 33.0 pg    MCHC 33.0 32.3 - 36.5 g/dL    RDW 54.1 (H) 35.9 - 50.0 fL    Platelet Count 77 (L) 164 - 446 K/uL    MPV 10.3 9.0 - 12.9 fL    Neutrophils-Polys 59.60 44.00 - 72.00 %    Lymphocytes 23.40 22.00 - 41.00 %    Monocytes 13.50 (H) 0.00 - 13.40 %    Eosinophils 2.90 0.00 - 6.90 %    Basophils 0.30 0.00 - 1.80 %    Immature Granulocytes 0.30 0.00 - 0.90 %    Nucleated RBC 0.00 0.00 - 0.20 /100 WBC    Neutrophils (Absolute)  3.44 1.82 - 7.42 K/uL    Lymphs (Absolute) 1.35 1.00 - 4.80 K/uL    Monos (Absolute) 0.78 0.00 - 0.85 K/uL    Eos (Absolute) 0.17 0.00 - 0.51 K/uL    Baso (Absolute) 0.02 0.00 - 0.12 K/uL    Immature Granulocytes (abs) 0.02 0.00 - 0.11 K/uL    NRBC (Absolute) 0.00 K/uL   Comp Metabolic Panel    Collection Time: 11/15/24 10:08 AM   Result Value Ref Range    Sodium 138 135 - 145 mmol/L    Potassium 4.0 3.6 - 5.5 mmol/L    Chloride 110 96 - 112 mmol/L    Co2 20 20 - 33 mmol/L    Anion Gap 8.0 7.0 - 16.0    Glucose 107 (H) 65 - 99 mg/dL    Bun 12 8 - 22 mg/dL    Creatinine 0.85 0.50 - 1.40 mg/dL    Calcium 8.5 8.5 - 10.5 mg/dL    Correct Calcium 9.5 8.5 - 10.5 mg/dL    AST(SGOT) 53 (H) 12 - 45 U/L    ALT(SGPT) 22 2 - 50 U/L    Alkaline Phosphatase 144 (H) 30 - 99 U/L    Total Bilirubin 2.9 (H) 0.1 - 1.5 mg/dL    Albumin 2.7 (L) 3.2 - 4.9 g/dL    Total Protein 5.6 (L) 6.0 - 8.2 g/dL    Globulin 2.9 1.9 - 3.5 g/dL    A-G Ratio 0.9 g/dL   PHOSPHORUS    Collection Time: 11/15/24 10:08 AM   Result Value Ref Range    Phosphorus 4.1 2.5 - 4.5 mg/dL   MAGNESIUM    Collection Time: 11/15/24 10:08 AM   Result Value Ref Range    Magnesium 1.7 1.5 - 2.5 mg/dL   IMMATURE PLT FRACTION    Collection Time: 11/15/24 10:08 AM   Result Value Ref Range    Imm. Plt Fraction 1.8 0.6 - 13.1 %   ESTIMATED GFR    Collection Time: 11/15/24 10:08 AM   Result Value Ref Range    GFR (CKD-EPI) 111 >60 mL/min/1.73 m 2   EC-ECHOCARDIOGRAM COMPLETE W/O CONT    Collection Time: 11/15/24 10:17 AM   Result Value Ref Range    Eject.Frac. MOD BP 60.85     Eject.Frac. MOD 4C 62.38     Eject.Frac. MOD 2C 58.56     Left Ventrical Ejection Fraction 65    CBC WITHOUT DIFFERENTIAL    Collection Time: 11/16/24  2:23 AM   Result Value Ref Range    WBC 4.9 4.8 - 10.8 K/uL    RBC 2.95 (L) 4.70 - 6.10 M/uL    Hemoglobin 9.2 (L) 14.0 - 18.0 g/dL    Hematocrit 27.7 (L) 42.0 - 52.0 %    MCV 93.9 81.4 - 97.8 fL    MCH 31.2 27.0 - 33.0 pg    MCHC 33.2 32.3 - 36.5 g/dL     RDW 54.2 (H) 35.9 - 50.0 fL    Platelet Count 68 (L) 164 - 446 K/uL    MPV 10.4 9.0 - 12.9 fL   Comp Metabolic Panel    Collection Time: 11/16/24  2:23 AM   Result Value Ref Range    Sodium 138 135 - 145 mmol/L    Potassium 4.0 3.6 - 5.5 mmol/L    Chloride 111 96 - 112 mmol/L    Co2 20 20 - 33 mmol/L    Anion Gap 7.0 7.0 - 16.0    Glucose 89 65 - 99 mg/dL    Bun 14 8 - 22 mg/dL    Creatinine 0.88 0.50 - 1.40 mg/dL    Calcium 8.2 (L) 8.5 - 10.5 mg/dL    Correct Calcium 9.5 8.5 - 10.5 mg/dL    AST(SGOT) 44 12 - 45 U/L    ALT(SGPT) 20 2 - 50 U/L    Alkaline Phosphatase 130 (H) 30 - 99 U/L    Total Bilirubin 2.5 (H) 0.1 - 1.5 mg/dL    Albumin 2.4 (L) 3.2 - 4.9 g/dL    Total Protein 5.0 (L) 6.0 - 8.2 g/dL    Globulin 2.6 1.9 - 3.5 g/dL    A-G Ratio 0.9 g/dL   IMMATURE PLT FRACTION    Collection Time: 11/16/24  2:23 AM   Result Value Ref Range    Imm. Plt Fraction 2.4 0.6 - 13.1 %   ESTIMATED GFR    Collection Time: 11/16/24  2:23 AM   Result Value Ref Range    GFR (CKD-EPI) 110 >60 mL/min/1.73 m 2   CBC WITHOUT DIFFERENTIAL    Collection Time: 11/17/24  4:17 AM   Result Value Ref Range    WBC 5.1 4.8 - 10.8 K/uL    RBC 2.78 (L) 4.70 - 6.10 M/uL    Hemoglobin 8.9 (L) 14.0 - 18.0 g/dL    Hematocrit 26.0 (L) 42.0 - 52.0 %    MCV 93.5 81.4 - 97.8 fL    MCH 32.0 27.0 - 33.0 pg    MCHC 34.2 32.3 - 36.5 g/dL    RDW 53.0 (H) 35.9 - 50.0 fL    Platelet Count 62 (L) 164 - 446 K/uL    MPV 11.5 9.0 - 12.9 fL   Comp Metabolic Panel    Collection Time: 11/17/24  4:17 AM   Result Value Ref Range    Sodium 133 (L) 135 - 145 mmol/L    Potassium 3.8 3.6 - 5.5 mmol/L    Chloride 106 96 - 112 mmol/L    Co2 19 (L) 20 - 33 mmol/L    Anion Gap 8.0 7.0 - 16.0    Glucose 115 (H) 65 - 99 mg/dL    Bun 16 8 - 22 mg/dL    Creatinine 0.69 0.50 - 1.40 mg/dL    Calcium 7.4 (L) 8.5 - 10.5 mg/dL    Correct Calcium 8.8 8.5 - 10.5 mg/dL    AST(SGOT) 37 12 - 45 U/L    ALT(SGPT) 18 2 - 50 U/L    Alkaline Phosphatase 127 (H) 30 - 99 U/L    Total Bilirubin 2.2  (H) 0.1 - 1.5 mg/dL    Albumin 2.2 (L) 3.2 - 4.9 g/dL    Total Protein 4.6 (L) 6.0 - 8.2 g/dL    Globulin 2.4 1.9 - 3.5 g/dL    A-G Ratio 0.9 g/dL   IMMATURE PLT FRACTION    Collection Time: 11/17/24  4:17 AM   Result Value Ref Range    Imm. Plt Fraction 2.8 0.6 - 13.1 %   ESTIMATED GFR    Collection Time: 11/17/24  4:17 AM   Result Value Ref Range    GFR (CKD-EPI) 118 >60 mL/min/1.73 m 2       Imaging/ Testing:      EC-ECHOCARDIOGRAM COMPLETE W/O CONT   Final Result      CT-HEAD W/O   Final Result         1.  No acute intracranial abnormality is identified, there are nonspecific white matter changes, commonly associated with small vessel ischemic disease.  Associated mild cerebral atrophy is noted.                   Physical Exam:  Gen: A&O x 3, mildly slowed speech and occasionally confused statements.  HEENT: Normocephalic, periocular ecchymosis in the left eye, EOMI, scleral icterus  Pulm: clear to auscultation bilaterally, no respiratory distress   Cardio: RRR, systolic murmur  Abdom: soft, nontender, nondistended, normoactive bowel sounds in all quadrants  Ext: No edema, 2+ pulses bilaterally  Neuro: Cranial nerves II through XII grossly intact, A&O x 4  Skin: Mildly jaundiced    Discharge Medications:           Medication List        CHANGE how you take these medications        Instructions   furosemide 80 MG Tabs  What changed:   medication strength  additional instructions  Commonly known as: Lasix   Take 1 Tablet by mouth every day.  Dose: 80 mg     * lactulose 10 GM/15ML Soln  What changed: Another medication with the same name was added. Make sure you understand how and when to take each.   Take 30 mL by mouth 4 times a day.  Dose: 30 mL     * lactulose 10 GM/15ML Soln  What changed: You were already taking a medication with the same name, and this prescription was added. Make sure you understand how and when to take each.   Take 45 mL by mouth 4 times a day.  Dose: 45 mL     spironolactone 100 MG  Tabs  What changed:   medication strength  additional instructions  Another medication with the same name was removed. Continue taking this medication, and follow the directions you see here.  Commonly known as: Aldactone   Take 1 Tablet by mouth every day.  Dose: 100 mg           * This list has 2 medication(s) that are the same as other medications prescribed for you. Read the directions carefully, and ask your doctor or other care provider to review them with you.                CONTINUE taking these medications        Instructions   acamprosate 333 MG tablet  Commonly known as: Campral   Take 1 Tablet by mouth 3 times a day for 30 days.  Dose: 333 mg     amLODIPine 5 MG Tabs  Commonly known as: Norvasc   Take 1 Tablet by mouth every day.  Dose: 5 mg     carvedilol 3.125 MG Tabs  Commonly known as: Coreg   Take 1 Tablet by mouth 2 times a day with meals.  Dose: 3.125 mg     escitalopram 20 MG tablet  Commonly known as: Lexapro   Take 1 Tablet by mouth every day.  Dose: 20 mg     folic acid 1 MG Tabs  Commonly known as: Folvite   Take 1 mg by mouth every day.  Dose: 1 mg     hydrOXYzine HCl 25 MG Tabs  Commonly known as: Atarax   Take 1 Tablet by mouth 3 times a day as needed for Anxiety.  Dose: 25 mg     naltrexone 50 MG Tabs  Commonly known as: Depade   Take 1 Tablet by mouth every day.  Dose: 50 mg     pantoprazole 40 MG Tbec  Commonly known as: Protonix   Take 1 Tablet by mouth 2 times daily, before breakfast and dinner.  Dose: 40 mg     polyethylene glycol/lytes Pack  Commonly known as: Miralax   Mix 1 Packet per package instructions and drink by mouth 1 time a day as needed (for constipation).  Dose: 17 g     Stimulant Laxative 8.6-50 MG Tabs  Generic drug: senna-docusate   Take 2 Tablets by mouth 1 time a day as needed for Constipation.  Dose: 2 Tablet     thiamine 100 MG tablet  Commonly known as: Thiamine   Take 1 Tablet by mouth every day.  Dose: 100 mg     traZODone 50 MG Tabs  Commonly known as:  Desyrel   Take 1 Tablet by mouth at bedtime as needed for Sleep.  Dose: 50 mg     Xifaxan 550 MG Tabs tablet  Generic drug: riFAXIMin   Take 1 Tablet by mouth 2 times a day.  Dose: 550 mg            ASK your doctor about these medications        Instructions   amoxicillin-clavulanate 875-125 MG Tabs  Commonly known as: Augmentin  Ask about: Should I take this medication?   Take 1 Tablet by mouth 2 times a day for 7 days.  Dose: 1 Tablet                Disposition: Discharge with bus pass to [facility]    Diet: Regular    Activity: As tolerated    Instructions:      -Continue taking lactulose and rifaximin as prescribed  -Continue home medications  -Follow-up with PCP    The patient was instructed to return to the ER in the event of worsening symptoms. I have counseled the patient on the importance of compliance and the patient has agreed to proceed with all medical recommendations and follow up plan indicated above.   The patient understands that all medications come with benefits and risks. Risks may include permanent injury or death and these risks can be minimized with close reassessment and monitoring.        Please CC: Mann Johnson M.D.    Follow up appointment details :        No future appointments.    Follow up with Primary Care Physician within 7 days of discharge for further evaluation and care.    Pending Studies:        None      (2) The patient met the 2-midnight criteria for an inpatient stay at the time of discharge.     Oni Erickson M.D.  PGY1  [facility]

## 2024-11-19 ENCOUNTER — APPOINTMENT (OUTPATIENT)
Dept: RADIOLOGY | Facility: MEDICAL CENTER | Age: 44
DRG: 897 | End: 2024-11-19
Attending: STUDENT IN AN ORGANIZED HEALTH CARE EDUCATION/TRAINING PROGRAM
Payer: COMMERCIAL

## 2024-11-19 ENCOUNTER — HOSPITAL ENCOUNTER (INPATIENT)
Facility: MEDICAL CENTER | Age: 44
LOS: 1 days | DRG: 897 | End: 2024-11-21
Attending: STUDENT IN AN ORGANIZED HEALTH CARE EDUCATION/TRAINING PROGRAM | Admitting: FAMILY MEDICINE
Payer: COMMERCIAL

## 2024-11-19 DIAGNOSIS — F10.930 ALCOHOL WITHDRAWAL SYNDROME WITHOUT COMPLICATION (HCC): ICD-10-CM

## 2024-11-19 DIAGNOSIS — R53.1 GENERALIZED WEAKNESS: ICD-10-CM

## 2024-11-19 LAB
ALBUMIN SERPL BCP-MCNC: 2.8 G/DL (ref 3.2–4.9)
ALBUMIN/GLOB SERPL: 0.9 G/DL
ALP SERPL-CCNC: 160 U/L (ref 30–99)
ALT SERPL-CCNC: 25 U/L (ref 2–50)
ANION GAP SERPL CALC-SCNC: 9 MMOL/L (ref 7–16)
APTT PPP: 35.1 SEC (ref 24.7–36)
AST SERPL-CCNC: 48 U/L (ref 12–45)
BASOPHILS # BLD AUTO: 0.2 % (ref 0–1.8)
BASOPHILS # BLD: 0.01 K/UL (ref 0–0.12)
BILIRUB SERPL-MCNC: 2.3 MG/DL (ref 0.1–1.5)
BUN SERPL-MCNC: 24 MG/DL (ref 8–22)
CALCIUM ALBUM COR SERPL-MCNC: 9.2 MG/DL (ref 8.5–10.5)
CALCIUM SERPL-MCNC: 8.2 MG/DL (ref 8.5–10.5)
CHLORIDE SERPL-SCNC: 103 MMOL/L (ref 96–112)
CO2 SERPL-SCNC: 20 MMOL/L (ref 20–33)
CREAT SERPL-MCNC: 1.03 MG/DL (ref 0.5–1.4)
EOSINOPHIL # BLD AUTO: 0.14 K/UL (ref 0–0.51)
EOSINOPHIL NFR BLD: 2.8 % (ref 0–6.9)
ERYTHROCYTE [DISTWIDTH] IN BLOOD BY AUTOMATED COUNT: 53.1 FL (ref 35.9–50)
ETHANOL BLD-MCNC: 118.6 MG/DL
GFR SERPLBLD CREATININE-BSD FMLA CKD-EPI: 93 ML/MIN/1.73 M 2
GLOBULIN SER CALC-MCNC: 3.2 G/DL (ref 1.9–3.5)
GLUCOSE BLD STRIP.AUTO-MCNC: 117 MG/DL (ref 65–99)
GLUCOSE SERPL-MCNC: 95 MG/DL (ref 65–99)
HCT VFR BLD AUTO: 29.4 % (ref 42–52)
HGB BLD-MCNC: 9.9 G/DL (ref 14–18)
IMM GRANULOCYTES # BLD AUTO: 0.02 K/UL (ref 0–0.11)
IMM GRANULOCYTES NFR BLD AUTO: 0.4 % (ref 0–0.9)
INR PPP: 1.42 (ref 0.87–1.13)
LYMPHOCYTES # BLD AUTO: 1.52 K/UL (ref 1–4.8)
LYMPHOCYTES NFR BLD: 30 % (ref 22–41)
MCH RBC QN AUTO: 31.3 PG (ref 27–33)
MCHC RBC AUTO-ENTMCNC: 33.7 G/DL (ref 32.3–36.5)
MCV RBC AUTO: 93 FL (ref 81.4–97.8)
MONOCYTES # BLD AUTO: 0.77 K/UL (ref 0–0.85)
MONOCYTES NFR BLD AUTO: 15.2 % (ref 0–13.4)
NEUTROPHILS # BLD AUTO: 2.61 K/UL (ref 1.82–7.42)
NEUTROPHILS NFR BLD: 51.4 % (ref 44–72)
NRBC # BLD AUTO: 0 K/UL
NRBC BLD-RTO: 0 /100 WBC (ref 0–0.2)
PLATELET # BLD AUTO: 64 K/UL (ref 164–446)
PLATELETS.RETICULATED NFR BLD AUTO: 2.3 % (ref 0.6–13.1)
PMV BLD AUTO: 11.8 FL (ref 9–12.9)
POTASSIUM SERPL-SCNC: 4.2 MMOL/L (ref 3.6–5.5)
PROT SERPL-MCNC: 6 G/DL (ref 6–8.2)
PROTHROMBIN TIME: 17.4 SEC (ref 12–14.6)
RBC # BLD AUTO: 3.16 M/UL (ref 4.7–6.1)
SODIUM SERPL-SCNC: 132 MMOL/L (ref 135–145)
WBC # BLD AUTO: 5.1 K/UL (ref 4.8–10.8)

## 2024-11-19 PROCEDURE — 82962 GLUCOSE BLOOD TEST: CPT

## 2024-11-19 PROCEDURE — 80053 COMPREHEN METABOLIC PANEL: CPT

## 2024-11-19 PROCEDURE — 36415 COLL VENOUS BLD VENIPUNCTURE: CPT

## 2024-11-19 PROCEDURE — 700117 HCHG RX CONTRAST REV CODE 255: Mod: UD | Performed by: STUDENT IN AN ORGANIZED HEALTH CARE EDUCATION/TRAINING PROGRAM

## 2024-11-19 PROCEDURE — 99285 EMERGENCY DEPT VISIT HI MDM: CPT

## 2024-11-19 PROCEDURE — 85730 THROMBOPLASTIN TIME PARTIAL: CPT

## 2024-11-19 PROCEDURE — 85610 PROTHROMBIN TIME: CPT

## 2024-11-19 PROCEDURE — 74177 CT ABD & PELVIS W/CONTRAST: CPT

## 2024-11-19 PROCEDURE — 70450 CT HEAD/BRAIN W/O DYE: CPT

## 2024-11-19 PROCEDURE — HZ2ZZZZ DETOXIFICATION SERVICES FOR SUBSTANCE ABUSE TREATMENT: ICD-10-PCS | Performed by: FAMILY MEDICINE

## 2024-11-19 PROCEDURE — 82077 ASSAY SPEC XCP UR&BREATH IA: CPT

## 2024-11-19 PROCEDURE — 85055 RETICULATED PLATELET ASSAY: CPT

## 2024-11-19 PROCEDURE — 85025 COMPLETE CBC W/AUTO DIFF WBC: CPT

## 2024-11-19 PROCEDURE — 83735 ASSAY OF MAGNESIUM: CPT

## 2024-11-19 RX ADMIN — IOHEXOL 100 ML: 350 INJECTION, SOLUTION INTRAVENOUS at 21:56

## 2024-11-19 ASSESSMENT — FIBROSIS 4 INDEX: FIB4 SCORE: 5.91

## 2024-11-19 NOTE — DISCHARGE PLANNING
DPA received fax copy of approval for RX Xifaxan. LILLIANA RN placed call to pharmacy and was given information. Scanned to media.

## 2024-11-20 ENCOUNTER — APPOINTMENT (OUTPATIENT)
Dept: RADIOLOGY | Facility: MEDICAL CENTER | Age: 44
DRG: 897 | End: 2024-11-20
Attending: FAMILY MEDICINE
Payer: COMMERCIAL

## 2024-11-20 PROBLEM — R53.1 WEAKNESS GENERALIZED: Status: ACTIVE | Noted: 2024-11-20

## 2024-11-20 LAB — MAGNESIUM SERPL-MCNC: 2.2 MG/DL (ref 1.5–2.5)

## 2024-11-20 PROCEDURE — A9270 NON-COVERED ITEM OR SERVICE: HCPCS | Performed by: BEHAVIOR ANALYST

## 2024-11-20 PROCEDURE — 700102 HCHG RX REV CODE 250 W/ 637 OVERRIDE(OP)

## 2024-11-20 PROCEDURE — 700111 HCHG RX REV CODE 636 W/ 250 OVERRIDE (IP): Mod: JZ

## 2024-11-20 PROCEDURE — A9270 NON-COVERED ITEM OR SERVICE: HCPCS

## 2024-11-20 PROCEDURE — 700102 HCHG RX REV CODE 250 W/ 637 OVERRIDE(OP): Performed by: BEHAVIOR ANALYST

## 2024-11-20 PROCEDURE — 700105 HCHG RX REV CODE 258

## 2024-11-20 PROCEDURE — 700101 HCHG RX REV CODE 250

## 2024-11-20 PROCEDURE — 97162 PT EVAL MOD COMPLEX 30 MIN: CPT

## 2024-11-20 PROCEDURE — 96374 THER/PROPH/DIAG INJ IV PUSH: CPT

## 2024-11-20 PROCEDURE — 700105 HCHG RX REV CODE 258: Mod: UD | Performed by: STUDENT IN AN ORGANIZED HEALTH CARE EDUCATION/TRAINING PROGRAM

## 2024-11-20 PROCEDURE — 770006 HCHG ROOM/CARE - MED/SURG/GYN SEMI*

## 2024-11-20 PROCEDURE — 99222 1ST HOSP IP/OBS MODERATE 55: CPT | Mod: GC | Performed by: FAMILY MEDICINE

## 2024-11-20 PROCEDURE — 700111 HCHG RX REV CODE 636 W/ 250 OVERRIDE (IP): Mod: JZ,UD | Performed by: STUDENT IN AN ORGANIZED HEALTH CARE EDUCATION/TRAINING PROGRAM

## 2024-11-20 PROCEDURE — 96375 TX/PRO/DX INJ NEW DRUG ADDON: CPT

## 2024-11-20 PROCEDURE — 700111 HCHG RX REV CODE 636 W/ 250 OVERRIDE (IP): Performed by: BEHAVIOR ANALYST

## 2024-11-20 RX ORDER — THIAMINE HYDROCHLORIDE 100 MG/ML
200 INJECTION, SOLUTION INTRAMUSCULAR; INTRAVENOUS 3 TIMES DAILY
Status: DISCONTINUED | OUTPATIENT
Start: 2024-11-20 | End: 2024-11-21 | Stop reason: HOSPADM

## 2024-11-20 RX ORDER — LORAZEPAM 0.5 MG/1
0.5 TABLET ORAL EVERY 4 HOURS PRN
Status: DISCONTINUED | OUTPATIENT
Start: 2024-11-20 | End: 2024-11-21 | Stop reason: HOSPADM

## 2024-11-20 RX ORDER — PROMETHAZINE HYDROCHLORIDE 25 MG/1
12.5-25 TABLET ORAL EVERY 4 HOURS PRN
Status: DISCONTINUED | OUTPATIENT
Start: 2024-11-20 | End: 2024-11-21 | Stop reason: HOSPADM

## 2024-11-20 RX ORDER — ONDANSETRON 4 MG/1
4 TABLET, ORALLY DISINTEGRATING ORAL EVERY 4 HOURS PRN
Status: DISCONTINUED | OUTPATIENT
Start: 2024-11-20 | End: 2024-11-21 | Stop reason: HOSPADM

## 2024-11-20 RX ORDER — ONDANSETRON 2 MG/ML
4 INJECTION INTRAMUSCULAR; INTRAVENOUS EVERY 4 HOURS PRN
Status: DISCONTINUED | OUTPATIENT
Start: 2024-11-20 | End: 2024-11-21 | Stop reason: HOSPADM

## 2024-11-20 RX ORDER — FOLIC ACID 1 MG/1
1 TABLET ORAL ONCE
Status: COMPLETED | OUTPATIENT
Start: 2024-11-20 | End: 2024-11-20

## 2024-11-20 RX ORDER — LACTULOSE 10 G/15ML
30 SOLUTION ORAL 4 TIMES DAILY
Status: DISCONTINUED | OUTPATIENT
Start: 2024-11-20 | End: 2024-11-21 | Stop reason: HOSPADM

## 2024-11-20 RX ORDER — ACAMPROSATE CALCIUM 333 MG/1
333 TABLET, DELAYED RELEASE ORAL 3 TIMES DAILY
Status: ON HOLD | COMMUNITY
End: 2024-11-21

## 2024-11-20 RX ORDER — LORAZEPAM 2 MG/ML
2 INJECTION INTRAMUSCULAR
Status: DISCONTINUED | OUTPATIENT
Start: 2024-11-20 | End: 2024-11-21 | Stop reason: HOSPADM

## 2024-11-20 RX ORDER — PROCHLORPERAZINE EDISYLATE 5 MG/ML
5-10 INJECTION INTRAMUSCULAR; INTRAVENOUS EVERY 4 HOURS PRN
Status: DISCONTINUED | OUTPATIENT
Start: 2024-11-20 | End: 2024-11-21 | Stop reason: HOSPADM

## 2024-11-20 RX ORDER — PHENOBARBITAL SODIUM 130 MG/ML
260 INJECTION, SOLUTION INTRAMUSCULAR; INTRAVENOUS ONCE
Status: COMPLETED | OUTPATIENT
Start: 2024-11-20 | End: 2024-11-20

## 2024-11-20 RX ORDER — TRAZODONE HYDROCHLORIDE 50 MG/1
50 TABLET, FILM COATED ORAL
Status: DISCONTINUED | OUTPATIENT
Start: 2024-11-20 | End: 2024-11-21 | Stop reason: HOSPADM

## 2024-11-20 RX ORDER — LORAZEPAM 2 MG/1
2 TABLET ORAL
Status: DISCONTINUED | OUTPATIENT
Start: 2024-11-20 | End: 2024-11-21 | Stop reason: HOSPADM

## 2024-11-20 RX ORDER — ESCITALOPRAM OXALATE 10 MG/1
20 TABLET ORAL DAILY
Status: DISCONTINUED | OUTPATIENT
Start: 2024-11-20 | End: 2024-11-21 | Stop reason: HOSPADM

## 2024-11-20 RX ORDER — FUROSEMIDE 40 MG/1
80 TABLET ORAL DAILY
Status: DISCONTINUED | OUTPATIENT
Start: 2024-11-20 | End: 2024-11-21 | Stop reason: HOSPADM

## 2024-11-20 RX ORDER — FOLIC ACID 1 MG/1
1 TABLET ORAL DAILY
Status: DISCONTINUED | OUTPATIENT
Start: 2024-11-21 | End: 2024-11-20

## 2024-11-20 RX ORDER — LORAZEPAM 2 MG/1
4 TABLET ORAL
Status: DISCONTINUED | OUTPATIENT
Start: 2024-11-20 | End: 2024-11-21 | Stop reason: HOSPADM

## 2024-11-20 RX ORDER — SPIRONOLACTONE 100 MG/1
100 TABLET, FILM COATED ORAL DAILY
Status: DISCONTINUED | OUTPATIENT
Start: 2024-11-20 | End: 2024-11-21 | Stop reason: HOSPADM

## 2024-11-20 RX ORDER — LORAZEPAM 1 MG/1
1 TABLET ORAL EVERY 4 HOURS PRN
Status: DISCONTINUED | OUTPATIENT
Start: 2024-11-20 | End: 2024-11-21 | Stop reason: HOSPADM

## 2024-11-20 RX ORDER — FOLIC ACID 1 MG/1
1 TABLET ORAL DAILY
Status: DISCONTINUED | OUTPATIENT
Start: 2024-11-21 | End: 2024-11-21 | Stop reason: HOSPADM

## 2024-11-20 RX ORDER — SODIUM CHLORIDE 9 MG/ML
1000 INJECTION, SOLUTION INTRAVENOUS ONCE
Status: COMPLETED | OUTPATIENT
Start: 2024-11-20 | End: 2024-11-20

## 2024-11-20 RX ORDER — CARVEDILOL 3.12 MG/1
3.12 TABLET ORAL 2 TIMES DAILY WITH MEALS
Status: DISCONTINUED | OUTPATIENT
Start: 2024-11-20 | End: 2024-11-21 | Stop reason: HOSPADM

## 2024-11-20 RX ORDER — GAUZE BANDAGE 2" X 2"
100 BANDAGE TOPICAL DAILY
Status: DISCONTINUED | OUTPATIENT
Start: 2024-11-23 | End: 2024-11-21 | Stop reason: HOSPADM

## 2024-11-20 RX ORDER — PROMETHAZINE HYDROCHLORIDE 25 MG/1
12.5-25 SUPPOSITORY RECTAL EVERY 4 HOURS PRN
Status: DISCONTINUED | OUTPATIENT
Start: 2024-11-20 | End: 2024-11-21 | Stop reason: HOSPADM

## 2024-11-20 RX ORDER — LORAZEPAM 2 MG/ML
1.5 INJECTION INTRAMUSCULAR
Status: DISCONTINUED | OUTPATIENT
Start: 2024-11-20 | End: 2024-11-21 | Stop reason: HOSPADM

## 2024-11-20 RX ORDER — GAUZE BANDAGE 2" X 2"
100 BANDAGE TOPICAL DAILY
Status: DISCONTINUED | OUTPATIENT
Start: 2024-11-21 | End: 2024-11-20

## 2024-11-20 RX ORDER — LORAZEPAM 2 MG/ML
0.5 INJECTION INTRAMUSCULAR EVERY 4 HOURS PRN
Status: DISCONTINUED | OUTPATIENT
Start: 2024-11-20 | End: 2024-11-21 | Stop reason: HOSPADM

## 2024-11-20 RX ORDER — AMLODIPINE BESYLATE 5 MG/1
5 TABLET ORAL DAILY
Status: DISCONTINUED | OUTPATIENT
Start: 2024-11-20 | End: 2024-11-21 | Stop reason: HOSPADM

## 2024-11-20 RX ORDER — DIAZEPAM 10 MG/2ML
5 INJECTION, SOLUTION INTRAMUSCULAR; INTRAVENOUS ONCE
Status: COMPLETED | OUTPATIENT
Start: 2024-11-20 | End: 2024-11-20

## 2024-11-20 RX ORDER — LORAZEPAM 2 MG/ML
1 INJECTION INTRAMUSCULAR
Status: DISCONTINUED | OUTPATIENT
Start: 2024-11-20 | End: 2024-11-21 | Stop reason: HOSPADM

## 2024-11-20 RX ADMIN — THIAMINE HYDROCHLORIDE 200 MG: 100 INJECTION, SOLUTION INTRAMUSCULAR; INTRAVENOUS at 20:46

## 2024-11-20 RX ADMIN — TRAZODONE HYDROCHLORIDE 50 MG: 50 TABLET ORAL at 20:46

## 2024-11-20 RX ADMIN — LORAZEPAM 2 MG: 2 TABLET ORAL at 04:43

## 2024-11-20 RX ADMIN — RIFAXIMIN 550 MG: 550 TABLET ORAL at 16:31

## 2024-11-20 RX ADMIN — CARVEDILOL 3.12 MG: 3.12 TABLET, FILM COATED ORAL at 08:45

## 2024-11-20 RX ADMIN — LORAZEPAM 0.5 MG: 0.5 TABLET ORAL at 16:25

## 2024-11-20 RX ADMIN — LACTULOSE 30 ML: 10 SOLUTION ORAL at 16:25

## 2024-11-20 RX ADMIN — OMEPRAZOLE 20 MG: 20 CAPSULE, DELAYED RELEASE ORAL at 16:26

## 2024-11-20 RX ADMIN — LACTULOSE 30 ML: 10 SOLUTION ORAL at 09:49

## 2024-11-20 RX ADMIN — CARVEDILOL 3.12 MG: 3.12 TABLET, FILM COATED ORAL at 16:31

## 2024-11-20 RX ADMIN — OMEPRAZOLE 20 MG: 20 CAPSULE, DELAYED RELEASE ORAL at 08:45

## 2024-11-20 RX ADMIN — FOLIC ACID: 5 INJECTION, SOLUTION INTRAMUSCULAR; INTRAVENOUS; SUBCUTANEOUS at 04:42

## 2024-11-20 RX ADMIN — THIAMINE HYDROCHLORIDE 200 MG: 100 INJECTION, SOLUTION INTRAMUSCULAR; INTRAVENOUS at 17:40

## 2024-11-20 RX ADMIN — LACTULOSE 30 ML: 10 SOLUTION ORAL at 20:46

## 2024-11-20 RX ADMIN — DIAZEPAM 5 MG: 5 INJECTION, SOLUTION INTRAMUSCULAR; INTRAVENOUS at 02:08

## 2024-11-20 RX ADMIN — SODIUM CHLORIDE 1000 ML: 9 INJECTION, SOLUTION INTRAVENOUS at 02:08

## 2024-11-20 RX ADMIN — PHENOBARBITAL SODIUM 260 MG: 130 INJECTION INTRAMUSCULAR; INTRAVENOUS at 02:26

## 2024-11-20 RX ADMIN — FOLIC ACID 1 MG: 1 TABLET ORAL at 16:26

## 2024-11-20 RX ADMIN — LORAZEPAM 0.5 MG: 0.5 TABLET ORAL at 08:45

## 2024-11-20 ASSESSMENT — PATIENT HEALTH QUESTIONNAIRE - PHQ9
2. FEELING DOWN, DEPRESSED, IRRITABLE, OR HOPELESS: NEARLY EVERY DAY
SUM OF ALL RESPONSES TO PHQ QUESTIONS 1-9: 21
8. MOVING OR SPEAKING SO SLOWLY THAT OTHER PEOPLE COULD HAVE NOTICED. OR THE OPPOSITE, BEING SO FIGETY OR RESTLESS THAT YOU HAVE BEEN MOVING AROUND A LOT MORE THAN USUAL: SEVERAL DAYS
7. TROUBLE CONCENTRATING ON THINGS, SUCH AS READING THE NEWSPAPER OR WATCHING TELEVISION: MORE THAN HALF THE DAYS
1. LITTLE INTEREST OR PLEASURE IN DOING THINGS: NEARLY EVERY DAY
6. FEELING BAD ABOUT YOURSELF - OR THAT YOU ARE A FAILURE OR HAVE LET YOURSELF OR YOUR FAMILY DOWN: NEARLY EVERY DAY
9. THOUGHTS THAT YOU WOULD BE BETTER OFF DEAD, OR OF HURTING YOURSELF: SEVERAL DAYS
SUM OF ALL RESPONSES TO PHQ9 QUESTIONS 1 AND 2: 6
5. POOR APPETITE OR OVEREATING: MORE THAN HALF THE DAYS
4. FEELING TIRED OR HAVING LITTLE ENERGY: NEARLY EVERY DAY
3. TROUBLE FALLING OR STAYING ASLEEP OR SLEEPING TOO MUCH: NEARLY EVERY DAY

## 2024-11-20 ASSESSMENT — SOCIAL DETERMINANTS OF HEALTH (SDOH)
WITHIN THE PAST 12 MONTHS, THE FOOD YOU BOUGHT JUST DIDN'T LAST AND YOU DIDN'T HAVE MONEY TO GET MORE: PATIENT DECLINED
IN THE PAST 12 MONTHS, HAS THE ELECTRIC, GAS, OIL, OR WATER COMPANY THREATENED TO SHUT OFF SERVICE IN YOUR HOME?: PATIENT DECLINED
WITHIN THE PAST 12 MONTHS, YOU WORRIED THAT YOUR FOOD WOULD RUN OUT BEFORE YOU GOT THE MONEY TO BUY MORE: PATIENT DECLINED
WITHIN THE LAST YEAR, HAVE YOU BEEN AFRAID OF YOUR PARTNER OR EX-PARTNER?: NO
WITHIN THE LAST YEAR, HAVE YOU BEEN HUMILIATED OR EMOTIONALLY ABUSED IN OTHER WAYS BY YOUR PARTNER OR EX-PARTNER?: NO
WITHIN THE LAST YEAR, HAVE TO BEEN RAPED OR FORCED TO HAVE ANY KIND OF SEXUAL ACTIVITY BY YOUR PARTNER OR EX-PARTNER?: NO
WITHIN THE LAST YEAR, HAVE YOU BEEN KICKED, HIT, SLAPPED, OR OTHERWISE PHYSICALLY HURT BY YOUR PARTNER OR EX-PARTNER?: NO

## 2024-11-20 ASSESSMENT — LIFESTYLE VARIABLES
AUDITORY DISTURBANCES: NOT PRESENT
VISUAL DISTURBANCES: NOT PRESENT
AUDITORY DISTURBANCES: NOT PRESENT
NAUSEA AND VOMITING: NO NAUSEA AND NO VOMITING
TREMOR: MODERATE TREMOR WITH ARMS EXTENDED
AUDITORY DISTURBANCES: NOT PRESENT
VISUAL DISTURBANCES: NOT PRESENT
NAUSEA AND VOMITING: NO NAUSEA AND NO VOMITING
AGITATION: NORMAL ACTIVITY
HAVE PEOPLE ANNOYED YOU BY CRITICIZING YOUR DRINKING: NO
TOTAL SCORE: 4
TOTAL SCORE: VERY MILD ITCHING, PINS AND NEEDLES SENSATION, BURNING OR NUMBNESS
ORIENTATION AND CLOUDING OF SENSORIUM: ORIENTED AND CAN DO SERIAL ADDITIONS
ANXIETY: NO ANXIETY (AT EASE)
PAROXYSMAL SWEATS: NO SWEAT VISIBLE
ON A TYPICAL DAY WHEN YOU DRINK ALCOHOL HOW MANY DRINKS DO YOU HAVE: 6
TOTAL SCORE: 7
AUDITORY DISTURBANCES: NOT PRESENT
HEADACHE, FULLNESS IN HEAD: VERY MILD
PAROXYSMAL SWEATS: NO SWEAT VISIBLE
DOES PATIENT WANT TO TALK TO SOMEONE ABOUT QUITTING: YES
VISUAL DISTURBANCES: NOT PRESENT
NAUSEA AND VOMITING: NO NAUSEA AND NO VOMITING
ORIENTATION AND CLOUDING OF SENSORIUM: ORIENTED AND CAN DO SERIAL ADDITIONS
AUDITORY DISTURBANCES: NOT PRESENT
TOTAL SCORE: 2
AGITATION: SOMEWHAT MORE THAN NORMAL ACTIVITY
ORIENTATION AND CLOUDING OF SENSORIUM: ORIENTED AND CAN DO SERIAL ADDITIONS
ANXIETY: MILDLY ANXIOUS
ORIENTATION AND CLOUDING OF SENSORIUM: ORIENTED AND CAN DO SERIAL ADDITIONS
ANXIETY: NO ANXIETY (AT EASE)
HEADACHE, FULLNESS IN HEAD: MILD
AUDITORY DISTURBANCES: NOT PRESENT
TOTAL SCORE: 2
ANXIETY: NO ANXIETY (AT EASE)
HAVE YOU EVER FELT YOU SHOULD CUT DOWN ON YOUR DRINKING: YES
HEADACHE, FULLNESS IN HEAD: NOT PRESENT
PAROXYSMAL SWEATS: NO SWEAT VISIBLE
NAUSEA AND VOMITING: NO NAUSEA AND NO VOMITING
ORIENTATION AND CLOUDING OF SENSORIUM: ORIENTED AND CAN DO SERIAL ADDITIONS
DOES PATIENT WANT TO STOP DRINKING: YES
TOTAL SCORE: 2
ALCOHOL_USE: YES
TOTAL SCORE: 7
PAROXYSMAL SWEATS: NO SWEAT VISIBLE
PAROXYSMAL SWEATS: NO SWEAT VISIBLE
TOTAL SCORE: 3
NAUSEA AND VOMITING: NO NAUSEA AND NO VOMITING
NAUSEA AND VOMITING: NO NAUSEA AND NO VOMITING
ANXIETY: *
VISUAL DISTURBANCES: NOT PRESENT
TREMOR: MODERATE TREMOR WITH ARMS EXTENDED
ANXIETY: NO ANXIETY (AT EASE)
PAROXYSMAL SWEATS: NO SWEAT VISIBLE
AGITATION: NORMAL ACTIVITY
TREMOR: MODERATE TREMOR WITH ARMS EXTENDED
HOW MANY TIMES IN THE PAST YEAR HAVE YOU HAD 5 OR MORE DRINKS IN A DAY: 43
TREMOR: MODERATE TREMOR WITH ARMS EXTENDED
VISUAL DISTURBANCES: NOT PRESENT
AGITATION: SOMEWHAT MORE THAN NORMAL ACTIVITY
HEADACHE, FULLNESS IN HEAD: NOT PRESENT
AGITATION: SOMEWHAT MORE THAN NORMAL ACTIVITY
AGITATION: NORMAL ACTIVITY
ORIENTATION AND CLOUDING OF SENSORIUM: CANNOT DO SERIAL ADDITIONS OR IS UNCERTAIN ABOUT DATE
EVER HAD A DRINK FIRST THING IN THE MORNING TO STEADY YOUR NERVES TO GET RID OF A HANGOVER: NO
TREMOR: *
AVERAGE NUMBER OF DAYS PER WEEK YOU HAVE A DRINK CONTAINING ALCOHOL: 4
TREMOR: MODERATE TREMOR WITH ARMS EXTENDED
TOTAL SCORE: 11
HEADACHE, FULLNESS IN HEAD: MILD
HEADACHE, FULLNESS IN HEAD: NOT PRESENT
CONSUMPTION TOTAL: POSITIVE
EVER FELT BAD OR GUILTY ABOUT YOUR DRINKING: YES
VISUAL DISTURBANCES: NOT PRESENT
TOTAL SCORE: 7

## 2024-11-20 ASSESSMENT — COGNITIVE AND FUNCTIONAL STATUS - GENERAL
DRESSING REGULAR LOWER BODY CLOTHING: A LITTLE
SUGGESTED CMS G CODE MODIFIER MOBILITY: CK
MOBILITY SCORE: 20
DAILY ACTIVITIY SCORE: 23
WALKING IN HOSPITAL ROOM: A LITTLE
SUGGESTED CMS G CODE MODIFIER DAILY ACTIVITY: CI
WALKING IN HOSPITAL ROOM: A LITTLE
STANDING UP FROM CHAIR USING ARMS: A LITTLE
MOVING TO AND FROM BED TO CHAIR: A LITTLE
MOVING TO AND FROM BED TO CHAIR: A LITTLE
MOBILITY SCORE: 19
STANDING UP FROM CHAIR USING ARMS: A LITTLE
MOVING FROM LYING ON BACK TO SITTING ON SIDE OF FLAT BED: A LITTLE
CLIMB 3 TO 5 STEPS WITH RAILING: A LITTLE
SUGGESTED CMS G CODE MODIFIER MOBILITY: CJ
CLIMB 3 TO 5 STEPS WITH RAILING: A LITTLE

## 2024-11-20 ASSESSMENT — PAIN DESCRIPTION - PAIN TYPE
TYPE: ACUTE PAIN

## 2024-11-20 ASSESSMENT — GAIT ASSESSMENTS
DISTANCE (FEET): 100
ASSISTIVE DEVICE: FRONT WHEEL WALKER
GAIT LEVEL OF ASSIST: MINIMAL ASSIST

## 2024-11-20 NOTE — PROGRESS NOTES
[facility] [place] [facility] PROGRESS NOTE     Attending:   Dr. Howe    Resident:   Sandhya Santiago MD    PATIENT:   Jalen Vaughn; [id number]; 1980    ID:   44 y.o. male admitted for etoh intoxication, concern for wernicke.     SUBJECTIVE:   No acute events overnight  More lucid as he pee  States he is having significant problems w/ balance    OBJECTIVE:  Vitals:    11/20/24 0230 11/20/24 0420 11/20/24 0731 11/20/24 1538   BP: 138/76 132/78 131/79 131/73   Pulse: 66 65 67 74   Resp: 18 17 17 18   Temp:  36.1 °C (97 °F) 36.4 °C (97.5 °F) 36.8 °C (98.2 °F)   TempSrc:  Temporal Temporal Temporal   SpO2: 97% 97% 93% 93%   Weight:       Height:           Intake/Output Summary (Last 24 hours) at 11/20/2024 1547  Last data filed at 11/20/2024 1538  Gross per 24 hour   Intake 240 ml   Output 0 ml   Net 240 ml       PHYSICAL EXAM:  General: No acute distress, afebrile, resting comfortably, conversational   HEENT: NC/AT. EOMI.   Cardiovascular: RRR without murmurs, rubs, heaves. Normal capillary refill   Respiratory: CTAB, no tachypnea or retractions   Abdomen: normal bowel sounds, soft, nontender, nondistended, no masses, no organomegaly   EXT:  SAWYER, no edema  Skin: No erythema/lesions   Neuro: Non-focal, alert and orientated; no asymmetrical weakness, slurred speech, nystagmus; cn2-12 intact; having trouble w/ gait imbalance; strength 5/5 throughout x4 ext    LABS:  Recent Labs     11/19/24 2112   WBC 5.1   RBC 3.16*   HEMOGLOBIN 9.9*   HEMATOCRIT 29.4*   MCV 93.0   MCH 31.3   RDW 53.1*   PLATELETCT 64*   MPV 11.8   NEUTSPOLYS 51.40   LYMPHOCYTES 30.00   MONOCYTES 15.20*   EOSINOPHILS 2.80   BASOPHILS 0.20     Recent Labs     11/19/24 2112   SODIUM 132*   POTASSIUM 4.2   CHLORIDE 103   CO2 20   BUN 24*   CREATININE 1.03   CALCIUM 8.2*   MAGNESIUM 2.2   ALBUMIN 2.8*     Estimated GFR/CRCL = Estimated Creatinine Clearance: 136.1 mL/min (by C-G formula based on SCr of 1.03 mg/dL).  Recent Labs     11/19/24  2112   GLUCOSE  95     Recent Labs     11/19/24 2112   ASTSGOT 48*   ALTSGPT 25   TBILIRUBIN 2.3*   ALKPHOSPHAT 160*   GLOBULIN 3.2   INR 1.42*             Recent Labs     11/19/24 2112   INR 1.42*   APTT 35.1         IMAGING:  CT-HEAD W/O   Final Result         1.  No acute intracranial abnormality is identified, there are nonspecific white matter changes, commonly associated with small vessel ischemic disease.  Associated mild cerebral atrophy is noted.               CT-ABDOMEN-PELVIS WITH   Final Result         1.  Trace right pleural effusion   2.  Changes of cirrhosis with changes of portal hypertension and splenic varices   3.  Atherosclerosis and atherosclerotic coronary artery disease          MEDS:  Current Facility-Administered Medications   Medication Last Admin    ondansetron (Zofran) syringe/vial injection 4 mg      ondansetron (Zofran ODT) dispertab 4 mg      promethazine (Phenergan) tablet 12.5-25 mg      promethazine (Phenergan) suppository 12.5-25 mg      prochlorperazine (Compazine) injection 5-10 mg      LORazepam (Ativan) tablet 0.5 mg 0.5 mg at 11/20/24 0845    LORazepam (Ativan) tablet 1 mg      Or    LORazepam (Ativan) injection 0.5 mg      LORazepam (Ativan) tablet 2 mg 2 mg at 11/20/24 0443    Or    LORazepam (Ativan) injection 1 mg      LORazepam (Ativan) tablet 3 mg      Or    LORazepam (Ativan) injection 1.5 mg      LORazepam (Ativan) tablet 4 mg      Or    LORazepam (Ativan) injection 2 mg      [START ON 11/21/2024] multivitamin tablet 1 Tablet      amLODIPine (Norvasc) tablet 5 mg      carvedilol (Coreg) tablet 3.125 mg 3.125 mg at 11/20/24 0845    escitalopram (Lexapro) tablet 20 mg      furosemide (Lasix) tablet 80 mg      omeprazole (PriLOSEC) capsule 20 mg 20 mg at 11/20/24 0845    riFAXIMin (Xifaxan) tablet 550 mg      spironolactone (Aldactone) tablet 100 mg      traZODone (Desyrel) tablet 50 mg      lactulose 20 GM/30ML solution 30 mL 30 mL at 11/20/24 0949    thiamine (B-1) injection 200 mg       folic acid (Folvite) tablet 1 mg      [START ON 11/21/2024] folic acid (Folvite) tablet 1 mg      [START ON 11/23/2024] thiamine (Vitamin B-1) tablet 100 mg         ASSESSMENT/PLAN:    * Alcohol withdrawal syndrome with complication, cirrhosis (HCC)- (present on admission)  Assessment & Plan  Patient with recent hospitalization 11/13 to 11/18 for alcohol withdrawal with hepatic encephalopathy.  Patient not answering questions on my exam, had recently received Valium and phenobarbital and quite drowsy secondary to that.  Uncertain exact amount he had been drinking since discharge from hospital and whether or not he was taking medications for hepatic neuropathy and decompensated cirrhosis during this time.  Alcohol level 118 on admission with labs showing normocytic anemia, thrombocytopenia of 64, AST of 48 with ALT of 25, alkaline phosphatase of 160, and total bilirubin of 2.3. S/p Rally bag on admission    MDM: Labs relatively stable today; more lucid aox4; given gait abnormality there is concern for wernicke's encephalopathy, versus continued intoxication although unlikely as patient is becoming more sober and coordinated with his other senses, versus hepatic encephalopathy also less likely given BUN levels and patient improving, versus much less likely CVA at this time and she is otherwise neurologically intact    - [facility] protocol in place, monitor for acute alcohol withdrawal  - Continue home rifaximin, lactulose, spironolactone, Lasix  - Hold home acamprosate  - Continue to monitor CMP  - initiate thiamine 200mg bid IV for 2d then transition to oral  -Initiate folic acid daily  - Case management consult ordered for alcohol use disorder, housing instability    Weakness generalized  Assessment & Plan  Patient had mechanical fall in the ED, was noted by ERP to be significantly weak with unstable gait concerning for possible Wernicke's encephalopathy.  CT head prior to this fall was negative for acute intracranial  abnormality but did show possible small vessel ischemic disease and mild cerebral atrophy.  Vitamin B12 level from prior admission within normal at 1270.  - Will continue with folic acid supplementation  - Consider possible MRI head to evaluate for possible Wernicke's encephalopathy; will hold off for now  - PT/OT consults ordered    Primary hypertension- (present on admission)  Assessment & Plan  -cont coreg   -cont amlodipine    Anxiety- (present on admission)  Assessment & Plan  -cont lexaparo        Lines: piv  Lasix: 80mg qd  Diet: reg  Abx: rifaximin  DVT prophylaxis: held given thrombocytopenia in setting of liver failure  Code Status: Full    Dispo: cont hospitalization

## 2024-11-20 NOTE — PROGRESS NOTES
Pharmacy Medication Reconciliation      ~Medication reconciliation updated and complete per patient home pharmacy   ~No oral ABX within the last 30 days  ~Patient home pharmacy :  [facility]      ~Anticoagulants (rivaroxaban, apixaban, edoxaban, dabigatran, warfarin, enoxaparin) taken in the last 14 days? No

## 2024-11-20 NOTE — PROGRESS NOTES
Assumed pt care with RN. Pt transferred to unit via gurney on room air, and walked unsteadily to unit bed x2 assist. Pt is A&OX4 and states he is in 4/10 pain but declines interventions at this time. Plan of care discussed, no further questions. Admit profile and skin check completed. Personal belongings and call light within reach, bed alarm on.

## 2024-11-20 NOTE — H&P
FAMILY MEDICINE HISTORY AND PHYSICAL       PATIENT ID:  NAME:  Jalen Vaughn  MRN:               [id number]  YOB: 1980    Date of Admission: 11/19/2024     Attending: Isabella Montanez M.d.     Resident: Tatiana Aldana M.D.    Primary Care Physician:  Mann Johnson M.D.    CC:    Chief Complaint   Patient presents with    Alcohol Intoxication     BIB [facility] unit 60 from [facility] and [facility]. Bystanders called EMS after seeing patient laying on the ground. Aox2 (self and year). Reports to drinking 2 x 16 Oz beer. Old left periorbital bruising noted. Unable to recall what happened.        HPI: Jalen Vaughn is a 44 y.o. male w/PMHx alcohol use disorder, hypertension, decompensated cirrhosis status post TIPS, who presented with alcohol intoxication.  At bedside, patient was lethargic and, though arousable, was not participating in history taking due to recent administration of phenobarbital and Valium in ED as well as likely ongoing intoxication.  Per ER providers notes, EMS was called by bystanders after patient was found lying on the ground.  Patient had reported drinking several beers today.  History was limited for ER provider as well.  Does have ecchymosis noted around left eye which was also present on prior admission.    Patient recently admitted 11/13 to 11/18 for hepatic encephalopathy and acute alcohol withdrawal.  Treated with Librium taper and monitored on CIWA protocol.  Started rifaximin and lactulose for hepatic encephalopathy as well.  Was discharged with these medications as was also instructed to continue Lasix and spironolactone for decompensated cirrhosis.    ERCourse:  On arrival to ED, patient had overall stable vital signs.  Was noted to be acutely intoxicated and poor historian.  Was observed in the ED for several hours.  Lab work including CBC, CMP, coags was significant for stable normocytic anemia, elevated total bilirubin and alkaline phosphatase, alcohol level elevated at  118.  CT abdomen pelvis ordered due to new ecchymosis to the right flank, which showed trace right pleural effusion and changes of cirrhosis with changes of portal hypertension and splenic varices.  CT head also ordered due to possible head strike and altered mental status, which showed no acute intracranial abnormality.  Did show nonspecific white matter changes associated with small vessel ischemic disease and mild cerebral atrophy.  While being observed in the ED, patient had a ground-level fall after slipping on urine that spilled on the ground when he stood up to urinate.  Was reported to have struck head on glass door by a nurse who observed the fall as she was passing by, however patient denied head strike.  No loss of consciousness, new injuries noted on exam.    REVIEW OF SYSTEMS:   Ten systems reviewed and were negative except as noted in the HPI.                PAST MEDICAL HISTORY:   has a past medical history of Cirrhosis of liver (HCC), Hypertension, Liver disease, and Seizure (HCC).     PAST SURGICAL HISTORY:   has a past surgical history that includes appendectomy; cholecystectomy; and pr upper gi endoscopy,diagnosis (N/A, 10/27/2024).     FAMILY HISTORY:  family history includes Heart Disease in her father and mother; Kidney Disease in her father and mother.     SOCIAL HISTORY:     Living Situation: Per chart review from recent admission, patient is unhoused  Smoking: Unable to assess  Etoh: Unable to assess  Recreational Drug: Unable to assess    DIET:   Orders Placed This Encounter   Procedures    Diet Order Diet: Regular     Standing Status:   Standing     Number of Occurrences:   1     Order Specific Question:   Diet:     Answer:   Regular [1]       ALLERGIES:  Allergies   Allergen Reactions    Latex Itching    Morphine Itching       OUTPATIENT MEDICATIONS:    Current Facility-Administered Medications:     ondansetron (Zofran) syringe/vial injection 4 mg, 4 mg, Intravenous, Q4HRS PRN, Tatiana B  SEVERIANO Aldana    ondansetron (Zofran ODT) dispertab 4 mg, 4 mg, Oral, Q4HRS PRN, Tatiana Aldana M.D.    promethazine (Phenergan) tablet 12.5-25 mg, 12.5-25 mg, Oral, Q4HRS PRN, Tatiana Aldana M.D.    promethazine (Phenergan) suppository 12.5-25 mg, 12.5-25 mg, Rectal, Q4HRS PRN, Tatiana Aldana M.D.    prochlorperazine (Compazine) injection 5-10 mg, 5-10 mg, Intravenous, Q4HRS PRN, Tatiana Aldana M.D.    LORazepam (Ativan) tablet 0.5 mg, 0.5 mg, Oral, Q4HRS PRN, Tatiana Aldana M.D.    LORazepam (Ativan) tablet 1 mg, 1 mg, Oral, Q4HRS PRN **OR** LORazepam (Ativan) injection 0.5 mg, 0.5 mg, Intravenous, Q4HRS PRN, Tatiana Aldana M.D.    LORazepam (Ativan) tablet 2 mg, 2 mg, Oral, Q2HRS PRN, 2 mg at 11/20/24 0443 **OR** LORazepam (Ativan) injection 1 mg, 1 mg, Intravenous, Q2HRS PRN, Tatiana Aldana M.D.    LORazepam (Ativan) tablet 3 mg, 3 mg, Oral, Q HOUR PRN **OR** LORazepam (Ativan) injection 1.5 mg, 1.5 mg, Intravenous, Q HOUR PRN, Tatiana Aldana M.D.    LORazepam (Ativan) tablet 4 mg, 4 mg, Oral, Q15 MIN PRN **OR** LORazepam (Ativan) injection 2 mg, 2 mg, Intravenous, Q15 MIN PRN, Tatiana Aldana M.D.    Rally Bag IV (D5LR 1000 mL + Thiamine 100 mg + Folic Acid 1 mg + Magnesium Sulfate 1 g) infusion, , Intravenous, Once, Last Rate: 500 mL/hr at 11/20/24 0442, New Bag at 11/20/24 0442 **AND** [START ON 11/21/2024] thiamine (Vitamin B-1) tablet 100 mg, 100 mg, Oral, DAILY **AND** [START ON 11/21/2024] multivitamin tablet 1 Tablet, 1 Tablet, Oral, DAILY **AND** [START ON 11/21/2024] folic acid (Folvite) tablet 1 mg, 1 mg, Oral, DAILY, Tatiana Aldana M.D.    amLODIPine (Norvasc) tablet 5 mg, 5 mg, Oral, DAILY, Tatiana Aldana M.D.    carvedilol (Coreg) tablet 3.125 mg, 3.125 mg, Oral, BID WITH MEALS, Tatiana Aldana M.D.    escitalopram (Lexapro) tablet 20 mg, 20 mg, Oral, DAILY, Tatiana Aldana M.D.    furosemide (Lasix) tablet 80 mg, 80 mg, Oral, DAILY, Tatiana Aldana M.D.    omeprazole  (PriLOSEC) capsule 20 mg, 20 mg, Oral, BID AC, Tatiana Aldana M.D.    riFAXIMin (Xifaxan) tablet 550 mg, 550 mg, Oral, BID, Tatiana Aldana M.D.    spironolactone (Aldactone) tablet 100 mg, 100 mg, Oral, DAILY, Tatiana Aldana M.D.    traZODone (Desyrel) tablet 50 mg, 50 mg, Oral, QHS PRN, Tatiana Aldana M.D.    lactulose 20 GM/30ML solution 30 mL, 30 mL, Oral, 4X/DAY, Tatiana Aldana M.D.    PHYSICAL EXAM:  Vitals:    24 0200 24 0210 24 0230 24 0420   BP: (!) 143/82 (!) 143/84 138/76 132/78   Pulse: 62 63 66 65   Resp: 16 18 18 17   Temp:    36.1 °C (97 °F)   TempSrc:    Temporal   SpO2: 98% 98% 97% 97%   Weight:       Height:       , Temp (24hrs), Av.4 °C (97.6 °F), Min:36.1 °C (97 °F), Max:37 °C (98.6 °F)  , Pulse Oximetry: 97 %, O2 (LPM): 0, O2 Delivery Device: None - Room Air    Physical Exam  Constitutional:       General: She is not in acute distress.     Comments: Lethargic, arousable to loud voice and movement.  Not answering questions.  Asleep.   HENT:      Head:      Comments: Ecchymosis noted over left orbital area.  Otherwise atraumatic     Mouth/Throat:      Mouth: Mucous membranes are dry.      Pharynx: Oropharynx is clear.   Cardiovascular:      Rate and Rhythm: Normal rate and regular rhythm.      Pulses: Normal pulses.      Heart sounds: Normal heart sounds.   Pulmonary:      Effort: Pulmonary effort is normal.      Breath sounds: Normal breath sounds.   Abdominal:      General: Bowel sounds are normal. There is no distension.      Palpations: Abdomen is soft.      Tenderness: There is no abdominal tenderness.   Skin:     General: Skin is warm and dry.   Neurological:      Comments: Unable to assess due to patient's drowsy state.        LAB TESTS:   Recent Labs     11/19/24  2112   WBC 5.1   RBC 3.16*   HEMOGLOBIN 9.9*   HEMATOCRIT 29.4*   MCV 93.0   MCH 31.3   MCHC 33.7   RDW 53.1*   PLATELETCT 64*   MPV 11.8     Recent Labs     24   SODIUM 132*  "  POTASSIUM 4.2   CHLORIDE 103   CO2 20   GLUCOSE 95   BUN 24*   CREATININE 1.03   CALCIUM 8.2*     Recent Labs     11/19/24 2112   ALTSGPT 25   ASTSGOT 48*   ALKPHOSPHAT 160*   TBILIRUBIN 2.3*   GLUCOSE 95     Recent Labs     11/19/24 2112   APTT 35.1   INR 1.42*     No results for input(s): \"NTPROBNP\" in the last 72 hours.      No results for input(s): \"TROPONINT\" in the last 72 hours.    CULTURES:   Results       ** No results found for the last 168 hours. **            IMAGES:  CT-HEAD W/O   Final Result         1.  No acute intracranial abnormality is identified, there are nonspecific white matter changes, commonly associated with small vessel ischemic disease.  Associated mild cerebral atrophy is noted.               CT-ABDOMEN-PELVIS WITH   Final Result         1.  Trace right pleural effusion   2.  Changes of cirrhosis with changes of portal hypertension and splenic varices   3.  Atherosclerosis and atherosclerotic coronary artery disease        CONSULTS:   None    ASSESSMENT/PLAN:   44 y.o. male w/PMHx alcohol use disorder, hypertension, decompensated cirrhosis status post TIPS, admitted for alcohol withdrawal, progressive weakness and gait instability.     I anticipate this patient will require at least two midnights for appropriate medical management, necessitating inpatient admission because alcohol withdrawal    * Alcohol withdrawal syndrome with complication, cirrhosis (HCC)- (present on admission)  Assessment & Plan  Patient with recent hospitalization 11/13 to 11/18 for alcohol withdrawal with hepatic encephalopathy.  Patient not answering questions on my exam, had recently received Valium and phenobarbital and quite drowsy secondary to that.  Uncertain exact amount he had been drinking since discharge from hospital and whether or not he was taking medications for hepatic neuropathy and decompensated cirrhosis during this time.  Alcohol level 118 on admission with labs showing normocytic anemia, " thrombocytopenia of 64, AST of 48 with ALT of 25, alkaline phosphatase of 160, and total bilirubin of 2.3.  - [facility] protocol in place, monitor for acute alcohol withdrawal  - Will need to be reassessed as patient becomes more lucid once the acute intoxication and sedating medications wear off  - Continue home rifaximin, lactulose, spironolactone, Lasix  - Hold home acamprosate  - Continue to monitor CMP  - Rally bag with thiamine 100 mg, folic acid 1 mg, D5 LR 1 L, magnesium sulfate 1 g ordered.  Begin p.o. vitamins starting tomorrow.  - Case management consult ordered for alcohol use disorder, housing instability    Weakness generalized  Assessment & Plan  Patient had mechanical fall in the ED, was noted by ERP to be significantly weak with unstable gait concerning for possible Wernicke's encephalopathy.  CT head prior to this fall was negative for acute intracranial abnormality but did show possible small vessel ischemic disease and mild cerebral atrophy.  Vitamin B12 level from prior admission within normal at 1270.  - Will continue with folic acid supplementation  - Consider possible MRI head to evaluate for possible Wernicke's encephalopathy  - PT/OT consults ordered      Chronic medical conditions:  - Continue home medications for HTN, depression/anxiety including amlodipine, carvedilol, lexapro, trazodone    Core Measures:  Fluids: Rally bag w/1L D5LR  Lines: PIV  Abx: none  Diet: regular  PPX: SCDs/TEDs and pharmacologic prophylaxis contraindicated due to increased bleeding risk in setting of alcohol use disorder and ground level fall    CODE Status: Full Code    Tatiana Aldana M.D.  PGY-2  [employer] [facility]   Nasal Turnover Hinge Flap Text: The defect edges were debeveled with a #15 scalpel blade.  Given the size, depth, location of the defect and the defect being full thickness a nasal turnover hinge flap was deemed most appropriate.  Using a sterile surgical marker, an appropriate hinge flap was drawn incorporating the defect. The area thus outlined was incised with a #15 scalpel blade. The flap was designed to recreate the nasal mucosal lining and the alar rim. The skin margins were undermined to an appropriate distance in all directions utilizing iris scissors.

## 2024-11-20 NOTE — THERAPY
Physical Therapy   Initial Evaluation     Patient Name: Jalen Vaughn  Age:  44 y.o., Sex:  male  Medical Record #: [id number]  Today's Date: 11/20/2024          Assessment  Patient is 44 y.o. male known to me from prior admit.  This is his third admit, since relocating her from [place] for ETOH/detox.  He is homeless and was mobile w/o AD.  Today, he needs min assist for mobility, including ambulating w/ a fww.  He does list to the left during gait.  Anticipate that in several more sessions and w/ oob activities w/ nsg, he will again progress to being able to d/c to the shelter.  PT will follow and address impairments noted below.  Plan    Physical Therapy Initial Treatment Plan   Treatment Plan : Gait Training, Therapeutic Activities  Treatment Frequency: 3 Times per Week  Duration: Until Therapy Goals Met    DC Equipment Recommendations: Unable to determine at this time  Discharge Recommendations:  (Anticipate that w/ increased oob w/ nsg and PT, he will progress to the level of no longer needing PT)        Objective       11/20/24 0842   Prior Living Situation   Housing / Facility Homeless   Prior Level of Functional Mobility   Bed Mobility Independent   Transfer Status Independent   Ambulation Independent   Assistive Devices Used None   Cognition    Level of Consciousness Alert   Strength Lower Body   Comments wnl   Balance Assessment   Sitting Balance (Static) Fair +   Sitting Balance (Dynamic) Fair +   Standing Balance (Static) Fair   Standing Balance (Dynamic) Fair -   Weight Shift Sitting Fair   Weight Shift Standing Fair   Comments w/ fww   Bed Mobility    Supine to Sit Supervised   Sit to Supine Supervised   Gait Analysis   Gait Level Of Assist Minimal Assist   Assistive Device Front Wheel Walker   Distance (Feet) 100   Deviation   (listing to the left)   Functional Mobility   Sit to Stand Minimal Assist   Short Term Goals    Short Term Goal # 1 Pt will move sit to/from stand w/ spv in 6 visits to  improve fxl indep   Short Term Goal # 2 Pt to ambulate 150 ft w/ spv in 6 visits to improve fxl indep   Education Group   Education Provided Role of Physical Therapist;Use of Assistive Device   Role of Physical Therapist Patient Response Patient;Acceptance;Explanation;Verbal Demonstration   Use of Assistive Device Patient Response Patient;Acceptance;Explanation;Demonstration;Reinforcement Needed   Physical Therapy Initial Treatment Plan    Treatment Plan  Gait Training;Therapeutic Activities   Treatment Frequency 3 Times per Week   Duration Until Therapy Goals Met   Problem List    Problems Impaired Transfers;Impaired Ambulation;Impaired Balance   Anticipated Discharge Equipment and Recommendations   DC Equipment Recommendations Unable to determine at this time   Discharge Recommendations   (Anticipate that w/ increased oob w/ nsg and PT, he will progress to the level of no longer needing PT)

## 2024-11-20 NOTE — ASSESSMENT & PLAN NOTE
Patient had mechanical fall in the ED, was noted by ERP to be significantly weak with unstable gait concerning for possible Wernicke's encephalopathy.  CT head prior to this fall was negative for acute intracranial abnormality but did show possible small vessel ischemic disease and mild cerebral atrophy.  Vitamin B12 level from prior admission within normal at 1270.  - Will continue with folic acid supplementation  - Consider possible MRI head to evaluate for possible Wernicke's encephalopathy; will hold off for now  - PT/OT consults ordered

## 2024-11-20 NOTE — ED NOTES
Pt reported that he was attempting to ambulate to urinate when he slipped in his urine and fell. Pt denied head strike, LOC, or injuries. Charge RN notified. Bed alarm in place.

## 2024-11-20 NOTE — PROGRESS NOTES
4 Eyes Skin Assessment Completed by GABRIELE Valera and GABRIELE Schmidt.    Head Bruising around Left eye  Ears WDL  Nose WDL  Mouth WDL  Neck WDL  Breast/Chest WDL  Shoulder Blades WDL  Spine WDL  (R) Arm/Elbow/Hand Bruising and Scab  (L) Arm/Elbow/Hand Bruising and Scab  Abdomen Bruising  Groin WDL  Scrotum/Coccyx/Buttocks WDL  (R) Leg Scab, Bruising, and Swelling  (L) Leg Scab, Bruising, and Swelling  (R) Heel/Foot/Toe Boggy, dry  (L) Heel/Foot/Toe Boggy, dry          Devices In Places none      Interventions In Place Pillows, seizure precautions    Possible Skin Injury No    Pictures Uploaded Into Epic N/A  Wound Consult Placed N/A  RN Wound Prevention Protocol Ordered No

## 2024-11-20 NOTE — ED TRIAGE NOTES
Jalen Vaughn  44 y.o. male  Chief Complaint   Patient presents with    Alcohol Intoxication     BIB [facility] unit [facility] from [facility] and [facility]. Bystanders called EMS after seeing patient laying on the ground. Aox2 (self and year). Reports to drinking 2 x 16 Oz beer. Old left periorbital bruising noted. Unable to recall what happened.        Vitals:    11/19/24 1910   BP: (!) 149/80   Pulse: 65   Resp: 18   Temp: 37 °C (98.6 °F)   SpO2: 97%

## 2024-11-20 NOTE — ED NOTES
This nurse was passing by and witnessed patient fell on floor on his right shoulder and strike his head on glass door. Primary RN notified. Patient assisted back to [facility] with Tigist Henriquez. Escalated to Charge RN, Dr Rivas called for post fall assessment.

## 2024-11-20 NOTE — ED NOTES
Pt appears to be resting in gurney, RR even and unlabored, NAD. Pt updated on POC and has no additional requests at this time.

## 2024-11-20 NOTE — ASSESSMENT & PLAN NOTE
Patient with recent hospitalization 11/13 to 11/18 for alcohol withdrawal with hepatic encephalopathy.  Patient not answering questions on my exam, had recently received Valium and phenobarbital and quite drowsy secondary to that.  Uncertain exact amount he had been drinking since discharge from hospital and whether or not he was taking medications for hepatic neuropathy and decompensated cirrhosis during this time.  Alcohol level 118 on admission with labs showing normocytic anemia, thrombocytopenia of 64, AST of 48 with ALT of 25, alkaline phosphatase of 160, and total bilirubin of 2.3. S/p Rally bag on admission    MDM: Labs relatively stable today; aox4; given gait abnormality there is concern for wernicke's encephalopathy, versus continued intoxication although unlikely as patient is becoming more sober and coordinated with his other senses, versus hepatic encephalopathy also less likely given BUN levels and patient improving, versus much less likely CVA at this time and she is otherwise neurologically intact    - Needs continued work w/ PT/OT to for their evaluation  - [facility] protocol in place, monitor for acute alcohol withdrawal  - Continue home rifaximin, lactulose, spironolactone, Lasix  - Hold home acamprosate  - Continue to monitor CMP  - cont thiamine 200mg bid IV for 2d then transition to oral  - cont folic acid daily  - Case management consult ordered for alcohol use disorder, housing instability

## 2024-11-20 NOTE — ED PROVIDER NOTES
ED Provider Note    CHIEF COMPLAINT  Chief Complaint   Patient presents with    Alcohol Intoxication     BIB [facility] unit [facility] from [facility] and [facility]. Bystanders called EMS after seeing patient laying on the ground. Aox2 (self and year). Reports to drinking 2 x 16 Oz beer. Old left periorbital bruising noted. Unable to recall what happened.        EXTERNAL RECORDS REVIEWED  Reviewed recent internal emergency department note, admitted for hepatic encephalopathy.  Periorbital ecchymosis was noted on emergency department note on 11/13/2024 he was noted to have ecchymosis to the anterior abdomen but no noted ecchymosis to the right flank at that time..     HPI/ROS  LIMITATION TO HISTORY   Select: Intoxication  OUTSIDE HISTORIAN(S):  EMS providing collateral history    Jalen Vaughn is a 44 y.o. male with past medical history of alcohol use disorder, cirrhosis status post TIPS, hypertension, seizure disorder presenting to the emergency department for alcohol intoxication.  bystanders reportedly called EMS after patient was found lying on the ground.  Patient reported drinking several beers today.     On my evaluation, patient was extremely inebriated and his history was limited.  He is unable to provide any history about the ecchymosis around his left eye or right flank.    PAST MEDICAL HISTORY   has a past medical history of Cirrhosis of liver (HCC), Hypertension, Liver disease, and Seizure (HCC).    SURGICAL HISTORY   has a past surgical history that includes appendectomy; cholecystectomy; and upper gi endoscopy,diagnosis (N/A, 10/27/2024).    FAMILY HISTORY  Family History   Problem Relation Age of Onset    Heart Disease Mother     Kidney Disease Mother     Heart Disease Father     Kidney Disease Father        SOCIAL HISTORY  Social History     Tobacco Use    Smoking status: Never    Smokeless tobacco: Current     Types: Chew   Vaping Use    Vaping status: Never Used   Substance and Sexual Activity    Alcohol use: Yes  "    Comment: pt reports two beers daily    Drug use: Not Currently    Sexual activity: Not on file       CURRENT MEDICATIONS  Home Medications       Reviewed by Billy Umana R.N. (Registered Nurse) on 11/19/24 at 1911  Med List Status: Partial     Medication Last Dose Status   acamprosate (CAMPRAL) 333 MG tablet  Active   amLODIPine (NORVASC) 5 MG Tab  Active   carvedilol (COREG) 3.125 MG Tab  Active   escitalopram (LEXAPRO) 20 MG tablet  Active   folic acid (FOLVITE) 1 MG Tab  Active   furosemide (LASIX) 80 MG Tab  Active   hydrOXYzine HCl (ATARAX) 25 MG Tab  Active   lactulose 20 GM/30ML Solution  Active   lactulose 20 GM/30ML Solution  Active   naltrexone (DEPADE) 50 MG Tab  Active   pantoprazole (PROTONIX) 40 MG Tablet Delayed Response  Active   polyethylene glycol/lytes (MIRALAX) Pack  Active   riFAXIMin (XIFAXAN) 550 MG Tab tablet  Active   senna-docusate (PERICOLACE OR SENOKOT S) 8.6-50 MG Tab  Active   spironolactone (ALDACTONE) 100 MG Tab  Active   thiamine (THIAMINE) 100 MG tablet  Active   traZODone (DESYREL) 50 MG Tab  Active                  Audit from Redirected Encounters    **Home medications have not yet been reviewed for this encounter**         ALLERGIES  Allergies   Allergen Reactions    Latex Itching    Morphine Itching       PHYSICAL EXAM  VITAL SIGNS: /76   Pulse 66   Temp 37 °C (98.6 °F) (Oral)   Resp 18   Ht 1.93 m (6' 4\")   Wt (!) 127 kg (280 lb)   SpO2 97%   BMI 34.08 kg/m²    General: Extremely inebriated, slurring speech, unable to sit himself up in bed  Neuro: oriented x 3, moving all extremities.   HEENT:   - Head: Normocephalic, atraumatic  - Eyes: PERRL.  Left periorbital ecchymosis, ecchymosis appears quite old  - Ears/Nose: normal external nose and ears  - Mouth: moist mucosal membranes  Resp: clear to auscultation, no increased work of breathing  CV: Regular rate and rhythm  Abd: Soft, non-tender, non-distended.  Multiple foci of ecchymosis to the anterior " abdomen  Back: Right flank ecchymosis  Extremities: No peripheral edema.  Atraumatic  Psych: Not agitated or aggressive          DIAGNOSTIC STUDIES / PROCEDURES    EKG  My independent EKG interpretation:  Results for orders placed or performed during the hospital encounter of 24   EKG   Result Value Ref Range    Report       [facility] Emergency Dept.    Test Date:  2024  Pt Name:    ARSENIO RODGERS               Department: ER  MRN:        [id number]                      Room:       [facility]  Gender:     Male                         Technician: [id number]  :        1980                   Requested By:JAMES TALLEY  Order #:    [id number]                    Reading MD:    Measurements  Intervals                                Axis  Rate:       74                           P:          113  RI:         147                          QRS:        66  QRSD:       106                          T:          54  QT:         457  QTc:        507    Interpretive Statements  Sinus rhythm  Prolonged QT interval  Compared to ECG 2024 16:08:59  Sinus bradycardia no longer present         LABS  Results for orders placed or performed during the hospital encounter of 24   POCT glucose device results    Collection Time: 24  7:06 PM   Result Value Ref Range    POC Glucose, Blood 117 (H) 65 - 99 mg/dL   CBC WITH DIFFERENTIAL    Collection Time: 24  9:12 PM   Result Value Ref Range    WBC 5.1 4.8 - 10.8 K/uL    RBC 3.16 (L) 4.70 - 6.10 M/uL    Hemoglobin 9.9 (L) 14.0 - 18.0 g/dL    Hematocrit 29.4 (L) 42.0 - 52.0 %    MCV 93.0 81.4 - 97.8 fL    MCH 31.3 27.0 - 33.0 pg    MCHC 33.7 32.3 - 36.5 g/dL    RDW 53.1 (H) 35.9 - 50.0 fL    Platelet Count 64 (L) 164 - 446 K/uL    MPV 11.8 9.0 - 12.9 fL    Neutrophils-Polys 51.40 44.00 - 72.00 %    Lymphocytes 30.00 22.00 - 41.00 %    Monocytes 15.20 (H) 0.00 - 13.40 %    Eosinophils 2.80 0.00 - 6.90 %    Basophils 0.20 0.00 - 1.80 %    Immature  Granulocytes 0.40 0.00 - 0.90 %    Nucleated RBC 0.00 0.00 - 0.20 /100 WBC    Neutrophils (Absolute) 2.61 1.82 - 7.42 K/uL    Lymphs (Absolute) 1.52 1.00 - 4.80 K/uL    Monos (Absolute) 0.77 0.00 - 0.85 K/uL    Eos (Absolute) 0.14 0.00 - 0.51 K/uL    Baso (Absolute) 0.01 0.00 - 0.12 K/uL    Immature Granulocytes (abs) 0.02 0.00 - 0.11 K/uL    NRBC (Absolute) 0.00 K/uL   COMP METABOLIC PANEL    Collection Time: 11/19/24  9:12 PM   Result Value Ref Range    Sodium 132 (L) 135 - 145 mmol/L    Potassium 4.2 3.6 - 5.5 mmol/L    Chloride 103 96 - 112 mmol/L    Co2 20 20 - 33 mmol/L    Anion Gap 9.0 7.0 - 16.0    Glucose 95 65 - 99 mg/dL    Bun 24 (H) 8 - 22 mg/dL    Creatinine 1.03 0.50 - 1.40 mg/dL    Calcium 8.2 (L) 8.5 - 10.5 mg/dL    Correct Calcium 9.2 8.5 - 10.5 mg/dL    AST(SGOT) 48 (H) 12 - 45 U/L    ALT(SGPT) 25 2 - 50 U/L    Alkaline Phosphatase 160 (H) 30 - 99 U/L    Total Bilirubin 2.3 (H) 0.1 - 1.5 mg/dL    Albumin 2.8 (L) 3.2 - 4.9 g/dL    Total Protein 6.0 6.0 - 8.2 g/dL    Globulin 3.2 1.9 - 3.5 g/dL    A-G Ratio 0.9 g/dL   DIAGNOSTIC ALCOHOL    Collection Time: 11/19/24  9:12 PM   Result Value Ref Range    Diagnostic Alcohol 118.6 (H) <10.1 mg/dL   APTT    Collection Time: 11/19/24  9:12 PM   Result Value Ref Range    APTT 35.1 24.7 - 36.0 sec   PROTHROMBIN TIME (INR)    Collection Time: 11/19/24  9:12 PM   Result Value Ref Range    PT 17.4 (H) 12.0 - 14.6 sec    INR 1.42 (H) 0.87 - 1.13   IMMATURE PLT FRACTION    Collection Time: 11/19/24  9:12 PM   Result Value Ref Range    Imm. Plt Fraction 2.3 0.6 - 13.1 %   ESTIMATED GFR    Collection Time: 11/19/24  9:12 PM   Result Value Ref Range    GFR (CKD-EPI) 93 >60 mL/min/1.73 m 2       RADIOLOGY  I have independently interpreted the diagnostic imaging associated with this visit and am waiting the final reading from the radiologist.   My preliminary interpretation is as follows:   - Reviewed CT scan of the head and abdomen pelvis showed no evidence of acute  process.  Radiologist interpretation:   CT-HEAD W/O   Final Result         1.  No acute intracranial abnormality is identified, there are nonspecific white matter changes, commonly associated with small vessel ischemic disease.  Associated mild cerebral atrophy is noted.               CT-ABDOMEN-PELVIS WITH   Final Result         1.  Trace right pleural effusion   2.  Changes of cirrhosis with changes of portal hypertension and splenic varices   3.  Atherosclerosis and atherosclerotic coronary artery disease              MEDICAL DECISION MAKING      ED COURSE AND PLAN    Jalen Vaughn is a 44 y.o. male presenting to the emergency department for alcohol intoxication.  He had several areas of ecchymosis that appeared old on physical exam.  Ecchymosis to the right flank was not documented during her recent hospitalization so I ordered a CT abdomen pelvis.  Given the degree of his altered mental status unclear if he had an intracranial process I also ordered a CT scan of his head.  I obtain baseline labs.  CBC shows stable normocytic anemia.  Chemistry shows borderline hyponatremia 132 was stable elevated total bilirubin alk phos.  INR is elevated at 1.42.  Alcohol level was elevated at 118.     CT scan of the head as well as the abdomen pelvis showed no evidence of acute traumatic process.  He has a small right pleural effusion doubt hemothorax.    Patient was allowed to metabolize here in the emergency department throughout the duration of my shift.    ---Pertinent ED Course---:    10:00 PM I reviewed the patient's old records in Epic, medication list, allergies, past medical history and performed a physical examination.     1:00 AM I reevaluated the patient, I was informed by the nurse that he had a ground-level fall when he went up to urinate.  I reevaluated him, he has no new signs of trauma on exam.  No indication for repeat CT scan of the head.  I did attempt to ambulate him and he was quite unstable on his  feet.  He does have symptoms consistent with mild withdrawal.  I do not think alcohol intoxication explains his instability on his feet.  Consider the possibility of Warnicke's encephalopathy, he is not altered he has no nystagmus.  He has no other neurologic deficits to suggest a central process.  Will plan to admit him to medicine for observation possible PT OT evaluation possible MRI of the brain for workup of his generalized weakness likely the precipitating cause of multiple falls over the last several weeks.    Hydration: Based on the patient's presentation of Dehydration the patient was given IV fluids. IV Hydration was used because oral hydration was not adequate alone. Upon recheck following hydration, the patient was stable.    Procedures:      ----------------------------------------------------------------------------------  DISCUSSIONS    I have discussed management of the patient with the following physicians and ELISSA's: [facility] family resident     Discussion of management with other Q or appropriate source(s):     Escalation of care considered, and ultimately not performed:    Barriers to care at this time, including but not limited to:     Decision tools and prescription drugs considered including, but not limited to:     FINAL IMPRESSION    1. Alcohol withdrawal syndrome without complication (HCC)    2. Generalized weakness        New Prescriptions    No medications on file         DISPOSITION      Admission: The patient will be admitted for further evaluation and treatment. Discussed case with consultants and relayed all necessary information.        This chart was dictated using an electronic voice recognition software. The chart has been reviewed and edited but there is still possibility for dictation errors due to limitation of software.    Aaron Todd,  11/20/2024

## 2024-11-20 NOTE — ED NOTES
Bedside report received from off going RN/tech: greg rn, assumed care of patient.  POC discussed with patient. Call light within reach, all needs addressed at this time.       Fall risk interventions in place: Not Applicable (all applicable per [place] Fall risk assessment)   Continuous monitoring: Not Applicable   IVF/IV medications: Not Applicable   Oxygen: Room Air  Bedside sitter: Not Applicable   Isolation: Not Applicable

## 2024-11-21 ENCOUNTER — PHARMACY VISIT (OUTPATIENT)
Dept: PHARMACY | Facility: MEDICAL CENTER | Age: 44
End: 2024-11-21
Payer: COMMERCIAL

## 2024-11-21 VITALS
HEIGHT: 76 IN | DIASTOLIC BLOOD PRESSURE: 77 MMHG | BODY MASS INDEX: 34.1 KG/M2 | RESPIRATION RATE: 12 BRPM | SYSTOLIC BLOOD PRESSURE: 147 MMHG | TEMPERATURE: 97.6 F | HEART RATE: 66 BPM | OXYGEN SATURATION: 97 % | WEIGHT: 280 LBS

## 2024-11-21 LAB
ALBUMIN SERPL BCP-MCNC: 2.5 G/DL (ref 3.2–4.9)
ALBUMIN/GLOB SERPL: 0.9 G/DL
ALP SERPL-CCNC: 140 U/L (ref 30–99)
ALT SERPL-CCNC: 21 U/L (ref 2–50)
ANION GAP SERPL CALC-SCNC: 6 MMOL/L (ref 7–16)
AST SERPL-CCNC: 47 U/L (ref 12–45)
BASOPHILS # BLD AUTO: 0.2 % (ref 0–1.8)
BASOPHILS # BLD: 0.01 K/UL (ref 0–0.12)
BILIRUB SERPL-MCNC: 1.9 MG/DL (ref 0.1–1.5)
BUN SERPL-MCNC: 13 MG/DL (ref 8–22)
CALCIUM ALBUM COR SERPL-MCNC: 9.7 MG/DL (ref 8.5–10.5)
CALCIUM SERPL-MCNC: 8.5 MG/DL (ref 8.5–10.5)
CHLORIDE SERPL-SCNC: 109 MMOL/L (ref 96–112)
CO2 SERPL-SCNC: 23 MMOL/L (ref 20–33)
CREAT SERPL-MCNC: 0.87 MG/DL (ref 0.5–1.4)
EOSINOPHIL # BLD AUTO: 0.16 K/UL (ref 0–0.51)
EOSINOPHIL NFR BLD: 3.5 % (ref 0–6.9)
ERYTHROCYTE [DISTWIDTH] IN BLOOD BY AUTOMATED COUNT: 54.4 FL (ref 35.9–50)
GFR SERPLBLD CREATININE-BSD FMLA CKD-EPI: 110 ML/MIN/1.73 M 2
GLOBULIN SER CALC-MCNC: 2.7 G/DL (ref 1.9–3.5)
GLUCOSE SERPL-MCNC: 110 MG/DL (ref 65–99)
HCT VFR BLD AUTO: 27.3 % (ref 42–52)
HGB BLD-MCNC: 9.2 G/DL (ref 14–18)
IMM GRANULOCYTES # BLD AUTO: 0.01 K/UL (ref 0–0.11)
IMM GRANULOCYTES NFR BLD AUTO: 0.2 % (ref 0–0.9)
LYMPHOCYTES # BLD AUTO: 1.11 K/UL (ref 1–4.8)
LYMPHOCYTES NFR BLD: 24.5 % (ref 22–41)
MAGNESIUM SERPL-MCNC: 1.7 MG/DL (ref 1.5–2.5)
MCH RBC QN AUTO: 31.5 PG (ref 27–33)
MCHC RBC AUTO-ENTMCNC: 33.7 G/DL (ref 32.3–36.5)
MCV RBC AUTO: 93.5 FL (ref 81.4–97.8)
MONOCYTES # BLD AUTO: 0.67 K/UL (ref 0–0.85)
MONOCYTES NFR BLD AUTO: 14.8 % (ref 0–13.4)
NEUTROPHILS # BLD AUTO: 2.57 K/UL (ref 1.82–7.42)
NEUTROPHILS NFR BLD: 56.8 % (ref 44–72)
NRBC # BLD AUTO: 0 K/UL
NRBC BLD-RTO: 0 /100 WBC (ref 0–0.2)
PHOSPHATE SERPL-MCNC: 4.2 MG/DL (ref 2.5–4.5)
PLATELET # BLD AUTO: 60 K/UL (ref 164–446)
PLATELETS.RETICULATED NFR BLD AUTO: 1.9 % (ref 0.6–13.1)
PMV BLD AUTO: 10 FL (ref 9–12.9)
POTASSIUM SERPL-SCNC: 4.4 MMOL/L (ref 3.6–5.5)
PROT SERPL-MCNC: 5.2 G/DL (ref 6–8.2)
RBC # BLD AUTO: 2.92 M/UL (ref 4.7–6.1)
SODIUM SERPL-SCNC: 138 MMOL/L (ref 135–145)
WBC # BLD AUTO: 4.5 K/UL (ref 4.8–10.8)

## 2024-11-21 PROCEDURE — 83735 ASSAY OF MAGNESIUM: CPT

## 2024-11-21 PROCEDURE — 85025 COMPLETE CBC W/AUTO DIFF WBC: CPT

## 2024-11-21 PROCEDURE — A9270 NON-COVERED ITEM OR SERVICE: HCPCS

## 2024-11-21 PROCEDURE — 84100 ASSAY OF PHOSPHORUS: CPT

## 2024-11-21 PROCEDURE — 700102 HCHG RX REV CODE 250 W/ 637 OVERRIDE(OP): Performed by: BEHAVIOR ANALYST

## 2024-11-21 PROCEDURE — 36415 COLL VENOUS BLD VENIPUNCTURE: CPT

## 2024-11-21 PROCEDURE — 85055 RETICULATED PLATELET ASSAY: CPT

## 2024-11-21 PROCEDURE — 97165 OT EVAL LOW COMPLEX 30 MIN: CPT

## 2024-11-21 PROCEDURE — 700102 HCHG RX REV CODE 250 W/ 637 OVERRIDE(OP)

## 2024-11-21 PROCEDURE — 99238 HOSP IP/OBS DSCHRG MGMT 30/<: CPT | Mod: GC | Performed by: FAMILY MEDICINE

## 2024-11-21 PROCEDURE — A9270 NON-COVERED ITEM OR SERVICE: HCPCS | Performed by: BEHAVIOR ANALYST

## 2024-11-21 PROCEDURE — RXMED WILLOW AMBULATORY MEDICATION CHARGE: Performed by: BEHAVIOR ANALYST

## 2024-11-21 PROCEDURE — 80053 COMPREHEN METABOLIC PANEL: CPT

## 2024-11-21 PROCEDURE — 97116 GAIT TRAINING THERAPY: CPT

## 2024-11-21 PROCEDURE — 700111 HCHG RX REV CODE 636 W/ 250 OVERRIDE (IP): Performed by: BEHAVIOR ANALYST

## 2024-11-21 RX ORDER — GABAPENTIN 300 MG/1
300 CAPSULE ORAL 3 TIMES DAILY
Qty: 90 CAPSULE | Refills: 0 | Status: SHIPPED | OUTPATIENT
Start: 2024-11-21 | End: 2024-11-24

## 2024-11-21 RX ORDER — AMLODIPINE BESYLATE 5 MG/1
5 TABLET ORAL DAILY
Qty: 30 TABLET | Refills: 3 | Status: SHIPPED | OUTPATIENT
Start: 2024-11-22 | End: 2024-11-24

## 2024-11-21 RX ADMIN — ESCITALOPRAM OXALATE 20 MG: 10 TABLET ORAL at 05:20

## 2024-11-21 RX ADMIN — OMEPRAZOLE 20 MG: 20 CAPSULE, DELAYED RELEASE ORAL at 08:40

## 2024-11-21 RX ADMIN — LACTULOSE 30 ML: 10 SOLUTION ORAL at 08:39

## 2024-11-21 RX ADMIN — AMLODIPINE BESYLATE 5 MG: 5 TABLET ORAL at 05:20

## 2024-11-21 RX ADMIN — THIAMINE HYDROCHLORIDE 200 MG: 100 INJECTION, SOLUTION INTRAMUSCULAR; INTRAVENOUS at 08:40

## 2024-11-21 RX ADMIN — THERA TABS 1 TABLET: TAB at 05:19

## 2024-11-21 RX ADMIN — SPIRONOLACTONE 100 MG: 100 TABLET ORAL at 05:20

## 2024-11-21 RX ADMIN — FUROSEMIDE 80 MG: 40 TABLET ORAL at 05:20

## 2024-11-21 RX ADMIN — RIFAXIMIN 550 MG: 550 TABLET ORAL at 05:19

## 2024-11-21 RX ADMIN — FOLIC ACID 1 MG: 1 TABLET ORAL at 05:20

## 2024-11-21 RX ADMIN — LACTULOSE 30 ML: 10 SOLUTION ORAL at 14:11

## 2024-11-21 RX ADMIN — CARVEDILOL 3.12 MG: 3.12 TABLET, FILM COATED ORAL at 08:40

## 2024-11-21 ASSESSMENT — COGNITIVE AND FUNCTIONAL STATUS - GENERAL
SUGGESTED CMS G CODE MODIFIER DAILY ACTIVITY: CJ
HELP NEEDED FOR BATHING: A LITTLE
MOBILITY SCORE: 24
TOILETING: A LITTLE
SUGGESTED CMS G CODE MODIFIER MOBILITY: CH
DAILY ACTIVITIY SCORE: 21
DRESSING REGULAR LOWER BODY CLOTHING: A LITTLE

## 2024-11-21 ASSESSMENT — LIFESTYLE VARIABLES
AUDITORY DISTURBANCES: NOT PRESENT
NAUSEA AND VOMITING: NO NAUSEA AND NO VOMITING
ANXIETY: NO ANXIETY (AT EASE)
TREMOR: *
TOTAL SCORE: 4
AUDITORY DISTURBANCES: NOT PRESENT
HEADACHE, FULLNESS IN HEAD: NOT PRESENT
ORIENTATION AND CLOUDING OF SENSORIUM: ORIENTED AND CAN DO SERIAL ADDITIONS
ORIENTATION AND CLOUDING OF SENSORIUM: ORIENTED AND CAN DO SERIAL ADDITIONS
VISUAL DISTURBANCES: NOT PRESENT
HEADACHE, FULLNESS IN HEAD: NOT PRESENT
AGITATION: NORMAL ACTIVITY
TOTAL SCORE: 4
ANXIETY: NO ANXIETY (AT EASE)
ORIENTATION AND CLOUDING OF SENSORIUM: ORIENTED AND CAN DO SERIAL ADDITIONS
AGITATION: NORMAL ACTIVITY
ANXIETY: NO ANXIETY (AT EASE)
VISUAL DISTURBANCES: NOT PRESENT
TREMOR: *
TREMOR: *
PAROXYSMAL SWEATS: NO SWEAT VISIBLE
ORIENTATION AND CLOUDING OF SENSORIUM: ORIENTED AND CAN DO SERIAL ADDITIONS
TOTAL SCORE: 3
NAUSEA AND VOMITING: NO NAUSEA AND NO VOMITING
AGITATION: NORMAL ACTIVITY
TREMOR: *
VISUAL DISTURBANCES: NOT PRESENT
AUDITORY DISTURBANCES: NOT PRESENT
AUDITORY DISTURBANCES: NOT PRESENT
NAUSEA AND VOMITING: NO NAUSEA AND NO VOMITING
PAROXYSMAL SWEATS: NO SWEAT VISIBLE
TOTAL SCORE: 3
ANXIETY: MILDLY ANXIOUS
VISUAL DISTURBANCES: NOT PRESENT
HEADACHE, FULLNESS IN HEAD: VERY MILD
PAROXYSMAL SWEATS: NO SWEAT VISIBLE
HEADACHE, FULLNESS IN HEAD: NOT PRESENT
NAUSEA AND VOMITING: NO NAUSEA AND NO VOMITING
PAROXYSMAL SWEATS: NO SWEAT VISIBLE
AGITATION: NORMAL ACTIVITY

## 2024-11-21 ASSESSMENT — GAIT ASSESSMENTS
DISTANCE (FEET): 200
GAIT LEVEL OF ASSIST: SUPERVISED

## 2024-11-21 ASSESSMENT — PAIN DESCRIPTION - PAIN TYPE: TYPE: ACUTE PAIN

## 2024-11-21 ASSESSMENT — ACTIVITIES OF DAILY LIVING (ADL): TOILETING: INDEPENDENT

## 2024-11-21 NOTE — CARE PLAN
The patient is Watcher - Medium risk of patient condition declining or worsening    Shift Goals  Clinical Goals: CIWAs will be scored and patient will be medicated appropriately throughout the shift, patients neuro status will remain stable; pt will remain free from falls  Patient Goals: sleep  Family Goals: JAMEE    Progress made toward(s) clinical / shift goals:        Problem: Optimal Care for Alcohol Withdrawal  Goal: Optimal Care for the alcohol withdrawal patient  Outcome: Progressing  Note: CIWA scored and documented Q4H. CIWAs scored between 3 and 4. Thiamine given per MAR.      Problem: Fall Risk  Goal: Patient will remain free from falls  Note: Pt did not have a fall during the shift. Fall precautions remain in place. Bed locked in the lowest position with bed alarm on. Pt frequently calls appropriately. Treaded slipper socks on patient. Hourly rounding in place. Telesitter in place as of 11/20.        Patient is not progressing towards the following goals:

## 2024-11-21 NOTE — CARE PLAN
The patient is Stable - Low risk of patient condition declining or worsening    Shift Goals  Clinical Goals: pt neuro status will remain stable, assess CIWA and aminister medication appropriately, pt will remain free from falls  Patient Goals: discharge  Family Goals: JAMEE    Progress made toward(s) clinical / shift goals:    Problem: Knowledge Deficit - Standard  Goal: Patient and family/care givers will demonstrate understanding of plan of care, disease process/condition, diagnostic tests and medications  Outcome: Progressing     Problem: Optimal Care for Alcohol Withdrawal  Goal: Optimal Care for the alcohol withdrawal patient  Outcome: Progressing     Problem: Seizure Precautions  Goal: Implementation of seizure precautions  Outcome: Progressing     Problem: Lifestyle Changes  Goal: Patient's ability to identify lifestyle changes and available resources to help reduce recurrence of condition will improve  Outcome: Progressing     Problem: Psychosocial  Goal: Patient's level of anxiety will decrease  Outcome: Progressing  Goal: Spiritual and cultural needs incorporated into hospitalization  Outcome: Progressing     Problem: Risk for Aspiration  Goal: Patient's risk for aspiration will be absent or decrease  Outcome: Progressing     Problem: Fall Risk  Goal: Patient will remain free from falls  Outcome: Progressing     Problem: Depression  Goal: Patient and family/caregiver will verbalize accurate information about at least two of the possible causes of depression, three-four of the signs and symptoms of depression  Outcome: Progressing     Problem: Pain - Standard  Goal: Alleviation of pain or a reduction in pain to the patient’s comfort goal  Outcome: Progressing       Patient is not progressing towards the following goals:

## 2024-11-21 NOTE — THERAPY
"Occupational Therapy   Initial Evaluation     Patient Name: Jalen Vaughn  Age:  44 y.o., Sex:  male  Medical Record #: [id number]  Today's Date: 11/21/2024     Precautions  Precautions: Fall Risk    Assessment  Patient is 44 y.o. male with a diagnosis of etoh withdrawal, hepatic encephalopathy, recent admit from 11/13-11/18.     He is at a supervision level for basic self care, functional mobility, and functional transfers. He had one LOB when he tripped over the telesitter. He denies any further deficits in self care at this time.      Plan    Occupational Therapy Initial Treatment Plan   Duration: Discharge Needs Only    DC Equipment Recommendations: None  Discharge Recommendations: Anticipate that the patient will have no further occupational therapy needs after discharge from the hospital     Subjective    \"I'm feeling much better today. I have an AA meeting later today.\"     Objective       11/21/24 1020   Prior Living Situation   Housing / Facility Homeless   Equipment Owned None   Lives with - Patient's Self Care Capacity Other (Comments)   Comments Pt has been staying at [facility]. Reports that he's trying to get in some low  income housing through [employer]   Prior Level of ADL Function   Self Feeding Independent   Grooming / Hygiene Independent   Bathing Independent   Dressing Independent   Toileting Independent   Prior Level of IADL Function   Comments independent   History of Falls   History of Falls Yes   Precautions   Precautions Fall Risk   Vitals   Vitals Comments VSS   Pain 0 - 10 Group   Therapist Pain Assessment Nurse Notified;0   Cognition    Cognition / Consciousness X   Level of Consciousness Alert   Safety Awareness Impulsive   Comments pleasant and cooperative   Active ROM Upper Body   Active ROM Upper Body  WDL   Strength Upper Body   Upper Body Strength  WDL   Balance Assessment   Sitting Balance (Static) Fair +   Sitting Balance (Dynamic) Fair   Standing Balance (Static) Fair "   Standing Balance (Dynamic) Fair   Weight Shift Sitting Fair   Weight Shift Standing Fair   Comments no AD; had one LOB when he tripped over the telesitter requiring therapist assist to correct   Bed Mobility    Supine to Sit Supervised   Sit to Supine Supervised   Scooting Supervised   ADL Assessment   Grooming Supervision;Standing  (wash hands)   Upper Body Dressing Supervision   Lower Body Dressing Supervision  (don pajama pants)   Toileting Supervision  (urinate in stance)   How much help from another person does the patient currently need...   Putting on and taking off regular lower body clothing? 3   Bathing (including washing, rinsing, and drying)? 3   Toileting, which includes using a toilet, bedpan, or urinal? 3   Putting on and taking off regular upper body clothing? 4   Taking care of personal grooming such as brushing teeth? 4   Eating meals? 4   6 Clicks Daily Activity Score 21   Functional Mobility   Sit to Stand Supervised   Bed, Chair, Wheelchair Transfer Supervised   Mobility EOB>BR>Sink>BTB   Comments no AD     Handoff to PT

## 2024-11-21 NOTE — PROGRESS NOTES
Rec'd report from NOC shift RN. Assumed pt care. Pt assessment completed and pt is AA&Ox4. Pt on RA and vss. Pt displaying tremors and complains of headache, will score CIWA and medicate PRN. All needs met. Seizure precautions in place. Bed locked, bed in lowest position, call light and personal belongings within reach, treaded socks and bed alarm in place for safety.

## 2024-11-21 NOTE — CARE PLAN
The patient is Stable - Low risk of patient condition declining or worsening    Shift Goals  Clinical Goals: CIWA and safety  Patient Goals: rest and eat  Family Goals: N/A    Progress made toward(s) clinical / shift goals:  Pt free from falls and seizures, pt medicated per MAR PRN orders for CIWA and able to rest.    Problem: Knowledge Deficit - Standard  Goal: Patient and family/care givers will demonstrate understanding of plan of care, disease process/condition, diagnostic tests and medications  Outcome: Progressing     Problem: Optimal Care for Alcohol Withdrawal  Goal: Optimal Care for the alcohol withdrawal patient  Outcome: Progressing     Problem: Seizure Precautions  Goal: Implementation of seizure precautions  Outcome: Progressing       Patient is not progressing towards the following goals:

## 2024-11-21 NOTE — DISCHARGE PLANNING
Referral: PCP Appointment    Intervention: PACO spoke with [facility] .    Appointment Type: PCP Initial Appointment                        Location: [facility]                        Date: 12/4/24                        Time: 2:00pm                           Plan: As Above

## 2024-11-21 NOTE — PROGRESS NOTES
Telesitter put in place due to pts high risk for falls. Pt on CIWA scoring and unsteady on feet. Sometimes calls appropriately for assistance. Sometimes does not. Report called to telesitter at [phone]. Telesitter in room.

## 2024-11-21 NOTE — DISCHARGE SUMMARY
[facility] Discharge Summary    Attending: Dr. Holbrook  Senior Resident: Dr. Santiago  Intern:  n/a  Contact Number: [phone]    CHIEF COMPLAINT ON ADMISSION  Chief Complaint   Patient presents with    Alcohol Intoxication     BIB [facility] unit [facility] from [facility] and [facility]. Bystanders called EMS after seeing patient laying on the ground. Aox2 (self and year). Reports to drinking 2 x 16 Oz beer. Old left periorbital bruising noted. Unable to recall what happened.        Reason for Admission  EMS     Admission Date  11/19/2024    CODE STATUS  Full Code    HPI & HOSPITAL COURSE  This is a 44 y.o. male w/PMHx alcohol use disorder, hypertension, decompensated cirrhosis status post TIPS, hx of variceal bleed s/p banding, who was adt for alcohol intoxication and CIWA monitoring.  There was also concern for Warnicke's encephalopathy versus EtOH intoxication, given patient's abnormal gait, however as he became more sober his gait has normalized; he was given thiamine and folic acid supplementation.    Pt's last drink was day of discharge, although patient is now sober, and cleared by PT/OT for discharge, he is still at risk for alcohol withdrawal; given that his CIWA scores at this time are low (below 10) and simultaneously we want to medicate him for his alcohol dependence, per my conversation with pharmacist we believe gabapentin would be the best medication to discharge him on for both facilitating EtOH dependence as well as withdrawals.  Given his significant decompensated hepatic failure with a Castro score of B-C, he is not a candidate for naltrexone; and given his risk of continued drinking he is not a candidate for acamprosate or a benzodiazepine withdrawal taper.  Therefore once again gabapentin is his best option.  This was discussed extensively with patient, and our team emphasized that he should avoid EtOH consumption altogether specially while on gabapentin.    Social work provided resources as well as  establishing with PCP outpatient for close follow-up        No notes on file    Therefore, she is discharged in guarded and stable condition to home with close outpatient follow-up.    The patient met 2-midnight criteria for an inpatient stay at the time of discharge.    Discharge Date  11/21/2024    Physical Exam on Day of Discharge  Physical Exam  Constitutional:       Appearance: Normal appearance.   HENT:      Mouth/Throat:      Mouth: Mucous membranes are moist.   Eyes:      Extraocular Movements: Extraocular movements intact.      Pupils: Pupils are equal, round, and reactive to light.   Cardiovascular:      Rate and Rhythm: Normal rate and regular rhythm.      Pulses: Normal pulses.      Heart sounds: Normal heart sounds.   Pulmonary:      Effort: Pulmonary effort is normal.      Breath sounds: Normal breath sounds.   Abdominal:      General: Abdomen is flat.      Palpations: Abdomen is soft.   Musculoskeletal:         General: Normal range of motion.   Skin:     General: Skin is warm.      Coloration: Skin is jaundiced (Mild).   Neurological:      General: No focal deficit present.      Mental Status: She is alert and oriented to person, place, and time.      Cranial Nerves: No cranial nerve deficit.   Psychiatric:         Mood and Affect: Mood normal.         Behavior: Behavior normal.         FOLLOW UP ITEMS POST DISCHARGE  PCP  Rehabilitation  [facility]    DISCHARGE DIAGNOSES  Principal Problem:    Alcohol withdrawal syndrome with complication, cirrhosis (HCC) (POA: Yes)  Active Problems:    Anxiety (POA: Yes)    Primary hypertension (POA: Yes)    Weakness generalized (POA: Unknown)  Resolved Problems:    * No resolved hospital problems. *      FOLLOW UP  Future Appointments   Date Time Provider Department Center   12/4/2024  2:30 PM SEVERIANO Paulino     No follow-up provider specified.    MEDICATIONS ON DISCHARGE     Medication List        START taking these medications         Instructions   gabapentin 300 MG Caps  Commonly known as: Neurontin   Take 1 Capsule by mouth 3 times a day.  Dose: 300 mg            CONTINUE taking these medications        Instructions   amLODIPine 5 MG Tabs  Start taking on: November 22, 2024  Commonly known as: Norvasc   Take 1 Tablet by mouth every day.  Dose: 5 mg     carvedilol 3.125 MG Tabs  Commonly known as: Coreg   Take 1 Tablet by mouth 2 times a day with meals.  Dose: 3.125 mg     escitalopram 20 MG tablet  Commonly known as: Lexapro   Take 1 Tablet by mouth every day.  Dose: 20 mg     folic acid 1 MG Tabs  Commonly known as: Folvite   Take 1 mg by mouth every day.  Dose: 1 mg     furosemide 80 MG Tabs  Commonly known as: Lasix   Take 1 Tablet by mouth every day.  Dose: 80 mg     hydrOXYzine HCl 25 MG Tabs  Commonly known as: Atarax   Take 1 Tablet by mouth 3 times a day as needed for Anxiety.  Dose: 25 mg     lactulose 10 GM/15ML Soln   Take 45 mL by mouth 4 times a day.  Dose: 45 mL     spironolactone 100 MG Tabs  Commonly known as: Aldactone   Take 1 Tablet by mouth every day.  Dose: 100 mg     thiamine 100 MG tablet  Commonly known as: Thiamine   Take 1 Tablet by mouth every day.  Dose: 100 mg     Xifaxan 550 MG Tabs tablet  Generic drug: riFAXIMin   Take 1 Tablet by mouth 2 times a day.  Dose: 550 mg            STOP taking these medications      acamprosate 333 MG tablet  Commonly known as: Campral     naltrexone 50 MG Tabs  Commonly known as: Depade     pantoprazole 40 MG Tbec  Commonly known as: Protonix     polyethylene glycol/lytes Pack  Commonly known as: Miralax     Stimulant Laxative 8.6-50 MG Tabs  Generic drug: senna-docusate     traZODone 50 MG Tabs  Commonly known as: Desyrel              Allergies  Allergies   Allergen Reactions    Latex Itching    Morphine Itching       DIET  Orders Placed This Encounter   Procedures    Diet Order Diet: Regular     Standing Status:   Standing     Number of Occurrences:   1     Order Specific Question:    Diet:     Answer:   Regular [1]    Discontinue Diet Tray     Standing Status:   Standing     Number of Occurrences:   1       ACTIVITY  As tolerated.  Weight bearing as tolerated    CONSULTATIONS  N/A    PROCEDURES  N/A    LABORATORY  Lab Results   Component Value Date    SODIUM 138 11/21/2024    POTASSIUM 4.4 11/21/2024    CHLORIDE 109 11/21/2024    CO2 23 11/21/2024    GLUCOSE 110 (H) 11/21/2024    BUN 13 11/21/2024    CREATININE 0.87 11/21/2024        Lab Results   Component Value Date    WBC 4.5 (L) 11/21/2024    HEMOGLOBIN 9.2 (L) 11/21/2024    HEMATOCRIT 27.3 (L) 11/21/2024    PLATELETCT 60 (L) 11/21/2024

## 2024-11-21 NOTE — THERAPY
Physical Therapy   Daily Treatment     Patient Name: Jalen Vaughn  Age:  44 y.o., Sex:  male  Medical Record #: [id number]  Today's Date: 11/21/2024     Precautions  Precautions: Fall Risk    Assessment    PT goals met today.  Significant improvement from yesterday.  He is now able to mobilize at spv level, w/o loss of balance or need of AD or physical assist.  Ambulated 200 ft w/o loss of balance.  No longer presents w/ an acue PT need.    Plan    Treatment Duration: Discharge Needs Only    DC Equipment Recommendations: None  Discharge Recommendations: Anticipate that the patient will have no further physical therapy needs after discharge from the hospital        Objective       11/21/24 1050   Balance   Sitting Balance (Static) Fair +   Sitting Balance (Dynamic) Fair +   Standing Balance (Static) Fair +   Standing Balance (Dynamic) Fair +   Weight Shift Sitting Good   Weight Shift Standing Good   Bed Mobility    Supine to Sit Supervised   Sit to Supine Supervised   Gait Analysis   Gait Level Of Assist Supervised   Assistive Device None   Distance (Feet) 200   Functional Mobility   Sit to Stand Supervised   Bed, Chair, Wheelchair Transfer Supervised   Short Term Goals    Short Term Goal # 1 Pt will move sit to/from stand w/ spv in 6 visits to improve fxl indep   Goal Outcome # 1 Goal met   Short Term Goal # 2 Pt to ambulate 150 ft w/ spv in 6 visits to improve fxl indep   Goal Outcome # 2 Goal met   Physical Therapy Treatment Plan   Physical Therapy Treatment Plan Modify Current Treatment Plan   Duration Discharge Needs Only   Reason For Discharge Discharge Secondary to Goals Met   Anticipated Discharge Equipment and Recommendations   DC Equipment Recommendations None   Discharge Recommendations Anticipate that the patient will have no further physical therapy needs after discharge from the hospital

## 2024-11-21 NOTE — PROGRESS NOTES
Report received from NOC RN and assumed patient care at 0700. Patient is A&Ox 4, on RA, and bed alarm is on . Telesitter in place.  Patient denies pain at this time. Pt scored CIWA 4 at this time. Pt appears to be slurring words and having a difficult time with focusing on conversation. When asked a question, pt will answer and then start talking about an unrelated topic. MD requested AM labs to be drawn STAT. Notified lab. Pt Call light within reach and bed in lowest position. Reinforced the need to call for assistance. Plan of care discussed and patient does not have any further needs at this time.

## 2024-11-21 NOTE — PROGRESS NOTES
Received report from day shift RN and assumed pt care at 1900. Patient assessment completed. Patient is A&Ox4 and on RA. Pt displaying tremors, pt states anxiety as well. No headache at this time/. Pt states that his headache is intermittent. CIWA scoring in place. Patient report pain of 4/10. Pt declines intervention at this time. Bed locked and in the lowest position. Call light within reach. Pt asked for medication to sleep. Medicated per MAR. Plan of care discussed and Patient expresses no further needs at this time.

## 2024-11-23 ENCOUNTER — APPOINTMENT (OUTPATIENT)
Dept: RADIOLOGY | Facility: MEDICAL CENTER | Age: 44
DRG: 442 | End: 2024-11-23
Payer: COMMERCIAL

## 2024-11-23 ENCOUNTER — HOSPITAL ENCOUNTER (INPATIENT)
Facility: MEDICAL CENTER | Age: 44
LOS: 1 days | DRG: 442 | End: 2024-11-23
Attending: EMERGENCY MEDICINE | Admitting: FAMILY MEDICINE
Payer: COMMERCIAL

## 2024-11-23 ENCOUNTER — APPOINTMENT (OUTPATIENT)
Dept: RADIOLOGY | Facility: MEDICAL CENTER | Age: 44
DRG: 442 | End: 2024-11-23
Attending: EMERGENCY MEDICINE
Payer: COMMERCIAL

## 2024-11-23 VITALS
OXYGEN SATURATION: 90 % | HEART RATE: 78 BPM | TEMPERATURE: 97.2 F | DIASTOLIC BLOOD PRESSURE: 67 MMHG | RESPIRATION RATE: 17 BRPM | BODY MASS INDEX: 34.1 KG/M2 | SYSTOLIC BLOOD PRESSURE: 119 MMHG | WEIGHT: 280 LBS | HEIGHT: 76 IN

## 2024-11-23 DIAGNOSIS — I10 HYPERTENSION, UNSPECIFIED TYPE: ICD-10-CM
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
## 2024-11-23 DIAGNOSIS — K70.30 ALCOHOLIC CIRRHOSIS OF LIVER WITHOUT ASCITES (HCC): ICD-10-CM

## 2024-11-23 DIAGNOSIS — K76.82 HEPATIC ENCEPHALOPATHY (HCC): Primary | ICD-10-CM

## 2024-11-23 DIAGNOSIS — R26.81 GAIT INSTABILITY: ICD-10-CM

## 2024-11-23 DIAGNOSIS — F10.921 ALCOHOL INTOXICATION WITH DELIRIUM (HCC): ICD-10-CM

## 2024-11-23 LAB
ALBUMIN SERPL BCP-MCNC: 3 G/DL (ref 3.2–4.9)
ALBUMIN/GLOB SERPL: 1 G/DL
ALP SERPL-CCNC: 158 U/L (ref 30–99)
ALT SERPL-CCNC: 25 U/L (ref 2–50)
AMMONIA PLAS-SCNC: 87 UMOL/L (ref 11–45)
ANION GAP SERPL CALC-SCNC: 12 MMOL/L (ref 7–16)
APTT PPP: 34.3 SEC (ref 24.7–36)
AST SERPL-CCNC: 53 U/L (ref 12–45)
BASOPHILS # BLD AUTO: 0.6 % (ref 0–1.8)
BASOPHILS # BLD: 0.03 K/UL (ref 0–0.12)
BILIRUB SERPL-MCNC: 2.2 MG/DL (ref 0.1–1.5)
BUN SERPL-MCNC: 17 MG/DL (ref 8–22)
CALCIUM ALBUM COR SERPL-MCNC: 9.3 MG/DL (ref 8.5–10.5)
CALCIUM SERPL-MCNC: 8.5 MG/DL (ref 8.5–10.5)
CHLORIDE SERPL-SCNC: 108 MMOL/L (ref 96–112)
CO2 SERPL-SCNC: 19 MMOL/L (ref 20–33)
CREAT SERPL-MCNC: 0.59 MG/DL (ref 0.5–1.4)
EOSINOPHIL # BLD AUTO: 0.21 K/UL (ref 0–0.51)
EOSINOPHIL NFR BLD: 3.9 % (ref 0–6.9)
ERYTHROCYTE [DISTWIDTH] IN BLOOD BY AUTOMATED COUNT: 53.9 FL (ref 35.9–50)
ETHANOL BLD-MCNC: 166.2 MG/DL
GFR SERPLBLD CREATININE-BSD FMLA CKD-EPI: 124 ML/MIN/1.73 M 2
GLOBULIN SER CALC-MCNC: 2.9 G/DL (ref 1.9–3.5)
GLUCOSE SERPL-MCNC: 95 MG/DL (ref 65–99)
HCT VFR BLD AUTO: 28.2 % (ref 42–52)
HGB BLD-MCNC: 9.4 G/DL (ref 14–18)
IMM GRANULOCYTES # BLD AUTO: 0.02 K/UL (ref 0–0.11)
IMM GRANULOCYTES NFR BLD AUTO: 0.4 % (ref 0–0.9)
INR PPP: 1.38 (ref 0.87–1.13)
LIPASE SERPL-CCNC: 46 U/L (ref 11–82)
LYMPHOCYTES # BLD AUTO: 1.56 K/UL (ref 1–4.8)
LYMPHOCYTES NFR BLD: 28.9 % (ref 22–41)
MAGNESIUM SERPL-MCNC: 1.9 MG/DL (ref 1.5–2.5)
MCH RBC QN AUTO: 31.2 PG (ref 27–33)
MCHC RBC AUTO-ENTMCNC: 33.3 G/DL (ref 32.3–36.5)
MCV RBC AUTO: 93.7 FL (ref 81.4–97.8)
MONOCYTES # BLD AUTO: 0.73 K/UL (ref 0–0.85)
MONOCYTES NFR BLD AUTO: 13.5 % (ref 0–13.4)
NEUTROPHILS # BLD AUTO: 2.85 K/UL (ref 1.82–7.42)
NEUTROPHILS NFR BLD: 52.7 % (ref 44–72)
NRBC # BLD AUTO: 0 K/UL
NRBC BLD-RTO: 0 /100 WBC (ref 0–0.2)
PHOSPHATE SERPL-MCNC: 3.7 MG/DL (ref 2.5–4.5)
PLATELET # BLD AUTO: 69 K/UL (ref 164–446)
PLATELETS.RETICULATED NFR BLD AUTO: 2.3 % (ref 0.6–13.1)
PMV BLD AUTO: 11.5 FL (ref 9–12.9)
POC BREATHALIZER: 0.12 PERCENT (ref 0–0.01)
POTASSIUM SERPL-SCNC: 4.4 MMOL/L (ref 3.6–5.5)
PROT SERPL-MCNC: 5.9 G/DL (ref 6–8.2)
PROTHROMBIN TIME: 17 SEC (ref 12–14.6)
RBC # BLD AUTO: 3.01 M/UL (ref 4.7–6.1)
SODIUM SERPL-SCNC: 139 MMOL/L (ref 135–145)
WBC # BLD AUTO: 5.4 K/UL (ref 4.8–10.8)

## 2024-11-23 PROCEDURE — A9270 NON-COVERED ITEM OR SERVICE: HCPCS

## 2024-11-23 PROCEDURE — 96374 THER/PROPH/DIAG INJ IV PUSH: CPT

## 2024-11-23 PROCEDURE — 84100 ASSAY OF PHOSPHORUS: CPT

## 2024-11-23 PROCEDURE — 82077 ASSAY SPEC XCP UR&BREATH IA: CPT

## 2024-11-23 PROCEDURE — 770006 HCHG ROOM/CARE - MED/SURG/GYN SEMI*

## 2024-11-23 PROCEDURE — 85610 PROTHROMBIN TIME: CPT

## 2024-11-23 PROCEDURE — 85025 COMPLETE CBC W/AUTO DIFF WBC: CPT

## 2024-11-23 PROCEDURE — 700105 HCHG RX REV CODE 258: Performed by: EMERGENCY MEDICINE

## 2024-11-23 PROCEDURE — 70450 CT HEAD/BRAIN W/O DYE: CPT

## 2024-11-23 PROCEDURE — 83690 ASSAY OF LIPASE: CPT

## 2024-11-23 PROCEDURE — 99222 1ST HOSP IP/OBS MODERATE 55: CPT | Performed by: FAMILY MEDICINE

## 2024-11-23 PROCEDURE — 85730 THROMBOPLASTIN TIME PARTIAL: CPT

## 2024-11-23 PROCEDURE — 700102 HCHG RX REV CODE 250 W/ 637 OVERRIDE(OP)

## 2024-11-23 PROCEDURE — 99285 EMERGENCY DEPT VISIT HI MDM: CPT

## 2024-11-23 PROCEDURE — 700111 HCHG RX REV CODE 636 W/ 250 OVERRIDE (IP): Performed by: EMERGENCY MEDICINE

## 2024-11-23 PROCEDURE — 302970 POC BREATHALIZER: Performed by: EMERGENCY MEDICINE

## 2024-11-23 PROCEDURE — 80053 COMPREHEN METABOLIC PANEL: CPT

## 2024-11-23 PROCEDURE — 83735 ASSAY OF MAGNESIUM: CPT

## 2024-11-23 PROCEDURE — 73100 X-RAY EXAM OF WRIST: CPT | Mod: RT

## 2024-11-23 PROCEDURE — 700111 HCHG RX REV CODE 636 W/ 250 OVERRIDE (IP): Mod: JZ

## 2024-11-23 PROCEDURE — 82140 ASSAY OF AMMONIA: CPT

## 2024-11-23 PROCEDURE — 85055 RETICULATED PLATELET ASSAY: CPT

## 2024-11-23 PROCEDURE — 94760 N-INVAS EAR/PLS OXIMETRY 1: CPT

## 2024-11-23 PROCEDURE — 36415 COLL VENOUS BLD VENIPUNCTURE: CPT

## 2024-11-23 PROCEDURE — HZ2ZZZZ DETOXIFICATION SERVICES FOR SUBSTANCE ABUSE TREATMENT: ICD-10-PCS

## 2024-11-23 RX ORDER — FUROSEMIDE 40 MG/1
80 TABLET ORAL DAILY
Status: DISCONTINUED | OUTPATIENT
Start: 2024-11-23 | End: 2024-11-23 | Stop reason: HOSPADM

## 2024-11-23 RX ORDER — TRAZODONE HYDROCHLORIDE 50 MG/1
50 TABLET, FILM COATED ORAL NIGHTLY
COMMUNITY
End: 2024-11-24

## 2024-11-23 RX ORDER — LORAZEPAM 2 MG/ML
1.5 INJECTION INTRAMUSCULAR
Status: DISCONTINUED | OUTPATIENT
Start: 2024-11-23 | End: 2024-11-23 | Stop reason: HOSPADM

## 2024-11-23 RX ORDER — FERROUS SULFATE 325(65) MG
325 TABLET ORAL DAILY
COMMUNITY
End: 2024-11-24

## 2024-11-23 RX ORDER — CARVEDILOL 3.12 MG/1
3.12 TABLET ORAL 2 TIMES DAILY WITH MEALS
Status: DISCONTINUED | OUTPATIENT
Start: 2024-11-23 | End: 2024-11-23 | Stop reason: HOSPADM

## 2024-11-23 RX ORDER — LORAZEPAM 2 MG/1
4 TABLET ORAL
Status: DISCONTINUED | OUTPATIENT
Start: 2024-11-23 | End: 2024-11-23 | Stop reason: HOSPADM

## 2024-11-23 RX ORDER — LORAZEPAM 1 MG/1
1 TABLET ORAL EVERY 4 HOURS PRN
Status: DISCONTINUED | OUTPATIENT
Start: 2024-11-23 | End: 2024-11-23 | Stop reason: HOSPADM

## 2024-11-23 RX ORDER — LORAZEPAM 2 MG/ML
0.5 INJECTION INTRAMUSCULAR EVERY 4 HOURS PRN
Status: DISCONTINUED | OUTPATIENT
Start: 2024-11-23 | End: 2024-11-23 | Stop reason: HOSPADM

## 2024-11-23 RX ORDER — ACETAMINOPHEN 325 MG/1
650 TABLET ORAL EVERY 6 HOURS PRN
Status: DISCONTINUED | OUTPATIENT
Start: 2024-11-23 | End: 2024-11-23 | Stop reason: HOSPADM

## 2024-11-23 RX ORDER — CHLORDIAZEPOXIDE HYDROCHLORIDE 25 MG/1
25 CAPSULE, GELATIN COATED ORAL EVERY 6 HOURS
Status: DISCONTINUED | OUTPATIENT
Start: 2024-11-24 | End: 2024-11-23 | Stop reason: HOSPADM

## 2024-11-23 RX ORDER — ENOXAPARIN SODIUM 100 MG/ML
40 INJECTION SUBCUTANEOUS DAILY
Status: DISCONTINUED | OUTPATIENT
Start: 2024-11-23 | End: 2024-11-23 | Stop reason: HOSPADM

## 2024-11-23 RX ORDER — FOLIC ACID 1 MG/1
1 TABLET ORAL DAILY
Status: DISCONTINUED | OUTPATIENT
Start: 2024-11-23 | End: 2024-11-23 | Stop reason: HOSPADM

## 2024-11-23 RX ORDER — ESCITALOPRAM OXALATE 10 MG/1
20 TABLET ORAL DAILY
Status: DISCONTINUED | OUTPATIENT
Start: 2024-11-23 | End: 2024-11-23 | Stop reason: HOSPADM

## 2024-11-23 RX ORDER — AMLODIPINE BESYLATE 5 MG/1
5 TABLET ORAL DAILY
Status: DISCONTINUED | OUTPATIENT
Start: 2024-11-23 | End: 2024-11-23 | Stop reason: HOSPADM

## 2024-11-23 RX ORDER — POTASSIUM CHLORIDE 1.5 G/1.58G
20 POWDER, FOR SOLUTION ORAL DAILY
COMMUNITY
End: 2024-11-24

## 2024-11-23 RX ORDER — LORAZEPAM 0.5 MG/1
0.5 TABLET ORAL EVERY 4 HOURS PRN
Status: DISCONTINUED | OUTPATIENT
Start: 2024-11-23 | End: 2024-11-23 | Stop reason: HOSPADM

## 2024-11-23 RX ORDER — AMOXICILLIN 250 MG
2 CAPSULE ORAL EVERY EVENING
Status: DISCONTINUED | OUTPATIENT
Start: 2024-11-23 | End: 2024-11-23 | Stop reason: HOSPADM

## 2024-11-23 RX ORDER — LORAZEPAM 2 MG/ML
1 INJECTION INTRAMUSCULAR
Status: DISCONTINUED | OUTPATIENT
Start: 2024-11-23 | End: 2024-11-23 | Stop reason: HOSPADM

## 2024-11-23 RX ORDER — ONDANSETRON 2 MG/ML
4 INJECTION INTRAMUSCULAR; INTRAVENOUS EVERY 4 HOURS PRN
Status: DISCONTINUED | OUTPATIENT
Start: 2024-11-23 | End: 2024-11-23

## 2024-11-23 RX ORDER — LORAZEPAM 2 MG/1
2 TABLET ORAL
Status: DISCONTINUED | OUTPATIENT
Start: 2024-11-23 | End: 2024-11-23 | Stop reason: HOSPADM

## 2024-11-23 RX ORDER — CHLORDIAZEPOXIDE HYDROCHLORIDE 25 MG/1
50 CAPSULE, GELATIN COATED ORAL EVERY 6 HOURS
Status: DISCONTINUED | OUTPATIENT
Start: 2024-11-23 | End: 2024-11-23 | Stop reason: HOSPADM

## 2024-11-23 RX ORDER — LACTULOSE 10 G/15ML
45 SOLUTION ORAL 3 TIMES DAILY
Status: DISCONTINUED | OUTPATIENT
Start: 2024-11-23 | End: 2024-11-23 | Stop reason: HOSPADM

## 2024-11-23 RX ORDER — GABAPENTIN 300 MG/1
300 CAPSULE ORAL 3 TIMES DAILY
Status: DISCONTINUED | OUTPATIENT
Start: 2024-11-23 | End: 2024-11-23 | Stop reason: HOSPADM

## 2024-11-23 RX ORDER — POLYETHYLENE GLYCOL 3350 17 G/17G
1 POWDER, FOR SOLUTION ORAL
Status: DISCONTINUED | OUTPATIENT
Start: 2024-11-23 | End: 2024-11-23 | Stop reason: HOSPADM

## 2024-11-23 RX ORDER — PANTOPRAZOLE SODIUM 40 MG/1
40 TABLET, DELAYED RELEASE ORAL 2 TIMES DAILY
COMMUNITY
End: 2024-11-24

## 2024-11-23 RX ORDER — HYDROXYZINE HYDROCHLORIDE 50 MG/1
25 TABLET, FILM COATED ORAL 3 TIMES DAILY PRN
Status: DISCONTINUED | OUTPATIENT
Start: 2024-11-23 | End: 2024-11-23 | Stop reason: HOSPADM

## 2024-11-23 RX ORDER — LORAZEPAM 2 MG/ML
2 INJECTION INTRAMUSCULAR
Status: DISCONTINUED | OUTPATIENT
Start: 2024-11-23 | End: 2024-11-23 | Stop reason: HOSPADM

## 2024-11-23 RX ADMIN — CARVEDILOL 3.12 MG: 3.12 TABLET, FILM COATED ORAL at 17:56

## 2024-11-23 RX ADMIN — CHLORDIAZEPOXIDE HYDROCHLORIDE 50 MG: 25 CAPSULE ORAL at 13:22

## 2024-11-23 RX ADMIN — LACTULOSE 45 ML: 10 SOLUTION ORAL at 13:22

## 2024-11-23 RX ADMIN — THIAMINE HYDROCHLORIDE 500 MG: 100 INJECTION, SOLUTION INTRAMUSCULAR; INTRAVENOUS at 05:03

## 2024-11-23 RX ADMIN — LACTULOSE 45 ML: 10 SOLUTION ORAL at 09:43

## 2024-11-23 RX ADMIN — RIFAXIMIN 550 MG: 550 TABLET ORAL at 17:56

## 2024-11-23 RX ADMIN — ENOXAPARIN SODIUM 40 MG: 100 INJECTION SUBCUTANEOUS at 17:56

## 2024-11-23 RX ADMIN — ESCITALOPRAM OXALATE 20 MG: 10 TABLET ORAL at 09:44

## 2024-11-23 RX ADMIN — CARVEDILOL 3.12 MG: 3.12 TABLET, FILM COATED ORAL at 09:45

## 2024-11-23 RX ADMIN — RIFAXIMIN 550 MG: 550 TABLET ORAL at 09:45

## 2024-11-23 RX ADMIN — CHLORDIAZEPOXIDE HYDROCHLORIDE 50 MG: 25 CAPSULE ORAL at 17:56

## 2024-11-23 RX ADMIN — FOLIC ACID 1 MG: 1 TABLET ORAL at 09:44

## 2024-11-23 RX ADMIN — AMLODIPINE BESYLATE 5 MG: 5 TABLET ORAL at 09:44

## 2024-11-23 RX ADMIN — CHLORDIAZEPOXIDE HYDROCHLORIDE 50 MG: 25 CAPSULE ORAL at 09:45

## 2024-11-23 RX ADMIN — LORAZEPAM 1 MG: 1 TABLET ORAL at 17:57

## 2024-11-23 RX ADMIN — LORAZEPAM 0.5 MG: 0.5 TABLET ORAL at 13:36

## 2024-11-23 RX ADMIN — LACTULOSE 45 ML: 10 SOLUTION ORAL at 17:56

## 2024-11-23 RX ADMIN — FUROSEMIDE 80 MG: 40 TABLET ORAL at 09:45

## 2024-11-23 ASSESSMENT — LIFESTYLE VARIABLES
ANXIETY: MILDLY ANXIOUS
AUDITORY DISTURBANCES: NOT PRESENT
TREMOR: *
NAUSEA AND VOMITING: NO NAUSEA AND NO VOMITING
PAROXYSMAL SWEATS: NO SWEAT VISIBLE
PAROXYSMAL SWEATS: NO SWEAT VISIBLE
TOTAL SCORE: 8
AGITATION: SOMEWHAT MORE THAN NORMAL ACTIVITY
AUDITORY DISTURBANCES: NOT PRESENT
AGITATION: SOMEWHAT MORE THAN NORMAL ACTIVITY
NAUSEA AND VOMITING: NO NAUSEA AND NO VOMITING
ANXIETY: NO ANXIETY (AT EASE)
ANXIETY: NO ANXIETY (AT EASE)
ORIENTATION AND CLOUDING OF SENSORIUM: ORIENTED AND CAN DO SERIAL ADDITIONS
VISUAL DISTURBANCES: NOT PRESENT
HEADACHE, FULLNESS IN HEAD: MILD
AUDITORY DISTURBANCES: NOT PRESENT
TOTAL SCORE: VERY MILD ITCHING, PINS AND NEEDLES SENSATION, BURNING OR NUMBNESS
HEADACHE, FULLNESS IN HEAD: NOT PRESENT
TOTAL SCORE: 4
ORIENTATION AND CLOUDING OF SENSORIUM: ORIENTED AND CAN DO SERIAL ADDITIONS
VISUAL DISTURBANCES: NOT PRESENT
AGITATION: NORMAL ACTIVITY
TOTAL SCORE: 2
ANXIETY: *
ORIENTATION AND CLOUDING OF SENSORIUM: CANNOT DO SERIAL ADDITIONS OR IS UNCERTAIN ABOUT DATE
AGITATION: *
TOTAL SCORE: 5
ORIENTATION AND CLOUDING OF SENSORIUM: CANNOT DO SERIAL ADDITIONS OR IS UNCERTAIN ABOUT DATE
VISUAL DISTURBANCES: NOT PRESENT
NAUSEA AND VOMITING: NO NAUSEA AND NO VOMITING
NAUSEA AND VOMITING: NO NAUSEA AND NO VOMITING
VISUAL DISTURBANCES: NOT PRESENT
AUDITORY DISTURBANCES: NOT PRESENT
HEADACHE, FULLNESS IN HEAD: NOT PRESENT
TREMOR: NO TREMOR
HEADACHE, FULLNESS IN HEAD: NOT PRESENT
PAROXYSMAL SWEATS: NO SWEAT VISIBLE
TREMOR: TREMOR NOT VISIBLE BUT CAN BE FELT, FINGERTIP TO FINGERTIP
PAROXYSMAL SWEATS: NO SWEAT VISIBLE
TREMOR: *

## 2024-11-23 ASSESSMENT — PAIN DESCRIPTION - PAIN TYPE
TYPE: ACUTE PAIN

## 2024-11-23 ASSESSMENT — FIBROSIS 4 INDEX: FIB4 SCORE: 7.18

## 2024-11-23 NOTE — ASSESSMENT & PLAN NOTE
Acute, recurrent, patient nonadherent to medication or abstinent from alcohol.  Patient has been hospitalized 4 times in the past 45 days for similar presentation and usually responds well to treatment with lactulose and rifaximin.  -S/p thiamine given in ED, previous hospitalizations have not revealed thiamine deficiency.  Low concern for Warnicke's at this time.  -Ammonia 87, which is down trended from previous hospitalizations however, patient is clinically encephalopathic.    Plan:  - Lactulose 45 mL 3 times daily titrated to 2 bowel movements daily  - Rifaximin 550 mg daily  - Bowel protocol in place

## 2024-11-23 NOTE — ED NOTES
Attempted to ambulate patient - very unsteady gait. Stood at bedside to void in urinal. Bed alarm on

## 2024-11-23 NOTE — ED TRIAGE NOTES
"44 y.o. male Jalen Vaughn    Chief Complaint   Patient presents with    Alcohol Intoxication    GLF     BIB EMS to [facility]    Picked up from [facility]   EMS called by staff     Patient presented to ED with complaint of GLF , following alcohol consumption, resident witnessed head strike. Arousable to voice, somnolent at this time, patient is poor historian, Respirations even and unlabored on room air , afebrile at this time.    Head strike/ Injury Positive     Blood thinner/ ASA  Unknown    LOC Positive   Event Amnesia Positive   Nausea / vomiting  Negative      Medications given en route: None     BP (!) 160/93   Pulse 69   Temp 36 °C (96.8 °F) (Temporal)   Resp 16   Ht 1.93 m (6' 4\")   Wt (!) 127 kg (280 lb)   SpO2 93%   BMI 34.08 kg/m²     Past Medical History:   Diagnosis Date    Cirrhosis of liver (HCC)     Hypertension     Liver disease     Seizure (HCC)      Past Surgical History:   Procedure Laterality Date    [place] UPPER GI ENDOSCOPY,DIAGNOSIS N/A 10/27/2024    Procedure: GASTROSCOPY;  Surgeon: Treva Lopez M.D.;  Location: SURGERY [facility];  Service: Gastroenterology    APPENDECTOMY      CHOLECYSTECTOMY           "

## 2024-11-23 NOTE — ASSESSMENT & PLAN NOTE
Chronic, moderately well-controlled when adherent to treatment, on amlodipine 5 mg, carvedilol 3.125 mg daily.     Plan:  - Resume home meds

## 2024-11-23 NOTE — ASSESSMENT & PLAN NOTE
Blood alcohol 166.2 mg/dL (POC breathalyzer 0.122), presuming last drink was somewhere between the last 8 and 12 hours.  Patient does go into severe alcohol withdrawal and will be initiated on CIWA protocol.    Plan:  - CIWA protocol initiated, with Librium taper

## 2024-11-23 NOTE — H&P
[facility] FAMILY MEDICINE HISTORY AND PHYSICAL    PATIENT ID:  NAME:  Jalen Vaughn  MRN:               [id number]  YOB: 1980    Date of Admission: 11/23/2024     Attending: Bety Del Cid MD    Resident: Pedro Lott MD     Primary Care Physician:  Mann Johnson M.D.    CC:  Fall    HPI: Jalen Vaughn is a 44 y.o. male with significant past medical history of alcohol use disorder, hypertension, decompensated cirrhosis s/p TIPS, who presented after ground-level fall in setting of intoxication.  Patient found to be severely encephalopathic.  Signout received from ER physician.  I was unable to obtain any history from patient given his encephalopathic state.  Unfortunately, this patient is very well-known to our team and will be admitted for management of hepatic encephalopathy and anticipated alcohol withdrawal.      ER Course:  In the ED, vital signs notable for SBP up to 160s, otherwise within normal limits.  Labs: CBC notable for Hgb 9.4 and platelet count 69.  CMP notable for AST 53, ALT 25, alkaline phosphatase 158, total bilirubin 2.2, albumin 3, total protein 5.9 and ammonia 87.  Blood alcohol 166.2 mg/dL (POC breathalyzer 0.122).  INR 1.38, PT 17.  Imaging: CT head with no acute intracranial abnormalities.  Patient received thiamine bag.    REVIEW OF SYSTEMS:   Ten systems reviewed and were negative except as noted in the HPI.                PAST MEDICAL HISTORY:  Past Medical History:   Diagnosis Date    Cirrhosis of liver (HCC)     Hypertension     Liver disease     Seizure (HCC)        PAST SURGICAL HISTORY:  Past Surgical History:   Procedure Laterality Date    [place] UPPER GI ENDOSCOPY,DIAGNOSIS N/A 10/27/2024    Procedure: GASTROSCOPY;  Surgeon: Treva Lopez M.D.;  Location: SURGERY [facility];  Service: Gastroenterology    APPENDECTOMY      CHOLECYSTECTOMY         FAMILY HISTORY:  Family History   Problem Relation Age of Onset    Heart Disease Mother     Kidney Disease Mother      Heart Disease Father     Kidney Disease Father        SOCIAL HISTORY:   Pt is intermittently homeless, presently in care shelter.  Tobacco use: Previously denied  ETOH use: Alcohol use disorder, daily drinker  Drug use: Previously denied    ALLERGIES:  Allergies   Allergen Reactions    Latex Itching    Morphine Itching       OUTPATIENT MEDICATIONS:    Current Facility-Administered Medications:     LORazepam (Ativan) tablet 0.5 mg, 0.5 mg, Oral, Q4HRS PRN, Pedro Lott M.D.    LORazepam (Ativan) tablet 1 mg, 1 mg, Oral, Q4HRS PRN **OR** LORazepam (Ativan) injection 0.5 mg, 0.5 mg, Intravenous, Q4HRS PRN, Pedro Lott M.D.    LORazepam (Ativan) tablet 2 mg, 2 mg, Oral, Q2HRS PRN **OR** LORazepam (Ativan) injection 1 mg, 1 mg, Intravenous, Q2HRS PRN, Pedro Lott M.D.    LORazepam (Ativan) tablet 3 mg, 3 mg, Oral, Q HOUR PRN **OR** LORazepam (Ativan) injection 1.5 mg, 1.5 mg, Intravenous, Q HOUR PRN, Pedro Lott M.D.    LORazepam (Ativan) tablet 4 mg, 4 mg, Oral, Q15 MIN PRN **OR** LORazepam (Ativan) injection 2 mg, 2 mg, Intravenous, Q15 MIN PRN, Pedro Lott M.D.    chlordiazePOXIDE (Librium) capsule 50 mg, 50 mg, Oral, Q6HRS **FOLLOWED BY** [START ON 11/24/2024] chlordiazePOXIDE (Librium) capsule 25 mg, 25 mg, Oral, Q6HRS, Pedro Lott M.D.    lactulose 20 GM/30ML solution 45 mL, 45 mL, Oral, TID, Pedro Lott M.D.    amLODIPine (Norvasc) tablet 5 mg, 5 mg, Oral, DAILY, Pedro Lott M.D.    carvedilol (Coreg) tablet 3.125 mg, 3.125 mg, Oral, BID WITH MEALS, Pedro Lott M.D.    escitalopram (Lexapro) tablet 20 mg, 20 mg, Oral, DAILY, Pedro Lott M.D.    folic acid (Folvite) tablet 1 mg, 1 mg, Oral, DAILY, Pedro Lott M.D.    furosemide (Lasix) tablet 80 mg, 80 mg, Oral, DAILY, Pedro Lott M.D.    [Held by provider] gabapentin (Neurontin) capsule 300 mg, 300 mg, Oral, TID, Pedro Lott M.D.    [Held by provider] hydrOXYzine HCl (Atarax) tablet 25  "mg, 25 mg, Oral, TID PRN, Pedro Lott M.D.    riFAXIMin (Xifaxan) tablet 550 mg, 550 mg, Oral, BID, Pedro Lott M.D.    enoxaparin (Lovenox) inj 40 mg, 40 mg, Subcutaneous, DAILY AT 1800, Pedro Lott M.D.    acetaminophen (Tylenol) tablet 650 mg, 650 mg, Oral, Q6HRS PRN, Pedro Lott M.D.    senna-docusate (Pericolace Or Senokot S) 8.6-50 MG per tablet 2 Tablet, 2 Tablet, Oral, Q EVENING **AND** polyethylene glycol/lytes (Miralax) Packet 1 Packet, 1 Packet, Oral, QDAY PRN, Pedro Lott M.D.    PHYSICAL EXAM:  Vitals:    24 0146 24 0200 24 0300 24 0432   BP: (!) 160/93 (!) 163/86 (!) 145/78 (!) 156/77   Pulse:  73 76 72   Resp:  18 17 16   Temp:       TempSrc:       SpO2:  94% 93% 97%   Weight: (!) 127 kg (280 lb)      Height: 1.93 m (6' 4\")      , Temp (24hrs), Av °C (96.8 °F), Min:36 °C (96.8 °F), Max:36 °C (96.8 °F)  , Pulse Oximetry: 97 %, O2 Delivery Device: None - Room Air    General: Disheveled appearance, somnolent but arousable  Skin: Mild jaundice  HEENT: Normocephalic, periorbital ecchymosis, EOMI with mild scleral icterus  Lungs:  Symmetrical.  CTAB with no W/R/R.  Good air movement   Cardiovascular: Regular rate and rhythm, systolic murmur  Abdomen:  Abdomen is soft NT/ND. +BS. No masses noted.  Extremities:  Full range of motion. No gross deformities noted. 2+ pulses in all extremities.   Neuro: Encephalopathic      LAB TESTS:   Recent Labs     24  0745 24  0245   WBC 4.5* 5.4   RBC 2.92* 3.01*   HEMOGLOBIN 9.2* 9.4*   HEMATOCRIT 27.3* 28.2*   MCV 93.5 93.7   MCH 31.5 31.2   RDW 54.4* 53.9*   PLATELETCT 60* 69*   MPV 10.0 11.5   NEUTSPOLYS 56.80 52.70   LYMPHOCYTES 24.50 28.90   MONOCYTES 14.80* 13.50*   EOSINOPHILS 3.50 3.90   BASOPHILS 0.20 0.60         Recent Labs     24  0745 24  0245   SODIUM 138 139   POTASSIUM 4.4 4.4   CHLORIDE 109 108   CO2 23 19*   BUN 13 17   CREATININE 0.87 0.59   CALCIUM 8.5 8.5   MAGNESIUM 1.7 " 1.9   PHOSPHORUS 4.2 3.7   ALBUMIN 2.5* 3.0*       CULTURES:   Results       ** No results found for the last 168 hours. **            IMAGING:  CT-HEAD W/O   Final Result         1. No acute intracranial abnormality. No evidence of acute intracranial hemorrhage or mass lesion.                               CONSULTS:   None    ASSESSMENT/PLAN: 44 y.o. male with significant past medical history of alcohol use disorder, hypertension, decompensated cirrhosis s/p TIPS admitted for hepatic encephalopathy and anticipated alcohol withdrawal following ground-level fall in acutely intoxicated state.    * Encephalopathy- (present on admission)  Assessment & Plan  Acute, recurrent, patient nonadherent to medication or abstinent from alcohol.  Patient has been hospitalized 4 times in the past 45 days for similar presentation and usually responds well to treatment with lactulose and rifaximin.  -S/p thiamine given in ED, previous hospitalizations have not revealed thiamine deficiency.  Low concern for Warnicke's at this time.  -Ammonia 87, which is down trended from previous hospitalizations however, patient is clinically encephalopathic.    Plan:  - Lactulose 45 mL 3 times daily titrated to 2 bowel movements daily  - Rifaximin 550 mg daily  - Bowel protocol in place    Alcoholic cirrhosis of liver without ascites (HCC)- (present on admission)  Assessment & Plan  Chronic, secondary to alcoholism, decompensated, complicated by recurrent episodes of hepatic encephalopathy.  Patient is s/p TIPS procedure.  Most recent CT abdomen/pelvis on 10/17 notable for portal hypertension with numerous varices.  -MELD-NA of 23, 19.6% estimated 3-month mortality.   -Platelet count: 36<-63  -T. Bili: 2.6  -Albumin: 3.1  -EGD from 10/27 shows a large inlet patch with irregular borders distally - could be biopsied for Patel's as an outpatient.  Did not appreciate esophageal varices or gastric varices.        Plan:  - Monitor for ascites, high  risk for SBP  - Continue Lasix 80 mg daily  - Patient has outstanding referral to GI however, has failed to follow-up    Alcoholic intoxication without complication (HCC)- (present on admission)  Assessment & Plan  Blood alcohol 166.2 mg/dL (POC breathalyzer 0.122), presuming last drink was somewhere between the last 8 and 12 hours.  Patient does go into severe alcohol withdrawal and will be initiated on CIWA protocol.    Plan:  - CIWA protocol initiated, with Librium taper    Primary hypertension- (present on admission)  Assessment & Plan  Chronic, moderately well-controlled when adherent to treatment, on amlodipine 5 mg, carvedilol 3.125 mg daily.     Plan:  - Resume home meds    Anxiety- (present on admission)  Assessment & Plan  Chronic, recently exacerbated by loss of his father, home regimen includes Lexapro 20 mg daily.     Plan:  - Continue home regimen  -Will hold any as needed anxiolytics at this time given active CIWA protocol           Core Measures:  Fluids: PO  Lines: PIV  Abx: None  Diet: Regular  PPX: SCDs/TEDs, lovenox  Wound care: No wounds    CODE STATUS: Full code      I anticipate the patient:  (1) will require at least two midnights for appropriate medical management, necessitating inpatient admission because treatment for acute alcohol withdrawal, hepatic encephalopathy.      Pedro Lott MD  [facility]

## 2024-11-23 NOTE — CARE PLAN
The patient is Stable - Low risk of patient condition declining or worsening    Shift Goals  Clinical Goals: pt will remain free from falls. pt weill recieve ciwa checks per/protocol  Patient Goals: sleep, food    Progress made toward(s) clinical / shift goals:    Pt remains free from seizures and falls    Patient is not progressing towards the following goals:

## 2024-11-23 NOTE — ED PROVIDER NOTES
"ED Provider Note    Scribed for Mateo Grover by Vladimir Retana. 11/23/2024  1:56 AM    Primary care provider: Mann Johnson M.D.  Means of arrival: EMS  History obtained from: Patient  History limited by: None    CHIEF COMPLAINT  Chief Complaint   Patient presents with    Alcohol Intoxication    GLF     EXTERNAL RECORDS REVIEWED  Inpatient Notes The patient has had multiple admissions in the last month for alcohol withdrawal and hepatic encephalopathy.    HPI/ROS  LIMITATION TO HISTORY   Select: Intoxication  OUTSIDE HISTORIAN(S):  None    HPI  Jalen Vaughn is a 44 y.o. male who presents to the Emergency Department via EMS from [facility] for evaluation of injuries secondary to a ground level fall onset prior to arrival. The patient reports he drank \"2 beers and 1 shot\" tonight and fell, hitting his head. He denies any head pain or other symptoms including abdominal pain or chest pain. He states he drinks alcohol regularly and wants to quit. The patient notes he gets injections in his abdomen \"for blood clots\". He notes he does chew tobacco, but denies using any drugs.    REVIEW OF SYSTEMS  As above, all other systems reviewed and are negative.   See HPI for further details.     PAST MEDICAL HISTORY   has a past medical history of Cirrhosis of liver (HCC), Hypertension, Liver disease, and Seizure (HCC).  SURGICAL HISTORY   has a past surgical history that includes appendectomy; cholecystectomy; and upper gi endoscopy,diagnosis (N/A, 10/27/2024).  SOCIAL HISTORY  Social History     Tobacco Use    Smoking status: Never    Smokeless tobacco: Current     Types: Chew   Vaping Use    Vaping status: Never Used   Substance Use Topics    Alcohol use: Yes     Comment: pt reports two beers daily    Drug use: Not Currently      Social History     Substance and Sexual Activity   Drug Use Not Currently     FAMILY HISTORY  Family History   Problem Relation Age of Onset    Heart Disease Mother     Kidney Disease " "Mother     Heart Disease Father     Kidney Disease Father      CURRENT MEDICATIONS  Home Medications       Reviewed by Eli Vitale R.N. (Registered Nurse) on 11/23/24 at 0156  Med List Status: Partial     Medication Last Dose Status   amLODIPine (NORVASC) 5 MG Tab  Active   carvedilol (COREG) 3.125 MG Tab  Active   escitalopram (LEXAPRO) 20 MG tablet  Active   folic acid (FOLVITE) 1 MG Tab  Active   furosemide (LASIX) 80 MG Tab  Active   gabapentin (NEURONTIN) 300 MG Cap  Active   hydrOXYzine HCl (ATARAX) 25 MG Tab  Active   lactulose 20 GM/30ML Solution  Active   riFAXIMin (XIFAXAN) 550 MG Tab tablet  Active   spironolactone (ALDACTONE) 100 MG Tab  Active   thiamine (THIAMINE) 100 MG tablet  Active                  Audit from Redirected Encounters    **Home medications have not yet been reviewed for this encounter**       ALLERGIES  Allergies   Allergen Reactions    Latex Itching    Morphine Itching       PHYSICAL EXAM    VITAL SIGNS:   Vitals:    11/23/24 0144 11/23/24 0146 11/23/24 0200 11/23/24 0300   BP:  (!) 160/93 (!) 163/86 (!) 145/78   Pulse: 69  73 76   Resp: 16  18 17   Temp: 36 °C (96.8 °F)      TempSrc: Temporal      SpO2: 93%  94% 93%   Weight:  (!) 127 kg (280 lb)     Height:  1.93 m (6' 4\")       Vitals: My interpretation: hypertensive, not tachycardic, afebrile, not hypoxic    Reinterpretation of vitals: Improving  PE:   Gen: Appears somewhat somnolent, slow to respond but alert and oriented  ENT: Mucous membranes moist, posterior pharynx clear, uvula midline, nares patent bilaterally   Neck: Supple, FROM, no obvious head trauma  Pulmonary: Lungs are clear to auscultation bilaterally. No tachypnea  CV:  RRR, no murmur appreciated, pulses 2+ in both upper and lower extremities  Abdomen: soft, NT/ND; no rebound/guarding  : no CVA or suprapubic tenderness   Neuro: A&Ox4 (person, place, time, situation), speech fluent, gait steady, no focal deficits appreciated  Skin: No rash or lesions.  No pallor " or jaundice.  No cyanosis.  Warm and dry.     DIAGNOSTIC STUDIES / PROCEDURES    LABS  Results for orders placed or performed during the hospital encounter of 11/23/24   POC BREATHALIZER    Collection Time: 11/23/24  2:16 AM   Result Value Ref Range    POC Breathalizer 0.122 (A) 0.00 - 0.01 Percent   AMMONIA    Collection Time: 11/23/24  2:45 AM   Result Value Ref Range    Ammonia 87 (H) 11 - 45 umol/L   CBC WITH DIFFERENTIAL    Collection Time: 11/23/24  2:45 AM   Result Value Ref Range    WBC 5.4 4.8 - 10.8 K/uL    RBC 3.01 (L) 4.70 - 6.10 M/uL    Hemoglobin 9.4 (L) 14.0 - 18.0 g/dL    Hematocrit 28.2 (L) 42.0 - 52.0 %    MCV 93.7 81.4 - 97.8 fL    MCH 31.2 27.0 - 33.0 pg    MCHC 33.3 32.3 - 36.5 g/dL    RDW 53.9 (H) 35.9 - 50.0 fL    Platelet Count 69 (L) 164 - 446 K/uL    MPV 11.5 9.0 - 12.9 fL    Neutrophils-Polys 52.70 44.00 - 72.00 %    Lymphocytes 28.90 22.00 - 41.00 %    Monocytes 13.50 (H) 0.00 - 13.40 %    Eosinophils 3.90 0.00 - 6.90 %    Basophils 0.60 0.00 - 1.80 %    Immature Granulocytes 0.40 0.00 - 0.90 %    Nucleated RBC 0.00 0.00 - 0.20 /100 WBC    Neutrophils (Absolute) 2.85 1.82 - 7.42 K/uL    Lymphs (Absolute) 1.56 1.00 - 4.80 K/uL    Monos (Absolute) 0.73 0.00 - 0.85 K/uL    Eos (Absolute) 0.21 0.00 - 0.51 K/uL    Baso (Absolute) 0.03 0.00 - 0.12 K/uL    Immature Granulocytes (abs) 0.02 0.00 - 0.11 K/uL    NRBC (Absolute) 0.00 K/uL   COMP METABOLIC PANEL    Collection Time: 11/23/24  2:45 AM   Result Value Ref Range    Sodium 139 135 - 145 mmol/L    Potassium 4.4 3.6 - 5.5 mmol/L    Chloride 108 96 - 112 mmol/L    Co2 19 (L) 20 - 33 mmol/L    Anion Gap 12.0 7.0 - 16.0    Glucose 95 65 - 99 mg/dL    Bun 17 8 - 22 mg/dL    Creatinine 0.59 0.50 - 1.40 mg/dL    Calcium 8.5 8.5 - 10.5 mg/dL    Correct Calcium 9.3 8.5 - 10.5 mg/dL    AST(SGOT) 53 (H) 12 - 45 U/L    ALT(SGPT) 25 2 - 50 U/L    Alkaline Phosphatase 158 (H) 30 - 99 U/L    Total Bilirubin 2.2 (H) 0.1 - 1.5 mg/dL    Albumin 3.0 (L) 3.2 -  4.9 g/dL    Total Protein 5.9 (L) 6.0 - 8.2 g/dL    Globulin 2.9 1.9 - 3.5 g/dL    A-G Ratio 1.0 g/dL   LIPASE    Collection Time: 11/23/24  2:45 AM   Result Value Ref Range    Lipase 46 11 - 82 U/L   PROTHROMBIN TIME (INR)    Collection Time: 11/23/24  2:45 AM   Result Value Ref Range    PT 17.0 (H) 12.0 - 14.6 sec    INR 1.38 (H) 0.87 - 1.13   APTT    Collection Time: 11/23/24  2:45 AM   Result Value Ref Range    APTT 34.3 24.7 - 36.0 sec   DIAGNOSTIC ALCOHOL    Collection Time: 11/23/24  2:45 AM   Result Value Ref Range    Diagnostic Alcohol 166.2 (H) <10.1 mg/dL   IMMATURE PLT FRACTION    Collection Time: 11/23/24  2:45 AM   Result Value Ref Range    Imm. Plt Fraction 2.3 0.6 - 13.1 %   ESTIMATED GFR    Collection Time: 11/23/24  2:45 AM   Result Value Ref Range    GFR (CKD-EPI) 124 >60 mL/min/1.73 m 2      All labs reviewed by me. Labs were compared to prior labs if they were available. Significant for no leukocytosis, baseline anemia, normal electrolytes, normal glucose, normal renal function, mild AST elevation, mild hyperbilirubinemia, lipase normal, PT and INR are within normal limits, alcohol mildly elevated at 166, ammonia 87 improved from 124 previously.    RADIOLOGY  I have independently interpreted the diagnostic imaging associated with this visit and am waiting the final reading from the radiologist.   My preliminary interpretation is a follows: No obvious intracranial hemorrhage and my independent interpretation  Radiologist interpretation is as follows:  CT-HEAD W/O   Final Result         1. No acute intracranial abnormality. No evidence of acute intracranial hemorrhage or mass lesion.                             COURSE & MEDICAL DECISION MAKING  Nursing notes, VS, PMSFHx, labs, imaging, EKG reviewed in chart.    ED Observation Status? No; Patient does not meet criteria for ED Observation.     Ddx: Alcohol intoxication, hepatic encephalopathy, hypoglycemia, noncompliance, homelessness    MDM: 1:56  CATE Jalen Brian Vaughn is a 44 y.o. male who presented with with past medical history of alcohol abuse, hypertension, decompensated cirrhosis status post TIPS, homelessness, noncompliance, chronic alcohol dependency and abuse, presenting for possible fall and head injury.  He has had multiple admissions recently, once the 13th and once in 19 for alcohol related issues, history of hepatic cephalopathy, ammonia previously 124.  He supposed be on lactulose but unclear whether he is compliant.  He comes in by EMS after reportedly drinking and falling, bystander witnessed him strike his head.  Unclear whether he lost consciousness.  Upon arrival here he is alert and oriented but slow to respond and appears drowsy.  States he drank a few beers tonight and a shot of alcohol.  I cannot appreciate any head trauma on his exam.  The rest of his physical exam are normal.  He has hypertension but otherwise unremarkable vital signs upon evaluation here.  Will initiate CT scan of the head and repeat evaluation of labs for his history of hepatic encephalopathy and is mild encephalopathic state here considering his alcohol level is only 122.  CT imaging of the head shows no obvious intracranial hemorrhage on my independent interpretation, radiologist agrees.  All labs reviewed by me. Labs were compared to prior labs if they were available. Significant for no leukocytosis, baseline anemia, normal electrolytes, normal glucose, normal renal function, mild AST elevation, mild hyperbilirubinemia, lipase normal, PT and INR are within normal limits, alcohol mildly elevated at 166, ammonia 87 improved from 124 previously.   On reevaluation patient sleeping comfortably, vital signs improving, in no acute distress.  Negative workup here with CT imaging and labs that are deranged but improving from prior evaluations.  At this time patient is able to ambulate, tolerate oral intake and is in no acute distress.  He is saying he is compliant with  his lactulose and outpatient medications.  On reevaluation 3 hours into his ED stay, patient is still unable to ambulate without assistance and I do think he is having symptoms of hepatic encephalopathy.  He is a fall risk.  I think he would benefit from readmission for lactulose therapy and monitoring.  Discussed with hospitalist and they are amenable to inpatient admission at this time.    ADDITIONAL PROBLEM LIST AND DISPOSITION    I have discussed management of the patient with the following physicians and ELISSA's: Hospitalist    Discussion of management with other QHP or appropriate source(s): None     Barriers to care at this time, including but not limited to: Patient is homeless, Patient lacks transportation , and Patient lacks financial resources.     Decision tools and prescription drugs considered including, but not limited to:  None .    FINAL IMPRESSION  1. Hepatic encephalopathy (HCC) Acute   2. Alcohol intoxication with delirium (HCC) Acute   3. Fall, initial encounter Acute   4. Hypertension, unspecified type Acute   5. Alcoholic cirrhosis of liver without ascites (HCC) Acute   6. Gait instability Acute      Vladimir ARIAS (Sagar), am scribing for, and in the presence of, Mateo Grover.    Electronically signed by: Vladimir Retana (Joanieibtianna), 11/23/2024    IMateo personally performed the services described in this documentation, as scribed by Vladimir Retana in my presence, and it is both accurate and complete.    The note accurately reflects work and decisions made by me.  Mateo Grover  11/23/2024  2:16 AM

## 2024-11-23 NOTE — ASSESSMENT & PLAN NOTE
Chronic, secondary to alcoholism, decompensated, complicated by recurrent episodes of hepatic encephalopathy.  Patient is s/p TIPS procedure.  Most recent CT abdomen/pelvis on 10/17 notable for portal hypertension with numerous varices.  -MELD-NA of 23, 19.6% estimated 3-month mortality.   -Platelet count: 36<-63  -T. Bili: 2.6  -Albumin: 3.1  -EGD from 10/27 shows a large inlet patch with irregular borders distally - could be biopsied for Patel's as an outpatient.  Did not appreciate esophageal varices or gastric varices.        Plan:  - Monitor for ascites, high risk for SBP  - Continue Lasix 80 mg daily  - Patient has outstanding referral to GI however, has failed to follow-up

## 2024-11-24 ENCOUNTER — HOSPITAL ENCOUNTER (EMERGENCY)
Facility: MEDICAL CENTER | Age: 44
End: 2024-11-24
Attending: EMERGENCY MEDICINE
Payer: COMMERCIAL

## 2024-11-24 ENCOUNTER — PHARMACY VISIT (OUTPATIENT)
Dept: PHARMACY | Facility: MEDICAL CENTER | Age: 44
End: 2024-11-24
Payer: COMMERCIAL

## 2024-11-24 VITALS
BODY MASS INDEX: 34.1 KG/M2 | OXYGEN SATURATION: 92 % | HEIGHT: 76 IN | TEMPERATURE: 99.2 F | WEIGHT: 280 LBS | HEART RATE: 73 BPM | RESPIRATION RATE: 16 BRPM | SYSTOLIC BLOOD PRESSURE: 116 MMHG | DIASTOLIC BLOOD PRESSURE: 59 MMHG

## 2024-11-24 DIAGNOSIS — R10.84 GENERALIZED ABDOMINAL PAIN: ICD-10-CM

## 2024-11-24 DIAGNOSIS — Z76.0 MEDICATION REFILL: ICD-10-CM

## 2024-11-24 LAB
ALBUMIN SERPL BCP-MCNC: 2.9 G/DL (ref 3.2–4.9)
ALBUMIN/GLOB SERPL: 1 G/DL
ALP SERPL-CCNC: 139 U/L (ref 30–99)
ALT SERPL-CCNC: 24 U/L (ref 2–50)
ANION GAP SERPL CALC-SCNC: 9 MMOL/L (ref 7–16)
APTT PPP: 35.4 SEC (ref 24.7–36)
AST SERPL-CCNC: 59 U/L (ref 12–45)
BASOPHILS # BLD AUTO: 0.3 % (ref 0–1.8)
BASOPHILS # BLD: 0.02 K/UL (ref 0–0.12)
BILIRUB SERPL-MCNC: 2.9 MG/DL (ref 0.1–1.5)
BUN SERPL-MCNC: 19 MG/DL (ref 8–22)
CALCIUM ALBUM COR SERPL-MCNC: 9.4 MG/DL (ref 8.5–10.5)
CALCIUM SERPL-MCNC: 8.5 MG/DL (ref 8.5–10.5)
CHLORIDE SERPL-SCNC: 104 MMOL/L (ref 96–112)
CO2 SERPL-SCNC: 21 MMOL/L (ref 20–33)
CREAT SERPL-MCNC: 1.09 MG/DL (ref 0.5–1.4)
EOSINOPHIL # BLD AUTO: 0.09 K/UL (ref 0–0.51)
EOSINOPHIL NFR BLD: 1.4 % (ref 0–6.9)
ERYTHROCYTE [DISTWIDTH] IN BLOOD BY AUTOMATED COUNT: 53.1 FL (ref 35.9–50)
ETHANOL BLD-MCNC: <10.1 MG/DL
GFR SERPLBLD CREATININE-BSD FMLA CKD-EPI: 86 ML/MIN/1.73 M 2
GLOBULIN SER CALC-MCNC: 3 G/DL (ref 1.9–3.5)
GLUCOSE SERPL-MCNC: 126 MG/DL (ref 65–99)
HCT VFR BLD AUTO: 26.5 % (ref 42–52)
HGB BLD-MCNC: 9 G/DL (ref 14–18)
IMM GRANULOCYTES # BLD AUTO: 0.02 K/UL (ref 0–0.11)
IMM GRANULOCYTES NFR BLD AUTO: 0.3 % (ref 0–0.9)
INR PPP: 1.53 (ref 0.87–1.13)
LIPASE SERPL-CCNC: 43 U/L (ref 11–82)
LYMPHOCYTES # BLD AUTO: 1.12 K/UL (ref 1–4.8)
LYMPHOCYTES NFR BLD: 17.9 % (ref 22–41)
MCH RBC QN AUTO: 31.7 PG (ref 27–33)
MCHC RBC AUTO-ENTMCNC: 34 G/DL (ref 32.3–36.5)
MCV RBC AUTO: 93.3 FL (ref 81.4–97.8)
MONOCYTES # BLD AUTO: 0.85 K/UL (ref 0–0.85)
MONOCYTES NFR BLD AUTO: 13.6 % (ref 0–13.4)
NEUTROPHILS # BLD AUTO: 4.14 K/UL (ref 1.82–7.42)
NEUTROPHILS NFR BLD: 66.5 % (ref 44–72)
NRBC # BLD AUTO: 0 K/UL
NRBC BLD-RTO: 0 /100 WBC (ref 0–0.2)
PLATELET # BLD AUTO: 68 K/UL (ref 164–446)
PLATELETS.RETICULATED NFR BLD AUTO: 3 % (ref 0.6–13.1)
PMV BLD AUTO: 10.3 FL (ref 9–12.9)
POTASSIUM SERPL-SCNC: 3.9 MMOL/L (ref 3.6–5.5)
PROT SERPL-MCNC: 5.9 G/DL (ref 6–8.2)
PROTHROMBIN TIME: 18.5 SEC (ref 12–14.6)
RBC # BLD AUTO: 2.84 M/UL (ref 4.7–6.1)
SODIUM SERPL-SCNC: 134 MMOL/L (ref 135–145)
WBC # BLD AUTO: 6.2 K/UL (ref 4.8–10.8)

## 2024-11-24 PROCEDURE — 83690 ASSAY OF LIPASE: CPT

## 2024-11-24 PROCEDURE — 85025 COMPLETE CBC W/AUTO DIFF WBC: CPT

## 2024-11-24 PROCEDURE — RXMED WILLOW AMBULATORY MEDICATION CHARGE: Performed by: EMERGENCY MEDICINE

## 2024-11-24 PROCEDURE — 85610 PROTHROMBIN TIME: CPT

## 2024-11-24 PROCEDURE — 99284 EMERGENCY DEPT VISIT MOD MDM: CPT

## 2024-11-24 PROCEDURE — 85055 RETICULATED PLATELET ASSAY: CPT

## 2024-11-24 PROCEDURE — 85730 THROMBOPLASTIN TIME PARTIAL: CPT

## 2024-11-24 PROCEDURE — 80053 COMPREHEN METABOLIC PANEL: CPT

## 2024-11-24 PROCEDURE — 36415 COLL VENOUS BLD VENIPUNCTURE: CPT

## 2024-11-24 PROCEDURE — 82077 ASSAY SPEC XCP UR&BREATH IA: CPT

## 2024-11-24 RX ORDER — AMLODIPINE BESYLATE 5 MG/1
5 TABLET ORAL DAILY
Qty: 30 TABLET | Refills: 0 | Status: SHIPPED | OUTPATIENT
Start: 2024-11-24 | End: 2024-12-24

## 2024-11-24 RX ORDER — PANTOPRAZOLE SODIUM 40 MG/1
40 TABLET, DELAYED RELEASE ORAL 2 TIMES DAILY
Qty: 30 TABLET | Refills: 0 | Status: SHIPPED | OUTPATIENT
Start: 2024-11-24

## 2024-11-24 RX ORDER — ESCITALOPRAM OXALATE 20 MG/1
20 TABLET ORAL DAILY
Qty: 30 TABLET | Refills: 0 | Status: SHIPPED | OUTPATIENT
Start: 2024-11-24 | End: 2024-12-24

## 2024-11-24 RX ORDER — POTASSIUM CHLORIDE 1.5 G/1.58G
20 POWDER, FOR SOLUTION ORAL DAILY
Qty: 30 EACH | Refills: 0 | Status: SHIPPED | OUTPATIENT
Start: 2024-11-24

## 2024-11-24 RX ORDER — GABAPENTIN 300 MG/1
300 CAPSULE ORAL 3 TIMES DAILY
Qty: 90 CAPSULE | Refills: 0 | Status: SHIPPED | OUTPATIENT
Start: 2024-11-24

## 2024-11-24 RX ORDER — FOLIC ACID 1 MG/1
1 TABLET ORAL DAILY
Qty: 30 TABLET | Refills: 0 | Status: SHIPPED | OUTPATIENT
Start: 2024-11-24

## 2024-11-24 RX ORDER — FUROSEMIDE 80 MG/1
80 TABLET ORAL DAILY
Qty: 60 TABLET | Refills: 0 | Status: SHIPPED | OUTPATIENT
Start: 2024-11-24

## 2024-11-24 RX ORDER — LANOLIN ALCOHOL/MO/W.PET/CERES
100 CREAM (GRAM) TOPICAL DAILY
Qty: 30 TABLET | Refills: 0 | Status: SHIPPED | OUTPATIENT
Start: 2024-11-24

## 2024-11-24 RX ORDER — CARVEDILOL 3.12 MG/1
3.12 TABLET ORAL 2 TIMES DAILY WITH MEALS
Qty: 60 TABLET | Refills: 0 | Status: SHIPPED | OUTPATIENT
Start: 2024-11-24 | End: 2024-12-24

## 2024-11-24 RX ORDER — LACTULOSE 10 G/15ML
45 SOLUTION ORAL 4 TIMES DAILY
Qty: 473 ML | Refills: 0 | Status: SHIPPED | OUTPATIENT
Start: 2024-11-24

## 2024-11-24 RX ORDER — HYDROXYZINE HYDROCHLORIDE 25 MG/1
25 TABLET, FILM COATED ORAL 3 TIMES DAILY PRN
Qty: 30 TABLET | Refills: 1 | Status: SHIPPED | OUTPATIENT
Start: 2024-11-24

## 2024-11-24 RX ORDER — SPIRONOLACTONE 100 MG/1
100 TABLET, FILM COATED ORAL DAILY
Qty: 30 TABLET | Refills: 3 | Status: SHIPPED | OUTPATIENT
Start: 2024-11-24

## 2024-11-24 RX ORDER — FERROUS SULFATE 325(65) MG
325 TABLET ORAL DAILY
Qty: 30 TABLET | Refills: 0 | Status: SHIPPED | OUTPATIENT
Start: 2024-11-24

## 2024-11-24 RX ORDER — TRAZODONE HYDROCHLORIDE 50 MG/1
50 TABLET, FILM COATED ORAL NIGHTLY
Qty: 30 TABLET | Refills: 0 | Status: SHIPPED | OUTPATIENT
Start: 2024-11-24

## 2024-11-24 ASSESSMENT — FIBROSIS 4 INDEX: FIB4 SCORE: 6.45

## 2024-11-24 NOTE — PROGRESS NOTES
4 Eyes Skin Assessment Completed by Britany RN and GABRIELE Hair.    Head WDL  Ears WDL  Nose WDL  Mouth WDL  Neck WDL  Breast/Chest WDL  Shoulder Blades WDL  Spine WDL  (R) Arm/Elbow/Hand Bruising and Abrasion  (L) Arm/Elbow/Hand Bruising  Abdomen Bruising  Groin WDL  Scrotum/Coccyx/Buttocks Redness  (R) Leg Redness and Abrasion        (L) Leg Abrasion and Edema  (R) Heel/Foot/Toe Redness and Swelling  (L) Heel/Foot/Toe Edema          Devices In Places Blood Pressure Cuff and SCD's      Interventions In Place Pillows    Possible Skin Injury Yes    Pictures Uploaded Into Epic Yes  Wound Consult Placed N/A  RN Wound Prevention Protocol Ordered Yes   
Jalen Vaughn has chosen to leave the hospital against medical advice. The attending physician has not discharged the patient. The provider is aware that the patient is leaving against medical advice. Patient expresses understanding of the risks of leaving the hospital and benefits of admission including but not limited to, the availability and proximity of nurses, physicians, monitoring, diagnostic testing, treatment, and a safe discharge plan. The patient had the opportunity to ask questions about their medical condition and recommended treatment.  Patient is aware that they may return for care at any time.  
Med rec is complete per historical med rec from recent admit on 11/20. The patient was also interviewed who states they take medications when they remember and when the meds are not stolen.  Outpatient antibiotics within the last 30 days: Augmentin 7-day course started 11/10. Has since finished.  Anticoagulants: NONE  Patient's home pharmacy - [facility] ([phone])    Tatyana Rosales, ValentinT    
Notified of patient leaving AMA approximately 7:22PM. Patient had already left room and hospital building when notified.     The patient was admitted 11/23/24 at approximately 5:12AM for acute alcohol withdrawal. Patient elected to leave against medical advice on 11/23/24 at 7:22PM. Attempts were made to  patient prior to departure, however I was unable to come to bedside before the patient left. Per reports from nursing staff, patient had significant improvements in mentation throughout the afternoon after monitoring and treatment for alcohol intoxication and withdrawal with CIWA protocol. Was alert and oriented, demonstrated intact mentation, and articulated some understanding of risks of leaving AMA.   Patient has upcoming follow up appointment at [facility] with Dr. Garcia on 12/04/24.  
Pt left AMA. [employer] on call jonah polanco contacted  
Received pt from Er via julianne, pt appear, lethargic but arousable, AOX2, follows commands but unable to carry out conversations, not in distress, on room air, pt unable to to stand for bed transfer, skin check done, bed alarm activated, fall prevention education given, 3 rails up, oriented to room, vitals signs taken, skin check done, pt denies pain.      0600 hrs attempted to give sips of water to pt but too lethargic and was not able to perform commands, unable to stay awake but responds to name with eye opening  
Report received from NOC RN and assumed patient care at 0700. Patient is A&Ox 2 (disoriented to time and situation), on RA, and groggy and sleeping on and off with unlabored breathing . Patient reporting a pain level of 2/10 in lower back. Repositioned pt. CIWA protocol in place. Seizure prevention protocols in place Call light within reach and bed in lowest position. Reinforced the need to call for assistance. Plan of care discussed and patient does not have any further needs at this time.     
Patient

## 2024-11-24 NOTE — ED NOTES
Pt discharged to home. Pt was given follow up instructions and multiple prescriptions. Pt verbalized understanding of all instructions for discharge and is ambulatory out of ED with steady gait. AOx4

## 2024-11-24 NOTE — ED TRIAGE NOTES
Chief Complaint   Patient presents with    Abdominal Pain     BIB EMS. Pt reports 10/10 abdominal pain when he goes to stand or assist.     Other     Pt reports history of liver disease and states that it is worsening. Pt reports falling yesterday onto his face yesterday and reports pain to his back, right wrist, and right hip. Pt reports drinking approx 3 drinks daily and his last drink was 24 hours ago. Pt denies history of withdraws.     Aox4, GCS 15.     [facility]. Pt reports 10/10 diffused abdominal pain when he stands or sits down. Denies pain at rest. Pt reports drinks 3 drinks daily and his last drink was 24 hours prior. Denies history of withdraw, pt reports being worried that withdraw symptoms are starting. Slurred speech during triage.     Pt also states he has a history of liver disease and had TIPS procedure on 11/13. Pt states he feels as though his liver disease is worsening.     Hx: cirrhosis, htn, encephalopathy, etoh abuse    No interventions pta.
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
## 2024-11-29 ENCOUNTER — APPOINTMENT (OUTPATIENT)
Dept: RADIOLOGY | Facility: MEDICAL CENTER | Age: 44
DRG: 894 | End: 2024-11-29
Attending: EMERGENCY MEDICINE
Payer: COMMERCIAL

## 2024-11-29 ENCOUNTER — HOSPITAL ENCOUNTER (INPATIENT)
Facility: MEDICAL CENTER | Age: 44
LOS: 1 days | DRG: 894 | End: 2024-11-30
Attending: EMERGENCY MEDICINE | Admitting: FAMILY MEDICINE
Payer: COMMERCIAL

## 2024-11-29 DIAGNOSIS — K70.30 ALCOHOLIC CIRRHOSIS, UNSPECIFIED WHETHER ASCITES PRESENT (HCC): ICD-10-CM

## 2024-11-29 DIAGNOSIS — R41.82 ALTERED MENTAL STATUS, UNSPECIFIED ALTERED MENTAL STATUS TYPE: ICD-10-CM

## 2024-11-29 DIAGNOSIS — K76.82 HEPATIC ENCEPHALOPATHY (HCC): ICD-10-CM

## 2024-11-29 DIAGNOSIS — F10.20 ALCOHOLISM (HCC): ICD-10-CM

## 2024-11-29 LAB
ALBUMIN SERPL BCP-MCNC: 3.1 G/DL (ref 3.2–4.9)
ALBUMIN/GLOB SERPL: 1.1 G/DL
ALP SERPL-CCNC: 160 U/L (ref 30–99)
ALT SERPL-CCNC: 22 U/L (ref 2–50)
ANION GAP SERPL CALC-SCNC: 11 MMOL/L (ref 7–16)
APPEARANCE UR: CLEAR
AST SERPL-CCNC: 44 U/L (ref 12–45)
BACTERIA #/AREA URNS HPF: ABNORMAL /HPF
BASOPHILS # BLD AUTO: 0.4 % (ref 0–1.8)
BASOPHILS # BLD: 0.02 K/UL (ref 0–0.12)
BILIRUB SERPL-MCNC: 2.8 MG/DL (ref 0.1–1.5)
BILIRUB UR QL STRIP.AUTO: ABNORMAL
BUN SERPL-MCNC: 10 MG/DL (ref 8–22)
CALCIUM ALBUM COR SERPL-MCNC: 9.2 MG/DL (ref 8.5–10.5)
CALCIUM SERPL-MCNC: 8.5 MG/DL (ref 8.5–10.5)
CASTS URNS QL MICRO: ABNORMAL /LPF (ref 0–2)
CHLORIDE SERPL-SCNC: 106 MMOL/L (ref 96–112)
CO2 SERPL-SCNC: 18 MMOL/L (ref 20–33)
COLOR UR: ABNORMAL
CREAT SERPL-MCNC: 0.57 MG/DL (ref 0.5–1.4)
EOSINOPHIL # BLD AUTO: 0.16 K/UL (ref 0–0.51)
EOSINOPHIL NFR BLD: 3.2 % (ref 0–6.9)
EPITHELIAL CELLS 1715: ABNORMAL /HPF (ref 0–5)
ERYTHROCYTE [DISTWIDTH] IN BLOOD BY AUTOMATED COUNT: 54.3 FL (ref 35.9–50)
GFR SERPLBLD CREATININE-BSD FMLA CKD-EPI: 125 ML/MIN/1.73 M 2
GLOBULIN SER CALC-MCNC: 2.7 G/DL (ref 1.9–3.5)
GLUCOSE SERPL-MCNC: 93 MG/DL (ref 65–99)
GLUCOSE UR STRIP.AUTO-MCNC: NEGATIVE MG/DL
HCT VFR BLD AUTO: 27.6 % (ref 42–52)
HGB BLD-MCNC: 9.2 G/DL (ref 14–18)
IMM GRANULOCYTES # BLD AUTO: 0.01 K/UL (ref 0–0.11)
IMM GRANULOCYTES NFR BLD AUTO: 0.2 % (ref 0–0.9)
KETONES UR STRIP.AUTO-MCNC: NEGATIVE MG/DL
LACTATE SERPL-SCNC: 1.8 MMOL/L (ref 0.5–2)
LEUKOCYTE ESTERASE UR QL STRIP.AUTO: NEGATIVE
LIPASE SERPL-CCNC: 30 U/L (ref 11–82)
LYMPHOCYTES # BLD AUTO: 1.06 K/UL (ref 1–4.8)
LYMPHOCYTES NFR BLD: 21 % (ref 22–41)
MAGNESIUM SERPL-MCNC: 1.7 MG/DL (ref 1.5–2.5)
MCH RBC QN AUTO: 31.2 PG (ref 27–33)
MCHC RBC AUTO-ENTMCNC: 33.3 G/DL (ref 32.3–36.5)
MCV RBC AUTO: 93.6 FL (ref 81.4–97.8)
MICRO URNS: ABNORMAL
MONOCYTES # BLD AUTO: 0.64 K/UL (ref 0–0.85)
MONOCYTES NFR BLD AUTO: 12.7 % (ref 0–13.4)
NEUTROPHILS # BLD AUTO: 3.15 K/UL (ref 1.82–7.42)
NEUTROPHILS NFR BLD: 62.5 % (ref 44–72)
NITRITE UR QL STRIP.AUTO: NEGATIVE
NRBC # BLD AUTO: 0 K/UL
NRBC BLD-RTO: 0 /100 WBC (ref 0–0.2)
NT-PROBNP SERPL IA-MCNC: 92 PG/ML (ref 0–125)
PH UR STRIP.AUTO: 6 [PH] (ref 5–8)
PLATELET # BLD AUTO: 75 K/UL (ref 164–446)
PLATELETS.RETICULATED NFR BLD AUTO: 2 % (ref 0.6–13.1)
PMV BLD AUTO: 10.1 FL (ref 9–12.9)
POTASSIUM SERPL-SCNC: 4 MMOL/L (ref 3.6–5.5)
PROT SERPL-MCNC: 5.8 G/DL (ref 6–8.2)
PROT UR QL STRIP: NEGATIVE MG/DL
RBC # BLD AUTO: 2.95 M/UL (ref 4.7–6.1)
RBC # URNS HPF: ABNORMAL /HPF (ref 0–2)
RBC UR QL AUTO: ABNORMAL
SODIUM SERPL-SCNC: 135 MMOL/L (ref 135–145)
SP GR UR STRIP.AUTO: 1.01
TROPONIN T SERPL-MCNC: 15 NG/L (ref 6–19)
TSH SERPL DL<=0.005 MIU/L-ACNC: 1.64 UIU/ML (ref 0.38–5.33)
UROBILINOGEN UR STRIP.AUTO-MCNC: 1 EU/DL
WBC # BLD AUTO: 5 K/UL (ref 4.8–10.8)
WBC #/AREA URNS HPF: ABNORMAL /HPF

## 2024-11-29 PROCEDURE — 36415 COLL VENOUS BLD VENIPUNCTURE: CPT

## 2024-11-29 PROCEDURE — 70450 CT HEAD/BRAIN W/O DYE: CPT

## 2024-11-29 PROCEDURE — 96365 THER/PROPH/DIAG IV INF INIT: CPT

## 2024-11-29 PROCEDURE — 81001 URINALYSIS AUTO W/SCOPE: CPT

## 2024-11-29 PROCEDURE — 84484 ASSAY OF TROPONIN QUANT: CPT

## 2024-11-29 PROCEDURE — 80053 COMPREHEN METABOLIC PANEL: CPT

## 2024-11-29 PROCEDURE — 71045 X-RAY EXAM CHEST 1 VIEW: CPT

## 2024-11-29 PROCEDURE — 83605 ASSAY OF LACTIC ACID: CPT

## 2024-11-29 PROCEDURE — 85055 RETICULATED PLATELET ASSAY: CPT

## 2024-11-29 PROCEDURE — 700111 HCHG RX REV CODE 636 W/ 250 OVERRIDE (IP): Mod: UD | Performed by: EMERGENCY MEDICINE

## 2024-11-29 PROCEDURE — 83880 ASSAY OF NATRIURETIC PEPTIDE: CPT

## 2024-11-29 PROCEDURE — 99285 EMERGENCY DEPT VISIT HI MDM: CPT

## 2024-11-29 PROCEDURE — 83735 ASSAY OF MAGNESIUM: CPT

## 2024-11-29 PROCEDURE — 84443 ASSAY THYROID STIM HORMONE: CPT

## 2024-11-29 PROCEDURE — 700101 HCHG RX REV CODE 250: Mod: UD | Performed by: EMERGENCY MEDICINE

## 2024-11-29 PROCEDURE — HZ2ZZZZ DETOXIFICATION SERVICES FOR SUBSTANCE ABUSE TREATMENT: ICD-10-PCS | Performed by: EMERGENCY MEDICINE

## 2024-11-29 PROCEDURE — 85025 COMPLETE CBC W/AUTO DIFF WBC: CPT

## 2024-11-29 PROCEDURE — 83690 ASSAY OF LIPASE: CPT

## 2024-11-29 PROCEDURE — 82140 ASSAY OF AMMONIA: CPT

## 2024-11-29 PROCEDURE — 700105 HCHG RX REV CODE 258: Mod: UD | Performed by: EMERGENCY MEDICINE

## 2024-11-29 RX ADMIN — FOLIC ACID: 5 INJECTION, SOLUTION INTRAMUSCULAR; INTRAVENOUS; SUBCUTANEOUS at 22:19

## 2024-11-29 ASSESSMENT — FIBROSIS 4 INDEX: FIB4 SCORE: 7.44

## 2024-11-30 ENCOUNTER — HOSPITAL ENCOUNTER (EMERGENCY)
Facility: MEDICAL CENTER | Age: 44
DRG: 894 | End: 2024-11-30
Attending: EMERGENCY MEDICINE
Payer: COMMERCIAL

## 2024-11-30 VITALS
HEIGHT: 76 IN | BODY MASS INDEX: 36.33 KG/M2 | HEART RATE: 79 BPM | OXYGEN SATURATION: 98 % | WEIGHT: 298.38 LBS | SYSTOLIC BLOOD PRESSURE: 153 MMHG | TEMPERATURE: 97.7 F | RESPIRATION RATE: 17 BRPM | DIASTOLIC BLOOD PRESSURE: 87 MMHG

## 2024-11-30 VITALS
HEIGHT: 76 IN | DIASTOLIC BLOOD PRESSURE: 83 MMHG | TEMPERATURE: 97 F | HEART RATE: 71 BPM | WEIGHT: 300.49 LBS | BODY MASS INDEX: 36.59 KG/M2 | RESPIRATION RATE: 16 BRPM | OXYGEN SATURATION: 97 % | SYSTOLIC BLOOD PRESSURE: 173 MMHG

## 2024-11-30 DIAGNOSIS — K74.60 HEPATIC CIRRHOSIS, UNSPECIFIED HEPATIC CIRRHOSIS TYPE, UNSPECIFIED WHETHER ASCITES PRESENT (HCC): ICD-10-CM

## 2024-11-30 DIAGNOSIS — G89.29 CHRONIC ABDOMINAL PAIN: ICD-10-CM

## 2024-11-30 DIAGNOSIS — R10.9 CHRONIC ABDOMINAL PAIN: ICD-10-CM

## 2024-11-30 DIAGNOSIS — F10.20 ALCOHOLISM (HCC): ICD-10-CM

## 2024-11-30 PROBLEM — R41.82 ALTERED MENTAL STATUS: Status: ACTIVE | Noted: 2024-11-30

## 2024-11-30 LAB
ALBUMIN SERPL BCP-MCNC: 2.5 G/DL (ref 3.2–4.9)
ALBUMIN SERPL BCP-MCNC: 3.2 G/DL (ref 3.2–4.9)
ALBUMIN/GLOB SERPL: 1 G/DL
ALBUMIN/GLOB SERPL: 1.3 G/DL
ALP SERPL-CCNC: 150 U/L (ref 30–99)
ALP SERPL-CCNC: 155 U/L (ref 30–99)
ALT SERPL-CCNC: 21 U/L (ref 2–50)
ALT SERPL-CCNC: 22 U/L (ref 2–50)
AMMONIA PLAS-SCNC: 57 UMOL/L (ref 11–45)
ANION GAP SERPL CALC-SCNC: 10 MMOL/L (ref 7–16)
ANION GAP SERPL CALC-SCNC: 7 MMOL/L (ref 7–16)
AST SERPL-CCNC: 40 U/L (ref 12–45)
AST SERPL-CCNC: 47 U/L (ref 12–45)
BASOPHILS # BLD AUTO: 0.2 % (ref 0–1.8)
BASOPHILS # BLD AUTO: 0.5 % (ref 0–1.8)
BASOPHILS # BLD: 0.01 K/UL (ref 0–0.12)
BASOPHILS # BLD: 0.02 K/UL (ref 0–0.12)
BILIRUB SERPL-MCNC: 2.2 MG/DL (ref 0.1–1.5)
BILIRUB SERPL-MCNC: 2.7 MG/DL (ref 0.1–1.5)
BUN SERPL-MCNC: 8 MG/DL (ref 8–22)
BUN SERPL-MCNC: 9 MG/DL (ref 8–22)
CALCIUM ALBUM COR SERPL-MCNC: 8.5 MG/DL (ref 8.5–10.5)
CALCIUM ALBUM COR SERPL-MCNC: 9.5 MG/DL (ref 8.5–10.5)
CALCIUM SERPL-MCNC: 7.9 MG/DL (ref 8.5–10.5)
CALCIUM SERPL-MCNC: 8.3 MG/DL (ref 8.5–10.5)
CHLORIDE SERPL-SCNC: 105 MMOL/L (ref 96–112)
CHLORIDE SERPL-SCNC: 110 MMOL/L (ref 96–112)
CO2 SERPL-SCNC: 19 MMOL/L (ref 20–33)
CO2 SERPL-SCNC: 19 MMOL/L (ref 20–33)
CREAT SERPL-MCNC: 0.58 MG/DL (ref 0.5–1.4)
CREAT SERPL-MCNC: 0.66 MG/DL (ref 0.5–1.4)
EOSINOPHIL # BLD AUTO: 0.13 K/UL (ref 0–0.51)
EOSINOPHIL # BLD AUTO: 0.19 K/UL (ref 0–0.51)
EOSINOPHIL NFR BLD: 2.5 % (ref 0–6.9)
EOSINOPHIL NFR BLD: 4.7 % (ref 0–6.9)
ERYTHROCYTE [DISTWIDTH] IN BLOOD BY AUTOMATED COUNT: 53.8 FL (ref 35.9–50)
ERYTHROCYTE [DISTWIDTH] IN BLOOD BY AUTOMATED COUNT: 54.7 FL (ref 35.9–50)
ETHANOL BLD-MCNC: 27.6 MG/DL
GFR SERPLBLD CREATININE-BSD FMLA CKD-EPI: 119 ML/MIN/1.73 M 2
GFR SERPLBLD CREATININE-BSD FMLA CKD-EPI: 124 ML/MIN/1.73 M 2
GLOBULIN SER CALC-MCNC: 2.4 G/DL (ref 1.9–3.5)
GLOBULIN SER CALC-MCNC: 2.6 G/DL (ref 1.9–3.5)
GLUCOSE SERPL-MCNC: 91 MG/DL (ref 65–99)
GLUCOSE SERPL-MCNC: 99 MG/DL (ref 65–99)
HCT VFR BLD AUTO: 26.2 % (ref 42–52)
HCT VFR BLD AUTO: 27 % (ref 42–52)
HGB BLD-MCNC: 8.6 G/DL (ref 14–18)
HGB BLD-MCNC: 8.9 G/DL (ref 14–18)
IMM GRANULOCYTES # BLD AUTO: 0.01 K/UL (ref 0–0.11)
IMM GRANULOCYTES # BLD AUTO: 0.01 K/UL (ref 0–0.11)
IMM GRANULOCYTES NFR BLD AUTO: 0.2 % (ref 0–0.9)
IMM GRANULOCYTES NFR BLD AUTO: 0.2 % (ref 0–0.9)
LIPASE SERPL-CCNC: 26 U/L (ref 11–82)
LYMPHOCYTES # BLD AUTO: 0.9 K/UL (ref 1–4.8)
LYMPHOCYTES # BLD AUTO: 1.11 K/UL (ref 1–4.8)
LYMPHOCYTES NFR BLD: 21.2 % (ref 22–41)
LYMPHOCYTES NFR BLD: 22.4 % (ref 22–41)
MCH RBC QN AUTO: 30.8 PG (ref 27–33)
MCH RBC QN AUTO: 31.7 PG (ref 27–33)
MCHC RBC AUTO-ENTMCNC: 31.9 G/DL (ref 32.3–36.5)
MCHC RBC AUTO-ENTMCNC: 34 G/DL (ref 32.3–36.5)
MCV RBC AUTO: 93.2 FL (ref 81.4–97.8)
MCV RBC AUTO: 96.8 FL (ref 81.4–97.8)
MONOCYTES # BLD AUTO: 0.61 K/UL (ref 0–0.85)
MONOCYTES # BLD AUTO: 0.67 K/UL (ref 0–0.85)
MONOCYTES NFR BLD AUTO: 12.8 % (ref 0–13.4)
MONOCYTES NFR BLD AUTO: 15.2 % (ref 0–13.4)
NEUTROPHILS # BLD AUTO: 2.29 K/UL (ref 1.82–7.42)
NEUTROPHILS # BLD AUTO: 3.31 K/UL (ref 1.82–7.42)
NEUTROPHILS NFR BLD: 57 % (ref 44–72)
NEUTROPHILS NFR BLD: 63.1 % (ref 44–72)
NRBC # BLD AUTO: 0 K/UL
NRBC # BLD AUTO: 0 K/UL
NRBC BLD-RTO: 0 /100 WBC (ref 0–0.2)
NRBC BLD-RTO: 0 /100 WBC (ref 0–0.2)
PLATELET # BLD AUTO: 71 K/UL (ref 164–446)
PLATELET # BLD AUTO: 77 K/UL (ref 164–446)
PLATELETS.RETICULATED NFR BLD AUTO: 1.6 % (ref 0.6–13.1)
PLATELETS.RETICULATED NFR BLD AUTO: 3.1 % (ref 0.6–13.1)
PMV BLD AUTO: 10.1 FL (ref 9–12.9)
PMV BLD AUTO: 11.1 FL (ref 9–12.9)
POTASSIUM SERPL-SCNC: 3.9 MMOL/L (ref 3.6–5.5)
POTASSIUM SERPL-SCNC: 4.1 MMOL/L (ref 3.6–5.5)
PROT SERPL-MCNC: 5.1 G/DL (ref 6–8.2)
PROT SERPL-MCNC: 5.6 G/DL (ref 6–8.2)
RBC # BLD AUTO: 2.79 M/UL (ref 4.7–6.1)
RBC # BLD AUTO: 2.81 M/UL (ref 4.7–6.1)
SODIUM SERPL-SCNC: 134 MMOL/L (ref 135–145)
SODIUM SERPL-SCNC: 136 MMOL/L (ref 135–145)
WBC # BLD AUTO: 4 K/UL (ref 4.8–10.8)
WBC # BLD AUTO: 5.2 K/UL (ref 4.8–10.8)

## 2024-11-30 PROCEDURE — 700102 HCHG RX REV CODE 250 W/ 637 OVERRIDE(OP): Mod: UD | Performed by: EMERGENCY MEDICINE

## 2024-11-30 PROCEDURE — 85055 RETICULATED PLATELET ASSAY: CPT

## 2024-11-30 PROCEDURE — 85055 RETICULATED PLATELET ASSAY: CPT | Mod: 91

## 2024-11-30 PROCEDURE — 85025 COMPLETE CBC W/AUTO DIFF WBC: CPT | Mod: 91

## 2024-11-30 PROCEDURE — 36415 COLL VENOUS BLD VENIPUNCTURE: CPT

## 2024-11-30 PROCEDURE — 770006 HCHG ROOM/CARE - MED/SURG/GYN SEMI*

## 2024-11-30 PROCEDURE — 700102 HCHG RX REV CODE 250 W/ 637 OVERRIDE(OP)

## 2024-11-30 PROCEDURE — 82077 ASSAY SPEC XCP UR&BREATH IA: CPT

## 2024-11-30 PROCEDURE — A9270 NON-COVERED ITEM OR SERVICE: HCPCS | Mod: UD | Performed by: EMERGENCY MEDICINE

## 2024-11-30 PROCEDURE — 85025 COMPLETE CBC W/AUTO DIFF WBC: CPT

## 2024-11-30 PROCEDURE — 80053 COMPREHEN METABOLIC PANEL: CPT

## 2024-11-30 PROCEDURE — A9270 NON-COVERED ITEM OR SERVICE: HCPCS

## 2024-11-30 PROCEDURE — 99284 EMERGENCY DEPT VISIT MOD MDM: CPT

## 2024-11-30 PROCEDURE — 83690 ASSAY OF LIPASE: CPT

## 2024-11-30 PROCEDURE — 96366 THER/PROPH/DIAG IV INF ADDON: CPT

## 2024-11-30 PROCEDURE — 80053 COMPREHEN METABOLIC PANEL: CPT | Mod: 91

## 2024-11-30 RX ORDER — AMOXICILLIN 250 MG
2 CAPSULE ORAL EVERY EVENING
Status: DISCONTINUED | OUTPATIENT
Start: 2024-11-30 | End: 2024-11-30 | Stop reason: HOSPADM

## 2024-11-30 RX ORDER — LORAZEPAM 2 MG/ML
1.5 INJECTION INTRAMUSCULAR
Status: DISCONTINUED | OUTPATIENT
Start: 2024-11-30 | End: 2024-11-30 | Stop reason: HOSPADM

## 2024-11-30 RX ORDER — MAGNESIUM SULFATE HEPTAHYDRATE 40 MG/ML
4 INJECTION, SOLUTION INTRAVENOUS ONCE
Status: DISCONTINUED | OUTPATIENT
Start: 2024-11-30 | End: 2024-11-30 | Stop reason: HOSPADM

## 2024-11-30 RX ORDER — LORAZEPAM 2 MG/ML
2 INJECTION INTRAMUSCULAR
Status: DISCONTINUED | OUTPATIENT
Start: 2024-11-30 | End: 2024-11-30 | Stop reason: HOSPADM

## 2024-11-30 RX ORDER — ESCITALOPRAM OXALATE 10 MG/1
20 TABLET ORAL DAILY
Status: DISCONTINUED | OUTPATIENT
Start: 2024-11-30 | End: 2024-11-30 | Stop reason: HOSPADM

## 2024-11-30 RX ORDER — ONDANSETRON 2 MG/ML
4 INJECTION INTRAMUSCULAR; INTRAVENOUS EVERY 4 HOURS PRN
Status: DISCONTINUED | OUTPATIENT
Start: 2024-11-30 | End: 2024-11-30 | Stop reason: HOSPADM

## 2024-11-30 RX ORDER — HYDROXYZINE HYDROCHLORIDE 25 MG/1
25 TABLET, FILM COATED ORAL 3 TIMES DAILY PRN
Status: DISCONTINUED | OUTPATIENT
Start: 2024-11-30 | End: 2024-11-30 | Stop reason: HOSPADM

## 2024-11-30 RX ORDER — LORAZEPAM 0.5 MG/1
0.5 TABLET ORAL EVERY 4 HOURS PRN
Status: DISCONTINUED | OUTPATIENT
Start: 2024-11-30 | End: 2024-11-30 | Stop reason: HOSPADM

## 2024-11-30 RX ORDER — LORAZEPAM 2 MG/1
2 TABLET ORAL
Status: DISCONTINUED | OUTPATIENT
Start: 2024-11-30 | End: 2024-11-30 | Stop reason: HOSPADM

## 2024-11-30 RX ORDER — LORAZEPAM 2 MG/ML
1 INJECTION INTRAMUSCULAR
Status: DISCONTINUED | OUTPATIENT
Start: 2024-11-30 | End: 2024-11-30 | Stop reason: HOSPADM

## 2024-11-30 RX ORDER — POLYETHYLENE GLYCOL 3350 17 G/17G
1 POWDER, FOR SOLUTION ORAL
Status: DISCONTINUED | OUTPATIENT
Start: 2024-11-30 | End: 2024-11-30 | Stop reason: HOSPADM

## 2024-11-30 RX ORDER — GAUZE BANDAGE 2" X 2"
100 BANDAGE TOPICAL DAILY
Status: DISCONTINUED | OUTPATIENT
Start: 2024-12-01 | End: 2024-11-30 | Stop reason: HOSPADM

## 2024-11-30 RX ORDER — LACTULOSE 10 G/15ML
30 SOLUTION ORAL ONCE
Status: COMPLETED | OUTPATIENT
Start: 2024-11-30 | End: 2024-11-30

## 2024-11-30 RX ORDER — FOLIC ACID 1 MG/1
1 TABLET ORAL DAILY
Status: DISCONTINUED | OUTPATIENT
Start: 2024-12-01 | End: 2024-11-30 | Stop reason: HOSPADM

## 2024-11-30 RX ORDER — LORAZEPAM 1 MG/1
1 TABLET ORAL ONCE
Status: COMPLETED | OUTPATIENT
Start: 2024-11-30 | End: 2024-11-30

## 2024-11-30 RX ORDER — CARVEDILOL 6.25 MG/1
3.12 TABLET ORAL 2 TIMES DAILY WITH MEALS
Status: DISCONTINUED | OUTPATIENT
Start: 2024-11-30 | End: 2024-11-30 | Stop reason: HOSPADM

## 2024-11-30 RX ORDER — LORAZEPAM 2 MG/1
4 TABLET ORAL
Status: DISCONTINUED | OUTPATIENT
Start: 2024-11-30 | End: 2024-11-30 | Stop reason: HOSPADM

## 2024-11-30 RX ORDER — FERROUS SULFATE 325(65) MG
325 TABLET ORAL DAILY
Status: DISCONTINUED | OUTPATIENT
Start: 2024-11-30 | End: 2024-11-30 | Stop reason: HOSPADM

## 2024-11-30 RX ORDER — FUROSEMIDE 40 MG/1
80 TABLET ORAL DAILY
Status: DISCONTINUED | OUTPATIENT
Start: 2024-11-30 | End: 2024-11-30 | Stop reason: HOSPADM

## 2024-11-30 RX ORDER — LORAZEPAM 1 MG/1
1 TABLET ORAL EVERY 4 HOURS PRN
Status: DISCONTINUED | OUTPATIENT
Start: 2024-11-30 | End: 2024-11-30 | Stop reason: HOSPADM

## 2024-11-30 RX ORDER — PANTOPRAZOLE SODIUM 40 MG/1
40 TABLET, DELAYED RELEASE ORAL 2 TIMES DAILY
Status: DISCONTINUED | OUTPATIENT
Start: 2024-11-30 | End: 2024-11-30

## 2024-11-30 RX ORDER — ONDANSETRON 4 MG/1
4 TABLET, ORALLY DISINTEGRATING ORAL EVERY 4 HOURS PRN
Status: DISCONTINUED | OUTPATIENT
Start: 2024-11-30 | End: 2024-11-30 | Stop reason: HOSPADM

## 2024-11-30 RX ORDER — SODIUM CHLORIDE 9 MG/ML
INJECTION, SOLUTION INTRAVENOUS CONTINUOUS
Status: DISCONTINUED | OUTPATIENT
Start: 2024-11-30 | End: 2024-11-30

## 2024-11-30 RX ORDER — AMLODIPINE BESYLATE 5 MG/1
5 TABLET ORAL DAILY
Status: DISCONTINUED | OUTPATIENT
Start: 2024-11-30 | End: 2024-11-30 | Stop reason: HOSPADM

## 2024-11-30 RX ORDER — SPIRONOLACTONE 25 MG/1
100 TABLET ORAL DAILY
Status: DISCONTINUED | OUTPATIENT
Start: 2024-11-30 | End: 2024-11-30 | Stop reason: HOSPADM

## 2024-11-30 RX ORDER — GABAPENTIN 300 MG/1
300 CAPSULE ORAL 3 TIMES DAILY
Status: DISCONTINUED | OUTPATIENT
Start: 2024-11-30 | End: 2024-11-30 | Stop reason: HOSPADM

## 2024-11-30 RX ORDER — TRAZODONE HYDROCHLORIDE 50 MG/1
50 TABLET, FILM COATED ORAL NIGHTLY
Status: DISCONTINUED | OUTPATIENT
Start: 2024-11-30 | End: 2024-11-30 | Stop reason: HOSPADM

## 2024-11-30 RX ORDER — LORAZEPAM 2 MG/ML
0.5 INJECTION INTRAMUSCULAR EVERY 4 HOURS PRN
Status: DISCONTINUED | OUTPATIENT
Start: 2024-11-30 | End: 2024-11-30 | Stop reason: HOSPADM

## 2024-11-30 RX ORDER — LACTULOSE 10 G/15ML
45 SOLUTION ORAL 4 TIMES DAILY
Status: DISCONTINUED | OUTPATIENT
Start: 2024-11-30 | End: 2024-11-30 | Stop reason: HOSPADM

## 2024-11-30 RX ADMIN — RIFAXIMIN 550 MG: 550 TABLET ORAL at 05:36

## 2024-11-30 RX ADMIN — ESCITALOPRAM OXALATE 20 MG: 10 TABLET ORAL at 05:36

## 2024-11-30 RX ADMIN — LACTULOSE 30 ML: 10 SOLUTION ORAL at 01:14

## 2024-11-30 RX ADMIN — OMEPRAZOLE 20 MG: 20 CAPSULE, DELAYED RELEASE ORAL at 05:35

## 2024-11-30 RX ADMIN — LORAZEPAM 0.5 MG: 0.5 TABLET ORAL at 02:40

## 2024-11-30 RX ADMIN — AMLODIPINE BESYLATE 5 MG: 5 TABLET ORAL at 05:36

## 2024-11-30 RX ADMIN — SPIRONOLACTONE 100 MG: 25 TABLET ORAL at 05:35

## 2024-11-30 RX ADMIN — LORAZEPAM 1 MG: 1 TABLET ORAL at 19:26

## 2024-11-30 RX ADMIN — FERROUS SULFATE TAB 325 MG (65 MG ELEMENTAL FE) 325 MG: 325 (65 FE) TAB at 05:35

## 2024-11-30 RX ADMIN — FUROSEMIDE 80 MG: 40 TABLET ORAL at 05:35

## 2024-11-30 ASSESSMENT — LIFESTYLE VARIABLES
VISUAL DISTURBANCES: NOT PRESENT
TREMOR: *
TOTAL SCORE: 7
PAROXYSMAL SWEATS: NO SWEAT VISIBLE
TOTAL SCORE: 2
NAUSEA AND VOMITING: NO NAUSEA AND NO VOMITING
HEADACHE, FULLNESS IN HEAD: MILD
AGITATION: SOMEWHAT MORE THAN NORMAL ACTIVITY
ANXIETY: NO ANXIETY (AT EASE)
AUDITORY DISTURBANCES: NOT PRESENT
AGITATION: NORMAL ACTIVITY
TREMOR: *
ORIENTATION AND CLOUDING OF SENSORIUM: ORIENTED AND CAN DO SERIAL ADDITIONS
PAROXYSMAL SWEATS: NO SWEAT VISIBLE
ANXIETY: MILDLY ANXIOUS
ORIENTATION AND CLOUDING OF SENSORIUM: ORIENTED AND CAN DO SERIAL ADDITIONS
NAUSEA AND VOMITING: NO NAUSEA AND NO VOMITING
AUDITORY DISTURBANCES: NOT PRESENT
HEADACHE, FULLNESS IN HEAD: NOT PRESENT
VISUAL DISTURBANCES: NOT PRESENT

## 2024-11-30 ASSESSMENT — FIBROSIS 4 INDEX
FIB4 SCORE: 5.25
FIB4 SCORE: 5.04

## 2024-11-30 NOTE — ASSESSMENT & PLAN NOTE
SBP ranging 140s-170s, no current CP, SOB, headaches.   -Continue home BP medications Amlodipine, Coreg, Spironolactone, Lasix

## 2024-11-30 NOTE — ASSESSMENT & PLAN NOTE
Patient with altered mental status on admission, though not as severe as previous admissions.  Suspect most likely secondary to hepatic encephalopathy.  Although patient states that he has been taking his home lactulose and rifaximin daily, he also reports not having a bowel movement daily, so suspect probable either noncompliance with lactulose or not high enough dosage.  - Continue lactulose 45 mL 4 times daily  - Continue rifaximin 550 mg twice daily  - Continue spironolactone 100 mg daily  - Continue Lasix 80 mg daily  - CIWA protocol in place, consider Librium taper

## 2024-11-30 NOTE — ED NOTES
PT wheeled to [facility] 29H. C/C is alcohol intoxication. Pt is on the monitor and call light is within reach. Chart up for ERP.

## 2024-11-30 NOTE — ASSESSMENT & PLAN NOTE
Chronic, secondary to alcoholism, decompensated, complicated by recurrent episodes of hepatic encephalopathy.  Patient is s/p TIPS procedure.  Most recent CT abdomen/pelvis on 10/17 notable for portal hypertension with numerous varices.  - Monitor for ascites, high risk for SBP  - Continue Lasix 80 mg daily  - Patient has outstanding referral to GI however, has failed to follow-up

## 2024-11-30 NOTE — H&P
FAMILY MEDICINE HISTORY AND PHYSICAL       PATIENT ID:  NAME:  Jalen Vaughn  MRN:               [id number]  YOB: 1980    Date of Admission: 11/29/2024     Attending: Lacy Sinha M.d.     Resident: Tatiana Aldana M.D.    Primary Care Physician:  Mann Johnson M.D.    CC:    Chief Complaint   Patient presents with    Alcohol Intoxication     BIB EMS from [facility]. Pt found sitting outside. Pt requesting to detox, last drink 5:30pm. -trauma       HPI: Jalen Vaughn is a 44 y.o. male significant past medical history of alcohol use disorder, hypertension, decompensated cirrhosis s/p TIPS, who presented with altered mental status. Patient poor historian. Does recall having one beer today at 5:30PM. Unable to recall if the day before he drank and, if so, how many drinks he had. Reports having one ground level fall about 1 week prior to admission with some residual pain in the lower back and right hip. Also reports some mild nausea, but no vomiting. States that he has been taking his home lactulose and rifaximin as prescribed but also states he hasn't had a bowel movement in a few days.     ERCourse:  On arrival to ED, patient was hypertensive to 150/80, otherwise vital signs stable.  Labs including CBC, CMP, .  EKG showed atrial flutter ammonia, lactic acid, diagnostic alcohol, troponin, BNP, UA obtained and were significant for elevated total bilirubin of 2.8, ammonia of 57, lactic acid within normal, diagnostic alcohol of 27.6, troponin and BNP normal.  CT head showed no acute intracranial abnormality.  Patient received lactulose 30 mL in the ED as well as rally bag.    REVIEW OF SYSTEMS:   Ten systems reviewed and were negative except as noted in the HPI.                PAST MEDICAL HISTORY:   has a past medical history of Cirrhosis of liver (HCC), Hypertension, Liver disease, and Seizure (HCC).     PAST SURGICAL HISTORY:   has a past surgical history that includes appendectomy;  cholecystectomy; and pr upper gi endoscopy,diagnosis (N/A, 10/27/2024).     FAMILY HISTORY:  family history includes Heart Disease in her father and mother; Kidney Disease in her father and mother.     SOCIAL HISTORY:   Employment: Unemployed  Living Situation: Unhoused  Smoking: Denies  Etoh: Reports last drink 5:30PM, has had total of 1 beer today. Alcohol use disorder with daily drinking  Recreational Drug: Denies    DIET:   No orders of the defined types were placed in this encounter.      ALLERGIES:  Allergies   Allergen Reactions    Latex Itching    Morphine Itching       OUTPATIENT MEDICATIONS:    Current Facility-Administered Medications:     senna-docusate (Pericolace Or Senokot S) 8.6-50 MG per tablet 2 Tablet, 2 Tablet, Oral, Q EVENING **AND** polyethylene glycol/lytes (Miralax) Packet 1 Packet, 1 Packet, Oral, QDAY PRN, Tatiana Aldana M.D.    ondansetron (Zofran) syringe/vial injection 4 mg, 4 mg, Intravenous, Q4HRS PRN, Tatiana Aldana M.D.    ondansetron (Zofran ODT) dispertab 4 mg, 4 mg, Oral, Q4HRS PRN, Tatiana Aldana M.D.    [START ON 12/1/2024] thiamine (Vitamin B-1) tablet 100 mg, 100 mg, Oral, DAILY **AND** [START ON 12/1/2024] multivitamin tablet 1 Tablet, 1 Tablet, Oral, DAILY **AND** [START ON 12/1/2024] folic acid (Folvite) tablet 1 mg, 1 mg, Oral, DAILY, Tatiana Aldana M.D.    LORazepam (Ativan) tablet 0.5 mg, 0.5 mg, Oral, Q4HRS PRN, Tatiana Aldana M.D., 0.5 mg at 11/30/24 0240    LORazepam (Ativan) tablet 1 mg, 1 mg, Oral, Q4HRS PRN **OR** LORazepam (Ativan) injection 0.5 mg, 0.5 mg, Intravenous, Q4HRS PRN, Tatiana Aldana M.D.    LORazepam (Ativan) tablet 2 mg, 2 mg, Oral, Q2HRS PRN **OR** LORazepam (Ativan) injection 1 mg, 1 mg, Intravenous, Q2HRS PRN, Tatiana Aldana M.D.    LORazepam (Ativan) tablet 3 mg, 3 mg, Oral, Q HOUR PRN **OR** LORazepam (Ativan) injection 1.5 mg, 1.5 mg, Intravenous, Q HOUR PRN, Tatiana Aldana M.D.    LORazepam (Ativan) tablet 4 mg, 4 mg, Oral, Q15  MIN PRN **OR** LORazepam (Ativan) injection 2 mg, 2 mg, Intravenous, Q15 MIN PRN, Tatiana Aldana M.D.    lactulose 20 GM/30ML solution 45 mL, 45 mL, Oral, 4X/DAY, Tatiana Aldana M.D.    amLODIPine (Norvasc) tablet 5 mg, 5 mg, Oral, DAILY, Tatiana Aldana M.D., 5 mg at 24 0536    carvedilol (Coreg) tablet 3.125 mg, 3.125 mg, Oral, BID WITH MEALS, Tatiana Aldana M.D.    escitalopram (Lexapro) tablet 20 mg, 20 mg, Oral, DAILY, Tatiana Aldana M.D., 20 mg at 24 0536    ferrous sulfate tablet 325 mg, 325 mg, Oral, DAILY, Tatiana Aldana M.D., 325 mg at 24 0535    furosemide (Lasix) tablet 80 mg, 80 mg, Oral, DAILY, Tatiana Aldana M.D., 80 mg at 24 0535    gabapentin (Neurontin) capsule 300 mg, 300 mg, Oral, TID, Tatiana Aldana M.D.    hydrOXYzine HCl (Atarax) tablet 25 mg, 25 mg, Oral, TID PRN, Tatiana Aldana M.D.    riFAXIMin (Xifaxan) tablet 550 mg, 550 mg, Oral, BID, Tatiana Aldana M.D., 550 mg at 24 0536    spironolactone (Aldactone) tablet 100 mg, 100 mg, Oral, DAILY, Tatiana Aldana M.D., 100 mg at 24 0535    traZODone (Desyrel) tablet 50 mg, 50 mg, Oral, Nightly, Tatiana Aldana M.D.    omeprazole (PriLOSEC) capsule 20 mg, 20 mg, Oral, BID, Tatiana Aldana M.D., 20 mg at 24 0535    PHYSICAL EXAM:  Vitals:    24 0108 24 0208 24 0429 24 0536   BP: (!) 145/67 (!) 146/73 (!) 140/80 (!) 140/80   Pulse:  74 78    Resp:  15 16    Temp:  36.6 °C (97.8 °F) 36.4 °C (97.6 °F)    TempSrc:  Temporal Temporal    SpO2: 92% 96% 97%    Weight:  (!) 135 kg (298 lb 6 oz)     Height:       , Temp (24hrs), Av.3 °C (97.3 °F), Min:35.9 °C (96.6 °F), Max:36.6 °C (97.8 °F)  , Pulse Oximetry: 97 %, O2 (LPM): 0, O2 Delivery Device: None - Room Air    Physical Exam  Constitutional:       General: She is not in acute distress.     Comments: Alert, answering questions in full sentences, A&Ox2 (person and time, but not to place).    HENT:      Head:  Normocephalic and atraumatic.      Mouth/Throat:      Mouth: Mucous membranes are dry.      Pharynx: Oropharynx is clear. No oropharyngeal exudate or posterior oropharyngeal erythema.   Eyes:      General: Scleral icterus present.      Extraocular Movements: Extraocular movements intact.      Pupils: Pupils are equal, round, and reactive to light.   Cardiovascular:      Rate and Rhythm: Normal rate and regular rhythm.      Pulses: Normal pulses.      Heart sounds: Normal heart sounds.   Pulmonary:      Effort: Pulmonary effort is normal. No respiratory distress.      Breath sounds: Normal breath sounds. No wheezing.   Abdominal:      General: Bowel sounds are normal. There is no distension.      Palpations: Abdomen is soft.      Tenderness: There is no abdominal tenderness.   Neurological:      General: No focal deficit present.      Mental Status: She is alert. She is disoriented.     LAB TESTS:   Recent Labs     11/29/24 2202 11/30/24 0420   WBC 5.0 4.0*   RBC 2.95* 2.81*   HEMOGLOBIN 9.2* 8.9*   HEMATOCRIT 27.6* 26.2*   MCV 93.6 93.2   MCH 31.2 31.7   MCHC 33.3 34.0   RDW 54.3* 53.8*   PLATELETCT 75* 71*   MPV 10.1 11.1     Recent Labs     11/29/24 2202 11/30/24 0420   SODIUM 135 136   POTASSIUM 4.0 4.1   CHLORIDE 106 110   CO2 18* 19*   GLUCOSE 93 91   BUN 10 8   CREATININE 0.57 0.58   CALCIUM 8.5 8.3*     Recent Labs     11/29/24 2202 11/30/24  0420   ALTSGPT 22 22   ASTSGOT 44 40   ALKPHOSPHAT 160* 150*   TBILIRUBIN 2.8* 2.2*   LIPASE 30  --    GLUCOSE 93 91         Recent Labs     11/29/24 2202   NTPROBNP 92         Recent Labs     11/29/24 2202   TROPONINT 15       CULTURES:   Results       Procedure Component Value Units Date/Time    URINALYSIS (UA) [[id number]]  (Abnormal) Collected: 11/29/24 2311    Order Status: Completed Specimen: Urine Updated: 11/29/24 2346     Color Dark Yellow     Character Clear     Specific Gravity 1.012     Ph 6.0     Glucose Negative mg/dL      Ketones Negative mg/dL       Protein Negative mg/dL      Bilirubin Small     Urobilinogen, Urine 1.0 EU/dL      Nitrite Negative     Leukocyte Esterase Negative     Occult Blood Trace     Micro Urine Req Microscopic            IMAGES:  CT-HEAD W/O   Final Result      1.  No acute intracranial abnormality.            DX-CHEST-PORTABLE (1 VIEW)   Final Result      Decreased RIGHT basilar atelectasis or pneumonia          CONSULTS:   N/A    ASSESSMENT/PLAN:   44 y.o. male with significant past medical history of alcohol use disorder, hypertension, decompensated cirrhosis s/p TIPS admitted for hepatic encephalopathy and anticipated alcohol withdrawal.       I anticipate this patient will require at least two midnights for appropriate medical management, necessitating inpatient admission because alcohol withdrawal      * Altered mental status- (present on admission)  Assessment & Plan  Patient with altered mental status on admission, though not as severe as previous admissions.  Suspect most likely secondary to hepatic encephalopathy.  Although patient states that he has been taking his home lactulose and rifaximin daily, he also reports not having a bowel movement daily, so suspect probable either noncompliance with lactulose or not high enough dosage.  - Continue lactulose 45 mL 4 times daily  - Continue rifaximin 550 mg twice daily  - Continue spironolactone 100 mg daily  - Continue Lasix 80 mg daily  - CIWA protocol in place, consider Librium taper    Alcoholic cirrhosis of liver without ascites (HCC)- (present on admission)  Assessment & Plan  Chronic, secondary to alcoholism, decompensated, complicated by recurrent episodes of hepatic encephalopathy.  Patient is s/p TIPS procedure.  Most recent CT abdomen/pelvis on 10/17 notable for portal hypertension with numerous varices.  - Monitor for ascites, high risk for SBP  - Continue Lasix 80 mg daily  - Patient has outstanding referral to GI however, has failed to follow-up      Chronic  conditions:  -Will continue home medications for hypertension, and chronic scheduled anxiety medications    Core Measures:  Fluids: PO  Lines: PIV  Abx: N/A  Diet: Regular  PPX: SCDs/TEDs    CODE Status: Full Code    Tatiana Aldana M.D.  PGY-2  [facility]

## 2024-11-30 NOTE — PROGRESS NOTES
CIWA scores so far were low overnight (7 then 2), given 1 x 0.5mg Ativan at 2:40am. Notified by RN Ro Brody at 8:15am that patient left AMA, but that he was fully A&O x4, not confused, and reportedly had a safe apartment for him to go to for housing before leaving. Personally was unaware of him wanting to leave AMA until notification he did so.     Oni Erickson MD  PGY1  [facility]

## 2024-11-30 NOTE — ED TRIAGE NOTES
Chief Complaint   Patient presents with    Alcohol Intoxication     BIB EMS from [facility]. Pt found sitting outside. Pt requesting to detox, last drink 5:30pm. -trauma    Aox4, GCS 15. Wheelchair to triage for above complaint. Reports having one drink today, last drink at 5:30pm. +slurred speech, -n/v    Pt placed in lobby and educated on triage process. Pt encouraged to alert staff for any changes.    Patient and staff wearing appropriate PPE.      Hx: etoh abuse, hx of withdrawls    No interventions by EMS pta.

## 2024-11-30 NOTE — CARE PLAN
The patient is Stable - Low risk of patient condition declining or worsening    Shift Goals  Clinical Goals: ciwa, pt will be free from fall  Patient Goals: jerry    Progress made toward(s) clinical / shift goals: remianed free from fall, bedside commitment in place, rounded hourly ,CIWA ongoing                     Problem: Optimal Care for Alcohol Withdrawal  Goal: Optimal Care for the alcohol withdrawal patient  Description: Target End Date:  1 to 3 days    1.  Alcohol history screening done on admission  2.  CIWA-AR score assessment (includes assessment of nausea/vomiting, tremor, sweats, anxiety, agitation, tactile, visual and auditory disturbances, headache and orientation/sensorium).  Document on CIWA flowsheet.  3.  Follow CIWA-AR score protocol  4.  Frequent reorientation  5.  Monitor for fluid and electrolyte imbalance.  6.  Assess for respiratory depression due to sedation (pulse ox)  7.  Consider thiamine, multivitamins, folic acid and magnesium sulfate per provider order  8.  Collaborate with Social Workers/Case Management  9.  Collaborate with mental health  Outcome: Progressing     Problem: Fall Risk  Goal: Patient will remain free from falls  Description: Target End Date:  Prior to discharge or change in level of care    Document interventions on the Kelly Farrar Fall Risk Assessment    1.  Assess for fall risk factors  2.  Implement fall precautions  Outcome: Progressing       Patient is not progressing towards the following goals:

## 2024-11-30 NOTE — ED PROVIDER NOTES
ER Provider Note    Scribed for Daniel Crain D.o. by Jose Mehta. 11/29/2024  8:45 PM    Primary Care Provider: Mann Johnson M.D.    CHIEF COMPLAINT   Chief Complaint   Patient presents with    Alcohol Intoxication     BIB EMS from [facility]. Pt found sitting outside. Pt requesting to detox, last drink 5:30pm. -trauma     EXTERNAL RECORDS REVIEWED  EMS run sheet was reviewed and states patient arrives from [facility] in requesting for a detox.     HPI/ROS  LIMITATION TO HISTORY   Select: Intoxication  OUTSIDE HISTORIAN(S):  None    Jalen Vaughn is a 44 y.o. male who presents to the ED with reported concern for alcoholism/altered mental status. The patient arrives from [facility] after being found sitting outside. He is requesting to detox. His last drink was 5:30 PM. Patient reports he feels like he is withdrawing at the moment but only has one drink. He endorses weakness, fatigue, and is talking slow.  And confused.  Patient reports he has serve liver disease.     PAST MEDICAL HISTORY  Past Medical History:   Diagnosis Date    Cirrhosis of liver (HCC)     Hypertension     Liver disease     Seizure (HCC)        SURGICAL HISTORY  Past Surgical History:   Procedure Laterality Date    VT UPPER GI ENDOSCOPY,DIAGNOSIS N/A 10/27/2024    Procedure: GASTROSCOPY;  Surgeon: Treva Lopez M.D.;  Location: SURGERY [facility];  Service: Gastroenterology    APPENDECTOMY      CHOLECYSTECTOMY         FAMILY HISTORY  Family History   Problem Relation Age of Onset    Heart Disease Mother     Kidney Disease Mother     Heart Disease Father     Kidney Disease Father        SOCIAL HISTORY   reports that she has never smoked. She has quit using smokeless tobacco.  Her smokeless tobacco use included chew. She reports current alcohol use. She reports that she does not currently use drugs.    CURRENT MEDICATIONS  Current Discharge Medication List        CONTINUE these medications which have NOT CHANGED    Details    amLODIPine (NORVASC) 5 MG Tab Take 1 Tablet by mouth every day for 30 days.  Qty: 30 Tablet, Refills: 0    Associated Diagnoses: Medication refill      carvedilol (COREG) 3.125 MG Tab Take 1 Tablet by mouth 2 times a day with meals for 30 days.  Qty: 60 Tablet, Refills: 0    Associated Diagnoses: Medication refill      escitalopram (LEXAPRO) 20 MG tablet Take 1 Tablet by mouth every day for 30 days.  Qty: 30 Tablet, Refills: 0    Associated Diagnoses: Medication refill      ferrous sulfate 325 (65 Fe) MG tablet Take 1 Tablet by mouth every day.  Qty: 30 Tablet, Refills: 0    Associated Diagnoses: Medication refill      folic acid (FOLVITE) 1 MG Tab Take 1 Tablet by mouth every day.  Qty: 30 Tablet, Refills: 0    Associated Diagnoses: Medication refill      furosemide (LASIX) 80 MG Tab Take 1 Tablet by mouth every day.  Qty: 60 Tablet, Refills: 0    Associated Diagnoses: Medication refill      gabapentin (NEURONTIN) 300 MG Cap Take 1 Capsule by mouth 3 times a day.  Qty: 90 Capsule, Refills: 0    Associated Diagnoses: Medication refill      hydrOXYzine HCl (ATARAX) 25 MG Tab Take 1 Tablet by mouth 3 times a day as needed for Anxiety.  Qty: 30 Tablet, Refills: 1    Associated Diagnoses: Medication refill      lactulose 20 GM/30ML Solution Take 45 mL by mouth 4 times a day.  Qty: 473 mL, Refills: 0    Associated Diagnoses: Medication refill      pantoprazole (PROTONIX) 40 MG Tablet Delayed Response Take 1 Tablet by mouth 2 times a day.  Qty: 30 Tablet, Refills: 0    Associated Diagnoses: Medication refill      potassium chloride (KLOR-CON) 20 MEQ Pack Take 1 Packet by mouth every day.  Qty: 30 Each, Refills: 0    Associated Diagnoses: Medication refill      riFAXIMin (XIFAXAN) 550 MG Tab tablet Take 1 Tablet by mouth 2 times a day.  Qty: 60 Tablet, Refills: 0    Associated Diagnoses: Medication refill      spironolactone (ALDACTONE) 100 MG Tab Take 1 Tablet by mouth every day.  Qty: 30 Tablet, Refills: 3     "Associated Diagnoses: Medication refill      thiamine (THIAMINE) 100 MG tablet Take 1 Tablet by mouth every day.  Qty: 30 Tablet, Refills: 0    Associated Diagnoses: Medication refill      traZODone (DESYREL) 50 MG Tab Take 1 Tablet by mouth every evening.  Qty: 30 Tablet, Refills: 0    Associated Diagnoses: Medication refill             ALLERGIES  Latex and Morphine    PHYSICAL EXAM  BP (!) 150/80   Pulse 66   Temp 35.9 °C (96.6 °F) (Temporal)   Resp 12   Ht 1.93 m (6' 4\")   Wt (!) 127 kg (280 lb)   SpO2 98%   BMI 34.08 kg/m²   General: No acute distress, confused  Neuro: Patient is awake and alert to himself is not completely oriented somewhat confused, cranial nerves are intact muscle strength equal throughout and sensation intact however patient has a generalized muscle strength deficit  Neck: Supple  Cardiac: Regular rate and rhythm  Pulmonary: Clear to auscultation bilaterally no distress  Abdomen: Soft nontender nondistended  Back: Nontender  Psych: Normal  Skin: Pink warm dry  Extremities: Full range of motion, muscle strength sensation intact 2+ pulses      DIAGNOSTIC STUDIES    EKG/LABS  Results for orders placed or performed during the hospital encounter of 11/29/24   CBC WITH DIFFERENTIAL    Collection Time: 11/29/24 10:02 PM   Result Value Ref Range    WBC 5.0 4.8 - 10.8 K/uL    RBC 2.95 (L) 4.70 - 6.10 M/uL    Hemoglobin 9.2 (L) 14.0 - 18.0 g/dL    Hematocrit 27.6 (L) 42.0 - 52.0 %    MCV 93.6 81.4 - 97.8 fL    MCH 31.2 27.0 - 33.0 pg    MCHC 33.3 32.3 - 36.5 g/dL    RDW 54.3 (H) 35.9 - 50.0 fL    Platelet Count 75 (L) 164 - 446 K/uL    MPV 10.1 9.0 - 12.9 fL    Neutrophils-Polys 62.50 44.00 - 72.00 %    Lymphocytes 21.00 (L) 22.00 - 41.00 %    Monocytes 12.70 0.00 - 13.40 %    Eosinophils 3.20 0.00 - 6.90 %    Basophils 0.40 0.00 - 1.80 %    Immature Granulocytes 0.20 0.00 - 0.90 %    Nucleated RBC 0.00 0.00 - 0.20 /100 WBC    Neutrophils (Absolute) 3.15 1.82 - 7.42 K/uL    Lymphs (Absolute) " 1.06 1.00 - 4.80 K/uL    Monos (Absolute) 0.64 0.00 - 0.85 K/uL    Eos (Absolute) 0.16 0.00 - 0.51 K/uL    Baso (Absolute) 0.02 0.00 - 0.12 K/uL    Immature Granulocytes (abs) 0.01 0.00 - 0.11 K/uL    NRBC (Absolute) 0.00 K/uL   COMP METABOLIC PANEL    Collection Time: 11/29/24 10:02 PM   Result Value Ref Range    Sodium 135 135 - 145 mmol/L    Potassium 4.0 3.6 - 5.5 mmol/L    Chloride 106 96 - 112 mmol/L    Co2 18 (L) 20 - 33 mmol/L    Anion Gap 11.0 7.0 - 16.0    Glucose 93 65 - 99 mg/dL    Bun 10 8 - 22 mg/dL    Creatinine 0.57 0.50 - 1.40 mg/dL    Calcium 8.5 8.5 - 10.5 mg/dL    Correct Calcium 9.2 8.5 - 10.5 mg/dL    AST(SGOT) 44 12 - 45 U/L    ALT(SGPT) 22 2 - 50 U/L    Alkaline Phosphatase 160 (H) 30 - 99 U/L    Total Bilirubin 2.8 (H) 0.1 - 1.5 mg/dL    Albumin 3.1 (L) 3.2 - 4.9 g/dL    Total Protein 5.8 (L) 6.0 - 8.2 g/dL    Globulin 2.7 1.9 - 3.5 g/dL    A-G Ratio 1.1 g/dL   LIPASE    Collection Time: 11/29/24 10:02 PM   Result Value Ref Range    Lipase 30 11 - 82 U/L   TROPONIN    Collection Time: 11/29/24 10:02 PM   Result Value Ref Range    Troponin T 15 6 - 19 ng/L   proBrain Natriuretic Peptide, NT    Collection Time: 11/29/24 10:02 PM   Result Value Ref Range    NT-proBNP 92 0 - 125 pg/mL   LACTIC ACID    Collection Time: 11/29/24 10:02 PM   Result Value Ref Range    Lactic Acid 1.8 0.5 - 2.0 mmol/L   TSH WITH REFLEX TO FT4    Collection Time: 11/29/24 10:02 PM   Result Value Ref Range    TSH 1.640 0.380 - 5.330 uIU/mL   MAGNESIUM    Collection Time: 11/29/24 10:02 PM   Result Value Ref Range    Magnesium 1.7 1.5 - 2.5 mg/dL   IMMATURE PLT FRACTION    Collection Time: 11/29/24 10:02 PM   Result Value Ref Range    Imm. Plt Fraction 2.0 0.6 - 13.1 %   ESTIMATED GFR    Collection Time: 11/29/24 10:02 PM   Result Value Ref Range    GFR (CKD-EPI) 125 >60 mL/min/1.73 m 2   URINALYSIS (UA)    Collection Time: 11/29/24 11:11 PM    Specimen: Urine   Result Value Ref Range    Color Dark Yellow     Character  Clear     Specific Gravity 1.012 <1.035    Ph 6.0 5.0 - 8.0    Glucose Negative Negative mg/dL    Ketones Negative Negative mg/dL    Protein Negative Negative mg/dL    Bilirubin Small (A) Negative    Urobilinogen, Urine 1.0 <=1.0 EU/dL    Nitrite Negative Negative    Leukocyte Esterase Negative Negative    Occult Blood Trace (A) Negative    Micro Urine Req Microscopic    URINE MICROSCOPIC (W/UA)    Collection Time: 11/29/24 11:11 PM   Result Value Ref Range    WBC 0-2 /hpf    RBC 3-5 (A) 0 - 2 /hpf    Bacteria None Seen None /hpf    Epithelial Cells 0-2 0 - 5 /hpf    Urine Casts 3-5 (A) 0 - 2 /lpf   AMMONIA    Collection Time: 11/29/24 11:29 PM   Result Value Ref Range    Ammonia 57 (H) 11 - 45 umol/L   DIAGNOSTIC ALCOHOL    Collection Time: 11/30/24  1:05 AM   Result Value Ref Range    Diagnostic Alcohol 27.6 (H) <10.1 mg/dL      I have independently interpreted this EKG    I have independently interpreted the above labs    RADIOLOGY/PROCEDURES   The attending emergency physician has independently interpreted the diagnostic imaging associated with this visit and am waiting the final reading from the radiologist.   My preliminary interpretation is a follows: No acute disease    Radiologist interpretation:  CT-HEAD W/O   Final Result      1.  No acute intracranial abnormality.            DX-CHEST-PORTABLE (1 VIEW)   Final Result      Decreased RIGHT basilar atelectasis or pneumonia          COURSE & MEDICAL DECISION MAKING     ASSESSMENT, COURSE AND PLAN  Care Narrative: 8:48 PM - Patient seen and examined at bedside. Discussed plan of care, including obtaining lab work and imagining. Patient agrees to the plan of care. The patient will be medicated with Rally bag IV. Ordered for Dx-chest, CT-head, Immature, Estimated GFR, Ammonia, POCT venous blood gas, Magnesium, CBC w.diff, Lipase, CMP,Troponin, Pro-brain, Lactic acid, UA, And TSH w/reflex,  to evaluate her symptoms.      Patient presented with concern for alcohol  intoxication and altered mental status.  His initial breathalyzer was 0.05.  And the confirmative blood alcohol was 27.6.  Thus secondary to his confusion altered mental status generalized weakness and slurring of his words to move forward with additional altered mental status workup which when considering imaging and laboratory evaluation is consistent with slightly elevated ammonia, less than in the past however I do believe the ammonia at 57 is contributing to his altered mental status.  We discussed this with [facility] resident and they have agreed with admission prior to 2 AM.        ADDITIONAL PROBLEM LIST  Altered mental status: Likely hepatic encephalopathy got electrolytes and admission.  History of TIPS.  Alcoholism: Counseled the patient on alcoholism  Alcoholic cirrhosis: Chronic    DISPOSITION AND DISCUSSIONS  I have discussed management of the patient with the following physicians and ELISSA's: Discussed with admitting hospitalist      FINAL DIANGOSIS  1. Altered mental status, unspecified altered mental status type    2. Alcoholism (HCC)    3. Hepatic encephalopathy (HCC)    4. Alcoholic cirrhosis, unspecified whether ascites present (HCC)      Jose ARIAS (Joanieibtianna), am scribing for, and in the presence of, Daniel Crain D.O..    Electronically signed by: Jose Mehta (Joanieibtianna), 11/29/2024    Daniel ARIAS D.O. personally performed the services described in this documentation, as scribed by Jose Mehta in my presence, and it is both accurate and complete.      The note accurately reflects work and decisions made by me.  Daniel Crain D.O.  11/30/2024  2:17 AM

## 2024-11-30 NOTE — PROGRESS NOTES
Pt abulated to bed with 2 person assist, very drowsy, slowed speech, and slowed response, Aox3, unable to hold long conversations, bed alarm on for safety, educated on fall prevention, bedside commitment in place, call bell within reach.

## 2024-11-30 NOTE — PROGRESS NOTES
Patient decided to leave AMA. Patient A&Ox4. IV removed with tip intact. Patient dressed in personal clothing. MD notified.

## 2024-11-30 NOTE — ED NOTES
Stand by assist x 2 for patient to use restroom, states he can't lay down and use urinal, patient taken out of his wet closes and back into bed, bed alarm placed

## 2024-11-30 NOTE — ED NOTES
Attempting IV, difficult start, .Pt. Resting, no changes, 3 p's addressed, call light at bedside, poc updated

## 2024-11-30 NOTE — ED NOTES
Bedside report from Moni SUAZO    Checked on bed, with unlabored respirations. No safety risk noted  Sleeping  Continued safety precaution  Elaina in low position, side rail up for pt safety.   No needs identified at the moment  Hooked up to the monitor

## 2024-12-01 NOTE — DISCHARGE INSTRUCTIONS
We wish you the best of luck.  We are thankful that you have a bed at your current living situation.  Follow-up with your primary care doctor in the next couple days.  Return to the emergency department as needed.

## 2024-12-01 NOTE — ED PROVIDER NOTES
"ER Provider Note    Scribed for Daniel Crain D.o. by Meredith Culver. 11/30/2024  7:02 PM    Primary Care Provider: Mann Johnson M.D.    CHIEF COMPLAINT   Chief Complaint   Patient presents with    Detox     Patient states he is \"trying to quit drinking\", last drink approximately 1 hour ago, denies withdrawal seizures     Abdominal Pain     10/10 lower abdominal pain x 2 years, (+) nausea     EXTERNAL RECORDS REVIEWED  Inpatient Notes Patient was admitted yesterday after presenting with altered mental status. Patient left AMA at 8:15 AM, he was A&O x4     HPI/ROS  LIMITATION TO HISTORY   Select: : None  OUTSIDE HISTORIAN(S):  EMS report     Jalen Vaughn is a 44 y.o. male who presents to the ED via EMS for alcohol detox. Patient states his last drink was approximately 1 hour ago. He states he is trying to quit drinking. Denies withdrawal seizures in the past. Patient notes abdominal pain, rating it 10/10 which he has been experiencing for the last 2 years. He reports associated nausea and feels like he is withdrawing. No alleviating or exacerbating factors noted. He states he would like a bus voucher.     PAST MEDICAL HISTORY  Past Medical History:   Diagnosis Date    Cirrhosis of liver (HCC)     Hypertension     Liver disease     Seizure (HCC)      SURGICAL HISTORY  Past Surgical History:   Procedure Laterality Date    CT UPPER GI ENDOSCOPY,DIAGNOSIS N/A 10/27/2024    Procedure: GASTROSCOPY;  Surgeon: Treva Lopez M.D.;  Location: SURGERY [facility];  Service: Gastroenterology    APPENDECTOMY      CHOLECYSTECTOMY       FAMILY HISTORY  Family History   Problem Relation Age of Onset    Heart Disease Mother     Kidney Disease Mother     Heart Disease Father     Kidney Disease Father      SOCIAL HISTORY   reports that she has been smoking cigarettes. She has quit using smokeless tobacco.  Her smokeless tobacco use included chew. She reports current alcohol use. She reports that she does not currently " "use drugs.    CURRENT MEDICATIONS  Discharge Medication List as of 11/30/2024  7:31 PM        CONTINUE these medications which have NOT CHANGED    Details   amLODIPine (NORVASC) 5 MG Tab Take 1 Tablet by mouth every day for 30 days., Disp-30 Tablet, R-0, Normal      carvedilol (COREG) 3.125 MG Tab Take 1 Tablet by mouth 2 times a day with meals for 30 days., Disp-60 Tablet, R-0, Normal      escitalopram (LEXAPRO) 20 MG tablet Take 1 Tablet by mouth every day for 30 days., Disp-30 Tablet, R-0, Normal      ferrous sulfate 325 (65 Fe) MG tablet Take 1 Tablet by mouth every day., Disp-30 Tablet, R-0, Normal      folic acid (FOLVITE) 1 MG Tab Take 1 Tablet by mouth every day., Disp-30 Tablet, R-0, Normal      furosemide (LASIX) 80 MG Tab Take 1 Tablet by mouth every day., Disp-60 Tablet, R-0, Normal      gabapentin (NEURONTIN) 300 MG Cap Take 1 Capsule by mouth 3 times a day., Disp-90 Capsule, R-0, Normal      hydrOXYzine HCl (ATARAX) 25 MG Tab Take 1 Tablet by mouth 3 times a day as needed for Anxiety., Disp-30 Tablet, R-1, Normal      lactulose 20 GM/30ML Solution Take 45 mL by mouth 4 times a day., Disp-473 mL, R-0, Normal      pantoprazole (PROTONIX) 40 MG Tablet Delayed Response Take 1 Tablet by mouth 2 times a day., Disp-30 Tablet, R-0, Normal      potassium chloride (KLOR-CON) 20 MEQ Pack Take 1 Packet by mouth every day., Disp-30 Each, R-0, Normal      riFAXIMin (XIFAXAN) 550 MG Tab tablet Take 1 Tablet by mouth 2 times a day., Disp-60 Tablet, R-0, Normal      spironolactone (ALDACTONE) 100 MG Tab Take 1 Tablet by mouth every day., Disp-30 Tablet, R-3, Normal      thiamine (THIAMINE) 100 MG tablet Take 1 Tablet by mouth every day., Disp-30 Tablet, R-0, Normal      traZODone (DESYREL) 50 MG Tab Take 1 Tablet by mouth every evening., Disp-30 Tablet, R-0, Normal           ALLERGIES  Latex and Morphine    PHYSICAL EXAM  BP (!) 161/99   Pulse 69   Temp 36.1 °C (97 °F) (Temporal)   Resp 18   Ht 1.93 m (6' 4\")   Wt " (!) 136 kg (300 lb 7.8 oz)   SpO2 98%   BMI 36.58 kg/m²   General: No acute distress  Neuro: Is can slow to respond but is a GCS 15 awake alert and orient x 4, cranial nerves II through gross intact most repetitive in all 4 extremities sensation coordination gait within normal limits.  Neck: Supple  Cardiac: Regular rate and rhythm  Pulmonary: Clear to auscultation bilaterally no distress  Abdomen: Soft nontender nondistended  Back: Nontender  Psych: Normal  Skin: Slightly jaundiced, warm dry  Extremities: Full range of motion, muscle strength sensation intact 2+ pulses    COURSE & MEDICAL DECISION MAKING     ASSESSMENT, COURSE AND PLAN  Differential Diagnoses:  Differential diagnoses include but are not limited to alcohol intoxication     Care Narrative:   7:12 PM - Patient was seen and evaluated at bedside. Patient presents to the ED for alcohol detox.  After my exam, I discussed with the patient the plan of care, which includes treating with Ativan 1 mg. I then informed the patient of my plan for discharge, which includes strict return precautions for any new or worsening symptoms. Patient understands and verbalizes agreement to plan of care. Patient is comfortable going home at this time.     7:32 PM - Patient was reevaluated at bedside. He states he would like medications as he no longer has any Ativan, Lexapro, Gabapentin. Discussed with patient importance of following with PCP.     ED course and additional problems:    Patient presents stating that he would like some help with his alcohol: He is given resources for outpatient management of alcoholism.  He does not appear particularly intoxicated currently he does admit to drinking today.  He has no evidence of withdrawal neurological exam is normal other than he speaks little slow which I believe is his baseline.  He was seen yesterday and admitted out of concern and he left AGAINST MEDICAL ADVICE.  Patient understands these resources and understands he can  return the emergency department as needed.    Hepatic cirrhosis: Managed as outpatient    Chronic abdominal pain: Patient on reevaluation states that he has had abdominal pain for 2 years.  His abdomen is soft nontender nondistended.  No evidence of fever no leukocytosis.  He denies any nausea and vomiting.  He agrees to follow-up with primary care return if anything worsens or changes.    DISPOSITION AND DISCUSSIONS  I have discussed management of the patient with the following physicians and ELISSA's:  None    Discussion of management with other QHP or appropriate source(s): None     Escalation of care considered, and ultimately not performed: acute inpatient care management, however at this time, the patient is most appropriate for outpatient management.    Barriers to care at this time, including but not limited to:  None .     The patient will return for new or worsening symptoms and is stable at the time of discharge.    DISPOSITION:  Patient will be discharged home in stable condition.    FOLLOW UP:  Mann Johnson M.D.  [address] [address]  [phone]    Schedule an appointment as soon as possible for a visit in 1 day      [facility], Emergency Dept  [address] [id number] [phone]    As needed, If symptoms worsen    FINAL DIAGNOSIS  1. Alcoholism (HCC)    2. Hepatic cirrhosis, unspecified hepatic cirrhosis type, unspecified whether ascites present (HCC)    3. Chronic abdominal pain       The note accurately reflects work and decisions made by me.  Daniel Crain D.O.  12/1/2024  2:51 AM

## 2024-12-01 NOTE — ED NOTES
Bedside report received from off going RN/tech: Romelia, assumed care of patient.  POC discussed with patient. Call light within reach, all needs addressed at this time.       Fall risk interventions in place: Move the patient closer to the nurse's station and Patient's personal possessions are with in their safe reach (all applicable per [place] Fall risk assessment)   Continuous monitoring: Pulse Ox or Blood Pressure  IVF/IV medications: Not Applicable   Oxygen: Room Air  Bedside sitter: Not Applicable   Isolation: Not Applicable

## 2024-12-01 NOTE — ED NOTES
"Chief Complaint   Patient presents with    Detox     Patient states he is \"trying to quit drinking\", last drink approximately 1 hour ago, denies withdrawal seizures     Abdominal Pain     10/10 lower abdominal pain x 2 years, (+) nausea     BP (!) 161/99   Pulse 69   Temp 36.1 °C (97 °F) (Temporal)   Resp 18   Ht 1.93 m (6' 4\")   Wt (!) 136 kg (300 lb 7.8 oz)   SpO2 98%   BMI 36.58 kg/m²     Patient to triage for above, seen here yesterday and admitted for same, left AMA this morning, abdominal pain protocol ordered, to  for blood draw.    "

## 2024-12-01 NOTE — ED NOTES
Pt presenting to ED with c/o alcohol withdrawal, last drink 1 hr ago. Bilateral lower abd pain, chronic. Abd non-tender. Bp and spo2 monitors in place. Call light in reach. Report given to GABRIELE Weiss.

## 2024-12-01 NOTE — ED NOTES
Patient ready for discharge. Instructions given to patient. Patient educated on when/if to return to ED and follow-up appts. With PCP. Patient verbalized understanding.

## 2024-12-02 ENCOUNTER — HOSPITAL ENCOUNTER (INPATIENT)
Facility: MEDICAL CENTER | Age: 44
LOS: 1 days | DRG: 894 | End: 2024-12-03
Attending: EMERGENCY MEDICINE | Admitting: FAMILY MEDICINE
Payer: COMMERCIAL

## 2024-12-02 DIAGNOSIS — D64.9 CHRONIC ANEMIA: ICD-10-CM

## 2024-12-02 DIAGNOSIS — F10.920 ALCOHOLIC INTOXICATION WITHOUT COMPLICATION (HCC): ICD-10-CM

## 2024-12-02 DIAGNOSIS — K76.82 HEPATIC ENCEPHALOPATHY (HCC): ICD-10-CM

## 2024-12-02 PROCEDURE — 36415 COLL VENOUS BLD VENIPUNCTURE: CPT

## 2024-12-02 PROCEDURE — 83735 ASSAY OF MAGNESIUM: CPT

## 2024-12-02 PROCEDURE — 99285 EMERGENCY DEPT VISIT HI MDM: CPT

## 2024-12-02 PROCEDURE — 85610 PROTHROMBIN TIME: CPT

## 2024-12-02 PROCEDURE — 85025 COMPLETE CBC W/AUTO DIFF WBC: CPT

## 2024-12-02 PROCEDURE — 84100 ASSAY OF PHOSPHORUS: CPT

## 2024-12-02 PROCEDURE — 85055 RETICULATED PLATELET ASSAY: CPT

## 2024-12-02 PROCEDURE — 80053 COMPREHEN METABOLIC PANEL: CPT

## 2024-12-02 PROCEDURE — 82077 ASSAY SPEC XCP UR&BREATH IA: CPT

## 2024-12-02 PROCEDURE — 83690 ASSAY OF LIPASE: CPT

## 2024-12-02 ASSESSMENT — PAIN DESCRIPTION - PAIN TYPE
TYPE: ACUTE PAIN
TYPE: ACUTE PAIN

## 2024-12-02 ASSESSMENT — FIBROSIS 4 INDEX: FIB4 SCORE: 5.59

## 2024-12-03 VITALS
TEMPERATURE: 98.2 F | WEIGHT: 284.39 LBS | DIASTOLIC BLOOD PRESSURE: 66 MMHG | HEIGHT: 76 IN | HEART RATE: 82 BPM | RESPIRATION RATE: 18 BRPM | OXYGEN SATURATION: 94 % | BODY MASS INDEX: 34.63 KG/M2 | SYSTOLIC BLOOD PRESSURE: 121 MMHG

## 2024-12-03 PROBLEM — G31.2 ALCOHOLIC ENCEPHALOPATHY (HCC): Status: ACTIVE | Noted: 2024-12-03
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
## 2024-12-03 PROBLEM — F10.121 ALCOHOL INTOXICATION DELIRIUM (HCC): Status: ACTIVE | Noted: 2024-12-03

## 2024-12-03 LAB
ALBUMIN SERPL BCP-MCNC: 3 G/DL (ref 3.2–4.9)
ALBUMIN/GLOB SERPL: 1.1 G/DL
ALP SERPL-CCNC: 166 U/L (ref 30–99)
ALT SERPL-CCNC: 24 U/L (ref 2–50)
AMMONIA PLAS-SCNC: 80 UMOL/L (ref 11–45)
ANION GAP SERPL CALC-SCNC: 11 MMOL/L (ref 7–16)
AST SERPL-CCNC: 54 U/L (ref 12–45)
BASOPHILS # BLD AUTO: 0.2 % (ref 0–1.8)
BASOPHILS # BLD: 0.01 K/UL (ref 0–0.12)
BILIRUB SERPL-MCNC: 2.9 MG/DL (ref 0.1–1.5)
BUN SERPL-MCNC: 7 MG/DL (ref 8–22)
CALCIUM ALBUM COR SERPL-MCNC: 9.2 MG/DL (ref 8.5–10.5)
CALCIUM SERPL-MCNC: 8.4 MG/DL (ref 8.5–10.5)
CHLORIDE SERPL-SCNC: 106 MMOL/L (ref 96–112)
CO2 SERPL-SCNC: 18 MMOL/L (ref 20–33)
CREAT SERPL-MCNC: 0.68 MG/DL (ref 0.5–1.4)
EOSINOPHIL # BLD AUTO: 0.12 K/UL (ref 0–0.51)
EOSINOPHIL NFR BLD: 2.4 % (ref 0–6.9)
ERYTHROCYTE [DISTWIDTH] IN BLOOD BY AUTOMATED COUNT: 56.7 FL (ref 35.9–50)
ETHANOL BLD-MCNC: 132 MG/DL
GFR SERPLBLD CREATININE-BSD FMLA CKD-EPI: 118 ML/MIN/1.73 M 2
GLOBULIN SER CALC-MCNC: 2.8 G/DL (ref 1.9–3.5)
GLUCOSE SERPL-MCNC: 99 MG/DL (ref 65–99)
HCT VFR BLD AUTO: 26.9 % (ref 42–52)
HGB BLD-MCNC: 9.1 G/DL (ref 14–18)
IMM GRANULOCYTES # BLD AUTO: 0.01 K/UL (ref 0–0.11)
IMM GRANULOCYTES NFR BLD AUTO: 0.2 % (ref 0–0.9)
INR PPP: 1.54 (ref 0.87–1.13)
LIPASE SERPL-CCNC: 25 U/L (ref 11–82)
LYMPHOCYTES # BLD AUTO: 1.46 K/UL (ref 1–4.8)
LYMPHOCYTES NFR BLD: 29.7 % (ref 22–41)
MAGNESIUM SERPL-MCNC: 1.8 MG/DL (ref 1.5–2.5)
MCH RBC QN AUTO: 32.2 PG (ref 27–33)
MCHC RBC AUTO-ENTMCNC: 33.8 G/DL (ref 32.3–36.5)
MCV RBC AUTO: 95.1 FL (ref 81.4–97.8)
MONOCYTES # BLD AUTO: 0.52 K/UL (ref 0–0.85)
MONOCYTES NFR BLD AUTO: 10.6 % (ref 0–13.4)
NEUTROPHILS # BLD AUTO: 2.8 K/UL (ref 1.82–7.42)
NEUTROPHILS NFR BLD: 56.9 % (ref 44–72)
NRBC # BLD AUTO: 0 K/UL
NRBC BLD-RTO: 0 /100 WBC (ref 0–0.2)
PHOSPHATE SERPL-MCNC: 3.3 MG/DL (ref 2.5–4.5)
PLATELET # BLD AUTO: 69 K/UL (ref 164–446)
PLATELETS.RETICULATED NFR BLD AUTO: 3.7 % (ref 0.6–13.1)
PMV BLD AUTO: 9.8 FL (ref 9–12.9)
POTASSIUM SERPL-SCNC: 3.6 MMOL/L (ref 3.6–5.5)
PROT SERPL-MCNC: 5.8 G/DL (ref 6–8.2)
PROTHROMBIN TIME: 18.6 SEC (ref 12–14.6)
RBC # BLD AUTO: 2.83 M/UL (ref 4.7–6.1)
SODIUM SERPL-SCNC: 135 MMOL/L (ref 135–145)
WBC # BLD AUTO: 4.9 K/UL (ref 4.8–10.8)

## 2024-12-03 PROCEDURE — 97162 PT EVAL MOD COMPLEX 30 MIN: CPT

## 2024-12-03 PROCEDURE — 770006 HCHG ROOM/CARE - MED/SURG/GYN SEMI*

## 2024-12-03 PROCEDURE — A9270 NON-COVERED ITEM OR SERVICE: HCPCS | Mod: UD | Performed by: EMERGENCY MEDICINE

## 2024-12-03 PROCEDURE — 99284 EMERGENCY DEPT VISIT MOD MDM: CPT

## 2024-12-03 PROCEDURE — A9270 NON-COVERED ITEM OR SERVICE: HCPCS | Mod: UD

## 2024-12-03 PROCEDURE — 700102 HCHG RX REV CODE 250 W/ 637 OVERRIDE(OP): Mod: UD | Performed by: EMERGENCY MEDICINE

## 2024-12-03 PROCEDURE — 700102 HCHG RX REV CODE 250 W/ 637 OVERRIDE(OP): Mod: UD

## 2024-12-03 PROCEDURE — 700111 HCHG RX REV CODE 636 W/ 250 OVERRIDE (IP): Mod: JZ,UD

## 2024-12-03 PROCEDURE — 99222 1ST HOSP IP/OBS MODERATE 55: CPT | Performed by: FAMILY MEDICINE

## 2024-12-03 PROCEDURE — 82140 ASSAY OF AMMONIA: CPT

## 2024-12-03 PROCEDURE — 97166 OT EVAL MOD COMPLEX 45 MIN: CPT

## 2024-12-03 PROCEDURE — 36415 COLL VENOUS BLD VENIPUNCTURE: CPT

## 2024-12-03 PROCEDURE — 96372 THER/PROPH/DIAG INJ SC/IM: CPT

## 2024-12-03 RX ORDER — PROCHLORPERAZINE EDISYLATE 5 MG/ML
5-10 INJECTION INTRAMUSCULAR; INTRAVENOUS EVERY 4 HOURS PRN
Status: DISCONTINUED | OUTPATIENT
Start: 2024-12-03 | End: 2024-12-03 | Stop reason: HOSPADM

## 2024-12-03 RX ORDER — PROMETHAZINE HYDROCHLORIDE 25 MG/1
12.5-25 TABLET ORAL EVERY 4 HOURS PRN
Status: DISCONTINUED | OUTPATIENT
Start: 2024-12-03 | End: 2024-12-03 | Stop reason: HOSPADM

## 2024-12-03 RX ORDER — GABAPENTIN 300 MG/1
300 CAPSULE ORAL 3 TIMES DAILY
Status: DISCONTINUED | OUTPATIENT
Start: 2024-12-03 | End: 2024-12-03 | Stop reason: HOSPADM

## 2024-12-03 RX ORDER — HEPARIN SODIUM 5000 [USP'U]/ML
5000 INJECTION, SOLUTION INTRAVENOUS; SUBCUTANEOUS EVERY 8 HOURS
Status: DISCONTINUED | OUTPATIENT
Start: 2024-12-04 | End: 2024-12-03 | Stop reason: HOSPADM

## 2024-12-03 RX ORDER — FERROUS SULFATE 325(65) MG
325 TABLET ORAL DAILY
Status: DISCONTINUED | OUTPATIENT
Start: 2024-12-03 | End: 2024-12-03 | Stop reason: HOSPADM

## 2024-12-03 RX ORDER — AMOXICILLIN 250 MG
2 CAPSULE ORAL EVERY EVENING
Status: DISCONTINUED | OUTPATIENT
Start: 2024-12-03 | End: 2024-12-03 | Stop reason: HOSPADM

## 2024-12-03 RX ORDER — FUROSEMIDE 40 MG/1
80 TABLET ORAL DAILY
Status: DISCONTINUED | OUTPATIENT
Start: 2024-12-03 | End: 2024-12-03 | Stop reason: HOSPADM

## 2024-12-03 RX ORDER — LORAZEPAM 1 MG/1
3 TABLET ORAL
Status: DISCONTINUED | OUTPATIENT
Start: 2024-12-03 | End: 2024-12-03 | Stop reason: HOSPADM

## 2024-12-03 RX ORDER — LORAZEPAM 1 MG/1
2 TABLET ORAL
Status: DISCONTINUED | OUTPATIENT
Start: 2024-12-03 | End: 2024-12-03 | Stop reason: HOSPADM

## 2024-12-03 RX ORDER — LORAZEPAM 2 MG/ML
1 INJECTION INTRAMUSCULAR
Status: DISCONTINUED | OUTPATIENT
Start: 2024-12-03 | End: 2024-12-03 | Stop reason: HOSPADM

## 2024-12-03 RX ORDER — POLYETHYLENE GLYCOL 3350 17 G/17G
1 POWDER, FOR SOLUTION ORAL
Status: DISCONTINUED | OUTPATIENT
Start: 2024-12-03 | End: 2024-12-03 | Stop reason: HOSPADM

## 2024-12-03 RX ORDER — ONDANSETRON 4 MG/1
4 TABLET, ORALLY DISINTEGRATING ORAL EVERY 4 HOURS PRN
Status: DISCONTINUED | OUTPATIENT
Start: 2024-12-03 | End: 2024-12-03 | Stop reason: HOSPADM

## 2024-12-03 RX ORDER — FOLIC ACID 1 MG/1
1 TABLET ORAL DAILY
Status: DISCONTINUED | OUTPATIENT
Start: 2024-12-03 | End: 2024-12-03 | Stop reason: HOSPADM

## 2024-12-03 RX ORDER — ONDANSETRON 2 MG/ML
4 INJECTION INTRAMUSCULAR; INTRAVENOUS EVERY 4 HOURS PRN
Status: DISCONTINUED | OUTPATIENT
Start: 2024-12-03 | End: 2024-12-03 | Stop reason: HOSPADM

## 2024-12-03 RX ORDER — PROMETHAZINE HYDROCHLORIDE 25 MG/1
12.5-25 SUPPOSITORY RECTAL EVERY 4 HOURS PRN
Status: DISCONTINUED | OUTPATIENT
Start: 2024-12-03 | End: 2024-12-03 | Stop reason: HOSPADM

## 2024-12-03 RX ORDER — FOLIC ACID 1 MG/1
1 TABLET ORAL DAILY
Status: DISCONTINUED | OUTPATIENT
Start: 2024-12-04 | End: 2024-12-03

## 2024-12-03 RX ORDER — LORAZEPAM 2 MG/ML
2 INJECTION INTRAMUSCULAR
Status: DISCONTINUED | OUTPATIENT
Start: 2024-12-03 | End: 2024-12-03 | Stop reason: HOSPADM

## 2024-12-03 RX ORDER — CARVEDILOL 3.12 MG/1
3.12 TABLET ORAL 2 TIMES DAILY WITH MEALS
Status: DISCONTINUED | OUTPATIENT
Start: 2024-12-03 | End: 2024-12-03 | Stop reason: HOSPADM

## 2024-12-03 RX ORDER — IBUPROFEN 400 MG/1
600 TABLET, FILM COATED ORAL EVERY 6 HOURS PRN
Status: DISCONTINUED | OUTPATIENT
Start: 2024-12-03 | End: 2024-12-03 | Stop reason: HOSPADM

## 2024-12-03 RX ORDER — CHLORDIAZEPOXIDE HYDROCHLORIDE 25 MG/1
50 CAPSULE, GELATIN COATED ORAL EVERY 6 HOURS
Status: DISCONTINUED | OUTPATIENT
Start: 2024-12-03 | End: 2024-12-03 | Stop reason: HOSPADM

## 2024-12-03 RX ORDER — SPIRONOLACTONE 100 MG/1
100 TABLET, FILM COATED ORAL DAILY
Status: DISCONTINUED | OUTPATIENT
Start: 2024-12-03 | End: 2024-12-03 | Stop reason: HOSPADM

## 2024-12-03 RX ORDER — CHLORDIAZEPOXIDE HYDROCHLORIDE 25 MG/1
25 CAPSULE, GELATIN COATED ORAL EVERY 6 HOURS
Status: DISCONTINUED | OUTPATIENT
Start: 2024-12-04 | End: 2024-12-03 | Stop reason: HOSPADM

## 2024-12-03 RX ORDER — ENOXAPARIN SODIUM 100 MG/ML
40 INJECTION SUBCUTANEOUS DAILY
Status: DISCONTINUED | OUTPATIENT
Start: 2024-12-03 | End: 2024-12-03

## 2024-12-03 RX ORDER — GAUZE BANDAGE 2" X 2"
100 BANDAGE TOPICAL ONCE
Status: COMPLETED | OUTPATIENT
Start: 2024-12-03 | End: 2024-12-03

## 2024-12-03 RX ORDER — LORAZEPAM 0.5 MG/1
0.5 TABLET ORAL EVERY 4 HOURS PRN
Status: DISCONTINUED | OUTPATIENT
Start: 2024-12-03 | End: 2024-12-03 | Stop reason: HOSPADM

## 2024-12-03 RX ORDER — LACTULOSE 10 G/15ML
45 SOLUTION ORAL 4 TIMES DAILY
Status: DISCONTINUED | OUTPATIENT
Start: 2024-12-03 | End: 2024-12-03 | Stop reason: HOSPADM

## 2024-12-03 RX ORDER — LORAZEPAM 2 MG/ML
0.5 INJECTION INTRAMUSCULAR EVERY 4 HOURS PRN
Status: DISCONTINUED | OUTPATIENT
Start: 2024-12-03 | End: 2024-12-03 | Stop reason: HOSPADM

## 2024-12-03 RX ORDER — FOLIC ACID 1 MG/1
1 TABLET ORAL ONCE
Status: COMPLETED | OUTPATIENT
Start: 2024-12-03 | End: 2024-12-03

## 2024-12-03 RX ORDER — HYDROXYZINE HYDROCHLORIDE 50 MG/1
25 TABLET, FILM COATED ORAL 3 TIMES DAILY PRN
Status: DISCONTINUED | OUTPATIENT
Start: 2024-12-03 | End: 2024-12-03 | Stop reason: HOSPADM

## 2024-12-03 RX ORDER — LORAZEPAM 1 MG/1
4 TABLET ORAL
Status: DISCONTINUED | OUTPATIENT
Start: 2024-12-03 | End: 2024-12-03 | Stop reason: HOSPADM

## 2024-12-03 RX ORDER — LORAZEPAM 1 MG/1
1 TABLET ORAL EVERY 4 HOURS PRN
Status: DISCONTINUED | OUTPATIENT
Start: 2024-12-03 | End: 2024-12-03 | Stop reason: HOSPADM

## 2024-12-03 RX ORDER — LABETALOL HYDROCHLORIDE 5 MG/ML
10 INJECTION, SOLUTION INTRAVENOUS EVERY 4 HOURS PRN
Status: DISCONTINUED | OUTPATIENT
Start: 2024-12-03 | End: 2024-12-03 | Stop reason: HOSPADM

## 2024-12-03 RX ORDER — GAUZE BANDAGE 2" X 2"
100 BANDAGE TOPICAL DAILY
Status: DISCONTINUED | OUTPATIENT
Start: 2024-12-03 | End: 2024-12-03 | Stop reason: HOSPADM

## 2024-12-03 RX ORDER — AMLODIPINE BESYLATE 5 MG/1
5 TABLET ORAL DAILY
Status: DISCONTINUED | OUTPATIENT
Start: 2024-12-03 | End: 2024-12-03 | Stop reason: HOSPADM

## 2024-12-03 RX ORDER — POTASSIUM CHLORIDE 750 MG/1
20 TABLET, EXTENDED RELEASE ORAL DAILY
Status: DISCONTINUED | OUTPATIENT
Start: 2024-12-03 | End: 2024-12-03 | Stop reason: HOSPADM

## 2024-12-03 RX ORDER — LORAZEPAM 2 MG/ML
1.5 INJECTION INTRAMUSCULAR
Status: DISCONTINUED | OUTPATIENT
Start: 2024-12-03 | End: 2024-12-03 | Stop reason: HOSPADM

## 2024-12-03 RX ORDER — PANTOPRAZOLE SODIUM 40 MG/1
40 TABLET, DELAYED RELEASE ORAL 2 TIMES DAILY
Status: DISCONTINUED | OUTPATIENT
Start: 2024-12-03 | End: 2024-12-03

## 2024-12-03 RX ORDER — ESCITALOPRAM OXALATE 10 MG/1
20 TABLET ORAL DAILY
Status: DISCONTINUED | OUTPATIENT
Start: 2024-12-03 | End: 2024-12-03 | Stop reason: HOSPADM

## 2024-12-03 RX ORDER — LANOLIN ALCOHOL/MO/W.PET/CERES
400 CREAM (GRAM) TOPICAL ONCE
Status: COMPLETED | OUTPATIENT
Start: 2024-12-03 | End: 2024-12-03

## 2024-12-03 RX ADMIN — ESCITALOPRAM OXALATE 20 MG: 10 TABLET ORAL at 05:08

## 2024-12-03 RX ADMIN — AMLODIPINE BESYLATE 5 MG: 5 TABLET ORAL at 05:07

## 2024-12-03 RX ADMIN — SPIRONOLACTONE 100 MG: 100 TABLET ORAL at 05:07

## 2024-12-03 RX ADMIN — OMEPRAZOLE 20 MG: 20 CAPSULE, DELAYED RELEASE ORAL at 05:08

## 2024-12-03 RX ADMIN — Medication 100 MG: at 01:50

## 2024-12-03 RX ADMIN — CARVEDILOL 3.12 MG: 3.12 TABLET, FILM COATED ORAL at 08:22

## 2024-12-03 RX ADMIN — LORAZEPAM 1 MG: 1 TABLET ORAL at 12:35

## 2024-12-03 RX ADMIN — THERA TABS 1 TABLET: TAB at 05:08

## 2024-12-03 RX ADMIN — LACTULOSE 45 ML: 10 SOLUTION ORAL at 08:30

## 2024-12-03 RX ADMIN — LACTULOSE 45 ML: 10 SOLUTION ORAL at 12:33

## 2024-12-03 RX ADMIN — GABAPENTIN 300 MG: 300 CAPSULE ORAL at 02:57

## 2024-12-03 RX ADMIN — CHLORDIAZEPOXIDE HYDROCHLORIDE 50 MG: 25 CAPSULE ORAL at 12:34

## 2024-12-03 RX ADMIN — ENOXAPARIN SODIUM 40 MG: 100 INJECTION SUBCUTANEOUS at 02:58

## 2024-12-03 RX ADMIN — FOLIC ACID 1 MG: 1 TABLET ORAL at 05:07

## 2024-12-03 RX ADMIN — FOLIC ACID 1 MG: 1 TABLET ORAL at 01:50

## 2024-12-03 RX ADMIN — LORAZEPAM 1 MG: 1 TABLET ORAL at 08:23

## 2024-12-03 RX ADMIN — FUROSEMIDE 80 MG: 40 TABLET ORAL at 05:08

## 2024-12-03 RX ADMIN — Medication 100 MG: at 05:09

## 2024-12-03 RX ADMIN — RIFAXIMIN 550 MG: 550 TABLET ORAL at 02:57

## 2024-12-03 RX ADMIN — LACTULOSE 45 ML: 10 SOLUTION ORAL at 02:58

## 2024-12-03 RX ADMIN — FERROUS SULFATE TAB 325 MG (65 MG ELEMENTAL FE) 325 MG: 325 (65 FE) TAB at 05:08

## 2024-12-03 RX ADMIN — Medication 400 MG: at 01:50

## 2024-12-03 RX ADMIN — POTASSIUM CHLORIDE 20 MEQ: 750 TABLET, FILM COATED, EXTENDED RELEASE ORAL at 06:00

## 2024-12-03 RX ADMIN — THERA TABS 1 TABLET: TAB at 01:50

## 2024-12-03 RX ADMIN — GABAPENTIN 300 MG: 300 CAPSULE ORAL at 08:22

## 2024-12-03 ASSESSMENT — COGNITIVE AND FUNCTIONAL STATUS - GENERAL
DAILY ACTIVITIY SCORE: 21
DRESSING REGULAR LOWER BODY CLOTHING: A LITTLE
TOILETING: A LITTLE
SUGGESTED CMS G CODE MODIFIER MOBILITY: CI
HELP NEEDED FOR BATHING: A LITTLE
CLIMB 3 TO 5 STEPS WITH RAILING: A LITTLE
MOBILITY SCORE: 23
SUGGESTED CMS G CODE MODIFIER DAILY ACTIVITY: CJ

## 2024-12-03 ASSESSMENT — LIFESTYLE VARIABLES
EVER HAD A DRINK FIRST THING IN THE MORNING TO STEADY YOUR NERVES TO GET RID OF A HANGOVER: NO
TREMOR: TREMOR NOT VISIBLE BUT CAN BE FELT, FINGERTIP TO FINGERTIP
NAUSEA AND VOMITING: NO NAUSEA AND NO VOMITING
ANXIETY: MILDLY ANXIOUS
HEADACHE, FULLNESS IN HEAD: NOT PRESENT
TOTAL SCORE: 3
PAROXYSMAL SWEATS: BARELY PERCEPTIBLE SWEATING. PALMS MOIST
ORIENTATION AND CLOUDING OF SENSORIUM: ORIENTED AND CAN DO SERIAL ADDITIONS
ANXIETY: MILDLY ANXIOUS
HEADACHE, FULLNESS IN HEAD: NOT PRESENT
PAROXYSMAL SWEATS: *
VISUAL DISTURBANCES: MILD SENSITIVITY
PAROXYSMAL SWEATS: NO SWEAT VISIBLE
ANXIETY: NO ANXIETY (AT EASE)
HAVE PEOPLE ANNOYED YOU BY CRITICIZING YOUR DRINKING: NO
TOTAL SCORE: 0
ALCOHOL_USE: YES
AGITATION: NORMAL ACTIVITY
NAUSEA AND VOMITING: NO NAUSEA AND NO VOMITING
VISUAL DISTURBANCES: MILD SENSITIVITY
HAVE YOU EVER FELT YOU SHOULD CUT DOWN ON YOUR DRINKING: NO
CONSUMPTION TOTAL: INCOMPLETE
NAUSEA AND VOMITING: NO NAUSEA AND NO VOMITING
AUDITORY DISTURBANCES: NOT PRESENT
TOTAL SCORE: 0
TOTAL SCORE: 0
NAUSEA AND VOMITING: NO NAUSEA AND NO VOMITING
HEADACHE, FULLNESS IN HEAD: NOT PRESENT
AUDITORY DISTURBANCES: NOT PRESENT
TREMOR: TREMOR NOT VISIBLE BUT CAN BE FELT, FINGERTIP TO FINGERTIP
AUDITORY DISTURBANCES: NOT PRESENT
ORIENTATION AND CLOUDING OF SENSORIUM: ORIENTED AND CAN DO SERIAL ADDITIONS
TREMOR: *
VISUAL DISTURBANCES: NOT PRESENT
TOTAL SCORE: 8
AUDITORY DISTURBANCES: NOT PRESENT
AGITATION: NORMAL ACTIVITY
PAROXYSMAL SWEATS: *
EVER FELT BAD OR GUILTY ABOUT YOUR DRINKING: NO
TOTAL SCORE: 1
AGITATION: NORMAL ACTIVITY
TREMOR: *
AGITATION: NORMAL ACTIVITY
ORIENTATION AND CLOUDING OF SENSORIUM: ORIENTED AND CAN DO SERIAL ADDITIONS
ORIENTATION AND CLOUDING OF SENSORIUM: ORIENTED AND CAN DO SERIAL ADDITIONS
VISUAL DISTURBANCES: VERY MILD SENSITIVITY
ANXIETY: NO ANXIETY (AT EASE)
TOTAL SCORE: 8
HEADACHE, FULLNESS IN HEAD: NOT PRESENT

## 2024-12-03 ASSESSMENT — GAIT ASSESSMENTS
DISTANCE (FEET): 200
GAIT LEVEL OF ASSIST: SUPERVISED
DEVIATION: BRADYKINETIC

## 2024-12-03 ASSESSMENT — FIBROSIS 4 INDEX
FIB4 SCORE: 6.71
FIB4 SCORE: 6.71

## 2024-12-03 ASSESSMENT — PATIENT HEALTH QUESTIONNAIRE - PHQ9
9. THOUGHTS THAT YOU WOULD BE BETTER OFF DEAD, OR OF HURTING YOURSELF: NOT AT ALL
5. POOR APPETITE OR OVEREATING: NOT AT ALL
SUM OF ALL RESPONSES TO PHQ9 QUESTIONS 1 AND 2: 0
SUM OF ALL RESPONSES TO PHQ QUESTIONS 1-9: 0
1. LITTLE INTEREST OR PLEASURE IN DOING THINGS: NOT AT ALL
2. FEELING DOWN, DEPRESSED, IRRITABLE, OR HOPELESS: NOT AT ALL
8. MOVING OR SPEAKING SO SLOWLY THAT OTHER PEOPLE COULD HAVE NOTICED. OR THE OPPOSITE, BEING SO FIGETY OR RESTLESS THAT YOU HAVE BEEN MOVING AROUND A LOT MORE THAN USUAL: NOT AT ALL
6. FEELING BAD ABOUT YOURSELF - OR THAT YOU ARE A FAILURE OR HAVE LET YOURSELF OR YOUR FAMILY DOWN: NOT AL ALL
4. FEELING TIRED OR HAVING LITTLE ENERGY: NOT AT ALL
3. TROUBLE FALLING OR STAYING ASLEEP OR SLEEPING TOO MUCH: NOT AT ALL
7. TROUBLE CONCENTRATING ON THINGS, SUCH AS READING THE NEWSPAPER OR WATCHING TELEVISION: NOT AT ALL

## 2024-12-03 ASSESSMENT — PAIN DESCRIPTION - PAIN TYPE: TYPE: ACUTE PAIN

## 2024-12-03 ASSESSMENT — ACTIVITIES OF DAILY LIVING (ADL): TOILETING: INDEPENDENT

## 2024-12-03 NOTE — ED NOTES
Verify with lab, the  notify this nurse there is no blood tubes in lab, phlebotomist notified to recollect.

## 2024-12-03 NOTE — DISCHARGE PLANNING
Complex Case Management    Order received for Complex Case Management. Patient's chart has been reviewed.     Complex case management following? No    No: Discharge planning recommendations are to have floor case management complete assessment and provide resources as needed and accepted by patient. Consult will be cancelled. Please re-consult as needed.    NOTE: There may be cases that are NOT assigned to a CCM but will be followed for progression of care by leaders of the Complex Discharge Committee.

## 2024-12-03 NOTE — CARE PLAN
The patient is Watcher - Medium risk of patient condition declining or worsening    Shift Goals  Clinical Goals: Patient safety  Patient Goals: JAMEE  Family Goals: JAMEE    Progress made toward(s) clinical / shift goals:  Patient on CIWA protocol, patient tolerated oral medications, Ambulated to the bathroom, bed alarm on, call light within reach.     Patient is not progressing towards the following goals:

## 2024-12-03 NOTE — ED PROVIDER NOTES
ED Provider Note    CHIEF COMPLAINT  Chief Complaint   Patient presents with    Alcohol Intoxication    GLF     EXTERNAL RECORDS REVIEWED  Review of records reveal that the patient has been seen here frequently for alcohol related complaints. He has a history of decompensated cirrhosis s/p TIPS procedure and has needed to be admitted for Hepatic encephalopathy and alcohol withdrawal in the past.  Last was seen here and attempted admission 11/19/2024 and left AGAINST MEDICAL ADVICE.    HPI/ROS  LIMITATION TO HISTORY   Select: Intoxication  OUTSIDE HISTORIAN(S):  None    Jalen Vaughn is a 44 y.o. male with a history of cirrhosis and alcohol abuse who presents to the Emergency Department for evaluation after intoxication and reported ground-level fall. He reports drinking six beers tonight however cannot really provide any more history of what happened.  He is quite lethargic and confused.  He denies any pain at this time.  Denies any other drug use.  History is otherwise limited due to presumed intoxication.    PAST MEDICAL HISTORY  Past Medical History:   Diagnosis Date    Cirrhosis of liver (HCC)     Hypertension     Liver disease     Seizure (HCC)         SURGICAL HISTORY  Past Surgical History:   Procedure Laterality Date    PA UPPER GI ENDOSCOPY,DIAGNOSIS N/A 10/27/2024    Procedure: GASTROSCOPY;  Surgeon: Treva Lopez M.D.;  Location: SURGERY [facility];  Service: Gastroenterology    APPENDECTOMY      CHOLECYSTECTOMY          FAMILY HISTORY  Family History   Problem Relation Age of Onset    Heart Disease Mother     Kidney Disease Mother     Heart Disease Father     Kidney Disease Father        SOCIAL HISTORY   reports that she has been smoking cigarettes. She has quit using smokeless tobacco.  Her smokeless tobacco use included chew. She reports current alcohol use. She reports that she does not currently use drugs.    CURRENT MEDICATIONS  Previous Medications    AMLODIPINE (NORVASC) 5 MG TAB     "Take 1 Tablet by mouth every day for 30 days.    CARVEDILOL (COREG) 3.125 MG TAB    Take 1 Tablet by mouth 2 times a day with meals for 30 days.    ESCITALOPRAM (LEXAPRO) 20 MG TABLET    Take 1 Tablet by mouth every day for 30 days.    FERROUS SULFATE 325 (65 FE) MG TABLET    Take 1 Tablet by mouth every day.    FOLIC ACID (FOLVITE) 1 MG TAB    Take 1 Tablet by mouth every day.    FUROSEMIDE (LASIX) 80 MG TAB    Take 1 Tablet by mouth every day.    GABAPENTIN (NEURONTIN) 300 MG CAP    Take 1 Capsule by mouth 3 times a day.    HYDROXYZINE HCL (ATARAX) 25 MG TAB    Take 1 Tablet by mouth 3 times a day as needed for Anxiety.    LACTULOSE 20 GM/30ML SOLUTION    Take 45 mL by mouth 4 times a day.    PANTOPRAZOLE (PROTONIX) 40 MG TABLET DELAYED RESPONSE    Take 1 Tablet by mouth 2 times a day.    POTASSIUM CHLORIDE (KLOR-CON) 20 MEQ PACK    Take 1 Packet by mouth every day.    RIFAXIMIN (XIFAXAN) 550 MG TAB TABLET    Take 1 Tablet by mouth 2 times a day.    SPIRONOLACTONE (ALDACTONE) 100 MG TAB    Take 1 Tablet by mouth every day.    THIAMINE (THIAMINE) 100 MG TABLET    Take 1 Tablet by mouth every day.    TRAZODONE (DESYREL) 50 MG TAB    Take 1 Tablet by mouth every evening.       ALLERGIES  Latex and Morphine    PHYSICAL EXAM  /70   Pulse 72   Temp 36.8 °C (98.2 °F) (Temporal)   Resp 18   Ht 1.93 m (6' 3.98\")   Wt (!) 136 kg (299 lb 13.2 oz)   SpO2 98%      Constitutional: Disheveled obese male.  Chronically ill-appearing.  HENT: Normocephalic, Atraumatic. Bilateral external ears normal. Nose normal.  Moist mucous membranes.  Oropharynx clear.  Eyes: Pupils are equal and reactive. Conjunctiva normal.   Neck: Supple, full range of motion  Heart: Regular rate and rhythm.  No murmurs.    Lungs: No respiratory distress, normal work of breathing. Lungs clear to auscultation bilaterally.  Abdomen Multiple old bruises noted to the anterior abdomen. No tenderness to palpation. Soft, no distention.   Musculoskeletal: " Atraumatic. No obvious deformities noted.  No lower extremity edema.  Skin: Warm, Dry.  No erythema, No rash.   Neurologic: Alert and oriented x3 however seems confused and intoxicated. Moving all extremities spontaneously without focal deficits.  Mild tremor/asterixis noted.  Psychiatric: Focal to assess, intoxicated      DIAGNOSTIC STUDIES / PROCEDURES    LABS  Labs Reviewed   CBC WITH DIFFERENTIAL - Abnormal; Notable for the following components:       Result Value    RBC 2.83 (*)     Hemoglobin 9.1 (*)     Hematocrit 26.9 (*)     RDW 56.7 (*)     Platelet Count 69 (*)     All other components within normal limits   COMP METABOLIC PANEL - Abnormal; Notable for the following components:    Co2 18 (*)     Bun 7 (*)     Calcium 8.4 (*)     AST(SGOT) 54 (*)     Alkaline Phosphatase 166 (*)     Total Bilirubin 2.9 (*)     Albumin 3.0 (*)     Total Protein 5.8 (*)     All other components within normal limits   PROTHROMBIN TIME - Abnormal; Notable for the following components:    PT 18.6 (*)     INR 1.54 (*)     All other components within normal limits   DIAGNOSTIC ALCOHOL - Abnormal; Notable for the following components:    Diagnostic Alcohol 132.0 (*)     All other components within normal limits   LIPASE   IMMATURE PLT FRACTION   ESTIMATED GFR   AMMONIA       COURSE & MEDICAL DECISION MAKING    9:00 PM - Patient seen and examined at bedside. Discussed plan of care, including blood work. Patient agrees to the plan of care.  Ordered for CBC w/ differential, CMP, Lipase, prothrombin time, diagnostic alcohol to evaluate his symptoms.     1:19 AM - Patient was reevaluated at bedside.  He continues to have normal vital signs.  Patient's alcohol level finally resolved and is not significantly elevated at 130 however he remains lethargic and confused.  Attempted to ambulate the patient and he is very unsteady on his feet.  Due to concerns for ongoing hepatic encephalopathy, will plan for admission.  Patient is agreeable to  stay.      ASSESSMENT, COURSE AND PLAN  Care Narrative: Patient with history of ongoing alcohol use and decompensated cirrhosis as well as multiple admissions for hepatic encephalopathy and withdrawal who presents with alcohol intoxication and a possible ground-level fall this evening.  The patient is somnolent but arousable on arrival with normal vital signs.  I did consider imaging however he is no evidence of trauma on exam.  He does have some old bruising to his abdomen likely related to Lovenox injections while here in the hospital.  He has no focal neurologic deficits concerning for stroke.  Labs were performed without evidence of leukocytosis.  He has chronic anemia and chronic thrombocytopenia which are unchanged.  Liver function tests appear at baseline as well as INR.  Alcohol level returned not significantly elevated at 132.  Labs were somewhat delayed therefore not obtained until he was here for a few hours.  On reassessment, soon after that alcohol level returned, the patient continues to remain confused and altered with concern for ongoing hepatic encephalopathy, we will plan on adding on an ammonia level and plan for admission as he does not appear to be sobering appropriately.    1:22 AM -I discussed the case with Dr. Mckeon, [facility] family medicine resident on-call who accepts admission of the patient.      ADDITIONAL PROBLEM LIST  Problem #1: Alcohol intoxication -counseled on use    Problem #2: Hepatic encephalopathy -plan for admission as patient does not appear to be sobering appropriately    Problem #3: Chronic anemia -continue to monitor, no signs of bleeding    DISPOSITION AND DISCUSSIONS  I have discussed management of the patient with the following physicians and ELISSA's:    [facility] family medicine team    Escalation of care considered, and ultimately not performed:diagnostic imaging    Barriers to care at this time, including but not limited to: Patient does not have established PCP and Patient  lacks financial resources.     DISPOSITION:  Patient will be hospitalized by [facility] family medicine team in guarded condition.      FINAL DIAGNOSIS  1. Alcoholic intoxication without complication (HCC)    2. Hepatic encephalopathy (HCC)    3. Chronic anemia        The note accurately reflects work and decisions made by me.  Shayy Dubois M.D.  12/3/2024  1:23 AM     Tiffanie ARIAS (Scribe), am scribing for, and in the presence of, Shayy Dubois M.D..    Electronically signed by: Tiffanie Robles (Scribe), 12/2/2024    Shayy ARIAS M.D. personally performed the services described in this documentation, as scribed by Tiffanie Robles in my presence, and it is both accurate and complete.

## 2024-12-03 NOTE — ASSESSMENT & PLAN NOTE
Very chronic for patient without demonstration of persistent motivation to address.  History of TIPS procedure in March 2024 in [place].  Patient continues to drink and is maxed out on lactulose without reassuring stool response to decrease ammonia.  Patient often persistently tremulous despite withdrawal protocol and encephalopathy treatment.  Mental status does wax and wane, at times is somewhat a reasonable historian.  History of requiring Precedex in ICU within the last 2 months.  GI has consulted on previous admissions and has attempted to schedule patient for repeat endoscopy banding without success.    Patient has also failed discharge planning as he has not qualified for rehab arrangement, has been discharged back to homeless situation to be quickly readmitted.  Reported last drink: Noon on 12/2, alcohol on admission PM of 12/2: 132.    - Admitted to obs, low threshold for changing status to inpatient  - [facility] protocol  -Librium taper initiated  - Restart home meds: Lactulose 45mL 4 times daily (monitor for output), rifaximin at 550 mg twice daily, spironolactone 100 mg daily, Lasix 80 mg daily.  - Daily labs while patient admitted  - Referrals to case management and difficult discharge to hopefully avoid readmission

## 2024-12-03 NOTE — THERAPY
"Physical Therapy   Initial Evaluation     Patient Name: Jalen Vaughn  Age:  44 y.o., Sex:  male  Medical Record #: [id number]  Today's Date: 12/3/2024     Precautions  Precautions: Fall Risk    Assessment  Patient is a 44 y.o. male who was admitted s/p fall due to acute alcohol intoxication. Pt reports staying at the [facility] and falls when he is drunk. PMH significant for HTN, obesity, ETOH abuse, cirrhosis, hepatic encephalopathy, anemia.     Pt agreeable to PT evaluation. Pt presents with mildly impaired balance and mildly antalgic gait pattern secondary to BLE pain and bruising after multiple falls. Pt able to mobilize at a supervised level with no overt losses of balance. Discussed use of SPC or FWW, pt politely declined and reported his legs would feel better soon enough. Anticipate not further PT needs.     Plan    Physical Therapy Initial Treatment Plan   Duration: Evaluation only    DC Equipment Recommendations: None  Discharge Recommendations: Anticipate that the patient will have no further physical therapy needs after discharge from the hospital       Subjective    \"I'm hungry\"     Objective       12/03/24 1155   Precautions   Precautions Fall Risk   Vitals   O2 Delivery Device None - Room Air   Pain 0 - 10 Group   Therapist Pain Assessment Post Activity Pain Same as Prior to Activity;Nurse Notified  (generalized pain, not rated)   Prior Living Situation   Housing / Facility Homeless   Equipment Owned None   Lives with - Patient's Self Care Capacity Alone and Able to Care For Self   Comments Pt reports staying at the [facility].   Prior Level of Functional Mobility   Bed Mobility Independent   Transfer Status Independent   Ambulation Independent   Assistive Devices Used None   Stairs Independent   History of Falls   History of Falls Yes   Date of Last Fall   (repeated falls while intoxicated)   Cognition    Level of Consciousness Alert   Safety Awareness Impaired   Comments Pleasant and " cooperative   Active ROM Lower Body    Active ROM Lower Body  WDL   Strength Lower Body   Comments Grossly WDL   Sensation Lower Body   Lower Extremity Sensation   WDL   Coordination Lower Body    Coordination Lower Body  WDL   Balance Assessment   Sitting Balance (Static) Fair +   Sitting Balance (Dynamic) Fair +   Standing Balance (Static) Fair   Standing Balance (Dynamic) Fair   Weight Shift Sitting Fair   Weight Shift Standing Fair   Comments no AD, no loss of balance   Bed Mobility    Supine to Sit Supervised   Scooting Supervised   Rolling Supervised   Comments HOB elevated   Gait Analysis   Gait Level Of Assist Supervised   Assistive Device None   Distance (Feet) 200   # of Times Distance was Traveled 1   Deviation Bradykinetic   Comments Mildly antalgic gait pattern due to LE soreness from falls   Functional Mobility   Sit to Stand Supervised   Bed, Chair, Wheelchair Transfer Supervised   Mobility up with FWW   6 Clicks Assessment - How much HELP from from another person do you currently need... (If the patient hasn't done an activity recently, how much help from another person do you think he/she would need if he/she tried?)   Turning from your back to your side while in a flat bed without using bedrails? 4   Moving from lying on your back to sitting on the side of a flat bed without using bedrails? 4   Moving to and from a bed to a chair (including a wheelchair)? 4   Standing up from a chair using your arms (e.g., wheelchair, or bedside chair)? 4   Walking in hospital room? 4   Climbing 3-5 steps with a railing? 3   6 clicks Mobility Score 23   Education Group   Role of Physical Therapist Patient Response Patient;Acceptance;Explanation;Verbal Demonstration   Physical Therapy Initial Treatment Plan    Duration Evaluation only   Anticipated Discharge Equipment and Recommendations   DC Equipment Recommendations None   Discharge Recommendations Anticipate that the patient will have no further physical therapy  needs after discharge from the hospital   Interdisciplinary Plan of Care Collaboration   IDT Collaboration with  Nursing;Occupational Therapist   Patient Position at End of Therapy Seated;Chair Alarm On;Call Light within Reach;Tray Table within Reach;Phone within Reach   Collaboration Comments RN updated, received from OT   Session Information   Date / Session Number  12/3-eval only

## 2024-12-03 NOTE — ED NOTES
Bedside report received from off going RN/tech: Eli RN, assumed care of patient.  POC discussed with patient. All needs addressed at this time.       Fall risk interventions in place: Move the patient closer to the nurse's station, Patient's personal possessions are with in their safe reach, Place socks on patient, Place fall risk sign on patient's door, Give patient urinal if applicable, and Keep floor surfaces clean and dry (all applicable per [place] Fall risk assessment)   Continuous monitoring: Not Applicable   IVF/IV medications: Not Applicable   Oxygen: Room Air  Bedside sitter: Not Applicable   Isolation: Not Applicable

## 2024-12-03 NOTE — ED TRIAGE NOTES
44 y.o. male Jalen Vaughn    Chief Complaint   Patient presents with    Alcohol Intoxication    GLF     BIB EMS to [facility]   [address] up from [address]  EMS called by patient     Patient presented to ED with complaint of ground level fall following ETOH use, patient endorses he stumble and fell , strike his head on ground. AAO X2 , confused in conversation, Respirations even and unlabored on room air , afebrile at this time. FSBG 130 MG/DL, no obvious signs of trauma.     Head strike/ Injury Positive   Blood thinner/ ASA  Negative    LOC Unknown     Event Amnesia Negative    Nausea / vomiting  Negative      Medications given en route: none     /70   Pulse 72   Temp 36.8 °C (98.2 °F) (Temporal)   Resp 18   SpO2 98%     Past Medical History:   Diagnosis Date    Cirrhosis of liver (HCC)     Hypertension     Liver disease     Seizure (HCC)      Past Surgical History:   Procedure Laterality Date    [place] UPPER GI ENDOSCOPY,DIAGNOSIS N/A 10/27/2024    Procedure: GASTROSCOPY;  Surgeon: Treva Lopez M.D.;  Location: SURGERY [facility];  Service: Gastroenterology    APPENDECTOMY      CHOLECYSTECTOMY

## 2024-12-03 NOTE — THERAPY
Occupational Therapy   Initial Evaluation     Patient Name: Jalen Vaughn  Age:  44 y.o., Sex:  male  Medical Record #: [id number]  Today's Date: 12/3/2024     Precautions  Precautions: Fall Risk    Assessment    Patient is 44 y.o. male admitted for falls, alcohol intoxication, non-compliant with medication; PMH: HTN, obesity, ETOH, cirrhosis, encephalopathy. Pt normally independent with functional mobility and ADLs living at the [facility]. Pt able to complete functional mobility and ADLs with supervision, no AD (pt refused). Anticipate pt is at his functional baseline, will complete OT order at this time.      Plan    Occupational Therapy Initial Treatment Plan   Duration: (P) Discharge Needs Only    DC Equipment Recommendations: (P) None  Discharge Recommendations: (P) Anticipate that the patient will have no further occupational therapy needs after discharge from the hospital     Objective       12/03/24 1138   Prior Living Situation   Prior Services None   Housing / Facility Homeless   Equipment Owned None   Lives with - Patient's Self Care Capacity Alone and Able to Care For Self   Prior Level of ADL Function   Self Feeding Independent   Grooming / Hygiene Independent   Bathing Independent   Dressing Independent   Toileting Independent   History of Falls   History of Falls Yes   Date of Last Fall   (history of falls when intoxicated)   Precautions   Precautions Fall Risk   Vitals   O2 Delivery Device None - Room Air   Pain 0 - 10 Group   Therapist Pain Assessment Post Activity Pain Same as Prior to Activity;Nurse Notified   Cognition    Cognition / Consciousness X   Level of Consciousness Alert   Safety Awareness Impaired   New Learning Impaired   Strength Upper Body   Upper Body Strength  WDL   Upper Body Muscle Tone   Upper Body Muscle Tone  WDL   Balance Assessment   Sitting Balance (Static) Fair +   Sitting Balance (Dynamic) Fair +   Standing Balance (Static) Fair   Standing Balance (Dynamic) Fair    Weight Shift Sitting Fair   Comments no AD   Bed Mobility    Supine to Sit Supervised   Scooting Supervised   Rolling Supervised   Comments left with PT   ADL Assessment   Grooming Supervision;Standing   Upper Body Dressing Supervision   Lower Body Dressing Supervision   Toileting Supervision   How much help from another person does the patient currently need...   Putting on and taking off regular lower body clothing? 3   Bathing (including washing, rinsing, and drying)? 3   Toileting, which includes using a toilet, bedpan, or urinal? 3   Putting on and taking off regular upper body clothing? 4   Taking care of personal grooming such as brushing teeth? 4   Eating meals? 4   6 Clicks Daily Activity Score 21   Functional Mobility   Sit to Stand Supervised   Bed, Chair, Wheelchair Transfer Supervised   Toilet Transfers Supervised   Transfer Method Stand Step   Mobility bed mobility, up to bathroom, sink, left with PT   Comments no AD   Education Group   Education Provided Role of Occupational Therapist   Role of Occupational Therapist Patient Response Patient;Acceptance;Explanation   Occupational Therapy Initial Treatment Plan    Duration Discharge Needs Only   Problem List   Problem List None   Anticipated Discharge Equipment and Recommendations   DC Equipment Recommendations None   Discharge Recommendations Anticipate that the patient will have no further occupational therapy needs after discharge from the hospital   Interdisciplinary Plan of Care Collaboration   IDT Collaboration with  Nursing;Physical Therapist   Patient Position at End of Therapy Other (Comments)  (left standing with PT)   Collaboration Comments RN updated

## 2024-12-03 NOTE — ASSESSMENT & PLAN NOTE
Acute.  Reports last bowel movement was 6 days ago, has not been taking lactulose or rifaximin outpatient. Reports required an enema while inpatient, unknown which admission.     -Resume lactulose  -Monitor output
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
## 2024-12-03 NOTE — ASSESSMENT & PLAN NOTE
Cause noted to be alcohol intoxication, assumed in context of baseline encephalopathy.  Patient attests to hitting posterior scalp.  Exam reassuring.  Patient has had 7 head CTs without contrast in the last month that were negative for acute findings.  Admitting provider and ERP defer CT head at this time.  Patient does have unexplained mild bruising around left eye, which could have been present prior to this admission.    - Monitor for changing of mental status, low threshold for head CT without.  - PT/OT referral to help in discharge planning.

## 2024-12-03 NOTE — PROGRESS NOTES
Attending:   Dr. Santillan    Resident:   Hannah Horvath D.O.    PATIENT:   Jalen Vaughn; [id number]; 1980    ID:   44 y.o. male with hx alcohol cirrhosis s/p TIPS, HTN and frequent alcohol-related admissions who presented after GLF in context of alcohol intoxication, admitted for monitoring of mental status and EtOH withdrawal    SUBJECTIVE:   Admitted to the hospital overnight, this morning he reports that he wants to get sober, wants to live someplace that let some work.  Reports his last drink was at 11 PM last night, it was half a pint of vodka.  He reports he normally drinks a tall beer a day and has so since 2018.  Was previously residing at [facility], was not taking his medications since the last time he was admitted to the hospital.  Reports he has not had a bowel movement in 6 days, denies history of melena or hematochezia  Regarding his fall he has no headache, mild tenderness over site where her head was struck.  Endorses blurring of the left eye, states has been occurring for 1 month since a fall.  He is unable to state how many times he has fallen this month  CIWA 1 > 8        OBJECTIVE:  Vitals:    12/03/24 0520 12/03/24 0746 12/03/24 0823 12/03/24 1234   BP: (!) 165/91 (!) 155/91     Pulse: 83 84     Resp: 17 18 17 18   Temp: 36.4 °C (97.6 °F) 36.2 °C (97.2 °F)     TempSrc: Temporal Temporal     SpO2: 93% 94%     Weight:       Height:           Intake/Output Summary (Last 24 hours) at 12/3/2024 1337  Last data filed at 12/3/2024 0900  Gross per 24 hour   Intake 360 ml   Output 2600 ml   Net -2240 ml       PHYSICAL EXAM:  General: No acute distress, afebrile, resting ,Essential tremor of the UE, mild/intermittent in the LE.   HEENT: NC/AT, slight tenderness over right occipital scalp, no swelling or deformities.  EOMI, PERRLA, slight jaundiced sclera  Cardiovascular: RRR. Normal capillary refill   Respiratory: CTAB  Abdomen: soft, nondistended, no masses, generalized mild tenderness to  palpation, hepatomegaly  EXT:  SAWYER, 2+ edema LE bilaterally to knees  Skin: No erythema. Abrasions along anterior LE/knees bilaterally   Neuro: Non-focal    LABS:  Recent Labs     11/30/24  1831 12/02/24  2349   WBC 5.2 4.9   RBC 2.79* 2.83*   HEMOGLOBIN 8.6* 9.1*   HEMATOCRIT 27.0* 26.9*   MCV 96.8 95.1   MCH 30.8 32.2   RDW 54.7* 56.7*   PLATELETCT 77* 69*   MPV 10.1 9.8   NEUTSPOLYS 63.10 56.90   LYMPHOCYTES 21.20* 29.70   MONOCYTES 12.80 10.60   EOSINOPHILS 2.50 2.40   BASOPHILS 0.20 0.20     Recent Labs     11/30/24  1831 12/02/24  2349   SODIUM 134* 135   POTASSIUM 3.9 3.6   CHLORIDE 105 106   CO2 19* 18*   BUN 9 7*   CREATININE 0.66 0.68   CALCIUM 7.9* 8.4*   MAGNESIUM  --  1.8   PHOSPHORUS  --  3.3   ALBUMIN 3.2 3.0*     Estimated GFR/CRCL = Estimated Creatinine Clearance: 208.2 mL/min (by C-G formula based on SCr of 0.68 mg/dL).  Recent Labs     11/30/24  1831 12/02/24  2349   GLUCOSE 99 99     Recent Labs     11/30/24  1831 12/02/24  2349 12/03/24  0146   ASTSGOT 47* 54*  --    ALTSGPT 21 24  --    TBILIRUBIN 2.7* 2.9*  --    ALKPHOSPHAT 155* 166*  --    GLOBULIN 2.4 2.8  --    INR  --  1.54*  --    AMMONIA  --   --  80*             Recent Labs     12/02/24  2349   INR 1.54*         IMAGING:  No orders to display       MEDS:  Current Facility-Administered Medications   Medication Last Admin    senna-docusate (Pericolace Or Senokot S) 8.6-50 MG per tablet 2 Tablet      And    polyethylene glycol/lytes (Miralax) Packet 1 Packet      labetalol (Normodyne/Trandate) injection 10 mg      ondansetron (Zofran) syringe/vial injection 4 mg      ondansetron (Zofran ODT) dispertab 4 mg      promethazine (Phenergan) tablet 12.5-25 mg      promethazine (Phenergan) suppository 12.5-25 mg      prochlorperazine (Compazine) injection 5-10 mg      thiamine (Vitamin B-1) tablet 100 mg 100 mg at 12/03/24 0509    And    multivitamin tablet 1 Tablet 1 Tablet at 12/03/24 0508    And    folic acid (Folvite) tablet 1 mg 1 mg at  12/03/24 0507    LORazepam (Ativan) tablet 0.5 mg      LORazepam (Ativan) tablet 1 mg 1 mg at 12/03/24 1235    Or    LORazepam (Ativan) injection 0.5 mg      LORazepam (Ativan) tablet 2 mg      Or    LORazepam (Ativan) injection 1 mg      LORazepam (Ativan) tablet 3 mg      Or    LORazepam (Ativan) injection 1.5 mg      LORazepam (Ativan) tablet 4 mg      Or    LORazepam (Ativan) injection 2 mg      ibuprofen (Motrin) tablet 600 mg      amLODIPine (Norvasc) tablet 5 mg 5 mg at 12/03/24 0507    carvedilol (Coreg) tablet 3.125 mg 3.125 mg at 12/03/24 0822    escitalopram (Lexapro) tablet 20 mg 20 mg at 12/03/24 0508    ferrous sulfate tablet 325 mg 325 mg at 12/03/24 0508    furosemide (Lasix) tablet 80 mg 80 mg at 12/03/24 0508    gabapentin (Neurontin) capsule 300 mg 300 mg at 12/03/24 0822    hydrOXYzine HCl (Atarax) tablet 25 mg      lactulose 20 GM/30ML solution 45 mL 45 mL at 12/03/24 1233    potassium chloride ER (Klor-Con) tablet 20 mEq 20 mEq at 12/03/24 0600    riFAXIMin (Xifaxan) tablet 550 mg 550 mg at 12/03/24 0257    spironolactone (Aldactone) tablet 100 mg 100 mg at 12/03/24 0507    omeprazole (PriLOSEC) capsule 20 mg 20 mg at 12/03/24 0508    [START ON 12/4/2024] heparin injection 5,000 Units      chlordiazePOXIDE (Librium) capsule 50 mg 50 mg at 12/03/24 1234    Followed by    [START ON 12/4/2024] chlordiazePOXIDE (Librium) capsule 25 mg         ASSESSMENT/PLAN:    Fall  Assessment & Plan  Cause noted to be alcohol intoxication, assumed in context of baseline encephalopathy.  Patient attests to hitting posterior scalp.  Exam reassuring.  Patient has had 7 head CTs without contrast in the last month that were negative for acute findings.  Admitting provider and ERP defer CT head at this time.  Patient does have unexplained mild bruising around left eye, which could have been present prior to this admission.    - Monitor for changing of mental status, low threshold for head CT without.  - PT/OT referral  to help in discharge planning.    Alcoholic encephalopathy (HCC)  Assessment & Plan  Very chronic for patient without demonstration of persistent motivation to address.  History of TIPS procedure in March 2024 in [place].  Patient continues to drink and is maxed out on lactulose without reassuring stool response to decrease ammonia.  Patient often persistently tremulous despite withdrawal protocol and encephalopathy treatment.  Mental status does wax and wane, at times is somewhat a reasonable historian.  History of requiring Precedex in ICU within the last 2 months.  GI has consulted on previous admissions and has attempted to schedule patient for repeat endoscopy banding without success.    Patient has also failed discharge planning as he has not qualified for rehab arrangement, has been discharged back to homeless situation to be quickly readmitted.  Reported last drink: Noon on 12/2, alcohol on admission PM of 12/2: 132.    - Admitted to obs, low threshold for changing status to inpatient  - [facility] protocol  -Librium taper initiated  - Restart home meds: Lactulose 45mL 4 times daily (monitor for output), rifaximin at 550 mg twice daily, spironolactone 100 mg daily, Lasix 80 mg daily.  - Daily labs while patient admitted  - Referrals to case management and difficult discharge to hopefully avoid readmission    Constipation- (present on admission)  Assessment & Plan  Acute.  Reports last bowel movement was 6 days ago, has not been taking lactulose or rifaximin outpatient. Reports required an enema while inpatient, unknown which admission.     -Resume lactulose  -Monitor output    Primary hypertension- (present on admission)  Assessment & Plan  - Continue home amlodipine and carvedilol with holding parameters.         Core Measures:  Fluids: P.o. hydration  Lines: PIV  Abx: None  Diet: Regular  PPX: Heparin  Wound care: N/A    DISPO: Inpatient due to acute alcohol withdrawal      CODE STATUS: Full code    Hannah KEYES  ISRAEL Horvath.  PGY-1  [facility]

## 2024-12-03 NOTE — ED NOTES
Patient bedside report received from/ given to GABRIELE Navarrete . Pt AAO X 4 , respirations even and unlabored, on room air. Fall risk interventions in place.

## 2024-12-03 NOTE — PROGRESS NOTES
4 Eyes Skin Assessment Completed by GABRIELE Manrique and GABRIELE Damon.    Head WDL  Ears WDL  Nose WDL  Mouth WDL  Neck WDL  Breast/Chest WDL  Shoulder Blades WDL  Spine Bruising  (R) Arm/Elbow/Hand WDL  (L) Arm/Elbow/Hand WDL  Abdomen Bruising  Groin WDL  Scrotum/Coccyx/Buttocks Pink, blanching  (R) Leg Scar, Scab, Bruising, and Edema  (L) Leg Scar, Scab, Bruising, and Edema  (R) Heel/Foot/Toe Redness and Blanching, calloused  (L) Heel/Foot/Toe Redness and Blanching,calloused          Devices In Places Blood Pressure Cuff and Condom Cath      Interventions In Place Pillows and Barrier Cream    Possible Skin Injury No    Pictures Uploaded Into Epic N/A  Wound Consult Placed N/A  RN Wound Prevention Protocol Ordered Yes

## 2024-12-04 ENCOUNTER — HOSPITAL ENCOUNTER (EMERGENCY)
Facility: MEDICAL CENTER | Age: 44
End: 2024-12-04
Attending: EMERGENCY MEDICINE
Payer: COMMERCIAL

## 2024-12-04 ENCOUNTER — APPOINTMENT (OUTPATIENT)
Dept: MEDICAL GROUP | Facility: CLINIC | Age: 44
End: 2024-12-04
Payer: COMMERCIAL

## 2024-12-04 VITALS
RESPIRATION RATE: 18 BRPM | BODY MASS INDEX: 35.31 KG/M2 | HEIGHT: 75 IN | DIASTOLIC BLOOD PRESSURE: 81 MMHG | TEMPERATURE: 97 F | OXYGEN SATURATION: 94 % | HEART RATE: 79 BPM | WEIGHT: 284 LBS | SYSTOLIC BLOOD PRESSURE: 131 MMHG

## 2024-12-04 DIAGNOSIS — F10.920 ALCOHOLIC INTOXICATION WITHOUT COMPLICATION (HCC): ICD-10-CM

## 2024-12-04 DIAGNOSIS — R41.82 ALTERED MENTAL STATUS, UNSPECIFIED ALTERED MENTAL STATUS TYPE: ICD-10-CM

## 2024-12-04 NOTE — ED NOTES
Pt escorted to [address] bed, transfer to [facility] and unsteady on his feet. Pt provided urinal and privacy screen

## 2024-12-04 NOTE — ED NOTES
"Pt states \"I did not fall\" and denies drinking. RN re-oriented pt on situation and pt states \" I still don't know why I am here\". Pt re-educated on location/situation   "

## 2024-12-04 NOTE — ED NOTES
Bedside report received from off going RN: Maddy, assumed care of patient.  POC discussed with patient. Call light within reach, all needs addressed at this time.       Fall risk interventions in place: Keep floor surfaces clean and dry (all applicable per [place] Fall risk assessment)   Continuous monitoring: Pulse Ox or Blood Pressure  IVF/IV medications: Not Applicable   Oxygen: Room Air  Bedside sitter: Not Applicable   Isolation: Not Applicable

## 2024-12-04 NOTE — ED PROVIDER NOTES
ED Provider Note    Scribed for Dr. Floyd by Jose Mehta. 12/4/2024,  12:50 AM.      CHIEF COMPLAINT  Chief Complaint   Patient presents with    Alcohol Intoxication     Pt reports multiple drinks of vodka this evening. Per EMS pt was sitting on a bench at [facility] when staff saw him try and stand up and fall forward onto hands and knees. -head strike, -LOC, -thinners.   Pt was discharged yesterday after admission of frequent falls and detox, pt reports he started drinking after discharge yesterday.        EXTERNAL RECORDS REVIEWED  Inpatient Notes The patient was admitted on 12/2/24 for alcohol intoxication delirium.    HPI  LIMITATION TO HISTORY   Select: : None  OUTSIDE HISTORIAN(S):  None    Jalen Vaughn is a 44 y.o. male who presents to the Emergency Department for evaluation of alcohol intoxication.    Patient reports multiple drinks of vodka this evening. Per EMS patient was sitting on a bench at [facility] when staff saw him try and stand up and fall forward onto hands and knees. Denies any head strike or loss of consciousness.   Patient denies any pain at this time. He was discharged yesterday after admission of frequent falls and detox.    REVIEW OF SYSTEMS  See HPI for further details. All other systems are negative.     PAST MEDICAL HISTORY     Past Medical History:   Diagnosis Date    Cirrhosis of liver (HCC)     Hypertension     Liver disease     Seizure (HCC)        SURGICAL HISTORY  Past Surgical History:   Procedure Laterality Date    OH UPPER GI ENDOSCOPY,DIAGNOSIS N/A 10/27/2024    Procedure: GASTROSCOPY;  Surgeon: Treva Lopez M.D.;  Location: SURGERY [facility];  Service: Gastroenterology    APPENDECTOMY      CHOLECYSTECTOMY         FAMILY HISTORY  Family History   Problem Relation Age of Onset    Heart Disease Mother     Kidney Disease Mother     Heart Disease Father     Kidney Disease Father        SOCIAL HISTORY    reports that she has been smoking cigarettes. She has quit  "using smokeless tobacco.  Her smokeless tobacco use included chew. She reports current alcohol use. She reports that she does not currently use drugs.    CURRENT MEDICATIONS  Home Medications       Reviewed by Dolores Dodd R.N. (Registered Nurse) on 12/03/24 at 2319  Med List Status: <None>     Medication Last Dose Status   amLODIPine (NORVASC) 5 MG Tab  Active   carvedilol (COREG) 3.125 MG Tab  Active   escitalopram (LEXAPRO) 20 MG tablet  Active   ferrous sulfate 325 (65 Fe) MG tablet  Active   folic acid (FOLVITE) 1 MG Tab  Active   furosemide (LASIX) 80 MG Tab  Active   gabapentin (NEURONTIN) 300 MG Cap  Active   hydrOXYzine HCl (ATARAX) 25 MG Tab  Active   lactulose 20 GM/30ML Solution  Active   pantoprazole (PROTONIX) 40 MG Tablet Delayed Response  Active   potassium chloride (KLOR-CON) 20 MEQ Pack  Active   riFAXIMin (XIFAXAN) 550 MG Tab tablet  Active   spironolactone (ALDACTONE) 100 MG Tab  Active                    ALLERGIES  Allergies   Allergen Reactions    Latex Itching    Morphine Itching       PHYSICAL EXAM  VITAL SIGNS: BP (!) 156/96   Pulse 65   Temp 36.1 °C (97 °F) (Temporal)   Resp 18   Ht 1.905 m (6' 3\")   Wt (!) 129 kg (284 lb)   SpO2 94%   BMI 35.50 kg/m²   Gen: Alert  HENT: No racoon eyes, septal hematoma, facial instability  Eye: EOMI, no chemosis, PERRL  Neck: trachea midline, no tenderness  Resp: no respiratory distress,  no chest wall tenderness or crepitus  CV: No JVD, RRR.  + peripheral pulses  Abd: soft, non-distended, non-tender. No ecchymosis  Ext: Able to bear weight bilateral legs.  Mild tenderness bilateral knees.  Otherwise, no deformities, tenderness or edema  Neuro: speech slurred, moves all extremities. GCS 14      COURSE & MEDICAL DECISION MAKING  Pertinent Labs & Imaging studies were reviewed. (See chart for details)    ED Observation Status? Yes  Patient admitted to ED observation at 12:50 AM on 12/4/2024    Observation plan: Monitor for clinical sobriety, " determination of need of imaging, labs, determination of disposition      12:50 AM Patient seen and examined at bedside.     INITIAL ASSESSMENT AND PLAN  Medical Decision Making: Patient presents with altered mental status in the setting of reported alcohol use.  The patient did have an episode where he went to stand up and fell forward but there is report of no head strike, no loss of consciousness, and patient does not take blood thinners.  At this point I do not believe CT head required.  He demonstrates no obvious signs of trauma at presentation.  Will monitor in the emergency department for clinical sobriety    On reassessment, the patient continues to appear intoxicated, will sign out to oncoming provider awaiting sober reevaluation.      ADDITIONAL PROBLEM LIST AND DISPOSITION      I have discussed management of the patient with the following medical professionals: Dr. Barclay    Escalation of care considered, and ultimately not performed: Laboratory analysis and diagnostic imaging.     Barriers to care at this time, including but not limited to: Patient is homeless.         FINAL IMPRESSION  1. Altered mental status, unspecified altered mental status type    2. Alcoholic intoxication without complication (HCC)      Jose ARIAS (Sagar), am scribing for, and in the presence of, Jacob Floyd M.D..    Electronically signed by: Jose Mehta (Sagar), 12/4/2024    Jacob ARIAS M.D. personally performed the services described in this documentation, as scribed by Jose Mehta in my presence, and it is both accurate and complete.    The note accurately reflects work and decisions made by me.  Jacob Floyd M.D.  12/4/2024  1:17 AM    This dictation was created using voice recognition software. The accuracy of the dictation is limited to the abilities of the software. I expect there may be some errors of grammar and possibly content. The nursing notes were reviewed and certain aspects of this information were  incorporated into this note.

## 2024-12-04 NOTE — PROGRESS NOTES
Pt. Getting restless at this time, he went to the restroom and came back to bed but wants to leave. Educated pt. About risks of leaving AMA and benefits of staying, pt. Still states he wants to go and he will have a place at the shelter or if not go to the motel. Updated Dr. Horvath, betzaida CASTILLO will come up.

## 2024-12-04 NOTE — PROGRESS NOTES
Patient wishes to leave the hospital, he knows this is AGAINST MEDICAL ADVICE.  He states that he already has quite a bit of medical debt, reports that he has to go to work to care for his children that are in [facility].  He is aware that he has in [facility], and is going to pursue work to be able to care for his children.  He states that he has his lactulose prescription, and has it through a Dr. Bose at a clinic on [facility].  He would not elaborate further.  Stated his plan to leave is to take the bus to a [place] and then attend AA meetings to maintain his sobriety, he states that he understands how important it is to his health to remain sober.    States he does not want any other medications at this time, he is insistent on leaving the hospital.  He was counseled on the risks of leaving the hospital and was given return precautions

## 2024-12-04 NOTE — ED NOTES
Patient attempted to get out of bed and ambulate in hallway. Patient unsteady on feet, requiring 1RN assistance to restroom. Patient assisted back to bed, provided with sandwich and water per request.

## 2024-12-04 NOTE — ED TRIAGE NOTES
"Chief Complaint   Patient presents with    Alcohol Intoxication     Pt reports multiple drinks of vodka this evening. Per EMS pt was sitting on a bench at [facility] when staff saw him try and stand up and fall forward onto hands and knees. -head strike, -LOC, -thinners.   Pt was discharged yesterday after admission of frequent falls and detox, pt reports he started drinking after discharge yesterday.        43 yo M BIB EMS to triage for above complaint. Pt reports last drink was around 7 pm     Pt placed in lobby. Pt educated on triage process. Pt encouraged to alert staff for any changes.     Patient and staff wearing appropriate PPE    BP (!) 156/96   Pulse 65   Temp 36.1 °C (97 °F) (Temporal)   Resp 18   Ht 1.905 m (6' 3\")   Wt (!) 129 kg (284 lb)   SpO2 94%   BMI 35.50 kg/m²     "

## 2024-12-04 NOTE — DISCHARGE SUMMARY
"  ED Observation Discharge Summary    Patient:Jalen Vaughn  Patient : 1980  Patient MRN: [id number]  Patient PCP: Mann Johnson M.D.    Admit Date: 2024  Discharge Date and Time: 24 7:27 AM  Discharge Diagnosis: Alcohol intoxication  Discharge Attending: Nba Barclay M.D.  Discharge Service: ED Observation    ED Course  Jalen is a 44 y.o. male who was evaluated at [facility] at around 1250 this morning intoxicated.  The patient was found altered at [facility].  He has been observed and at this time is tolerating oral fluids and is alert and appropriate.  He is able to ambulate safely.  The patient will be discharged with instructions to refrain from further alcohol abuse.    Discharge Exam:  /77   Pulse 90   Temp 36.1 °C (97 °F) (Temporal)   Resp 18   Ht 1.905 m (6' 3\")   Wt (!) 129 kg (284 lb)   SpO2 95%   BMI 35.50 kg/m² .    Constitutional: Awake and alert. Nontoxic  HENT:  Grossly normal  Eyes: Grossly normal  Neck: Normal range of motion  Cardiovascular: Normal heart rate   Thorax & Lungs: No respiratory distress  Abdomen: Nontender  Skin:  No pathologic rash.   Extremities: Well perfused  Psychiatric: Affect normal    Labs  Results for orders placed or performed during the hospital encounter of 24   CBC WITH DIFFERENTIAL    Collection Time: 24 11:49 PM   Result Value Ref Range    WBC 4.9 4.8 - 10.8 K/uL    RBC 2.83 (L) 4.70 - 6.10 M/uL    Hemoglobin 9.1 (L) 14.0 - 18.0 g/dL    Hematocrit 26.9 (L) 42.0 - 52.0 %    MCV 95.1 81.4 - 97.8 fL    MCH 32.2 27.0 - 33.0 pg    MCHC 33.8 32.3 - 36.5 g/dL    RDW 56.7 (H) 35.9 - 50.0 fL    Platelet Count 69 (L) 164 - 446 K/uL    MPV 9.8 9.0 - 12.9 fL    Neutrophils-Polys 56.90 44.00 - 72.00 %    Lymphocytes 29.70 22.00 - 41.00 %    Monocytes 10.60 0.00 - 13.40 %    Eosinophils 2.40 0.00 - 6.90 %    Basophils 0.20 0.00 - 1.80 %    Immature Granulocytes 0.20 0.00 - 0.90 %    Nucleated RBC 0.00 0.00 - 0.20 /100 WBC    " Neutrophils (Absolute) 2.80 1.82 - 7.42 K/uL    Lymphs (Absolute) 1.46 1.00 - 4.80 K/uL    Monos (Absolute) 0.52 0.00 - 0.85 K/uL    Eos (Absolute) 0.12 0.00 - 0.51 K/uL    Baso (Absolute) 0.01 0.00 - 0.12 K/uL    Immature Granulocytes (abs) 0.01 0.00 - 0.11 K/uL    NRBC (Absolute) 0.00 K/uL   COMP METABOLIC PANEL    Collection Time: 12/02/24 11:49 PM   Result Value Ref Range    Sodium 135 135 - 145 mmol/L    Potassium 3.6 3.6 - 5.5 mmol/L    Chloride 106 96 - 112 mmol/L    Co2 18 (L) 20 - 33 mmol/L    Anion Gap 11.0 7.0 - 16.0    Glucose 99 65 - 99 mg/dL    Bun 7 (L) 8 - 22 mg/dL    Creatinine 0.68 0.50 - 1.40 mg/dL    Calcium 8.4 (L) 8.5 - 10.5 mg/dL    Correct Calcium 9.2 8.5 - 10.5 mg/dL    AST(SGOT) 54 (H) 12 - 45 U/L    ALT(SGPT) 24 2 - 50 U/L    Alkaline Phosphatase 166 (H) 30 - 99 U/L    Total Bilirubin 2.9 (H) 0.1 - 1.5 mg/dL    Albumin 3.0 (L) 3.2 - 4.9 g/dL    Total Protein 5.8 (L) 6.0 - 8.2 g/dL    Globulin 2.8 1.9 - 3.5 g/dL    A-G Ratio 1.1 g/dL   LIPASE    Collection Time: 12/02/24 11:49 PM   Result Value Ref Range    Lipase 25 11 - 82 U/L   PROTHROMBIN TIME (INR)    Collection Time: 12/02/24 11:49 PM   Result Value Ref Range    PT 18.6 (H) 12.0 - 14.6 sec    INR 1.54 (H) 0.87 - 1.13   DIAGNOSTIC ALCOHOL    Collection Time: 12/02/24 11:49 PM   Result Value Ref Range    Diagnostic Alcohol 132.0 (H) <10.1 mg/dL   IMMATURE PLT FRACTION    Collection Time: 12/02/24 11:49 PM   Result Value Ref Range    Imm. Plt Fraction 3.7 0.6 - 13.1 %   ESTIMATED GFR    Collection Time: 12/02/24 11:49 PM   Result Value Ref Range    GFR (CKD-EPI) 118 >60 mL/min/1.73 m 2   PHOSPHORUS    Collection Time: 12/02/24 11:49 PM   Result Value Ref Range    Phosphorus 3.3 2.5 - 4.5 mg/dL   MAGNESIUM    Collection Time: 12/02/24 11:49 PM   Result Value Ref Range    Magnesium 1.8 1.5 - 2.5 mg/dL   AMMONIA    Collection Time: 12/03/24  1:46 AM   Result Value Ref Range    Ammonia 80 (H) 11 - 45 umol/L       Radiology  No orders to  display       Medications:   New Prescriptions    No medications on file       My final assessment includes alcohol intoxication  Upon Reevaluation, the patient's condition has: Improved; and will be discharged.    Patient discharged from ED Observation status at 0730 (Time) 12/4/24 (Date).     Total time spent on this ED Observation discharge encounter is > 30 Minutes    Electronically signed by: Nba Barclay M.D., 12/4/2024 7:27 AM

## 2024-12-07 ENCOUNTER — APPOINTMENT (OUTPATIENT)
Dept: RADIOLOGY | Facility: MEDICAL CENTER | Age: 44
End: 2024-12-07
Attending: STUDENT IN AN ORGANIZED HEALTH CARE EDUCATION/TRAINING PROGRAM
Payer: COMMERCIAL

## 2024-12-07 ENCOUNTER — HOSPITAL ENCOUNTER (EMERGENCY)
Facility: MEDICAL CENTER | Age: 44
End: 2024-12-07
Attending: STUDENT IN AN ORGANIZED HEALTH CARE EDUCATION/TRAINING PROGRAM | Admitting: INTERNAL MEDICINE
Payer: COMMERCIAL

## 2024-12-07 VITALS
DIASTOLIC BLOOD PRESSURE: 72 MMHG | HEIGHT: 75 IN | WEIGHT: 284 LBS | OXYGEN SATURATION: 95 % | RESPIRATION RATE: 14 BRPM | TEMPERATURE: 97.3 F | HEART RATE: 61 BPM | BODY MASS INDEX: 35.31 KG/M2 | SYSTOLIC BLOOD PRESSURE: 139 MMHG

## 2024-12-07 DIAGNOSIS — K70.30 ALCOHOLIC CIRRHOSIS OF LIVER WITHOUT ASCITES (HCC): ICD-10-CM

## 2024-12-07 DIAGNOSIS — R41.82 ALTERED MENTAL STATUS, UNSPECIFIED ALTERED MENTAL STATUS TYPE: ICD-10-CM

## 2024-12-07 DIAGNOSIS — R17 JAUNDICE: ICD-10-CM

## 2024-12-07 DIAGNOSIS — K76.82 HEPATIC ENCEPHALOPATHY (HCC): ICD-10-CM

## 2024-12-07 LAB
ALBUMIN SERPL BCP-MCNC: 3.1 G/DL (ref 3.2–4.9)
ALBUMIN/GLOB SERPL: 1.1 G/DL
ALP SERPL-CCNC: 180 U/L (ref 30–99)
ALT SERPL-CCNC: 26 U/L (ref 2–50)
AMMONIA PLAS-SCNC: 132 UMOL/L (ref 11–45)
ANION GAP SERPL CALC-SCNC: 13 MMOL/L (ref 7–16)
APPEARANCE UR: CLEAR
AST SERPL-CCNC: 57 U/L (ref 12–45)
BACTERIA #/AREA URNS HPF: ABNORMAL /HPF
BASOPHILS # BLD AUTO: 0.2 % (ref 0–1.8)
BASOPHILS # BLD: 0.01 K/UL (ref 0–0.12)
BILIRUB SERPL-MCNC: 4.1 MG/DL (ref 0.1–1.5)
BILIRUB UR QL STRIP.AUTO: NEGATIVE
BUN SERPL-MCNC: 7 MG/DL (ref 8–22)
CALCIUM ALBUM COR SERPL-MCNC: 8.9 MG/DL (ref 8.5–10.5)
CALCIUM SERPL-MCNC: 8.2 MG/DL (ref 8.5–10.5)
CASTS URNS QL MICRO: ABNORMAL /LPF (ref 0–2)
CHLORIDE SERPL-SCNC: 105 MMOL/L (ref 96–112)
CO2 SERPL-SCNC: 21 MMOL/L (ref 20–33)
COLOR UR: YELLOW
CREAT SERPL-MCNC: 0.6 MG/DL (ref 0.5–1.4)
EOSINOPHIL # BLD AUTO: 0.1 K/UL (ref 0–0.51)
EOSINOPHIL NFR BLD: 2.1 % (ref 0–6.9)
EPITHELIAL CELLS 1715: ABNORMAL /HPF (ref 0–5)
ERYTHROCYTE [DISTWIDTH] IN BLOOD BY AUTOMATED COUNT: 57.2 FL (ref 35.9–50)
ETHANOL BLD-MCNC: 150 MG/DL
GFR SERPLBLD CREATININE-BSD FMLA CKD-EPI: 123 ML/MIN/1.73 M 2
GLOBULIN SER CALC-MCNC: 2.8 G/DL (ref 1.9–3.5)
GLUCOSE BLD STRIP.AUTO-MCNC: 96 MG/DL (ref 65–99)
GLUCOSE SERPL-MCNC: 101 MG/DL (ref 65–99)
GLUCOSE UR STRIP.AUTO-MCNC: NEGATIVE MG/DL
HCT VFR BLD AUTO: 28.2 % (ref 42–52)
HGB BLD-MCNC: 9.4 G/DL (ref 14–18)
IMM GRANULOCYTES # BLD AUTO: 0.01 K/UL (ref 0–0.11)
IMM GRANULOCYTES NFR BLD AUTO: 0.2 % (ref 0–0.9)
KETONES UR STRIP.AUTO-MCNC: NEGATIVE MG/DL
LEUKOCYTE ESTERASE UR QL STRIP.AUTO: NEGATIVE
LIPASE SERPL-CCNC: 46 U/L (ref 11–82)
LYMPHOCYTES # BLD AUTO: 1.19 K/UL (ref 1–4.8)
LYMPHOCYTES NFR BLD: 24.5 % (ref 22–41)
MAGNESIUM SERPL-MCNC: 1.8 MG/DL (ref 1.5–2.5)
MCH RBC QN AUTO: 31.2 PG (ref 27–33)
MCHC RBC AUTO-ENTMCNC: 33.3 G/DL (ref 32.3–36.5)
MCV RBC AUTO: 93.7 FL (ref 81.4–97.8)
MICRO URNS: ABNORMAL
MONOCYTES # BLD AUTO: 0.78 K/UL (ref 0–0.85)
MONOCYTES NFR BLD AUTO: 16.1 % (ref 0–13.4)
NEUTROPHILS # BLD AUTO: 2.76 K/UL (ref 1.82–7.42)
NEUTROPHILS NFR BLD: 56.9 % (ref 44–72)
NITRITE UR QL STRIP.AUTO: NEGATIVE
NRBC # BLD AUTO: 0 K/UL
NRBC BLD-RTO: 0 /100 WBC (ref 0–0.2)
PH UR STRIP.AUTO: 7 [PH] (ref 5–8)
PLATELET # BLD AUTO: 71 K/UL (ref 164–446)
PLATELETS.RETICULATED NFR BLD AUTO: 2.7 % (ref 0.6–13.1)
PMV BLD AUTO: 11.4 FL (ref 9–12.9)
POTASSIUM SERPL-SCNC: 3.2 MMOL/L (ref 3.6–5.5)
PROT SERPL-MCNC: 5.9 G/DL (ref 6–8.2)
PROT UR QL STRIP: NEGATIVE MG/DL
RBC # BLD AUTO: 3.01 M/UL (ref 4.7–6.1)
RBC # URNS HPF: ABNORMAL /HPF (ref 0–2)
RBC UR QL AUTO: ABNORMAL
SODIUM SERPL-SCNC: 139 MMOL/L (ref 135–145)
SP GR UR STRIP.AUTO: 1
UROBILINOGEN UR STRIP.AUTO-MCNC: 1 EU/DL
WBC # BLD AUTO: 4.9 K/UL (ref 4.8–10.8)
WBC #/AREA URNS HPF: ABNORMAL /HPF

## 2024-12-07 PROCEDURE — 80053 COMPREHEN METABOLIC PANEL: CPT

## 2024-12-07 PROCEDURE — A9270 NON-COVERED ITEM OR SERVICE: HCPCS | Mod: UD

## 2024-12-07 PROCEDURE — 82962 GLUCOSE BLOOD TEST: CPT

## 2024-12-07 PROCEDURE — 99285 EMERGENCY DEPT VISIT HI MDM: CPT

## 2024-12-07 PROCEDURE — 700102 HCHG RX REV CODE 250 W/ 637 OVERRIDE(OP): Mod: UD

## 2024-12-07 PROCEDURE — 700101 HCHG RX REV CODE 250: Mod: UD

## 2024-12-07 PROCEDURE — 96366 THER/PROPH/DIAG IV INF ADDON: CPT

## 2024-12-07 PROCEDURE — 81001 URINALYSIS AUTO W/SCOPE: CPT

## 2024-12-07 PROCEDURE — 83735 ASSAY OF MAGNESIUM: CPT

## 2024-12-07 PROCEDURE — 96367 TX/PROPH/DG ADDL SEQ IV INF: CPT

## 2024-12-07 PROCEDURE — 36415 COLL VENOUS BLD VENIPUNCTURE: CPT

## 2024-12-07 PROCEDURE — 96375 TX/PRO/DX INJ NEW DRUG ADDON: CPT

## 2024-12-07 PROCEDURE — 83690 ASSAY OF LIPASE: CPT

## 2024-12-07 PROCEDURE — 85025 COMPLETE CBC W/AUTO DIFF WBC: CPT

## 2024-12-07 PROCEDURE — 700111 HCHG RX REV CODE 636 W/ 250 OVERRIDE (IP): Mod: UD

## 2024-12-07 PROCEDURE — 99284 EMERGENCY DEPT VISIT MOD MDM: CPT | Mod: GC | Performed by: FAMILY MEDICINE

## 2024-12-07 PROCEDURE — 82140 ASSAY OF AMMONIA: CPT

## 2024-12-07 PROCEDURE — 82077 ASSAY SPEC XCP UR&BREATH IA: CPT

## 2024-12-07 PROCEDURE — 96365 THER/PROPH/DIAG IV INF INIT: CPT

## 2024-12-07 PROCEDURE — 85055 RETICULATED PLATELET ASSAY: CPT

## 2024-12-07 PROCEDURE — 74177 CT ABD & PELVIS W/CONTRAST: CPT

## 2024-12-07 PROCEDURE — 70450 CT HEAD/BRAIN W/O DYE: CPT

## 2024-12-07 PROCEDURE — 700117 HCHG RX CONTRAST REV CODE 255: Mod: UD | Performed by: STUDENT IN AN ORGANIZED HEALTH CARE EDUCATION/TRAINING PROGRAM

## 2024-12-07 PROCEDURE — 700105 HCHG RX REV CODE 258: Mod: UD

## 2024-12-07 RX ORDER — FOLIC ACID 1 MG/1
1 TABLET ORAL DAILY
Status: DISCONTINUED | OUTPATIENT
Start: 2024-12-08 | End: 2024-12-07 | Stop reason: HOSPADM

## 2024-12-07 RX ORDER — LORAZEPAM 1 MG/1
1 TABLET ORAL EVERY 4 HOURS PRN
Status: DISCONTINUED | OUTPATIENT
Start: 2024-12-07 | End: 2024-12-07 | Stop reason: HOSPADM

## 2024-12-07 RX ORDER — AMLODIPINE BESYLATE 5 MG/1
5 TABLET ORAL DAILY
Status: DISCONTINUED | OUTPATIENT
Start: 2024-12-07 | End: 2024-12-07 | Stop reason: HOSPADM

## 2024-12-07 RX ORDER — ESCITALOPRAM OXALATE 10 MG/1
20 TABLET ORAL DAILY
Status: DISCONTINUED | OUTPATIENT
Start: 2024-12-07 | End: 2024-12-07 | Stop reason: HOSPADM

## 2024-12-07 RX ORDER — PROCHLORPERAZINE EDISYLATE 5 MG/ML
5-10 INJECTION INTRAMUSCULAR; INTRAVENOUS EVERY 4 HOURS PRN
Status: DISCONTINUED | OUTPATIENT
Start: 2024-12-07 | End: 2024-12-07 | Stop reason: HOSPADM

## 2024-12-07 RX ORDER — SODIUM CHLORIDE, SODIUM LACTATE, POTASSIUM CHLORIDE, CALCIUM CHLORIDE 600; 310; 30; 20 MG/100ML; MG/100ML; MG/100ML; MG/100ML
INJECTION, SOLUTION INTRAVENOUS CONTINUOUS
Status: DISCONTINUED | OUTPATIENT
Start: 2024-12-07 | End: 2024-12-07 | Stop reason: HOSPADM

## 2024-12-07 RX ORDER — POTASSIUM CHLORIDE 750 MG/1
20 TABLET, EXTENDED RELEASE ORAL DAILY
Status: DISCONTINUED | OUTPATIENT
Start: 2024-12-07 | End: 2024-12-07 | Stop reason: HOSPADM

## 2024-12-07 RX ORDER — ONDANSETRON 2 MG/ML
4 INJECTION INTRAMUSCULAR; INTRAVENOUS EVERY 4 HOURS PRN
Status: DISCONTINUED | OUTPATIENT
Start: 2024-12-07 | End: 2024-12-07 | Stop reason: HOSPADM

## 2024-12-07 RX ORDER — GAUZE BANDAGE 2" X 2"
100 BANDAGE TOPICAL DAILY
Status: SHIPPED | COMMUNITY
End: 2024-12-21

## 2024-12-07 RX ORDER — LORAZEPAM 2 MG/ML
0.5 INJECTION INTRAMUSCULAR EVERY 4 HOURS PRN
Status: DISCONTINUED | OUTPATIENT
Start: 2024-12-07 | End: 2024-12-07 | Stop reason: HOSPADM

## 2024-12-07 RX ORDER — PROMETHAZINE HYDROCHLORIDE 25 MG/1
12.5-25 SUPPOSITORY RECTAL EVERY 4 HOURS PRN
Status: DISCONTINUED | OUTPATIENT
Start: 2024-12-07 | End: 2024-12-07 | Stop reason: HOSPADM

## 2024-12-07 RX ORDER — LORAZEPAM 1 MG/1
0.5 TABLET ORAL EVERY 4 HOURS PRN
Status: DISCONTINUED | OUTPATIENT
Start: 2024-12-07 | End: 2024-12-07 | Stop reason: HOSPADM

## 2024-12-07 RX ORDER — ENOXAPARIN SODIUM 100 MG/ML
40 INJECTION SUBCUTANEOUS DAILY
Status: DISCONTINUED | OUTPATIENT
Start: 2024-12-07 | End: 2024-12-07 | Stop reason: HOSPADM

## 2024-12-07 RX ORDER — LORAZEPAM 2 MG/ML
2 INJECTION INTRAMUSCULAR
Status: DISCONTINUED | OUTPATIENT
Start: 2024-12-07 | End: 2024-12-07 | Stop reason: HOSPADM

## 2024-12-07 RX ORDER — LACTULOSE 10 G/15ML
45 SOLUTION ORAL 4 TIMES DAILY
Status: DISCONTINUED | OUTPATIENT
Start: 2024-12-07 | End: 2024-12-07 | Stop reason: HOSPADM

## 2024-12-07 RX ORDER — LORAZEPAM 2 MG/ML
1.5 INJECTION INTRAMUSCULAR
Status: DISCONTINUED | OUTPATIENT
Start: 2024-12-07 | End: 2024-12-07 | Stop reason: HOSPADM

## 2024-12-07 RX ORDER — LORAZEPAM 2 MG/1
2 TABLET ORAL
Status: DISCONTINUED | OUTPATIENT
Start: 2024-12-07 | End: 2024-12-07 | Stop reason: HOSPADM

## 2024-12-07 RX ORDER — AMOXICILLIN 250 MG
2 CAPSULE ORAL EVERY EVENING
Status: DISCONTINUED | OUTPATIENT
Start: 2024-12-07 | End: 2024-12-07 | Stop reason: HOSPADM

## 2024-12-07 RX ORDER — POTASSIUM CHLORIDE 7.45 MG/ML
10 INJECTION INTRAVENOUS
Status: COMPLETED | OUTPATIENT
Start: 2024-12-07 | End: 2024-12-07

## 2024-12-07 RX ORDER — LORAZEPAM 2 MG/ML
1 INJECTION INTRAMUSCULAR
Status: DISCONTINUED | OUTPATIENT
Start: 2024-12-07 | End: 2024-12-07 | Stop reason: HOSPADM

## 2024-12-07 RX ORDER — GAUZE BANDAGE 2" X 2"
100 BANDAGE TOPICAL DAILY
Status: DISCONTINUED | OUTPATIENT
Start: 2024-12-08 | End: 2024-12-07 | Stop reason: HOSPADM

## 2024-12-07 RX ORDER — ONDANSETRON 4 MG/1
4 TABLET, ORALLY DISINTEGRATING ORAL EVERY 4 HOURS PRN
Status: DISCONTINUED | OUTPATIENT
Start: 2024-12-07 | End: 2024-12-07 | Stop reason: HOSPADM

## 2024-12-07 RX ORDER — FUROSEMIDE 40 MG/1
80 TABLET ORAL DAILY
Status: DISCONTINUED | OUTPATIENT
Start: 2024-12-07 | End: 2024-12-07 | Stop reason: HOSPADM

## 2024-12-07 RX ORDER — HYDROXYZINE HYDROCHLORIDE 25 MG/1
25 TABLET, FILM COATED ORAL 3 TIMES DAILY PRN
Status: DISCONTINUED | OUTPATIENT
Start: 2024-12-07 | End: 2024-12-07 | Stop reason: HOSPADM

## 2024-12-07 RX ORDER — PANTOPRAZOLE SODIUM 40 MG/1
40 TABLET, DELAYED RELEASE ORAL 2 TIMES DAILY
Status: DISCONTINUED | OUTPATIENT
Start: 2024-12-07 | End: 2024-12-07

## 2024-12-07 RX ORDER — LORAZEPAM 2 MG/1
4 TABLET ORAL
Status: DISCONTINUED | OUTPATIENT
Start: 2024-12-07 | End: 2024-12-07 | Stop reason: HOSPADM

## 2024-12-07 RX ORDER — CARVEDILOL 3.12 MG/1
3.12 TABLET ORAL 2 TIMES DAILY WITH MEALS
Status: DISCONTINUED | OUTPATIENT
Start: 2024-12-07 | End: 2024-12-07 | Stop reason: HOSPADM

## 2024-12-07 RX ORDER — GABAPENTIN 300 MG/1
300 CAPSULE ORAL 3 TIMES DAILY
Status: DISCONTINUED | OUTPATIENT
Start: 2024-12-07 | End: 2024-12-07 | Stop reason: HOSPADM

## 2024-12-07 RX ORDER — SPIRONOLACTONE 25 MG/1
100 TABLET ORAL DAILY
Status: DISCONTINUED | OUTPATIENT
Start: 2024-12-07 | End: 2024-12-07 | Stop reason: HOSPADM

## 2024-12-07 RX ORDER — LABETALOL HYDROCHLORIDE 5 MG/ML
10 INJECTION, SOLUTION INTRAVENOUS EVERY 4 HOURS PRN
Status: DISCONTINUED | OUTPATIENT
Start: 2024-12-07 | End: 2024-12-07 | Stop reason: HOSPADM

## 2024-12-07 RX ORDER — FERROUS SULFATE 325(65) MG
325 TABLET ORAL DAILY
Status: DISCONTINUED | OUTPATIENT
Start: 2024-12-07 | End: 2024-12-07 | Stop reason: HOSPADM

## 2024-12-07 RX ORDER — PROMETHAZINE HYDROCHLORIDE 25 MG/1
12.5-25 TABLET ORAL EVERY 4 HOURS PRN
Status: DISCONTINUED | OUTPATIENT
Start: 2024-12-07 | End: 2024-12-07 | Stop reason: HOSPADM

## 2024-12-07 RX ORDER — POLYETHYLENE GLYCOL 3350 17 G/17G
1 POWDER, FOR SOLUTION ORAL
Status: DISCONTINUED | OUTPATIENT
Start: 2024-12-07 | End: 2024-12-07 | Stop reason: HOSPADM

## 2024-12-07 RX ADMIN — IOHEXOL 100 ML: 350 INJECTION, SOLUTION INTRAVENOUS at 03:45

## 2024-12-07 RX ADMIN — POTASSIUM CHLORIDE 10 MEQ: 7.45 INJECTION INTRAVENOUS at 06:50

## 2024-12-07 RX ADMIN — LACTULOSE 45 ML: 10 SOLUTION ORAL at 08:21

## 2024-12-07 RX ADMIN — POTASSIUM CHLORIDE 10 MEQ: 7.45 INJECTION INTRAVENOUS at 09:06

## 2024-12-07 RX ADMIN — GABAPENTIN 300 MG: 300 CAPSULE ORAL at 08:21

## 2024-12-07 RX ADMIN — POTASSIUM CHLORIDE 10 MEQ: 7.45 INJECTION INTRAVENOUS at 07:52

## 2024-12-07 RX ADMIN — POTASSIUM CHLORIDE 10 MEQ: 7.45 INJECTION INTRAVENOUS at 09:56

## 2024-12-07 RX ADMIN — LABETALOL HYDROCHLORIDE 10 MG: 5 INJECTION, SOLUTION INTRAVENOUS at 05:17

## 2024-12-07 RX ADMIN — FOLIC ACID: 5 INJECTION, SOLUTION INTRAMUSCULAR; INTRAVENOUS; SUBCUTANEOUS at 04:37

## 2024-12-07 ASSESSMENT — LIFESTYLE VARIABLES
AUDITORY DISTURBANCES: NOT PRESENT
VISUAL DISTURBANCES: NOT PRESENT
AGITATION: NORMAL ACTIVITY
NAUSEA AND VOMITING: NO NAUSEA AND NO VOMITING
TOTAL SCORE: 6
AGITATION: NORMAL ACTIVITY
ORIENTATION AND CLOUDING OF SENSORIUM: ORIENTED AND CAN DO SERIAL ADDITIONS
VISUAL DISTURBANCES: NOT PRESENT
AUDITORY DISTURBANCES: NOT PRESENT
PAROXYSMAL SWEATS: *
ANXIETY: MILDLY ANXIOUS
TOTAL SCORE: 4
ANXIETY: MILDLY ANXIOUS
HEADACHE, FULLNESS IN HEAD: VERY MILD
TREMOR: *
ORIENTATION AND CLOUDING OF SENSORIUM: ORIENTED AND CAN DO SERIAL ADDITIONS
NAUSEA AND VOMITING: NO NAUSEA AND NO VOMITING
HEADACHE, FULLNESS IN HEAD: VERY MILD
PAROXYSMAL SWEATS: *
TREMOR: NO TREMOR

## 2024-12-07 ASSESSMENT — FIBROSIS 4 INDEX: FIB4 SCORE: 6.71

## 2024-12-07 NOTE — ASSESSMENT & PLAN NOTE
Hematuria present on UA, unsure if gross.  Hx of hematuria mentioned  -CTM, consider repeat UA and outpt urology f/u

## 2024-12-07 NOTE — PROGRESS NOTES
Patient seen this morning, awoken from sleep, attempted to discuss with patient his chief complaint and obtain further information on what brought him to the hospital. However patient was very somnolent, his EtOh level was 150 and he could not remember when his last drink was.   Patient stated he was aware he was in the hospital, in [place], and the year was 2024. He stated he wants to stop drinking and did not elaborate when asked about abdominal or lower extremity pain.     Physical Exam:   General: Somnolent, difficult to arouse  Cardiac: distant heart sounds  Lungs: CTAB  Abdomen: nontender, generalized R sided distention.  Extremities: LE edema, L LE cooler to touch than R, nontender to palpation.   Neuro: Generalized weakness throughout UE and LE    Later this morning at 1110, I and Dr. Daysi Graf presented at bedside to evaluate patient and were informed that patient wishes to leave the hospital AGAINST MEDICAL ADVICE.   Discussion with patient, he was insistent on leaving, he is aware it is against medical advice. He states he saw his primary provider, Dr. Finch, this week and this is who he gets his medications from. Unable to verify information in Epic, however we see he was last in the ED of [facility] on 12/4 and the ED of [facility] on the same day.   Patient does not wish to discuss further and is insistent on leaving.     Informed patient of risks associated with leaving Against Medical Advice and encouraged him to return to the ED for further health concerns.      Hannah Horvath, DO  PGY-1 Family Medicine

## 2024-12-07 NOTE — ASSESSMENT & PLAN NOTE
Very chronic without patient compliance in alcohol cessation or medication management.  TIPS procedure occurred March 2024 in [place], has been shown to be patent on October imaging.  Patient maxed out on lactulose 45 mL QID, rarely continues to take this after discharge.  Patient also on rifaximin.  GI consulted on previous admissions, patient is as optimally medically managed as is possible if he is taking his medications, is due for endoscopy but has not been compliant with follow-up.  Patient is more altered on this admission with negative head CT (as is typical) and without reported fall/head strike prior to presentation.  Last drink unknown at this time (previous admission at the 7 PM on 12/3).    Alcohol level pending- Follow-up  - Restart previously prescribed home meds: Lactulose, rifaximin  - Monitor stool output  - Consider trending ammonia  - Continue spironolactone 100 mg daily, Lasix 10 mg daily and potassium repletion 20 mEq daily  - Rally bag followed by oral thiamine and folate.  - Referrals to case management difficult discharge.

## 2024-12-07 NOTE — ED NOTES
Bedside report received from prior RN. Pt alert to voice. Resp normal/unlabored on RA. Bed side rails up/in low position. Pt updated to POC and all questions answered.     Pt cleaned from incontinence, pt straight cathed and UA sent.

## 2024-12-07 NOTE — ED PROVIDER NOTES
"ER Provider Note    Scribed for Dr. Flex Goode MD. by Linnea Mack. 12/7/2024  1:58 AM    Primary Care Provider: Mann Johnson M.D.    CHIEF COMPLAINT  Chief Complaint   Patient presents with    Abdominal Pain     generalized    Jaundice    Weakness     \"I can't walk\"       EXTERNAL RECORDS REVIEWED  Other Patient is a frequent patient in the ED and his last visit was on 12/4/2024 for a fall with injury to his hip.      HPI/ROS  LIMITATION TO HISTORY   Select: Intoxication    OUTSIDE HISTORIAN(S):  None     Jalen Vaughn is a 44 y.o. male who presents to the ED for abdominal pain onset prior to arrival. As per triage the patient reported generalized abdominal pain but did not elaborate. Per chart review the patient has frequent hospital visits, falls, and chronic intoxication.     PAST MEDICAL HISTORY  Past Medical History:   Diagnosis Date    Cirrhosis of liver (HCC)     Hypertension     Liver disease     Seizure (HCC)        SURGICAL HISTORY  Past Surgical History:   Procedure Laterality Date    MS UPPER GI ENDOSCOPY,DIAGNOSIS N/A 10/27/2024    Procedure: GASTROSCOPY;  Surgeon: Treva Lopez M.D.;  Location: SURGERY [facility];  Service: Gastroenterology    APPENDECTOMY      CHOLECYSTECTOMY         FAMILY HISTORY  Family History   Problem Relation Age of Onset    Heart Disease Mother     Kidney Disease Mother     Heart Disease Father     Kidney Disease Father        SOCIAL HISTORY   reports that she has been smoking cigarettes. She has quit using smokeless tobacco.  Her smokeless tobacco use included chew. She reports current alcohol use. She reports that she does not currently use drugs.    CURRENT MEDICATIONS  Previous Medications    AMLODIPINE (NORVASC) 5 MG TAB    Take 1 Tablet by mouth every day for 30 days.    CARVEDILOL (COREG) 3.125 MG TAB    Take 1 Tablet by mouth 2 times a day with meals for 30 days.    ESCITALOPRAM (LEXAPRO) 20 MG TABLET    Take 1 Tablet by mouth every day for " "30 days.    FERROUS SULFATE 325 (65 FE) MG TABLET    Take 1 Tablet by mouth every day.    FOLIC ACID (FOLVITE) 1 MG TAB    Take 1 Tablet by mouth every day.    FUROSEMIDE (LASIX) 80 MG TAB    Take 1 Tablet by mouth every day.    GABAPENTIN (NEURONTIN) 300 MG CAP    Take 1 Capsule by mouth 3 times a day.    HYDROXYZINE HCL (ATARAX) 25 MG TAB    Take 1 Tablet by mouth 3 times a day as needed for Anxiety.    LACTULOSE 20 GM/30ML SOLUTION    Take 45 mL by mouth 4 times a day.    PANTOPRAZOLE (PROTONIX) 40 MG TABLET DELAYED RESPONSE    Take 1 Tablet by mouth 2 times a day.    POTASSIUM CHLORIDE (KLOR-CON) 20 MEQ PACK    Take 1 Packet by mouth every day.    RIFAXIMIN (XIFAXAN) 550 MG TAB TABLET    Take 1 Tablet by mouth 2 times a day.    SPIRONOLACTONE (ALDACTONE) 100 MG TAB    Take 1 Tablet by mouth every day.       ALLERGIES  Latex and Morphine    PHYSICAL EXAM  BP (!) 178/98   Pulse 72   Temp 36.3 °C (97.3 °F)   Resp 12   Ht 1.905 m (6' 3\")   Wt (!) 129 kg (284 lb)   SpO2 99%   BMI 35.50 kg/m²   Physical Exam  Vitals and nursing note reviewed.   Constitutional:       Appearance: She is well-developed.      Comments: Garbled speech   HENT:      Head: Normocephalic.   Cardiovascular:      Rate and Rhythm: Normal rate and regular rhythm.      Heart sounds: No murmur heard.  Pulmonary:      Effort: Pulmonary effort is normal.      Breath sounds: Normal breath sounds.   Abdominal:      Palpations: Abdomen is soft.      Tenderness: There is no abdominal tenderness.      Comments: Ecchymosis scattered across the abdomen.   Musculoskeletal:         General: Normal range of motion.      Right lower leg: No edema.      Left lower leg: No edema.      Comments: Moves all extremities   Skin:     General: Skin is warm.   Neurological:      General: No focal deficit present.      Mental Status: She is oriented to person, place, and time.      Comments: Arouses to painful stimuli. Attunement       DIAGNOSTIC STUDIES & " PROCEDURES    Labs:   Results for orders placed or performed during the hospital encounter of 12/07/24   URINALYSIS    Collection Time: 12/07/24  1:03 AM    Specimen: Urine, Clean Catch   Result Value Ref Range    Color Yellow     Character Clear     Specific Gravity 1.004 <1.035    Ph 7.0 5.0 - 8.0    Glucose Negative Negative mg/dL    Ketones Negative Negative mg/dL    Protein Negative Negative mg/dL    Bilirubin Negative Negative    Urobilinogen, Urine 1.0 <=1.0 EU/dL    Nitrite Negative Negative    Leukocyte Esterase Negative Negative    Occult Blood Moderate (A) Negative    Micro Urine Req Microscopic    URINE MICROSCOPIC (W/UA)    Collection Time: 12/07/24  1:03 AM   Result Value Ref Range    WBC 0-2 /hpf    RBC 11-20 (A) 0 - 2 /hpf    Bacteria None None /hpf    Epithelial Cells 0-2 0 - 5 /hpf    Urine Casts 0-2 0 - 2 /lpf   POCT glucose device results    Collection Time: 12/07/24  2:04 AM   Result Value Ref Range    POC Glucose, Blood 96 65 - 99 mg/dL   CBC WITH DIFFERENTIAL    Collection Time: 12/07/24  2:16 AM   Result Value Ref Range    WBC 4.9 4.8 - 10.8 K/uL    RBC 3.01 (L) 4.70 - 6.10 M/uL    Hemoglobin 9.4 (L) 14.0 - 18.0 g/dL    Hematocrit 28.2 (L) 42.0 - 52.0 %    MCV 93.7 81.4 - 97.8 fL    MCH 31.2 27.0 - 33.0 pg    MCHC 33.3 32.3 - 36.5 g/dL    RDW 57.2 (H) 35.9 - 50.0 fL    Platelet Count 71 (L) 164 - 446 K/uL    MPV 11.4 9.0 - 12.9 fL    Neutrophils-Polys 56.90 44.00 - 72.00 %    Lymphocytes 24.50 22.00 - 41.00 %    Monocytes 16.10 (H) 0.00 - 13.40 %    Eosinophils 2.10 0.00 - 6.90 %    Basophils 0.20 0.00 - 1.80 %    Immature Granulocytes 0.20 0.00 - 0.90 %    Nucleated RBC 0.00 0.00 - 0.20 /100 WBC    Neutrophils (Absolute) 2.76 1.82 - 7.42 K/uL    Lymphs (Absolute) 1.19 1.00 - 4.80 K/uL    Monos (Absolute) 0.78 0.00 - 0.85 K/uL    Eos (Absolute) 0.10 0.00 - 0.51 K/uL    Baso (Absolute) 0.01 0.00 - 0.12 K/uL    Immature Granulocytes (abs) 0.01 0.00 - 0.11 K/uL    NRBC (Absolute) 0.00 K/uL    COMP METABOLIC PANEL    Collection Time: 12/07/24  2:16 AM   Result Value Ref Range    Sodium 139 135 - 145 mmol/L    Potassium 3.2 (L) 3.6 - 5.5 mmol/L    Chloride 105 96 - 112 mmol/L    Co2 21 20 - 33 mmol/L    Anion Gap 13.0 7.0 - 16.0    Glucose 101 (H) 65 - 99 mg/dL    Bun 7 (L) 8 - 22 mg/dL    Creatinine 0.60 0.50 - 1.40 mg/dL    Calcium 8.2 (L) 8.5 - 10.5 mg/dL    Correct Calcium 8.9 8.5 - 10.5 mg/dL    AST(SGOT) 57 (H) 12 - 45 U/L    ALT(SGPT) 26 2 - 50 U/L    Alkaline Phosphatase 180 (H) 30 - 99 U/L    Total Bilirubin 4.1 (H) 0.1 - 1.5 mg/dL    Albumin 3.1 (L) 3.2 - 4.9 g/dL    Total Protein 5.9 (L) 6.0 - 8.2 g/dL    Globulin 2.8 1.9 - 3.5 g/dL    A-G Ratio 1.1 g/dL   LIPASE    Collection Time: 12/07/24  2:16 AM   Result Value Ref Range    Lipase 46 11 - 82 U/L   AMMONIA    Collection Time: 12/07/24  2:16 AM   Result Value Ref Range    Ammonia 132 (HH) 11 - 45 umol/L   IMMATURE PLT FRACTION    Collection Time: 12/07/24  2:16 AM   Result Value Ref Range    Imm. Plt Fraction 2.7 0.6 - 13.1 %   ESTIMATED GFR    Collection Time: 12/07/24  2:16 AM   Result Value Ref Range    GFR (CKD-EPI) 123 >60 mL/min/1.73 m 2       All labs reviewed by me.    Radiology:   The attending Emergency Physician has independently interpreted the diagnostic imaging associated with this visit and is awaiting the final reading from the radiologist, which will be displayed below.    Preliminary interpretation is a follows:   CT abd/pelvis no acute process, ct head no acute process  Radiologist interpretation:    CT-ABDOMEN-PELVIS WITH   Final Result         1. Trace right pleural effusion and moderate anasarca subcutaneous edema unchanged. No ascites.      2. Cirrhosis with varices and liver TIPS stent. Mild 14 cm splenomegaly.      3. Bladder full at 455 cc. Mild right renal pelvis fullness is unchanged. No zaid hydronephrosis.      4. No bowel distention or inflammation.      5. Subacute healing lower left rib fractures unchanged  from 11/19/2024. Bilateral hip degenerative changes.      CT-HEAD W/O   Final Result         1. No acute process in the brain evident.                  Point of Care Ultrasound    ED ULTRASOUND GUIDED PERIPHERAL VENOUS ACCESS    Limited Diagnostic Exam: Venipuncture requiring physician skill with ultrasound guidance  Ultrasound guidance required secondary to:REASONS: Scar tissue  Patient Identity confirmed: Yes  Risks, benefits and alternatives were discussed  Consent obtained verbally  Indication for Exam: Vascular Access     Vein/Location: left AC  Normal Compressibility and Visualized Patency   Catheter Size: 18 g  Number of Attempts: 1  Successful Catheter Insertion: yes  Complications: none    Image retained through [facility] as seen below:         Additional interpretation:multiple failed attempts.     This study is a limited ultrasound examination performed and interpreted to evaluate for limited conditions as outlined above. There may be other clinically important information contained in the images that is outside this scope. When clinically warranted, a comprehensive ultrasound through the appropriate department is considered.      COURSE & MEDICAL DECISION MAKING    INITIAL ASSESSMENT AND PLAN  Care Narrative:       1:58 AM - Patient seen and evaluated at bedside.     3:30 AM- Paged inpatient team for admission    4:13 AM I discussed the patient's case and the above findings with Dr. Mckeon (family medicine) who agrees to evaluate the patient for hospitalization.  I informed the patient of my plan to admit today given the patient's current presentation and diagnostic study results. Patient verbalizes understanding and support with my plan for admission.                 DISPOSITION AND DISCUSSIONS    44-year-old male history of alcoholism, cirrhosis who presents to the ED for altered mental status.  Patient also reporting abdominal pain and difficulty walking.  On exam he is notably jaundiced, disoriented and  unable to participate reason remainder of history of physical exam.  There is minimal appreciable abdominal tenderness there is no fluid wave present however given degree of chronic illness additional workup with labs and CT imaging obtained.  Thankfully CT head and CT abdomen showed no acute process at this time however the patient's ammonia is markedly elevated from his baseline.  Suspect this is due to medication nonadherence as he does smell of alcohol and has a history of such.  Will admit patient for additional management.  Discussed case with family medicine team is kindly agreed to admit.    I have discussed management of the patient with the following physicians and ELISSA's: Dr. Mota (General surgery)     Discussion of management with other [facility] or appropriate source(s): None     Escalation of care considered, and ultimately not performed: .    Barriers to care at this time, including but not limited to:  None noted .     Decision tools and prescription drugs considered including, but not limited to: .    DISPOSITION:    Patient will be hospitalized by Dr. Mckeon (Family Medicine) in guarded condition.      FINAL IMPRESSION   1. Hepatic encephalopathy (HCC)    2. Altered mental status, unspecified altered mental status type    3. Jaundice    4. Alcoholic cirrhosis of liver without ascites (HCC)         Linnea ARIAS (Sagar), am scribing for, and in the presence of, Flex Goode M.D..    Electronically signed by: Linnea Mack (Sagar), 12/7/2024    Flex ARIAS M.D. personally performed the services described in this documentation, as scribed by Linnea Mack in my presence, and it is both accurate and complete.    The note accurately reflects work and decisions made by me.  Flex Goode M.D.  12/10/2024  3:44 PM

## 2024-12-07 NOTE — ED NOTES
Contact to multiple personnel to determine who pt's providing attendees are, charge updated to issues with contacting [facility], x2 residents now at bedside to discuss with pt

## 2024-12-07 NOTE — ED NOTES
At bedside with ERP. Pt non- verbal. Pupils 4, reactive, equal. Pt withdrawals from pain.     FSBG 96

## 2024-12-07 NOTE — ASSESSMENT & PLAN NOTE
Blood pressure notably elevated on this admission to 170s/90s.  Most likely that patient has been noncompliant with home medications: Amlodipine and carvedilol (additionally is prescribed Lasix and spironolactone).  - Restart amlodipine 5 mg and carvedilol 3.25 mg twice daily.  - As needed labetalol

## 2024-12-07 NOTE — ED NOTES
Lab called with critical result of ammonia  at 132. Critical lab result read back to lab.   Dr. Goode notified of critical lab result at 0311.  Critical lab result read back by Dr. Goode.

## 2024-12-07 NOTE — ED NOTES
Pt wheeled to the room via WC. Changed into a gown and placed on SpO2 and BP monitors. Call light within reach. No further complaints at this time. Chart up for ERP.

## 2024-12-07 NOTE — ED NOTES
Pt wanting to walk to bathroom, informed that d/t mobility issues only can stand for urinal, pt accepted and stand and pivot to urinate, pt stating wanting to leave AMA

## 2024-12-07 NOTE — H&P
"[facility] MEDICINE HISTORY AND PHYSICAL     PATIENT ID:  NAME:  Jalen Vaughn  MRN:               [id number]  YOB: 1980    Date of Admission: 12/7/2024     Attending: Kacy Mota    Resident: Hannah Mckeon     Primary Care Physician: Mann Johnson    CC: Abdominal pain, jaundice, \"I can't walk\"    HPI: Jalen Vaughn is a 44 y.o. male well-known to our service with past medical history of alcohol cirrhosis s/p TIPS, HTN and patient alcohol related admissions who presented by EMS due to recurrence of abdominal pain, presence of jaundice and perceived weakness.    Patient recently admitted by myself in early a.m. of 12/3 for similar presentation, although less intoxicated/altered at that time.  Later that day, he left AMA expressing concern that he needed to work to get to his kids in [place] and stated he had a plan to get his medications (specifically his lactulose), verbalizing understanding of the importance to abstain from alcohol.    On exam, patient is somewhat arousable to light sternal rubs but will not answer any of my questions and does not utter any discernible words.    SOCIAL HISTORY (from past admissions):   Pt is homeless and typically resides at [facility] or the park across the street.    Tobacco use: Previously noted chewing tobacco  Etoh use: Frequent.   Drug use: Denies    ERCourse:  Vitals on presentation: T: 97.3, P: 72, RR: 12, BP: 177/98, SpO2: 99% on room air.  (En route blood glucose was 96)  Weight, 284 pounds, patient has fluctuated amongst 20 pounds in the last 2 weeks.    CBC: Pertinent for Hb of 9.4 (baseline, WBCs normal at 4.9.  Platelets 71 (baseline).  CMP: K3.2, glucose 101, AST 57, ALT 26, , total bilirubin: 4.1.  Lipase: 46  UA: Notable only for moderate occult blood    CT abdomen/pelvis with: Trace right pleural effusion, moderate anasarca (unchanged, no ascites).  Cirrhosis with varices and liver TIPS stent.  Mild 14 cm splenomegaly.  Full " bladder, mild right renal pelvis fullness unchanged.  No zaid hydronephrosis.  No bowel distention or inflammation.  Subacute healing well rib fractures unchanged from 11/19, bilateral degenerative hip changes.    CT head without: No acute process.  (Note: This will be his 9th negative (non-acute) noncontrast head CT since 10/17/24)      REVIEW OF SYSTEMS:   Ten systems reviewed and were negative except as noted in the HPI.                PAST MEDICAL HISTORY:  Past Medical History:   Diagnosis Date    Cirrhosis of liver (HCC)     Hypertension     Liver disease     Seizure (HCC)        PAST SURGICAL HISTORY:  Past Surgical History:   Procedure Laterality Date    IL UPPER GI ENDOSCOPY,DIAGNOSIS N/A 10/27/2024    Procedure: GASTROSCOPY;  Surgeon: Treva Lopez M.D.;  Location: SURGERY [facility];  Service: Gastroenterology    APPENDECTOMY      CHOLECYSTECTOMY         FAMILY HISTORY:  Family History   Problem Relation Age of Onset    Heart Disease Mother     Kidney Disease Mother     Heart Disease Father     Kidney Disease Father          ALLERGIES:  Allergies   Allergen Reactions    Latex Itching    Morphine Itching       OUTPATIENT MEDICATIONS:  No current facility-administered medications on file prior to encounter.     Current Outpatient Medications on File Prior to Encounter   Medication Sig Dispense Refill    amLODIPine (NORVASC) 5 MG Tab Take 1 Tablet by mouth every day for 30 days. 30 Tablet 0    carvedilol (COREG) 3.125 MG Tab Take 1 Tablet by mouth 2 times a day with meals for 30 days. 60 Tablet 0    escitalopram (LEXAPRO) 20 MG tablet Take 1 Tablet by mouth every day for 30 days. 30 Tablet 0    ferrous sulfate 325 (65 Fe) MG tablet Take 1 Tablet by mouth every day. 30 Tablet 0    folic acid (FOLVITE) 1 MG Tab Take 1 Tablet by mouth every day. 30 Tablet 0    furosemide (LASIX) 80 MG Tab Take 1 Tablet by mouth every day. 60 Tablet 0    gabapentin (NEURONTIN) 300 MG Cap Take 1 Capsule by mouth 3 times a  day. 90 Capsule 0    hydrOXYzine HCl (ATARAX) 25 MG Tab Take 1 Tablet by mouth 3 times a day as needed for Anxiety. 30 Tablet 1    lactulose 20 GM/30ML Solution Take 45 mL by mouth 4 times a day. 473 mL 0    pantoprazole (PROTONIX) 40 MG Tablet Delayed Response Take 1 Tablet by mouth 2 times a day. 30 Tablet 0    potassium chloride (KLOR-CON) 20 MEQ Pack Take 1 Packet by mouth every day. 30 Each 0    riFAXIMin (XIFAXAN) 550 MG Tab tablet Take 1 Tablet by mouth 2 times a day. 60 Tablet 0    spironolactone (ALDACTONE) 100 MG Tab Take 1 Tablet by mouth every day. 30 Tablet 3       PHYSICAL EXAM:  Vitals:    24 0222 24 0313 24 0402 24 0502   BP: (!) 166/99 (!) 177/90 (!) 156/84 (!) 183/88   Pulse: 72 78 79 75   Resp: 14 14 15 14   Temp:       SpO2: 97% 96% 94% 95%   Weight:       Height:       , Temp (24hrs), Av.3 °C (97.3 °F), Min:36.3 °C (97.3 °F), Max:36.3 °C (97.3 °F)  , Pulse Oximetry: 95 %, O2 Delivery Device: None - Room Air    General: Pt resting in NAD, arousable but not coherent.  Skin: Mild jaundice to hips, bruising on abdomen (from Lovenox shots).  Superficial abrasions throughout.  HEENT: NC/AT.  When eyelids opened by myself, eyes are unfocused and not aligned, pupils constricted but equal.  No nasal discharge.  Neck:  Supple without lymphadenopathy or rigidity. No JVD   Lungs:  Symmetrical.  CTAB with no W/R/R.  Good air movement   Cardiovascular:  S1/S2 RRR.  2/6 systolic murmur.  2+ pitting edema on lower LEs.  Abdomen:  Abdomen is soft NT/ND.  Palpable hepatomegaly.  MSK:   No gross deformities noted.   Neuro: Not able to assess due to patient's altered status.      LAB TESTS:   Results for orders placed or performed during the hospital encounter of 24   URINALYSIS    Collection Time: 24  1:03 AM    Specimen: Urine, Clean Catch   Result Value Ref Range    Color Yellow     Character Clear     Specific Gravity 1.004 <1.035    Ph 7.0 5.0 - 8.0    Glucose Negative  Negative mg/dL    Ketones Negative Negative mg/dL    Protein Negative Negative mg/dL    Bilirubin Negative Negative    Urobilinogen, Urine 1.0 <=1.0 EU/dL    Nitrite Negative Negative    Leukocyte Esterase Negative Negative    Occult Blood Moderate (A) Negative    Micro Urine Req Microscopic    URINE MICROSCOPIC (W/UA)    Collection Time: 12/07/24  1:03 AM   Result Value Ref Range    WBC 0-2 /hpf    RBC 11-20 (A) 0 - 2 /hpf    Bacteria None None /hpf    Epithelial Cells 0-2 0 - 5 /hpf    Urine Casts 0-2 0 - 2 /lpf   POCT glucose device results    Collection Time: 12/07/24  2:04 AM   Result Value Ref Range    POC Glucose, Blood 96 65 - 99 mg/dL   CBC WITH DIFFERENTIAL    Collection Time: 12/07/24  2:16 AM   Result Value Ref Range    WBC 4.9 4.8 - 10.8 K/uL    RBC 3.01 (L) 4.70 - 6.10 M/uL    Hemoglobin 9.4 (L) 14.0 - 18.0 g/dL    Hematocrit 28.2 (L) 42.0 - 52.0 %    MCV 93.7 81.4 - 97.8 fL    MCH 31.2 27.0 - 33.0 pg    MCHC 33.3 32.3 - 36.5 g/dL    RDW 57.2 (H) 35.9 - 50.0 fL    Platelet Count 71 (L) 164 - 446 K/uL    MPV 11.4 9.0 - 12.9 fL    Neutrophils-Polys 56.90 44.00 - 72.00 %    Lymphocytes 24.50 22.00 - 41.00 %    Monocytes 16.10 (H) 0.00 - 13.40 %    Eosinophils 2.10 0.00 - 6.90 %    Basophils 0.20 0.00 - 1.80 %    Immature Granulocytes 0.20 0.00 - 0.90 %    Nucleated RBC 0.00 0.00 - 0.20 /100 WBC    Neutrophils (Absolute) 2.76 1.82 - 7.42 K/uL    Lymphs (Absolute) 1.19 1.00 - 4.80 K/uL    Monos (Absolute) 0.78 0.00 - 0.85 K/uL    Eos (Absolute) 0.10 0.00 - 0.51 K/uL    Baso (Absolute) 0.01 0.00 - 0.12 K/uL    Immature Granulocytes (abs) 0.01 0.00 - 0.11 K/uL    NRBC (Absolute) 0.00 K/uL   COMP METABOLIC PANEL    Collection Time: 12/07/24  2:16 AM   Result Value Ref Range    Sodium 139 135 - 145 mmol/L    Potassium 3.2 (L) 3.6 - 5.5 mmol/L    Chloride 105 96 - 112 mmol/L    Co2 21 20 - 33 mmol/L    Anion Gap 13.0 7.0 - 16.0    Glucose 101 (H) 65 - 99 mg/dL    Bun 7 (L) 8 - 22 mg/dL    Creatinine 0.60 0.50 -  1.40 mg/dL    Calcium 8.2 (L) 8.5 - 10.5 mg/dL    Correct Calcium 8.9 8.5 - 10.5 mg/dL    AST(SGOT) 57 (H) 12 - 45 U/L    ALT(SGPT) 26 2 - 50 U/L    Alkaline Phosphatase 180 (H) 30 - 99 U/L    Total Bilirubin 4.1 (H) 0.1 - 1.5 mg/dL    Albumin 3.1 (L) 3.2 - 4.9 g/dL    Total Protein 5.9 (L) 6.0 - 8.2 g/dL    Globulin 2.8 1.9 - 3.5 g/dL    A-G Ratio 1.1 g/dL   LIPASE    Collection Time: 12/07/24  2:16 AM   Result Value Ref Range    Lipase 46 11 - 82 U/L   AMMONIA    Collection Time: 12/07/24  2:16 AM   Result Value Ref Range    Ammonia 132 (HH) 11 - 45 umol/L   IMMATURE PLT FRACTION    Collection Time: 12/07/24  2:16 AM   Result Value Ref Range    Imm. Plt Fraction 2.7 0.6 - 13.1 %   ESTIMATED GFR    Collection Time: 12/07/24  2:16 AM   Result Value Ref Range    GFR (CKD-EPI) 123 >60 mL/min/1.73 m 2   MAGNESIUM    Collection Time: 12/07/24  2:16 AM   Result Value Ref Range    Magnesium 1.8 1.5 - 2.5 mg/dL       CULTURES:   Results       Procedure Component Value Units Date/Time    URINALYSIS [[id number]]  (Abnormal) Collected: 12/07/24 0103    Order Status: Completed Specimen: Urine, Clean Catch Updated: 12/07/24 0150     Color Yellow     Character Clear     Specific Gravity 1.004     Ph 7.0     Glucose Negative mg/dL      Ketones Negative mg/dL      Protein Negative mg/dL      Bilirubin Negative     Urobilinogen, Urine 1.0 EU/dL      Nitrite Negative     Leukocyte Esterase Negative     Occult Blood Moderate     Micro Urine Req Microscopic            IMAGES:  CT-ABDOMEN-PELVIS WITH   Final Result         1. Trace right pleural effusion and moderate anasarca subcutaneous edema unchanged. No ascites.      2. Cirrhosis with varices and liver TIPS stent. Mild 14 cm splenomegaly.      3. Bladder full at 455 cc. Mild right renal pelvis fullness is unchanged. No zaid hydronephrosis.      4. No bowel distention or inflammation.      5. Subacute healing lower left rib fractures unchanged from 11/19/2024. Bilateral hip  degenerative changes.      CT-HEAD W/O   Final Result         1. No acute process in the brain evident.                   CONSULTS:   Difficult discharge, case management    ASSESSMENT/PLAN: 44 y.o. male well known to our service with past medical history of alcohol cirrhosis s/p TIPS, HTN and frequent alcohol-related admissions admitted 12/7 for AMS in setting of alcohol intoxication.      Hepatic encephalopathy secondary to alcoholic cirrhosis  Very chronic without patient compliance in alcohol cessation or medication management.  TIPS procedure occurred March 2024 in [place], has been shown to be patent on October imaging.  Patient maxed out on lactulose 45 mL QID, rarely continues to take this after discharge.  Patient also on rifaximin.  GI consulted on previous admissions, patient is as optimally medically managed as is possible if he is taking his medications, is due for endoscopy but has not been compliant with follow-up.  Patient is more altered on this admission with negative head CT (as is typical) and without reported fall/head strike prior to presentation.  Last drink unknown at this time (previous admission at the 7 PM on 12/3).    Alcohol level pending- Follow-up  - Restart previously prescribed home meds: Lactulose, rifaximin  - Monitor stool output  - Consider trending ammonia  - Continue spironolactone 100 mg daily, Lasix 10 mg daily and potassium repletion 20 mEq daily  - Rally bag followed by oral thiamine and folate.  -CIWA protocol initiated - HOLD until alcohol level returns - will likely need Librium taper if/when withdrawing.    - Referrals to case management difficult discharge.    Rib fracture  Noted on CT abdomen/pelvis this admission as chronic and unchanged from 11/19  - Pain management as is reasonable based on patient's mentation, caution against opioids due to sedation in already chronically altered state and likely with co-administration of benzos for withdrawal.    Primary  hypertension  Blood pressure notably elevated on this admission to 170s/90s.  Most likely that patient has been noncompliant with home medications: Amlodipine and carvedilol (additionally is prescribed Lasix and spironolactone).  - Restart amlodipine 5 mg and carvedilol 3.25 mg twice daily.  - As needed labetalol    Benign essential microscopic hematuria  Hematuria present on UA, unsure if gross.  Hx of hematuria mentioned  -CTM, consider repeat UA and outpt urology f/u      Core Measures:  Fluids: LR @ 125ml/hr  Lines: PIV  Abx: None  Diet: NPO until speech evals and/or mentation improves  PPX: Lovenox    DISPO: Pending AMS work up, monitoring and likely need for alcohol withdrawal tx.        CODE STATUS: Full code (presumed d/t lack of capacity and previous admissions)       Hannah Mckeon D.O.  PGY-3  [facility]    I anticipate the patient will require at least two midnights for appropriate medical management, necessitating inpatient admission due to need for AMS work up and tx.

## 2024-12-07 NOTE — ASSESSMENT & PLAN NOTE
Noted on CT abdomen/pelvis this admission as chronic and unchanged from 11/19  - Pain management as is reasonable based on patient's mentation, caution against opioids due to sedation in already chronically altered state and likely with co-administration of benzos for withdrawal.

## 2024-12-07 NOTE — ED NOTES
Medication history reviewed per patients dispense records/historical/chart review.  Med rec is complete  Allergies reviewed.   Patient prescribed Augmentin 11/10/24  Anticoagulants taken in the last 14 days? None prescribed    Lo Barragan

## 2024-12-07 NOTE — ED NOTES
Called [facility] to contact the appropriate MD signed into the pt's care, [facility] assigned not logged into voalte

## 2024-12-07 NOTE — ED TRIAGE NOTES
"Chief Complaint   Patient presents with    Abdominal Pain     generalized    Jaundice    Weakness     \"I can't walk\"     Patient BIB [facility] and to triage in a  for the above complaints. Patient lethargic and minimally cooperative with triage at this time. Patient states that he has abdominal pain, but does not elaborate, and that he \"is sick\" and when asked to elaborate states, \"I can't walk for one.\" Patient required a 2 person assist to stand and use urinal to pee. Per chart review, patient has had frequent hospital visits for increased falls and chronic intoxication.    Protocol ordered.    Pt is alert and oriented, speaking in full sentences, follows commands and responds appropriately to questions. Resp are even and unlabored.      Pt placed in lobby. Pt educated on triage process. Pt encouraged to alert staff for any changes.     Patient and staff wearing appropriate PPE.    BP (!) 178/98   Pulse 72   Temp 36.3 °C (97.3 °F)   Resp 12   Ht 1.905 m (6' 3\")   Wt (!) 129 kg (284 lb)   SpO2 99%     "

## 2024-12-08 ENCOUNTER — HOSPITAL ENCOUNTER (EMERGENCY)
Facility: MEDICAL CENTER | Age: 44
End: 2024-12-08
Attending: EMERGENCY MEDICINE
Payer: COMMERCIAL

## 2024-12-08 VITALS
HEART RATE: 91 BPM | RESPIRATION RATE: 15 BRPM | WEIGHT: 284.39 LBS | OXYGEN SATURATION: 93 % | HEIGHT: 75 IN | BODY MASS INDEX: 35.36 KG/M2 | DIASTOLIC BLOOD PRESSURE: 89 MMHG | TEMPERATURE: 98.6 F | SYSTOLIC BLOOD PRESSURE: 174 MMHG

## 2024-12-08 DIAGNOSIS — E87.6 HYPOKALEMIA: ICD-10-CM

## 2024-12-08 DIAGNOSIS — D64.9 ANEMIA, UNSPECIFIED TYPE: ICD-10-CM

## 2024-12-08 DIAGNOSIS — R10.9 CHRONIC ABDOMINAL PAIN: ICD-10-CM

## 2024-12-08 DIAGNOSIS — Z76.0 MEDICATION REFILL: ICD-10-CM

## 2024-12-08 DIAGNOSIS — F10.920 ALCOHOLIC INTOXICATION WITHOUT COMPLICATION (HCC): ICD-10-CM

## 2024-12-08 DIAGNOSIS — G89.29 CHRONIC ABDOMINAL PAIN: ICD-10-CM

## 2024-12-08 LAB
ALBUMIN SERPL BCP-MCNC: 2.8 G/DL (ref 3.2–4.9)
ALBUMIN/GLOB SERPL: 1.1 G/DL
ALP SERPL-CCNC: 187 U/L (ref 30–99)
ALT SERPL-CCNC: 29 U/L (ref 2–50)
AMMONIA PLAS-SCNC: 112 UMOL/L (ref 11–45)
ANION GAP SERPL CALC-SCNC: 12 MMOL/L (ref 7–16)
APTT PPP: 35.1 SEC (ref 24.7–36)
AST SERPL-CCNC: 70 U/L (ref 12–45)
BASOPHILS # BLD AUTO: 0.4 % (ref 0–1.8)
BASOPHILS # BLD: 0.02 K/UL (ref 0–0.12)
BILIRUB SERPL-MCNC: 3.4 MG/DL (ref 0.1–1.5)
BUN SERPL-MCNC: 6 MG/DL (ref 8–22)
CALCIUM ALBUM COR SERPL-MCNC: 8.7 MG/DL (ref 8.5–10.5)
CALCIUM SERPL-MCNC: 7.7 MG/DL (ref 8.5–10.5)
CHLORIDE SERPL-SCNC: 107 MMOL/L (ref 96–112)
CO2 SERPL-SCNC: 20 MMOL/L (ref 20–33)
CREAT SERPL-MCNC: 0.73 MG/DL (ref 0.5–1.4)
EOSINOPHIL # BLD AUTO: 0.09 K/UL (ref 0–0.51)
EOSINOPHIL NFR BLD: 2 % (ref 0–6.9)
ERYTHROCYTE [DISTWIDTH] IN BLOOD BY AUTOMATED COUNT: 56.6 FL (ref 35.9–50)
ETHANOL BLD-MCNC: 66.3 MG/DL
GFR SERPLBLD CREATININE-BSD FMLA CKD-EPI: 116 ML/MIN/1.73 M 2
GLOBULIN SER CALC-MCNC: 2.6 G/DL (ref 1.9–3.5)
GLUCOSE SERPL-MCNC: 106 MG/DL (ref 65–99)
HCT VFR BLD AUTO: 25 % (ref 42–52)
HGB BLD-MCNC: 8.6 G/DL (ref 14–18)
IMM GRANULOCYTES # BLD AUTO: 0 K/UL (ref 0–0.11)
IMM GRANULOCYTES NFR BLD AUTO: 0 % (ref 0–0.9)
INR PPP: 1.86 (ref 0.87–1.13)
LIPASE SERPL-CCNC: 42 U/L (ref 11–82)
LYMPHOCYTES # BLD AUTO: 1.14 K/UL (ref 1–4.8)
LYMPHOCYTES NFR BLD: 25.1 % (ref 22–41)
MCH RBC QN AUTO: 31.5 PG (ref 27–33)
MCHC RBC AUTO-ENTMCNC: 34.4 G/DL (ref 32.3–36.5)
MCV RBC AUTO: 91.6 FL (ref 81.4–97.8)
MONOCYTES # BLD AUTO: 0.78 K/UL (ref 0–0.85)
MONOCYTES NFR BLD AUTO: 17.2 % (ref 0–13.4)
NEUTROPHILS # BLD AUTO: 2.51 K/UL (ref 1.82–7.42)
NEUTROPHILS NFR BLD: 55.3 % (ref 44–72)
NRBC # BLD AUTO: 0 K/UL
NRBC BLD-RTO: 0 /100 WBC (ref 0–0.2)
PLATELET # BLD AUTO: 67 K/UL (ref 164–446)
PLATELETS.RETICULATED NFR BLD AUTO: 2 % (ref 0.6–13.1)
PMV BLD AUTO: 9.8 FL (ref 9–12.9)
POTASSIUM SERPL-SCNC: 3.2 MMOL/L (ref 3.6–5.5)
PROT SERPL-MCNC: 5.4 G/DL (ref 6–8.2)
PROTHROMBIN TIME: 21.5 SEC (ref 12–14.6)
RBC # BLD AUTO: 2.73 M/UL (ref 4.7–6.1)
SODIUM SERPL-SCNC: 139 MMOL/L (ref 135–145)
WBC # BLD AUTO: 4.5 K/UL (ref 4.8–10.8)

## 2024-12-08 PROCEDURE — 85055 RETICULATED PLATELET ASSAY: CPT

## 2024-12-08 PROCEDURE — 85025 COMPLETE CBC W/AUTO DIFF WBC: CPT

## 2024-12-08 PROCEDURE — 80053 COMPREHEN METABOLIC PANEL: CPT

## 2024-12-08 PROCEDURE — 85730 THROMBOPLASTIN TIME PARTIAL: CPT

## 2024-12-08 PROCEDURE — 83690 ASSAY OF LIPASE: CPT

## 2024-12-08 PROCEDURE — 82077 ASSAY SPEC XCP UR&BREATH IA: CPT

## 2024-12-08 PROCEDURE — 99285 EMERGENCY DEPT VISIT HI MDM: CPT

## 2024-12-08 PROCEDURE — 700102 HCHG RX REV CODE 250 W/ 637 OVERRIDE(OP): Mod: UD | Performed by: EMERGENCY MEDICINE

## 2024-12-08 PROCEDURE — 82140 ASSAY OF AMMONIA: CPT

## 2024-12-08 PROCEDURE — 36415 COLL VENOUS BLD VENIPUNCTURE: CPT

## 2024-12-08 PROCEDURE — A9270 NON-COVERED ITEM OR SERVICE: HCPCS | Mod: UD | Performed by: EMERGENCY MEDICINE

## 2024-12-08 PROCEDURE — 85610 PROTHROMBIN TIME: CPT

## 2024-12-08 RX ORDER — POTASSIUM CHLORIDE 1500 MG/1
20 TABLET, EXTENDED RELEASE ORAL ONCE
Status: COMPLETED | OUTPATIENT
Start: 2024-12-08 | End: 2024-12-08

## 2024-12-08 RX ORDER — LACTULOSE 10 G/15ML
45 SOLUTION ORAL 4 TIMES DAILY
Qty: 473 ML | Refills: 0 | Status: ON HOLD | OUTPATIENT
Start: 2024-12-08 | End: 2024-12-26

## 2024-12-08 RX ADMIN — POTASSIUM CHLORIDE 20 MEQ: 1500 TABLET, EXTENDED RELEASE ORAL at 23:00

## 2024-12-08 ASSESSMENT — PAIN DESCRIPTION - PAIN TYPE: TYPE: CHRONIC PAIN

## 2024-12-08 ASSESSMENT — FIBROSIS 4 INDEX: FIB4 SCORE: 6.61

## 2024-12-09 ENCOUNTER — HOSPITAL ENCOUNTER (EMERGENCY)
Facility: MEDICAL CENTER | Age: 44
End: 2024-12-09
Payer: COMMERCIAL

## 2024-12-09 VITALS
WEIGHT: 284.39 LBS | TEMPERATURE: 97.6 F | SYSTOLIC BLOOD PRESSURE: 162 MMHG | BODY MASS INDEX: 35.36 KG/M2 | DIASTOLIC BLOOD PRESSURE: 90 MMHG | RESPIRATION RATE: 16 BRPM | OXYGEN SATURATION: 97 % | HEART RATE: 91 BPM | HEIGHT: 75 IN

## 2024-12-09 PROCEDURE — 302449 STATCHG TRIAGE ONLY (STATISTIC)

## 2024-12-09 ASSESSMENT — FIBROSIS 4 INDEX: FIB4 SCORE: 8.15

## 2024-12-09 NOTE — ED NOTES
Rounded on patient. Patient sleeping in bed. Denies further needs at this time. Call light within reach.

## 2024-12-09 NOTE — ED TRIAGE NOTES
"Chief Complaint   Patient presents with    Diarrhea     Pt BIB EMS from [facility] for Diarrhea. Pt was seen her yesterday for similar complaint. Pt states he has had diarrhea x4 months.        Pt wheeled to triage. Pt A&Ox4, for above complaint.     Pt to lobby . Pt educated on alerting staff in changes to condition. Pt verbalized understanding.     BP (!) 162/90   Pulse 91   Temp 36.4 °C (97.6 °F) (Temporal)   Resp 16   Ht 1.905 m (6' 3\")   Wt (!) 129 kg (284 lb 6.3 oz)   SpO2 97%   BMI 35.55 kg/m²     "
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
## 2024-12-09 NOTE — ED NOTES
Pt provided discharge ppw and instructions, verbalized understanding. Pt provided ice chips upon request and wheel chair for discharge to ED lobby.  PIV removed.  Pt advised medications will be available for  at in house pharmacy.  Pt provided face sheet with medicaid ID for MTM arrangement

## 2024-12-09 NOTE — ED TRIAGE NOTES
"Chief Complaint   Patient presents with    Abdominal Pain     Pt states \"I've been having abdominal pain for 3 months non stop, I can't sleep\". Reports he vomited once today and admits drinking 1 beer today.     N/V     BP (!) 161/82 Comment: R arm  Pulse 86   Temp 36.9 °C (98.4 °F) (Temporal)   Resp 14   Ht 1.905 m (6' 3\")   Wt (!) 129 kg (284 lb 6.3 oz)   SpO2 98%   BMI 35.55 kg/m²     Pain:8/10    Pt came in to triage via EMS from [facility].    Pt is alert and oriented x 4, speaking in full sentences, follows commands and responds appropriately to questions. Respirations are even and unlabored.    Pt placed in lobby. Pt educated on triage process.     Pt encouraged to inform staff for any changes in condition or if needs help while waiting to be room in.    "

## 2024-12-10 NOTE — ED NOTES
Patient approached staff requesting to leave AMA. Patient informed of risks up to and including death associated with leaving before being seen by a physician. Pt instructed to return to Emergency Department or call 911 if their condition worsens. Patient is A&Ox4 with a  GCS of 15. Patient verbalized understanding and signed AMA form. Will dismiss.

## 2024-12-18 ENCOUNTER — APPOINTMENT (OUTPATIENT)
Dept: RADIOLOGY | Facility: MEDICAL CENTER | Age: 44
DRG: 432 | End: 2024-12-18
Attending: STUDENT IN AN ORGANIZED HEALTH CARE EDUCATION/TRAINING PROGRAM
Payer: COMMERCIAL

## 2024-12-18 ENCOUNTER — HOSPITAL ENCOUNTER (EMERGENCY)
Facility: MEDICAL CENTER | Age: 44
DRG: 432 | End: 2024-12-18
Attending: STUDENT IN AN ORGANIZED HEALTH CARE EDUCATION/TRAINING PROGRAM
Payer: COMMERCIAL

## 2024-12-18 VITALS
TEMPERATURE: 97.8 F | OXYGEN SATURATION: 94 % | HEIGHT: 75 IN | SYSTOLIC BLOOD PRESSURE: 141 MMHG | HEART RATE: 83 BPM | DIASTOLIC BLOOD PRESSURE: 68 MMHG | BODY MASS INDEX: 35.31 KG/M2 | RESPIRATION RATE: 20 BRPM | WEIGHT: 284 LBS

## 2024-12-18 DIAGNOSIS — N50.89 SCROTAL EDEMA: ICD-10-CM

## 2024-12-18 DIAGNOSIS — Z76.0 MEDICATION REFILL: ICD-10-CM

## 2024-12-18 DIAGNOSIS — R60.0 PERIPHERAL EDEMA: ICD-10-CM

## 2024-12-18 DIAGNOSIS — Z87.19 HISTORY OF CIRRHOSIS OF LIVER: ICD-10-CM

## 2024-12-18 LAB
ALBUMIN SERPL BCP-MCNC: 2.8 G/DL (ref 3.2–4.9)
ALBUMIN/GLOB SERPL: 1 G/DL
ALP SERPL-CCNC: 188 U/L (ref 30–99)
ALT SERPL-CCNC: 38 U/L (ref 2–50)
ANION GAP SERPL CALC-SCNC: 12 MMOL/L (ref 7–16)
AST SERPL-CCNC: 88 U/L (ref 12–45)
BASOPHILS # BLD AUTO: 0.3 % (ref 0–1.8)
BASOPHILS # BLD: 0.02 K/UL (ref 0–0.12)
BILIRUB SERPL-MCNC: 2.6 MG/DL (ref 0.1–1.5)
BUN SERPL-MCNC: 8 MG/DL (ref 8–22)
CALCIUM ALBUM COR SERPL-MCNC: 8.5 MG/DL (ref 8.5–10.5)
CALCIUM SERPL-MCNC: 7.5 MG/DL (ref 8.5–10.5)
CHLORIDE SERPL-SCNC: 105 MMOL/L (ref 96–112)
CO2 SERPL-SCNC: 19 MMOL/L (ref 20–33)
CREAT SERPL-MCNC: 0.52 MG/DL (ref 0.5–1.4)
EKG IMPRESSION: NORMAL
EOSINOPHIL # BLD AUTO: 0.09 K/UL (ref 0–0.51)
EOSINOPHIL NFR BLD: 1.5 % (ref 0–6.9)
ERYTHROCYTE [DISTWIDTH] IN BLOOD BY AUTOMATED COUNT: 60.3 FL (ref 35.9–50)
GFR SERPLBLD CREATININE-BSD FMLA CKD-EPI: 128 ML/MIN/1.73 M 2
GLOBULIN SER CALC-MCNC: 2.7 G/DL (ref 1.9–3.5)
GLUCOSE SERPL-MCNC: 111 MG/DL (ref 65–99)
HCT VFR BLD AUTO: 27.3 % (ref 42–52)
HGB BLD-MCNC: 8.8 G/DL (ref 14–18)
IMM GRANULOCYTES # BLD AUTO: 0.02 K/UL (ref 0–0.11)
IMM GRANULOCYTES NFR BLD AUTO: 0.3 % (ref 0–0.9)
LYMPHOCYTES # BLD AUTO: 1.48 K/UL (ref 1–4.8)
LYMPHOCYTES NFR BLD: 24.1 % (ref 22–41)
MCH RBC QN AUTO: 30.8 PG (ref 27–33)
MCHC RBC AUTO-ENTMCNC: 32.2 G/DL (ref 32.3–36.5)
MCV RBC AUTO: 95.5 FL (ref 81.4–97.8)
MONOCYTES # BLD AUTO: 1.16 K/UL (ref 0–0.85)
MONOCYTES NFR BLD AUTO: 18.9 % (ref 0–13.4)
NEUTROPHILS # BLD AUTO: 3.38 K/UL (ref 1.82–7.42)
NEUTROPHILS NFR BLD: 54.9 % (ref 44–72)
NRBC # BLD AUTO: 0 K/UL
NRBC BLD-RTO: 0 /100 WBC (ref 0–0.2)
NT-PROBNP SERPL IA-MCNC: 185 PG/ML (ref 0–125)
PLATELET # BLD AUTO: 71 K/UL (ref 164–446)
PLATELETS.RETICULATED NFR BLD AUTO: 3 % (ref 0.6–13.1)
PMV BLD AUTO: 11.6 FL (ref 9–12.9)
POTASSIUM SERPL-SCNC: 3.4 MMOL/L (ref 3.6–5.5)
PROT SERPL-MCNC: 5.5 G/DL (ref 6–8.2)
RBC # BLD AUTO: 2.86 M/UL (ref 4.7–6.1)
SODIUM SERPL-SCNC: 136 MMOL/L (ref 135–145)
TROPONIN T SERPL-MCNC: 27 NG/L (ref 6–19)
TROPONIN T SERPL-MCNC: 30 NG/L (ref 6–19)
WBC # BLD AUTO: 6.2 K/UL (ref 4.8–10.8)

## 2024-12-18 PROCEDURE — 93005 ELECTROCARDIOGRAM TRACING: CPT | Mod: TC | Performed by: STUDENT IN AN ORGANIZED HEALTH CARE EDUCATION/TRAINING PROGRAM

## 2024-12-18 PROCEDURE — 85055 RETICULATED PLATELET ASSAY: CPT

## 2024-12-18 PROCEDURE — 71045 X-RAY EXAM CHEST 1 VIEW: CPT

## 2024-12-18 PROCEDURE — 84484 ASSAY OF TROPONIN QUANT: CPT

## 2024-12-18 PROCEDURE — 700102 HCHG RX REV CODE 250 W/ 637 OVERRIDE(OP): Mod: UD | Performed by: STUDENT IN AN ORGANIZED HEALTH CARE EDUCATION/TRAINING PROGRAM

## 2024-12-18 PROCEDURE — 85025 COMPLETE CBC W/AUTO DIFF WBC: CPT

## 2024-12-18 PROCEDURE — A9270 NON-COVERED ITEM OR SERVICE: HCPCS | Mod: UD | Performed by: STUDENT IN AN ORGANIZED HEALTH CARE EDUCATION/TRAINING PROGRAM

## 2024-12-18 PROCEDURE — 36415 COLL VENOUS BLD VENIPUNCTURE: CPT

## 2024-12-18 PROCEDURE — 99284 EMERGENCY DEPT VISIT MOD MDM: CPT

## 2024-12-18 PROCEDURE — 83880 ASSAY OF NATRIURETIC PEPTIDE: CPT

## 2024-12-18 PROCEDURE — 93005 ELECTROCARDIOGRAM TRACING: CPT | Mod: TC

## 2024-12-18 PROCEDURE — 80053 COMPREHEN METABOLIC PANEL: CPT

## 2024-12-18 RX ORDER — SPIRONOLACTONE 100 MG/1
100 TABLET, FILM COATED ORAL DAILY
Qty: 30 TABLET | Refills: 3 | Status: ON HOLD | OUTPATIENT
Start: 2024-12-18 | End: 2024-12-26

## 2024-12-18 RX ORDER — FUROSEMIDE 80 MG/1
80 TABLET ORAL DAILY
Qty: 60 TABLET | Refills: 0 | Status: ON HOLD | OUTPATIENT
Start: 2024-12-18 | End: 2024-12-26

## 2024-12-18 RX ORDER — FUROSEMIDE 40 MG/1
40 TABLET ORAL
Status: DISCONTINUED | OUTPATIENT
Start: 2024-12-18 | End: 2024-12-19 | Stop reason: HOSPADM

## 2024-12-18 RX ORDER — CARVEDILOL 3.12 MG/1
3.12 TABLET ORAL 2 TIMES DAILY WITH MEALS
Qty: 60 TABLET | Refills: 0 | Status: ON HOLD | OUTPATIENT
Start: 2024-12-18 | End: 2024-12-26

## 2024-12-18 RX ORDER — FOLIC ACID 1 MG/1
1 TABLET ORAL DAILY
Qty: 30 TABLET | Refills: 0 | Status: ON HOLD | OUTPATIENT
Start: 2024-12-18 | End: 2024-12-26

## 2024-12-18 RX ORDER — POTASSIUM CHLORIDE 1.5 G/1.58G
20 POWDER, FOR SOLUTION ORAL DAILY
Qty: 30 EACH | Refills: 0 | Status: ON HOLD | OUTPATIENT
Start: 2024-12-18 | End: 2024-12-26

## 2024-12-18 RX ORDER — POTASSIUM CHLORIDE 1500 MG/1
40 TABLET, EXTENDED RELEASE ORAL ONCE
Status: COMPLETED | OUTPATIENT
Start: 2024-12-18 | End: 2024-12-18

## 2024-12-18 RX ORDER — FUROSEMIDE 40 MG/1
40 TABLET ORAL
Status: DISCONTINUED | OUTPATIENT
Start: 2024-12-19 | End: 2024-12-18

## 2024-12-18 RX ADMIN — FUROSEMIDE 40 MG: 40 TABLET ORAL at 21:39

## 2024-12-18 RX ADMIN — POTASSIUM CHLORIDE 40 MEQ: 1500 TABLET, EXTENDED RELEASE ORAL at 18:58

## 2024-12-18 ASSESSMENT — FIBROSIS 4 INDEX: FIB4 SCORE: 7.97

## 2024-12-19 NOTE — ED TRIAGE NOTES
Patient to ED with complaints of swelling to leg and testicle. He states he is not taking any prescribed medications - he states he went to [facility] to get them and no prescription has been sent there. He has hx of ETOH use and chorisis. He admits to 1 beer today.

## 2024-12-19 NOTE — DISCHARGE INSTRUCTIONS
You need to go  your medications and take them as prescribed if you develop any chest pain shortness of breath please return to the ER.

## 2024-12-19 NOTE — ED PROVIDER NOTES
CHIEF COMPLAINT  Chief Complaint   Patient presents with    Groin Swelling    Difficulty Urinating       LIMITATION TO HISTORY   Select: None for evaluation of lower extremity swelling and scrotal edema.  Patient was seen at [facility] yesterday,    [facility]    Jalen Vaughn is a 44 y.o. male who presents to the Emergency Department Patient declined.  Was given prescription for potassium and Lasix which she did not .  He does state that he has a history of chronic leg and groin swelling that is been worse over the past 3 days no chest pain shortness of breath.  Echo obtained in November was normal with an EF of 65% patient does have a history of alcoholic liver cirrhosis status post TIPS hypertension .  Patient denies any chest pain shortness of breath orthopnea or other complaints.    OUTSIDE HISTORIAN(S):  Select: none    EXTERNAL RECORDS REVIEWED  Select: Other reviewed [facility] note from yesterday was seen for scrotal edema and peripheral edema was given prescription for Lasix and potassium which she has not picked up.  Reviewed discharge summary patient does have a history of alcoholic liver cirrhosis, hypertension and medication noncompliance      PAST MEDICAL HISTORY  Past Medical History:   Diagnosis Date    Cirrhosis of liver (HCC)     Hypertension     Liver disease     Seizure (HCC)      .    SURGICAL HISTORY  Past Surgical History:   Procedure Laterality Date    [place] UPPER GI ENDOSCOPY,DIAGNOSIS N/A 10/27/2024    Procedure: GASTROSCOPY;  Surgeon: Treva Lopez M.D.;  Location: SURGERY [facility];  Service: Gastroenterology    APPENDECTOMY      CHOLECYSTECTOMY           FAMILY HISTORY  Family History   Problem Relation Age of Onset    Heart Disease Mother     Kidney Disease Mother     Heart Disease Father     Kidney Disease Father           SOCIAL HISTORY  Social History     Socioeconomic History    Marital status: Single     Spouse name: Not on file    Number of children: Not on file     Years of education: Not on file    Highest education level: Not on file   Occupational History    Not on file   Tobacco Use    Smoking status: Some Days     Types: Cigarettes    Smokeless tobacco: Former     Types: Chew   Vaping Use    Vaping status: Never Used   Substance and Sexual Activity    Alcohol use: Yes     Comment: 3 drinks daily    Drug use: Not Currently    Sexual activity: Not on file   Other Topics Concern    Not on file   Social History Narrative    Not on file     Social Drivers of Health     Financial Resource Strain: Patient Unable To Answer (12/10/2024)    Received from [facility]    Overall Financial Resource Strain (CARDIA)     Difficulty of Paying Living Expenses: Patient unable to answer   Recent Concern: Financial Resource Strain - High Risk (11/6/2024)    Received from [facility]    Overall Financial Resource Strain (CARDIA)     Difficulty of Paying Living Expenses: Hard   Food Insecurity: Patient Unable To Answer (12/10/2024)    Received from [facility]    Hunger Vital Sign     Worried About Running Out of Food in the Last Year: Patient unable to answer     Ran Out of Food in the Last Year: Patient unable to answer   Recent Concern: Food Insecurity - Food Insecurity Present (11/6/2024)    Received from [facility]    Hunger Vital Sign     Worried About Running Out of Food in the Last Year: Often true     Ran Out of Food in the Last Year: Often true   Transportation Needs: Patient Unable To Answer (12/10/2024)    [facility]     Lack of Transportation (Medical): Patient unable to answer     Lack of Transportation (Non-Medical): Patient unable to answer   Recent Concern: Transportation Needs - Unmet Transportation Needs (11/6/2024)    Received from [facility] - Transportation     Lack of Transportation (Medical): Yes     Lack of Transportation (Non-Medical): Yes   Physical Activity: Unknown (12/10/2024)     Received from [facility]    Exercise Vital Sign     Days of Exercise per Week: Patient unable to answer     Minutes of Exercise per Session: 0 min   Recent Concern: Physical Activity - Inactive (11/6/2024)    Received from [facility]    Exercise Vital Sign     Days of Exercise per Week: 0 days     Minutes of Exercise per Session: 0 min   Stress: Patient Unable To Answer (12/10/2024)    Received from [facility]  Occupational Health - Occupational Stress Questionnaire     Feeling of Stress : Patient unable to answer   Recent Concern: Stress - Stress Concern Present (11/6/2024)    Received from [facility] Occupational Health - Occupational Stress Questionnaire     Feeling of Stress : To some extent   Social Connections: Patient Unable To Answer (12/10/2024)    Received from [facility]    Social Connection and Isolation Panel [NHANES]     Frequency of Communication with Friends and Family: Patient unable to answer     Frequency of Social Gatherings with Friends and Family: Patient unable to answer     Attends Yazidism Services: Patient unable to answer     Active Member of Clubs or Organizations: Patient unable to answer     Attends Club or Organization Meetings: Patient unable to answer     Marital Status: Patient unable to answer   Recent Concern: Social Connections - Socially Isolated (11/6/2024)    Received from [facility]    Social Connection and Isolation Panel [NHANES]     Frequency of Communication with Friends and Family: Never     Frequency of Social Gatherings with Friends and Family: Never     Attends Yazidism Services: Never     Active Member of Clubs or Organizations: No     Attends Club or Organization Meetings: Never     Marital Status:    Intimate Partner Violence: Patient Unable To Answer (12/10/2024)    Received from [facility]    Humiliation, Afraid, Rape, and Kick questionnaire     Fear of Current  or Ex-Partner: Patient unable to answer     Emotionally Abused: Patient unable to answer     Physically Abused: Patient unable to answer     Sexually Abused: Patient unable to answer   Housing Stability: Patient Unable To Answer (12/10/2024)    Received from [facility]    [employer] Stability Vital Sign     Unable to Pay for Housing in the Last Year: Patient unable to answer     Number of Times Moved in the Last Year: 1     Homeless in the Last Year: Patient unable to answer   Recent Concern: Housing Stability - High Risk (11/6/2024)    Received from [facility]    [employer] Stability Vital Sign     Unable to Pay for Housing in the Last Year: Patient unable to answer     Number of Times Moved in the Last Year: 1     Homeless in the Last Year: Yes         CURRENT MEDICATIONS  No current facility-administered medications on file prior to encounter.     Current Outpatient Medications on File Prior to Encounter   Medication Sig Dispense Refill    lactulose 20 GM/30ML Solution Take 45 mL by mouth 4 times a day. 473 mL 0    thiamine (VITAMIN B-1) 100 MG Tab Take 100 mg by mouth every day.      amoxicillin-clavulanate (AUGMENTIN) 875-125 MG Tab Take 1 Tablet by mouth 2 times a day. 7 day course 11/10/24      amLODIPine (NORVASC) 5 MG Tab Take 1 Tablet by mouth every day for 30 days. 30 Tablet 0    escitalopram (LEXAPRO) 20 MG tablet Take 1 Tablet by mouth every day for 30 days. 30 Tablet 0    ferrous sulfate 325 (65 Fe) MG tablet Take 1 Tablet by mouth every day. 30 Tablet 0    gabapentin (NEURONTIN) 300 MG Cap Take 1 Capsule by mouth 3 times a day. 90 Capsule 0    hydrOXYzine HCl (ATARAX) 25 MG Tab Take 1 Tablet by mouth 3 times a day as needed for Anxiety. 30 Tablet 1    pantoprazole (PROTONIX) 40 MG Tablet Delayed Response Take 1 Tablet by mouth 2 times a day. 30 Tablet 0           ALLERGIES  Allergies   Allergen Reactions    Latex Itching    Morphine Itching       PHYSICAL EXAM  VITAL SIGNS:/65   Pulse 82   " Temp 36.6 °C (97.8 °F) (Temporal)   Resp 20   Ht 1.905 m (6' 3\")   Wt (!) 129 kg (284 lb)   SpO2 94%   BMI 35.50 kg/m²       VITALS - vital signs documented prior to this note have been reviewed and noted,  GENERAL - awake, alert, oriented, GCS 15, no apparent distress, non-toxic  appearing  HEENT - normocephalic, atraumatic, pupils equal, sclera anicteric, mucus  membranes moist  NECK - supple, no meningismus, full active range of motion, trachea midline  CARDIOVASCULAR - regular rate/rhythm, no murmurs/gallops/rubs  PULMONARY - no respiratory distress, speaking in full sentences, clear to  auscultation bilaterally, no wheezing/ronchi/rales, no accessory muscle use  GASTROINTESTINAL - soft, non-tender, non-distended, no rebound, guarding,  or peritonitis  GENITOURINARY -penile swelling and scrotal edema  NEUROLOGIC - Awake alert, normal mental status, speech fluid, cognition  normal, moves all extremities  MUSCULOSKELETAL - no obvious asymmetry or deformities present  EXTREMITIES - warm, well-perfused, no cyanosis or significant edema  DERMATOLOGIC - warm, dry, no rashes, no jaundice  PSYCHIATRIC - normal affect, normal insight, normal concentration    DIAGNOSTIC STUDIES / PROCEDURES  EKG  I have independently interpreted this EKG  Interpreted below by myself as EKG demonstrates a sinus rhythm at a rate of 77 with a normal MD QRS slightly prolonged QTc interval appears unchanged compared prior no STEMI pattern.       Report   Date Value Ref Range Status   2024       [facility] Emergency Dept.    Test Date:  2024  Pt Name:    ARSENIO RODGERS               Department: ER  MRN:        [id number]                      Room:  Gender:     Male                         Technician: [id number]  :        1980                   Requested By:ER TRIAGE PROTOCOL  Order #:    [id number]                    Reading MD:    Measurements  Intervals                                Axis  Rate:       77 "                           P:          7  TN:         169                          QRS:        30  QRSD:       100                          T:          44  QT:         444  QTc:        503    Interpretive Statements  Sinus rhythm  Prolonged QT interval  Compared to ECG 11/13/2024 19:42:56  No significant changes                LABS  Labs Reviewed   CBC WITH DIFFERENTIAL - Abnormal; Notable for the following components:       Result Value    RBC 2.86 (*)     Hemoglobin 8.8 (*)     Hematocrit 27.3 (*)     MCHC 32.2 (*)     RDW 60.3 (*)     Platelet Count 71 (*)     Monocytes 18.90 (*)     Monos (Absolute) 1.16 (*)     All other components within normal limits   COMP METABOLIC PANEL - Abnormal; Notable for the following components:    Potassium 3.4 (*)     Co2 19 (*)     Glucose 111 (*)     Calcium 7.5 (*)     AST(SGOT) 88 (*)     Alkaline Phosphatase 188 (*)     Total Bilirubin 2.6 (*)     Albumin 2.8 (*)     Total Protein 5.5 (*)     All other components within normal limits   PROBRAIN NATRIURETIC PEPTIDE, NT - Abnormal; Notable for the following components:    NT-proBNP 185 (*)     All other components within normal limits   TROPONIN - Abnormal; Notable for the following components:    Troponin T 30 (*)     All other components within normal limits   TROPONIN - Abnormal; Notable for the following components:    Troponin T 27 (*)     All other components within normal limits   IMMATURE PLT FRACTION   ESTIMATED GFR       Hypochromic normocytic anemia improved compared to prior thrombocytopenia improving compared to prior bilirubin elevated though at baseline  RADIOLOGY  I have independently interpreted the diagnostic imaging associated with this visit and am waiting the final reading from the radiologist.   My preliminary interpretation is as follows: No pneumothorax      Radiologist interpretation:   DX-CHEST-PORTABLE (1 VIEW)   Final Result      No acute cardiopulmonary abnormality.           COURSE & MEDICAL  DECISION MAKING    ED COURSE:        INTERVENTIONS BY ME:  Medications   furosemide (Lasix) tablet 40 mg (has no administration in time range)   potassium chloride SA (Kdur) tablet 40 mEq (40 mEq Oral Given 12/18/24 1858)             CHEST PAIN:   HEART Score for Major Cardiac Events  HEART Score     Heart score 3    INITIAL ASSESSMENT, COURSE AND PLAN  Care Narrative: Patient presented for evaluation of a peripheral edema and scrotal swelling.  He does have a longstanding history of this, he was prescribed Lasix and potassium which he did not fill.  On examination he does have notable scrotal edema, as well as pitting edema of his lower extremities.  Labs were obtained did show an elevated troponin, though no complaints of chest pain his EKG is nonischemic and delta troponin is unchanged and concern for ACS.  BNP was elevated at 185 though he does have a recent echo which showed a normal ejection fraction. Patient does have a longstanding history of chronic edema, otherwise his vital signs are stable in the emergency department, and his labs are near his baseline, do believe he is appropriate for outpatient management.  I did refill all of his outpatient medications.  Will recommend he go and  his medications he will be given a dose of Lasix as well as potassium tonight return precautions were discussed and he was discharged in stable condition.             ADDITIONAL PROBLEM LIST  History of liver cirrhosis  DISPOSITION AND DISCUSSIONS    Barriers to care at this time, including but not limited to: Patient lacks transportation .     Decision tools and prescription drugs considered including, but not limited to:  Heart score 3 .    FINAL DIAGNOSIS  1. Medication refill    2. Peripheral edema    3. Scrotal edema    4. History of cirrhosis of liver             Electronically signed by: Twin Stanton DO ,9:11 PM 12/18/24

## 2024-12-19 NOTE — ED NOTES
PT reports swelling in testicles & pain x 1 day.  Pt to green 23 in down and on monitor. Call light remains in reach.

## 2024-12-19 NOTE — ED NOTES
Pt aox4, skin pink warm and dry, airway patent, rr even and unlabored, NAD noted. No new complaints. Pt vss. Pt given discharge instructions without further questions. Ambulates to wheelchair upon discharge with steady gait without new incident or distress. Wheeled out to lobby by [employer].

## 2024-12-20 ENCOUNTER — HOSPITAL ENCOUNTER (INPATIENT)
Facility: MEDICAL CENTER | Age: 44
LOS: 2 days | DRG: 432 | End: 2024-12-23
Attending: STUDENT IN AN ORGANIZED HEALTH CARE EDUCATION/TRAINING PROGRAM | Admitting: HOSPITALIST
Payer: COMMERCIAL

## 2024-12-20 DIAGNOSIS — K70.30 ALCOHOLIC CIRRHOSIS OF LIVER WITHOUT ASCITES (HCC): ICD-10-CM

## 2024-12-20 DIAGNOSIS — R60.1 ANASARCA: ICD-10-CM

## 2024-12-20 DIAGNOSIS — I50.23 ACUTE ON CHRONIC SYSTOLIC CONGESTIVE HEART FAILURE (HCC): ICD-10-CM

## 2024-12-20 DIAGNOSIS — J10.1 INFLUENZA A: ICD-10-CM

## 2024-12-20 DIAGNOSIS — K65.2 SPONTANEOUS BACTERIAL PERITONITIS (HCC): ICD-10-CM

## 2024-12-20 DIAGNOSIS — F10.121 ALCOHOL INTOXICATION DELIRIUM (HCC): ICD-10-CM

## 2024-12-20 DIAGNOSIS — E83.51 HYPOCALCEMIA: ICD-10-CM

## 2024-12-20 PROCEDURE — 99285 EMERGENCY DEPT VISIT HI MDM: CPT

## 2024-12-21 ENCOUNTER — APPOINTMENT (OUTPATIENT)
Dept: RADIOLOGY | Facility: MEDICAL CENTER | Age: 44
DRG: 432 | End: 2024-12-21
Payer: COMMERCIAL

## 2024-12-21 ENCOUNTER — APPOINTMENT (OUTPATIENT)
Dept: RADIOLOGY | Facility: MEDICAL CENTER | Age: 44
DRG: 432 | End: 2024-12-21
Attending: STUDENT IN AN ORGANIZED HEALTH CARE EDUCATION/TRAINING PROGRAM
Payer: COMMERCIAL

## 2024-12-21 PROBLEM — J11.1 INFLUENZA: Status: ACTIVE | Noted: 2024-12-21

## 2024-12-21 PROBLEM — R60.1 ANASARCA: Status: ACTIVE | Noted: 2024-12-21

## 2024-12-21 PROBLEM — E83.51 HYPOCALCEMIA: Status: ACTIVE | Noted: 2024-12-21

## 2024-12-21 PROBLEM — D50.8 IRON DEFICIENCY ANEMIA SECONDARY TO INADEQUATE DIETARY IRON INTAKE: Status: ACTIVE | Noted: 2024-12-21

## 2024-12-21 PROBLEM — N50.89 SCROTAL SWELLING: Status: ACTIVE | Noted: 2024-12-21

## 2024-12-21 LAB
ABO + RH BLD: NORMAL
ABO GROUP BLD: NORMAL
ALBUMIN SERPL BCP-MCNC: 2.2 G/DL (ref 3.2–4.9)
ALBUMIN SERPL BCP-MCNC: 2.4 G/DL (ref 3.2–4.9)
ALBUMIN/GLOB SERPL: 1 G/DL
ALBUMIN/GLOB SERPL: 1 G/DL
ALP SERPL-CCNC: 118 U/L (ref 30–99)
ALP SERPL-CCNC: 130 U/L (ref 30–99)
ALT SERPL-CCNC: 28 U/L (ref 2–50)
ALT SERPL-CCNC: 32 U/L (ref 2–50)
AMMONIA PLAS-SCNC: 37 UMOL/L (ref 11–45)
ANION GAP SERPL CALC-SCNC: 10 MMOL/L (ref 7–16)
ANION GAP SERPL CALC-SCNC: 12 MMOL/L (ref 7–16)
ANISOCYTOSIS BLD QL SMEAR: ABNORMAL
APPEARANCE UR: CLEAR
AST SERPL-CCNC: 76 U/L (ref 12–45)
AST SERPL-CCNC: 80 U/L (ref 12–45)
BACTERIA #/AREA URNS HPF: ABNORMAL /HPF
BARCODED ABORH UBTYP: 6200
BARCODED PRD CODE UBPRD: NORMAL
BARCODED UNIT NUM UBUNT: NORMAL
BASOPHILS # BLD AUTO: 0 % (ref 0–1.8)
BASOPHILS # BLD AUTO: 0.5 % (ref 0–1.8)
BASOPHILS # BLD: 0 K/UL (ref 0–0.12)
BASOPHILS # BLD: 0.01 K/UL (ref 0–0.12)
BILIRUB SERPL-MCNC: 2.3 MG/DL (ref 0.1–1.5)
BILIRUB SERPL-MCNC: 2.5 MG/DL (ref 0.1–1.5)
BILIRUB UR QL STRIP.AUTO: NEGATIVE
BLD GP AB SCN SERPL QL: NORMAL
BUN SERPL-MCNC: 10 MG/DL (ref 8–22)
BUN SERPL-MCNC: 8 MG/DL (ref 8–22)
C TRACH DNA SPEC QL NAA+PROBE: NEGATIVE
CA-I SERPL-SCNC: 1.1 MMOL/L (ref 1.1–1.3)
CALCIUM ALBUM COR SERPL-MCNC: 7.9 MG/DL (ref 8.5–10.5)
CALCIUM ALBUM COR SERPL-MCNC: 8.8 MG/DL (ref 8.5–10.5)
CALCIUM SERPL-MCNC: 6.5 MG/DL (ref 8.5–10.5)
CALCIUM SERPL-MCNC: 7.5 MG/DL (ref 8.5–10.5)
CASTS URNS QL MICRO: ABNORMAL /LPF (ref 0–2)
CHLORIDE SERPL-SCNC: 106 MMOL/L (ref 96–112)
CHLORIDE SERPL-SCNC: 109 MMOL/L (ref 96–112)
CO2 SERPL-SCNC: 15 MMOL/L (ref 20–33)
CO2 SERPL-SCNC: 20 MMOL/L (ref 20–33)
COLOR UR: YELLOW
COMPONENT R 8504R: NORMAL
CREAT SERPL-MCNC: 0.49 MG/DL (ref 0.5–1.4)
CREAT SERPL-MCNC: 0.64 MG/DL (ref 0.5–1.4)
EKG IMPRESSION: NORMAL
EOSINOPHIL # BLD AUTO: 0 K/UL (ref 0–0.51)
EOSINOPHIL # BLD AUTO: 0 K/UL (ref 0–0.51)
EOSINOPHIL NFR BLD: 0 % (ref 0–6.9)
EOSINOPHIL NFR BLD: 0 % (ref 0–6.9)
EPITHELIAL CELLS 1715: ABNORMAL /HPF (ref 0–5)
ERYTHROCYTE [DISTWIDTH] IN BLOOD BY AUTOMATED COUNT: 56.6 FL (ref 35.9–50)
ERYTHROCYTE [DISTWIDTH] IN BLOOD BY AUTOMATED COUNT: 57.1 FL (ref 35.9–50)
ERYTHROCYTE [DISTWIDTH] IN BLOOD BY AUTOMATED COUNT: 58.7 FL (ref 35.9–50)
ETHANOL BLD-MCNC: <10.1 MG/DL
FLUAV RNA SPEC QL NAA+PROBE: POSITIVE
FLUBV RNA SPEC QL NAA+PROBE: NEGATIVE
GFR SERPLBLD CREATININE-BSD FMLA CKD-EPI: 121 ML/MIN/1.73 M 2
GFR SERPLBLD CREATININE-BSD FMLA CKD-EPI: 131 ML/MIN/1.73 M 2
GLOBULIN SER CALC-MCNC: 2.3 G/DL (ref 1.9–3.5)
GLOBULIN SER CALC-MCNC: 2.4 G/DL (ref 1.9–3.5)
GLUCOSE SERPL-MCNC: 108 MG/DL (ref 65–99)
GLUCOSE SERPL-MCNC: 87 MG/DL (ref 65–99)
GLUCOSE UR STRIP.AUTO-MCNC: NEGATIVE MG/DL
HCT VFR BLD AUTO: 20.9 % (ref 42–52)
HCT VFR BLD AUTO: 20.9 % (ref 42–52)
HCT VFR BLD AUTO: 23.1 % (ref 42–52)
HGB BLD-MCNC: 6.8 G/DL (ref 14–18)
HGB BLD-MCNC: 6.9 G/DL (ref 14–18)
HGB BLD-MCNC: 7.5 G/DL (ref 14–18)
HOLDING TUBE BB 8507: NORMAL
HYPOCHROMIA BLD QL SMEAR: ABNORMAL
IMM GRANULOCYTES # BLD AUTO: 0.01 K/UL (ref 0–0.11)
IMM GRANULOCYTES NFR BLD AUTO: 0.5 % (ref 0–0.9)
KETONES UR STRIP.AUTO-MCNC: NEGATIVE MG/DL
LEUKOCYTE ESTERASE UR QL STRIP.AUTO: NEGATIVE
LYMPHOCYTES # BLD AUTO: 0.53 K/UL (ref 1–4.8)
LYMPHOCYTES # BLD AUTO: 0.65 K/UL (ref 1–4.8)
LYMPHOCYTES NFR BLD: 24.2 % (ref 22–41)
LYMPHOCYTES NFR BLD: 25.9 % (ref 22–41)
MACROCYTES BLD QL SMEAR: ABNORMAL
MAGNESIUM SERPL-MCNC: 1.5 MG/DL (ref 1.5–2.5)
MANUAL DIFF BLD: NORMAL
MCH RBC QN AUTO: 29.5 PG (ref 27–33)
MCH RBC QN AUTO: 29.7 PG (ref 27–33)
MCH RBC QN AUTO: 29.8 PG (ref 27–33)
MCHC RBC AUTO-ENTMCNC: 32.5 G/DL (ref 32.3–36.5)
MCHC RBC AUTO-ENTMCNC: 32.5 G/DL (ref 32.3–36.5)
MCHC RBC AUTO-ENTMCNC: 33 G/DL (ref 32.3–36.5)
MCV RBC AUTO: 90.1 FL (ref 81.4–97.8)
MCV RBC AUTO: 90.9 FL (ref 81.4–97.8)
MCV RBC AUTO: 91.7 FL (ref 81.4–97.8)
MICRO URNS: ABNORMAL
MONOCYTES # BLD AUTO: 0.6 K/UL (ref 0–0.85)
MONOCYTES # BLD AUTO: 0.64 K/UL (ref 0–0.85)
MONOCYTES NFR BLD AUTO: 24.1 % (ref 0–13.4)
MONOCYTES NFR BLD AUTO: 29.2 % (ref 0–13.4)
MORPHOLOGY BLD-IMP: NORMAL
MYELOCYTES NFR BLD MANUAL: 0.9 %
N GONORRHOEA DNA SPEC QL NAA+PROBE: NEGATIVE
NEUTROPHILS # BLD AUTO: 1 K/UL (ref 1.82–7.42)
NEUTROPHILS # BLD AUTO: 1.23 K/UL (ref 1.82–7.42)
NEUTROPHILS NFR BLD: 45.6 % (ref 44–72)
NEUTROPHILS NFR BLD: 49.1 % (ref 44–72)
NITRITE UR QL STRIP.AUTO: NEGATIVE
NRBC # BLD AUTO: 0 K/UL
NRBC # BLD AUTO: 0 K/UL
NRBC BLD-RTO: 0 /100 WBC (ref 0–0.2)
NRBC BLD-RTO: 0 /100 WBC (ref 0–0.2)
NT-PROBNP SERPL IA-MCNC: 728 PG/ML (ref 0–125)
PH UR STRIP.AUTO: 6 [PH] (ref 5–8)
PLATELET # BLD AUTO: 50 K/UL (ref 164–446)
PLATELET # BLD AUTO: 51 K/UL (ref 164–446)
PLATELET # BLD AUTO: 51 K/UL (ref 164–446)
PLATELET BLD QL SMEAR: NORMAL
PLATELETS.RETICULATED NFR BLD AUTO: 2.2 % (ref 0.6–13.1)
PLATELETS.RETICULATED NFR BLD AUTO: 2.5 % (ref 0.6–13.1)
PLATELETS.RETICULATED NFR BLD AUTO: 4.1 % (ref 0.6–13.1)
PMV BLD AUTO: 10.3 FL (ref 9–12.9)
PMV BLD AUTO: 12.5 FL (ref 9–12.9)
POIKILOCYTOSIS BLD QL SMEAR: NORMAL
POTASSIUM SERPL-SCNC: 2.8 MMOL/L (ref 3.6–5.5)
POTASSIUM SERPL-SCNC: 3 MMOL/L (ref 3.6–5.5)
PRODUCT TYPE UPROD: NORMAL
PROT SERPL-MCNC: 4.5 G/DL (ref 6–8.2)
PROT SERPL-MCNC: 4.8 G/DL (ref 6–8.2)
PROT UR QL STRIP: NEGATIVE MG/DL
PTH-INTACT SERPL-MCNC: 9.9 PG/ML (ref 14–72)
RBC # BLD AUTO: 2.28 M/UL (ref 4.7–6.1)
RBC # BLD AUTO: 2.32 M/UL (ref 4.7–6.1)
RBC # BLD AUTO: 2.54 M/UL (ref 4.7–6.1)
RBC # URNS HPF: ABNORMAL /HPF (ref 0–2)
RBC BLD AUTO: PRESENT
RBC UR QL AUTO: ABNORMAL
RH BLD: NORMAL
RSV RNA SPEC QL NAA+PROBE: NEGATIVE
SARS-COV-2 RNA RESP QL NAA+PROBE: NOTDETECTED
SODIUM SERPL-SCNC: 136 MMOL/L (ref 135–145)
SODIUM SERPL-SCNC: 136 MMOL/L (ref 135–145)
SP GR UR STRIP.AUTO: 1.01
SPECIMEN SOURCE: NORMAL
TARGETS BLD QL SMEAR: NORMAL
UNIT STATUS USTAT: NORMAL
UROBILINOGEN UR STRIP.AUTO-MCNC: 0.2 EU/DL
WBC # BLD AUTO: 1.8 K/UL (ref 4.8–10.8)
WBC # BLD AUTO: 2.2 K/UL (ref 4.8–10.8)
WBC # BLD AUTO: 2.5 K/UL (ref 4.8–10.8)
WBC #/AREA URNS HPF: ABNORMAL /HPF

## 2024-12-21 PROCEDURE — 700102 HCHG RX REV CODE 250 W/ 637 OVERRIDE(OP)

## 2024-12-21 PROCEDURE — 80053 COMPREHEN METABOLIC PANEL: CPT | Mod: 91

## 2024-12-21 PROCEDURE — 86923 COMPATIBILITY TEST ELECTRIC: CPT

## 2024-12-21 PROCEDURE — 76870 US EXAM SCROTUM: CPT

## 2024-12-21 PROCEDURE — A9270 NON-COVERED ITEM OR SERVICE: HCPCS

## 2024-12-21 PROCEDURE — 86901 BLOOD TYPING SEROLOGIC RH(D): CPT | Mod: 91

## 2024-12-21 PROCEDURE — 82330 ASSAY OF CALCIUM: CPT

## 2024-12-21 PROCEDURE — 770020 HCHG ROOM/CARE - TELE (206)

## 2024-12-21 PROCEDURE — 83880 ASSAY OF NATRIURETIC PEPTIDE: CPT

## 2024-12-21 PROCEDURE — 85027 COMPLETE CBC AUTOMATED: CPT

## 2024-12-21 PROCEDURE — 36430 TRANSFUSION BLD/BLD COMPNT: CPT

## 2024-12-21 PROCEDURE — 86900 BLOOD TYPING SEROLOGIC ABO: CPT | Mod: 91

## 2024-12-21 PROCEDURE — A9270 NON-COVERED ITEM OR SERVICE: HCPCS | Performed by: HOSPITALIST

## 2024-12-21 PROCEDURE — 83970 ASSAY OF PARATHORMONE: CPT

## 2024-12-21 PROCEDURE — 96365 THER/PROPH/DIAG IV INF INIT: CPT

## 2024-12-21 PROCEDURE — 82140 ASSAY OF AMMONIA: CPT

## 2024-12-21 PROCEDURE — 85007 BL SMEAR W/DIFF WBC COUNT: CPT

## 2024-12-21 PROCEDURE — 96375 TX/PRO/DX INJ NEW DRUG ADDON: CPT

## 2024-12-21 PROCEDURE — 0241U HCHG SARS-COV-2 COVID-19 NFCT DS RESP RNA 4 TRGT ED POC: CPT

## 2024-12-21 PROCEDURE — 85025 COMPLETE CBC W/AUTO DIFF WBC: CPT

## 2024-12-21 PROCEDURE — 93005 ELECTROCARDIOGRAM TRACING: CPT | Mod: TC | Performed by: STUDENT IN AN ORGANIZED HEALTH CARE EDUCATION/TRAINING PROGRAM

## 2024-12-21 PROCEDURE — P9016 RBC LEUKOCYTES REDUCED: HCPCS

## 2024-12-21 PROCEDURE — 36415 COLL VENOUS BLD VENIPUNCTURE: CPT

## 2024-12-21 PROCEDURE — 85055 RETICULATED PLATELET ASSAY: CPT

## 2024-12-21 PROCEDURE — 81001 URINALYSIS AUTO W/SCOPE: CPT

## 2024-12-21 PROCEDURE — 700111 HCHG RX REV CODE 636 W/ 250 OVERRIDE (IP): Mod: JZ,JG

## 2024-12-21 PROCEDURE — A9270 NON-COVERED ITEM OR SERVICE: HCPCS | Mod: UD | Performed by: STUDENT IN AN ORGANIZED HEALTH CARE EDUCATION/TRAINING PROGRAM

## 2024-12-21 PROCEDURE — 96367 TX/PROPH/DG ADDL SEQ IV INF: CPT

## 2024-12-21 PROCEDURE — 99222 1ST HOSP IP/OBS MODERATE 55: CPT | Mod: GC | Performed by: HOSPITALIST

## 2024-12-21 PROCEDURE — 700111 HCHG RX REV CODE 636 W/ 250 OVERRIDE (IP): Mod: JZ,JG,UD | Performed by: STUDENT IN AN ORGANIZED HEALTH CARE EDUCATION/TRAINING PROGRAM

## 2024-12-21 PROCEDURE — 82077 ASSAY SPEC XCP UR&BREATH IA: CPT

## 2024-12-21 PROCEDURE — 71045 X-RAY EXAM CHEST 1 VIEW: CPT

## 2024-12-21 PROCEDURE — 700111 HCHG RX REV CODE 636 W/ 250 OVERRIDE (IP)

## 2024-12-21 PROCEDURE — 87591 N.GONORRHOEAE DNA AMP PROB: CPT

## 2024-12-21 PROCEDURE — 83735 ASSAY OF MAGNESIUM: CPT

## 2024-12-21 PROCEDURE — HZ2ZZZZ DETOXIFICATION SERVICES FOR SUBSTANCE ABUSE TREATMENT: ICD-10-PCS | Performed by: HOSPITALIST

## 2024-12-21 PROCEDURE — 700102 HCHG RX REV CODE 250 W/ 637 OVERRIDE(OP): Performed by: HOSPITALIST

## 2024-12-21 PROCEDURE — 96366 THER/PROPH/DIAG IV INF ADDON: CPT

## 2024-12-21 PROCEDURE — 87491 CHLMYD TRACH DNA AMP PROBE: CPT

## 2024-12-21 PROCEDURE — 700102 HCHG RX REV CODE 250 W/ 637 OVERRIDE(OP): Mod: UD | Performed by: STUDENT IN AN ORGANIZED HEALTH CARE EDUCATION/TRAINING PROGRAM

## 2024-12-21 PROCEDURE — P9047 ALBUMIN (HUMAN), 25%, 50ML: HCPCS | Mod: JZ,JG

## 2024-12-21 PROCEDURE — 86850 RBC ANTIBODY SCREEN: CPT

## 2024-12-21 RX ORDER — FERROUS SULFATE 325(65) MG
325 TABLET ORAL DAILY
Status: DISCONTINUED | OUTPATIENT
Start: 2024-12-21 | End: 2024-12-23 | Stop reason: HOSPADM

## 2024-12-21 RX ORDER — ACETAMINOPHEN 325 MG/1
975 TABLET ORAL ONCE
Status: COMPLETED | OUTPATIENT
Start: 2024-12-21 | End: 2024-12-21

## 2024-12-21 RX ORDER — PANTOPRAZOLE SODIUM 40 MG/1
40 TABLET, DELAYED RELEASE ORAL 2 TIMES DAILY
Status: DISCONTINUED | OUTPATIENT
Start: 2024-12-21 | End: 2024-12-21

## 2024-12-21 RX ORDER — TRAZODONE HYDROCHLORIDE 50 MG/1
50 TABLET, FILM COATED ORAL
Status: COMPLETED | OUTPATIENT
Start: 2024-12-21 | End: 2024-12-21

## 2024-12-21 RX ORDER — LORAZEPAM 1 MG/1
1 TABLET ORAL EVERY 4 HOURS PRN
Status: DISCONTINUED | OUTPATIENT
Start: 2024-12-21 | End: 2024-12-23 | Stop reason: HOSPADM

## 2024-12-21 RX ORDER — ENOXAPARIN SODIUM 100 MG/ML
40 INJECTION SUBCUTANEOUS DAILY
Status: DISCONTINUED | OUTPATIENT
Start: 2024-12-21 | End: 2024-12-21

## 2024-12-21 RX ORDER — LORAZEPAM 2 MG/ML
2 INJECTION INTRAMUSCULAR
Status: DISCONTINUED | OUTPATIENT
Start: 2024-12-21 | End: 2024-12-23 | Stop reason: HOSPADM

## 2024-12-21 RX ORDER — IBUPROFEN 600 MG/1
600 TABLET, FILM COATED ORAL EVERY 6 HOURS PRN
Status: DISCONTINUED | OUTPATIENT
Start: 2024-12-21 | End: 2024-12-22

## 2024-12-21 RX ORDER — FOLIC ACID 1 MG/1
1 TABLET ORAL DAILY
Status: DISCONTINUED | OUTPATIENT
Start: 2024-12-21 | End: 2024-12-23 | Stop reason: HOSPADM

## 2024-12-21 RX ORDER — MAGNESIUM SULFATE HEPTAHYDRATE 40 MG/ML
2 INJECTION, SOLUTION INTRAVENOUS ONCE
Status: COMPLETED | OUTPATIENT
Start: 2024-12-21 | End: 2024-12-21

## 2024-12-21 RX ORDER — LABETALOL HYDROCHLORIDE 5 MG/ML
10 INJECTION, SOLUTION INTRAVENOUS EVERY 4 HOURS PRN
Status: DISCONTINUED | OUTPATIENT
Start: 2024-12-21 | End: 2024-12-23 | Stop reason: HOSPADM

## 2024-12-21 RX ORDER — CARVEDILOL 3.12 MG/1
3.12 TABLET ORAL 2 TIMES DAILY WITH MEALS
Status: DISCONTINUED | OUTPATIENT
Start: 2024-12-21 | End: 2024-12-23 | Stop reason: HOSPADM

## 2024-12-21 RX ORDER — FUROSEMIDE 10 MG/ML
40 INJECTION INTRAMUSCULAR; INTRAVENOUS ONCE
Status: DISCONTINUED | OUTPATIENT
Start: 2024-12-21 | End: 2024-12-21

## 2024-12-21 RX ORDER — GABAPENTIN 300 MG/1
300 CAPSULE ORAL 3 TIMES DAILY
Status: DISCONTINUED | OUTPATIENT
Start: 2024-12-21 | End: 2024-12-23 | Stop reason: HOSPADM

## 2024-12-21 RX ORDER — SPIRONOLACTONE 100 MG/1
100 TABLET, FILM COATED ORAL DAILY
Qty: 30 TABLET | Refills: 3 | Status: SHIPPED | OUTPATIENT
Start: 2024-12-22 | End: 2025-01-09

## 2024-12-21 RX ORDER — FUROSEMIDE 80 MG/1
80 TABLET ORAL DAILY
Qty: 30 TABLET | Refills: 0 | Status: ON HOLD | OUTPATIENT
Start: 2024-12-22 | End: 2024-12-26

## 2024-12-21 RX ORDER — GABAPENTIN 300 MG/1
300 CAPSULE ORAL 3 TIMES DAILY
Qty: 90 CAPSULE | Refills: 0 | Status: ON HOLD | OUTPATIENT
Start: 2024-12-21 | End: 2024-12-26

## 2024-12-21 RX ORDER — AMLODIPINE BESYLATE 5 MG/1
5 TABLET ORAL DAILY
Status: DISCONTINUED | OUTPATIENT
Start: 2024-12-21 | End: 2024-12-21

## 2024-12-21 RX ORDER — FOLIC ACID 1 MG/1
1 TABLET ORAL DAILY
Qty: 30 TABLET | Refills: 0 | Status: ON HOLD | OUTPATIENT
Start: 2024-12-21 | End: 2024-12-26

## 2024-12-21 RX ORDER — GAUZE BANDAGE 2" X 2"
100 BANDAGE TOPICAL DAILY
Status: DISCONTINUED | OUTPATIENT
Start: 2024-12-21 | End: 2024-12-23 | Stop reason: HOSPADM

## 2024-12-21 RX ORDER — POTASSIUM CHLORIDE 1500 MG/1
40 TABLET, EXTENDED RELEASE ORAL ONCE
Status: COMPLETED | OUTPATIENT
Start: 2024-12-21 | End: 2024-12-21

## 2024-12-21 RX ORDER — FUROSEMIDE 40 MG/1
80 TABLET ORAL DAILY
Status: DISCONTINUED | OUTPATIENT
Start: 2024-12-22 | End: 2024-12-22

## 2024-12-21 RX ORDER — LORAZEPAM 0.5 MG/1
0.5 TABLET ORAL EVERY 4 HOURS PRN
Status: DISCONTINUED | OUTPATIENT
Start: 2024-12-21 | End: 2024-12-23 | Stop reason: HOSPADM

## 2024-12-21 RX ORDER — LACTULOSE 10 G/15ML
45 SOLUTION ORAL 4 TIMES DAILY
Status: DISCONTINUED | OUTPATIENT
Start: 2024-12-21 | End: 2024-12-23 | Stop reason: HOSPADM

## 2024-12-21 RX ORDER — CALCIUM GLUCONATE 20 MG/ML
1 INJECTION, SOLUTION INTRAVENOUS ONCE
Status: COMPLETED | OUTPATIENT
Start: 2024-12-21 | End: 2024-12-21

## 2024-12-21 RX ORDER — LORAZEPAM 2 MG/1
2 TABLET ORAL
Status: DISCONTINUED | OUTPATIENT
Start: 2024-12-21 | End: 2024-12-23 | Stop reason: HOSPADM

## 2024-12-21 RX ORDER — FUROSEMIDE 10 MG/ML
40 INJECTION INTRAMUSCULAR; INTRAVENOUS ONCE
Status: COMPLETED | OUTPATIENT
Start: 2024-12-21 | End: 2024-12-21

## 2024-12-21 RX ORDER — ALBUMIN (HUMAN) 12.5 G/50ML
25 SOLUTION INTRAVENOUS ONCE
Status: DISCONTINUED | OUTPATIENT
Start: 2024-12-21 | End: 2024-12-21

## 2024-12-21 RX ORDER — ALBUMIN (HUMAN) 12.5 G/50ML
25 SOLUTION INTRAVENOUS ONCE
Status: COMPLETED | OUTPATIENT
Start: 2024-12-21 | End: 2024-12-21

## 2024-12-21 RX ORDER — CARVEDILOL 3.12 MG/1
3.12 TABLET ORAL 2 TIMES DAILY WITH MEALS
Qty: 60 TABLET | Refills: 0 | Status: SHIPPED | OUTPATIENT
Start: 2024-12-21 | End: 2025-01-09

## 2024-12-21 RX ORDER — LACTULOSE 10 G/15ML
45 SOLUTION ORAL ONCE
Status: COMPLETED | OUTPATIENT
Start: 2024-12-21 | End: 2024-12-21

## 2024-12-21 RX ORDER — LORAZEPAM 2 MG/ML
0.5 INJECTION INTRAMUSCULAR EVERY 4 HOURS PRN
Status: DISCONTINUED | OUTPATIENT
Start: 2024-12-21 | End: 2024-12-23 | Stop reason: HOSPADM

## 2024-12-21 RX ORDER — SPIRONOLACTONE 25 MG/1
100 TABLET ORAL DAILY
Status: DISCONTINUED | OUTPATIENT
Start: 2024-12-21 | End: 2024-12-23 | Stop reason: HOSPADM

## 2024-12-21 RX ORDER — LORAZEPAM 2 MG/ML
1 INJECTION INTRAMUSCULAR
Status: DISCONTINUED | OUTPATIENT
Start: 2024-12-21 | End: 2024-12-23 | Stop reason: HOSPADM

## 2024-12-21 RX ORDER — LORAZEPAM 2 MG/1
4 TABLET ORAL
Status: DISCONTINUED | OUTPATIENT
Start: 2024-12-21 | End: 2024-12-23 | Stop reason: HOSPADM

## 2024-12-21 RX ORDER — ESCITALOPRAM OXALATE 10 MG/1
20 TABLET ORAL DAILY
Status: DISCONTINUED | OUTPATIENT
Start: 2024-12-21 | End: 2024-12-23 | Stop reason: HOSPADM

## 2024-12-21 RX ORDER — LACTULOSE 10 G/15ML
20 SOLUTION ORAL 4 TIMES DAILY
Qty: 3600 ML | Refills: 0 | Status: ON HOLD | OUTPATIENT
Start: 2024-12-21 | End: 2024-12-26

## 2024-12-21 RX ORDER — HYDROXYZINE HYDROCHLORIDE 25 MG/1
25 TABLET, FILM COATED ORAL 3 TIMES DAILY PRN
Status: DISCONTINUED | OUTPATIENT
Start: 2024-12-21 | End: 2024-12-22

## 2024-12-21 RX ORDER — FUROSEMIDE 10 MG/ML
80 INJECTION INTRAMUSCULAR; INTRAVENOUS ONCE
Status: COMPLETED | OUTPATIENT
Start: 2024-12-21 | End: 2024-12-21

## 2024-12-21 RX ORDER — LORAZEPAM 2 MG/ML
1.5 INJECTION INTRAMUSCULAR
Status: DISCONTINUED | OUTPATIENT
Start: 2024-12-21 | End: 2024-12-23 | Stop reason: HOSPADM

## 2024-12-21 RX ORDER — CALCIUM GLUCONATE 20 MG/ML
2 INJECTION, SOLUTION INTRAVENOUS ONCE
Status: COMPLETED | OUTPATIENT
Start: 2024-12-21 | End: 2024-12-21

## 2024-12-21 RX ADMIN — ACETAMINOPHEN 975 MG: 325 TABLET ORAL at 02:15

## 2024-12-21 RX ADMIN — LORAZEPAM 1 MG: 2 INJECTION INTRAMUSCULAR; INTRAVENOUS at 20:12

## 2024-12-21 RX ADMIN — GABAPENTIN 300 MG: 300 CAPSULE ORAL at 20:10

## 2024-12-21 RX ADMIN — FERROUS SULFATE TAB 325 MG (65 MG ELEMENTAL FE) 325 MG: 325 (65 FE) TAB at 06:11

## 2024-12-21 RX ADMIN — FUROSEMIDE 80 MG: 10 INJECTION INTRAMUSCULAR; INTRAVENOUS at 02:35

## 2024-12-21 RX ADMIN — POTASSIUM CHLORIDE 40 MEQ: 20 TABLET, EXTENDED RELEASE ORAL at 16:39

## 2024-12-21 RX ADMIN — SPIRONOLACTONE 100 MG: 25 TABLET ORAL at 10:14

## 2024-12-21 RX ADMIN — CALCIUM GLUCONATE 1 G: 20 INJECTION, SOLUTION INTRAVENOUS at 04:17

## 2024-12-21 RX ADMIN — CARVEDILOL 3.12 MG: 3.12 TABLET, FILM COATED ORAL at 10:11

## 2024-12-21 RX ADMIN — FUROSEMIDE 40 MG: 10 INJECTION INTRAMUSCULAR; INTRAVENOUS at 16:39

## 2024-12-21 RX ADMIN — ESCITALOPRAM OXALATE 20 MG: 10 TABLET ORAL at 10:11

## 2024-12-21 RX ADMIN — OMEPRAZOLE 20 MG: 20 CAPSULE, DELAYED RELEASE ORAL at 17:56

## 2024-12-21 RX ADMIN — LACTULOSE 45 ML: 10 SOLUTION ORAL at 02:15

## 2024-12-21 RX ADMIN — GABAPENTIN 300 MG: 300 CAPSULE ORAL at 16:39

## 2024-12-21 RX ADMIN — LORAZEPAM 1 MG: 2 INJECTION INTRAMUSCULAR; INTRAVENOUS at 15:46

## 2024-12-21 RX ADMIN — LACTULOSE 45 ML: 10 SOLUTION ORAL at 10:13

## 2024-12-21 RX ADMIN — FOLIC ACID 1 MG: 1 TABLET ORAL at 06:11

## 2024-12-21 RX ADMIN — MAGNESIUM SULFATE HEPTAHYDRATE 2 G: 2 INJECTION, SOLUTION INTRAVENOUS at 05:39

## 2024-12-21 RX ADMIN — POTASSIUM CHLORIDE 40 MEQ: 1500 TABLET, EXTENDED RELEASE ORAL at 03:18

## 2024-12-21 RX ADMIN — GABAPENTIN 300 MG: 300 CAPSULE ORAL at 10:12

## 2024-12-21 RX ADMIN — TRAZODONE HYDROCHLORIDE 50 MG: 50 TABLET ORAL at 20:10

## 2024-12-21 RX ADMIN — RIFAXIMIN 550 MG: 550 TABLET ORAL at 10:16

## 2024-12-21 RX ADMIN — RIFAXIMIN 550 MG: 550 TABLET ORAL at 17:56

## 2024-12-21 RX ADMIN — LACTULOSE 45 ML: 10 SOLUTION ORAL at 16:39

## 2024-12-21 RX ADMIN — OMEPRAZOLE 20 MG: 20 CAPSULE, DELAYED RELEASE ORAL at 10:14

## 2024-12-21 RX ADMIN — CARVEDILOL 3.12 MG: 3.12 TABLET, FILM COATED ORAL at 16:39

## 2024-12-21 RX ADMIN — CALCIUM GLUCONATE 2 G: 20 INJECTION, SOLUTION INTRAVENOUS at 03:12

## 2024-12-21 RX ADMIN — LORAZEPAM 2 MG: 2 TABLET ORAL at 16:39

## 2024-12-21 RX ADMIN — ALBUMIN (HUMAN) 25 G: 0.25 INJECTION, SOLUTION INTRAVENOUS at 18:06

## 2024-12-21 RX ADMIN — Medication 100 MG: at 10:18

## 2024-12-21 ASSESSMENT — LIFESTYLE VARIABLES
AUDITORY DISTURBANCES: NOT PRESENT
TOTAL SCORE: 13
ORIENTATION AND CLOUDING OF SENSORIUM: ORIENTED AND CAN DO SERIAL ADDITIONS
VISUAL DISTURBANCES: NOT PRESENT
AGITATION: *
HOW MANY TIMES IN THE PAST YEAR HAVE YOU HAD 5 OR MORE DRINKS IN A DAY: 365
ANXIETY: MODERATELY ANXIOUS OR GUARDED, SO ANXIETY IS INFERRED
NAUSEA AND VOMITING: NO NAUSEA AND NO VOMITING
PAROXYSMAL SWEATS: *
VISUAL DISTURBANCES: NOT PRESENT
ANXIETY: MODERATELY ANXIOUS OR GUARDED, SO ANXIETY IS INFERRED
ORIENTATION AND CLOUDING OF SENSORIUM: ORIENTED AND CAN DO SERIAL ADDITIONS
CONSUMPTION TOTAL: POSITIVE
DOES PATIENT WANT TO TALK TO SOMEONE ABOUT QUITTING: YES
VISUAL DISTURBANCES: NOT PRESENT
AUDITORY DISTURBANCES: NOT PRESENT
NAUSEA AND VOMITING: MILD NAUSEA WITH NO VOMITING
TREMOR: *
PAROXYSMAL SWEATS: *
AUDITORY DISTURBANCES: NOT PRESENT
DOES PATIENT WANT TO STOP DRINKING: YES
TOTAL SCORE: 7
TREMOR: TREMOR NOT VISIBLE BUT CAN BE FELT, FINGERTIP TO FINGERTIP
AGITATION: SOMEWHAT MORE THAN NORMAL ACTIVITY
TOTAL SCORE: 4
EVER HAD A DRINK FIRST THING IN THE MORNING TO STEADY YOUR NERVES TO GET RID OF A HANGOVER: YES
HAVE YOU EVER FELT YOU SHOULD CUT DOWN ON YOUR DRINKING: YES
ALCOHOL_USE: YES
HEADACHE, FULLNESS IN HEAD: MILD
NAUSEA AND VOMITING: MILD NAUSEA WITH NO VOMITING
TOTAL SCORE: 4
AVERAGE NUMBER OF DAYS PER WEEK YOU HAVE A DRINK CONTAINING ALCOHOL: 7
TREMOR: *
AGITATION: SOMEWHAT MORE THAN NORMAL ACTIVITY
TOTAL SCORE: 4
ON A TYPICAL DAY WHEN YOU DRINK ALCOHOL HOW MANY DRINKS DO YOU HAVE: 4
PAROXYSMAL SWEATS: *
HEADACHE, FULLNESS IN HEAD: MILD
HAVE PEOPLE ANNOYED YOU BY CRITICIZING YOUR DRINKING: YES
HEADACHE, FULLNESS IN HEAD: NOT PRESENT
ORIENTATION AND CLOUDING OF SENSORIUM: ORIENTED AND CAN DO SERIAL ADDITIONS
EVER FELT BAD OR GUILTY ABOUT YOUR DRINKING: YES
TOTAL SCORE: 11
ANXIETY: MILDLY ANXIOUS

## 2024-12-21 ASSESSMENT — PATIENT HEALTH QUESTIONNAIRE - PHQ9
SUM OF ALL RESPONSES TO PHQ QUESTIONS 1-9: 24
4. FEELING TIRED OR HAVING LITTLE ENERGY: NEARLY EVERY DAY
8. MOVING OR SPEAKING SO SLOWLY THAT OTHER PEOPLE COULD HAVE NOTICED. OR THE OPPOSITE, BEING SO FIGETY OR RESTLESS THAT YOU HAVE BEEN MOVING AROUND A LOT MORE THAN USUAL: NEARLY EVERY DAY
3. TROUBLE FALLING OR STAYING ASLEEP OR SLEEPING TOO MUCH: NEARLY EVERY DAY
1. LITTLE INTEREST OR PLEASURE IN DOING THINGS: NEARLY EVERY DAY
SUM OF ALL RESPONSES TO PHQ9 QUESTIONS 1 AND 2: 6
2. FEELING DOWN, DEPRESSED, IRRITABLE, OR HOPELESS: NEARLY EVERY DAY
9. THOUGHTS THAT YOU WOULD BE BETTER OFF DEAD, OR OF HURTING YOURSELF: NOT AT ALL
5. POOR APPETITE OR OVEREATING: NEARLY EVERY DAY
7. TROUBLE CONCENTRATING ON THINGS, SUCH AS READING THE NEWSPAPER OR WATCHING TELEVISION: NEARLY EVERY DAY
6. FEELING BAD ABOUT YOURSELF - OR THAT YOU ARE A FAILURE OR HAVE LET YOURSELF OR YOUR FAMILY DOWN: NEARLY EVERY DAY

## 2024-12-21 ASSESSMENT — COGNITIVE AND FUNCTIONAL STATUS - GENERAL
MOVING FROM LYING ON BACK TO SITTING ON SIDE OF FLAT BED: A LITTLE
PERSONAL GROOMING: A LITTLE
SUGGESTED CMS G CODE MODIFIER MOBILITY: CL
DRESSING REGULAR LOWER BODY CLOTHING: A LOT
DAILY ACTIVITIY SCORE: 15
HELP NEEDED FOR BATHING: A LOT
STANDING UP FROM CHAIR USING ARMS: A LOT
TOILETING: A LOT
DRESSING REGULAR UPPER BODY CLOTHING: A LOT
WALKING IN HOSPITAL ROOM: A LOT
MOBILITY SCORE: 13
SUGGESTED CMS G CODE MODIFIER DAILY ACTIVITY: CK
MOVING TO AND FROM BED TO CHAIR: A LOT
TURNING FROM BACK TO SIDE WHILE IN FLAT BAD: A LITTLE
CLIMB 3 TO 5 STEPS WITH RAILING: TOTAL

## 2024-12-21 ASSESSMENT — FIBROSIS 4 INDEX
FIB4 SCORE: 12.7
FIB4 SCORE: 11.06

## 2024-12-21 ASSESSMENT — SOCIAL DETERMINANTS OF HEALTH (SDOH)
WITHIN THE LAST YEAR, HAVE YOU BEEN HUMILIATED OR EMOTIONALLY ABUSED IN OTHER WAYS BY YOUR PARTNER OR EX-PARTNER?: NO
IN THE PAST 12 MONTHS, HAS THE ELECTRIC, GAS, OIL, OR WATER COMPANY THREATENED TO SHUT OFF SERVICE IN YOUR HOME?: PATIENT DECLINED
WITHIN THE LAST YEAR, HAVE YOU BEEN AFRAID OF YOUR PARTNER OR EX-PARTNER?: NO
WITHIN THE PAST 12 MONTHS, YOU WORRIED THAT YOUR FOOD WOULD RUN OUT BEFORE YOU GOT THE MONEY TO BUY MORE: OFTEN TRUE
WITHIN THE PAST 12 MONTHS, THE FOOD YOU BOUGHT JUST DIDN'T LAST AND YOU DIDN'T HAVE MONEY TO GET MORE: OFTEN TRUE
WITHIN THE LAST YEAR, HAVE TO BEEN RAPED OR FORCED TO HAVE ANY KIND OF SEXUAL ACTIVITY BY YOUR PARTNER OR EX-PARTNER?: NO
WITHIN THE LAST YEAR, HAVE YOU BEEN KICKED, HIT, SLAPPED, OR OTHERWISE PHYSICALLY HURT BY YOUR PARTNER OR EX-PARTNER?: NO

## 2024-12-21 ASSESSMENT — PAIN DESCRIPTION - PAIN TYPE
TYPE: ACUTE PAIN
TYPE: ACUTE PAIN

## 2024-12-21 NOTE — ASSESSMENT & PLAN NOTE
Several days of scrotal swelling with tenderness.  Patient denies any new sexual encounters.  Likely 2/2 anasarca. Scrotal US just showed edema. GC/CT were negative.  Plan:  -Mild improvement is seen, if patient does not continue to improve with diuresis will consider further workup

## 2024-12-21 NOTE — ED NOTES
Pharmacy Medication Reconciliation      ~Medication reconciliation updated and complete per patient   ~Allergies have been verified and updated   ~No oral ABX within the last 30 days  ~Patient home pharmacy :  [facility] [phone]      ~Anticoagulants (rivaroxaban, apixaban, edoxaban, dabigatran, warfarin, enoxaparin) taken in the last 14 days? NO       Patient stated that he has not taken ANY medications for well over a month

## 2024-12-21 NOTE — ASSESSMENT & PLAN NOTE
Patient has multiple cell lines including platelets, hemoglobin, and white blood cells which are low.  History of iron deficient anemia.  Has mild elevation of alkaline phosphatase.  CT scan with splenomegaly suggesting liver disease is driving process. Also acute viral illness. Less likely primary bone process.  - Iron studies, reticulocyte count, LDH, bone specific alk phos, coags to assess for primary bone process  - Will continue to monitor

## 2024-12-21 NOTE — ASSESSMENT & PLAN NOTE
Pt has history of alcohol abuse requiring multiple admissions. Per patient he drank 2-3 beers on 12/20. His diagnostic alcohol level at 1100 on 12/21 was <10.1. Pt has some confusion and mild tremors. Pt says he does not have history or seizures due to withdrawal. CIWA scores have been between 3 and 10 over last 24 hours.  - CIWA protocol initiated, 5 days from last drink 12/26  - Librium taper initiated 12/22  - Folate and Thiamine supplementation

## 2024-12-21 NOTE — ASSESSMENT & PLAN NOTE
Likely secondary to GI losses from vomiting and diarrhea.  Also may be related to chronic Lasix use and inconsistent potassium use. S/p 40 mEq Kcl in ED.   -Monitor labs and replete electrolytes as needed

## 2024-12-21 NOTE — ED TRIAGE NOTES
Chief Complaint   Patient presents with    Groin Swelling     BIB [facility] from [facility] for continued groin swelling. Patient has been unable to fill his prescriptions from his ED visit here 2 days ago. Hx of liver cirrhosis. Fever noted in triage. Patient denies feeling ill, feverish or body aches    Abdominal Pain       Patient to triage via EMS with wheelchair, AAOx4, Appropriate precautions in place.     Explained wait time and triage process. Placed back in lobby. Told to notify ED tech or RN of any changes, verbalized understanding.    /70   Pulse 85   Temp (!) 38.6 °C (101.4 °F) (Oral)   Resp 14   SpO2 94%

## 2024-12-21 NOTE — ED NOTES
Report given to RN for [id number]. Updated on history, labs, mentation, and POC. All questions addressed. Will come to transport pt to room.

## 2024-12-21 NOTE — ED NOTES
Consent for blood transfusion signed and placed in chart. 2nd RN at bedside to verify blood. One unit of PRBC infusing as ordered. Continuous VS being monitored. Remains on 2 liters NC. Proved with ice chips.

## 2024-12-21 NOTE — ED NOTES
Pt wanting to leave AMA. Resident informed by assist RN. Resident contacted regarding request to leave AMA and states that both her and the senior resident came to bedside to discuss the risks of leaving against medical advice and potential for worsening illness or death. Pt did verbalize to this RN that he is willing to take the risk of death to leave AMA. Admit MD stating the same. They are aware he is on 2 liters NC and of all labs, VS, and all co morbidities. SW called to see if there are any available resource to aid in his departure. States he plans to take bus to see family in a different state. Pt up to commode. SW at bedside. Charge consulted regarding appropriateness of AMA.

## 2024-12-21 NOTE — ASSESSMENT & PLAN NOTE
Chronic history.  Denies any active bleeding or black stools.  Patient on iron supplementation at home which he is noncompliant with. Pt received 1 unit of RBC after a Hgb of 6.9, repeat levels have been stable  -Repeating iron studies  -Will continue to monitor and transfuse as needed  -Continue ferrous sulfate inpatient  -Low threshold for consult to GI for consideration of scope
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
## 2024-12-21 NOTE — ED NOTES
Pt escorted to room, changed in gown an placed on cardiac and tele monitors. Call light in reach. Pt provided with blankets

## 2024-12-21 NOTE — ED PROVIDER NOTES
ER Provider Note    Scribed for Dr. Flex Goode MD. by Luis Carlos Barreto. 12/21/2024  12:01 AM    Primary Care Provider: Mann Johnson M.D.    CHIEF COMPLAINT  Chief Complaint   Patient presents with    Groin Swelling     BIB [facility] from [facility] for continued groin swelling. Patient has been unable to fill his prescriptions from his ED visit here 2 days ago. Hx of liver cirrhosis. Fever noted in triage. Patient denies feeling ill, feverish or body aches    Abdominal Pain     EXTERNAL RECORDS REVIEWED  The patient's records show the patient was hospitalized on 12/9-12/16 for evaluation of abdominal pain. The patient also had an EGD on 12/10/24.     HPI/ROS  LIMITATION TO HISTORY   Select: : None    OUTSIDE HISTORIAN(S):  None    Jalen Vaughn is a 44 y.o. male who presents to the ED for evaluation of groin and lower extremity swelling onset a few days ago. The patient was seen here 2 days ago for the same and was given prescriptions for Lasix and Kdur which he has since been unable to fill. States associated rhinorrhea and a cough beginning 2 days ago. Denies any associated dysuria. Patient reports living at [facility]. Notes a medical history of liver cirrhosis.      PAST MEDICAL HISTORY  Past Medical History:   Diagnosis Date    Cirrhosis of liver (HCC)     Hypertension     Liver disease     Seizure (HCC)      SURGICAL HISTORY  Past Surgical History:   Procedure Laterality Date    [place] UPPER GI ENDOSCOPY,DIAGNOSIS N/A 10/27/2024    Procedure: GASTROSCOPY;  Surgeon: Treva Lopez M.D.;  Location: SURGERY [facility];  Service: Gastroenterology    APPENDECTOMY      CHOLECYSTECTOMY       FAMILY HISTORY  Family History   Problem Relation Age of Onset    Heart Disease Mother     Kidney Disease Mother     Heart Disease Father     Kidney Disease Father      SOCIAL HISTORY   reports that she has been smoking cigarettes. She has quit using smokeless tobacco.  Her smokeless tobacco use included chew. She  reports current alcohol use. She reports that she does not currently use drugs.    CURRENT MEDICATIONS  Previous Medications    AMLODIPINE (NORVASC) 5 MG TAB    Take 1 Tablet by mouth every day for 30 days.    AMOXICILLIN-CLAVULANATE (AUGMENTIN) 875-125 MG TAB    Take 1 Tablet by mouth 2 times a day. 7 day course 11/10/24    CARVEDILOL (COREG) 3.125 MG TAB    Take 1 Tablet by mouth 2 times a day with meals for 30 days.    ESCITALOPRAM (LEXAPRO) 20 MG TABLET    Take 1 Tablet by mouth every day for 30 days.    FERROUS SULFATE 325 (65 FE) MG TABLET    Take 1 Tablet by mouth every day.    FOLIC ACID (FOLVITE) 1 MG TAB    Take 1 Tablet by mouth every day.    FUROSEMIDE (LASIX) 80 MG TAB    Take 1 Tablet by mouth every day.    GABAPENTIN (NEURONTIN) 300 MG CAP    Take 1 Capsule by mouth 3 times a day.    HYDROXYZINE HCL (ATARAX) 25 MG TAB    Take 1 Tablet by mouth 3 times a day as needed for Anxiety.    LACTULOSE 20 GM/30ML SOLUTION    Take 45 mL by mouth 4 times a day.    PANTOPRAZOLE (PROTONIX) 40 MG TABLET DELAYED RESPONSE    Take 1 Tablet by mouth 2 times a day.    POTASSIUM CHLORIDE (KLOR-CON) 20 MEQ PACK    Take 1 Packet by mouth every day.    RIFAXIMIN (XIFAXAN) 550 MG TAB TABLET    Take 1 Tablet by mouth 2 times a day.    SPIRONOLACTONE (ALDACTONE) 100 MG TAB    Take 1 Tablet by mouth every day.    THIAMINE (VITAMIN B-1) 100 MG TAB    Take 100 mg by mouth every day.     ALLERGIES  Latex and Morphine    PHYSICAL EXAM  BP (!) 158/79   Pulse 84   Temp (!) 38.6 °C (101.4 °F) (Oral)   Resp 20   SpO2 92%   Physical Exam  Vitals and nursing note reviewed.   Constitutional:       Comments: Disheveled    HENT:      Head: Normocephalic.   Cardiovascular:      Rate and Rhythm: Normal rate and regular rhythm.      Heart sounds: No murmur heard.  Pulmonary:      Effort: Pulmonary effort is normal.      Comments: Mild tachypnea, Crackles on lung exam  Abdominal:      Palpations: Abdomen is soft.      Tenderness: There is no  abdominal tenderness.      Comments: Diffused anasarca    Musculoskeletal:         General: Normal range of motion.      Right lower leg: No edema.      Left lower leg: No edema.   Skin:     General: Skin is warm.   Neurological:      General: No focal deficit present.      Mental Status: She is alert and oriented to person, place, and time.       DIAGNOSTIC STUDIES & PROCEDURES    Labs:   Results for orders placed or performed during the hospital encounter of 24   POC CoV-2, FLU A/B, RSV by PCR    Collection Time: 24 12:37 AM   Result Value Ref Range    POC Influenza A RNA, PCR POSITIVE (A) Negative    POC Influenza B RNA, PCR Negative Negative    POC RSV, by PCR Negative Negative    POC SARS-CoV-2, PCR NotDetected NotDetected   EKG    Collection Time: 24 12:40 AM   Result Value Ref Range    Report       [facility] Emergency Dept.    Test Date:  2024  Pt Name:    ARSENIO RODGERS               Department: ER  MRN:        [id number]                      Room:       [facility]  Gender:     Male                         Technician: [id number]  :        1980                   Requested By:LISA HIDALGO  Order #:    [id number]                    Reading MD:    Measurements  Intervals                                Axis  Rate:       84                           P:          59  TN:         184                          QRS:        38  QRSD:       91                           T:          -9  QT:         391  QTc:        463    Interpretive Statements  Sinus rhythm  Abnrm T, consider ischemia, anterolateral lds  Minimal ST elevation, lateral leads  Compared to ECG 2024 16:43:00  Possible ischemia now present  ST (T wave) deviation now present  Prolonged QT interval no longer present     AMMONIA    Collection Time: 24  1:21 AM   Result Value Ref Range    Ammonia 37 11 - 45 umol/L   COMP METABOLIC PANEL    Collection Time: 24  1:26 AM   Result Value Ref Range    Sodium 136  135 - 145 mmol/L    Potassium 2.8 (L) 3.6 - 5.5 mmol/L    Chloride 109 96 - 112 mmol/L    Co2 15 (L) 20 - 33 mmol/L    Anion Gap 12.0 7.0 - 16.0    Glucose 87 65 - 99 mg/dL    Bun 8 8 - 22 mg/dL    Creatinine 0.49 (L) 0.50 - 1.40 mg/dL    Calcium 6.5 (LL) 8.5 - 10.5 mg/dL    Correct Calcium 7.9 (L) 8.5 - 10.5 mg/dL    AST(SGOT) 80 (H) 12 - 45 U/L    ALT(SGPT) 28 2 - 50 U/L    Alkaline Phosphatase 118 (H) 30 - 99 U/L    Total Bilirubin 2.3 (H) 0.1 - 1.5 mg/dL    Albumin 2.2 (L) 3.2 - 4.9 g/dL    Total Protein 4.5 (L) 6.0 - 8.2 g/dL    Globulin 2.3 1.9 - 3.5 g/dL    A-G Ratio 1.0 g/dL   MAGNESIUM    Collection Time: 12/21/24  1:26 AM   Result Value Ref Range    Magnesium 1.5 1.5 - 2.5 mg/dL   proBrain Natriuretic Peptide, NT    Collection Time: 12/21/24  1:26 AM   Result Value Ref Range    NT-proBNP 728 (H) 0 - 125 pg/mL   ESTIMATED GFR    Collection Time: 12/21/24  1:26 AM   Result Value Ref Range    GFR (CKD-EPI) 131 >60 mL/min/1.73 m 2   CBC WITH DIFFERENTIAL    Collection Time: 12/21/24  2:17 AM   Result Value Ref Range    WBC 2.5 (L) 4.8 - 10.8 K/uL    RBC 2.28 (L) 4.70 - 6.10 M/uL    Hemoglobin 6.8 (L) 14.0 - 18.0 g/dL    Hematocrit 20.9 (L) 42.0 - 52.0 %    MCV 91.7 81.4 - 97.8 fL    MCH 29.8 27.0 - 33.0 pg    MCHC 32.5 32.3 - 36.5 g/dL    RDW 58.7 (H) 35.9 - 50.0 fL    Platelet Count 50 (L) 164 - 446 K/uL    Neutrophils-Polys 49.10 44.00 - 72.00 %    Lymphocytes 25.90 22.00 - 41.00 %    Monocytes 24.10 (H) 0.00 - 13.40 %    Eosinophils 0.00 0.00 - 6.90 %    Basophils 0.00 0.00 - 1.80 %    Nucleated RBC 0.00 0.00 - 0.20 /100 WBC    Neutrophils (Absolute) 1.23 (L) 1.82 - 7.42 K/uL    Lymphs (Absolute) 0.65 (L) 1.00 - 4.80 K/uL    Monos (Absolute) 0.60 0.00 - 0.85 K/uL    Eos (Absolute) 0.00 0.00 - 0.51 K/uL    Baso (Absolute) 0.00 0.00 - 0.12 K/uL    NRBC (Absolute) 0.00 K/uL    Hypochromia 1+     Anisocytosis 1+     Macrocytosis 1+    DIFFERENTIAL MANUAL    Collection Time: 12/21/24  2:17 AM   Result Value Ref  Range    Myelocytes 0.90 %    Manual Diff Status PERFORMED    PERIPHERAL SMEAR REVIEW    Collection Time: 12/21/24  2:17 AM   Result Value Ref Range    Peripheral Smear Review see below    PLATELET ESTIMATE    Collection Time: 12/21/24  2:17 AM   Result Value Ref Range    Plt Estimation Decreased    MORPHOLOGY    Collection Time: 12/21/24  2:17 AM   Result Value Ref Range    RBC Morphology Present     Poikilocytosis 1+     Target Cells 1+    IMMATURE PLT FRACTION    Collection Time: 12/21/24  2:17 AM   Result Value Ref Range    Imm. Plt Fraction 2.5 0.6 - 13.1 %   URINALYSIS,CULTURE IF INDICATED    Collection Time: 12/21/24  2:50 AM    Specimen: Urine, Clean Catch   Result Value Ref Range    Color Yellow     Character Clear     Specific Gravity 1.014 <1.035    Ph 6.0 5.0 - 8.0    Glucose Negative Negative mg/dL    Ketones Negative Negative mg/dL    Protein Negative Negative mg/dL    Bilirubin Negative Negative    Urobilinogen, Urine 0.2 <=1.0 EU/dL    Nitrite Negative Negative    Leukocyte Esterase Negative Negative    Occult Blood Moderate (A) Negative    Micro Urine Req Microscopic    IONIZED CALCIUM    Collection Time: 12/21/24  2:50 AM   Result Value Ref Range    Ionized Calcium 1.1 1.1 - 1.3 mmol/L   URINE MICROSCOPIC (W/UA)    Collection Time: 12/21/24  2:50 AM   Result Value Ref Range    WBC 0-2 /hpf    RBC 21-50 (A) 0 - 2 /hpf    Bacteria None Seen None /hpf    Epithelial Cells 0-2 0 - 5 /hpf    Urine Casts 0-2 0 - 2 /lpf   PTH INTACT (PTH ONLY)    Collection Time: 12/21/24  2:50 AM   Result Value Ref Range    Pth, Intact 9.9 (L) 14.0 - 72.0 pg/mL   Chlamydia/GC, PCR (Urine)    Collection Time: 12/21/24  4:58 AM    Specimen: Urine   Result Value Ref Range    Source Urine    CBC WITH DIFFERENTIAL    Collection Time: 12/21/24  5:40 AM   Result Value Ref Range    WBC 2.2 (L) 4.8 - 10.8 K/uL    RBC 2.32 (L) 4.70 - 6.10 M/uL    Hemoglobin 6.9 (L) 14.0 - 18.0 g/dL    Hematocrit 20.9 (L) 42.0 - 52.0 %    MCV 90.1  81.4 - 97.8 fL    MCH 29.7 27.0 - 33.0 pg    MCHC 33.0 32.3 - 36.5 g/dL    RDW 57.1 (H) 35.9 - 50.0 fL    Platelet Count 51 (L) 164 - 446 K/uL    MPV 10.3 9.0 - 12.9 fL    Neutrophils-Polys 45.60 44.00 - 72.00 %    Lymphocytes 24.20 22.00 - 41.00 %    Monocytes 29.20 (H) 0.00 - 13.40 %    Eosinophils 0.00 0.00 - 6.90 %    Basophils 0.50 0.00 - 1.80 %    Immature Granulocytes 0.50 0.00 - 0.90 %    Nucleated RBC 0.00 0.00 - 0.20 /100 WBC    Neutrophils (Absolute) 1.00 (L) 1.82 - 7.42 K/uL    Lymphs (Absolute) 0.53 (L) 1.00 - 4.80 K/uL    Monos (Absolute) 0.64 0.00 - 0.85 K/uL    Eos (Absolute) 0.00 0.00 - 0.51 K/uL    Baso (Absolute) 0.01 0.00 - 0.12 K/uL    Immature Granulocytes (abs) 0.01 0.00 - 0.11 K/uL    NRBC (Absolute) 0.00 K/uL   Comp Metabolic Panel    Collection Time: 12/21/24  5:40 AM   Result Value Ref Range    Sodium 136 135 - 145 mmol/L    Potassium 3.0 (L) 3.6 - 5.5 mmol/L    Chloride 106 96 - 112 mmol/L    Co2 20 20 - 33 mmol/L    Anion Gap 10.0 7.0 - 16.0    Glucose 108 (H) 65 - 99 mg/dL    Bun 10 8 - 22 mg/dL    Creatinine 0.64 0.50 - 1.40 mg/dL    Calcium 7.5 (L) 8.5 - 10.5 mg/dL    Correct Calcium 8.8 8.5 - 10.5 mg/dL    AST(SGOT) 76 (H) 12 - 45 U/L    ALT(SGPT) 32 2 - 50 U/L    Alkaline Phosphatase 130 (H) 30 - 99 U/L    Total Bilirubin 2.5 (H) 0.1 - 1.5 mg/dL    Albumin 2.4 (L) 3.2 - 4.9 g/dL    Total Protein 4.8 (L) 6.0 - 8.2 g/dL    Globulin 2.4 1.9 - 3.5 g/dL    A-G Ratio 1.0 g/dL   HOLD BLOOD BANK SPECIMEN (NOT TESTED)    Collection Time: 12/21/24  5:40 AM   Result Value Ref Range    Holding Tube - Bb DONE    IMMATURE PLT FRACTION    Collection Time: 12/21/24  5:40 AM   Result Value Ref Range    Imm. Plt Fraction 2.2 0.6 - 13.1 %   ESTIMATED GFR    Collection Time: 12/21/24  5:40 AM   Result Value Ref Range    GFR (CKD-EPI) 121 >60 mL/min/1.73 m 2   All labs reviewed by me.    EKG:   I have independently interpreted this EKG     Radiology:   The attending Emergency Physician has independently  interpreted the diagnostic imaging associated with this visit and is awaiting the final reading from the radiologist, which will be displayed below.  Preliminary interpretation is a follows: Chest x-ray with interstitial edema  Radiologist interpretation:  DX-CHEST-PORTABLE (1 VIEW)   Final Result         1.  Slight patchy bilateral lung bases, could represent prominent lung markings due to low lung volumes, component of infiltrate not excluded.      [id number]    (Results Pending)      CRITICAL CARE  The very real possibility of a deterioration of this patient's condition required the highest level of my preparedness for sudden, emergent intervention.  I provided critical care services, which included medication orders, frequent reevaluations of the patient's condition and response to treatment, ordering and reviewing test results, and discussing the case with various consultants.  The critical care time associated with the care of the patient was 35 minutes. Review chart for interventions. This time is exclusive of any other billable procedures.     COURSE & MEDICAL DECISION MAKING    INITIAL ASSESSMENT AND PLAN  Care Narrative:       12:01 AM - Patient seen and evaluated at bedside. Bedside POCUS of abdomen shows no drainable abdominal ascites present.     3:05 AM - Paged hospitalist.    3:15 AM - I discussed the patient's case and the above findings with Dr. Monteiro (Hospitalist) who agrees to evaluate the patient for hospitalization.    ADDITIONAL PROBLEM LIST AND DISPOSITION  Past Medical History:   Diagnosis Date    Cirrhosis of liver (HCC)     Hypertension     Liver disease     Seizure (HCC)                   DISPOSITION AND DISCUSSIONS  44-year-old male history of liver cirrhosis, heart failure presents to the ED with worsening bilateral lower extremity swelling, groin swelling.  On exam he is diffusely anasarcic.  Profound scrotal swelling is present.  Additional workup obtained and notable for  uptrending proBNP in the setting of medication nonadherence, worsening hypocalcemia and hypokalemia.  Given the degree of electrolyte derangements and the patient's chronically ill nature felt is best to admit for further diuresis and electrolyte repletion.  The patient was given IV Lasix here with good urine output.  There are no significant EKG changes including no QTc prolongation.  Discussed case with hospitalist who kindly agreed to admit.    I have discussed management of the patient with the following physicians and ELISSA's: Dr. Monteiro (Hospitalist)    Discussion of management with other [facility] or appropriate source(s): None     Escalation of care considered, and ultimately not performed: .    Barriers to care at this time, including but not limited to: Patient is homeless.     Decision tools and prescription drugs considered including, but not limited to: .    DISPOSITION:  Patient will be hospitalized by Dr. Monteiro (Hospitalist) in critical condition.    FINAL IMPRESSION   1. Hypocalcemia    2. Anasarca    3. Acute on chronic systolic congestive heart failure (HCC)    4. Influenza A    5. Critical Care Time     Luis Carlos ARIAS (Scribe), am scribing for, and in the presence of, Flex Goode M.D..    Electronically signed by: Luis Carlos Barreto (Scribe), 12/21/2024    Flex ARIAS M.D. personally performed the services described in this documentation, as scribed by Luis Carlos Barreto in my presence, and it is both accurate and complete.    The note accurately reflects work and decisions made by me.  Flex Goode M.D.  12/21/2024  6:33 AM

## 2024-12-21 NOTE — ED NOTES
Received report and assumed care of pt. On 2 liters NC. Awake and alert. Remains on cardiac monitor. [employer] resident at bedside.

## 2024-12-21 NOTE — PROGRESS NOTES
Attending:   Dr. Monteiro    Resident:   Veronique Dyer D.O.    PATIENT:   Jalen Vaughn; [id number]; 1980    ID:   44 y.o. male with past medical history alcoholic cirrhosis, hypertension status post TIPS, admitted for electrolyte abnormalities and anasarca with positive influenza.    SUBJECTIVE:   No acute events overnight, patient states the swelling in his lower extremities and waist have been going on for 5 days.  He states his legs are painful.  The last time he drank was yesterday on 12/19, he states he drank 2 beers.  Per nursing he has been having some difficulty urinating even after given the Lasix dose.    OBJECTIVE:  Vitals:    12/21/24 1230 12/21/24 1300 12/21/24 1330 12/21/24 1400   BP: 134/66 129/62 134/64 133/62   Pulse: 75 75 73 74   Resp: (!) 22 20 (!) 21 (!) 22   Temp:       TempSrc:       SpO2: 98% 96% 96% 97%   Weight:       Height:           Intake/Output Summary (Last 24 hours) at 12/21/2024 1030  Last data filed at 12/21/2024 0800  Gross per 24 hour   Intake --   Output 1200 ml   Net -1200 ml       PHYSICAL EXAM:  General: No acute distress, afebrile, resting comfortably  HEENT: NC/AT. EOMI.   Cardiovascular: RRR without murmurs. Normal capillary refill   Respiratory: CTAB  Abdomen: soft, nontender, nondistended, no masses  EXT: 2-3+ pitting edema in lower extremities bilaterally, extremities are warm, pulses are intact, 2 open wounds on left shin, draining clear fluid  Skin: No erythema/lesions   Neuro: Non-focal    LABS:  Recent Labs     12/18/24  1654 12/21/24  0217 12/21/24  0540 12/21/24  1056   WBC 6.2 2.5* 2.2* 1.8*   RBC 2.86* 2.28* 2.32* 2.54*   HEMOGLOBIN 8.8* 6.8* 6.9* 7.5*   HEMATOCRIT 27.3* 20.9* 20.9* 23.1*   MCV 95.5 91.7 90.1 90.9   MCH 30.8 29.8 29.7 29.5   RDW 60.3* 58.7* 57.1* 56.6*   PLATELETCT 71* 50* 51* 51*   MPV 11.6  --  10.3 12.5   NEUTSPOLYS 54.90 49.10 45.60  --    LYMPHOCYTES 24.10 25.90 24.20  --    MONOCYTES 18.90* 24.10* 29.20*  --    EOSINOPHILS 1.50 0.00  "0.00  --    BASOPHILS 0.30 0.00 0.50  --    RBCMORPHOLO  --  Present  --   --      Recent Labs     12/18/24  1654 12/21/24  0126 12/21/24  0540   SODIUM 136 136 136   POTASSIUM 3.4* 2.8* 3.0*   CHLORIDE 105 109 106   CO2 19* 15* 20   BUN 8 8 10   CREATININE 0.52 0.49* 0.64   CALCIUM 7.5* 6.5* 7.5*   MAGNESIUM  --  1.5  --    ALBUMIN 2.8* 2.2* 2.4*     Estimated GFR/CRCL = Estimated Creatinine Clearance: 221.2 mL/min (by C-G formula based on SCr of 0.64 mg/dL).  Recent Labs     12/18/24 1654 12/21/24  0126 12/21/24  0540   GLUCOSE 111* 87 108*     Recent Labs     12/18/24 1654 12/21/24  0121 12/21/24  0126 12/21/24  0540   ASTSGOT 88*  --  80* 76*   ALTSGPT 38  --  28 32   TBILIRUBIN 2.6*  --  2.3* 2.5*   ALKPHOSPHAT 188*  --  118* 130*   GLOBULIN 2.7  --  2.3 2.4   AMMONIA  --  37  --   --              No results for input(s): \"INR\", \"APTT\", \"FIBRINOGEN\" in the last 72 hours.    Invalid input(s): \"DIMER\"      IMAGING:  DX-CHEST-PORTABLE (1 VIEW)   Final Result         1.  Slight patchy bilateral lung bases, could represent prominent lung markings due to low lung volumes, component of infiltrate not excluded.      [id number]    (Results Pending)       MEDS:  Current Facility-Administered Medications   Medication Last Admin    carvedilol (Coreg) tablet 3.125 mg 3.125 mg at 12/21/24 1011    escitalopram (Lexapro) tablet 20 mg 20 mg at 12/21/24 1011    ferrous sulfate tablet 325 mg 325 mg at 12/21/24 0611    folic acid (Folvite) tablet 1 mg 1 mg at 12/21/24 0611    gabapentin (Neurontin) capsule 300 mg 300 mg at 12/21/24 1012    hydrOXYzine HCl (Atarax) tablet 25 mg      lactulose 20 GM/30ML solution 45 mL 45 mL at 12/21/24 1013    riFAXIMin (Xifaxan) tablet 550 mg 550 mg at 12/21/24 1016    spironolactone (Aldactone) tablet 100 mg 100 mg at 12/21/24 1014    thiamine (Vitamin B-1) tablet 100 mg 100 mg at 12/21/24 1018    [START ON 12/22/2024] furosemide (Lasix) tablet 80 mg      labetalol (Normodyne/Trandate) " injection 10 mg      omeprazole (PriLOSEC) capsule 20 mg 20 mg at 12/21/24 1014    LORazepam (Ativan) tablet 0.5 mg      LORazepam (Ativan) tablet 1 mg      Or    LORazepam (Ativan) injection 0.5 mg      LORazepam (Ativan) tablet 2 mg      Or    LORazepam (Ativan) injection 1 mg      LORazepam (Ativan) tablet 3 mg      Or    LORazepam (Ativan) injection 1.5 mg      LORazepam (Ativan) tablet 4 mg      Or    LORazepam (Ativan) injection 2 mg      albumin human 25% solution 25 g      furosemide (Lasix) injection 40 mg      potassium chloride SA (Kdur) tablet 40 mEq       Current Outpatient Medications   Medication    carvedilol (COREG) 3.125 MG Tab    [START ON 12/22/2024] furosemide (LASIX) 80 MG Tab    gabapentin (NEURONTIN) 300 MG Cap    [START ON 12/22/2024] spironolactone (ALDACTONE) 100 MG Tab    lactulose 10 g/15mL Solution    riFAXIMin (XIFAXAN) 550 MG Tab tablet    folic acid (FOLVITE) 1 MG Tab    furosemide (LASIX) 80 MG Tab    folic acid (FOLVITE) 1 MG Tab    carvedilol (COREG) 3.125 MG Tab    potassium chloride (KLOR-CON) 20 MEQ Pack    riFAXIMin (XIFAXAN) 550 MG Tab tablet    spironolactone (ALDACTONE) 100 MG Tab    lactulose 20 GM/30ML Solution    amLODIPine (NORVASC) 5 MG Tab    escitalopram (LEXAPRO) 20 MG tablet    ferrous sulfate 325 (65 Fe) MG tablet    gabapentin (NEURONTIN) 300 MG Cap    hydrOXYzine HCl (ATARAX) 25 MG Tab    pantoprazole (PROTONIX) 40 MG Tablet Delayed Response       ASSESSMENT/PLAN:    Problem   Hypocalcemia   Iron Deficiency Anemia Secondary to Inadequate Dietary Iron Intake   Influenza   Anasarca   Scrotal Swelling   Alcohol Abuse   Pancytopenia (Hcc)   Hypokalemia       * Hypocalcemia- (present on admission)  Assessment & Plan  Patient has a chronic history of low calcium but was worse on admission.  Likely multifactorial secondary to Lasix use and recent vomiting. S/p 3g calcium gluconate in the ED. Ionized calcium is within normal limits.  Plan:  -Repeat CMP at 0800 following  repletion  -PTH ordered and pending    Scrotal swelling  Assessment & Plan  Several days of scrotal swelling with tenderness.  Patient denies any new sexual encounters.  Likely 2/2 anasarca.  Plan:  -Scrotal US ordered  -GC/CT ordered    Anasarca  Assessment & Plan  Significant anasarca on exam with 2+ pitting edema extending to abdomen.  Patient has not been compliant with home Lasix. S/p IV Lasix 80mg in ED. BNP slightly elevated to the 700's.  Recent echo in November 2024 demonstrated a normal ejection fraction.  Plan:  -Continue home Lasix of 80mg BID  -Will administer one time time dose of albumin 25mg followed by lasix 40 IV     Influenza  Assessment & Plan  Patient positive for influenza.  Positive sick contacts.  Symptoms have been ongoing for greater than 48 hours therefore, not candidate for oseltamivir.  Plan:  -Supportive treatment   -Encourage oral hydration  -Anti-pyretics as needed    Iron deficiency anemia secondary to inadequate dietary iron intake  Assessment & Plan  Chronic history.  Denies any active bleeding or black stools.  Patient on iron supplementation at home which he is noncompliant with. Pt received 1 unit of RBC after a Hgb of 6.9, repeat level was 7.5.  Plan:  -Will continue to monitor and transfuse as needed  -Continue ferrous sulfate inpatient    Alcohol abuse- (present on admission)  Assessment & Plan  Pt has history of alcohol abuse requiring multiple admissions. Per patient he drank 2-3 beers on 12/20. His diagnostic alcohol level at 1100 on 12/21 was <10.1. Pt has some confusion and mild tremors. Pt says he does not have history or seizures due to withdrawal.  Plan:   - [facility] protocol initiated   - Folate and Thiamine supplementation     Hypokalemia- (present on admission)  Assessment & Plan  Likely secondary to GI losses from vomiting and diarrhea.  Also may be related to chronic Lasix use and inconsistent potassium use. S/p 40 mEq Kcl in ED.   Plan:  - Repeat CMP revealed low  potassium of 3.0 despite received 40mEq K+, another 40 was given   - Will repeat labs in the AM and replete electrolytes as needed    Pancytopenia (HCC)- (present on admission)  Assessment & Plan  Patient has multiple cell lines including platelets, hemoglobin, and white blood cells which are low.  Likely sequela of advanced alcoholic cirrhosis along with current viral infection.  Plan:  - Patient's WBC continues to decrease, likely secondary to alcohol abuse  - Will continue to monitor          Core Measures:  Fluids: None  Lines: PIV  Abx: None  Diet: Regular  PPX: SCDs    DISPO: In patient for monitoring of severe electrolyte abnormality as well as anasarca      CODE STATUS: Full    Veronique Dyer D.O.  PGY-1  [facility]

## 2024-12-21 NOTE — ASSESSMENT & PLAN NOTE
Significant anasarca on exam with 2+ pitting edema extending to abdomen at admission.  Patient has not been compliant with home Lasix. S/p IV Lasix 80mg in ED. BNP slightly elevated to the 700's.  Recent echo in November 2024 demonstrated a normal ejection fraction. Pt also received one time dose of 25mg albumin followed by IV lasix.  Patient with EZEQUIEL likely secondary to diuresis.  Still total volume up.  - Home Lasix 80 mg oral daily, continue to monitor kidney function and fluid status

## 2024-12-21 NOTE — ASSESSMENT & PLAN NOTE
Patient positive for influenza.  Positive sick contacts.  Symptoms ongoing for greater than 48 hours prior to admission therefore, not candidate for oseltamivir.  -Supportive treatment   -Anti-pyretics as needed

## 2024-12-21 NOTE — ASSESSMENT & PLAN NOTE
Patient has a chronic history of low calcium but was worse on admission.  Likely multifactorial secondary to Lasix use and recent vomiting. PTH is low.  Plan:  -Will monitor with daily labs  -Vitamin D level, repeat PTH  -Initiate calcium and vitamin D treatment once vitamin D level returns if low  -Anticipate initiation of 2 g elemental calcium and 0.25 mcg of calcitriol daily

## 2024-12-21 NOTE — ED NOTES
Extensive time spent at bedside talking with pt. Unable to verbalize a plan. Unable to get out of bed and dress self. Weak and unable to ambulate more then  a few feet on his own. Sleepy. Asking ED Tech and this RN to hand him a phone on the floor that is not there. [facility] resident informed. SW at bedside.

## 2024-12-21 NOTE — ED NOTES
Lab called with critical result of Calcium 6.5 at 0234.    Dr. Goode notified of critical lab result at 0234 via [employer].

## 2024-12-21 NOTE — ED NOTES
Medicated per MAR. Assisted to bedside commode with assistance of two for BM. Urinated uin urinal.

## 2024-12-21 NOTE — DISCHARGE PLANNING
"SW spoke with pt at bedside regarding a safe discharge plan and pt states he would like to leave for a few hours to smoke and have a few beers and then return in a few hours. SW told pt the hospital will not save his bed for him and staff is worried about his health and lack of housing and resources. Pt stated he wants to go to [place] to see his children and when SW asked if he has a bus ticket to [place] pt stated \"I do, but I don't.\" Pt went back and forth about wanting to stay in the hospital and also wanting to leave to smoke. When this SW asked pt if he would like to leave right now and try to get dressed pt asked this SW for a warm blanket since he was cold. Pt stated he has no bed at the shelter and does not want to be outside in the cold, but also does not want to stay at the hospital since he is not able to smoke. Pt also did not want any community resources at this time.    This SW spoke with pt at length about the risks of leaving the hospital and pt stated he understood but made no attempt or mention of leaving when this SW provided pt with a warm blanket.    SW will remain available for support and resources.  "

## 2024-12-22 ENCOUNTER — APPOINTMENT (OUTPATIENT)
Dept: RADIOLOGY | Facility: MEDICAL CENTER | Age: 44
DRG: 432 | End: 2024-12-22
Payer: COMMERCIAL

## 2024-12-22 PROBLEM — K65.2 SPONTANEOUS BACTERIAL PERITONITIS (HCC): Status: ACTIVE | Noted: 2024-12-22

## 2024-12-22 LAB
ALBUMIN SERPL BCP-MCNC: 2.3 G/DL (ref 3.2–4.9)
ALBUMIN/GLOB SERPL: 1 G/DL
ALP SERPL-CCNC: 120 U/L (ref 30–99)
ALT SERPL-CCNC: 27 U/L (ref 2–50)
ANION GAP SERPL CALC-SCNC: 9 MMOL/L (ref 7–16)
AST SERPL-CCNC: 75 U/L (ref 12–45)
BASE EXCESS BLDA CALC-SCNC: 0 MMOL/L (ref -4–3)
BASOPHILS # BLD AUTO: 0.4 % (ref 0–1.8)
BASOPHILS # BLD: 0.01 K/UL (ref 0–0.12)
BILIRUB SERPL-MCNC: 2 MG/DL (ref 0.1–1.5)
BODY TEMPERATURE: 38.3 CENTIGRADE
BUN SERPL-MCNC: 9 MG/DL (ref 8–22)
CALCIUM ALBUM COR SERPL-MCNC: 8.7 MG/DL (ref 8.5–10.5)
CALCIUM SERPL-MCNC: 7.3 MG/DL (ref 8.5–10.5)
CHLORIDE SERPL-SCNC: 110 MMOL/L (ref 96–112)
CO2 SERPL-SCNC: 20 MMOL/L (ref 20–33)
CREAT SERPL-MCNC: 0.73 MG/DL (ref 0.5–1.4)
EOSINOPHIL # BLD AUTO: 0 K/UL (ref 0–0.51)
EOSINOPHIL NFR BLD: 0 % (ref 0–6.9)
ERYTHROCYTE [DISTWIDTH] IN BLOOD BY AUTOMATED COUNT: 57.9 FL (ref 35.9–50)
GFR SERPLBLD CREATININE-BSD FMLA CKD-EPI: 116 ML/MIN/1.73 M 2
GLOBULIN SER CALC-MCNC: 2.3 G/DL (ref 1.9–3.5)
GLUCOSE SERPL-MCNC: 100 MG/DL (ref 65–99)
HCO3 BLDA-SCNC: 23 MMOL/L (ref 21–28)
HCT VFR BLD AUTO: 21.5 % (ref 42–52)
HGB BLD-MCNC: 7.1 G/DL (ref 14–18)
IMM GRANULOCYTES # BLD AUTO: 0.01 K/UL (ref 0–0.11)
IMM GRANULOCYTES NFR BLD AUTO: 0.4 % (ref 0–0.9)
INHALED O2 FLOW RATE: ABNORMAL L/MIN
LYMPHOCYTES # BLD AUTO: 0.73 K/UL (ref 1–4.8)
LYMPHOCYTES NFR BLD: 29.1 % (ref 22–41)
MAGNESIUM SERPL-MCNC: 1.6 MG/DL (ref 1.5–2.5)
MCH RBC QN AUTO: 29.7 PG (ref 27–33)
MCHC RBC AUTO-ENTMCNC: 33 G/DL (ref 32.3–36.5)
MCV RBC AUTO: 90 FL (ref 81.4–97.8)
MONOCYTES # BLD AUTO: 0.37 K/UL (ref 0–0.85)
MONOCYTES NFR BLD AUTO: 14.7 % (ref 0–13.4)
NEUTROPHILS # BLD AUTO: 1.39 K/UL (ref 1.82–7.42)
NEUTROPHILS NFR BLD: 55.4 % (ref 44–72)
NRBC # BLD AUTO: 0 K/UL
NRBC BLD-RTO: 0 /100 WBC (ref 0–0.2)
PARASITE SPEC INSPECT: ABNORMAL
PARASITE SPEC INSPECT: ABNORMAL
PCO2 BLDA: 32.1 MMHG (ref 32–48)
PCO2 TEMP ADJ BLDA: 34 MMHG (ref 32–48)
PH BLDA: 7.48 [PH] (ref 7.35–7.45)
PH TEMP ADJ BLDA: 7.46 [PH] (ref 7.35–7.45)
PHOSPHATE SERPL-MCNC: 2.4 MG/DL (ref 2.5–4.5)
PLATELET # BLD AUTO: 50 K/UL (ref 164–446)
PLATELETS.RETICULATED NFR BLD AUTO: 2.7 % (ref 0.6–13.1)
PMV BLD AUTO: 13 FL (ref 9–12.9)
PO2 BLDA: 67.8 MMHG (ref 83–108)
PO2 TEMP ADJ BLDA: 74.1 MMHG (ref 64–87)
POTASSIUM SERPL-SCNC: 3.2 MMOL/L (ref 3.6–5.5)
PROT SERPL-MCNC: 4.6 G/DL (ref 6–8.2)
RBC # BLD AUTO: 2.39 M/UL (ref 4.7–6.1)
SAO2 % BLDA: 92 % (ref 93–99)
SIGNIFICANT IND 70042: ABNORMAL
SITE SITE: ABNORMAL
SODIUM SERPL-SCNC: 139 MMOL/L (ref 135–145)
SOURCE SOURCE: ABNORMAL
WBC # BLD AUTO: 2.5 K/UL (ref 4.8–10.8)

## 2024-12-22 PROCEDURE — 700102 HCHG RX REV CODE 250 W/ 637 OVERRIDE(OP)

## 2024-12-22 PROCEDURE — A9270 NON-COVERED ITEM OR SERVICE: HCPCS

## 2024-12-22 PROCEDURE — 770020 HCHG ROOM/CARE - TELE (206)

## 2024-12-22 PROCEDURE — 700105 HCHG RX REV CODE 258

## 2024-12-22 PROCEDURE — 71045 X-RAY EXAM CHEST 1 VIEW: CPT

## 2024-12-22 PROCEDURE — 97602 WOUND(S) CARE NON-SELECTIVE: CPT

## 2024-12-22 PROCEDURE — 700111 HCHG RX REV CODE 636 W/ 250 OVERRIDE (IP)

## 2024-12-22 PROCEDURE — 85025 COMPLETE CBC W/AUTO DIFF WBC: CPT

## 2024-12-22 PROCEDURE — 85055 RETICULATED PLATELET ASSAY: CPT

## 2024-12-22 PROCEDURE — 700101 HCHG RX REV CODE 250

## 2024-12-22 PROCEDURE — A9270 NON-COVERED ITEM OR SERVICE: HCPCS | Performed by: HOSPITALIST

## 2024-12-22 PROCEDURE — 99232 SBSQ HOSP IP/OBS MODERATE 35: CPT | Performed by: HOSPITALIST

## 2024-12-22 PROCEDURE — 700102 HCHG RX REV CODE 250 W/ 637 OVERRIDE(OP): Performed by: HOSPITALIST

## 2024-12-22 PROCEDURE — 80053 COMPREHEN METABOLIC PANEL: CPT

## 2024-12-22 PROCEDURE — 36415 COLL VENOUS BLD VENIPUNCTURE: CPT

## 2024-12-22 PROCEDURE — 84100 ASSAY OF PHOSPHORUS: CPT

## 2024-12-22 PROCEDURE — 87169 MACROSCOPIC EXAM PARASITE: CPT

## 2024-12-22 PROCEDURE — 76705 ECHO EXAM OF ABDOMEN: CPT

## 2024-12-22 PROCEDURE — 83735 ASSAY OF MAGNESIUM: CPT

## 2024-12-22 PROCEDURE — 82803 BLOOD GASES ANY COMBINATION: CPT

## 2024-12-22 RX ORDER — CHLORDIAZEPOXIDE HYDROCHLORIDE 25 MG/1
25 CAPSULE, GELATIN COATED ORAL EVERY 6 HOURS
Status: DISCONTINUED | OUTPATIENT
Start: 2024-12-23 | End: 2024-12-23 | Stop reason: HOSPADM

## 2024-12-22 RX ORDER — HYDROXYZINE HYDROCHLORIDE 25 MG/1
25 TABLET, FILM COATED ORAL 3 TIMES DAILY PRN
Status: DISCONTINUED | OUTPATIENT
Start: 2024-12-22 | End: 2024-12-23

## 2024-12-22 RX ORDER — MAGNESIUM SULFATE HEPTAHYDRATE 40 MG/ML
4 INJECTION, SOLUTION INTRAVENOUS ONCE
Status: COMPLETED | OUTPATIENT
Start: 2024-12-22 | End: 2024-12-22

## 2024-12-22 RX ORDER — OXYCODONE HYDROCHLORIDE 5 MG/1
5 TABLET ORAL EVERY 6 HOURS PRN
Status: DISCONTINUED | OUTPATIENT
Start: 2024-12-22 | End: 2024-12-23

## 2024-12-22 RX ORDER — FUROSEMIDE 10 MG/ML
80 INJECTION INTRAMUSCULAR; INTRAVENOUS ONCE
Status: COMPLETED | OUTPATIENT
Start: 2024-12-22 | End: 2024-12-22

## 2024-12-22 RX ORDER — TRAZODONE HYDROCHLORIDE 50 MG/1
50 TABLET, FILM COATED ORAL
Status: DISCONTINUED | OUTPATIENT
Start: 2024-12-22 | End: 2024-12-23 | Stop reason: HOSPADM

## 2024-12-22 RX ORDER — POTASSIUM CHLORIDE 1500 MG/1
40 TABLET, EXTENDED RELEASE ORAL ONCE
Status: COMPLETED | OUTPATIENT
Start: 2024-12-22 | End: 2024-12-22

## 2024-12-22 RX ORDER — CHLORDIAZEPOXIDE HYDROCHLORIDE 25 MG/1
50 CAPSULE, GELATIN COATED ORAL EVERY 6 HOURS
Status: COMPLETED | OUTPATIENT
Start: 2024-12-22 | End: 2024-12-23

## 2024-12-22 RX ORDER — ACETAMINOPHEN 325 MG/1
650 TABLET ORAL EVERY 8 HOURS
Status: DISCONTINUED | OUTPATIENT
Start: 2024-12-22 | End: 2024-12-23 | Stop reason: HOSPADM

## 2024-12-22 RX ADMIN — LACTULOSE 45 ML: 10 SOLUTION ORAL at 13:49

## 2024-12-22 RX ADMIN — FUROSEMIDE 80 MG: 40 TABLET ORAL at 05:43

## 2024-12-22 RX ADMIN — RIFAXIMIN 550 MG: 550 TABLET ORAL at 18:49

## 2024-12-22 RX ADMIN — POTASSIUM CHLORIDE 40 MEQ: 20 TABLET, EXTENDED RELEASE ORAL at 13:49

## 2024-12-22 RX ADMIN — ACETAMINOPHEN 650 MG: 325 TABLET ORAL at 13:49

## 2024-12-22 RX ADMIN — LORAZEPAM 1 MG: 1 TABLET ORAL at 05:43

## 2024-12-22 RX ADMIN — IBUPROFEN 600 MG: 600 TABLET, FILM COATED ORAL at 11:45

## 2024-12-22 RX ADMIN — CHLORDIAZEPOXIDE HYDROCHLORIDE 50 MG: 25 CAPSULE ORAL at 13:49

## 2024-12-22 RX ADMIN — POTASSIUM PHOSPHATE, MONOBASIC AND POTASSIUM PHOSPHATE, DIBASIC 15 MMOL: 224; 236 INJECTION, SOLUTION, CONCENTRATE INTRAVENOUS at 10:50

## 2024-12-22 RX ADMIN — CARVEDILOL 3.12 MG: 3.12 TABLET, FILM COATED ORAL at 16:40

## 2024-12-22 RX ADMIN — IBUPROFEN 600 MG: 600 TABLET, FILM COATED ORAL at 00:49

## 2024-12-22 RX ADMIN — GABAPENTIN 300 MG: 300 CAPSULE ORAL at 08:17

## 2024-12-22 RX ADMIN — GABAPENTIN 300 MG: 300 CAPSULE ORAL at 16:40

## 2024-12-22 RX ADMIN — GABAPENTIN 300 MG: 300 CAPSULE ORAL at 21:08

## 2024-12-22 RX ADMIN — ESCITALOPRAM OXALATE 20 MG: 10 TABLET ORAL at 05:43

## 2024-12-22 RX ADMIN — CARVEDILOL 3.12 MG: 3.12 TABLET, FILM COATED ORAL at 08:17

## 2024-12-22 RX ADMIN — CHLORDIAZEPOXIDE HYDROCHLORIDE 50 MG: 25 CAPSULE ORAL at 18:49

## 2024-12-22 RX ADMIN — LACTULOSE 45 ML: 10 SOLUTION ORAL at 16:40

## 2024-12-22 RX ADMIN — MAGNESIUM SULFATE HEPTAHYDRATE 4 G: 4 INJECTION, SOLUTION INTRAVENOUS at 08:21

## 2024-12-22 RX ADMIN — CEFTRIAXONE SODIUM 2000 MG: 10 INJECTION, POWDER, FOR SOLUTION INTRAVENOUS at 18:48

## 2024-12-22 RX ADMIN — PIPERONYL BUTOXIDE, PYRETHRUM EXTRACT: 4; .33 SHAMPOO TOPICAL at 20:15

## 2024-12-22 RX ADMIN — TRAZODONE HYDROCHLORIDE 50 MG: 50 TABLET ORAL at 21:11

## 2024-12-22 RX ADMIN — OXYCODONE 5 MG: 5 TABLET ORAL at 21:08

## 2024-12-22 RX ADMIN — LORAZEPAM 1 MG: 1 TABLET ORAL at 11:45

## 2024-12-22 RX ADMIN — OMEPRAZOLE 20 MG: 20 CAPSULE, DELAYED RELEASE ORAL at 18:49

## 2024-12-22 RX ADMIN — SPIRONOLACTONE 100 MG: 25 TABLET ORAL at 05:43

## 2024-12-22 RX ADMIN — LACTULOSE 45 ML: 10 SOLUTION ORAL at 08:17

## 2024-12-22 RX ADMIN — LORAZEPAM 1 MG: 1 TABLET ORAL at 21:08

## 2024-12-22 RX ADMIN — ACETAMINOPHEN 650 MG: 325 TABLET ORAL at 21:09

## 2024-12-22 RX ADMIN — FUROSEMIDE 80 MG: 10 INJECTION INTRAMUSCULAR; INTRAVENOUS at 13:49

## 2024-12-22 RX ADMIN — POTASSIUM CHLORIDE 40 MEQ: 1500 TABLET, EXTENDED RELEASE ORAL at 08:17

## 2024-12-22 ASSESSMENT — LIFESTYLE VARIABLES
AGITATION: NORMAL ACTIVITY
HEADACHE, FULLNESS IN HEAD: VERY MILD
VISUAL DISTURBANCES: NOT PRESENT
ANXIETY: MILDLY ANXIOUS
AUDITORY DISTURBANCES: NOT PRESENT
HEADACHE, FULLNESS IN HEAD: MILD
ORIENTATION AND CLOUDING OF SENSORIUM: ORIENTED AND CAN DO SERIAL ADDITIONS
PAROXYSMAL SWEATS: BARELY PERCEPTIBLE SWEATING. PALMS MOIST
TREMOR: TREMOR NOT VISIBLE BUT CAN BE FELT, FINGERTIP TO FINGERTIP
HEADACHE, FULLNESS IN HEAD: VERY MILD
ORIENTATION AND CLOUDING OF SENSORIUM: ORIENTED AND CAN DO SERIAL ADDITIONS
AUDITORY DISTURBANCES: NOT PRESENT
AGITATION: *
TREMOR: TREMOR NOT VISIBLE BUT CAN BE FELT, FINGERTIP TO FINGERTIP
NAUSEA AND VOMITING: NO NAUSEA AND NO VOMITING
NAUSEA AND VOMITING: NO NAUSEA AND NO VOMITING
ANXIETY: *
PAROXYSMAL SWEATS: *
ORIENTATION AND CLOUDING OF SENSORIUM: ORIENTED AND CAN DO SERIAL ADDITIONS
HEADACHE, FULLNESS IN HEAD: NOT PRESENT
NAUSEA AND VOMITING: NO NAUSEA AND NO VOMITING
TOTAL SCORE: 4
ANXIETY: *
AUDITORY DISTURBANCES: NOT PRESENT
PAROXYSMAL SWEATS: *
NAUSEA AND VOMITING: NO NAUSEA AND NO VOMITING
AUDITORY DISTURBANCES: NOT PRESENT
TOTAL SCORE: 8
TREMOR: TREMOR NOT VISIBLE BUT CAN BE FELT, FINGERTIP TO FINGERTIP
AGITATION: NORMAL ACTIVITY
ORIENTATION AND CLOUDING OF SENSORIUM: CANNOT DO SERIAL ADDITIONS OR IS UNCERTAIN ABOUT DATE
NAUSEA AND VOMITING: NO NAUSEA AND NO VOMITING
VISUAL DISTURBANCES: NOT PRESENT
TREMOR: TREMOR NOT VISIBLE BUT CAN BE FELT, FINGERTIP TO FINGERTIP
VISUAL DISTURBANCES: NOT PRESENT
PAROXYSMAL SWEATS: *
PAROXYSMAL SWEATS: BARELY PERCEPTIBLE SWEATING. PALMS MOIST
TOTAL SCORE: 4
PAROXYSMAL SWEATS: BARELY PERCEPTIBLE SWEATING. PALMS MOIST
AUDITORY DISTURBANCES: NOT PRESENT
ANXIETY: MILDLY ANXIOUS
VISUAL DISTURBANCES: NOT PRESENT
HEADACHE, FULLNESS IN HEAD: MILD
VISUAL DISTURBANCES: NOT PRESENT
AGITATION: SOMEWHAT MORE THAN NORMAL ACTIVITY
HEADACHE, FULLNESS IN HEAD: MILD
AGITATION: *
TOTAL SCORE: 10
ANXIETY: NO ANXIETY (AT EASE)
TREMOR: TREMOR NOT VISIBLE BUT CAN BE FELT, FINGERTIP TO FINGERTIP
TOTAL SCORE: 3
TOTAL SCORE: 9
AGITATION: NORMAL ACTIVITY
VISUAL DISTURBANCES: NOT PRESENT
NAUSEA AND VOMITING: NO NAUSEA AND NO VOMITING
ANXIETY: *
AUDITORY DISTURBANCES: NOT PRESENT
ORIENTATION AND CLOUDING OF SENSORIUM: ORIENTED AND CAN DO SERIAL ADDITIONS
ORIENTATION AND CLOUDING OF SENSORIUM: ORIENTED AND CAN DO SERIAL ADDITIONS
TREMOR: *

## 2024-12-22 ASSESSMENT — PAIN DESCRIPTION - PAIN TYPE
TYPE: ACUTE PAIN

## 2024-12-22 ASSESSMENT — FIBROSIS 4 INDEX
FIB4 SCORE: 12.12
FIB4 SCORE: 12.12

## 2024-12-22 NOTE — WOUND TEAM
Assisted Wound RN Chalino with skin assessment and wound consult evaluation for pressure injury.  Additional staff necessary for turning/standing from chair, repositioning patient, assisting with dressing application and supplies and determining progress, assessment and staging of pressure injuries and/or complicated wound care.

## 2024-12-22 NOTE — PROGRESS NOTES
Per day shift GABRIELE Osullivan Pt has been requesting Trazodone. Orders received for Trazodone 50mg QHS from [facility] attending.

## 2024-12-22 NOTE — PROGRESS NOTES
Attending:   Dr. Monteiro    Resident:   Veronique Dyer D.O.    PATIENT:   Jalen Vaughn; [id number]; 1980    ID:   44 y.o. male admitted for electrolyte abnormalities, anasarca, and alcohol abuse.    SUBJECTIVE:   Overnight patient was tachypneic with highest rate of 32 average rate around 22.  His CIWA score ranged from 3-13 the latest being 10.  Last Ativan dose was at 5:45 AM.  Patient states that today he is having some abdominal pain as well as scrotal pain.  He is also hungry.  He does state that he is having a little bit of difficulty breathing.    OBJECTIVE:  Vitals:    12/22/24 0341 12/22/24 0728 12/22/24 1006 12/22/24 1122   BP: 114/60 110/59  136/64   Pulse: 77 64     Resp: (!) 22 18 20   Temp: 37.3 °C (99.1 °F) 36.9 °C (98.4 °F)  37.1 °C (98.8 °F)   TempSrc: Temporal Temporal  Temporal   SpO2: 96% 97% 93% 96%   Weight: (!) 145 kg (318 lb 12.6 oz)      Height:           Intake/Output Summary (Last 24 hours) at 12/22/2024 0906  Last data filed at 12/22/2024 0829  Gross per 24 hour   Intake 240 ml   Output 2400 ml   Net -2160 ml       PHYSICAL EXAM:  General: No acute distress, afebrile, A&Ox3  HEENT: NC/AT. EOMI.   Cardiovascular: RRR without murmurs. Normal capillary refill   Respiratory: CTAB  Abdomen: soft, TTP in R and L lower quadrants, distended with positive fluid wave, no masses  EXT:  SAWYER, no edema  Skin: No erythema/lesions   Neuro: Non-focal    LABS:  Recent Labs     12/21/24  0217 12/21/24  0540 12/21/24  1056 12/22/24  0111   WBC 2.5* 2.2* 1.8* 2.5*   RBC 2.28* 2.32* 2.54* 2.39*   HEMOGLOBIN 6.8* 6.9* 7.5* 7.1*   HEMATOCRIT 20.9* 20.9* 23.1* 21.5*   MCV 91.7 90.1 90.9 90.0   MCH 29.8 29.7 29.5 29.7   RDW 58.7* 57.1* 56.6* 57.9*   PLATELETCT 50* 51* 51* 50*   MPV  --  10.3 12.5 13.0*   NEUTSPOLYS 49.10 45.60  --  55.40   LYMPHOCYTES 25.90 24.20  --  29.10   MONOCYTES 24.10* 29.20*  --  14.70*   EOSINOPHILS 0.00 0.00  --  0.00   BASOPHILS 0.00 0.50  --  0.40   RBCMORPHOLO Present  --   --    "--      Recent Labs     12/21/24  0126 12/21/24  0540 12/22/24  0111   SODIUM 136 136 139   POTASSIUM 2.8* 3.0* 3.2*   CHLORIDE 109 106 110   CO2 15* 20 20   BUN 8 10 9   CREATININE 0.49* 0.64 0.73   CALCIUM 6.5* 7.5* 7.3*   MAGNESIUM 1.5  --  1.6   PHOSPHORUS  --   --  2.4*   ALBUMIN 2.2* 2.4* 2.3*     Estimated GFR/CRCL = Estimated Creatinine Clearance: 205.1 mL/min (by C-G formula based on SCr of 0.73 mg/dL).  Recent Labs     12/21/24  0126 12/21/24  0540 12/22/24  0111   GLUCOSE 87 108* 100*     Recent Labs     12/21/24  0121 12/21/24  0126 12/21/24  0540 12/22/24  0111   ASTSGOT  --  80* 76* 75*   ALTSGPT  --  28 32 27   TBILIRUBIN  --  2.3* 2.5* 2.0*   ALKPHOSPHAT  --  118* 130* 120*   GLOBULIN  --  2.3 2.4 2.3   AMMONIA 37  --   --   --          Recent Labs     12/22/24  0111   [id number] 7.48*   [id number] 32.1   [id number] 67.8*   [id number] 92.0*   ARTHCO3 23   [id number] 3L   ARTBE 0     No results for input(s): \"INR\", \"APTT\", \"FIBRINOGEN\" in the last 72 hours.    Invalid input(s): \"DIMER\"      IMAGING:  DX-CHEST-PORTABLE (1 VIEW)   Final Result         1.  Pulmonary edema and/or infiltrates   2.  Cardiomegaly      [id number]   Final Result      1.  No intratesticular mass or evidence for torsion.   2.  Marked scrotal wall edema.   3.  No hydrocele demonstrated.      DX-CHEST-PORTABLE (1 VIEW)   Final Result         1.  Slight patchy bilateral lung bases, could represent prominent lung markings due to low lung volumes, component of infiltrate not excluded.      IR-CONSULT AND TREAT    (Results Pending)   US-PARACENTESIS, ABD WITH IMAGING    (Results Pending)       MEDS:  Current Facility-Administered Medications   Medication Last Admin    potassium phosphate 15 mmol in dextrose 5% 250 mL ivpb 15 mmol at 12/22/24 1050    chlordiazePOXIDE (Librium) capsule 50 mg      Followed by    [START ON 12/23/2024] chlordiazePOXIDE (Librium) capsule 25 mg      acetaminophen (Tylenol) tablet 650 mg      carvedilol " (Coreg) tablet 3.125 mg 3.125 mg at 12/22/24 0817    escitalopram (Lexapro) tablet 20 mg 20 mg at 12/22/24 0543    ferrous sulfate tablet 325 mg 325 mg at 12/21/24 0611    folic acid (Folvite) tablet 1 mg 1 mg at 12/21/24 0611    gabapentin (Neurontin) capsule 300 mg 300 mg at 12/22/24 0817    hydrOXYzine HCl (Atarax) tablet 25 mg      lactulose 20 GM/30ML solution 45 mL 45 mL at 12/22/24 0817    riFAXIMin (Xifaxan) tablet 550 mg 550 mg at 12/21/24 1756    spironolactone (Aldactone) tablet 100 mg 100 mg at 12/22/24 0543    thiamine (Vitamin B-1) tablet 100 mg 100 mg at 12/21/24 1018    furosemide (Lasix) tablet 80 mg 80 mg at 12/22/24 0543    labetalol (Normodyne/Trandate) injection 10 mg      omeprazole (PriLOSEC) capsule 20 mg 20 mg at 12/21/24 1756    LORazepam (Ativan) tablet 0.5 mg      LORazepam (Ativan) tablet 1 mg 1 mg at 12/22/24 1145    Or    LORazepam (Ativan) injection 0.5 mg      LORazepam (Ativan) tablet 2 mg 2 mg at 12/21/24 1639    Or    LORazepam (Ativan) injection 1 mg 1 mg at 12/21/24 2012    LORazepam (Ativan) tablet 3 mg      Or    LORazepam (Ativan) injection 1.5 mg      LORazepam (Ativan) tablet 4 mg      Or    LORazepam (Ativan) injection 2 mg         ASSESSMENT/PLAN:    Problem   Spontaneous Bacterial Peritonitis (Hcc)   Hypocalcemia   Iron Deficiency Anemia Secondary to Inadequate Dietary Iron Intake   Influenza   Anasarca   Scrotal Swelling   Alcohol Abuse   Pancytopenia (Hcc)   Hypokalemia       * Hypocalcemia- (present on admission)  Assessment & Plan  Patient has a chronic history of low calcium but was worse on admission.  Likely multifactorial secondary to Lasix use and recent vomiting. S/p 3g calcium gluconate in the ED. Ionized calcium is within normal limits.  Plan:  -Will monitor with daily labs  -PTH ordered and pending    Spontaneous bacterial peritonitis (HCC)  Assessment & Plan  Concern for SBP given patient's fever overnight, abdominal pain, and history of alcoholic liver  cirrhosis.   Plan:   - IR has been consulted for US therapeutic and diagnostic paracentesis  - Once paracentesis is performed will start patient on empiric abx    Scrotal swelling  Assessment & Plan  Several days of scrotal swelling with tenderness.  Patient denies any new sexual encounters.  Likely 2/2 anasarca. Scrotal US just showed edema. GC/CT were negative.  Plan:  -Mild improvement is seen, if patient does not continue to improve with diuresis will consider further workup    Anasarca  Assessment & Plan  Significant anasarca on exam with 2+ pitting edema extending to abdomen.  Patient has not been compliant with home Lasix. S/p IV Lasix 80mg in ED. BNP slightly elevated to the 700's.  Recent echo in November 2024 demonstrated a normal ejection fraction. Pt also received one time dose of 25mg albumin followed by IV lasix.  Plan:  -Continue home Lasix of 80mg BID  -Will re-evaluate fluid status after oral lasix given and consider advancing to IV if needed     Influenza  Assessment & Plan  Patient positive for influenza.  Positive sick contacts.  Symptoms have been ongoing for greater than 48 hours therefore, not candidate for oseltamivir.  Plan:  -Supportive treatment   -Encourage oral hydration  -Anti-pyretics as needed    Iron deficiency anemia secondary to inadequate dietary iron intake  Assessment & Plan  Chronic history.  Denies any active bleeding or black stools.  Patient on iron supplementation at home which he is noncompliant with. Pt received 1 unit of RBC after a Hgb of 6.9, repeat level was 7.5.  Plan:  -Will continue to monitor and transfuse as needed  -Continue ferrous sulfate inpatient    Alcohol abuse- (present on admission)  Assessment & Plan  Pt has history of alcohol abuse requiring multiple admissions. Per patient he drank 2-3 beers on 12/20. His diagnostic alcohol level at 1100 on 12/21 was <10.1. Pt has some confusion and mild tremors. Pt says he does not have history or seizures due to  withdrawal. CIWA scores have been between 3 and 13.  Plan:   - CIWA protocol initiated   - Librium taper initiated   - Folate and Thiamine supplementation     Hypokalemia- (present on admission)  Assessment & Plan  Likely secondary to GI losses from vomiting and diarrhea.  Also may be related to chronic Lasix use and inconsistent potassium use. S/p 40 mEq Kcl in ED.   Plan:  - Will repeat labs in the AM and replete electrolytes as needed    Pancytopenia (HCC)- (present on admission)  Assessment & Plan  Patient has multiple cell lines including platelets, hemoglobin, and white blood cells which are low.  Likely sequela of advanced alcoholic cirrhosis along with current viral infection.  Plan:  - Patient's WBC continues to decrease, likely secondary to alcohol abuse  - Will continue to monitor          Core Measures:  Fluids: Oral hydration  Lines: PIV  Abx: Will start Ceftriaxone after paracentesis  Diet: Regular  PPX: SCD    DISPO: In patient for alcohol withdrawal as well as treatment of anasarca and SBP      CODE STATUS: Full    Veronique Dyer D.O.  PGY-1  [facility]

## 2024-12-22 NOTE — PROGRESS NOTES
Bedside report received from off going RN/tech: Shi, assumed care of patient.     Fall Risk Score: HIGH RISK  Fall risk interventions in place: Place yellow fall risk ID band on patient, Provide patient/family education based on risk assessment, Educate patient/family to call staff for assistance when getting out of bed, Place fall precaution signage outside patient door, Utilize bed/chair fall alarm, Notify charge of high risk for huddle, Tele-sitter, and Bed alarm connected correctly  Bed type: Regular (Jaison Score less than 17 interventions in place)  Patient on cardiac monitor: Yes  IVF/IV medications: Not Applicable   Oxygen: Room Air  Bedside sitter: Not Applicable   Isolation: Isolation precautions in place

## 2024-12-22 NOTE — WOUND TEAM
[facility]  Inpatient Services  Initial Wound and Skin Care Evaluation    Admission Date: 12/20/2024     Last order of IP CONSULT TO WOUND CARE was found on 12/21/2024 from Hospital Encounter on 12/20/2024     HPI, PMH, SH: Reviewed    Past Surgical History:   Procedure Laterality Date    [place] UPPER GI ENDOSCOPY,DIAGNOSIS N/A 10/27/2024    Procedure: GASTROSCOPY;  Surgeon: Treva Lopez M.D.;  Location: SURGERY [facility];  Service: Gastroenterology    APPENDECTOMY      CHOLECYSTECTOMY       Social History     Tobacco Use    Smoking status: Some Days     Types: Cigarettes    Smokeless tobacco: Former     Types: Chew   Substance Use Topics    Alcohol use: Yes     Comment: 3 drinks daily     Chief Complaint   Patient presents with    Groin Swelling     BIB [facility] from [facility] for continued groin swelling. Patient has been unable to fill his prescriptions from his ED visit here 2 days ago. Hx of liver cirrhosis. Fever noted in triage. Patient denies feeling ill, feverish or body aches    Abdominal Pain     Diagnosis: Hypocalcemia [E83.51]    Unit where seen by Wound Team: T814/02     WOUND CONSULT RELATED TO:  BLE    WOUND TEAM PLAN OF CARE - Frequency of Follow-up:   Nursing to follow dressing orders written for wound care. Contact wound team if area fails to progress, deteriorates or with any questions/concerns if something comes up before next scheduled follow up (See below as to whether wound is following and frequency of wound follow up)   Not following, consult as needed  - any area    WOUND HISTORY:   Pt is a 44yr old male with history of ETOH cirrhosis s/p TIPS who presented with groin and leg swelling x5 days. Wound team was consulted regarding edema to BLE.        WOUND ASSESSMENT/LDA      Wound 11/23/24 Pretibial;Knee Right (Active)       Wound 12/20/24 Venous Ulcer Leg Left BLE swelling L with wound (Active)   Wound Image    12/22/24 1500   Site Assessment Pink 12/22/24 1500    Periwound Assessment Dry;Intact;Clean 12/22/24 1500   Margins Attached edges 12/22/24 1500   Closure None 12/22/24 1500   Drainage Amount None 12/22/24 1500   Treatments Cleansed;Site care 12/22/24 1500   Dressing Status Open to Air 12/22/24 1500   NEXT Weekly Photo (Inpatient Only) 12/29/24 12/22/24 1500   Wound Team Following Not following 12/22/24 1500   Non-staged Wound Description Partial thickness 12/22/24 1500                               Vascular:    PRINCESS:   No results found.    Lab Values:    Lab Results   Component Value Date/Time    WBC 2.5 (L) 12/22/2024 01:11 AM    RBC 2.39 (L) 12/22/2024 01:11 AM    HEMOGLOBIN 7.1 (L) 12/22/2024 01:11 AM    HEMATOCRIT 21.5 (L) 12/22/2024 01:11 AM    HBA1C 4.9 01/10/2022 04:55 PM         Culture Results show:  No results found for this or any previous visit (from the past 720 hours).    Pain Level/Medicated:  None, Tolerated without pain medication       INTERVENTIONS BY WOUND TEAM:  Chart and images reviewed. Discussed with bedside RN. All areas of concern (based on picture review, LDA review and discussion with bedside RN) have been thoroughly assessed. Documentation of areas based on significant findings. This RN in to assess patient. Performed standard wound care which includes appropriate positioning, dressing removal and non-selective debridement. Pictures and measurements obtained weekly if/when required.    Wound:  BLE Edema and venous ulcers to LLE  Preparation for Dressing removal: Removed without difficulty  Cleansed/Non-selectively Debrided with:  Moist warm washcloth  Angélica wound: Cleansed with Moist warm washcloth, Prepped with N/A  Primary Dressing:  Heel offloading dressings to bilateral heels  Secondary (Outer) Dressing: Tubigrip F applied    Area Assessed:  Bilateral Ears  Area intact, gray foam in use     Area Assessed:  Bilateral Elbows & Arms  Area intact,     Area Assessed: Abdomen/Pannus  Area intact,     Area Assessed: Back  Area intact,      Area Assessed:  Sacrococcygeal area  Area intact, Offloading dressing refused by pt    Area Assessed:  Bilateral I.T.s  Area intact,     Area Assessed:  BLE & Knees  LLE with 2 small open but dry wounds.     Area Assessed:  Bilateral ankles  Area intact,     Area Assessed:  Bilateral heels  Area intact, Heel offloading dressings applied      Advanced Wound Care Discharge Planning  Number of Clinicians necessary to complete wound care: 1  Is patient requiring IV pain medications for dressing changes:  No   Length of time for dressing change 30 min. (This does not include chart review, pre-medication time, set up, clean up or time spent charting.)    Interdisciplinary consultation: Patient, Bedside RN, Alejandra GARCIA (Wound RN).  Pressure injury and staging reviewed with N/A.    EVALUATION / RATIONALE FOR TREATMENT:     Date:  12/22/24  Wound Status:  Initial evaluation    Pt with largely intact skin. Tubigrip F applied to provide mild compression. Offloading measures implemented as best as could be without causing agitation to pt.          Goals: Steady decrease in wound area and depth weekly.    NURSING PLAN OF CARE ORDERS:  RN Prevention Protocol    NUTRITION RECOMMENDATIONS   Wound Team Recommendations:  N/A    DIET ORDERS (From admission to next 24h)       Start     Ordered    12/22/24 0811  Diet Order Diet: Regular; Tray Modifications (optional): Double Portions  ALL MEALS        Question Answer Comment   Diet: Regular    Tray Modifications (optional) Double Portions        12/22/24 0811                    PREVENTATIVE INTERVENTIONS:    Q shift Jaison - performed per nursing policy  Q shift pressure point assessments - performed per nursing policy    Surface/Positioning  Standard/trauma mattress - Currently in Place    Offloading/Redistribution  Sacral offloading dressing (Silicone dressing) - Ordered  Heel offloading dressing (Silicone dressing) - Applied this Visit      Respiratory  Silicone O2 tubing -  Currently in Place  Gray Foam Ear protectors - Currently in Place    Containment/Moisture Prevention    Dri-gordon pad - Currently in Place  Purwick/Condom Cath - Ordered  Interdry - Ordered    Mobilization      Self mobile in bed      Anticipated discharge plans:  TBD        Vac Discharge Needs:  Vac Discharge plan is purely a recommendation from wound team and not a requirement for discharge unless otherwise stated by physician.  Not Applicable Pt not on a wound vac

## 2024-12-22 NOTE — PROGRESS NOTES
Bedside report received from off going RN/tech: GABRIELE Mata, assumed care of patient.     Fall Risk Score: HIGH RISK  Fall risk interventions in place: Place yellow fall risk ID band on patient, Provide patient/family education based on risk assessment, Educate patient/family to call staff for assistance when getting out of bed, Place fall precaution signage outside patient door, Place patient in room close to nursing station, Utilize bed/chair fall alarm, Notify charge of high risk for huddle, Tele-sitter, and Bed alarm connected correctly  Bed type: Regular (Jaison Score less than 17 interventions in place)  Patient on cardiac monitor: Yes  IVF/IV medications: Not Applicable   Oxygen: How many liters 3L, Traced the line to wall oxygen, and No oxygen tank in room  Bedside sitter: Not Applicable   Isolation: Isolation precautions in place, Droplet

## 2024-12-22 NOTE — ASSESSMENT & PLAN NOTE
Concern for SBP given patient's fever overnight, abdominal pain, and history of alcoholic liver cirrhosis.  Not sufficient fluid for diagnostic paracentesis.  -Started on ceftriaxone 12/22 with end of treatment 12/26

## 2024-12-22 NOTE — CARE PLAN
The patient is Stable - Low risk of patient condition declining or worsening    Shift Goals  Clinical Goals: [facility]  Patient Goals: go out to have a cigaretee, get nus to [place]    Progress made toward(s) clinical / shift goals:    Problem: Pain - Standard  Goal: Alleviation of pain or a reduction in pain to the patient’s comfort goal  Outcome: Progressing     Problem: Knowledge Deficit - Standard  Goal: Patient and family/care givers will demonstrate understanding of plan of care, disease process/condition, diagnostic tests and medications  Outcome: Progressing     Problem: Optimal Care for Alcohol Withdrawal  Goal: Optimal Care for the alcohol withdrawal patient  Outcome: Progressing     Problem: Psychosocial  Goal: Patient's level of anxiety will decrease  Outcome: Progressing     Problem: Fall Risk  Goal: Patient will remain free from falls  Outcome: Progressing       Patient is not progressing towards the following goals:

## 2024-12-22 NOTE — PROGRESS NOTES
4 Eyes Skin Assessment Completed by JENELLE Chanel and GABRIELE Osullivan.    Head WDL  Ears Redness and Blanching  Nose WDL  Mouth WDL  Neck WDL  Breast/Chest WDL  Shoulder Blades WDL  Spine WDL  (R) Arm/Elbow/Hand WDL  (L) Arm/Elbow/Hand WDL  Abdomen WDL  Groin Swelling  Scrotum/Coccyx/Buttocks Scrotal edema, sacrum WDL  (R) Leg Edema  (L) Leg Edema Abrasion on shin  (R) Heel/Foot/Toe Redness, Blanching, and Edema  (L) Heel/Foot/Toe Redness, Blanching, and Edema                    Devices In Places Tele Box and Pulse Ox      Interventions In Place N/A    Possible Skin Injury Yes    Pictures Uploaded Into Epic Yes  Wound Consult Placed N/A  RN Wound Prevention Protocol Ordered No

## 2024-12-22 NOTE — CARE PLAN
The patient is Watcher - Medium risk of patient condition declining or worsening    Shift Goals  Clinical Goals: [place]  Patient Goals: go out to have a cigaretee, get nus to [place]    Progress made toward(s) clinical / shift goals:  Progressing    Problem: Pain - Standard  Goal: Alleviation of pain or a reduction in pain to the patient’s comfort goal  Outcome: Progressing  Note: Pt denies pain at this time       Patient is not progressing towards the following goals:      Problem: Knowledge Deficit - Standard  Goal: Patient and family/care givers will demonstrate understanding of plan of care, disease process/condition, diagnostic tests and medications  Outcome: Not Progressing  Note: Pt requires reeducation regarding plan of care

## 2024-12-22 NOTE — CARE PLAN
The patient is Stable - Low risk of patient condition declining or worsening    Shift Goals  Clinical Goals: CIWA, safety, electrolyte replacement  Patient Goals: catch the bus    Progress made toward(s) clinical / shift goals:    Problem: Pain - Standard  Goal: Alleviation of pain or a reduction in pain to the patient’s comfort goal  Outcome: Progressing     Problem: Knowledge Deficit - Standard  Goal: Patient and family/care givers will demonstrate understanding of plan of care, disease process/condition, diagnostic tests and medications  Outcome: Progressing     Problem: Optimal Care for Alcohol Withdrawal  Goal: Optimal Care for the alcohol withdrawal patient  Outcome: Progressing     Problem: Seizure Precautions  Goal: Implementation of seizure precautions  Outcome: Progressing     Problem: Psychosocial  Goal: Patient's level of anxiety will decrease  Outcome: Progressing     Problem: Fall Risk  Goal: Patient will remain free from falls  Outcome: Progressing     Problem: Psychosocial  Goal: Patient's level of anxiety will decrease  Outcome: Progressing       Patient is not progressing towards the following goals:

## 2024-12-23 ENCOUNTER — APPOINTMENT (OUTPATIENT)
Dept: RADIOLOGY | Facility: MEDICAL CENTER | Age: 44
DRG: 682 | End: 2024-12-23
Attending: STUDENT IN AN ORGANIZED HEALTH CARE EDUCATION/TRAINING PROGRAM
Payer: COMMERCIAL

## 2024-12-23 ENCOUNTER — HOSPITAL ENCOUNTER (INPATIENT)
Facility: MEDICAL CENTER | Age: 44
LOS: 3 days | DRG: 682 | End: 2024-12-26
Attending: STUDENT IN AN ORGANIZED HEALTH CARE EDUCATION/TRAINING PROGRAM | Admitting: FAMILY MEDICINE
Payer: COMMERCIAL

## 2024-12-23 VITALS
RESPIRATION RATE: 18 BRPM | OXYGEN SATURATION: 93 % | TEMPERATURE: 98.6 F | SYSTOLIC BLOOD PRESSURE: 133 MMHG | DIASTOLIC BLOOD PRESSURE: 67 MMHG | BODY MASS INDEX: 37.53 KG/M2 | WEIGHT: 308.2 LBS | HEART RATE: 71 BPM | HEIGHT: 76 IN

## 2024-12-23 DIAGNOSIS — Z76.0 MEDICATION REFILL: ICD-10-CM

## 2024-12-23 DIAGNOSIS — R60.1 ANASARCA: ICD-10-CM

## 2024-12-23 DIAGNOSIS — N50.89 SCROTAL EDEMA: ICD-10-CM

## 2024-12-23 DIAGNOSIS — D69.6 THROMBOCYTOPENIA (HCC): ICD-10-CM

## 2024-12-23 DIAGNOSIS — Z87.19 HISTORY OF CIRRHOSIS: ICD-10-CM

## 2024-12-23 PROBLEM — E20.9 HYPOPARATHYROIDISM (HCC): Status: ACTIVE | Noted: 2024-12-21

## 2024-12-23 PROBLEM — B85.1: Status: ACTIVE | Noted: 2024-12-23

## 2024-12-23 LAB
ALBUMIN SERPL BCP-MCNC: 2.2 G/DL (ref 3.2–4.9)
ALBUMIN SERPL BCP-MCNC: 2.5 G/DL (ref 3.2–4.9)
ALBUMIN/GLOB SERPL: 0.9 G/DL
ALBUMIN/GLOB SERPL: 1 G/DL
ALP SERPL-CCNC: 123 U/L (ref 30–99)
ALP SERPL-CCNC: 149 U/L (ref 30–99)
ALT SERPL-CCNC: 25 U/L (ref 2–50)
ALT SERPL-CCNC: 28 U/L (ref 2–50)
ANION GAP SERPL CALC-SCNC: 10 MMOL/L (ref 7–16)
ANION GAP SERPL CALC-SCNC: 7 MMOL/L (ref 7–16)
AST SERPL-CCNC: 59 U/L (ref 12–45)
AST SERPL-CCNC: 74 U/L (ref 12–45)
BASOPHILS # BLD AUTO: 0.3 % (ref 0–1.8)
BASOPHILS # BLD AUTO: 0.4 % (ref 0–1.8)
BASOPHILS # BLD: 0.01 K/UL (ref 0–0.12)
BASOPHILS # BLD: 0.01 K/UL (ref 0–0.12)
BILIRUB SERPL-MCNC: 1.6 MG/DL (ref 0.1–1.5)
BILIRUB SERPL-MCNC: 2.3 MG/DL (ref 0.1–1.5)
BUN SERPL-MCNC: 13 MG/DL (ref 8–22)
BUN SERPL-MCNC: 14 MG/DL (ref 8–22)
CALCIUM ALBUM COR SERPL-MCNC: 8.3 MG/DL (ref 8.5–10.5)
CALCIUM ALBUM COR SERPL-MCNC: 8.5 MG/DL (ref 8.5–10.5)
CALCIUM SERPL-MCNC: 7.1 MG/DL (ref 8.5–10.5)
CALCIUM SERPL-MCNC: 7.1 MG/DL (ref 8.5–10.5)
CHLORIDE SERPL-SCNC: 107 MMOL/L (ref 96–112)
CHLORIDE SERPL-SCNC: 111 MMOL/L (ref 96–112)
CO2 SERPL-SCNC: 20 MMOL/L (ref 20–33)
CO2 SERPL-SCNC: 22 MMOL/L (ref 20–33)
CREAT SERPL-MCNC: 0.93 MG/DL (ref 0.5–1.4)
CREAT SERPL-MCNC: 1.09 MG/DL (ref 0.5–1.4)
EOSINOPHIL # BLD AUTO: 0.07 K/UL (ref 0–0.51)
EOSINOPHIL # BLD AUTO: 0.08 K/UL (ref 0–0.51)
EOSINOPHIL NFR BLD: 2.1 % (ref 0–6.9)
EOSINOPHIL NFR BLD: 2.8 % (ref 0–6.9)
ERYTHROCYTE [DISTWIDTH] IN BLOOD BY AUTOMATED COUNT: 59 FL (ref 35.9–50)
ERYTHROCYTE [DISTWIDTH] IN BLOOD BY AUTOMATED COUNT: 60 FL (ref 35.9–50)
GFR SERPLBLD CREATININE-BSD FMLA CKD-EPI: 105 ML/MIN/1.73 M 2
GFR SERPLBLD CREATININE-BSD FMLA CKD-EPI: 86 ML/MIN/1.73 M 2
GLOBULIN SER CALC-MCNC: 2.3 G/DL (ref 1.9–3.5)
GLOBULIN SER CALC-MCNC: 2.9 G/DL (ref 1.9–3.5)
GLUCOSE SERPL-MCNC: 124 MG/DL (ref 65–99)
GLUCOSE SERPL-MCNC: 88 MG/DL (ref 65–99)
HCT VFR BLD AUTO: 22.9 % (ref 42–52)
HCT VFR BLD AUTO: 27.3 % (ref 42–52)
HGB BLD-MCNC: 7.3 G/DL (ref 14–18)
HGB BLD-MCNC: 8.9 G/DL (ref 14–18)
IMM GRANULOCYTES # BLD AUTO: 0.01 K/UL (ref 0–0.11)
IMM GRANULOCYTES # BLD AUTO: 0.01 K/UL (ref 0–0.11)
IMM GRANULOCYTES NFR BLD AUTO: 0.3 % (ref 0–0.9)
IMM GRANULOCYTES NFR BLD AUTO: 0.4 % (ref 0–0.9)
LYMPHOCYTES # BLD AUTO: 0.88 K/UL (ref 1–4.8)
LYMPHOCYTES # BLD AUTO: 1.12 K/UL (ref 1–4.8)
LYMPHOCYTES NFR BLD: 28.9 % (ref 22–41)
LYMPHOCYTES NFR BLD: 34.8 % (ref 22–41)
MAGNESIUM SERPL-MCNC: 1.7 MG/DL (ref 1.5–2.5)
MAGNESIUM SERPL-MCNC: 1.9 MG/DL (ref 1.5–2.5)
MCH RBC QN AUTO: 29.2 PG (ref 27–33)
MCH RBC QN AUTO: 29.7 PG (ref 27–33)
MCHC RBC AUTO-ENTMCNC: 31.9 G/DL (ref 32.3–36.5)
MCHC RBC AUTO-ENTMCNC: 32.6 G/DL (ref 32.3–36.5)
MCV RBC AUTO: 91 FL (ref 81.4–97.8)
MCV RBC AUTO: 91.6 FL (ref 81.4–97.8)
MONOCYTES # BLD AUTO: 0.29 K/UL (ref 0–0.85)
MONOCYTES # BLD AUTO: 0.44 K/UL (ref 0–0.85)
MONOCYTES NFR BLD AUTO: 11.4 % (ref 0–13.4)
MONOCYTES NFR BLD AUTO: 11.5 % (ref 0–13.4)
NEUTROPHILS # BLD AUTO: 1.27 K/UL (ref 1.82–7.42)
NEUTROPHILS # BLD AUTO: 2.21 K/UL (ref 1.82–7.42)
NEUTROPHILS NFR BLD: 50.1 % (ref 44–72)
NEUTROPHILS NFR BLD: 57 % (ref 44–72)
NRBC # BLD AUTO: 0 K/UL
NRBC # BLD AUTO: 0 K/UL
NRBC BLD-RTO: 0 /100 WBC (ref 0–0.2)
NRBC BLD-RTO: 0 /100 WBC (ref 0–0.2)
NT-PROBNP SERPL IA-MCNC: 327 PG/ML (ref 0–125)
PHOSPHATE SERPL-MCNC: 3 MG/DL (ref 2.5–4.5)
PHOSPHATE SERPL-MCNC: 3.3 MG/DL (ref 2.5–4.5)
PLATELET # BLD AUTO: 51 K/UL (ref 164–446)
PLATELET # BLD AUTO: 57 K/UL (ref 164–446)
PLATELETS.RETICULATED NFR BLD AUTO: 3.5 % (ref 0.6–13.1)
PLATELETS.RETICULATED NFR BLD AUTO: 4.5 % (ref 0.6–13.1)
POTASSIUM SERPL-SCNC: 3.6 MMOL/L (ref 3.6–5.5)
POTASSIUM SERPL-SCNC: 3.6 MMOL/L (ref 3.6–5.5)
PROT SERPL-MCNC: 4.5 G/DL (ref 6–8.2)
PROT SERPL-MCNC: 5.4 G/DL (ref 6–8.2)
RBC # BLD AUTO: 2.5 M/UL (ref 4.7–6.1)
RBC # BLD AUTO: 3 M/UL (ref 4.7–6.1)
SODIUM SERPL-SCNC: 137 MMOL/L (ref 135–145)
SODIUM SERPL-SCNC: 140 MMOL/L (ref 135–145)
TROPONIN T SERPL-MCNC: 43 NG/L (ref 6–19)
WBC # BLD AUTO: 2.5 K/UL (ref 4.8–10.8)
WBC # BLD AUTO: 3.9 K/UL (ref 4.8–10.8)

## 2024-12-23 PROCEDURE — 36415 COLL VENOUS BLD VENIPUNCTURE: CPT

## 2024-12-23 PROCEDURE — 84484 ASSAY OF TROPONIN QUANT: CPT

## 2024-12-23 PROCEDURE — 96374 THER/PROPH/DIAG INJ IV PUSH: CPT

## 2024-12-23 PROCEDURE — 84100 ASSAY OF PHOSPHORUS: CPT

## 2024-12-23 PROCEDURE — 85055 RETICULATED PLATELET ASSAY: CPT

## 2024-12-23 PROCEDURE — 99285 EMERGENCY DEPT VISIT HI MDM: CPT

## 2024-12-23 PROCEDURE — 85055 RETICULATED PLATELET ASSAY: CPT | Mod: 91

## 2024-12-23 PROCEDURE — 770001 HCHG ROOM/CARE - MED/SURG/GYN PRIV*

## 2024-12-23 PROCEDURE — 83735 ASSAY OF MAGNESIUM: CPT | Mod: 91

## 2024-12-23 PROCEDURE — 97162 PT EVAL MOD COMPLEX 30 MIN: CPT

## 2024-12-23 PROCEDURE — 700111 HCHG RX REV CODE 636 W/ 250 OVERRIDE (IP): Mod: UD | Performed by: STUDENT IN AN ORGANIZED HEALTH CARE EDUCATION/TRAINING PROGRAM

## 2024-12-23 PROCEDURE — 80053 COMPREHEN METABOLIC PANEL: CPT

## 2024-12-23 PROCEDURE — 99238 HOSP IP/OBS DSCHRG MGMT 30/<: CPT | Performed by: FAMILY MEDICINE

## 2024-12-23 PROCEDURE — 700102 HCHG RX REV CODE 250 W/ 637 OVERRIDE(OP): Performed by: HOSPITALIST

## 2024-12-23 PROCEDURE — 700102 HCHG RX REV CODE 250 W/ 637 OVERRIDE(OP)

## 2024-12-23 PROCEDURE — A9270 NON-COVERED ITEM OR SERVICE: HCPCS

## 2024-12-23 PROCEDURE — 80053 COMPREHEN METABOLIC PANEL: CPT | Mod: 91

## 2024-12-23 PROCEDURE — 83880 ASSAY OF NATRIURETIC PEPTIDE: CPT

## 2024-12-23 PROCEDURE — 85025 COMPLETE CBC W/AUTO DIFF WBC: CPT | Mod: 91

## 2024-12-23 PROCEDURE — 83735 ASSAY OF MAGNESIUM: CPT

## 2024-12-23 PROCEDURE — 84100 ASSAY OF PHOSPHORUS: CPT | Mod: 91

## 2024-12-23 PROCEDURE — 93005 ELECTROCARDIOGRAM TRACING: CPT | Mod: TC | Performed by: STUDENT IN AN ORGANIZED HEALTH CARE EDUCATION/TRAINING PROGRAM

## 2024-12-23 PROCEDURE — 85025 COMPLETE CBC W/AUTO DIFF WBC: CPT

## 2024-12-23 PROCEDURE — A9270 NON-COVERED ITEM OR SERVICE: HCPCS | Performed by: HOSPITALIST

## 2024-12-23 PROCEDURE — 71045 X-RAY EXAM CHEST 1 VIEW: CPT

## 2024-12-23 RX ORDER — FUROSEMIDE 40 MG/1
80 TABLET ORAL
Status: DISCONTINUED | OUTPATIENT
Start: 2024-12-23 | End: 2024-12-23 | Stop reason: HOSPADM

## 2024-12-23 RX ORDER — PROMETHAZINE HYDROCHLORIDE 25 MG/1
12.5-25 SUPPOSITORY RECTAL EVERY 4 HOURS PRN
Status: DISCONTINUED | OUTPATIENT
Start: 2024-12-23 | End: 2024-12-26 | Stop reason: HOSPADM

## 2024-12-23 RX ORDER — AMOXICILLIN 250 MG
2 CAPSULE ORAL EVERY EVENING
Status: DISCONTINUED | OUTPATIENT
Start: 2024-12-24 | End: 2024-12-26 | Stop reason: HOSPADM

## 2024-12-23 RX ORDER — ONDANSETRON 2 MG/ML
4 INJECTION INTRAMUSCULAR; INTRAVENOUS EVERY 4 HOURS PRN
Status: DISCONTINUED | OUTPATIENT
Start: 2024-12-23 | End: 2024-12-26 | Stop reason: HOSPADM

## 2024-12-23 RX ORDER — PROCHLORPERAZINE EDISYLATE 5 MG/ML
5-10 INJECTION INTRAMUSCULAR; INTRAVENOUS EVERY 4 HOURS PRN
Status: DISCONTINUED | OUTPATIENT
Start: 2024-12-23 | End: 2024-12-26 | Stop reason: HOSPADM

## 2024-12-23 RX ORDER — CHLORDIAZEPOXIDE HYDROCHLORIDE 25 MG/1
25 CAPSULE, GELATIN COATED ORAL EVERY 6 HOURS
Qty: 8 CAPSULE | Refills: 0 | Status: ON HOLD | OUTPATIENT
Start: 2024-12-23 | End: 2024-12-26

## 2024-12-23 RX ORDER — OFLOXACIN 400 MG/1
400 TABLET, FILM COATED ORAL 2 TIMES DAILY
Qty: 8 TABLET | Refills: 0 | Status: ON HOLD | OUTPATIENT
Start: 2024-12-23 | End: 2024-12-26

## 2024-12-23 RX ORDER — POLYETHYLENE GLYCOL 3350 17 G/17G
1 POWDER, FOR SOLUTION ORAL
Status: DISCONTINUED | OUTPATIENT
Start: 2024-12-23 | End: 2024-12-26 | Stop reason: HOSPADM

## 2024-12-23 RX ORDER — PROMETHAZINE HYDROCHLORIDE 25 MG/1
12.5-25 TABLET ORAL EVERY 4 HOURS PRN
Status: DISCONTINUED | OUTPATIENT
Start: 2024-12-23 | End: 2024-12-26 | Stop reason: HOSPADM

## 2024-12-23 RX ORDER — FUROSEMIDE 10 MG/ML
80 INJECTION INTRAMUSCULAR; INTRAVENOUS ONCE
Status: COMPLETED | OUTPATIENT
Start: 2024-12-23 | End: 2024-12-23

## 2024-12-23 RX ORDER — ONDANSETRON 4 MG/1
4 TABLET, ORALLY DISINTEGRATING ORAL EVERY 4 HOURS PRN
Status: DISCONTINUED | OUTPATIENT
Start: 2024-12-23 | End: 2024-12-26 | Stop reason: HOSPADM

## 2024-12-23 RX ADMIN — CHLORDIAZEPOXIDE HYDROCHLORIDE 50 MG: 25 CAPSULE ORAL at 01:04

## 2024-12-23 RX ADMIN — CARVEDILOL 3.12 MG: 3.12 TABLET, FILM COATED ORAL at 09:05

## 2024-12-23 RX ADMIN — LACTULOSE 45 ML: 10 SOLUTION ORAL at 09:05

## 2024-12-23 RX ADMIN — LACTULOSE 45 ML: 10 SOLUTION ORAL at 12:05

## 2024-12-23 RX ADMIN — FOLIC ACID 1 MG: 1 TABLET ORAL at 06:58

## 2024-12-23 RX ADMIN — ACETAMINOPHEN 650 MG: 325 TABLET ORAL at 06:58

## 2024-12-23 RX ADMIN — RIFAXIMIN 550 MG: 550 TABLET ORAL at 06:58

## 2024-12-23 RX ADMIN — FUROSEMIDE 80 MG: 40 TABLET ORAL at 10:46

## 2024-12-23 RX ADMIN — HYDROXYZINE HYDROCHLORIDE 25 MG: 25 TABLET, FILM COATED ORAL at 01:04

## 2024-12-23 RX ADMIN — SPIRONOLACTONE 100 MG: 25 TABLET ORAL at 06:58

## 2024-12-23 RX ADMIN — FUROSEMIDE 80 MG: 10 INJECTION INTRAMUSCULAR; INTRAVENOUS at 21:52

## 2024-12-23 RX ADMIN — CHLORDIAZEPOXIDE HYDROCHLORIDE 50 MG: 25 CAPSULE ORAL at 07:03

## 2024-12-23 RX ADMIN — FERROUS SULFATE TAB 325 MG (65 MG ELEMENTAL FE) 325 MG: 325 (65 FE) TAB at 06:59

## 2024-12-23 RX ADMIN — ESCITALOPRAM OXALATE 20 MG: 10 TABLET ORAL at 06:59

## 2024-12-23 RX ADMIN — CHLORDIAZEPOXIDE HYDROCHLORIDE 25 MG: 25 CAPSULE ORAL at 12:05

## 2024-12-23 RX ADMIN — Medication 100 MG: at 06:59

## 2024-12-23 RX ADMIN — OMEPRAZOLE 20 MG: 20 CAPSULE, DELAYED RELEASE ORAL at 06:58

## 2024-12-23 RX ADMIN — GABAPENTIN 300 MG: 300 CAPSULE ORAL at 09:05

## 2024-12-23 ASSESSMENT — LIFESTYLE VARIABLES
TOTAL SCORE: 3
ANXIETY: NO ANXIETY (AT EASE)
NAUSEA AND VOMITING: NO NAUSEA AND NO VOMITING
PAROXYSMAL SWEATS: *
TREMOR: TREMOR NOT VISIBLE BUT CAN BE FELT, FINGERTIP TO FINGERTIP
VISUAL DISTURBANCES: NOT PRESENT
AUDITORY DISTURBANCES: NOT PRESENT
ORIENTATION AND CLOUDING OF SENSORIUM: ORIENTED AND CAN DO SERIAL ADDITIONS
HEADACHE, FULLNESS IN HEAD: NOT PRESENT
AGITATION: NORMAL ACTIVITY

## 2024-12-23 ASSESSMENT — COGNITIVE AND FUNCTIONAL STATUS - GENERAL
MOBILITY SCORE: 24
SUGGESTED CMS G CODE MODIFIER MOBILITY: CH

## 2024-12-23 ASSESSMENT — FIBROSIS 4 INDEX
FIB4 SCORE: 9.72
FIB4 SCORE: 9.72

## 2024-12-23 ASSESSMENT — PAIN DESCRIPTION - PAIN TYPE: TYPE: ACUTE PAIN

## 2024-12-23 ASSESSMENT — GAIT ASSESSMENTS
GAIT LEVEL OF ASSIST: SUPERVISED
DISTANCE (FEET): 100

## 2024-12-23 NOTE — CARE PLAN
The patient is Watcher - Medium risk of patient condition declining or worsening    Shift Goals  Clinical Goals: CIWA, safety, electrolyte replacement  Patient Goals: catch the bus    Progress made toward(s) clinical / shift goals:  Progressing      Problem: Pain - Standard  Goal: Alleviation of pain or a reduction in pain to the patient’s comfort goal  Outcome: Progressing  Note: Pt verbalizing 6/10 pain in scrotum, medicated per MAR     Problem: Fall Risk  Goal: Patient will remain free from falls  Outcome: Progressing  Note: Pt wearing treaded slipper socks, bed in lowest position and locked, strip alarm in use, call light within reach

## 2024-12-23 NOTE — ASSESSMENT & PLAN NOTE
Patient encephalopathic.  Oriented only to self.  Likely multifactorial related to alcohol use, cirrhosis, iatrogenic, delirium.  Patient with recent thiamine level at the beginning of November to high normal range.  Ammonia within normal limits.  Patient is on CIWA and receiving Ativan.  - Continue thiamine 100 mg daily, will not pursue full Warnicke treatment course given recent thiamine within normal levels  - Continue lactulose and rifamycin and titrate to 3-4 soft bowel movements daily  - Will work to minimize sedating medications while on CIWA and for pain

## 2024-12-23 NOTE — PROGRESS NOTES
Monitor Summary:    Rhythm: SR  Rate: 62-65  Ectopy: (R) PVC  Measurement : .16/.09/.45
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
## 2024-12-23 NOTE — THERAPY
Physical Therapy   Initial Evaluation     Patient Name: Jalen Vaughn  Age:  44 y.o., Sex:  male  Medical Record #: [id number]  Today's Date: 12/23/2024          Assessment  Patient is 44 y.o. male w/ hx of ETOH cirrhosis.  He is homeless.  Admitted w/ groin and LE edema.  He was mobile PTA w/o AD.  Today, he is rec'd alert, in bed, agreeable to mobilize w/ PT.  He is able to mobilize as noted below.  No loss of balance, no need of physical assist and no use of AD.  PT for d/c needs only.  Plan    Physical Therapy Initial Treatment Plan   Duration: Discharge Needs Only    DC Equipment Recommendations: None  Discharge Recommendations: Anticipate that the patient will have no further physical therapy needs after discharge from the hospital        Objective       12/23/24 1202   Prior Living Situation   Housing / Facility Homeless   Prior Level of Functional Mobility   Bed Mobility Independent   Transfer Status Independent   Ambulation Independent   Assistive Devices Used None   Stairs Independent   Cognition    Level of Consciousness Alert   Strength Lower Body   Comments grossly wnl   Balance Assessment   Sitting Balance (Static) Fair +   Sitting Balance (Dynamic) Fair +   Standing Balance (Static) Fair +   Standing Balance (Dynamic) Fair +   Weight Shift Sitting Good   Weight Shift Standing Good   Bed Mobility    Supine to Sit Supervised   Sit to Supine Supervised   Gait Analysis   Gait Level Of Assist Supervised   Assistive Device None   Distance (Feet) 100   Functional Mobility   Sit to Stand Supervised   Bed, Chair, Wheelchair Transfer Supervised   Physical Therapy Initial Treatment Plan    Duration Discharge Needs Only   Anticipated Discharge Equipment and Recommendations   DC Equipment Recommendations None   Discharge Recommendations Anticipate that the patient will have no further physical therapy needs after discharge from the hospital

## 2024-12-23 NOTE — PROGRESS NOTES
Monitor Summary  Rhythm: Normal Sinus Rhythm  Rate: 60-70s  Ectopy: PVCs  .18 / .11 / .55    12 hr Chart check.

## 2024-12-23 NOTE — PROGRESS NOTES
FAMILY MEDICINE PROGRESS NOTE          PATIENT ID:  NAME:  Jalen Vaughn  MRN:               [id number]  YOB: 1980    Date of Admission: 12/20/2024     Attending: Gracie Ceballos M.d.     Resident: Marisol Blair M.D.    Primary Care Physician:  Mann Johnson M.D.    HPI: Jalen Vaughn is a 44 y.o. male admitted for electrolyte abnormalities on hospital day 2    SUBJECTIVE:   No acute events overnight, patient reports he feels much better than when he came in here.  Denies any abdominal pain, shortness of breath, nausea/vomiting, diarrhea, fever, chills, cough.  States he has an appetite but does not like the food. does report he is worried about losing his belongings at the place he is staying and would like to get home as soon as possible.  Patient is oriented to self but does not know place and does not know where he has been living.    OBJECTIVE:  Temp:  [36.7 °C (98.1 °F)-37.1 °C (98.8 °F)] 36.7 °C (98.1 °F)  Pulse:  [60-68] 68  Resp:  [18-20] 18  BP: (111-136)/(55-64) 121/59  SpO2:  [90 %-96 %] 91 %      Intake/Output Summary (Last 24 hours) at 12/23/2024 0717  Last data filed at 12/23/2024 0000  Gross per 24 hour   Intake 720 ml   Output 700 ml   Net 20 ml       PHYSICAL EXAM:  Physical Exam  Constitutional:       General: She is not in acute distress.     Appearance: She is not ill-appearing, toxic-appearing or diaphoretic.   HENT:      Head: Normocephalic and atraumatic.      Right Ear: External ear normal.      Left Ear: External ear normal.      Nose: Nose normal. No congestion.      Mouth/Throat:      Mouth: Mucous membranes are moist.      Pharynx: No oropharyngeal exudate.   Eyes:      General: Scleral icterus present.      Extraocular Movements: Extraocular movements intact.   Cardiovascular:      Rate and Rhythm: Normal rate.      Pulses: Normal pulses.      Heart sounds: No murmur heard.  Pulmonary:      Effort: Pulmonary effort is normal.   Abdominal:      General: There  is distension.      Palpations: Abdomen is soft.      Tenderness: There is no abdominal tenderness. There is no guarding or rebound.   Musculoskeletal:         General: Normal range of motion.      Cervical back: Normal range of motion. No rigidity.      Right lower leg: Edema present.      Left lower leg: Edema present.      Comments: Bilateral lower extremity in clean compression dressing without weeping or strikethrough.  3+ pitting edema of feet   Neurological:      General: No focal deficit present.      Mental Status: She is disoriented.         LABS:  Reviewed    IMAGING:  Reviewed    CULTURES:   Reviewed    MEDS:  Reviewed    ASSESSMENT/PLAN:  44 y.o. male admitted for electrolyte abnormalities now treated for SBP.   * Hypoparathyroidism (HCC)- (present on admission)  Assessment & Plan  Patient has a chronic history of low calcium but was worse on admission.  Likely multifactorial secondary to Lasix use and recent vomiting. PTH is low.  Plan:  -Will monitor with daily labs  -Vitamin D level, repeat PTH  -Initiate calcium and vitamin D treatment once vitamin D level returns if low  -Anticipate initiation of 2 g elemental calcium and 0.25 mcg of calcitriol daily    Pediculus humanus  Assessment & Plan  Patient with low-dose infestation.  Treated with pyrethrins on 12/22.  - Repeat treatment on 12/29    Spontaneous bacterial peritonitis (HCC)  Assessment & Plan  Concern for SBP given patient's fever overnight, abdominal pain, and history of alcoholic liver cirrhosis.  Not sufficient fluid for diagnostic paracentesis.  -Started on ceftriaxone 12/22 with end of treatment 12/26    Scrotal swelling  Assessment & Plan  Several days of scrotal swelling with tenderness.  Patient denies any new sexual encounters.  Likely 2/2 anasarca. Scrotal US just showed edema. GC/CT were negative.  Plan:  -Mild improvement is seen, if patient does not continue to improve with diuresis will consider further  workup    Anasarca  Assessment & Plan  Significant anasarca on exam with 2+ pitting edema extending to abdomen at admission.  Patient has not been compliant with home Lasix. S/p IV Lasix 80mg in ED. BNP slightly elevated to the 700's.  Recent echo in November 2024 demonstrated a normal ejection fraction. Pt also received one time dose of 25mg albumin followed by IV lasix.  Patient with EZEQUIEL likely secondary to diuresis.  Still total volume up.  - Home Lasix 80 mg oral daily, continue to monitor kidney function and fluid status    Influenza  Assessment & Plan  Patient positive for influenza.  Positive sick contacts.  Symptoms ongoing for greater than 48 hours prior to admission therefore, not candidate for oseltamivir.  -Supportive treatment   -Anti-pyretics as needed    Iron deficiency anemia secondary to inadequate dietary iron intake  Assessment & Plan  Chronic history.  Denies any active bleeding or black stools.  Patient on iron supplementation at home which he is noncompliant with. Pt received 1 unit of RBC after a Hgb of 6.9, repeat levels have been stable  -Repeating iron studies  -Will continue to monitor and transfuse as needed  -Continue ferrous sulfate inpatient  -Low threshold for consult to GI for consideration of scope    Encephalopathy- (present on admission)  Assessment & Plan  Patient encephalopathic.  Oriented only to self.  Likely multifactorial related to alcohol use, cirrhosis, iatrogenic, delirium.  Patient with recent thiamine level at the beginning of November to high normal range.  Ammonia within normal limits.  Patient is on CIWA and receiving Ativan.  - Continue thiamine 100 mg daily, will not pursue full Warnicke treatment course given recent thiamine within normal levels  - Continue lactulose and rifamycin and titrate to 3-4 soft bowel movements daily  - Will work to minimize sedating medications while on CIWA and for pain    Alcohol abuse- (present on admission)  Assessment & Plan  Pt  has history of alcohol abuse requiring multiple admissions. Per patient he drank 2-3 beers on 12/20. His diagnostic alcohol level at 1100 on 12/21 was <10.1. Pt has some confusion and mild tremors. Pt says he does not have history or seizures due to withdrawal. CIWA scores have been between 3 and 10 over last 24 hours.  - CIWA protocol initiated, 5 days from last drink 12/26  - Librium taper initiated 12/22  - Folate and Thiamine supplementation     Hypokalemia- (present on admission)  Assessment & Plan  Likely secondary to GI losses from vomiting and diarrhea.  Also may be related to chronic Lasix use and inconsistent potassium use. S/p 40 mEq Kcl in ED.   -Monitor labs and replete electrolytes as needed    Pancytopenia (HCC)- (present on admission)  Assessment & Plan  Patient has multiple cell lines including platelets, hemoglobin, and white blood cells which are low.  History of iron deficient anemia.  Has mild elevation of alkaline phosphatase.  CT scan with splenomegaly suggesting liver disease is driving process. Also acute viral illness. Less likely primary bone process.  - Iron studies, reticulocyte count, LDH, bone specific alk phos, coags to assess for primary bone process  - Will continue to monitor          Core Measures:  Fluids: PO  Lines: PIV  Abx: ceftriaxone  Diet: Regular  PPX: SCDs/TEDs and pharmacologic prophylaxis contraindicated due to anemia     CODE Status: Full Code      Disposition  Patient is not medically cleared for discharge.   Anticipate discharge to to home with close outpatient follow-up.  I have placed the appropriate orders for post-discharge needs.      Marisol Blair M.D.

## 2024-12-23 NOTE — PROGRESS NOTES
Pt expressed leaving to go get his stuff from his place. MD was at bedside and discussed his POC and diagnosis' and the risks of him leaving AMA. Pt left AMA. Pt was escorted out via WC with belongings in red bag. Pt refused to get out of WC once outside and became agitated regarding a taxi not being ordered and paid for, for him to go back to where his stuff is. Pt started yelling at this RN and CNA and started walking inside the building again. Security was notified of situation and pt description.     Items in pts red bag were covered in bed bugs/lice. Pt opened bag and placed items back on self.     PIV x2 were removed prior to pt leaving AMA.

## 2024-12-23 NOTE — ASSESSMENT & PLAN NOTE
Patient with low-dose infestation.  Treated with pyrethrins on 12/22.  - Repeat treatment on 12/29

## 2024-12-24 LAB
25(OH)D3 SERPL-MCNC: 8 NG/ML (ref 30–100)
ALBUMIN SERPL BCP-MCNC: 2.3 G/DL (ref 3.2–4.9)
ALBUMIN/GLOB SERPL: 1 G/DL
ALP SERPL-CCNC: 132 U/L (ref 30–99)
ALT SERPL-CCNC: 24 U/L (ref 2–50)
ANION GAP SERPL CALC-SCNC: 10 MMOL/L (ref 7–16)
APTT PPP: 38.2 SEC (ref 24.7–36)
AST SERPL-CCNC: 65 U/L (ref 12–45)
BILIRUB SERPL-MCNC: 1.8 MG/DL (ref 0.1–1.5)
BUN SERPL-MCNC: 14 MG/DL (ref 8–22)
CA-I SERPL-SCNC: 1 MMOL/L (ref 1.1–1.3)
CALCIUM ALBUM COR SERPL-MCNC: 8.6 MG/DL (ref 8.5–10.5)
CALCIUM SERPL-MCNC: 7.2 MG/DL (ref 8.5–10.5)
CHLORIDE SERPL-SCNC: 108 MMOL/L (ref 96–112)
CO2 SERPL-SCNC: 21 MMOL/L (ref 20–33)
CREAT SERPL-MCNC: 0.84 MG/DL (ref 0.5–1.4)
EKG IMPRESSION: NORMAL
ERYTHROCYTE [DISTWIDTH] IN BLOOD BY AUTOMATED COUNT: 59.1 FL (ref 35.9–50)
ETHANOL BLD-MCNC: <10.1 MG/DL
GFR SERPLBLD CREATININE-BSD FMLA CKD-EPI: 111 ML/MIN/1.73 M 2
GLOBULIN SER CALC-MCNC: 2.4 G/DL (ref 1.9–3.5)
GLUCOSE SERPL-MCNC: 103 MG/DL (ref 65–99)
HCT VFR BLD AUTO: 24.2 % (ref 42–52)
HGB BLD-MCNC: 8.1 G/DL (ref 14–18)
HGB RETIC QN AUTO: 27 PG/CELL (ref 29–35)
IMM RETICS NFR: 13.8 % (ref 2.6–16.1)
INR PPP: 1.62 (ref 0.87–1.13)
LDH SERPL L TO P-CCNC: 405 U/L (ref 107–266)
MAGNESIUM SERPL-MCNC: 1.5 MG/DL (ref 1.5–2.5)
MCH RBC QN AUTO: 30.5 PG (ref 27–33)
MCHC RBC AUTO-ENTMCNC: 33.5 G/DL (ref 32.3–36.5)
MCV RBC AUTO: 91 FL (ref 81.4–97.8)
PHOSPHATE SERPL-MCNC: 3.3 MG/DL (ref 2.5–4.5)
PLATELET # BLD AUTO: 60 K/UL (ref 164–446)
PLATELETS.RETICULATED NFR BLD AUTO: 2.7 % (ref 0.6–13.1)
PMV BLD AUTO: 12.6 FL (ref 9–12.9)
POTASSIUM SERPL-SCNC: 3.5 MMOL/L (ref 3.6–5.5)
PROT SERPL-MCNC: 4.7 G/DL (ref 6–8.2)
PROTHROMBIN TIME: 19.3 SEC (ref 12–14.6)
PTH-INTACT SERPL-MCNC: 25.7 PG/ML (ref 14–72)
RBC # BLD AUTO: 2.66 M/UL (ref 4.7–6.1)
RETICS # AUTO: 0.04 M/UL (ref 0.04–0.12)
RETICS/RBC NFR: 1.4 % (ref 0.8–2.6)
SODIUM SERPL-SCNC: 139 MMOL/L (ref 135–145)
TROPONIN T SERPL-MCNC: 29 NG/L (ref 6–19)
WBC # BLD AUTO: 3.2 K/UL (ref 4.8–10.8)

## 2024-12-24 PROCEDURE — 700105 HCHG RX REV CODE 258

## 2024-12-24 PROCEDURE — 84484 ASSAY OF TROPONIN QUANT: CPT

## 2024-12-24 PROCEDURE — 85610 PROTHROMBIN TIME: CPT

## 2024-12-24 PROCEDURE — 82077 ASSAY SPEC XCP UR&BREATH IA: CPT

## 2024-12-24 PROCEDURE — 36415 COLL VENOUS BLD VENIPUNCTURE: CPT

## 2024-12-24 PROCEDURE — 80053 COMPREHEN METABOLIC PANEL: CPT

## 2024-12-24 PROCEDURE — 85027 COMPLETE CBC AUTOMATED: CPT

## 2024-12-24 PROCEDURE — 85055 RETICULATED PLATELET ASSAY: CPT

## 2024-12-24 PROCEDURE — A9270 NON-COVERED ITEM OR SERVICE: HCPCS

## 2024-12-24 PROCEDURE — 99222 1ST HOSP IP/OBS MODERATE 55: CPT | Performed by: FAMILY MEDICINE

## 2024-12-24 PROCEDURE — 82330 ASSAY OF CALCIUM: CPT

## 2024-12-24 PROCEDURE — 82306 VITAMIN D 25 HYDROXY: CPT

## 2024-12-24 PROCEDURE — 85730 THROMBOPLASTIN TIME PARTIAL: CPT

## 2024-12-24 PROCEDURE — A9270 NON-COVERED ITEM OR SERVICE: HCPCS | Mod: JZ

## 2024-12-24 PROCEDURE — 85046 RETICYTE/HGB CONCENTRATE: CPT

## 2024-12-24 PROCEDURE — 700102 HCHG RX REV CODE 250 W/ 637 OVERRIDE(OP)

## 2024-12-24 PROCEDURE — 770001 HCHG ROOM/CARE - MED/SURG/GYN PRIV*

## 2024-12-24 PROCEDURE — 83735 ASSAY OF MAGNESIUM: CPT

## 2024-12-24 PROCEDURE — 700111 HCHG RX REV CODE 636 W/ 250 OVERRIDE (IP)

## 2024-12-24 PROCEDURE — 700102 HCHG RX REV CODE 250 W/ 637 OVERRIDE(OP): Mod: JZ

## 2024-12-24 PROCEDURE — 83615 LACTATE (LD) (LDH) ENZYME: CPT

## 2024-12-24 PROCEDURE — 84100 ASSAY OF PHOSPHORUS: CPT

## 2024-12-24 PROCEDURE — 83970 ASSAY OF PARATHORMONE: CPT

## 2024-12-24 RX ORDER — POTASSIUM CHLORIDE 1500 MG/1
40 TABLET, EXTENDED RELEASE ORAL DAILY
Status: DISCONTINUED | OUTPATIENT
Start: 2024-12-25 | End: 2024-12-26 | Stop reason: HOSPADM

## 2024-12-24 RX ORDER — OXYCODONE HYDROCHLORIDE 10 MG/1
10 TABLET ORAL
Status: DISCONTINUED | OUTPATIENT
Start: 2024-12-24 | End: 2024-12-26

## 2024-12-24 RX ORDER — HYDROMORPHONE HYDROCHLORIDE 1 MG/ML
0.5 INJECTION, SOLUTION INTRAMUSCULAR; INTRAVENOUS; SUBCUTANEOUS
Status: DISCONTINUED | OUTPATIENT
Start: 2024-12-24 | End: 2024-12-26

## 2024-12-24 RX ORDER — FUROSEMIDE 20 MG/1
80 TABLET ORAL ONCE
Status: COMPLETED | OUTPATIENT
Start: 2024-12-24 | End: 2024-12-24

## 2024-12-24 RX ORDER — LORAZEPAM 1 MG/1
4 TABLET ORAL
Status: DISCONTINUED | OUTPATIENT
Start: 2024-12-24 | End: 2024-12-26

## 2024-12-24 RX ORDER — LORAZEPAM 1 MG/1
1 TABLET ORAL EVERY 4 HOURS PRN
Status: DISCONTINUED | OUTPATIENT
Start: 2024-12-24 | End: 2024-12-26

## 2024-12-24 RX ORDER — FOLIC ACID 1 MG/1
1 TABLET ORAL DAILY
Status: DISCONTINUED | OUTPATIENT
Start: 2024-12-24 | End: 2024-12-26 | Stop reason: HOSPADM

## 2024-12-24 RX ORDER — LORAZEPAM 2 MG/ML
1 INJECTION INTRAMUSCULAR
Status: DISCONTINUED | OUTPATIENT
Start: 2024-12-24 | End: 2024-12-26

## 2024-12-24 RX ORDER — LORAZEPAM 1 MG/1
3 TABLET ORAL
Status: DISCONTINUED | OUTPATIENT
Start: 2024-12-24 | End: 2024-12-26

## 2024-12-24 RX ORDER — VITAMIN B COMPLEX
1000 TABLET ORAL
Status: DISCONTINUED | OUTPATIENT
Start: 2024-12-24 | End: 2024-12-25

## 2024-12-24 RX ORDER — FUROSEMIDE 20 MG/1
80 TABLET ORAL DAILY
Status: DISCONTINUED | OUTPATIENT
Start: 2024-12-24 | End: 2024-12-26 | Stop reason: HOSPADM

## 2024-12-24 RX ORDER — OXYCODONE HYDROCHLORIDE 5 MG/1
5 TABLET ORAL
Status: DISCONTINUED | OUTPATIENT
Start: 2024-12-24 | End: 2024-12-26

## 2024-12-24 RX ORDER — HYDROXYZINE HYDROCHLORIDE 25 MG/1
25 TABLET, FILM COATED ORAL 3 TIMES DAILY PRN
Status: DISCONTINUED | OUTPATIENT
Start: 2024-12-24 | End: 2024-12-26 | Stop reason: HOSPADM

## 2024-12-24 RX ORDER — ESCITALOPRAM OXALATE 10 MG/1
20 TABLET ORAL DAILY
Status: DISCONTINUED | OUTPATIENT
Start: 2024-12-24 | End: 2024-12-26 | Stop reason: HOSPADM

## 2024-12-24 RX ORDER — FERROUS SULFATE 325(65) MG
325 TABLET ORAL DAILY
Status: DISCONTINUED | OUTPATIENT
Start: 2024-12-24 | End: 2024-12-26 | Stop reason: HOSPADM

## 2024-12-24 RX ORDER — AMLODIPINE BESYLATE 5 MG/1
5 TABLET ORAL DAILY
Status: DISCONTINUED | OUTPATIENT
Start: 2024-12-24 | End: 2024-12-26 | Stop reason: HOSPADM

## 2024-12-24 RX ORDER — POTASSIUM CHLORIDE 750 MG/1
20 TABLET, EXTENDED RELEASE ORAL 2 TIMES DAILY
Status: DISCONTINUED | OUTPATIENT
Start: 2024-12-24 | End: 2024-12-26 | Stop reason: HOSPADM

## 2024-12-24 RX ORDER — SPIRONOLACTONE 100 MG/1
100 TABLET, FILM COATED ORAL DAILY
Status: DISCONTINUED | OUTPATIENT
Start: 2024-12-24 | End: 2024-12-26 | Stop reason: HOSPADM

## 2024-12-24 RX ORDER — LORAZEPAM 2 MG/ML
2 INJECTION INTRAMUSCULAR
Status: DISCONTINUED | OUTPATIENT
Start: 2024-12-24 | End: 2024-12-26

## 2024-12-24 RX ORDER — LACTULOSE 10 G/15ML
45 SOLUTION ORAL 4 TIMES DAILY
Status: DISCONTINUED | OUTPATIENT
Start: 2024-12-24 | End: 2024-12-26 | Stop reason: HOSPADM

## 2024-12-24 RX ORDER — LORAZEPAM 2 MG/ML
0.5 INJECTION INTRAMUSCULAR EVERY 4 HOURS PRN
Status: DISCONTINUED | OUTPATIENT
Start: 2024-12-24 | End: 2024-12-26

## 2024-12-24 RX ORDER — LORAZEPAM 1 MG/1
2 TABLET ORAL
Status: DISCONTINUED | OUTPATIENT
Start: 2024-12-24 | End: 2024-12-26

## 2024-12-24 RX ORDER — POTASSIUM CHLORIDE 1500 MG/1
40 TABLET, EXTENDED RELEASE ORAL 2 TIMES DAILY
Status: COMPLETED | OUTPATIENT
Start: 2024-12-24 | End: 2024-12-24

## 2024-12-24 RX ORDER — LORAZEPAM 1 MG/1
0.5 TABLET ORAL EVERY 4 HOURS PRN
Status: DISCONTINUED | OUTPATIENT
Start: 2024-12-24 | End: 2024-12-26

## 2024-12-24 RX ORDER — CARVEDILOL 6.25 MG/1
3.12 TABLET ORAL 2 TIMES DAILY WITH MEALS
Status: DISCONTINUED | OUTPATIENT
Start: 2024-12-24 | End: 2024-12-26 | Stop reason: HOSPADM

## 2024-12-24 RX ORDER — GABAPENTIN 300 MG/1
300 CAPSULE ORAL 3 TIMES DAILY
Status: DISCONTINUED | OUTPATIENT
Start: 2024-12-24 | End: 2024-12-26 | Stop reason: HOSPADM

## 2024-12-24 RX ORDER — LORAZEPAM 2 MG/ML
1.5 INJECTION INTRAMUSCULAR
Status: DISCONTINUED | OUTPATIENT
Start: 2024-12-24 | End: 2024-12-26

## 2024-12-24 RX ADMIN — GABAPENTIN 300 MG: 300 CAPSULE ORAL at 13:30

## 2024-12-24 RX ADMIN — CEFTRIAXONE SODIUM 2000 MG: 10 INJECTION, POWDER, FOR SOLUTION INTRAVENOUS at 09:14

## 2024-12-24 RX ADMIN — LACTULOSE 45 ML: 10 SOLUTION ORAL at 13:30

## 2024-12-24 RX ADMIN — OMEPRAZOLE 20 MG: 20 CAPSULE, DELAYED RELEASE ORAL at 18:29

## 2024-12-24 RX ADMIN — LACTULOSE 45 ML: 10 SOLUTION ORAL at 18:28

## 2024-12-24 RX ADMIN — RIFAXIMIN 550 MG: 550 TABLET ORAL at 05:36

## 2024-12-24 RX ADMIN — OXYCODONE 5 MG: 5 TABLET ORAL at 05:36

## 2024-12-24 RX ADMIN — SPIRONOLACTONE 100 MG: 100 TABLET ORAL at 05:36

## 2024-12-24 RX ADMIN — POTASSIUM CHLORIDE 20 MEQ: 750 TABLET, EXTENDED RELEASE ORAL at 05:36

## 2024-12-24 RX ADMIN — POTASSIUM CHLORIDE 40 MEQ: 1500 TABLET, EXTENDED RELEASE ORAL at 13:30

## 2024-12-24 RX ADMIN — AMLODIPINE BESYLATE 5 MG: 5 TABLET ORAL at 05:36

## 2024-12-24 RX ADMIN — FUROSEMIDE 80 MG: 20 TABLET ORAL at 18:30

## 2024-12-24 RX ADMIN — GABAPENTIN 300 MG: 300 CAPSULE ORAL at 05:36

## 2024-12-24 RX ADMIN — Medication 1000 UNITS: at 13:31

## 2024-12-24 RX ADMIN — FOLIC ACID 1 MG: 1 TABLET ORAL at 05:36

## 2024-12-24 RX ADMIN — LACTULOSE 45 ML: 10 SOLUTION ORAL at 09:13

## 2024-12-24 RX ADMIN — POTASSIUM CHLORIDE 40 MEQ: 1500 TABLET, EXTENDED RELEASE ORAL at 18:29

## 2024-12-24 RX ADMIN — CARVEDILOL 3.12 MG: 6.25 TABLET, FILM COATED ORAL at 09:13

## 2024-12-24 RX ADMIN — POTASSIUM CHLORIDE 20 MEQ: 750 TABLET, EXTENDED RELEASE ORAL at 20:26

## 2024-12-24 RX ADMIN — CARVEDILOL 3.12 MG: 6.25 TABLET, FILM COATED ORAL at 18:33

## 2024-12-24 RX ADMIN — OXYCODONE 5 MG: 5 TABLET ORAL at 18:32

## 2024-12-24 RX ADMIN — LACTULOSE 45 ML: 10 SOLUTION ORAL at 20:25

## 2024-12-24 RX ADMIN — FERROUS SULFATE TAB 325 MG (65 MG ELEMENTAL FE) 325 MG: 325 (65 FE) TAB at 05:36

## 2024-12-24 RX ADMIN — SENNOSIDES AND DOCUSATE SODIUM 2 TABLET: 50; 8.6 TABLET ORAL at 18:29

## 2024-12-24 RX ADMIN — FUROSEMIDE 80 MG: 20 TABLET ORAL at 05:36

## 2024-12-24 RX ADMIN — ESCITALOPRAM OXALATE 20 MG: 10 TABLET ORAL at 05:36

## 2024-12-24 RX ADMIN — OMEPRAZOLE 20 MG: 20 CAPSULE, DELAYED RELEASE ORAL at 05:36

## 2024-12-24 RX ADMIN — RIFAXIMIN 550 MG: 550 TABLET ORAL at 18:29

## 2024-12-24 RX ADMIN — GABAPENTIN 300 MG: 300 CAPSULE ORAL at 18:31

## 2024-12-24 ASSESSMENT — LIFESTYLE VARIABLES
TREMOR: TREMOR NOT VISIBLE BUT CAN BE FELT, FINGERTIP TO FINGERTIP
VISUAL DISTURBANCES: NOT PRESENT
VISUAL DISTURBANCES: NOT PRESENT
NAUSEA AND VOMITING: NO NAUSEA AND NO VOMITING
NAUSEA AND VOMITING: NO NAUSEA AND NO VOMITING
HAVE PEOPLE ANNOYED YOU BY CRITICIZING YOUR DRINKING: NO
NAUSEA AND VOMITING: NO NAUSEA AND NO VOMITING
TOTAL SCORE: 6
ORIENTATION AND CLOUDING OF SENSORIUM: ORIENTED AND CAN DO SERIAL ADDITIONS
PAROXYSMAL SWEATS: NO SWEAT VISIBLE
DOES PATIENT WANT TO STOP DRINKING: YES
ANXIETY: MILDLY ANXIOUS
VISUAL DISTURBANCES: NOT PRESENT
TOTAL SCORE: 2
TOTAL SCORE: 2
AGITATION: NORMAL ACTIVITY
HEADACHE, FULLNESS IN HEAD: NOT PRESENT
ORIENTATION AND CLOUDING OF SENSORIUM: ORIENTED AND CAN DO SERIAL ADDITIONS
TREMOR: TREMOR NOT VISIBLE BUT CAN BE FELT, FINGERTIP TO FINGERTIP
NAUSEA AND VOMITING: NO NAUSEA AND NO VOMITING
HEADACHE, FULLNESS IN HEAD: NOT PRESENT
NAUSEA AND VOMITING: NO NAUSEA AND NO VOMITING
HEADACHE, FULLNESS IN HEAD: MILD
AGITATION: NORMAL ACTIVITY
CONSUMPTION TOTAL: NEGATIVE
AUDITORY DISTURBANCES: NOT PRESENT
HOW MANY TIMES IN THE PAST YEAR HAVE YOU HAD 5 OR MORE DRINKS IN A DAY: 0
ANXIETY: MILDLY ANXIOUS
AUDITORY DISTURBANCES: NOT PRESENT
TOTAL SCORE: 2
ORIENTATION AND CLOUDING OF SENSORIUM: ORIENTED AND CAN DO SERIAL ADDITIONS
PAROXYSMAL SWEATS: BARELY PERCEPTIBLE SWEATING. PALMS MOIST
TREMOR: TREMOR NOT VISIBLE BUT CAN BE FELT, FINGERTIP TO FINGERTIP
HEADACHE, FULLNESS IN HEAD: NOT PRESENT
AUDITORY DISTURBANCES: NOT PRESENT
VISUAL DISTURBANCES: NOT PRESENT
AGITATION: NORMAL ACTIVITY
ANXIETY: MILDLY ANXIOUS
ON A TYPICAL DAY WHEN YOU DRINK ALCOHOL HOW MANY DRINKS DO YOU HAVE: 3
ANXIETY: NO ANXIETY (AT EASE)
ORIENTATION AND CLOUDING OF SENSORIUM: ORIENTED AND CAN DO SERIAL ADDITIONS
VISUAL DISTURBANCES: NOT PRESENT
ANXIETY: NO ANXIETY (AT EASE)
TREMOR: TREMOR NOT VISIBLE BUT CAN BE FELT, FINGERTIP TO FINGERTIP
AGITATION: NORMAL ACTIVITY
PAROXYSMAL SWEATS: BARELY PERCEPTIBLE SWEATING. PALMS MOIST
TREMOR: TREMOR NOT VISIBLE BUT CAN BE FELT, FINGERTIP TO FINGERTIP
PAROXYSMAL SWEATS: BARELY PERCEPTIBLE SWEATING. PALMS MOIST
ORIENTATION AND CLOUDING OF SENSORIUM: ORIENTED AND CAN DO SERIAL ADDITIONS
TOTAL SCORE: 1
TOTAL SCORE: VERY MILD ITCHING, PINS AND NEEDLES SENSATION, BURNING OR NUMBNESS
TOTAL SCORE: 3
VISUAL DISTURBANCES: NOT PRESENT
HEADACHE, FULLNESS IN HEAD: MILD
AUDITORY DISTURBANCES: NOT PRESENT
PAROXYSMAL SWEATS: NO SWEAT VISIBLE
AUDITORY DISTURBANCES: NOT PRESENT
DOES PATIENT WANT TO TALK TO SOMEONE ABOUT QUITTING: YES
PAROXYSMAL SWEATS: NO SWEAT VISIBLE
ANXIETY: MILDLY ANXIOUS
TREMOR: TREMOR NOT VISIBLE BUT CAN BE FELT, FINGERTIP TO FINGERTIP
NAUSEA AND VOMITING: NO NAUSEA AND NO VOMITING
ORIENTATION AND CLOUDING OF SENSORIUM: ORIENTED AND CAN DO SERIAL ADDITIONS
AGITATION: NORMAL ACTIVITY
EVER HAD A DRINK FIRST THING IN THE MORNING TO STEADY YOUR NERVES TO GET RID OF A HANGOVER: NO
AUDITORY DISTURBANCES: NOT PRESENT
HEADACHE, FULLNESS IN HEAD: VERY MILD
HAVE YOU EVER FELT YOU SHOULD CUT DOWN ON YOUR DRINKING: YES
TOTAL SCORE: 4
AGITATION: NORMAL ACTIVITY
TOTAL SCORE: 1
AVERAGE NUMBER OF DAYS PER WEEK YOU HAVE A DRINK CONTAINING ALCOHOL: 3
EVER FELT BAD OR GUILTY ABOUT YOUR DRINKING: NO
ALCOHOL_USE: YES
TOTAL SCORE: 1

## 2024-12-24 ASSESSMENT — COGNITIVE AND FUNCTIONAL STATUS - GENERAL
DRESSING REGULAR UPPER BODY CLOTHING: A LOT
PERSONAL GROOMING: A LITTLE
CLIMB 3 TO 5 STEPS WITH RAILING: A LITTLE
TOILETING: A LOT
WALKING IN HOSPITAL ROOM: A LITTLE
HELP NEEDED FOR BATHING: A LITTLE
SUGGESTED CMS G CODE MODIFIER DAILY ACTIVITY: CK
STANDING UP FROM CHAIR USING ARMS: A LITTLE
DAILY ACTIVITIY SCORE: 17
MOBILITY SCORE: 21
SUGGESTED CMS G CODE MODIFIER MOBILITY: CJ
DRESSING REGULAR LOWER BODY CLOTHING: A LITTLE

## 2024-12-24 ASSESSMENT — SOCIAL DETERMINANTS OF HEALTH (SDOH)
WITHIN THE LAST YEAR, HAVE YOU BEEN AFRAID OF YOUR PARTNER OR EX-PARTNER?: NO
IN THE PAST 12 MONTHS, HAS THE ELECTRIC, GAS, OIL, OR WATER COMPANY THREATENED TO SHUT OFF SERVICE IN YOUR HOME?: PATIENT DECLINED
WITHIN THE PAST 12 MONTHS, THE FOOD YOU BOUGHT JUST DIDN'T LAST AND YOU DIDN'T HAVE MONEY TO GET MORE: OFTEN TRUE
WITHIN THE LAST YEAR, HAVE YOU BEEN KICKED, HIT, SLAPPED, OR OTHERWISE PHYSICALLY HURT BY YOUR PARTNER OR EX-PARTNER?: NO
WITHIN THE LAST YEAR, HAVE YOU BEEN HUMILIATED OR EMOTIONALLY ABUSED IN OTHER WAYS BY YOUR PARTNER OR EX-PARTNER?: NO
WITHIN THE LAST YEAR, HAVE TO BEEN RAPED OR FORCED TO HAVE ANY KIND OF SEXUAL ACTIVITY BY YOUR PARTNER OR EX-PARTNER?: NO
WITHIN THE PAST 12 MONTHS, YOU WORRIED THAT YOUR FOOD WOULD RUN OUT BEFORE YOU GOT THE MONEY TO BUY MORE: OFTEN TRUE

## 2024-12-24 ASSESSMENT — PATIENT HEALTH QUESTIONNAIRE - PHQ9
6. FEELING BAD ABOUT YOURSELF - OR THAT YOU ARE A FAILURE OR HAVE LET YOURSELF OR YOUR FAMILY DOWN: NOT AL ALL
SUM OF ALL RESPONSES TO PHQ9 QUESTIONS 1 AND 2: 2
4. FEELING TIRED OR HAVING LITTLE ENERGY: SEVERAL DAYS
2. FEELING DOWN, DEPRESSED, IRRITABLE, OR HOPELESS: SEVERAL DAYS
8. MOVING OR SPEAKING SO SLOWLY THAT OTHER PEOPLE COULD HAVE NOTICED. OR THE OPPOSITE, BEING SO FIGETY OR RESTLESS THAT YOU HAVE BEEN MOVING AROUND A LOT MORE THAN USUAL: NOT AT ALL
7. TROUBLE CONCENTRATING ON THINGS, SUCH AS READING THE NEWSPAPER OR WATCHING TELEVISION: NOT AT ALL
9. THOUGHTS THAT YOU WOULD BE BETTER OFF DEAD, OR OF HURTING YOURSELF: NOT AT ALL
1. LITTLE INTEREST OR PLEASURE IN DOING THINGS: SEVERAL DAYS
5. POOR APPETITE OR OVEREATING: NOT AT ALL
3. TROUBLE FALLING OR STAYING ASLEEP OR SLEEPING TOO MUCH: SEVERAL DAYS
SUM OF ALL RESPONSES TO PHQ QUESTIONS 1-9: 4

## 2024-12-24 ASSESSMENT — COPD QUESTIONNAIRES
DURING THE PAST 4 WEEKS HOW MUCH DID YOU FEEL SHORT OF BREATH: NONE/LITTLE OF THE TIME
HAVE YOU SMOKED AT LEAST 100 CIGARETTES IN YOUR ENTIRE LIFE: YES
COPD SCREENING SCORE: 2
DO YOU EVER COUGH UP ANY MUCUS OR PHLEGM?: NO/ONLY WITH OCCASIONAL COLDS OR INFECTIONS

## 2024-12-24 ASSESSMENT — PAIN DESCRIPTION - PAIN TYPE
TYPE: ACUTE PAIN

## 2024-12-24 NOTE — ASSESSMENT & PLAN NOTE
Patient started on treatment 12/22 for SBP.  At that time was having fevers and abdominal pain on setting of encephalopathy and with a chronic anemia concern for slow GI bleed.  Patient hemodynamically stable.  - Completed ceftriaxone EOT 12/26

## 2024-12-24 NOTE — ASSESSMENT & PLAN NOTE
Patient with low-dose infestation. Head was shaved without evidence of lice. Treated with pyrethrins on 12/22.

## 2024-12-24 NOTE — PROGRESS NOTES
FAMILY MEDICINE PROGRESS NOTE          PATIENT ID:  NAME:  Jalen Vaughn  MRN:               [id number]  YOB: 1980    Date of Admission: 12/23/2024     Attending: Gracie Ceballos M.d.     Resident: Marisol Blair M.D.    Primary Care Physician:  Mann Johnson M.D.      SUBJECTIVE:   No acute events overnight, patient reports he feels about the same as when he left.  He is disappointed that he was not able to obtain his items from the shelter but would like treatment for his swelling.  Reports his scrotum is still very swollen.  Denies any chest pain, nausea, vomiting, shortness of breath, leg pain.    OBJECTIVE:  Temp:  [36.4 °C (97.5 °F)-37.2 °C (99 °F)] 37.2 °C (99 °F)  Pulse:  [64-72] 72  Resp:  [18-22] 18  BP: (133-160)/(67-95) 134/69  SpO2:  [89 %-95 %] 95 %    No intake or output data in the 24 hours ending 12/24/24 0633    PHYSICAL EXAM:  Physical Exam  Constitutional:       General: She is not in acute distress.     Appearance: She is not ill-appearing or diaphoretic.   HENT:      Head: Normocephalic and atraumatic.      Right Ear: External ear normal.      Left Ear: External ear normal.      Nose: Nose normal. No congestion.      Mouth/Throat:      Mouth: Mucous membranes are moist.      Pharynx: No oropharyngeal exudate.   Eyes:      General:         Right eye: No discharge.         Left eye: No discharge.      Extraocular Movements: Extraocular movements intact.   Cardiovascular:      Rate and Rhythm: Normal rate and regular rhythm.      Pulses: Normal pulses.      Heart sounds: No murmur heard.  Pulmonary:      Effort: Pulmonary effort is normal.      Breath sounds: Rhonchi present.   Abdominal:      General: There is distension.      Palpations: Abdomen is soft.      Tenderness: There is no abdominal tenderness. There is no guarding or rebound.   Musculoskeletal:         General: Normal range of motion.      Cervical back: Normal range of motion. No rigidity.      Right lower  Ct done leg: Edema present.      Left lower leg: Edema present.   Skin:     General: Skin is warm.      Coloration: Skin is not jaundiced.   Neurological:      Mental Status: She is alert. Mental status is at baseline.         LABS:  Reviewed    IMAGING:  Reviewed      CULTURES:   Reviewed      MEDS:  Reviewed    ASSESSMENT/PLAN:  44 y.o. male admitted for SBP, pancytopenia, anasarca  * Anasarca- (present on admission)  Assessment & Plan  Significant anasarca on exam with 2+ pitting edema extending to abdomen at admission.  Patient has not been compliant with home Lasix. S/p IV Lasix 80mg in ED. BNP in 300s on admission, improved from 700s a few days prior.  Recent echo in November 2024 demonstrated a normal ejection fraction.   Patient with EZEQUIEL likely secondary to diuresis.  Still total volume up.  - Home Lasix 80 mg oral daily, continue to monitor kidney function and fluid status  -Will add additional diuresis as indicated and is tolerated    Pediculus humanus- (present on admission)  Assessment & Plan  Patient with low-dose infestation. Head was shaved without evidence of lice. Treated with pyrethrins on 12/22.    Spontaneous bacterial peritonitis (HCC)- (present on admission)  Assessment & Plan  Patient started on treatment 12/22 for SBP.  At that time was having fevers and abdominal pain on setting of encephalopathy and with a chronic anemia concern for slow GI bleed.  Patient hemodynamically stable.  - Continue ceftriaxone EOT 12/26    Scrotal swelling- (present on admission)  Assessment & Plan  Several days of scrotal swelling with tenderness.  Patient denies any new sexual encounters.  Likely 2/2 anasarca. Scrotal US just showed edema. GC/CT were negative.  -Mild improvement is seen, if patient does not continue to improve with diuresis will consider further workup    Influenza- (present on admission)  Assessment & Plan  Patient diagnosed with influenza on 12/21.  Was greater than 72 hours after onset of symptoms so  was not started on Tamiflu.  Patient does have cough and coarse lung sounds.  This could be the source of his fever on 12/22 inflating with SBP diagnoses the patient did have abdominal pain at that time so was treated conservatively for SBP.  - Titrate oxygen to goal  - Continue to monitor    Hypoparathyroidism (HCC)- (present on admission)  Assessment & Plan  Patient has a chronic history of low calcium but was worse on admission.  Likely multifactorial secondary to Lasix use and recent vomiting. PTH is low.  Plan:  -Will monitor with daily labs  -Vitamin D level, repeat PTH  -Initiate calcium and vitamin D treatment once vitamin D level returns if low  -Anticipate initiation of 2 g elemental calcium and 0.25 mcg of calcitriol daily    Pancytopenia (HCC)- (present on admission)  Assessment & Plan  Patient has multiple cell lines including platelets, hemoglobin, and white blood cells which are low.  History of iron deficient anemia.  Has mild elevation of alkaline phosphatase.  CT scan with splenomegaly suggesting liver disease is driving process. Also acute viral illness. Less likely primary bone process.  - Iron studies, reticulocyte count, LDH, bone specific alk phos, coags to assess for primary bone process  - Will continue to monitor, transfuse as indicated  - Hold DVT PPx in setting of thrombocytopenia    Alcohol withdrawal syndrome with complication, cirrhosis (HCC)- (present on admission)  Assessment & Plan  Pt has history of alcohol abuse requiring multiple admissions. Per patient he drank 2-3 beers on 12/20. His diagnostic alcohol level at 1100 on 12/21 was <10.1. Uncertain if patient drank after leaving AMA. Pt has some confusion and mild tremors. Pt says he does not have history or seizures due to withdrawal. CIWA scores have been between 3 and 10 over last 24 hours.  - Repeat EtOH 12/24   - CIWA protocol initiated, 5 days from last drink 12/26  - Librium taper initiated 12/22, DC'd when left AMA, will  hold off on reinitiation pending CIWA scoring  - Folate and Thiamine supplementation  - Continue lactulose and rifamycin for hepatic encephalopathy         Core Measures:  Fluids: PO   Lines: PIV  Abx: Ceftriaxone EOT 12/26 for SBP  Diet: regular  PPX: SCDs/TEDs    CODE Status: Full Code      Disposition  Patient is not medically cleared for discharge.   Anticipate discharge to to home with close outpatient follow-up.  I have placed the appropriate orders for post-discharge needs.      Marisol Blair M.D.

## 2024-12-24 NOTE — ED PROVIDER NOTES
ED Provider Note    CHIEF COMPLAINT  Chief Complaint   Patient presents with    Testicle Pain     Pt states was recently here for groin swelling and was supposed to stay but left to check mail. Is coming back because his testicles are still swollen.        EXTERNAL RECORDS REVIEWED  Reviewed  inpatient admission discharge summary from earlier today at 1252, patient was found to have anasarca, he underwent scrotal ultrasound which showed edema from his anasarca.  He was appropriately diuresed but he was still markedly hypervolemic on leaving the hospital AGAINST MEDICAL ADVICE.  He was found to have capacity to leave AGAINST MEDICAL ADVICE.    Reviewed ultrasound of the abdomen which was obtained yesterday showed no significant ascites  Reviewed ultrasound of the scrotum 12/21/2024 showed scrotal wall edema but no evidence of torsion    HPI/ROS  LIMITATION TO HISTORY   Select: : None  OUTSIDE HISTORIAN(S):  None    Jalen Vaughn is a 44 y.o. male with past medical history of cirrhosis, hypertension presenting to the emergency department for evaluation of swelling of his testicles.  Patient left the hospital AGAINST MEDICAL ADVICE earlier today for anasarca.  Reportedly left [facility] at the shelter and had to go collect his possessions.  He came back because he was feeling short of breath says that he was unable to perform any of his ADLs.  Patient says that he has been noncompliant with his medication for the last several weeks.  He has had progressive swelling to his legs and scrotum, ongoing shortness of breath denies any active chest pain.  Denies any fevers, chills, bodyaches  \    PAST MEDICAL HISTORY   has a past medical history of Cirrhosis of liver (HCC), Hypertension, Liver disease, and Seizure (HCC).    SURGICAL HISTORY   has a past surgical history that includes appendectomy; cholecystectomy; and upper gi endoscopy,diagnosis (N/A, 10/27/2024).    FAMILY HISTORY  Family History   Problem Relation Age  of Onset    Heart Disease Mother     Kidney Disease Mother     Heart Disease Father     Kidney Disease Father        SOCIAL HISTORY  Social History     Tobacco Use    Smoking status: Former     Types: Cigarettes    Smokeless tobacco: Former     Types: Chew   Vaping Use    Vaping status: Never Used   Substance and Sexual Activity    Alcohol use: Yes    Drug use: Not Currently    Sexual activity: Not on file       CURRENT MEDICATIONS  Home Medications       Reviewed by Lo Moulton (Pharmacy Tech) on 12/23/24 at 2255  Med List Status: Complete     Medication Last Dose Status   amLODIPine (NORVASC) 5 MG Tab Unknown Active   carvedilol (COREG) 3.125 MG Tab Unknown Active   carvedilol (COREG) 3.125 MG Tab Unknown Active   chlordiazePOXIDE (LIBRIUM) 25 MG Cap Unknown Active   escitalopram (LEXAPRO) 20 MG tablet Unknown Active   ferrous sulfate 325 (65 Fe) MG tablet Unknown Active   folic acid (FOLVITE) 1 MG Tab Unknown Active   folic acid (FOLVITE) 1 MG Tab Unknown Active   furosemide (LASIX) 80 MG Tab Unknown Active   furosemide (LASIX) 80 MG Tab Unknown Active   gabapentin (NEURONTIN) 300 MG Cap Unknown Active   gabapentin (NEURONTIN) 300 MG Cap Unknown Active   hydrOXYzine HCl (ATARAX) 25 MG Tab Unknown Active   lactulose 10 g/15mL Solution Unknown Active   lactulose 20 GM/30ML Solution Unknown Active   ofloxacin (FLOXIN) 400 MG tablet Unknown Active   omeprazole (PRILOSEC) 20 MG delayed-release capsule Unknown Active   pantoprazole (PROTONIX) 40 MG Tablet Delayed Response Unknown Active   potassium chloride (KLOR-CON) 20 MEQ Pack Unknown Active   riFAXIMin (XIFAXAN) 550 MG Tab tablet Unknown Active   riFAXIMin (XIFAXAN) 550 MG Tab tablet Unknown Active   spironolactone (ALDACTONE) 100 MG Tab Unknown Active   spironolactone (ALDACTONE) 100 MG Tab Unknown Active                  Audit from Redirected Encounters    **Home medications have not yet been reviewed for this encounter**         ALLERGIES  Allergies  "  Allergen Reactions    Latex Itching    Morphine Itching       PHYSICAL EXAM  VITAL SIGNS: BP (!) 145/72   Pulse 70   Temp 36.8 °C (98.2 °F) (Temporal)   Resp 19   Ht 1.93 m (6' 4\")   Wt (!) 144 kg (317 lb 14.5 oz)   SpO2 93%   BMI 38.70 kg/m²    General: non-toxic, no acute distress.  Obese habitus  Neuro: oriented x 3, moving all extremities.   HEENT:   - Head: Normocephalic, atraumatic  - Eyes: PERRL  - Ears/Nose: normal external nose and ears  - Mouth: moist mucosal membranes  Neck: Body habitus inhibiting evaluation of jugular venous distention  Resp: Bilateral rhonchi, mild expiratory wheeze  CV: Regular rate and rhythm  Abd: Soft, non-tender, non-distended  : Marked edema of the scrotum, foreskin of the penis.  I am able to easily reduce the glans, no evidence of paraphimosis or strangulation of the glans.  Extremities: 2+ pitting symmetric lower extremity edema  Psych: lucid          DIAGNOSTIC STUDIES / PROCEDURES    EKG  My independent EKG interpretation:  Results for orders placed or performed during the hospital encounter of 24   EKG (NOW)   Result Value Ref Range    Report       [facility]    Test Date:  2024  Pt Name:    ARSENIO RODGERS               Department: ER  MRN:        [id number]                      Room:       T418  Gender:     Male                         Technician: [id number]  :        1980                   Requested By:AARON TODD  Order #:    [id number]                    Reading MD: Aaron Todd    Measurements  Intervals                                Axis  Rate:       65                           P:          14  SD:         164                          QRS:        53  QRSD:       97                           T:          55  QT:         469  QTc:        488    Interpretive Statements  Sinus rhythm  Borderline prolonged QT interval  Compared to ECG 2024 00:40:15  Possible ischemia no longer present  ST (T wave) " deviation no longer present  Electronically Signed On 12- 03:55:23 PST by Aaron Todd         LABS  Results for orders placed or performed during the hospital encounter of 12/23/24   CBC WITH DIFFERENTIAL    Collection Time: 12/23/24  9:46 PM   Result Value Ref Range    WBC 3.9 (L) 4.8 - 10.8 K/uL    RBC 3.00 (L) 4.70 - 6.10 M/uL    Hemoglobin 8.9 (L) 14.0 - 18.0 g/dL    Hematocrit 27.3 (L) 42.0 - 52.0 %    MCV 91.0 81.4 - 97.8 fL    MCH 29.7 27.0 - 33.0 pg    MCHC 32.6 32.3 - 36.5 g/dL    RDW 59.0 (H) 35.9 - 50.0 fL    Platelet Count 57 (L) 164 - 446 K/uL    Neutrophils-Polys 57.00 44.00 - 72.00 %    Lymphocytes 28.90 22.00 - 41.00 %    Monocytes 11.40 0.00 - 13.40 %    Eosinophils 2.10 0.00 - 6.90 %    Basophils 0.30 0.00 - 1.80 %    Immature Granulocytes 0.30 0.00 - 0.90 %    Nucleated RBC 0.00 0.00 - 0.20 /100 WBC    Neutrophils (Absolute) 2.21 1.82 - 7.42 K/uL    Lymphs (Absolute) 1.12 1.00 - 4.80 K/uL    Monos (Absolute) 0.44 0.00 - 0.85 K/uL    Eos (Absolute) 0.08 0.00 - 0.51 K/uL    Baso (Absolute) 0.01 0.00 - 0.12 K/uL    Immature Granulocytes (abs) 0.01 0.00 - 0.11 K/uL    NRBC (Absolute) 0.00 K/uL   COMP METABOLIC PANEL    Collection Time: 12/23/24  9:46 PM   Result Value Ref Range    Sodium 137 135 - 145 mmol/L    Potassium 3.6 3.6 - 5.5 mmol/L    Chloride 107 96 - 112 mmol/L    Co2 20 20 - 33 mmol/L    Anion Gap 10.0 7.0 - 16.0    Glucose 88 65 - 99 mg/dL    Bun 14 8 - 22 mg/dL    Creatinine 0.93 0.50 - 1.40 mg/dL    Calcium 7.1 (L) 8.5 - 10.5 mg/dL    Correct Calcium 8.3 (L) 8.5 - 10.5 mg/dL    AST(SGOT) 74 (H) 12 - 45 U/L    ALT(SGPT) 28 2 - 50 U/L    Alkaline Phosphatase 149 (H) 30 - 99 U/L    Total Bilirubin 2.3 (H) 0.1 - 1.5 mg/dL    Albumin 2.5 (L) 3.2 - 4.9 g/dL    Total Protein 5.4 (L) 6.0 - 8.2 g/dL    Globulin 2.9 1.9 - 3.5 g/dL    A-G Ratio 0.9 g/dL   TROPONIN    Collection Time: 12/23/24  9:46 PM   Result Value Ref Range    Troponin T 43 (H) 6 - 19 ng/L   proBrain Natriuretic  Peptide, NT    Collection Time: 24  9:46 PM   Result Value Ref Range    NT-proBNP 327 (H) 0 - 125 pg/mL   PHOSPHORUS    Collection Time: 24  9:46 PM   Result Value Ref Range    Phosphorus 3.0 2.5 - 4.5 mg/dL   MAGNESIUM    Collection Time: 24  9:46 PM   Result Value Ref Range    Magnesium 1.7 1.5 - 2.5 mg/dL   IMMATURE PLT FRACTION    Collection Time: 24  9:46 PM   Result Value Ref Range    Imm. Plt Fraction 3.5 0.6 - 13.1 %   ESTIMATED GFR    Collection Time: 24  9:46 PM   Result Value Ref Range    GFR (CKD-EPI) 105 >60 mL/min/1.73 m 2   EKG (NOW)    Collection Time: 24  3:55 AM   Result Value Ref Range    Report       [facility] Dept[facility]    Test Date:  2024  Pt Name:    ARSENIO RODGERS               Department: ER  MRN:        [id number]                      Room:       T418  Gender:     Male                         Technician: [id number]  :        1980                   Requested By:AARON TODD  Order #:    [id number]                    Reading MD: Aaron Todd    Measurements  Intervals                                Axis  Rate:       65                           P:          14  DC:         164                          QRS:        53  QRSD:       97                           T:          55  QT:         469  QTc:        488    Interpretive Statements  Sinus rhythm  Borderline prolonged QT interval  Compared to ECG 2024 00:40:15  Possible ischemia no longer present  ST (T wave) deviation no longer present  Electronically Signed On 2024 03:55:23 PST by Aaron Todd         RADIOLOGY  I have independently interpreted the diagnostic imaging associated with this visit and am waiting the final reading from the radiologist.   My preliminary interpretation is as follows:   - Plain film of the chest shows no obvious cardiopulmonary abnormality  Radiologist interpretation:   DX-CHEST-PORTABLE (1 VIEW)   Final Result         1.  Right  basilar atelectasis, no focal infiltrate   2.  Trace right pleural effusion              MEDICAL DECISION MAKING      ED COURSE AND PLAN    Jalen Vaughn is a 44 y.o. male presenting to the emergency department for evaluation of ongoing anasarca, swelling to the testicles.  On exam, his testicles and penis are markedly edematous, there is no clinical evidence of necrotizing soft tissue infection, cellulitis.  Patient is hypertensive on arrival to the emergency department but vital signs otherwise stable  Suspect that his marked anasarca is due to his underlying cirrhosis and medication noncompliance.  Last echocardiogram obtained 11/15/2024 showed normal LV ejection fraction, normal diastolic function.    Will plan to repeat labs, obtain chest x-ray, treat patient with a dose of IV Lasix.    ---Pertinent ED Course---:    9:13 PM I reviewed the patient's old records in Epic, medication list, allergies, past medical history and performed a physical examination.     10:30 PM workup in the emergency department reveals borderline elevation troponin, BNP consistent with priors.  CBC and chemistry is unremarkable.  I initiated IV diuresis with 80 mg of IV Lasix.  Will admit to the hospital for marked anasarca, discussed with [facility] resident for admission      Procedures:      ----------------------------------------------------------------------------------  DISCUSSIONS    I have discussed management of the patient with the following physicians and ELISSA's: [facility] resident    Discussion of management with other Hospitals in [place] or appropriate source(s):     Escalation of care considered, and ultimately not performed:    Barriers to care at this time, including but not limited to:     Decision tools and prescription drugs considered including, but not limited to:     FINAL IMPRESSION    1. Anasarca    2. Thrombocytopenia (HCC)    3. History of cirrhosis    4. Scrotal edema        Current Discharge Medication List             DISPOSITION      Admission: The patient will be admitted for further evaluation and treatment. Discussed case with consultants and relayed all necessary information.        This chart was dictated using an electronic voice recognition software. The chart has been reviewed and edited but there is still possibility for dictation errors due to limitation of software.    Aaron Todd,  12/23/2024

## 2024-12-24 NOTE — ASSESSMENT & PLAN NOTE
Several days of scrotal swelling with tenderness prior to admission.  Resolved patient denies any new sexual encounters.  Likely 2/2 anasarca. Scrotal US just showed edema. GC/CT were negative.  -Continue to monitor

## 2024-12-24 NOTE — ED NOTES
Med rec updated and complete. Allergies reviewed.    Pt is  not currently taking any of his medications.  Pt is unsure where his medications are and believes they may have been stolen.   Hasn't taken medications for > 1 month.

## 2024-12-24 NOTE — PROGRESS NOTES
"Virtual Nurse rounding complete.  Rounding Needs: Patient resting comfortably with no needs at this time.  Pt declined to have camera on for visit, states that VRN \"scared him\" when calling in. VRN apologized for startling the pt and asked if pt has any needs. Pt declines any needs at this time.   "

## 2024-12-24 NOTE — ASSESSMENT & PLAN NOTE
Significant anasarca on exam with 2+ pitting edema extending to abdomen at admission.  Patient has not been compliant with home Lasix. S/p IV Lasix 80mg in ED. BNP in 300s on admission, improved from 700s a few days prior.  Recent echo in November 2024 demonstrated a normal ejection fraction.   Patient with EZEQUIEL likely secondary to diuresis.  Still total volume up.  - Home Lasix 80 mg oral daily with scheduled 40 KDur, continue to monitor kidney function and fluid status  -Will add additional diuresis/potassium supplement as indicated and as tolerated

## 2024-12-24 NOTE — ASSESSMENT & PLAN NOTE
Patient has multiple cell lines including platelets, hemoglobin, and white blood cells which are low.  History of iron deficient anemia.  Has mild elevation of alkaline phosphatase.  CT scan with splenomegaly suggesting liver disease is driving process. Also acute viral illness. Less likely primary bone process.  - Iron studies, reticulocyte count, ferritin WNL. LDH, coags slightly elevated. Bilirubin elevated but downtrending. Alk Phos isoenzyme pending to assess for primary bone process  - Will continue to monitor, transfuse as indicated  - Heparin DVT PPx for plts >50

## 2024-12-24 NOTE — ED NOTES
Admission report given to the assigned RN in  T418   for continuity of care and management.  Provided opportunity to asks questions.  All pt belongings at bedside

## 2024-12-24 NOTE — ASSESSMENT & PLAN NOTE
Pt has history of alcohol abuse requiring multiple admissions. Per patient he drank 2-3 beers on 12/20. His diagnostic alcohol level at 1100 on 12/21 was <10.1. Uncertain if patient drank after leaving AMA. Pt has some confusion and mild tremors. Pt says he does not have history or seizures due to withdrawal. CIWA scores have been between 2 and 4 over last 24 hours.  - CIWA discontinued  - Folate and Thiamine supplementation  - Continue lactulose and rifamycin for hepatic encephalopathy

## 2024-12-24 NOTE — DISCHARGE PLANNING
Case Management Discharge Planning    Admission Date: 12/23/2024  GMLOS:    ALOS: 1    6-Clicks ADL Score: 17  6-Clicks Mobility Score: 21  PT and/or OT Eval ordered: No  Post-acute Referrals Ordered: No  Post-acute Choice Obtained: No  Has referral(s) been sent to post-acute provider:  NA      Anticipated Discharge Dispo: Discharge Disposition: Discharged to home/self care (01) with close OP follow up/ address of choice.    DME Needed: No    Action(s) Taken: Updated Provider/Nurse on Discharge Plan    Pt was discussed in IDT rounds with Dr Waters . Plan is to discharge Pt on 12/26 after completion of 3 more days of IV antibiotic.    Escalations Completed: None    Medically Clear: No    Next Steps:   CM to continue to assist Pt with discharge as needed    Barriers to Discharge:   Pending medical clearance    Is the patient up for discharge tomorrow: No

## 2024-12-24 NOTE — ED TRIAGE NOTES
Jalen Vaughn  44 y.o. male    Chief Complaint   Patient presents with    Testicle Pain     Pt states was recently here for groin swelling and was supposed to stay but left to check mail. Is coming back because his testicles are still swollen.      Pt ambulatory to triage for above complaint. Pt A&Ox4, VSS.    Vitals:    12/23/24 2012   BP: (!) 150/95   Pulse: 72   Resp: 20   Temp: 36.4 °C (97.5 °F)   SpO2: 93%       Triage process explained to patient, apologized for wait time, and returned to lobby.  Pt informed to notify staff of any change in condition.

## 2024-12-24 NOTE — PROGRESS NOTES
4 Eyes Skin Assessment Completed by GABRIELE Dela Cruz and GABRIELE Linares.    Head Redness, flaky  Ears WDL  Nose Redness, flaky  Mouth Redness, dry, flaky  Neck Redness  Breast/Chest Redness  Shoulder Blades WDL  Spine WDL  (R) Arm/Elbow/Hand Redness, Bruising, and Abrasion  (L) Arm/Elbow/Hand Redness, Bruising, and Abrasion  Abdomen Abrasion  Groin Redness  Scrotum/Coccyx/Buttocks Redness, swollen testicles  (R) Leg Redness and Edema  (L) Leg Redness and Edema, scabs   (R) Heel/Foot/Toe Redness and Swelling  (L) Heel/Foot/Toe Redness and Swelling          Devices In Places Pulse Ox and Nasal Cannula      Interventions In Place NC W/Ear Foams and Pillows    Possible Skin Injury No    Pictures Uploaded Into Epic N/A  Wound Consult Placed N/A  RN Wound Prevention Protocol Ordered No

## 2024-12-24 NOTE — H&P
FAMILY MEDICINE HISTORY AND PHYSICAL       PATIENT ID:  NAME:  Jalen Vaughn  MRN:               [id number]  YOB: 1980    Date of Admission: 12/23/2024     Attending: Savanna Quinteros M.d.     Resident: Jurgen Silva M.D. (PGY-2)    Primary Care Physician:  Mann Johnson M.D.    CC:    Chief Complaint   Patient presents with    Testicle Pain     Pt states was recently here for groin swelling and was supposed to stay but left to check mail. Is coming back because his testicles are still swollen.        HPI: Jalen Vaughn is a 44 y.o. male with alcoholic cirrhosis s/p TIPS who presented for re-admission after leaving AMA earlier in the day. He was admitted on 12/20/2024 for groin and lower extremity swelling as well as fevers. He was found to be flu A positive. He was on CIWA protocol and Librium taper for EtOH withdrawal. He had significant edema of his groin and lower extremities and received diuretics and ceftriaxone. He was found to be pancytopenic and received 1 U pRBCs during admission. There was concern for possible GI bleed given his hx of cirrhosis, but Hgb remained stable. Additional laboratory testing was planned but patient left AMA prior to completion.     At this time, Sherman is very sleepy and does not want to answer very many questions about his medical conditions at this time.  He does state that nothing has changed since when he left earlier in the day.  He denies drinking any alcohol today, or any other substance use between when he left the hospital and when he returned.  He denies any new pain, but does endorse some persistent pain in his groin.  He denies any other acute complaints at this time and would like to sleep.      ER Course:  Patient returned to ED after leaving AMA earlier in the day.  CBC notable for hemoglobin improved to 8.9 from 7.3 earlier in the day.  T. bili 2.3 from 1.6 prior.  Troponin mildly elevated to 43,  down from 728.  CXR shows  atelectasis, trace right pleural effusion.  Medicated with Lasix IV 80 mg, readmitted to medical team for further workup and management.    REVIEW OF SYSTEMS:   Ten systems reviewed and were negative except as noted in the HPI.                PAST MEDICAL HISTORY:   has a past medical history of Cirrhosis of liver (HCC), Hypertension, Liver disease, and Seizure (HCC).     PAST SURGICAL HISTORY:   has a past surgical history that includes appendectomy; cholecystectomy; and pr upper gi endoscopy,diagnosis (N/A, 10/27/2024).     FAMILY HISTORY:  family history includes Heart Disease in her father and mother; Kidney Disease in her father and mother.     SOCIAL HISTORY:   Employment: Unemployed  Living Situation: Homeless, lives at [facility] and [facility]  Smoking: Denies  Etoh: Regular EtOH use,  reports last drink was prior to admission on 12/20  Recreational Drug: Denies    DIET:   Orders Placed This Encounter   Procedures    Diet Order Diet: Regular     Standing Status:   Standing     Number of Occurrences:   1     Order Specific Question:   Diet:     Answer:   Regular [1]       ALLERGIES:  Allergies   Allergen Reactions    Latex Itching    Morphine Itching       OUTPATIENT MEDICATIONS:  Current Outpatient Medications   Medication Instructions    amLODIPine (NORVASC) 5 mg, Oral, DAILY    carvedilol (COREG) 3.125 mg, Oral, 2 TIMES DAILY WITH MEALS    carvedilol (COREG) 3.125 mg, Oral, 2 TIMES DAILY WITH MEALS    chlordiazePOXIDE (LIBRIUM) 25 mg, Oral, EVERY 6 HOURS    escitalopram (LEXAPRO) 20 mg, Oral, DAILY    ferrous sulfate 325 mg, Oral, DAILY    folic acid (FOLVITE) 1 mg, Oral, DAILY    folic acid (FOLVITE) 1 mg, Oral, DAILY    furosemide (LASIX) 80 mg, Oral, DAILY    furosemide (LASIX) 80 mg, Oral, DAILY    gabapentin (NEURONTIN) 300 mg, Oral, 3 TIMES DAILY    gabapentin (NEURONTIN) 300 mg, Oral, 3 times daily    hydrOXYzine HCl (ATARAX) 25 mg, Oral, 3 TIMES DAILY PRN    lactulose 20 GM/30ML Solution 45 mL, Oral, 4 TIMES  DAILY    lactulose 20 g, Oral, 4 TIMES DAILY    ofloxacin (FLOXIN) 400 mg, Oral, 2 TIMES DAILY    omeprazole (PRILOSEC) 20 mg, Oral, 2 TIMES DAILY    pantoprazole (PROTONIX) 40 mg, Oral, 2 TIMES DAILY    potassium chloride (KLOR-CON) 20 MEQ Pack 20 mEq, Oral, DAILY    riFAXIMin (XIFAXAN) 550 mg, Oral, 2 TIMES DAILY    riFAXIMin (XIFAXAN) 550 mg, Oral, 2 TIMES DAILY    spironolactone (ALDACTONE) 100 mg, Oral, DAILY    spironolactone (ALDACTONE) 100 mg, Oral, DAILY        PHYSICAL EXAM:  Vitals:    24 2224 24 2259 24 2300 24 0040   BP: (!) 160/87   (!) 145/72   Pulse: 64 70 71 70   Resp: (!)    Temp:    36.8 °C (98.2 °F)   TempSrc:    Temporal   SpO2: 92% 89% 93% 93%   Weight:       Height:       , Temp (24hrs), Av.8 °C (98.2 °F), Min:36.4 °C (97.5 °F), Max:37 °C (98.6 °F)  , Pulse Oximetry: 93 %, O2 (LPM): 2, O2 Delivery Device: Nasal Cannula    Physical Exam  Vitals reviewed.   Constitutional:       General: She is not in acute distress.     Appearance: Normal appearance.      Comments: Chronically ill-appearing male, lying in hospital bed.  Drowsy, but wakes appropriately for exam.  Falls back asleep quickly.  Answering questions appropriately.   HENT:      Head: Normocephalic and atraumatic.      Right Ear: External ear normal.      Left Ear: External ear normal.      Nose: Nose normal. No congestion.      Mouth/Throat:      Mouth: Mucous membranes are moist.      Pharynx: Oropharynx is clear.   Eyes:      Extraocular Movements: Extraocular movements intact.      Pupils: Pupils are equal, round, and reactive to light.   Cardiovascular:      Rate and Rhythm: Normal rate and regular rhythm.      Heart sounds: No murmur heard.  Pulmonary:      Effort: Pulmonary effort is normal. No respiratory distress.      Breath sounds: Normal breath sounds.   Abdominal:      General: Bowel sounds are normal. There is no distension.      Palpations: Abdomen is soft.      Tenderness: There is  abdominal tenderness (Minimal, left-sided, LUQ slightly worse than L LQ). There is no guarding or rebound.   Musculoskeletal:         General: Swelling (BLE pitting edema, compression stockings in place) present. No deformity. Normal range of motion.      Cervical back: Normal range of motion and neck supple.   Skin:     General: Skin is warm and dry.      Capillary Refill: Capillary refill takes less than 2 seconds.      Findings: No rash.   Neurological:      General: No focal deficit present.      Mental Status: She is alert and oriented to person, place, and time. Mental status is at baseline.      Cranial Nerves: No cranial nerve deficit.   Psychiatric:         Mood and Affect: Mood normal.         Behavior: Behavior normal.          LAB TESTS:   Results for orders placed or performed during the hospital encounter of 12/23/24   CBC WITH DIFFERENTIAL    Collection Time: 12/23/24  9:46 PM   Result Value Ref Range    WBC 3.9 (L) 4.8 - 10.8 K/uL    RBC 3.00 (L) 4.70 - 6.10 M/uL    Hemoglobin 8.9 (L) 14.0 - 18.0 g/dL    Hematocrit 27.3 (L) 42.0 - 52.0 %    MCV 91.0 81.4 - 97.8 fL    MCH 29.7 27.0 - 33.0 pg    MCHC 32.6 32.3 - 36.5 g/dL    RDW 59.0 (H) 35.9 - 50.0 fL    Platelet Count 57 (L) 164 - 446 K/uL    Neutrophils-Polys 57.00 44.00 - 72.00 %    Lymphocytes 28.90 22.00 - 41.00 %    Monocytes 11.40 0.00 - 13.40 %    Eosinophils 2.10 0.00 - 6.90 %    Basophils 0.30 0.00 - 1.80 %    Immature Granulocytes 0.30 0.00 - 0.90 %    Nucleated RBC 0.00 0.00 - 0.20 /100 WBC    Neutrophils (Absolute) 2.21 1.82 - 7.42 K/uL    Lymphs (Absolute) 1.12 1.00 - 4.80 K/uL    Monos (Absolute) 0.44 0.00 - 0.85 K/uL    Eos (Absolute) 0.08 0.00 - 0.51 K/uL    Baso (Absolute) 0.01 0.00 - 0.12 K/uL    Immature Granulocytes (abs) 0.01 0.00 - 0.11 K/uL    NRBC (Absolute) 0.00 K/uL   COMP METABOLIC PANEL    Collection Time: 12/23/24  9:46 PM   Result Value Ref Range    Sodium 137 135 - 145 mmol/L    Potassium 3.6 3.6 - 5.5 mmol/L     Chloride 107 96 - 112 mmol/L    Co2 20 20 - 33 mmol/L    Anion Gap 10.0 7.0 - 16.0    Glucose 88 65 - 99 mg/dL    Bun 14 8 - 22 mg/dL    Creatinine 0.93 0.50 - 1.40 mg/dL    Calcium 7.1 (L) 8.5 - 10.5 mg/dL    Correct Calcium 8.3 (L) 8.5 - 10.5 mg/dL    AST(SGOT) 74 (H) 12 - 45 U/L    ALT(SGPT) 28 2 - 50 U/L    Alkaline Phosphatase 149 (H) 30 - 99 U/L    Total Bilirubin 2.3 (H) 0.1 - 1.5 mg/dL    Albumin 2.5 (L) 3.2 - 4.9 g/dL    Total Protein 5.4 (L) 6.0 - 8.2 g/dL    Globulin 2.9 1.9 - 3.5 g/dL    A-G Ratio 0.9 g/dL   TROPONIN    Collection Time: 24  9:46 PM   Result Value Ref Range    Troponin T 43 (H) 6 - 19 ng/L   proBrain Natriuretic Peptide, NT    Collection Time: 24  9:46 PM   Result Value Ref Range    NT-proBNP 327 (H) 0 - 125 pg/mL   PHOSPHORUS    Collection Time: 24  9:46 PM   Result Value Ref Range    Phosphorus 3.0 2.5 - 4.5 mg/dL   MAGNESIUM    Collection Time: 24  9:46 PM   Result Value Ref Range    Magnesium 1.7 1.5 - 2.5 mg/dL   IMMATURE PLT FRACTION    Collection Time: 24  9:46 PM   Result Value Ref Range    Imm. Plt Fraction 3.5 0.6 - 13.1 %   ESTIMATED GFR    Collection Time: 24  9:46 PM   Result Value Ref Range    GFR (CKD-EPI) 105 >60 mL/min/1.73 m 2   EKG (NOW)    Collection Time: 24  3:55 AM   Result Value Ref Range    Report       [facility] Emergency Dept.    Test Date:  2024  Pt Name:    ARSENIO RODGERS               Department: ER  MRN:        [id number]                      Room:       T418  Gender:     Male                         Technician: [id number]  :        1980                   Requested By:SHAHID TODD  Order #:    [id number]                    Ariadna MD: Shahid Todd    Measurements  Intervals                                Axis  Rate:       65                           P:          14  MD:         164                          QRS:        53  QRSD:       97                           T:          55  QT:          469  QTc:        488    Interpretive Statements  Sinus rhythm  Borderline prolonged QT interval  Compared to ECG 12/21/2024 00:40:15  Possible ischemia no longer present  ST (T wave) deviation no longer present  Electronically Signed On 12- 03:55:23 PST by Aaron Todd          CULTURES:   Results       ** No results found for the last 168 hours. **            IMAGES:  DX-CHEST-PORTABLE (1 VIEW)   Final Result         1.  Right basilar atelectasis, no focal infiltrate   2.  Trace right pleural effusion           CONSULTS:   None    ASSESSMENT/PLAN:   44 y.o. male with history of alcoholic cirrhosis status post TIPS admitted 12/20/2024 for electrolyte abnormalities with positive influenza.  Found to have anasarca, pancytopenia, returned for further workup and management.  Will resume workup of causes for pancytopenia as well as causes of hypocalcemia.    I anticipate this patient will require at least two midnights for appropriate medical management, necessitating inpatient admission because he is requiring laboratory testing for etiology of anemia, possible transfusions, electrolyte repletion    * Anasarca- (present on admission)  Assessment & Plan  Significant anasarca on exam with 2+ pitting edema extending to abdomen at admission.  Patient has not been compliant with home Lasix. S/p IV Lasix 80mg in ED. BNP in 300s on admission, improved from 700s a few days prior.  Recent echo in November 2024 demonstrated a normal ejection fraction.   Patient with EZEQUIEL likely secondary to diuresis.  Still total volume up.  - Home Lasix 80 mg oral daily, continue to monitor kidney function and fluid status  - Concern for SBP earlier in prior admission, initially started on ceftriaxone but discontinued when patient left AMA.  Will reconsider restarting ceftriaxone during this admission    Alcohol withdrawal syndrome with complication, cirrhosis (HCC)- (present on admission)  Assessment & Plan  Pt has history of alcohol  abuse requiring multiple admissions. Per patient he drank 2-3 beers on 12/20. His diagnostic alcohol level at 1100 on 12/21 was <10.1. Pt has some confusion and mild tremors. Pt says he does not have history or seizures due to withdrawal. CIWA scores have been between 3 and 10 over last 24 hours.  - CIWA protocol initiated, 5 days from last drink 12/26  - Librium taper initiated 12/22, DC'd when left AMA, will hold off on reinitiation pending CIWA scoring  - Folate and Thiamine supplementation  - Continue lactulose and rifamycin for hepatic encephalopathy    Scrotal swelling- (present on admission)  Assessment & Plan  Several days of scrotal swelling with tenderness.  Patient denies any new sexual encounters.  Likely 2/2 anasarca. Scrotal US just showed edema. GC/CT were negative.  Plan:  -Mild improvement is seen, if patient does not continue to improve with diuresis will consider further workup    Hypoparathyroidism (HCC)- (present on admission)  Assessment & Plan  Patient has a chronic history of low calcium but was worse on admission.  Likely multifactorial secondary to Lasix use and recent vomiting. PTH is low.  Plan:  -Will monitor with daily labs  -Vitamin D level, repeat PTH  -Initiate calcium and vitamin D treatment once vitamin D level returns if low  -Anticipate initiation of 2 g elemental calcium and 0.25 mcg of calcitriol daily    Pancytopenia (HCC)- (present on admission)  Assessment & Plan  Patient has multiple cell lines including platelets, hemoglobin, and white blood cells which are low.  History of iron deficient anemia.  Has mild elevation of alkaline phosphatase.  CT scan with splenomegaly suggesting liver disease is driving process. Also acute viral illness. Less likely primary bone process.  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  Treated with pyrethrins on 12/22.  - Repeat treatment on 12/29          Core Measures:  Fluids: PO hydration   Lines: Peripheral IV for intravenous access  Abx: None, may resume ceftriaxone if indicated  Diet: regular diet  PPX: pharmacologic prophylaxis contraindicated due to thrombocytopenia    Disposition  Patient is not medically cleared for discharge.   Anticipate discharge to home with close outpatient follow-up.  I have placed the appropriate orders for post-discharge needs.    I have personally seen and examined the patient at bedside. I discussed the plan of care with patient, bedside RN, and  Savanna Quinteros M.d..    CODE Status: Full Code      Jurgen Silva M.D.   PGY-2  [facility]

## 2024-12-24 NOTE — PROGRESS NOTES
Received report from ED nurse   Assessment complete.  A&O x 4. Patient calls appropriately.  Patient ambulates with standby assist. Bed alarm on.   Patient has 4/10 pain.   Denies N&V. Tolerating reg diet.  Scrotal area red and swollen  + void, Last BM 12/23 PTA per patient  Patient denies SOB.  Patient sitting up in bed.  Review plan with of care with patient. Call light and personal belongings with in reach. Hourly rounding in place. All needs met at this time.

## 2024-12-24 NOTE — ASSESSMENT & PLAN NOTE
Patient has a chronic history of low calcium but was worse on admission.  Likely multifactorial secondary to Lasix use and recent vomiting, low vitamin D.  Initial PTH was low, repeat within normal limits.  Patient with very low vitamin D and ionized calcium.  -Mg repletion as indicated   -Initiate 1 g 3 times daily calcium carbonate and vitamin D 50,000 units weekly for 8 weeks treatment 12/25

## 2024-12-24 NOTE — ASSESSMENT & PLAN NOTE
Patient diagnosed with influenza on 12/21.  Was greater than 72 hours after onset of symptoms so was not started on Tamiflu.  Patient does have cough and coarse lung sounds.  This could be the source of his fever on 12/22 inflating with SBP diagnoses the patient did have abdominal pain at that time so was treated conservatively for SBP.  - Currently on room air  - Continue to monitor

## 2024-12-24 NOTE — PROGRESS NOTES
Assumed care of patient at 0645. Bedside report received. Assessment complete. Patient on droplet precautions for Flu and contact precautions for lice and bed bugs.     AA&Ox4. Denies CP/SOB.Room air. Congested cough, inspiratory wheezes. Patient encouraged to cough and deep breathe.     Patient has slurred speech at this time. Patient states he drank 2 beers yesterday after he left AMA from hospital. CIWA protocol in place patient currently scoring 6    Reporting mild pain in scrotum. Declined intervention at this time. Educated patient regarding pharmacologic and non pharmacologic modalities for pain management.    Skin per flow sheets.   Tolerating diet. Denies N/V.  + void. + BM. Last BM 12/24  Pt ambulates x1 FWW unsteady  Fall risk per Mendoza Charan score. Fall prevention measures in place per flowsheets.  Teds on bilateral lowers. Pharmacologic VTE prophylaxis in use.    Plan of care discussed, all questions answered.  Educated regarding importance of oral care. Oral care kit at bedside. Call light is within reach, treaded slipper socks on, bed in lowest/ locked position, hourly rounding in place, all needs met at this time.

## 2024-12-25 LAB
ANION GAP SERPL CALC-SCNC: 11 MMOL/L (ref 7–16)
BUN SERPL-MCNC: 10 MG/DL (ref 8–22)
CA-I SERPL-SCNC: 1.1 MMOL/L (ref 1.1–1.3)
CALCIUM SERPL-MCNC: 6.8 MG/DL (ref 8.5–10.5)
CHLORIDE SERPL-SCNC: 109 MMOL/L (ref 96–112)
CO2 SERPL-SCNC: 20 MMOL/L (ref 20–33)
CREAT SERPL-MCNC: 0.68 MG/DL (ref 0.5–1.4)
EKG IMPRESSION: NORMAL
ERYTHROCYTE [DISTWIDTH] IN BLOOD BY AUTOMATED COUNT: 59.9 FL (ref 35.9–50)
FERRITIN SERPL-MCNC: 149 NG/ML (ref 22–322)
GFR SERPLBLD CREATININE-BSD FMLA CKD-EPI: 118 ML/MIN/1.73 M 2
GLUCOSE SERPL-MCNC: 115 MG/DL (ref 65–99)
HCT VFR BLD AUTO: 25.3 % (ref 42–52)
HGB BLD-MCNC: 8.2 G/DL (ref 14–18)
IRON SATN MFR SERPL: 61 % (ref 15–55)
IRON SERPL-MCNC: 137 UG/DL (ref 50–180)
MAGNESIUM SERPL-MCNC: 1.3 MG/DL (ref 1.5–2.5)
MCH RBC QN AUTO: 29.9 PG (ref 27–33)
MCHC RBC AUTO-ENTMCNC: 32.4 G/DL (ref 32.3–36.5)
MCV RBC AUTO: 92.3 FL (ref 81.4–97.8)
PLATELET # BLD AUTO: 61 K/UL (ref 164–446)
PLATELETS.RETICULATED NFR BLD AUTO: 4.3 % (ref 0.6–13.1)
POTASSIUM SERPL-SCNC: 3.7 MMOL/L (ref 3.6–5.5)
RBC # BLD AUTO: 2.74 M/UL (ref 4.7–6.1)
SODIUM SERPL-SCNC: 140 MMOL/L (ref 135–145)
TIBC SERPL-MCNC: 225 UG/DL (ref 250–450)
UIBC SERPL-MCNC: 88 UG/DL (ref 110–370)
WBC # BLD AUTO: 4.1 K/UL (ref 4.8–10.8)

## 2024-12-25 PROCEDURE — 83540 ASSAY OF IRON: CPT

## 2024-12-25 PROCEDURE — 82330 ASSAY OF CALCIUM: CPT

## 2024-12-25 PROCEDURE — 700102 HCHG RX REV CODE 250 W/ 637 OVERRIDE(OP)

## 2024-12-25 PROCEDURE — 770001 HCHG ROOM/CARE - MED/SURG/GYN PRIV*

## 2024-12-25 PROCEDURE — 93010 ELECTROCARDIOGRAM REPORT: CPT | Performed by: INTERNAL MEDICINE

## 2024-12-25 PROCEDURE — A9270 NON-COVERED ITEM OR SERVICE: HCPCS

## 2024-12-25 PROCEDURE — 94760 N-INVAS EAR/PLS OXIMETRY 1: CPT

## 2024-12-25 PROCEDURE — 94669 MECHANICAL CHEST WALL OSCILL: CPT

## 2024-12-25 PROCEDURE — 85055 RETICULATED PLATELET ASSAY: CPT

## 2024-12-25 PROCEDURE — 700105 HCHG RX REV CODE 258

## 2024-12-25 PROCEDURE — 82728 ASSAY OF FERRITIN: CPT

## 2024-12-25 PROCEDURE — 700111 HCHG RX REV CODE 636 W/ 250 OVERRIDE (IP)

## 2024-12-25 PROCEDURE — 83550 IRON BINDING TEST: CPT

## 2024-12-25 PROCEDURE — 99232 SBSQ HOSP IP/OBS MODERATE 35: CPT | Mod: GC | Performed by: FAMILY MEDICINE

## 2024-12-25 PROCEDURE — 83735 ASSAY OF MAGNESIUM: CPT

## 2024-12-25 PROCEDURE — 93005 ELECTROCARDIOGRAM TRACING: CPT | Mod: TC

## 2024-12-25 PROCEDURE — 85027 COMPLETE CBC AUTOMATED: CPT

## 2024-12-25 PROCEDURE — 80048 BASIC METABOLIC PNL TOTAL CA: CPT

## 2024-12-25 RX ORDER — CALCIUM CARBONATE 500 MG/1
1000 TABLET, CHEWABLE ORAL
Status: DISCONTINUED | OUTPATIENT
Start: 2024-12-25 | End: 2024-12-26 | Stop reason: HOSPADM

## 2024-12-25 RX ORDER — CALCIUM GLUCONATE 20 MG/ML
2 INJECTION, SOLUTION INTRAVENOUS ONCE
Status: CANCELLED | OUTPATIENT
Start: 2024-12-25 | End: 2024-12-26

## 2024-12-25 RX ORDER — HEPARIN SODIUM 5000 [USP'U]/ML
5000 INJECTION, SOLUTION INTRAVENOUS; SUBCUTANEOUS EVERY 8 HOURS
Status: DISCONTINUED | OUTPATIENT
Start: 2024-12-25 | End: 2024-12-26 | Stop reason: HOSPADM

## 2024-12-25 RX ORDER — POTASSIUM CHLORIDE 1500 MG/1
40 TABLET, EXTENDED RELEASE ORAL ONCE
Status: COMPLETED | OUTPATIENT
Start: 2024-12-25 | End: 2024-12-25

## 2024-12-25 RX ORDER — TRAZODONE HYDROCHLORIDE 50 MG/1
50 TABLET, FILM COATED ORAL
Status: COMPLETED | OUTPATIENT
Start: 2024-12-25 | End: 2024-12-25

## 2024-12-25 RX ORDER — ERGOCALCIFEROL 1.25 MG/1
50000 CAPSULE, LIQUID FILLED ORAL
Status: DISCONTINUED | OUTPATIENT
Start: 2024-12-25 | End: 2024-12-26 | Stop reason: HOSPADM

## 2024-12-25 RX ORDER — FUROSEMIDE 20 MG/1
80 TABLET ORAL ONCE
Status: COMPLETED | OUTPATIENT
Start: 2024-12-25 | End: 2024-12-25

## 2024-12-25 RX ORDER — MAGNESIUM SULFATE HEPTAHYDRATE 40 MG/ML
4 INJECTION, SOLUTION INTRAVENOUS ONCE
Status: COMPLETED | OUTPATIENT
Start: 2024-12-25 | End: 2024-12-25

## 2024-12-25 RX ORDER — CALCIUM GLUCONATE 20 MG/ML
2 INJECTION, SOLUTION INTRAVENOUS ONCE
Status: CANCELLED | OUTPATIENT
Start: 2024-12-25 | End: 2024-12-25

## 2024-12-25 RX ADMIN — MAGNESIUM SULFATE HEPTAHYDRATE 4 G: 4 INJECTION, SOLUTION INTRAVENOUS at 11:12

## 2024-12-25 RX ADMIN — POTASSIUM CHLORIDE 20 MEQ: 750 TABLET, EXTENDED RELEASE ORAL at 08:20

## 2024-12-25 RX ADMIN — SPIRONOLACTONE 100 MG: 100 TABLET ORAL at 05:48

## 2024-12-25 RX ADMIN — HEPARIN SODIUM 5000 UNITS: 5000 INJECTION, SOLUTION INTRAVENOUS; SUBCUTANEOUS at 08:20

## 2024-12-25 RX ADMIN — Medication 1000 UNITS: at 11:12

## 2024-12-25 RX ADMIN — GABAPENTIN 300 MG: 300 CAPSULE ORAL at 17:52

## 2024-12-25 RX ADMIN — ANTACID TABLETS 1000 MG: 500 TABLET, CHEWABLE ORAL at 17:50

## 2024-12-25 RX ADMIN — ANTACID TABLETS 1000 MG: 500 TABLET, CHEWABLE ORAL at 11:13

## 2024-12-25 RX ADMIN — CARVEDILOL 3.12 MG: 6.25 TABLET, FILM COATED ORAL at 08:19

## 2024-12-25 RX ADMIN — ERGOCALCIFEROL 50000 UNITS: 1.25 CAPSULE ORAL at 08:19

## 2024-12-25 RX ADMIN — OXYCODONE 5 MG: 5 TABLET ORAL at 05:47

## 2024-12-25 RX ADMIN — OMEPRAZOLE 20 MG: 20 CAPSULE, DELAYED RELEASE ORAL at 05:50

## 2024-12-25 RX ADMIN — TRAZODONE HYDROCHLORIDE 50 MG: 50 TABLET ORAL at 20:51

## 2024-12-25 RX ADMIN — POTASSIUM CHLORIDE 40 MEQ: 1500 TABLET, EXTENDED RELEASE ORAL at 05:48

## 2024-12-25 RX ADMIN — CARVEDILOL 3.12 MG: 6.25 TABLET, FILM COATED ORAL at 17:53

## 2024-12-25 RX ADMIN — RIFAXIMIN 550 MG: 550 TABLET ORAL at 17:52

## 2024-12-25 RX ADMIN — FUROSEMIDE 80 MG: 20 TABLET ORAL at 17:52

## 2024-12-25 RX ADMIN — AMLODIPINE BESYLATE 5 MG: 5 TABLET ORAL at 05:48

## 2024-12-25 RX ADMIN — POTASSIUM CHLORIDE 40 MEQ: 1500 TABLET, EXTENDED RELEASE ORAL at 17:53

## 2024-12-25 RX ADMIN — RIFAXIMIN 550 MG: 550 TABLET ORAL at 05:47

## 2024-12-25 RX ADMIN — FUROSEMIDE 80 MG: 20 TABLET ORAL at 05:49

## 2024-12-25 RX ADMIN — LACTULOSE 45 ML: 10 SOLUTION ORAL at 15:55

## 2024-12-25 RX ADMIN — FOLIC ACID 1 MG: 1 TABLET ORAL at 05:47

## 2024-12-25 RX ADMIN — LACTULOSE 45 ML: 10 SOLUTION ORAL at 17:50

## 2024-12-25 RX ADMIN — LACTULOSE 45 ML: 10 SOLUTION ORAL at 08:19

## 2024-12-25 RX ADMIN — CEFTRIAXONE SODIUM 2000 MG: 10 INJECTION, POWDER, FOR SOLUTION INTRAVENOUS at 08:20

## 2024-12-25 RX ADMIN — LACTULOSE 45 ML: 10 SOLUTION ORAL at 20:51

## 2024-12-25 RX ADMIN — HEPARIN SODIUM 5000 UNITS: 5000 INJECTION, SOLUTION INTRAVENOUS; SUBCUTANEOUS at 15:56

## 2024-12-25 RX ADMIN — ANTACID TABLETS 1000 MG: 500 TABLET, CHEWABLE ORAL at 08:20

## 2024-12-25 RX ADMIN — GABAPENTIN 300 MG: 300 CAPSULE ORAL at 11:12

## 2024-12-25 RX ADMIN — OMEPRAZOLE 20 MG: 20 CAPSULE, DELAYED RELEASE ORAL at 17:52

## 2024-12-25 RX ADMIN — ESCITALOPRAM OXALATE 20 MG: 10 TABLET ORAL at 05:50

## 2024-12-25 RX ADMIN — HEPARIN SODIUM 5000 UNITS: 5000 INJECTION, SOLUTION INTRAVENOUS; SUBCUTANEOUS at 21:26

## 2024-12-25 RX ADMIN — GABAPENTIN 300 MG: 300 CAPSULE ORAL at 05:49

## 2024-12-25 RX ADMIN — POTASSIUM CHLORIDE 20 MEQ: 750 TABLET, EXTENDED RELEASE ORAL at 17:55

## 2024-12-25 RX ADMIN — FERROUS SULFATE TAB 325 MG (65 MG ELEMENTAL FE) 325 MG: 325 (65 FE) TAB at 05:47

## 2024-12-25 ASSESSMENT — LIFESTYLE VARIABLES
TOTAL SCORE: 4
ANXIETY: NO ANXIETY (AT EASE)
NAUSEA AND VOMITING: NO NAUSEA AND NO VOMITING
NAUSEA AND VOMITING: NO NAUSEA AND NO VOMITING
PAROXYSMAL SWEATS: BARELY PERCEPTIBLE SWEATING. PALMS MOIST
AGITATION: SOMEWHAT MORE THAN NORMAL ACTIVITY
AUDITORY DISTURBANCES: NOT PRESENT
PAROXYSMAL SWEATS: BARELY PERCEPTIBLE SWEATING. PALMS MOIST
AGITATION: NORMAL ACTIVITY
HEADACHE, FULLNESS IN HEAD: NOT PRESENT
TREMOR: TREMOR NOT VISIBLE BUT CAN BE FELT, FINGERTIP TO FINGERTIP
ORIENTATION AND CLOUDING OF SENSORIUM: ORIENTED AND CAN DO SERIAL ADDITIONS
NAUSEA AND VOMITING: NO NAUSEA AND NO VOMITING
VISUAL DISTURBANCES: NOT PRESENT
ANXIETY: NO ANXIETY (AT EASE)
ORIENTATION AND CLOUDING OF SENSORIUM: ORIENTED AND CAN DO SERIAL ADDITIONS
HEADACHE, FULLNESS IN HEAD: NOT PRESENT
ORIENTATION AND CLOUDING OF SENSORIUM: ORIENTED AND CAN DO SERIAL ADDITIONS
HEADACHE, FULLNESS IN HEAD: NOT PRESENT
TOTAL SCORE: 2
ANXIETY: MILDLY ANXIOUS
NAUSEA AND VOMITING: NO NAUSEA AND NO VOMITING
AUDITORY DISTURBANCES: NOT PRESENT
TOTAL SCORE: 3
AGITATION: NORMAL ACTIVITY
TREMOR: TREMOR NOT VISIBLE BUT CAN BE FELT, FINGERTIP TO FINGERTIP
HEADACHE, FULLNESS IN HEAD: NOT PRESENT
AGITATION: NORMAL ACTIVITY
ORIENTATION AND CLOUDING OF SENSORIUM: ORIENTED AND CAN DO SERIAL ADDITIONS
ORIENTATION AND CLOUDING OF SENSORIUM: ORIENTED AND CAN DO SERIAL ADDITIONS
VISUAL DISTURBANCES: NOT PRESENT
TREMOR: TREMOR NOT VISIBLE BUT CAN BE FELT, FINGERTIP TO FINGERTIP
NAUSEA AND VOMITING: NO NAUSEA AND NO VOMITING
TOTAL SCORE: 2
AGITATION: NORMAL ACTIVITY
TREMOR: TREMOR NOT VISIBLE BUT CAN BE FELT, FINGERTIP TO FINGERTIP
VISUAL DISTURBANCES: NOT PRESENT
TREMOR: TREMOR NOT VISIBLE BUT CAN BE FELT, FINGERTIP TO FINGERTIP
AUDITORY DISTURBANCES: NOT PRESENT
ANXIETY: NO ANXIETY (AT EASE)
PAROXYSMAL SWEATS: BARELY PERCEPTIBLE SWEATING. PALMS MOIST
VISUAL DISTURBANCES: NOT PRESENT
HEADACHE, FULLNESS IN HEAD: NOT PRESENT
HEADACHE, FULLNESS IN HEAD: NOT PRESENT
AUDITORY DISTURBANCES: NOT PRESENT
TREMOR: TREMOR NOT VISIBLE BUT CAN BE FELT, FINGERTIP TO FINGERTIP
AUDITORY DISTURBANCES: NOT PRESENT
TOTAL SCORE: 2
PAROXYSMAL SWEATS: BARELY PERCEPTIBLE SWEATING. PALMS MOIST
AUDITORY DISTURBANCES: NOT PRESENT
PAROXYSMAL SWEATS: BARELY PERCEPTIBLE SWEATING. PALMS MOIST
AGITATION: SOMEWHAT MORE THAN NORMAL ACTIVITY
ANXIETY: NO ANXIETY (AT EASE)
ANXIETY: NO ANXIETY (AT EASE)
TOTAL SCORE: 2
NAUSEA AND VOMITING: NO NAUSEA AND NO VOMITING
PAROXYSMAL SWEATS: BARELY PERCEPTIBLE SWEATING. PALMS MOIST
ORIENTATION AND CLOUDING OF SENSORIUM: ORIENTED AND CAN DO SERIAL ADDITIONS

## 2024-12-25 ASSESSMENT — PAIN DESCRIPTION - PAIN TYPE
TYPE: ACUTE PAIN
TYPE: ACUTE PAIN

## 2024-12-25 NOTE — CARE PLAN
Problem: Pain - Standard  Goal: Alleviation of pain or a reduction in pain to the patient’s comfort goal  Outcome: Progressing     Problem: Knowledge Deficit - Standard  Goal: Patient and family/care givers will demonstrate understanding of plan of care, disease process/condition, diagnostic tests and medications  Outcome: Progressing   The patient is Watcher - Medium risk of patient condition declining or worsening    Shift Goals  Clinical Goals: CIWA, safety, contact precautions, pul hygeine, pain mngmt  Patient Goals: rest, sleep  Family Goals: none    Progress made toward(s) clinical / shift goals:  CIWA q4 at this time, patient's last drink was  12/23 when he was not admitted to the hospital. Contact precautions in place. Pain controlled    Patient is not progressing towards the following goals:

## 2024-12-25 NOTE — PROGRESS NOTES
FAMILY MEDICINE PROGRESS NOTE          PATIENT ID:  NAME:  Jalen Vaughn  MRN:               [id number]  YOB: 1980    Date of Admission: 12/23/2024     Attending: Gracie Ceballos M.d.     Resident: Marisol Blair M.D.    Primary Care Physician:  Mann Johnson M.D.      SUBJECTIVE:   No acute events overnight, patient reports feeling well. Swelling improved, denies pain and SOB. No fever, chills, nausea, vomiting. Is stooling well. No muscle cramping.     OBJECTIVE:  Temp:  [37 °C (98.6 °F)-37.6 °C (99.7 °F)] 37 °C (98.6 °F)  Pulse:  [66-72] 66  Resp:  [16-18] 16  BP: (118-148)/(62-84) 148/84  SpO2:  [90 %-95 %] 90 %    No intake or output data in the 24 hours ending 12/24/24 0633    PHYSICAL EXAM:  Physical Exam  Constitutional:       General: She is not in acute distress.     Appearance: She is not ill-appearing or diaphoretic.   HENT:      Head: Normocephalic and atraumatic.      Comments: Negative [place]     Right Ear: External ear normal.      Left Ear: External ear normal.      Nose: Nose normal. No congestion.      Mouth/Throat:      Mouth: Mucous membranes are moist.      Pharynx: No oropharyngeal exudate.   Eyes:      General:         Right eye: No discharge.         Left eye: No discharge.      Extraocular Movements: Extraocular movements intact.   Cardiovascular:      Rate and Rhythm: Normal rate and regular rhythm.      Pulses: Normal pulses.      Heart sounds: No murmur heard.  Pulmonary:      Effort: Pulmonary effort is normal.      Breath sounds: Rhonchi present.   Abdominal:      General: There is distension.      Palpations: Abdomen is soft.      Tenderness: There is no abdominal tenderness. There is no guarding or rebound.   Genitourinary:     Penis: Normal.       Comments: Scrotum mildly swollen, improved from prior   Musculoskeletal:         General: Normal range of motion.      Cervical back: Normal range of motion. No rigidity.      Right lower leg: Edema present.       Left lower leg: Edema present.      Comments: Improved from prior   Skin:     General: Skin is warm.      Coloration: Skin is not jaundiced.   Neurological:      Mental Status: She is alert. Mental status is at baseline.         LABS:  Reviewed    IMAGING:  Reviewed      CULTURES:   Reviewed      MEDS:  Reviewed    ASSESSMENT/PLAN:  44 y.o. male admitted for SBP, pancytopenia, anasarca  * Anasarca- (present on admission)  Assessment & Plan  Significant anasarca on exam with 2+ pitting edema extending to abdomen at admission.  Patient has not been compliant with home Lasix. S/p IV Lasix 80mg in ED. BNP in 300s on admission, improved from 700s a few days prior.  Recent echo in November 2024 demonstrated a normal ejection fraction.   Patient with EZEQUIEL likely secondary to diuresis.  Still total volume up.  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 Head was shaved without evidence of lice. Treated with pyrethrins on 12/22.    Spontaneous bacterial peritonitis (HCC)- (present on admission)  Assessment & Plan  Patient started on treatment 12/22 for SBP.  At that time was having fevers and abdominal pain on setting of encephalopathy and with a chronic anemia concern for slow GI bleed.  Patient hemodynamically stable.  - Continue ceftriaxone EOT 12/26    Scrotal swelling- (present on admission)  Assessment & Plan  Several days of scrotal swelling with tenderness.  Patient denies any new sexual encounters.  Likely 2/2 anasarca. Scrotal US just showed edema. GC/CT were negative.  -Mild improvement still seen, if patient does not continue to improve with diuresis will consider further workup    Influenza- (present on admission)  Assessment & Plan  Patient diagnosed with influenza on  12/21.  Was greater than 72 hours after onset of symptoms so was not started on Tamiflu.  Patient does have cough and coarse lung sounds.  This could be the source of his fever on 12/22 inflating with SBP diagnoses the patient did have abdominal pain at that time so was treated conservatively for SBP.  - Titrate oxygen to goal  - Continue to monitor    Hypocalcemia- (present on admission)  Assessment & Plan  Patient has a chronic history of low calcium but was worse on admission.  Likely multifactorial secondary to Lasix use and recent vomiting, low vitamin D.  Initial PTH was low, repeat within normal limits.  Patient with very low vitamin D and ionized calcium.  Plan:  -12/25 ionized calcium, will replete parenterally as indicated along with other electrolytes  -EKG  -Will monitor with daily labs (Ca and mag) and replete as indicated  -Initiate 1 g 3 times daily calcium carbonate and vitamin D 50,000 units weekly for 8 weeks treatment 12/25    Pancytopenia (HCC)- (present on admission)  Assessment & Plan  Patient has multiple cell lines including platelets, hemoglobin, and white blood cells which are low.  History of iron deficient anemia.  Has mild elevation of alkaline phosphatase.  CT scan with splenomegaly suggesting liver disease is driving process. Also acute viral illness. Less likely primary bone process.  - Iron studies, reticulocyte count, ferritin WNL. LDH, coags slightly elevated. Bilirubin elevated but downtrending. Alk Phos isoenzyme pending to assess for primary bone process  - Will continue to monitor, transfuse as indicated  - Heparin DVT PPx for plts >50    Alcohol withdrawal syndrome with complication, cirrhosis (HCC)- (present on admission)  Assessment & Plan  Pt has history of alcohol abuse requiring multiple admissions. Per patient he drank 2-3 beers on 12/20. His diagnostic alcohol level at 1100 on 12/21 was <10.1. Uncertain if patient drank after leaving AMA. Pt has some confusion and mild  tremors. Pt says he does not have history or seizures due to withdrawal. CIWA scores have been between 3 and 10 over last 24 hours.  - Repeat EtOH 12/24   - CIWA protocol initiated, 5 days from last drink 12/26  - Librium taper initiated 12/22, DC'd when left AMA, will hold off on reinitiation pending CIWA scoring  - Folate and Thiamine supplementation  - Continue lactulose and rifamycin for hepatic encephalopathy         Core Measures:  Fluids: PO   Lines: PIV  Abx: Ceftriaxone EOT 12/26 for SBP  Diet: regular  PPX: SCDs/TEDs, heparin given platelets >50k    CODE Status: Full Code      Disposition  Patient is not medically cleared for discharge.   Anticipate discharge to to home with close outpatient follow-up.  I have placed the appropriate orders for post-discharge needs.      Marisol Blair M.D.

## 2024-12-25 NOTE — PROGRESS NOTES
Received report from previous nurse   Assessment complete.  A&O x 4. Patient calls appropriately.  Patient ambulates with standby assist. Bed alarm on.   Patient has 0/10 pain.   Denies N&V. Tolerating reg diet.  Scrotal area red and swollen  + void, Last BM 12/24  Patient denies SOB.  Patient sitting up in bed.  Review plan with of care with patient. Call light and personal belongings with in reach. Hourly rounding in place. All needs met at this time

## 2024-12-25 NOTE — PROGRESS NOTES
Virtual Nurse rounding complete.  Rounding Needs: patient declining any needs at this time, doesn't want a visit from the VRN today.

## 2024-12-25 NOTE — CARE PLAN
The patient is Stable - Low risk of patient condition declining or worsening    Shift Goals  Clinical Goals: CIWA, safety, pain control  Patient Goals: rest  Family Goals: jerry    Progress made toward(s) clinical / shift goals:  Pain managed with prescribed medications. CIWA protocol followed. Bed alarm on for safety.      Problem: Pain - Standard  Goal: Alleviation of pain or a reduction in pain to the patient’s comfort goal  Outcome: Progressing     Problem: Knowledge Deficit - Standard  Goal: Patient and family/care givers will demonstrate understanding of plan of care, disease process/condition, diagnostic tests and medications  Outcome: Progressing     Problem: Fall Risk  Goal: Patient will remain free from falls  Outcome: Progressing

## 2024-12-26 ENCOUNTER — PHARMACY VISIT (OUTPATIENT)
Dept: PHARMACY | Facility: MEDICAL CENTER | Age: 44
End: 2024-12-26
Payer: COMMERCIAL

## 2024-12-26 VITALS
RESPIRATION RATE: 16 BRPM | BODY MASS INDEX: 38.36 KG/M2 | HEART RATE: 61 BPM | HEIGHT: 76 IN | WEIGHT: 315 LBS | DIASTOLIC BLOOD PRESSURE: 70 MMHG | OXYGEN SATURATION: 90 % | TEMPERATURE: 98.8 F | SYSTOLIC BLOOD PRESSURE: 137 MMHG

## 2024-12-26 PROBLEM — G31.84 MCI (MILD COGNITIVE IMPAIRMENT): Status: ACTIVE | Noted: 2024-12-26

## 2024-12-26 LAB
ALBUMIN SERPL BCP-MCNC: 2.4 G/DL (ref 3.2–4.9)
ALBUMIN/GLOB SERPL: 1 G/DL
ALP SERPL-CCNC: 137 U/L (ref 30–99)
ALT SERPL-CCNC: 25 U/L (ref 2–50)
ANION GAP SERPL CALC-SCNC: 9 MMOL/L (ref 7–16)
AST SERPL-CCNC: 57 U/L (ref 12–45)
BILIRUB SERPL-MCNC: 1.8 MG/DL (ref 0.1–1.5)
BUN SERPL-MCNC: 8 MG/DL (ref 8–22)
CA-I SERPL-SCNC: 1.1 MMOL/L (ref 1.1–1.3)
CALCIUM ALBUM COR SERPL-MCNC: 8.5 MG/DL (ref 8.5–10.5)
CALCIUM SERPL-MCNC: 7.2 MG/DL (ref 8.5–10.5)
CHLORIDE SERPL-SCNC: 107 MMOL/L (ref 96–112)
CO2 SERPL-SCNC: 23 MMOL/L (ref 20–33)
CREAT SERPL-MCNC: 0.65 MG/DL (ref 0.5–1.4)
ERYTHROCYTE [DISTWIDTH] IN BLOOD BY AUTOMATED COUNT: 58.5 FL (ref 35.9–50)
GFR SERPLBLD CREATININE-BSD FMLA CKD-EPI: 120 ML/MIN/1.73 M 2
GLOBULIN SER CALC-MCNC: 2.3 G/DL (ref 1.9–3.5)
GLUCOSE SERPL-MCNC: 116 MG/DL (ref 65–99)
HCT VFR BLD AUTO: 25.4 % (ref 42–52)
HGB BLD-MCNC: 8.4 G/DL (ref 14–18)
MAGNESIUM SERPL-MCNC: 1.6 MG/DL (ref 1.5–2.5)
MCH RBC QN AUTO: 30.4 PG (ref 27–33)
MCHC RBC AUTO-ENTMCNC: 33.1 G/DL (ref 32.3–36.5)
MCV RBC AUTO: 92 FL (ref 81.4–97.8)
PLATELET # BLD AUTO: 70 K/UL (ref 164–446)
PLATELETS.RETICULATED NFR BLD AUTO: 4.5 % (ref 0.6–13.1)
PMV BLD AUTO: 11.6 FL (ref 9–12.9)
POTASSIUM SERPL-SCNC: 4 MMOL/L (ref 3.6–5.5)
PROT SERPL-MCNC: 4.7 G/DL (ref 6–8.2)
RBC # BLD AUTO: 2.76 M/UL (ref 4.7–6.1)
SODIUM SERPL-SCNC: 139 MMOL/L (ref 135–145)
WBC # BLD AUTO: 4.6 K/UL (ref 4.8–10.8)

## 2024-12-26 PROCEDURE — RXMED WILLOW AMBULATORY MEDICATION CHARGE

## 2024-12-26 PROCEDURE — 700111 HCHG RX REV CODE 636 W/ 250 OVERRIDE (IP)

## 2024-12-26 PROCEDURE — 99231 SBSQ HOSP IP/OBS SF/LOW 25: CPT | Mod: GC | Performed by: STUDENT IN AN ORGANIZED HEALTH CARE EDUCATION/TRAINING PROGRAM

## 2024-12-26 PROCEDURE — 84080 ASSAY ALKALINE PHOSPHATASES: CPT

## 2024-12-26 PROCEDURE — 83735 ASSAY OF MAGNESIUM: CPT

## 2024-12-26 PROCEDURE — 80053 COMPREHEN METABOLIC PANEL: CPT

## 2024-12-26 PROCEDURE — 700102 HCHG RX REV CODE 250 W/ 637 OVERRIDE(OP)

## 2024-12-26 PROCEDURE — 85027 COMPLETE CBC AUTOMATED: CPT

## 2024-12-26 PROCEDURE — 82330 ASSAY OF CALCIUM: CPT

## 2024-12-26 PROCEDURE — 700105 HCHG RX REV CODE 258

## 2024-12-26 PROCEDURE — 84075 ASSAY ALKALINE PHOSPHATASE: CPT

## 2024-12-26 PROCEDURE — A9270 NON-COVERED ITEM OR SERVICE: HCPCS

## 2024-12-26 PROCEDURE — 99284 EMERGENCY DEPT VISIT MOD MDM: CPT

## 2024-12-26 PROCEDURE — 85055 RETICULATED PLATELET ASSAY: CPT

## 2024-12-26 RX ORDER — GABAPENTIN 300 MG/1
300 CAPSULE ORAL 3 TIMES DAILY
Qty: 270 CAPSULE | Refills: 0 | Status: SHIPPED | OUTPATIENT
Start: 2024-12-26 | End: 2025-03-26

## 2024-12-26 RX ORDER — LANOLIN ALCOHOL/MO/W.PET/CERES
800 CREAM (GRAM) TOPICAL ONCE
Status: COMPLETED | OUTPATIENT
Start: 2024-12-26 | End: 2024-12-26

## 2024-12-26 RX ORDER — POTASSIUM CHLORIDE 1500 MG/1
20 TABLET, EXTENDED RELEASE ORAL 2 TIMES DAILY
Qty: 180 TABLET | Refills: 0 | Status: SHIPPED | OUTPATIENT
Start: 2024-12-26 | End: 2025-01-09

## 2024-12-26 RX ORDER — ESCITALOPRAM OXALATE 20 MG/1
20 TABLET ORAL DAILY
Qty: 90 TABLET | Refills: 0 | Status: SHIPPED | OUTPATIENT
Start: 2024-12-26 | End: 2025-03-26

## 2024-12-26 RX ORDER — FOLIC ACID 1 MG/1
1 TABLET ORAL DAILY
Qty: 90 TABLET | Refills: 0 | Status: SHIPPED | OUTPATIENT
Start: 2024-12-26 | End: 2025-03-26

## 2024-12-26 RX ORDER — CARVEDILOL 3.12 MG/1
3.12 TABLET ORAL 2 TIMES DAILY WITH MEALS
Qty: 180 TABLET | Refills: 0 | Status: SHIPPED | OUTPATIENT
Start: 2024-12-26

## 2024-12-26 RX ORDER — AMLODIPINE BESYLATE 5 MG/1
5 TABLET ORAL DAILY
Qty: 90 TABLET | Refills: 0 | Status: SHIPPED | OUTPATIENT
Start: 2024-12-27

## 2024-12-26 RX ORDER — FUROSEMIDE 80 MG/1
80 TABLET ORAL DAILY
Qty: 90 TABLET | Refills: 0 | Status: SHIPPED | OUTPATIENT
Start: 2024-12-27 | End: 2024-12-28

## 2024-12-26 RX ORDER — MAGNESIUM SULFATE HEPTAHYDRATE 40 MG/ML
4 INJECTION, SOLUTION INTRAVENOUS ONCE
Status: DISCONTINUED | OUTPATIENT
Start: 2024-12-26 | End: 2024-12-26

## 2024-12-26 RX ORDER — ERGOCALCIFEROL 1.25 MG/1
50000 CAPSULE, LIQUID FILLED ORAL
Qty: 7 CAPSULE | Refills: 0 | Status: SHIPPED | OUTPATIENT
Start: 2025-01-01 | End: 2025-02-13

## 2024-12-26 RX ORDER — LACTULOSE 10 G/15ML
45 SOLUTION ORAL 4 TIMES DAILY
Qty: 540 EACH | Refills: 0 | Status: SHIPPED | OUTPATIENT
Start: 2024-12-26 | End: 2025-01-09

## 2024-12-26 RX ORDER — SPIRONOLACTONE 100 MG/1
100 TABLET, FILM COATED ORAL DAILY
Qty: 90 TABLET | Refills: 0 | Status: SHIPPED | OUTPATIENT
Start: 2024-12-27 | End: 2025-01-09

## 2024-12-26 RX ADMIN — GABAPENTIN 300 MG: 300 CAPSULE ORAL at 05:04

## 2024-12-26 RX ADMIN — RIFAXIMIN 550 MG: 550 TABLET ORAL at 05:05

## 2024-12-26 RX ADMIN — ESCITALOPRAM OXALATE 20 MG: 10 TABLET ORAL at 05:05

## 2024-12-26 RX ADMIN — FERROUS SULFATE TAB 325 MG (65 MG ELEMENTAL FE) 325 MG: 325 (65 FE) TAB at 05:05

## 2024-12-26 RX ADMIN — Medication 800 MG: at 09:03

## 2024-12-26 RX ADMIN — CARVEDILOL 3.12 MG: 6.25 TABLET, FILM COATED ORAL at 08:52

## 2024-12-26 RX ADMIN — SPIRONOLACTONE 100 MG: 100 TABLET ORAL at 05:05

## 2024-12-26 RX ADMIN — ANTACID TABLETS 1000 MG: 500 TABLET, CHEWABLE ORAL at 08:51

## 2024-12-26 RX ADMIN — FOLIC ACID 1 MG: 1 TABLET ORAL at 05:05

## 2024-12-26 RX ADMIN — POTASSIUM CHLORIDE 20 MEQ: 750 TABLET, EXTENDED RELEASE ORAL at 08:51

## 2024-12-26 RX ADMIN — POTASSIUM CHLORIDE 40 MEQ: 1500 TABLET, EXTENDED RELEASE ORAL at 05:05

## 2024-12-26 RX ADMIN — LACTULOSE 45 ML: 10 SOLUTION ORAL at 08:53

## 2024-12-26 RX ADMIN — CEFTRIAXONE SODIUM 2000 MG: 10 INJECTION, POWDER, FOR SOLUTION INTRAVENOUS at 09:05

## 2024-12-26 RX ADMIN — AMLODIPINE BESYLATE 5 MG: 5 TABLET ORAL at 05:05

## 2024-12-26 RX ADMIN — FUROSEMIDE 80 MG: 20 TABLET ORAL at 05:05

## 2024-12-26 RX ADMIN — HEPARIN SODIUM 5000 UNITS: 5000 INJECTION, SOLUTION INTRAVENOUS; SUBCUTANEOUS at 05:05

## 2024-12-26 RX ADMIN — OMEPRAZOLE 20 MG: 20 CAPSULE, DELAYED RELEASE ORAL at 05:04

## 2024-12-26 RX ADMIN — OXYCODONE 5 MG: 5 TABLET ORAL at 05:05

## 2024-12-26 ASSESSMENT — LIFESTYLE VARIABLES
ORIENTATION AND CLOUDING OF SENSORIUM: ORIENTED AND CAN DO SERIAL ADDITIONS
ANXIETY: NO ANXIETY (AT EASE)
VISUAL DISTURBANCES: NOT PRESENT
PAROXYSMAL SWEATS: BARELY PERCEPTIBLE SWEATING. PALMS MOIST
ORIENTATION AND CLOUDING OF SENSORIUM: ORIENTED AND CAN DO SERIAL ADDITIONS
PAROXYSMAL SWEATS: BARELY PERCEPTIBLE SWEATING. PALMS MOIST
AGITATION: NORMAL ACTIVITY
TOTAL SCORE: VERY MILD ITCHING, PINS AND NEEDLES SENSATION, BURNING OR NUMBNESS
TOTAL SCORE: 2
NAUSEA AND VOMITING: NO NAUSEA AND NO VOMITING
HEADACHE, FULLNESS IN HEAD: NOT PRESENT
AUDITORY DISTURBANCES: NOT PRESENT
AGITATION: NORMAL ACTIVITY
HEADACHE, FULLNESS IN HEAD: NOT PRESENT
AUDITORY DISTURBANCES: NOT PRESENT
NAUSEA AND VOMITING: NO NAUSEA AND NO VOMITING
TREMOR: TREMOR NOT VISIBLE BUT CAN BE FELT, FINGERTIP TO FINGERTIP
ANXIETY: NO ANXIETY (AT EASE)
TOTAL SCORE: 3
TREMOR: TREMOR NOT VISIBLE BUT CAN BE FELT, FINGERTIP TO FINGERTIP
VISUAL DISTURBANCES: NOT PRESENT

## 2024-12-26 ASSESSMENT — PAIN DESCRIPTION - PAIN TYPE
TYPE: ACUTE PAIN
TYPE: ACUTE PAIN

## 2024-12-26 ASSESSMENT — FIBROSIS 4 INDEX: FIB4 SCORE: 6.84

## 2024-12-26 NOTE — PROGRESS NOTES
Received report from previous nurse   Assessment complete.  A&O x 4. Patient calls appropriately.  Patient ambulates with standby assist. Bed alarm on.   Patient has 0/10 pain.   Denies N&V. Tolerating reg diet.  Scrotal area red and swollen  + void, Last BM 12/25  Patient denies SOB.  Patient sitting up in bed.  Review plan with of care with patient. Call light and personal belongings with in reach. Hourly rounding in place. All needs met at this manny

## 2024-12-26 NOTE — PROGRESS NOTES
Assumed care of patient at 0745. Bedside report received. Assessment complete.  AA&Ox4. Denies CP/SOB.  Reporting 4/10 pain located mainly in the groin. Declined intervention at this time.  Educated patient regarding pharmacologic and non pharmacologic modalities for pain management.  Skin: Scrotal area is red, swollen, but intact. Inner thighs also red, intact. Scattered bruising and excoriations on the body throughout.   Tolerating regular diet. Denies N/V.  + void. Last BM 12/25/24  Pt ambulates with standby assist, bed alarm on.  All needs met at this time. Call light within reach. Pt calls appropriately. Bed low and locked, non skid socks in place. Hourly rounding in place.

## 2024-12-26 NOTE — DISCHARGE SUMMARY
"[facility] Discharge Summary    Attending: Mann Johnson MD   Senior Resident: Dr. Marisol Blair    CHIEF COMPLAINT ON ADMISSION  Chief Complaint   Patient presents with    Testicle Pain     Pt states was recently here for groin swelling and was supposed to stay but left to check mail. Is coming back because his testicles are still swollen.        Reason for Admission  Other,     Admission Date  12/23/2024    CODE STATUS  Full Code    HPI FROM H&P    \"Jalen Vaughn is a 44 y.o. male with alcoholic cirrhosis s/p TIPS who presented for re-admission after leaving AMA earlier in the day. He was admitted on 12/20/2024 for groin and lower extremity swelling as well as fevers. He was found to be flu A positive. He was on CIWA protocol and Librium taper for EtOH withdrawal. He had significant edema of his groin and lower extremities and received diuretics and ceftriaxone. He was found to be pancytopenic and received 1 U pRBCs during admission. There was concern for possible GI bleed given his hx of cirrhosis, but Hgb remained stable. Additional laboratory testing was planned but patient left AMA prior to completion.      At this time, Sherman is very sleepy and does not want to answer very many questions about his medical conditions at this time.  He does state that nothing has changed since when he left earlier in the day.  He denies drinking any alcohol today, or any other substance use between when he left the hospital and when he returned.  He denies any new pain, but does endorse some persistent pain in his groin.  He denies any other acute complaints at this time and would like to sleep.        ER Course:  Patient returned to ED after leaving AMA earlier in the day.  CBC notable for hemoglobin improved to 8.9 from 7.3 earlier in the day.  T. bili 2.3 from 1.6 prior.  Troponin mildly elevated to 43,  down from 728.  CXR shows atelectasis, trace right pleural effusion.  Medicated with Lasix IV 80 mg, " "readmitted to medical team for further workup and management.\"    HOSPITAL COURSE    Patient was readmitted to complete course of ceftriaxone for SBP.  Patient received 5 days in a row ceftriaxone IV.  Abdominal pain and fever were resolved at time of discharge.    Patient was diuresed for anasarca secondary to end-stage liver disease.  He was still volume up at time of discharge but significantly improved with improved laboratory values. Ultrasound of his scrotum was only with scrotal wall edema. He had significant to discharge and had decrease swelling in his scrotum. Discussed importance of diuretic adherence and close outpatient follow-up to ensure resolution.    Patient was placed on CIWA and scores were 2-4 on day of discharge.  Of note he did not have detectable alcohol in his blood prior to admission on 12/21 or readmission on 12/24 so low risk of withdrawal at time of discharge.    Patient underwent workup for pancytopenia during admission.  Was deemed likely to be secondary to his advanced liver disease and splenic congestion.  He had improvement prior to discharge without evidence of overt bleed.  He was recommended to follow-up with gastroenterology given his advanced liver disease.    Patient was found to have lice on initial admission 12/22 and was treated with pyrethrins and his head was shaved.    Patient was found to be hypocalcemic during admission. Initial PTH was low but repeat was normal.  Vitamin D level was extremely low as well as magnesium.  Vitamin D weekly repletion was started and magnesium was repleted on admission.  Patient was discharged on vitamin D and calcium supplement.    Discussed with case management patient being referred to complex discharge service for possible placement. Complex discharge agreed to meet to discuss patient but did not desire to stay for complex discharge evaluation. He was discharged to [facility] with taxi voucher.     Therefore, he is discharged in fair " and stable condition to home with close outpatient follow-up.    The patient met 2-midnight criteria for an inpatient stay at the time of discharge.    Discharge Date  12/26/24     Physical Exam on Day of Discharge  See exam from progress note on date of discharge     FOLLOW UP ITEMS POST DISCHARGE  -Diuresis to euvolemia and ensure resolution of scrotal swelling  -Follow-up with gastroenterology for advanced liver disease  -Follow-up with primary care for pancytopenia, vitamin D, calcium    DISCHARGE DIAGNOSES  Principal Problem:    Anasarca (POA: Yes)  Active Problems:    Alcohol withdrawal syndrome with complication, cirrhosis (HCC) (POA: Yes)    Pancytopenia (HCC) (POA: Yes)    Hypocalcemia (POA: Yes)    Influenza (POA: Yes)    Scrotal swelling (POA: Yes)    Spontaneous bacterial peritonitis (HCC) (POA: Yes)    Pediculus humanus (POA: Yes)    MCI (mild cognitive impairment) (POA: Unknown)  Resolved Problems:    * No resolved hospital problems. *      FOLLOW UP  12/31 hospital follow-up [facility]     MEDICATIONS ON DISCHARGE     Medication List        START taking these medications        Instructions   Calcium Carbonate Antacid 1000 MG Chew   Chew 1 Tablet 3 times a day with meals for 90 days.  Dose: 1,000 mg     potassium Chloride ER 20 MEQ Tbcr tablet  Commonly known as: K-Tab  Replaces: potassium chloride 20 MEQ Pack   Take 1 Tablet by mouth 2 times a day for 90 days.  Dose: 20 mEq     vitamin D2 (Ergocalciferol) 1.25 MG (29717 UT) Caps capsule  Start taking on: January 1, 2025  Commonly known as: Drisdol   Take 1 Capsule by mouth every 7 days for 7 doses.  Dose: 50,000 Units            CHANGE how you take these medications        Instructions   folic acid 1 MG Tabs  What changed: Another medication with the same name was removed. Continue taking this medication, and follow the directions you see here.  Commonly known as: Folvite   Take 1 Tablet by mouth every day for 90 days.  Dose: 1 mg     furosemide 80 MG  Tabs  Start taking on: December 27, 2024  What changed: Another medication with the same name was removed. Continue taking this medication, and follow the directions you see here.  Commonly known as: Lasix   Take 1 Tablet by mouth every day.  Dose: 80 mg     gabapentin 300 MG Caps  What changed:   when to take this  Another medication with the same name was removed. Continue taking this medication, and follow the directions you see here.  Commonly known as: Neurontin   Take 1 Capsule by mouth 3 times a day for 90 days.  Dose: 300 mg     lactulose 20 GM/30ML Soln  What changed: Another medication with the same name was removed. Continue taking this medication, and follow the directions you see here.   Take 45 mL by mouth 4 times a day for 90 days.  Dose: 45 mL     riFAXIMin 550 MG Tabs tablet  What changed: Another medication with the same name was removed. Continue taking this medication, and follow the directions you see here.  Commonly known as: Xifaxan   Take 1 Tablet by mouth 2 times a day for 90 days.  Dose: 550 mg            CONTINUE taking these medications        Instructions   amLODIPine 5 MG Tabs  Start taking on: December 27, 2024  Commonly known as: Norvasc   Take 1 Tablet by mouth every day.  Dose: 5 mg     * carvedilol 3.125 MG Tabs  Commonly known as: Coreg   Take 1 Tablet by mouth 2 times a day with meals for 30 days.  Dose: 3.125 mg     * carvedilol 3.125 MG Tabs  Commonly known as: Coreg   Take 1 Tablet by mouth 2 times a day with meals.  Dose: 3.125 mg     escitalopram 20 MG tablet  Commonly known as: Lexapro   Take 1 Tablet by mouth every day for 90 days.  Dose: 20 mg     hydrOXYzine HCl 25 MG Tabs  Commonly known as: Atarax   Take 1 Tablet by mouth 3 times a day as needed for Anxiety.  Dose: 25 mg     omeprazole 20 MG delayed-release capsule  Commonly known as: PriLOSEC   Take 1 Capsule by mouth 2 times a day for 90 days.  Dose: 20 mg           * This list has 2 medication(s) that are the same  as other medications prescribed for you. Read the directions carefully, and ask your doctor or other care provider to review them with you.                STOP taking these medications      chlordiazePOXIDE 25 MG Caps  Commonly known as: Librium     ferrous sulfate 325 (65 Fe) MG tablet     ofloxacin 400 MG tablet  Commonly known as: Floxin     pantoprazole 40 MG Tbec  Commonly known as: Protonix     potassium chloride 20 MEQ Pack  Commonly known as: Klor-Con  Replaced by: potassium Chloride ER 20 MEQ Tbcr tablet            ASK your doctor about these medications        Instructions   * spironolactone 100 MG Tabs  Commonly known as: Aldactone  Ask about: Which instructions should I use?   Take 1 Tablet by mouth every day.  Dose: 100 mg     * spironolactone 100 MG Tabs  Start taking on: December 27, 2024  Commonly known as: Aldactone  Ask about: Which instructions should I use?   Take 1 Tablet by mouth every day for 90 days.  Dose: 100 mg           * This list has 2 medication(s) that are the same as other medications prescribed for you. Read the directions carefully, and ask your doctor or other care provider to review them with you.                  Allergies  Allergies   Allergen Reactions    Latex Itching    Morphine Itching       DIET  Orders Placed This Encounter   Procedures    Diet Order Diet: Regular     Standing Status:   Standing     Number of Occurrences:   1     Order Specific Question:   Diet:     Answer:   Regular [1]       ACTIVITY  As tolerated.  Weight bearing as tolerated    CONSULTATIONS  None    PROCEDURES  None    LABORATORY  Lab Results   Component Value Date    SODIUM 139 12/26/2024    POTASSIUM 4.0 12/26/2024    CHLORIDE 107 12/26/2024    CO2 23 12/26/2024    GLUCOSE 116 (H) 12/26/2024    BUN 8 12/26/2024    CREATININE 0.65 12/26/2024        Lab Results   Component Value Date    WBC 4.6 (L) 12/26/2024    HEMOGLOBIN 8.4 (L) 12/26/2024    HEMATOCRIT 25.4 (L) 12/26/2024    PLATELETCT 70 (L)  12/26/2024        Total time of the discharge process exceeds 24 minutes.

## 2024-12-26 NOTE — PROGRESS NOTES
Patient transported to [facility] by [facility] staff with belongings.

## 2024-12-26 NOTE — CARE PLAN
The patient is Stable - Low risk of patient condition declining or worsening    Shift Goals  Clinical Goals: CIWA, safety, pain control, ABX   Patient Goals: rest  Family Goals: jerry    Progress made toward(s) clinical / shift goals:  Patient ambulated in room. CIWA protocol followed. Bed alarm on for patient safety. Pain managed with prn medications.       Problem: Pain - Standard  Goal: Alleviation of pain or a reduction in pain to the patient’s comfort goal  Outcome: Progressing     Problem: Knowledge Deficit - Standard  Goal: Patient and family/care givers will demonstrate understanding of plan of care, disease process/condition, diagnostic tests and medications  Outcome: Progressing     Problem: Fall Risk  Goal: Patient will remain free from falls  Outcome: Progressing

## 2024-12-26 NOTE — PROGRESS NOTES
FAMILY MEDICINE PROGRESS NOTE          PATIENT ID:  NAME:  Jalen Vaughn  MRN:               [id number]  YOB: 1980    Date of Admission: 12/23/2024     Attending: Gracie Ceballos M.d.     Resident: Marisol Blair M.D.    Primary Care Physician:  Mann Johnson M.D.      SUBJECTIVE:   No acute events overnight, patient reports his swelling is significantly improved denies any further testicular swelling.  Leg swelling is down.  No chest pain, nausea, vomiting, shortness of breath, fever, chills.  He is stooling multiple times daily.    OBJECTIVE:  Temp:  [36.9 °C (98.4 °F)-37.1 °C (98.8 °F)] 37.1 °C (98.8 °F)  Pulse:  [61-68] 61  Resp:  [16-18] 16  BP: (125-166)/(69-95) 137/70  SpO2:  [90 %-92 %] 90 %    No intake or output data in the 24 hours ending 12/24/24 0633    PHYSICAL EXAM:  Physical Exam  Constitutional:       General: She is not in acute distress.     Appearance: She is not ill-appearing or diaphoretic.   HENT:      Head: Normocephalic and atraumatic.      Comments: Negative [place]     Right Ear: External ear normal.      Left Ear: External ear normal.      Nose: Nose normal. No congestion.      Mouth/Throat:      Mouth: Mucous membranes are moist.      Pharynx: No oropharyngeal exudate.   Eyes:      General:         Right eye: No discharge.         Left eye: No discharge.      Extraocular Movements: Extraocular movements intact.   Cardiovascular:      Rate and Rhythm: Normal rate and regular rhythm.      Pulses: Normal pulses.      Heart sounds: No murmur heard.  Pulmonary:      Effort: Pulmonary effort is normal.      Breath sounds: Rhonchi present.   Abdominal:      General: There is distension.      Palpations: Abdomen is soft.      Tenderness: There is no abdominal tenderness. There is no guarding or rebound.   Genitourinary:     Penis: Normal.       Comments: Scrotum mildly swollen, improved from prior   Musculoskeletal:         General: Normal range of motion.      Cervical  back: Normal range of motion. No rigidity.      Right lower leg: Edema present.      Left lower leg: Edema present.      Comments: Improved from prior   Skin:     General: Skin is warm.      Coloration: Skin is not jaundiced.   Neurological:      Mental Status: She is alert. Mental status is at baseline.         LABS:  Reviewed    IMAGING:  Reviewed      CULTURES:   Reviewed      MEDS:  Reviewed    ASSESSMENT/PLAN:  44 y.o. male admitted for SBP, pancytopenia, anasarca  * Anasarca- (present on admission)  Assessment & Plan  Significant anasarca on exam with 2+ pitting edema extending to abdomen at admission.  Patient has not been compliant with home Lasix. S/p IV Lasix 80mg in ED. BNP in 300s on admission, improved from 700s a few days prior.  Recent echo in November 2024 demonstrated a normal ejection fraction.   Patient with EZEQUIEL likely secondary to diuresis.  Still total volume up.  - Home Lasix 80 mg oral daily with scheduled 40 KDur, continue to monitor kidney function and fluid status  -Will add additional diuresis/potassium supplement as indicated and as tolerated    MCI (mild cognitive impairment)  Assessment & Plan  With concern for MCI.  Patient is A&O x 4 and is not delirious (ICU CAM negative).  There is concern for patient inability to care for self.  Patient is open to discussion of group home given housing instability and frequent readmissions for inability to adhere to medication regimen.  - SLP consult for cognitive evaluation  - Consult to complex discharge team.  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 Head was shaved without evidence of lice. Treated with pyrethrins on 12/22.    Spontaneous bacterial peritonitis (HCC)- (present on admission)  Assessment & Plan  Patient started on treatment 12/22 for SBP.  At that time was having fevers and abdominal pain on setting of encephalopathy and with a chronic  anemia concern for slow GI bleed.  Patient hemodynamically stable.  - Completed ceftriaxone EOT 12/26    Scrotal swelling- (present on admission)  Assessment & Plan  Several days of scrotal swelling with tenderness prior to admission.  Resolved patient denies any new sexual encounters.  Likely 2/2 anasarca. Scrotal US just showed edema. GC/CT were negative.  -Continue to monitor    Influenza- (present on admission)  Assessment & Plan  Patient diagnosed with influenza on 12/21.  Was greater than 72 hours after onset of symptoms so was not started on Tamiflu.  Patient does have cough and coarse lung sounds.  This could be the source of his fever on 12/22 inflating with SBP diagnoses the patient did have abdominal pain at that time so was treated conservatively for SBP.  - Currently on room air  - Continue to monitor    Hypocalcemia- (present on admission)  Assessment & Plan  Patient has a chronic history of low calcium but was worse on admission.  Likely multifactorial secondary to Lasix use and recent vomiting, low vitamin D.  Initial PTH was low, repeat within normal limits.  Patient with very low vitamin D and ionized calcium.  -Mg repletion as indicated   -Initiate 1 g 3 times daily calcium carbonate and vitamin D 50,000 units weekly for 8 weeks treatment 12/25      Pancytopenia (HCC)- (present on admission)  Assessment & Plan  Patient has multiple cell lines including platelets, hemoglobin, and white blood cells which are low.  History of iron deficient anemia.  Has mild elevation of alkaline phosphatase.  CT scan with splenomegaly suggesting liver disease is driving process. Also acute viral illness. Less likely primary bone process.  - Iron studies, reticulocyte count, ferritin WNL. LDH, coags slightly elevated. Bilirubin elevated but downtrending. Alk Phos isoenzyme pending to assess for primary bone process  - Will continue to monitor, transfuse as indicated  - Heparin DVT PPx for plts >50    Alcohol  withdrawal syndrome with complication, cirrhosis (HCC)- (present on admission)  Assessment & Plan  Pt has history of alcohol abuse requiring multiple admissions. Per patient he drank 2-3 beers on 12/20. His diagnostic alcohol level at 1100 on 12/21 was <10.1. Uncertain if patient drank after leaving AMA. Pt has some confusion and mild tremors. Pt says he does not have history or seizures due to withdrawal. CIWA scores have been between 2 and 4 over last 24 hours.  - CIWA discontinued  - Folate and Thiamine supplementation  - Continue lactulose and rifamycin for hepatic encephalopathy         Core Measures:  Fluids: PO   Lines: PIV  Abx: Ceftriaxone EOT 12/26 for SBP  Diet: regular  PPX: SCDs/TEDs, heparin given platelets >50k    CODE Status: Full Code      Disposition  Patient is medically cleared for discharge.  Pending SLP evaluation and complex discharge team evaluation for placement.  I have placed the appropriate orders for post-discharge needs.      Marisol Blair M.D.

## 2024-12-26 NOTE — PROGRESS NOTES
Discharge orders received.  Patient arrived to the discharge lounge.  PIV removed by floor RN. Meds to beds medications verified by discharge RN, bag of medications given to patient.  Instructions given, medications reviewed and general discharge education provided to patient.  Follow up appointments discussed.  Patient verbalized understanding of dc instructions and prescriptions.  Patient signed discharge instructions.  Patient verbalized he had all belongings with him, Denied having any home medications locked in our inpatient pharmacy that  he needs back. Patient ambulated with steady gait from discharge lounge to taxi. Patient left via taxi to home in stable condition.

## 2024-12-26 NOTE — ASSESSMENT & PLAN NOTE
With concern for MCI.  Patient is A&O x 4 and is not delirious (ICU CAM negative).  There is concern for patient inability to care for self.  Patient is open to discussion of group home given housing instability and frequent readmissions for inability to adhere to medication regimen.  - SLP consult for cognitive evaluation  - Consult to complex discharge team.  Consideration for long-term housing

## 2024-12-27 ENCOUNTER — HOSPITAL ENCOUNTER (EMERGENCY)
Facility: MEDICAL CENTER | Age: 44
End: 2024-12-27
Attending: STUDENT IN AN ORGANIZED HEALTH CARE EDUCATION/TRAINING PROGRAM
Payer: COMMERCIAL

## 2024-12-27 VITALS
SYSTOLIC BLOOD PRESSURE: 142 MMHG | DIASTOLIC BLOOD PRESSURE: 78 MMHG | OXYGEN SATURATION: 96 % | RESPIRATION RATE: 16 BRPM | HEIGHT: 76 IN | WEIGHT: 315 LBS | TEMPERATURE: 97.2 F | HEART RATE: 78 BPM | BODY MASS INDEX: 38.36 KG/M2

## 2024-12-27 DIAGNOSIS — N30.00 ACUTE CYSTITIS WITHOUT HEMATURIA: ICD-10-CM

## 2024-12-27 LAB
ALP BONE SERPL-CCNC: 84 U/L (ref 0–55)
ALP ISOS SERPL HS-CCNC: 0 U/L
ALP LIVER SERPL-CCNC: 64 U/L (ref 0–94)
ALP SERPL-CCNC: 148 U/L (ref 40–120)
APPEARANCE UR: ABNORMAL
BACTERIA #/AREA URNS HPF: ABNORMAL /HPF
BILIRUB UR QL STRIP.AUTO: ABNORMAL
CASTS URNS QL MICRO: ABNORMAL /LPF (ref 0–2)
COLOR UR: ABNORMAL
EPITHELIAL CELLS 1715: ABNORMAL /HPF (ref 0–5)
GLUCOSE UR STRIP.AUTO-MCNC: NEGATIVE MG/DL
KETONES UR STRIP.AUTO-MCNC: ABNORMAL MG/DL
LEUKOCYTE ESTERASE UR QL STRIP.AUTO: ABNORMAL
MICRO URNS: ABNORMAL
NITRITE UR QL STRIP.AUTO: NEGATIVE
PH UR STRIP.AUTO: 5.5 [PH] (ref 5–8)
PROT UR QL STRIP: 100 MG/DL
RBC # URNS HPF: ABNORMAL /HPF (ref 0–2)
RBC UR QL AUTO: ABNORMAL
SP GR UR STRIP.AUTO: 1.02
UROBILINOGEN UR STRIP.AUTO-MCNC: 1 EU/DL
WBC #/AREA URNS HPF: ABNORMAL /HPF

## 2024-12-27 PROCEDURE — 83690 ASSAY OF LIPASE: CPT

## 2024-12-27 PROCEDURE — 85025 COMPLETE CBC W/AUTO DIFF WBC: CPT

## 2024-12-27 PROCEDURE — 80053 COMPREHEN METABOLIC PANEL: CPT

## 2024-12-27 PROCEDURE — 700102 HCHG RX REV CODE 250 W/ 637 OVERRIDE(OP): Mod: UD | Performed by: STUDENT IN AN ORGANIZED HEALTH CARE EDUCATION/TRAINING PROGRAM

## 2024-12-27 PROCEDURE — 99284 EMERGENCY DEPT VISIT MOD MDM: CPT

## 2024-12-27 PROCEDURE — 700101 HCHG RX REV CODE 250: Mod: UD | Performed by: STUDENT IN AN ORGANIZED HEALTH CARE EDUCATION/TRAINING PROGRAM

## 2024-12-27 PROCEDURE — 81001 URINALYSIS AUTO W/SCOPE: CPT

## 2024-12-27 PROCEDURE — 85055 RETICULATED PLATELET ASSAY: CPT

## 2024-12-27 PROCEDURE — 36415 COLL VENOUS BLD VENIPUNCTURE: CPT

## 2024-12-27 PROCEDURE — A9270 NON-COVERED ITEM OR SERVICE: HCPCS | Mod: UD | Performed by: STUDENT IN AN ORGANIZED HEALTH CARE EDUCATION/TRAINING PROGRAM

## 2024-12-27 RX ORDER — ACETAMINOPHEN 500 MG
1000 TABLET ORAL ONCE
Status: COMPLETED | OUTPATIENT
Start: 2024-12-27 | End: 2024-12-27

## 2024-12-27 RX ORDER — GRANULES FOR ORAL 3 G/1
3 POWDER ORAL ONCE
Status: COMPLETED | OUTPATIENT
Start: 2024-12-27 | End: 2024-12-27

## 2024-12-27 RX ADMIN — ACETAMINOPHEN 1000 MG: 500 TABLET, FILM COATED ORAL at 03:44

## 2024-12-27 RX ADMIN — GRANULES FOR ORAL SOLUTION 3 G: 3 POWDER ORAL at 03:44

## 2024-12-27 ASSESSMENT — FIBROSIS 4 INDEX: FIB4 SCORE: 6.84

## 2024-12-27 NOTE — ED TRIAGE NOTES
"Chief Complaint   Patient presents with    Testicle Pain     X 6 days ago, recently admitted for same CC and discharged today. Pt states pain with ambulation. 10/10 pain. States last voided at 1330.        Pt used WC to triage. Pt A&Ox4, on room air. Pt states having a meeting/appointment tomorrow for living situation.     [facility] per pt they do not have any beds. No meds given en route.     Pt to lobby . Pt educated on alerting staff in changes to condition. Pt verbalized understanding. Protocol ordered.     BP (!) 141/79   Pulse 73   Temp 36.1 °C (97 °F)   Resp 17   Ht 1.93 m (6' 4\")   Wt (!) 144 kg (317 lb 7.4 oz)   SpO2 97%   BMI 38.64 kg/m²   "
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
## 2024-12-27 NOTE — ED NOTES
Pt discharged to home. Discharge paperwork provided. Education provided by ERP. Reinforced discharge instructions.  Pt was given follow up instructions.  Pt verbalized understanding of all instructions for discharge.   [facility]

## 2024-12-27 NOTE — ED NOTES
Taken patient from triage waiting room, per WC alert/ oriented x 4.Verified patient identification.  Assumed patient care.   Placed on patient room. Given the call light and instructed to call for any assistance needed/ or concerns.   Bed on lowest position, side rails up, breaks locked. Awaiting for ERP.

## 2024-12-27 NOTE — ED NOTES
MTM arranged for patient; ETA 20 mins. Patient ambulatory to lobby with all belongings; AOX4 [id number]

## 2024-12-28 ENCOUNTER — HOSPITAL ENCOUNTER (EMERGENCY)
Facility: MEDICAL CENTER | Age: 44
End: 2024-12-28
Attending: EMERGENCY MEDICINE
Payer: COMMERCIAL

## 2024-12-28 VITALS
BODY MASS INDEX: 38.36 KG/M2 | TEMPERATURE: 96.8 F | RESPIRATION RATE: 18 BRPM | OXYGEN SATURATION: 98 % | DIASTOLIC BLOOD PRESSURE: 72 MMHG | SYSTOLIC BLOOD PRESSURE: 136 MMHG | WEIGHT: 315 LBS | HEART RATE: 70 BPM | HEIGHT: 76 IN

## 2024-12-28 DIAGNOSIS — N50.812 PAIN IN BOTH TESTICLES: ICD-10-CM

## 2024-12-28 DIAGNOSIS — R60.0 PERIPHERAL EDEMA: ICD-10-CM

## 2024-12-28 DIAGNOSIS — N50.811 PAIN IN BOTH TESTICLES: ICD-10-CM

## 2024-12-28 LAB
ALBUMIN SERPL BCP-MCNC: 3 G/DL (ref 3.2–4.9)
ALBUMIN/GLOB SERPL: 0.9 G/DL
ALP SERPL-CCNC: 171 U/L (ref 30–99)
ALT SERPL-CCNC: 27 U/L (ref 2–50)
ANION GAP SERPL CALC-SCNC: 15 MMOL/L (ref 7–16)
APPEARANCE UR: CLEAR
AST SERPL-CCNC: 65 U/L (ref 12–45)
BACTERIA #/AREA URNS HPF: ABNORMAL /HPF
BASOPHILS # BLD AUTO: 0.2 % (ref 0–1.8)
BASOPHILS # BLD: 0.01 K/UL (ref 0–0.12)
BILIRUB SERPL-MCNC: 2.8 MG/DL (ref 0.1–1.5)
BILIRUB UR QL STRIP.AUTO: NEGATIVE
BUN SERPL-MCNC: 13 MG/DL (ref 8–22)
CALCIUM ALBUM COR SERPL-MCNC: 8.9 MG/DL (ref 8.5–10.5)
CALCIUM SERPL-MCNC: 8.1 MG/DL (ref 8.5–10.5)
CASTS URNS QL MICRO: ABNORMAL /LPF (ref 0–2)
CHLORIDE SERPL-SCNC: 99 MMOL/L (ref 96–112)
CO2 SERPL-SCNC: 18 MMOL/L (ref 20–33)
COLOR UR: YELLOW
CREAT SERPL-MCNC: 0.83 MG/DL (ref 0.5–1.4)
EOSINOPHIL # BLD AUTO: 0.12 K/UL (ref 0–0.51)
EOSINOPHIL NFR BLD: 2.6 % (ref 0–6.9)
EPITHELIAL CELLS 1715: ABNORMAL /HPF (ref 0–5)
ERYTHROCYTE [DISTWIDTH] IN BLOOD BY AUTOMATED COUNT: 59.2 FL (ref 35.9–50)
GFR SERPLBLD CREATININE-BSD FMLA CKD-EPI: 111 ML/MIN/1.73 M 2
GLOBULIN SER CALC-MCNC: 3.3 G/DL (ref 1.9–3.5)
GLUCOSE SERPL-MCNC: 99 MG/DL (ref 65–99)
GLUCOSE UR STRIP.AUTO-MCNC: NEGATIVE MG/DL
HCT VFR BLD AUTO: 27.6 % (ref 42–52)
HGB BLD-MCNC: 9.2 G/DL (ref 14–18)
IMM GRANULOCYTES # BLD AUTO: 0.02 K/UL (ref 0–0.11)
IMM GRANULOCYTES NFR BLD AUTO: 0.4 % (ref 0–0.9)
KETONES UR STRIP.AUTO-MCNC: NEGATIVE MG/DL
LEUKOCYTE ESTERASE UR QL STRIP.AUTO: NEGATIVE
LIPASE SERPL-CCNC: 69 U/L (ref 11–82)
LYMPHOCYTES # BLD AUTO: 1.55 K/UL (ref 1–4.8)
LYMPHOCYTES NFR BLD: 34 % (ref 22–41)
MCH RBC QN AUTO: 30 PG (ref 27–33)
MCHC RBC AUTO-ENTMCNC: 33.3 G/DL (ref 32.3–36.5)
MCV RBC AUTO: 89.9 FL (ref 81.4–97.8)
MICRO URNS: ABNORMAL
MONOCYTES # BLD AUTO: 0.92 K/UL (ref 0–0.85)
MONOCYTES NFR BLD AUTO: 20.2 % (ref 0–13.4)
NEUTROPHILS # BLD AUTO: 1.94 K/UL (ref 1.82–7.42)
NEUTROPHILS NFR BLD: 42.6 % (ref 44–72)
NITRITE UR QL STRIP.AUTO: NEGATIVE
NRBC # BLD AUTO: 0 K/UL
NRBC BLD-RTO: 0 /100 WBC (ref 0–0.2)
PH UR STRIP.AUTO: 6.5 [PH] (ref 5–8)
PLATELET # BLD AUTO: 74 K/UL (ref 164–446)
PLATELETS.RETICULATED NFR BLD AUTO: 3.8 % (ref 0.6–13.1)
PMV BLD AUTO: 12.4 FL (ref 9–12.9)
POTASSIUM SERPL-SCNC: 4.2 MMOL/L (ref 3.6–5.5)
PROT SERPL-MCNC: 6.3 G/DL (ref 6–8.2)
PROT UR QL STRIP: NEGATIVE MG/DL
RBC # BLD AUTO: 3.07 M/UL (ref 4.7–6.1)
RBC # URNS HPF: ABNORMAL /HPF (ref 0–2)
RBC UR QL AUTO: ABNORMAL
SODIUM SERPL-SCNC: 132 MMOL/L (ref 135–145)
SP GR UR STRIP.AUTO: 1.01
UROBILINOGEN UR STRIP.AUTO-MCNC: 0.2 EU/DL
WBC # BLD AUTO: 4.6 K/UL (ref 4.8–10.8)
WBC #/AREA URNS HPF: ABNORMAL /HPF

## 2024-12-28 PROCEDURE — 81001 URINALYSIS AUTO W/SCOPE: CPT

## 2024-12-28 PROCEDURE — A9270 NON-COVERED ITEM OR SERVICE: HCPCS | Mod: UD | Performed by: EMERGENCY MEDICINE

## 2024-12-28 PROCEDURE — 700102 HCHG RX REV CODE 250 W/ 637 OVERRIDE(OP): Mod: UD | Performed by: EMERGENCY MEDICINE

## 2024-12-28 PROCEDURE — 87086 URINE CULTURE/COLONY COUNT: CPT

## 2024-12-28 RX ORDER — BUMETANIDE 2 MG/1
2 TABLET ORAL DAILY
Qty: 30 TABLET | Refills: 0 | Status: SHIPPED | OUTPATIENT
Start: 2024-12-28 | End: 2025-01-09

## 2024-12-28 RX ORDER — ACETAMINOPHEN 500 MG
1000 TABLET ORAL ONCE
Status: COMPLETED | OUTPATIENT
Start: 2024-12-28 | End: 2024-12-28

## 2024-12-28 RX ORDER — BUMETANIDE 1 MG/1
2 TABLET ORAL ONCE
Status: COMPLETED | OUTPATIENT
Start: 2024-12-28 | End: 2024-12-28

## 2024-12-28 RX ADMIN — ACETAMINOPHEN 1000 MG: 500 TABLET ORAL at 03:01

## 2024-12-28 RX ADMIN — BUMETANIDE 2 MG: 1 TABLET ORAL at 05:21

## 2024-12-28 NOTE — ED PROVIDER NOTES
ED Provider Note    CHIEF COMPLAINT  Chief Complaint   Patient presents with    Testicle Pain     Pt was just discharged today    Brought by EMS due to both testicles pain since 7 days associated with lower abdominal pain  Unit No.: 22   from: [facility]    Pain: 10/10  GCS: 15/15  Alert and Oriented x 4  Ambulatory: on wheelchair  Oxygen: None  Blood Glucose en route: None  ECG en route: None    Treatment given en route: None  Contraptions:  None    Abdominal Pain     Abdominal pain since 7 days  + diarrhea for 4 days    Denied any nausea and vomiting, fever or chills       EXTERNAL RECORDS REVIEWED  Other patient has been seen multiple times this week for testicular pain, found to have an acute UTI given yesterday and given fosfomycin and prescribed Lasix original presentation ultrasound showed no evidence of testicular damage to scrotal swelling    HPI/ROS  LIMITATION TO HISTORY   Select: : None  OUTSIDE HISTORIAN(S):  david    Jalen Vaughn is a 44 y.o. male who presents to the emergency department stating that his testicles are still hurting.  He did not  the prescribed medications.  He states Bumex works better for him he is just asking for something for pain no abdominal pain nausea vomiting shortness of breath or chest pain.    PAST MEDICAL HISTORY   has a past medical history of Cirrhosis of liver (HCC), Hypertension, Liver disease, and Seizure (HCC).    SURGICAL HISTORY   has a past surgical history that includes appendectomy; cholecystectomy; and upper gi endoscopy,diagnosis (N/A, 10/27/2024).    FAMILY HISTORY  Family History   Problem Relation Age of Onset    Heart Disease Mother     Kidney Disease Mother     Heart Disease Father     Kidney Disease Father        SOCIAL HISTORY  Social History     Tobacco Use    Smoking status: Former     Types: Cigarettes    Smokeless tobacco: Former     Types: Chew   Vaping Use    Vaping status: Never Used   Substance and Sexual Activity    Alcohol  "use: Yes     Comment: 1 beer/day    Drug use: Not Currently    Sexual activity: Not on file       CURRENT MEDICATIONS  Home Medications       Reviewed by Johnny Hall R.N. (Registered Nurse) on 12/27/24 at 2347  Med List Status: Partial     Medication Last Dose Status   amLODIPine (NORVASC) 5 MG Tab  Active   Calcium Carbonate Antacid 1000 MG Chew Tab  Active   carvedilol (COREG) 3.125 MG Tab  Active   carvedilol (COREG) 3.125 MG Tab  Active   escitalopram (LEXAPRO) 20 MG tablet  Active   folic acid (FOLVITE) 1 MG Tab  Active   furosemide (LASIX) 80 MG Tab  Active   gabapentin (NEURONTIN) 300 MG Cap  Active   hydrOXYzine HCl (ATARAX) 25 MG Tab  Active   lactulose 20 GM/30ML Solution  Active   omeprazole (PRILOSEC) 20 MG delayed-release capsule  Active   potassium Chloride ER (K-TAB) 20 MEQ Tab CR tablet  Active   riFAXIMin (XIFAXAN) 550 MG Tab tablet  Active   spironolactone (ALDACTONE) 100 MG Tab  Active   spironolactone (ALDACTONE) 100 MG Tab  Active   vitamin D2, Ergocalciferol, (DRISDOL) 1.25 MG (05321 UT) Cap capsule  Active                    ALLERGIES  Allergies   Allergen Reactions    Latex Itching    Morphine Itching       PHYSICAL EXAM  VITAL SIGNS: BP (!) 143/78   Pulse 81   Temp 36 °C (96.8 °F) (Temporal)   Resp 16   Ht 1.93 m (6' 4\")   Wt (!) 144 kg (317 lb)   SpO2 95%   BMI 38.59 kg/m²    Pulse OX: Pulse Oxygen level is within normal limits  Constitutional: Alert in no apparent distress.  HENT: Normocephalic, Atraumatic, MMM  Eyes: PERound. Conjunctiva normal, non-icteric.   Heart: Regular rate and rhythm, intact distal pulses   Lungs: Symmetrical movement, no resp distress   Abdomen/: Non-tender, non-distended, normal bowel sounds significant scrotal edema circumcised male with so much swelling the penis is almost tucked back in, no evidence of rash erythema warmth induration or fluctuance  EXT/Back 3+ pitting edema bilaterally  Skin: Warm, Dry, No erythema, No rash.   Neurologic: Alert " and oriented, Grossly non-focal.       EKG/LABS  Labs Reviewed   CBC WITH DIFFERENTIAL - Abnormal; Notable for the following components:       Result Value    WBC 4.6 (*)     RBC 3.07 (*)     Hemoglobin 9.2 (*)     Hematocrit 27.6 (*)     RDW 59.2 (*)     Platelet Count 74 (*)     Neutrophils-Polys 42.60 (*)     Monocytes 20.20 (*)     Monos (Absolute) 0.92 (*)     All other components within normal limits   COMP METABOLIC PANEL - Abnormal; Notable for the following components:    Sodium 132 (*)     Co2 18 (*)     Calcium 8.1 (*)     AST(SGOT) 65 (*)     Alkaline Phosphatase 171 (*)     Total Bilirubin 2.8 (*)     Albumin 3.0 (*)     All other components within normal limits   URINALYSIS - Abnormal; Notable for the following components:    Occult Blood Small (*)     All other components within normal limits   URINE MICROSCOPIC (W/UA) - Abnormal; Notable for the following components:    RBC 3-5 (*)     All other components within normal limits   LIPASE   IMMATURE PLT FRACTION   ESTIMATED GFR   CHLAMYDIA/GC, PCR (URINE)   URINE CULTURE(NEW)         COURSE & MEDICAL DECISION MAKING    ASSESSMENT, COURSE AND PLAN  Care Narrative:     Patient is a 44-year-old male with a history of cirrhosis and lower extremity edema without significant ascites at this time now requiring a tap who presents with continued scrotal pain.  He will be given Bumex here in the ED he was treated for UTI yesterday however there is no bacterial seen so there is possible concern for me for possible prostatitis.  GC CT will be sent to we will repeat a urinalysis which should ensure were not missing anything.  Laboratory analysis was done at triage he is always resting a comfortably there is no physical examination signs would be concerning for an acute infectious process in the scrotum and within normal ultrasound this week and no change in his symptoms likely secondary to the edema.    DISPOSITION AND DISCUSSIONS  Patient was given the Bumex here  in the ED will be prescribed some at home and return precautions for any worsening issues with Tylenol and underwear.    There are no signs of acute UTI at this time and the fosfomycin that was given yesterday appears to be working I have less concern for acute prostatitis    I have discussed management of the patient with the following physicians and ELISSA's:  none    Discussion of management with other [facility] or appropriate source(s): None     Escalation of care considered, and ultimately not performed:acute inpatient care management, however at this time, the patient is most appropriate for outpatient management    Barriers to care at this time, including but not limited to: Patient is homeless.     Decision tools and prescription drugs considered including, but not limited to:  bumex instead of lasix .    The patient will return for new or worsening symptoms and is stable at the time of discharge.    The patient is referred to a primary physician for blood pressure management, diabetic screening, and for all other preventative health concerns.    DISPOSITION:  Patient will be discharged home in stable condition.    FOLLOW UP:  Mann Johnson M.D.  [address] [facility] [id number] [phone]    Schedule an appointment as soon as possible for a visit       [facility], Emergency Dept  [address] [facility] [id number] [phone]    If symptoms worsen      OUTPATIENT MEDICATIONS:  Current Discharge Medication List        START taking these medications    Details   bumetanide (BUMEX) 2 MG tablet Take 1 Tablet by mouth every day.  Qty: 30 Tablet, Refills: 0    Associated Diagnoses: Peripheral edema               FINAL DIAGNOSIS  1. Peripheral edema    2. Pain in both testicles         Electronically signed by: Margaret Meyer M.D., 12/28/2024 2:53 AM

## 2024-12-28 NOTE — ED NOTES
"Jalen Vaughn has been discharged from the Emergency Room.      Discharge instructions, which include signs and symptoms to monitor patient for, as well as detailed information regarding Peripheral Edema provided.  Patient verbalizes understanding of follow up care and medication management. All questions and concerns addressed at this time.     Patient provided with education on when to return to the ER and verbally understands with no concerns. Patient advised on setting up MyChart and information provided about patient survey.  Patient leaves ER in no apparent distress. This RN provided education regarding returning to the ER for any new concerns or changes in patient's condition.      /72   Pulse 70   Temp 36 °C (96.8 °F) (Temporal)   Resp 18   Ht 1.93 m (6' 4\")   Wt (!) 144 kg (317 lb)   SpO2 98%   BMI 38.59 kg/m²   "

## 2024-12-28 NOTE — ED TRIAGE NOTES
Chief Complaint   Patient presents with    Testicle Pain     Pt was just discharged today    Brought by EMS due to both testicles pain since 7 days associated with lower abdominal pain  Unit No.: 22   from: [facility]    Pain: 10/10  GCS: 15/15  Alert and Oriented x 4  Ambulatory: on wheelchair  Oxygen: None  Blood Glucose en route: None  ECG en route: None    Treatment given en route: None  Contraptions:  None    Abdominal Pain     Abdominal pain since 7 days  + diarrhea for 4 days    Denied any nausea and vomiting, fever or chills       Pt came in to triage for the above complaints.     Pt is speaking in full sentences, follows commands and responds appropriately to questions.     Respirations are even and unlabored.    Pt placed in lobby. Pt educated on triage process.     Pt encouraged to inform staff for any changes in condition or if needs help while waiting to be room in.      Vitals:    12/27/24 2339   BP: (!) 143/78   Pulse: 81   Resp: 16   Temp: 36 °C (96.8 °F)   SpO2: 95%

## 2024-12-30 LAB
BACTERIA UR CULT: NORMAL
SIGNIFICANT IND 70042: NORMAL
SITE SITE: NORMAL
SOURCE SOURCE: NORMAL

## 2024-12-31 ENCOUNTER — APPOINTMENT (OUTPATIENT)
Dept: MEDICAL GROUP | Facility: CLINIC | Age: 44
End: 2024-12-31
Payer: COMMERCIAL

## 2025-01-02 ENCOUNTER — HOSPITAL ENCOUNTER (EMERGENCY)
Facility: MEDICAL CENTER | Age: 45
End: 2025-01-03
Attending: EMERGENCY MEDICINE
Payer: COMMERCIAL

## 2025-01-02 ENCOUNTER — APPOINTMENT (OUTPATIENT)
Dept: RADIOLOGY | Facility: MEDICAL CENTER | Age: 45
End: 2025-01-02
Attending: EMERGENCY MEDICINE
Payer: COMMERCIAL

## 2025-01-02 VITALS
SYSTOLIC BLOOD PRESSURE: 151 MMHG | BODY MASS INDEX: 38.36 KG/M2 | RESPIRATION RATE: 15 BRPM | HEART RATE: 87 BPM | HEIGHT: 76 IN | OXYGEN SATURATION: 93 % | DIASTOLIC BLOOD PRESSURE: 77 MMHG | WEIGHT: 315 LBS | TEMPERATURE: 98.1 F

## 2025-01-02 DIAGNOSIS — S09.90XA CLOSED HEAD INJURY, INITIAL ENCOUNTER: ICD-10-CM

## 2025-01-02 DIAGNOSIS — F10.929 ALCOHOLIC INTOXICATION WITH COMPLICATION (HCC): ICD-10-CM

## 2025-01-02 DIAGNOSIS — B85.0 LICE INFESTED HAIR: ICD-10-CM

## 2025-01-02 PROCEDURE — 99284 EMERGENCY DEPT VISIT MOD MDM: CPT

## 2025-01-02 PROCEDURE — 72125 CT NECK SPINE W/O DYE: CPT

## 2025-01-02 PROCEDURE — 70450 CT HEAD/BRAIN W/O DYE: CPT

## 2025-01-02 PROCEDURE — A9270 NON-COVERED ITEM OR SERVICE: HCPCS | Mod: UD | Performed by: EMERGENCY MEDICINE

## 2025-01-02 PROCEDURE — 700102 HCHG RX REV CODE 250 W/ 637 OVERRIDE(OP): Mod: UD | Performed by: EMERGENCY MEDICINE

## 2025-01-02 RX ORDER — PERMETHRIN 50 MG/G
CREAM TOPICAL ONCE
Status: COMPLETED | OUTPATIENT
Start: 2025-01-02 | End: 2025-01-02

## 2025-01-02 RX ORDER — PERMETHRIN 50 MG/G
CREAM TOPICAL
Qty: 60 G | Refills: 0 | Status: SHIPPED | OUTPATIENT
Start: 2025-01-02 | End: 2025-01-09

## 2025-01-02 RX ADMIN — PERMETHRIN CREAM 5% W/W: 50 CREAM TOPICAL at 20:05

## 2025-01-02 ASSESSMENT — FIBROSIS 4 INDEX: FIB4 SCORE: 7.1

## 2025-01-03 ENCOUNTER — APPOINTMENT (OUTPATIENT)
Dept: RADIOLOGY | Facility: MEDICAL CENTER | Age: 45
End: 2025-01-03
Attending: STUDENT IN AN ORGANIZED HEALTH CARE EDUCATION/TRAINING PROGRAM
Payer: COMMERCIAL

## 2025-01-03 ENCOUNTER — HOSPITAL ENCOUNTER (EMERGENCY)
Facility: MEDICAL CENTER | Age: 45
End: 2025-01-04
Attending: STUDENT IN AN ORGANIZED HEALTH CARE EDUCATION/TRAINING PROGRAM
Payer: COMMERCIAL

## 2025-01-03 DIAGNOSIS — F10.920 ALCOHOLIC INTOXICATION WITHOUT COMPLICATION (HCC): ICD-10-CM
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
## 2025-01-03 DIAGNOSIS — R42 DIZZINESS: ICD-10-CM

## 2025-01-03 LAB
ALBUMIN SERPL BCP-MCNC: 3.1 G/DL (ref 3.2–4.9)
ALBUMIN/GLOB SERPL: 1 G/DL
ALP SERPL-CCNC: 186 U/L (ref 30–99)
ALT SERPL-CCNC: 37 U/L (ref 2–50)
ANION GAP SERPL CALC-SCNC: 10 MMOL/L (ref 7–16)
APPEARANCE UR: CLEAR
AST SERPL-CCNC: 86 U/L (ref 12–45)
BACTERIA #/AREA URNS HPF: ABNORMAL /HPF
BASOPHILS # BLD AUTO: 0.2 % (ref 0–1.8)
BASOPHILS # BLD: 0.01 K/UL (ref 0–0.12)
BILIRUB SERPL-MCNC: 3.3 MG/DL (ref 0.1–1.5)
BILIRUB UR QL STRIP.AUTO: NEGATIVE
BUN SERPL-MCNC: 6 MG/DL (ref 8–22)
CALCIUM ALBUM COR SERPL-MCNC: 9 MG/DL (ref 8.5–10.5)
CALCIUM SERPL-MCNC: 8.3 MG/DL (ref 8.5–10.5)
CASTS URNS QL MICRO: ABNORMAL /LPF (ref 0–2)
CHLORIDE SERPL-SCNC: 110 MMOL/L (ref 96–112)
CO2 SERPL-SCNC: 20 MMOL/L (ref 20–33)
COLOR UR: YELLOW
CREAT SERPL-MCNC: 0.54 MG/DL (ref 0.5–1.4)
EOSINOPHIL # BLD AUTO: 0.06 K/UL (ref 0–0.51)
EOSINOPHIL NFR BLD: 1.1 % (ref 0–6.9)
EPITHELIAL CELLS 1715: ABNORMAL /HPF (ref 0–5)
ERYTHROCYTE [DISTWIDTH] IN BLOOD BY AUTOMATED COUNT: 61.4 FL (ref 35.9–50)
ETHANOL BLD-MCNC: 265 MG/DL
GFR SERPLBLD CREATININE-BSD FMLA CKD-EPI: 127 ML/MIN/1.73 M 2
GLOBULIN SER CALC-MCNC: 3.1 G/DL (ref 1.9–3.5)
GLUCOSE SERPL-MCNC: 110 MG/DL (ref 65–99)
GLUCOSE UR STRIP.AUTO-MCNC: NEGATIVE MG/DL
HCT VFR BLD AUTO: 29.3 % (ref 42–52)
HGB BLD-MCNC: 9.4 G/DL (ref 14–18)
IMM GRANULOCYTES # BLD AUTO: 0.01 K/UL (ref 0–0.11)
IMM GRANULOCYTES NFR BLD AUTO: 0.2 % (ref 0–0.9)
KETONES UR STRIP.AUTO-MCNC: NEGATIVE MG/DL
LEUKOCYTE ESTERASE UR QL STRIP.AUTO: NEGATIVE
LYMPHOCYTES # BLD AUTO: 1.53 K/UL (ref 1–4.8)
LYMPHOCYTES NFR BLD: 27.1 % (ref 22–41)
MCH RBC QN AUTO: 29.1 PG (ref 27–33)
MCHC RBC AUTO-ENTMCNC: 32.1 G/DL (ref 32.3–36.5)
MCV RBC AUTO: 90.7 FL (ref 81.4–97.8)
MICRO URNS: ABNORMAL
MONOCYTES # BLD AUTO: 0.84 K/UL (ref 0–0.85)
MONOCYTES NFR BLD AUTO: 14.9 % (ref 0–13.4)
NEUTROPHILS # BLD AUTO: 3.2 K/UL (ref 1.82–7.42)
NEUTROPHILS NFR BLD: 56.5 % (ref 44–72)
NITRITE UR QL STRIP.AUTO: NEGATIVE
NRBC # BLD AUTO: 0 K/UL
NRBC BLD-RTO: 0 /100 WBC (ref 0–0.2)
PH UR STRIP.AUTO: 6.5 [PH] (ref 5–8)
PLATELET # BLD AUTO: 86 K/UL (ref 164–446)
PLATELETS.RETICULATED NFR BLD AUTO: 2.8 % (ref 0.6–13.1)
PMV BLD AUTO: 10.6 FL (ref 9–12.9)
POTASSIUM SERPL-SCNC: 3.6 MMOL/L (ref 3.6–5.5)
PROT SERPL-MCNC: 6.2 G/DL (ref 6–8.2)
PROT UR QL STRIP: 30 MG/DL
RBC # BLD AUTO: 3.23 M/UL (ref 4.7–6.1)
RBC # URNS HPF: ABNORMAL /HPF (ref 0–2)
RBC UR QL AUTO: ABNORMAL
SODIUM SERPL-SCNC: 140 MMOL/L (ref 135–145)
SP GR UR STRIP.AUTO: 1.01
UROBILINOGEN UR STRIP.AUTO-MCNC: 1 EU/DL
WBC # BLD AUTO: 5.7 K/UL (ref 4.8–10.8)
WBC #/AREA URNS HPF: ABNORMAL /HPF

## 2025-01-03 PROCEDURE — 85055 RETICULATED PLATELET ASSAY: CPT

## 2025-01-03 PROCEDURE — 99285 EMERGENCY DEPT VISIT HI MDM: CPT

## 2025-01-03 PROCEDURE — 85025 COMPLETE CBC W/AUTO DIFF WBC: CPT

## 2025-01-03 PROCEDURE — 70450 CT HEAD/BRAIN W/O DYE: CPT

## 2025-01-03 PROCEDURE — 96374 THER/PROPH/DIAG INJ IV PUSH: CPT

## 2025-01-03 PROCEDURE — 96375 TX/PRO/DX INJ NEW DRUG ADDON: CPT

## 2025-01-03 PROCEDURE — 80053 COMPREHEN METABOLIC PANEL: CPT

## 2025-01-03 PROCEDURE — 81001 URINALYSIS AUTO W/SCOPE: CPT

## 2025-01-03 PROCEDURE — 82077 ASSAY SPEC XCP UR&BREATH IA: CPT

## 2025-01-03 PROCEDURE — 36415 COLL VENOUS BLD VENIPUNCTURE: CPT

## 2025-01-03 RX ORDER — CALCIUM CARBONATE 500 MG/1
500 TABLET, CHEWABLE ORAL ONCE
Status: DISCONTINUED | OUTPATIENT
Start: 2025-01-03 | End: 2025-01-04 | Stop reason: HOSPADM

## 2025-01-03 ASSESSMENT — FIBROSIS 4 INDEX: FIB4 SCORE: 7.1

## 2025-01-03 ASSESSMENT — PAIN DESCRIPTION - PAIN TYPE: TYPE: ACUTE PAIN

## 2025-01-03 NOTE — ED NOTES
PT updated on plan of care, pt noted to have lice, CT called and updated, will call after pt is showered

## 2025-01-03 NOTE — ED PROVIDER NOTES
ER Provider Note    Scribed for Tomasz Barillas M.d. by Mekhi Mercedes. 1/2/2025  6:38 PM    Primary Care Provider: Mann Johnson M.D.    CHIEF COMPLAINT   Chief Complaint   Patient presents with    Alcohol Intoxication    Fall     Pt reports fall. States he fell, tripping over a concrete block. No whiteness. EMS reports pt was able to recall events immediately following fall.      EXTERNAL RECORDS REVIEWED  Inpatient Notes recent Flu A and hx of ETOH cirrhosis    HPI/ROS  LIMITATION TO HISTORY   Select: Intoxication  OUTSIDE HISTORIAN(S):  None noted     Jalen Vaughn is a 44 y.o. male who presents to the ED complaining of a GLF. Patient reports head strike during his fall today. Patient endorses alcohol use today.     PAST MEDICAL HISTORY  Past Medical History:   Diagnosis Date    Cirrhosis of liver (HCC)     Hypertension     Liver disease     Seizure (HCC)        SURGICAL HISTORY  Past Surgical History:   Procedure Laterality Date    AL UPPER GI ENDOSCOPY,DIAGNOSIS N/A 10/27/2024    Procedure: GASTROSCOPY;  Surgeon: Treva Lopez M.D.;  Location: SURGERY [facility];  Service: Gastroenterology    APPENDECTOMY      CHOLECYSTECTOMY         FAMILY HISTORY  Family History   Problem Relation Age of Onset    Heart Disease Mother     Kidney Disease Mother     Heart Disease Father     Kidney Disease Father        SOCIAL HISTORY   reports that she has quit smoking. Her smoking use included cigarettes. She has quit using smokeless tobacco.  Her smokeless tobacco use included chew. She reports current alcohol use. She reports that she does not currently use drugs.    CURRENT MEDICATIONS  Previous Medications    AMLODIPINE (NORVASC) 5 MG TAB    Take 1 Tablet by mouth every day.    BUMETANIDE (BUMEX) 2 MG TABLET    Take 1 Tablet by mouth every day.    CALCIUM CARBONATE ANTACID 1000 MG CHEW TAB    Chew 1 Tablet 3 times a day with meals for 90 days.    CARVEDILOL (COREG) 3.125 MG TAB    Take 1 Tablet by mouth 2  "times a day with meals for 30 days.    CARVEDILOL (COREG) 3.125 MG TAB    Take 1 Tablet by mouth 2 times a day with meals.    ESCITALOPRAM (LEXAPRO) 20 MG TABLET    Take 1 Tablet by mouth every day for 90 days.    FOLIC ACID (FOLVITE) 1 MG TAB    Take 1 Tablet by mouth every day for 90 days.    GABAPENTIN (NEURONTIN) 300 MG CAP    Take 1 Capsule by mouth 3 times a day for 90 days.    HYDROXYZINE HCL (ATARAX) 25 MG TAB    Take 1 Tablet by mouth 3 times a day as needed for Anxiety.    LACTULOSE 20 GM/30ML SOLUTION    Take 45 mL by mouth 4 times a day for 90 days.    OMEPRAZOLE (PRILOSEC) 20 MG DELAYED-RELEASE CAPSULE    Take 1 Capsule by mouth 2 times a day for 90 days.    POTASSIUM CHLORIDE ER (K-TAB) 20 MEQ TAB CR TABLET    Take 1 Tablet by mouth 2 times a day for 90 days.    RIFAXIMIN (XIFAXAN) 550 MG TAB TABLET    Take 1 Tablet by mouth 2 times a day for 90 days.    SPIRONOLACTONE (ALDACTONE) 100 MG TAB    Take 1 Tablet by mouth every day.    SPIRONOLACTONE (ALDACTONE) 100 MG TAB    Take 1 Tablet by mouth every day for 90 days.    VITAMIN D2, ERGOCALCIFEROL, (DRISDOL) 1.25 MG (16480 UT) CAP CAPSULE    Take 1 Capsule by mouth every 7 days for 7 doses.       ALLERGIES  Latex and Morphine    PHYSICAL EXAM  BP (!) 167/84   Pulse 72   Temp 36.4 °C (97.6 °F) (Temporal)   Resp 16   Ht 1.93 m (6' 4\")   Wt (!) 144 kg (318 lb)   SpO2 95%   BMI 38.71 kg/m²   Constitutional: Slurred speech, moves all 4 extremities equally.   +Head lice   HENT: No signs of trauma, Bilateral external ears normal, Nose normal. Uvula midline.   Eyes: Pupils are equal and reactive, Conjunctiva normal, Non-icteric.   Neck: Normal range of motion, No tenderness, Supple, No stridor.   Lymphatic: No lymphadenopathy noted.   Cardiovascular: Regular rate and rhythm, no murmurs.   Thorax & Lungs: Normal breath sounds, No respiratory distress, No wheezing, No chest tenderness.   Skin: Warm, Dry, No erythema, No rash.   Back: No bony tenderness, No " CVA tenderness.   Extremities: Intact distal pulses, No edema, No tenderness, No cyanosis.  Musculoskeletal: Good range of motion in all major joints. No tenderness to palpation or major deformities noted.   Neurologic: Normal motor function, Normal sensory function, No focal deficits noted.   Psychiatric: Intoxicated.     DIAGNOSTIC STUDIES    EKG/LABS  Labs Reviewed - No data to display     I have independently interpreted this EKG    RADIOLOGY/PROCEDURES   The attending emergency physician has independently interpreted the diagnostic imaging associated with this visit and am waiting the final reading from the radiologist.   My preliminary interpretation is a follows:     Radiologist interpretation:  CT-HEAD W/O    (Results Pending)   CT-CSPINE WITHOUT PLUS RECONS    (Results Pending)     COURSE & MEDICAL DECISION MAKING     ASSESSMENT, COURSE AND PLAN  Care Narrative:   ED OBS: Yes; I am placing the patient in to an observation status due to a diagnostic uncertainty as well as therapeutic intensity. Patient placed in observation status at 6:38 PM, 1/2/2025.     Observation plan is as follows: Will continue to monitor until clinical sobriety is reached and to obtain CT head and c/s given fall    6:38 PM - Patient presents to the ED for a fall secondary to alcohol intoxication. Patient seen and examined at bedside. Discussed plan of care, including plan to further evaluate. Patient agrees to the plan of care. The patient will be medicated as ordered. Ordered for imaging to evaluate their symptoms.    Differentials include but not limited to: Toxic encephalopathy likely given report of ingestion and behaviour in ED c/w intoxication  Given trauma to head, pt [place] head CT positive    Despite labs not being drawn doubt significant electrolyte etiology given pt w/ ability to torerate PO and return to baseline.   Pt afebrile at this time, doubt infectious etiology of encephalopathy      DISPOSITION AND  DISCUSSIONS  S/o pending trauma sober reevaluation    FINAL DIANGOSIS  1. Alcoholic intoxication with complication (HCC)    2. Closed head injury, initial encounter    3. Lice infested hair          The note accurately reflects work and decisions made by me.  Tomasz Barillas M.D.  1/2/2025  7:46 PM

## 2025-01-03 NOTE — DISCHARGE SUMMARY
ED Observation Discharge Summary    Patient:Jalen Vaughn  Patient : 1980  Patient MRN: [id number]  Patient PCP: Mann Johnson M.D.    Admit Date: 2025  Discharge Date and Time: 25 11:43 PM  Discharge Diagnosis: Alcohol intoxication ground-level fall  Discharge Attending: Flex Goode M.D.  Discharge Service: ED Observation    ED Course  Jalen is a 44 y.o. [facility] and a ground-level fall that occurred at home a shelter.  CT scans of head and neck obtained which are within normal limits at this time.  After time for metabolization the patient was able to ambulate freely throughout the department without difficulty and will therefore be discharged.      Electronically signed by: Flex Goode M.D., 2025 11:43 PM

## 2025-01-03 NOTE — ED TRIAGE NOTES
"Chief Complaint   Patient presents with    Alcohol Intoxication    Fall     Pt reports fall. States he fell, tripping over a concrete block. No whiteness. EMS reports pt was able to recall events immediately following fall.      BIB EMS. VS: 161/81, HR 61, SPO2 97% RA, GCS 15.     Pt alert, oriented, slurred speech, incontinent urine. Pt has two purple bruises to stomach that appear to be old. + CSM all extremities. No obvious signs trauma noted. Reports he has, \"Liver pain.\"    BP (!) 167/84   Pulse 72   Temp 36.4 °C (97.6 °F) (Temporal)   Resp 16   Ht 1.93 m (6' 4\")   Wt (!) 144 kg (318 lb)   SpO2 95%   BMI 38.71 kg/m²     "

## 2025-01-04 VITALS
BODY MASS INDEX: 38.36 KG/M2 | RESPIRATION RATE: 19 BRPM | TEMPERATURE: 98.2 F | DIASTOLIC BLOOD PRESSURE: 75 MMHG | HEIGHT: 76 IN | WEIGHT: 315 LBS | SYSTOLIC BLOOD PRESSURE: 132 MMHG | OXYGEN SATURATION: 95 % | HEART RATE: 89 BPM

## 2025-01-04 NOTE — ED TRIAGE NOTES
Chief Complaint   Patient presents with    Alcohol Intoxication     Brought by EMS with the above complaints  Unit No.: 49   from: [facility]    Pain: 4/10  GCS: 14/15  Alert and Oriented x person, place, situation; disoriented to time  Ambulatory: Not at the moment  Oxygen Treatment: None  Blood Glucose en route: 148 mg/dL  ECG en route: Sinus    Treatment given en route: None  Contraptions: None    Weakness     Pt is complaining of generalized body weakness    T-5000 GLF     Pt said he had multiple GLF for the pas days  Pt was seen in the ED with the same complaints for the past days    Latest was 1 hour ago  - Denied any head strike, loss of consciousness or taking blood thinners    Applied Be Alarm     + Head Lice infestation yesterday -> treatment/ protocol done yesterday    Vitals:    01/03/25 2113   BP: (!) 153/100   Pulse: 69   Resp: 19   Temp: 36.8 °C (98.2 °F)   SpO2: 97%

## 2025-01-04 NOTE — ED NOTES
Report received from off going RN/tech: Gracy, assumed care of patient.  POC discussed with patient. Call light within reach, all needs addressed at this time.   Breakfast provided to pt     Fall risk interventions in place: Not Applicable (all applicable per [facility] Fall risk assessment)   Continuous monitoring: Not Applicable   IVF/IV medications: Not Applicable   Oxygen: Room Air  Bedside sitter: Not Applicable   Isolation: Not Applicable

## 2025-01-04 NOTE — ED NOTES
Break RN:  Pt sleeping in bed in view of RN station with even and unlabored breaths at this time. No acute distress is noted.

## 2025-01-04 NOTE — DISCHARGE SUMMARY
"  ED Observation Discharge Summary    Patient:Jalen Vaughn  Patient : 1980  Patient MRN: [id number]  Patient PCP: Mann Johnson M.D.    Admit Date: 1/3/2025  Discharge Date and Time: 25 11:57 PM  Discharge Diagnosis: Alcohol intoxication  Discharge Attending: Eugenio Woods D.O.  Discharge Service: ED Observation    ED Course  Jalen is a 44 y.o. male who was evaluated at [facility] after being brought in by EMS.  Patient was intoxicated and reporting generalized weakness and multiple falls over the past few days.  Patient had a thorough workup for his complaints with no acute concerns or acute abnormalities.  Of note he does have alcoholic cirrhosis and labs are consistent with this finding.  Patient also had a CT of his head given report of multiple falls which was normal.    He was assigned to me pending being clinically sober and safe for discharge.  He was allowed to sleep and metabolize the alcohol in the department for several hours.  He was reassessed in the morning and is clinically sober.  He has no acute complaints.  He is discharged in stable condition to prior living conditions.    Discharge Exam:  BP (!) 147/86   Pulse 80   Temp 36.8 °C (98.2 °F) (Temporal)   Resp 17   Ht 1.93 m (6' 4\")   Wt (!) 144 kg (317 lb 7.4 oz)   SpO2 94%   BMI 38.64 kg/m² .    Constitutional: Awake and alert. Nontoxic  HENT:  Grossly normal  Eyes: Grossly normal  Neck: Normal range of motion  Cardiovascular: Normal heart rate   Thorax & Lungs: No respiratory distress  Abdomen: Nontender  Skin:  No pathologic rash.   Extremities: Well perfused  Psychiatric: Affect normal    Labs  Results for orders placed or performed during the hospital encounter of 25   CBC WITH DIFFERENTIAL    Collection Time: 25  9:50 PM   Result Value Ref Range    WBC 5.7 4.8 - 10.8 K/uL    RBC 3.23 (L) 4.70 - 6.10 M/uL    Hemoglobin 9.4 (L) 14.0 - 18.0 g/dL    Hematocrit 29.3 (L) 42.0 - 52.0 %    MCV 90.7 81.4 - " 97.8 fL    MCH 29.1 27.0 - 33.0 pg    MCHC 32.1 (L) 32.3 - 36.5 g/dL    RDW 61.4 (H) 35.9 - 50.0 fL    Platelet Count 86 (L) 164 - 446 K/uL    MPV 10.6 9.0 - 12.9 fL    Neutrophils-Polys 56.50 44.00 - 72.00 %    Lymphocytes 27.10 22.00 - 41.00 %    Monocytes 14.90 (H) 0.00 - 13.40 %    Eosinophils 1.10 0.00 - 6.90 %    Basophils 0.20 0.00 - 1.80 %    Immature Granulocytes 0.20 0.00 - 0.90 %    Nucleated RBC 0.00 0.00 - 0.20 /100 WBC    Neutrophils (Absolute) 3.20 1.82 - 7.42 K/uL    Lymphs (Absolute) 1.53 1.00 - 4.80 K/uL    Monos (Absolute) 0.84 0.00 - 0.85 K/uL    Eos (Absolute) 0.06 0.00 - 0.51 K/uL    Baso (Absolute) 0.01 0.00 - 0.12 K/uL    Immature Granulocytes (abs) 0.01 0.00 - 0.11 K/uL    NRBC (Absolute) 0.00 K/uL   COMP METABOLIC PANEL    Collection Time: 01/03/25  9:50 PM   Result Value Ref Range    Sodium 140 135 - 145 mmol/L    Potassium 3.6 3.6 - 5.5 mmol/L    Chloride 110 96 - 112 mmol/L    Co2 20 20 - 33 mmol/L    Anion Gap 10.0 7.0 - 16.0    Glucose 110 (H) 65 - 99 mg/dL    Bun 6 (L) 8 - 22 mg/dL    Creatinine 0.54 0.50 - 1.40 mg/dL    Calcium 8.3 (L) 8.5 - 10.5 mg/dL    Correct Calcium 9.0 8.5 - 10.5 mg/dL    AST(SGOT) 86 (H) 12 - 45 U/L    ALT(SGPT) 37 2 - 50 U/L    Alkaline Phosphatase 186 (H) 30 - 99 U/L    Total Bilirubin 3.3 (H) 0.1 - 1.5 mg/dL    Albumin 3.1 (L) 3.2 - 4.9 g/dL    Total Protein 6.2 6.0 - 8.2 g/dL    Globulin 3.1 1.9 - 3.5 g/dL    A-G Ratio 1.0 g/dL   DIAGNOSTIC ALCOHOL    Collection Time: 01/03/25  9:50 PM   Result Value Ref Range    Diagnostic Alcohol 265.0 (H) <10.1 mg/dL   URINALYSIS    Collection Time: 01/03/25  9:50 PM    Specimen: Urine   Result Value Ref Range    Color Yellow     Character Clear     Specific Gravity 1.007 <1.035    Ph 6.5 5.0 - 8.0    Glucose Negative Negative mg/dL    Ketones Negative Negative mg/dL    Protein 30 (A) Negative mg/dL    Bilirubin Negative Negative    Urobilinogen, Urine 1.0 <=1.0 EU/dL    Nitrite Negative Negative    Leukocyte Esterase  Negative Negative    Occult Blood Moderate (A) Negative    Micro Urine Req Microscopic    URINE MICROSCOPIC (W/UA)    Collection Time: 01/03/25  9:50 PM   Result Value Ref Range    WBC 0-2 /hpf    RBC 11-20 (A) 0 - 2 /hpf    Bacteria None Seen None /hpf    Epithelial Cells 0-2 0 - 5 /hpf    Urine Casts 0-2 0 - 2 /lpf   IMMATURE PLT FRACTION    Collection Time: 01/03/25  9:50 PM   Result Value Ref Range    Imm. Plt Fraction 2.8 0.6 - 13.1 %   ESTIMATED GFR    Collection Time: 01/03/25  9:50 PM   Result Value Ref Range    GFR (CKD-EPI) 127 >60 mL/min/1.73 m 2       Radiology  CT-HEAD W/O   Final Result         1. No acute intracranial abnormality. No evidence of acute intracranial hemorrhage or mass lesion.                               Medications:   New Prescriptions    No medications on file       My final assessment includes alcohol intoxication, alcoholic cirrhosis  Upon Reevaluation, the patient's condition has: Improved; and will be discharged.    Patient discharged from ED Observation status at 06:40 (Time) 1/4/2025 (Date).     Total time spent on this ED Observation discharge encounter is < 30 Minutes    Electronically signed by: Eugenio Woods D.O., 1/4/2025 06:57 AM

## 2025-01-04 NOTE — ED NOTES
Bedside report received from off going RN/tech: Joey RN, assumed care of patient.  POC discussed with patient. Call light within reach, all needs addressed at this time.       Fall risk interventions in place: Place socks on patient, Place fall risk sign on patient's door, Give patient urinal if applicable, Keep floor surfaces clean and dry, Accompanied to restroom, and Bed Alarm in use (all applicable per [facility] Fall risk assessment)   Continuous monitoring: Pulse Ox or Blood Pressure  IVF/IV medications: Not Applicable   Oxygen: Room Air  Bedside sitter: Not Applicable   Isolation: Not Applicable

## 2025-01-04 NOTE — ED PROVIDER NOTES
ED Provider Note    CHIEF COMPLAINT  Chief Complaint   Patient presents with    Alcohol Intoxication     Brought by EMS with the above complaints  Unit No.: [id number]   from: [facility]    Pain: 4/10  GCS: 14/15  Alert and Oriented x person, place, situation; disoriented to time  Ambulatory: Not at the moment  Oxygen Treatment: None  Blood Glucose en route: 148 mg/dL  ECG en route: Sinus    Treatment given en route: None  Contraptions: None    Weakness     Pt is complaining of generalized body weakness    T-5000 GLF     Pt said he had multiple GLF for the pas days  Pt was seen in the ED with the same complaints for the past days    Latest was 1 hour ago  - Denied any head strike, loss of consciousness or taking blood thinners    Applied Be Alarm       EXTERNAL RECORDS REVIEWED  History of alcohol cirrhosis.  He was last seen in the ER on 2 January.    HPI/ROS  LIMITATION TO HISTORY   Intoxication  OUTSIDE HISTORIAN(S):  EMS    Jalen Vaughn is a 44 y.o. male who presents with alcohol intoxication.  Patient stating he feels generally weak. He cannot walk and is dizzy.  He says that he is having some abdominal pain and vomiting.  Pain is about 4 /10 in severity.  He said that he last fell about an hour ago.  He does not think he hit his head.  Remainder of history is fairly limited as patient is intoxicated.    PAST MEDICAL HISTORY   has a past medical history of Cirrhosis of liver (HCC), Hypertension, Liver disease, and Seizure (HCC).    SURGICAL HISTORY   has a past surgical history that includes appendectomy; cholecystectomy; and upper gi endoscopy,diagnosis (N/A, 10/27/2024).    FAMILY HISTORY  Family History   Problem Relation Age of Onset    Heart Disease Mother     Kidney Disease Mother     Heart Disease Father     Kidney Disease Father        SOCIAL HISTORY  Social History     Tobacco Use    Smoking status: Former     Types: Cigarettes    Smokeless tobacco: Former     Types: Chew   Vaping Use     "Vaping status: Never Used   Substance and Sexual Activity    Alcohol use: Yes     Comment: 1 beer/day    Drug use: Not Currently    Sexual activity: Not on file       CURRENT MEDICATIONS  Home Medications       Reviewed by Johnny Hall R.N. (Registered Nurse) on 01/03/25 at 2119  Med List Status: Partial     Medication Last Dose Status   amLODIPine (NORVASC) 5 MG Tab  Active   bumetanide (BUMEX) 2 MG tablet  Active   Calcium Carbonate Antacid 1000 MG Chew Tab  Active   carvedilol (COREG) 3.125 MG Tab  Active   carvedilol (COREG) 3.125 MG Tab  Active   escitalopram (LEXAPRO) 20 MG tablet  Active   folic acid (FOLVITE) 1 MG Tab  Active   gabapentin (NEURONTIN) 300 MG Cap  Active   hydrOXYzine HCl (ATARAX) 25 MG Tab  Active   lactulose 20 GM/30ML Solution  Active   omeprazole (PRILOSEC) 20 MG delayed-release capsule  Active   permethrin (ELIMITE) 5 % Cream  Active   potassium Chloride ER (K-TAB) 20 MEQ Tab CR tablet  Active   riFAXIMin (XIFAXAN) 550 MG Tab tablet  Active   spironolactone (ALDACTONE) 100 MG Tab  Active   spironolactone (ALDACTONE) 100 MG Tab  Active   vitamin D2, Ergocalciferol, (DRISDOL) 1.25 MG (57540 UT) Cap capsule  Active                  Audit from Redirected Encounters    **Home medications have not yet been reviewed for this encounter**         ALLERGIES  Allergies   Allergen Reactions    Latex Itching    Morphine Itching       PHYSICAL EXAM  VITAL SIGNS: BP (!) 147/86   Pulse 80   Temp 36.8 °C (98.2 °F) (Temporal)   Resp 17   Ht 1.93 m (6' 4\")   Wt (!) 144 kg (317 lb 7.4 oz)   SpO2 94%   BMI 38.64 kg/m²    Constitutional: Awake and alert Non toxic  HENT: No signs of trauma  Eyes: Normal inspection  Neck: Grossly normal range of motion.  Cardiovascular: Normal heart rate, Normal rhythm.   Thorax & Lungs: No respiratory distress  Abdomen: Soft, non-distended, nontender to palpation in all 4 quadrants,   Skin: No obvious rash.  Extremities: Warm, well perfused. No clubbing, cyanosis, " edema   Neurologic: No focal deficits.  Slurred speech, patient endorses drinking alcohol.  He has full strength in upper and lower extremities.  Psychiatric: Normal for situation      EKG/LABS  Results for orders placed or performed during the hospital encounter of 01/03/25   CBC WITH DIFFERENTIAL    Collection Time: 01/03/25  9:50 PM   Result Value Ref Range    WBC 5.7 4.8 - 10.8 K/uL    RBC 3.23 (L) 4.70 - 6.10 M/uL    Hemoglobin 9.4 (L) 14.0 - 18.0 g/dL    Hematocrit 29.3 (L) 42.0 - 52.0 %    MCV 90.7 81.4 - 97.8 fL    MCH 29.1 27.0 - 33.0 pg    MCHC 32.1 (L) 32.3 - 36.5 g/dL    RDW 61.4 (H) 35.9 - 50.0 fL    Platelet Count 86 (L) 164 - 446 K/uL    MPV 10.6 9.0 - 12.9 fL    Neutrophils-Polys 56.50 44.00 - 72.00 %    Lymphocytes 27.10 22.00 - 41.00 %    Monocytes 14.90 (H) 0.00 - 13.40 %    Eosinophils 1.10 0.00 - 6.90 %    Basophils 0.20 0.00 - 1.80 %    Immature Granulocytes 0.20 0.00 - 0.90 %    Nucleated RBC 0.00 0.00 - 0.20 /100 WBC    Neutrophils (Absolute) 3.20 1.82 - 7.42 K/uL    Lymphs (Absolute) 1.53 1.00 - 4.80 K/uL    Monos (Absolute) 0.84 0.00 - 0.85 K/uL    Eos (Absolute) 0.06 0.00 - 0.51 K/uL    Baso (Absolute) 0.01 0.00 - 0.12 K/uL    Immature Granulocytes (abs) 0.01 0.00 - 0.11 K/uL    NRBC (Absolute) 0.00 K/uL   COMP METABOLIC PANEL    Collection Time: 01/03/25  9:50 PM   Result Value Ref Range    Sodium 140 135 - 145 mmol/L    Potassium 3.6 3.6 - 5.5 mmol/L    Chloride 110 96 - 112 mmol/L    Co2 20 20 - 33 mmol/L    Anion Gap 10.0 7.0 - 16.0    Glucose 110 (H) 65 - 99 mg/dL    Bun 6 (L) 8 - 22 mg/dL    Creatinine 0.54 0.50 - 1.40 mg/dL    Calcium 8.3 (L) 8.5 - 10.5 mg/dL    Correct Calcium 9.0 8.5 - 10.5 mg/dL    AST(SGOT) 86 (H) 12 - 45 U/L    ALT(SGPT) 37 2 - 50 U/L    Alkaline Phosphatase 186 (H) 30 - 99 U/L    Total Bilirubin 3.3 (H) 0.1 - 1.5 mg/dL    Albumin 3.1 (L) 3.2 - 4.9 g/dL    Total Protein 6.2 6.0 - 8.2 g/dL    Globulin 3.1 1.9 - 3.5 g/dL    A-G Ratio 1.0 g/dL   DIAGNOSTIC ALCOHOL     Collection Time: 01/03/25  9:50 PM   Result Value Ref Range    Diagnostic Alcohol 265.0 (H) <10.1 mg/dL   URINALYSIS    Collection Time: 01/03/25  9:50 PM    Specimen: Urine   Result Value Ref Range    Color Yellow     Character Clear     Specific Gravity 1.007 <1.035    Ph 6.5 5.0 - 8.0    Glucose Negative Negative mg/dL    Ketones Negative Negative mg/dL    Protein 30 (A) Negative mg/dL    Bilirubin Negative Negative    Urobilinogen, Urine 1.0 <=1.0 EU/dL    Nitrite Negative Negative    Leukocyte Esterase Negative Negative    Occult Blood Moderate (A) Negative    Micro Urine Req Microscopic    URINE MICROSCOPIC (W/UA)    Collection Time: 01/03/25  9:50 PM   Result Value Ref Range    WBC 0-2 /hpf    RBC 11-20 (A) 0 - 2 /hpf    Bacteria None Seen None /hpf    Epithelial Cells 0-2 0 - 5 /hpf    Urine Casts 0-2 0 - 2 /lpf   IMMATURE PLT FRACTION    Collection Time: 01/03/25  9:50 PM   Result Value Ref Range    Imm. Plt Fraction 2.8 0.6 - 13.1 %   ESTIMATED GFR    Collection Time: 01/03/25  9:50 PM   Result Value Ref Range    GFR (CKD-EPI) 127 >60 mL/min/1.73 m 2         RADIOLOGY/PROCEDURES   I have independently interpreted the diagnostic imaging associated with this visit and am waiting the final reading from the radiologist.   My preliminary interpretation is as follows: NO large bleed    Radiologist interpretation:  CT-HEAD W/O   Final Result         1. No acute intracranial abnormality. No evidence of acute intracranial hemorrhage or mass lesion.                               COURSE & MEDICAL DECISION MAKING    ASSESSMENT, COURSE AND PLAN  Care Narrative: This is a 44-year-old with a history of alcohol use who presents for evaluation of abdominal pain, ground-level fall and generalized dizziness.  He arrives with normal vital signs and is systemically well-appearing.  His labs are largely unremarkable. He is anemic, though this is stable from prior.  He is mildly hypocalcemic otherwise has no significant  electrolyte derangements.  His bilirubin is elevated though marginally increased from prior.  He has a benign abdominal exam and I have very low suspicion for a focal inflammatory or surgical etiology such as a bowel obstruction, appendicitis, cholecystitis etc that would warrant workup with further imaging. No epigastric tenderness and doubt pancreatitis.  CT head obtained shows no evidence of bleed or infarct.  His urine is negative for infection.  Overall I feel reassured by his workup in the ER.  I suspect that a his symptoms are largely explained by his alcohol intoxication.  I do anticipate he will be able to discharge home once clinically sober.    ED OBS: Yes; I am placing the patient in to an observation status due to a diagnostic uncertainty as well as therapeutic intensity. Patient placed in observation status at 10:00 PM, 1/3/2025.     Observation plan is as follows: sober reevaluation          DISPOSITION AND DISCUSSIONS  I have discussed management of the patient with the following physicians and ELISSA's:  Dr. Woods    Discussion of management with other QHP or appropriate source(s): None     Escalation of care considered, and ultimately not performed:IV fluids. He is able to tolerate PO and rehydrate orally     Barriers to care at this time, including but not limited to: None    Decision tools and prescription drugs considered including, but not limited to:  None .    FINAL DIAGNOSIS  1. Alcoholic intoxication without complication (HCC) Acute   2. Dizziness    3. Ground-level fall         Electronically signed by: Vashti Means M.D., 1/3/2025 9:38 PM

## 2025-01-07 ENCOUNTER — APPOINTMENT (OUTPATIENT)
Dept: RADIOLOGY | Facility: MEDICAL CENTER | Age: 45
DRG: 894 | End: 2025-01-07
Attending: EMERGENCY MEDICINE
Payer: COMMERCIAL

## 2025-01-07 ENCOUNTER — HOSPITAL ENCOUNTER (INPATIENT)
Facility: MEDICAL CENTER | Age: 45
LOS: 1 days | DRG: 894 | End: 2025-01-08
Attending: EMERGENCY MEDICINE | Admitting: FAMILY MEDICINE
Payer: COMMERCIAL

## 2025-01-07 DIAGNOSIS — K76.82 HEPATIC ENCEPHALOPATHY (HCC): ICD-10-CM

## 2025-01-07 DIAGNOSIS — D64.9 CHRONIC ANEMIA: ICD-10-CM

## 2025-01-07 DIAGNOSIS — F10.10 CHRONIC ALCOHOL ABUSE: ICD-10-CM

## 2025-01-07 DIAGNOSIS — K70.9 LIVER DISEASE DUE TO ALCOHOL (HCC): ICD-10-CM

## 2025-01-07 LAB
ALBUMIN SERPL BCP-MCNC: 2.5 G/DL (ref 3.2–4.9)
ALBUMIN/GLOB SERPL: 0.9 G/DL
ALP SERPL-CCNC: 148 U/L (ref 30–99)
ALT SERPL-CCNC: 32 U/L (ref 2–50)
AMMONIA PLAS-SCNC: 112 UMOL/L (ref 11–45)
ANION GAP SERPL CALC-SCNC: 9 MMOL/L (ref 7–16)
ANISOCYTOSIS BLD QL SMEAR: ABNORMAL
APTT PPP: 29 SEC (ref 24.7–36)
AST SERPL-CCNC: 81 U/L (ref 12–45)
BASOPHILS # BLD AUTO: 0.2 % (ref 0–1.8)
BASOPHILS # BLD: 0.01 K/UL (ref 0–0.12)
BILIRUB SERPL-MCNC: 2.6 MG/DL (ref 0.1–1.5)
BUN SERPL-MCNC: 5 MG/DL (ref 8–22)
CALCIUM ALBUM COR SERPL-MCNC: 9.1 MG/DL (ref 8.5–10.5)
CALCIUM SERPL-MCNC: 7.9 MG/DL (ref 8.5–10.5)
CHLORIDE SERPL-SCNC: 110 MMOL/L (ref 96–112)
CO2 SERPL-SCNC: 20 MMOL/L (ref 20–33)
COMMENT 1642: NORMAL
CREAT SERPL-MCNC: 0.57 MG/DL (ref 0.5–1.4)
EOSINOPHIL # BLD AUTO: 0.12 K/UL (ref 0–0.51)
EOSINOPHIL NFR BLD: 2.9 % (ref 0–6.9)
ERYTHROCYTE [DISTWIDTH] IN BLOOD BY AUTOMATED COUNT: 65.3 FL (ref 35.9–50)
GFR SERPLBLD CREATININE-BSD FMLA CKD-EPI: 125 ML/MIN/1.73 M 2
GLOBULIN SER CALC-MCNC: 2.7 G/DL (ref 1.9–3.5)
GLUCOSE SERPL-MCNC: 105 MG/DL (ref 65–99)
HCT VFR BLD AUTO: 24.8 % (ref 42–52)
HGB BLD-MCNC: 8 G/DL (ref 14–18)
IMM GRANULOCYTES # BLD AUTO: 0.01 K/UL (ref 0–0.11)
IMM GRANULOCYTES NFR BLD AUTO: 0.2 % (ref 0–0.9)
INR PPP: 1.66 (ref 0.87–1.13)
LACTATE SERPL-SCNC: 1.6 MMOL/L (ref 0.5–2)
LYMPHOCYTES # BLD AUTO: 1.28 K/UL (ref 1–4.8)
LYMPHOCYTES NFR BLD: 31.3 % (ref 22–41)
MACROCYTES BLD QL SMEAR: ABNORMAL
MAGNESIUM SERPL-MCNC: 1.5 MG/DL (ref 1.5–2.5)
MCH RBC QN AUTO: 29.5 PG (ref 27–33)
MCHC RBC AUTO-ENTMCNC: 32.3 G/DL (ref 32.3–36.5)
MCV RBC AUTO: 91.5 FL (ref 81.4–97.8)
MONOCYTES # BLD AUTO: 0.55 K/UL (ref 0–0.85)
MONOCYTES NFR BLD AUTO: 13.4 % (ref 0–13.4)
MORPHOLOGY BLD-IMP: NORMAL
NEUTROPHILS # BLD AUTO: 2.12 K/UL (ref 1.82–7.42)
NEUTROPHILS NFR BLD: 52 % (ref 44–72)
NRBC # BLD AUTO: 0 K/UL
NRBC BLD-RTO: 0 /100 WBC (ref 0–0.2)
OVALOCYTES BLD QL SMEAR: NORMAL
PLATELET # BLD AUTO: 58 K/UL (ref 164–446)
PLATELET BLD QL SMEAR: NORMAL
PLATELETS.RETICULATED NFR BLD AUTO: 3.3 % (ref 0.6–13.1)
POIKILOCYTOSIS BLD QL SMEAR: NORMAL
POTASSIUM SERPL-SCNC: 3.2 MMOL/L (ref 3.6–5.5)
PROT SERPL-MCNC: 5.2 G/DL (ref 6–8.2)
PROTHROMBIN TIME: 19.7 SEC (ref 12–14.6)
RBC # BLD AUTO: 2.71 M/UL (ref 4.7–6.1)
RBC BLD AUTO: PRESENT
SODIUM SERPL-SCNC: 139 MMOL/L (ref 135–145)
TARGETS BLD QL SMEAR: NORMAL
WBC # BLD AUTO: 4.1 K/UL (ref 4.8–10.8)

## 2025-01-07 PROCEDURE — 83735 ASSAY OF MAGNESIUM: CPT

## 2025-01-07 PROCEDURE — 700101 HCHG RX REV CODE 250: Mod: UD | Performed by: EMERGENCY MEDICINE

## 2025-01-07 PROCEDURE — 82140 ASSAY OF AMMONIA: CPT

## 2025-01-07 PROCEDURE — 700105 HCHG RX REV CODE 258: Mod: UD | Performed by: EMERGENCY MEDICINE

## 2025-01-07 PROCEDURE — 96375 TX/PRO/DX INJ NEW DRUG ADDON: CPT

## 2025-01-07 PROCEDURE — 36415 COLL VENOUS BLD VENIPUNCTURE: CPT

## 2025-01-07 PROCEDURE — 85610 PROTHROMBIN TIME: CPT

## 2025-01-07 PROCEDURE — 85025 COMPLETE CBC W/AUTO DIFF WBC: CPT

## 2025-01-07 PROCEDURE — 83605 ASSAY OF LACTIC ACID: CPT

## 2025-01-07 PROCEDURE — 85055 RETICULATED PLATELET ASSAY: CPT

## 2025-01-07 PROCEDURE — 85730 THROMBOPLASTIN TIME PARTIAL: CPT

## 2025-01-07 PROCEDURE — 99285 EMERGENCY DEPT VISIT HI MDM: CPT

## 2025-01-07 PROCEDURE — 96365 THER/PROPH/DIAG IV INF INIT: CPT

## 2025-01-07 PROCEDURE — 700111 HCHG RX REV CODE 636 W/ 250 OVERRIDE (IP): Mod: UD | Performed by: EMERGENCY MEDICINE

## 2025-01-07 PROCEDURE — 80053 COMPREHEN METABOLIC PANEL: CPT

## 2025-01-07 RX ORDER — THIAMINE HYDROCHLORIDE 100 MG/ML
100 INJECTION, SOLUTION INTRAMUSCULAR; INTRAVENOUS ONCE
Status: COMPLETED | OUTPATIENT
Start: 2025-01-07 | End: 2025-01-07

## 2025-01-07 RX ADMIN — THIAMINE HYDROCHLORIDE 100 MG: 100 INJECTION, SOLUTION INTRAMUSCULAR; INTRAVENOUS at 23:03

## 2025-01-07 RX ADMIN — FOLIC ACID 1 MG: 5 INJECTION, SOLUTION INTRAMUSCULAR; INTRAVENOUS; SUBCUTANEOUS at 23:04

## 2025-01-07 ASSESSMENT — FIBROSIS 4 INDEX: FIB4 SCORE: 6.88

## 2025-01-08 ENCOUNTER — HOSPITAL ENCOUNTER (EMERGENCY)
Facility: MEDICAL CENTER | Age: 45
End: 2025-01-09
Attending: STUDENT IN AN ORGANIZED HEALTH CARE EDUCATION/TRAINING PROGRAM
Payer: COMMERCIAL

## 2025-01-08 VITALS
BODY MASS INDEX: 38.36 KG/M2 | OXYGEN SATURATION: 96 % | HEIGHT: 76 IN | RESPIRATION RATE: 18 BRPM | DIASTOLIC BLOOD PRESSURE: 87 MMHG | TEMPERATURE: 98 F | HEART RATE: 84 BPM | SYSTOLIC BLOOD PRESSURE: 159 MMHG | WEIGHT: 315 LBS

## 2025-01-08 PROBLEM — F10.931 ALCOHOL WITHDRAWAL DELIRIUM, PERSISTENT, HYPERACTIVE (HCC): Status: ACTIVE | Noted: 2025-01-08

## 2025-01-08 LAB
ALBUMIN SERPL BCP-MCNC: 2.8 G/DL (ref 3.2–4.9)
ALBUMIN/GLOB SERPL: 1 G/DL
ALP SERPL-CCNC: 157 U/L (ref 30–99)
ALT SERPL-CCNC: 30 U/L (ref 2–50)
AMMONIA PLAS-SCNC: 102 UMOL/L (ref 11–45)
ANION GAP SERPL CALC-SCNC: 10 MMOL/L (ref 7–16)
APPEARANCE UR: CLEAR
AST SERPL-CCNC: 60 U/L (ref 12–45)
BACTERIA #/AREA URNS HPF: ABNORMAL /HPF
BILIRUB SERPL-MCNC: 2.4 MG/DL (ref 0.1–1.5)
BILIRUB UR QL STRIP.AUTO: NEGATIVE
BUN SERPL-MCNC: 6 MG/DL (ref 8–22)
CALCIUM ALBUM COR SERPL-MCNC: 8.3 MG/DL (ref 8.5–10.5)
CALCIUM SERPL-MCNC: 7.3 MG/DL (ref 8.5–10.5)
CASTS URNS QL MICRO: ABNORMAL /LPF (ref 0–2)
CHLORIDE SERPL-SCNC: 110 MMOL/L (ref 96–112)
CO2 SERPL-SCNC: 21 MMOL/L (ref 20–33)
COLOR UR: YELLOW
CREAT SERPL-MCNC: 0.47 MG/DL (ref 0.5–1.4)
EPITHELIAL CELLS 1715: ABNORMAL /HPF (ref 0–5)
ERYTHROCYTE [DISTWIDTH] IN BLOOD BY AUTOMATED COUNT: 64.7 FL (ref 35.9–50)
ETHANOL BLD-MCNC: 176 MG/DL
ETHANOL BLD-MCNC: 223.9 MG/DL
GFR SERPLBLD CREATININE-BSD FMLA CKD-EPI: 132 ML/MIN/1.73 M 2
GLOBULIN SER CALC-MCNC: 2.7 G/DL (ref 1.9–3.5)
GLUCOSE SERPL-MCNC: 99 MG/DL (ref 65–99)
GLUCOSE UR STRIP.AUTO-MCNC: NEGATIVE MG/DL
HCT VFR BLD AUTO: 26.8 % (ref 42–52)
HGB BLD-MCNC: 8.4 G/DL (ref 14–18)
KETONES UR STRIP.AUTO-MCNC: NEGATIVE MG/DL
LEUKOCYTE ESTERASE UR QL STRIP.AUTO: NEGATIVE
MCH RBC QN AUTO: 28.8 PG (ref 27–33)
MCHC RBC AUTO-ENTMCNC: 31.3 G/DL (ref 32.3–36.5)
MCV RBC AUTO: 91.8 FL (ref 81.4–97.8)
MICRO URNS: ABNORMAL
NITRITE UR QL STRIP.AUTO: NEGATIVE
PH UR STRIP.AUTO: 7 [PH] (ref 5–8)
PHOSPHATE SERPL-MCNC: 3.1 MG/DL (ref 2.5–4.5)
PLATELET # BLD AUTO: 56 K/UL (ref 164–446)
PLATELETS.RETICULATED NFR BLD AUTO: 3.9 % (ref 0.6–13.1)
PMV BLD AUTO: 10.1 FL (ref 9–12.9)
POTASSIUM SERPL-SCNC: 3.1 MMOL/L (ref 3.6–5.5)
PROT SERPL-MCNC: 5.5 G/DL (ref 6–8.2)
PROT UR QL STRIP: NEGATIVE MG/DL
RBC # BLD AUTO: 2.92 M/UL (ref 4.7–6.1)
RBC # URNS HPF: ABNORMAL /HPF (ref 0–2)
RBC UR QL AUTO: ABNORMAL
SODIUM SERPL-SCNC: 141 MMOL/L (ref 135–145)
SP GR UR STRIP.AUTO: 1.01
UROBILINOGEN UR STRIP.AUTO-MCNC: 2 EU/DL
WBC # BLD AUTO: 3.9 K/UL (ref 4.8–10.8)
WBC #/AREA URNS HPF: ABNORMAL /HPF

## 2025-01-08 PROCEDURE — 76870 US EXAM SCROTUM: CPT

## 2025-01-08 PROCEDURE — 85055 RETICULATED PLATELET ASSAY: CPT

## 2025-01-08 PROCEDURE — 82077 ASSAY SPEC XCP UR&BREATH IA: CPT

## 2025-01-08 PROCEDURE — 83735 ASSAY OF MAGNESIUM: CPT

## 2025-01-08 PROCEDURE — A9270 NON-COVERED ITEM OR SERVICE: HCPCS

## 2025-01-08 PROCEDURE — 84100 ASSAY OF PHOSPHORUS: CPT

## 2025-01-08 PROCEDURE — 85027 COMPLETE CBC AUTOMATED: CPT

## 2025-01-08 PROCEDURE — 80053 COMPREHEN METABOLIC PANEL: CPT

## 2025-01-08 PROCEDURE — 770006 HCHG ROOM/CARE - MED/SURG/GYN SEMI*

## 2025-01-08 PROCEDURE — 96375 TX/PRO/DX INJ NEW DRUG ADDON: CPT

## 2025-01-08 PROCEDURE — 81001 URINALYSIS AUTO W/SCOPE: CPT

## 2025-01-08 PROCEDURE — HZ2ZZZZ DETOXIFICATION SERVICES FOR SUBSTANCE ABUSE TREATMENT: ICD-10-PCS | Performed by: FAMILY MEDICINE

## 2025-01-08 PROCEDURE — 84100 ASSAY OF PHOSPHORUS: CPT | Mod: 91

## 2025-01-08 PROCEDURE — 700111 HCHG RX REV CODE 636 W/ 250 OVERRIDE (IP): Mod: JZ,UD | Performed by: EMERGENCY MEDICINE

## 2025-01-08 PROCEDURE — 700102 HCHG RX REV CODE 250 W/ 637 OVERRIDE(OP)

## 2025-01-08 PROCEDURE — 82140 ASSAY OF AMMONIA: CPT

## 2025-01-08 PROCEDURE — 36415 COLL VENOUS BLD VENIPUNCTURE: CPT

## 2025-01-08 PROCEDURE — 99222 1ST HOSP IP/OBS MODERATE 55: CPT | Mod: GC | Performed by: FAMILY MEDICINE

## 2025-01-08 PROCEDURE — 82077 ASSAY SPEC XCP UR&BREATH IA: CPT | Mod: 91

## 2025-01-08 PROCEDURE — 99285 EMERGENCY DEPT VISIT HI MDM: CPT

## 2025-01-08 RX ORDER — SPIRONOLACTONE 25 MG/1
100 TABLET ORAL DAILY
Status: DISCONTINUED | OUTPATIENT
Start: 2025-01-09 | End: 2025-01-09 | Stop reason: HOSPADM

## 2025-01-08 RX ORDER — LORAZEPAM 1 MG/1
2 TABLET ORAL
Status: DISCONTINUED | OUTPATIENT
Start: 2025-01-08 | End: 2025-01-08 | Stop reason: HOSPADM

## 2025-01-08 RX ORDER — ESCITALOPRAM OXALATE 10 MG/1
10 TABLET ORAL DAILY
Status: DISCONTINUED | OUTPATIENT
Start: 2025-01-09 | End: 2025-01-09

## 2025-01-08 RX ORDER — LORAZEPAM 1 MG/1
3 TABLET ORAL
Status: DISCONTINUED | OUTPATIENT
Start: 2025-01-08 | End: 2025-01-08 | Stop reason: HOSPADM

## 2025-01-08 RX ORDER — CARVEDILOL 3.12 MG/1
3.12 TABLET ORAL 2 TIMES DAILY WITH MEALS
Status: DISCONTINUED | OUTPATIENT
Start: 2025-01-09 | End: 2025-01-09 | Stop reason: HOSPADM

## 2025-01-08 RX ORDER — LORAZEPAM 2 MG/ML
0.5 INJECTION INTRAMUSCULAR EVERY 4 HOURS PRN
Status: DISCONTINUED | OUTPATIENT
Start: 2025-01-08 | End: 2025-01-08 | Stop reason: HOSPADM

## 2025-01-08 RX ORDER — GABAPENTIN 300 MG/1
300 CAPSULE ORAL 3 TIMES DAILY
Status: DISCONTINUED | OUTPATIENT
Start: 2025-01-09 | End: 2025-01-09 | Stop reason: HOSPADM

## 2025-01-08 RX ORDER — POTASSIUM CHLORIDE 1500 MG/1
20 TABLET, EXTENDED RELEASE ORAL DAILY
Status: DISCONTINUED | OUTPATIENT
Start: 2025-01-08 | End: 2025-01-08 | Stop reason: HOSPADM

## 2025-01-08 RX ORDER — LORAZEPAM 2 MG/ML
2 INJECTION INTRAMUSCULAR
Status: DISCONTINUED | OUTPATIENT
Start: 2025-01-08 | End: 2025-01-08 | Stop reason: HOSPADM

## 2025-01-08 RX ORDER — LACTULOSE 10 G/15ML
45 SOLUTION ORAL 4 TIMES DAILY
Status: DISCONTINUED | OUTPATIENT
Start: 2025-01-09 | End: 2025-01-09 | Stop reason: HOSPADM

## 2025-01-08 RX ORDER — LORAZEPAM 1 MG/1
4 TABLET ORAL
Status: DISCONTINUED | OUTPATIENT
Start: 2025-01-08 | End: 2025-01-08 | Stop reason: HOSPADM

## 2025-01-08 RX ORDER — LORAZEPAM 1 MG/1
1 TABLET ORAL EVERY 4 HOURS PRN
Status: DISCONTINUED | OUTPATIENT
Start: 2025-01-08 | End: 2025-01-08 | Stop reason: HOSPADM

## 2025-01-08 RX ORDER — LACTULOSE 10 G/15ML
30 SOLUTION ORAL 3 TIMES DAILY
Status: DISCONTINUED | OUTPATIENT
Start: 2025-01-08 | End: 2025-01-08 | Stop reason: HOSPADM

## 2025-01-08 RX ORDER — LORAZEPAM 2 MG/ML
1 INJECTION INTRAMUSCULAR
Status: DISCONTINUED | OUTPATIENT
Start: 2025-01-08 | End: 2025-01-08 | Stop reason: HOSPADM

## 2025-01-08 RX ORDER — FOLIC ACID 1 MG/1
1 TABLET ORAL DAILY
Status: DISCONTINUED | OUTPATIENT
Start: 2025-01-08 | End: 2025-01-08 | Stop reason: HOSPADM

## 2025-01-08 RX ORDER — LORAZEPAM 0.5 MG/1
0.5 TABLET ORAL EVERY 4 HOURS PRN
Status: DISCONTINUED | OUTPATIENT
Start: 2025-01-08 | End: 2025-01-08 | Stop reason: HOSPADM

## 2025-01-08 RX ORDER — FUROSEMIDE 40 MG/1
40 TABLET ORAL
Status: DISCONTINUED | OUTPATIENT
Start: 2025-01-08 | End: 2025-01-08 | Stop reason: HOSPADM

## 2025-01-08 RX ORDER — ENOXAPARIN SODIUM 100 MG/ML
40 INJECTION SUBCUTANEOUS DAILY
Status: DISCONTINUED | OUTPATIENT
Start: 2025-01-08 | End: 2025-01-08 | Stop reason: HOSPADM

## 2025-01-08 RX ORDER — LORAZEPAM 2 MG/ML
1.5 INJECTION INTRAMUSCULAR
Status: DISCONTINUED | OUTPATIENT
Start: 2025-01-08 | End: 2025-01-08 | Stop reason: HOSPADM

## 2025-01-08 RX ORDER — GAUZE BANDAGE 2" X 2"
100 BANDAGE TOPICAL DAILY
Status: DISCONTINUED | OUTPATIENT
Start: 2025-01-08 | End: 2025-01-08 | Stop reason: HOSPADM

## 2025-01-08 RX ORDER — HYDROXYZINE HYDROCHLORIDE 25 MG/1
25 TABLET, FILM COATED ORAL 3 TIMES DAILY PRN
Status: DISCONTINUED | OUTPATIENT
Start: 2025-01-08 | End: 2025-01-09 | Stop reason: HOSPADM

## 2025-01-08 RX ORDER — AMLODIPINE BESYLATE 5 MG/1
5 TABLET ORAL DAILY
Status: DISCONTINUED | OUTPATIENT
Start: 2025-01-09 | End: 2025-01-09 | Stop reason: HOSPADM

## 2025-01-08 RX ADMIN — Medication 100 MG: at 06:14

## 2025-01-08 RX ADMIN — LACTULOSE 30 ML: 10 SOLUTION ORAL at 06:16

## 2025-01-08 RX ADMIN — FUROSEMIDE 40 MG: 40 TABLET ORAL at 06:15

## 2025-01-08 RX ADMIN — FOLIC ACID 1 MG: 1 TABLET ORAL at 06:15

## 2025-01-08 RX ADMIN — POTASSIUM CHLORIDE 20 MEQ: 1500 TABLET, EXTENDED RELEASE ORAL at 06:15

## 2025-01-08 RX ADMIN — THERA TABS 1 TABLET: TAB at 06:16

## 2025-01-08 RX ADMIN — FENTANYL CITRATE 50 MCG: 50 INJECTION, SOLUTION INTRAMUSCULAR; INTRAVENOUS at 00:00

## 2025-01-08 RX ADMIN — RIFAXIMIN 550 MG: 550 TABLET ORAL at 06:15

## 2025-01-08 ASSESSMENT — SOCIAL DETERMINANTS OF HEALTH (SDOH)
WITHIN THE PAST 12 MONTHS, YOU WORRIED THAT YOUR FOOD WOULD RUN OUT BEFORE YOU GOT THE MONEY TO BUY MORE: OFTEN TRUE
WITHIN THE LAST YEAR, HAVE TO BEEN RAPED OR FORCED TO HAVE ANY KIND OF SEXUAL ACTIVITY BY YOUR PARTNER OR EX-PARTNER?: NO
WITHIN THE LAST YEAR, HAVE YOU BEEN KICKED, HIT, SLAPPED, OR OTHERWISE PHYSICALLY HURT BY YOUR PARTNER OR EX-PARTNER?: NO
WITHIN THE LAST YEAR, HAVE YOU BEEN HUMILIATED OR EMOTIONALLY ABUSED IN OTHER WAYS BY YOUR PARTNER OR EX-PARTNER?: NO
WITHIN THE LAST YEAR, HAVE YOU BEEN AFRAID OF YOUR PARTNER OR EX-PARTNER?: NO
IN THE PAST 12 MONTHS, HAS THE ELECTRIC, GAS, OIL, OR WATER COMPANY THREATENED TO SHUT OFF SERVICE IN YOUR HOME?: PATIENT DECLINED
WITHIN THE PAST 12 MONTHS, THE FOOD YOU BOUGHT JUST DIDN'T LAST AND YOU DIDN'T HAVE MONEY TO GET MORE: OFTEN TRUE

## 2025-01-08 ASSESSMENT — COGNITIVE AND FUNCTIONAL STATUS - GENERAL
DRESSING REGULAR LOWER BODY CLOTHING: A LITTLE
MOVING TO AND FROM BED TO CHAIR: A LOT
TURNING FROM BACK TO SIDE WHILE IN FLAT BAD: A LITTLE
STANDING UP FROM CHAIR USING ARMS: A LOT
TOILETING: A LOT
HELP NEEDED FOR BATHING: TOTAL
CLIMB 3 TO 5 STEPS WITH RAILING: TOTAL
WALKING IN HOSPITAL ROOM: A LOT
MOVING FROM LYING ON BACK TO SITTING ON SIDE OF FLAT BED: A LITTLE
MOBILITY SCORE: 13
DRESSING REGULAR UPPER BODY CLOTHING: A LOT
DAILY ACTIVITIY SCORE: 15
SUGGESTED CMS G CODE MODIFIER MOBILITY: CL
SUGGESTED CMS G CODE MODIFIER DAILY ACTIVITY: CK
PERSONAL GROOMING: A LITTLE

## 2025-01-08 ASSESSMENT — LIFESTYLE VARIABLES
NAUSEA AND VOMITING: NO NAUSEA AND NO VOMITING
TOTAL SCORE: 3
VISUAL DISTURBANCES: NOT PRESENT
TOTAL SCORE: 3
TREMOR: NO TREMOR
DOES PATIENT WANT TO STOP DRINKING: YES
HOW MANY TIMES IN THE PAST YEAR HAVE YOU HAD 5 OR MORE DRINKS IN A DAY: 180
PAROXYSMAL SWEATS: NO SWEAT VISIBLE
HEADACHE, FULLNESS IN HEAD: NOT PRESENT
ALCOHOL_USE: YES
HAVE PEOPLE ANNOYED YOU BY CRITICIZING YOUR DRINKING: YES
EVER HAD A DRINK FIRST THING IN THE MORNING TO STEADY YOUR NERVES TO GET RID OF A HANGOVER: NO
TOTAL SCORE: 0
AUDITORY DISTURBANCES: NOT PRESENT
AGITATION: NORMAL ACTIVITY
HAVE YOU EVER FELT YOU SHOULD CUT DOWN ON YOUR DRINKING: YES
ON A TYPICAL DAY WHEN YOU DRINK ALCOHOL HOW MANY DRINKS DO YOU HAVE: 4
ANXIETY: NO ANXIETY (AT EASE)
CONSUMPTION TOTAL: POSITIVE
EVER FELT BAD OR GUILTY ABOUT YOUR DRINKING: YES
ORIENTATION AND CLOUDING OF SENSORIUM: ORIENTED AND CAN DO SERIAL ADDITIONS
TOTAL SCORE: 3
DOES PATIENT WANT TO TALK TO SOMEONE ABOUT QUITTING: YES
AVERAGE NUMBER OF DAYS PER WEEK YOU HAVE A DRINK CONTAINING ALCOHOL: 7

## 2025-01-08 ASSESSMENT — PATIENT HEALTH QUESTIONNAIRE - PHQ9
1. LITTLE INTEREST OR PLEASURE IN DOING THINGS: SEVERAL DAYS
8. MOVING OR SPEAKING SO SLOWLY THAT OTHER PEOPLE COULD HAVE NOTICED. OR THE OPPOSITE, BEING SO FIGETY OR RESTLESS THAT YOU HAVE BEEN MOVING AROUND A LOT MORE THAN USUAL: NOT AT ALL
2. FEELING DOWN, DEPRESSED, IRRITABLE, OR HOPELESS: SEVERAL DAYS
7. TROUBLE CONCENTRATING ON THINGS, SUCH AS READING THE NEWSPAPER OR WATCHING TELEVISION: NOT AT ALL
5. POOR APPETITE OR OVEREATING: NOT AT ALL
SUM OF ALL RESPONSES TO PHQ9 QUESTIONS 1 AND 2: 2
3. TROUBLE FALLING OR STAYING ASLEEP OR SLEEPING TOO MUCH: SEVERAL DAYS
4. FEELING TIRED OR HAVING LITTLE ENERGY: SEVERAL DAYS
9. THOUGHTS THAT YOU WOULD BE BETTER OFF DEAD, OR OF HURTING YOURSELF: NOT AT ALL
6. FEELING BAD ABOUT YOURSELF - OR THAT YOU ARE A FAILURE OR HAVE LET YOURSELF OR YOUR FAMILY DOWN: NOT AL ALL
SUM OF ALL RESPONSES TO PHQ QUESTIONS 1-9: 4

## 2025-01-08 ASSESSMENT — COPD QUESTIONNAIRES
COPD SCREENING SCORE: 2
DO YOU EVER COUGH UP ANY MUCUS OR PHLEGM?: NO/ONLY WITH OCCASIONAL COLDS OR INFECTIONS
HAVE YOU SMOKED AT LEAST 100 CIGARETTES IN YOUR ENTIRE LIFE: YES
DURING THE PAST 4 WEEKS HOW MUCH DID YOU FEEL SHORT OF BREATH: NONE/LITTLE OF THE TIME

## 2025-01-08 ASSESSMENT — FIBROSIS 4 INDEX
FIB4 SCORE: 10.37
FIB4 SCORE: 10.37

## 2025-01-08 NOTE — PROGRESS NOTES
4 Eyes Skin Assessment Completed by GABRIELE Young and GABRIELE Peck.    Head WDL  Ears WDL  Nose WDL  Mouth WDL  Neck WDL  Breast/Chest WDL  Shoulder Blades WDL  Spine WDL  (R) Arm/Elbow/Hand Bruising  (L) Arm/Elbow/Hand WDL  Abdomen Bruising  Groin Redness and Blanching  Scrotum/Coccyx/Buttocks Scrotal edema   (R) Leg Scab, bruising, swelling  (L) Leg Scab, Bruising, and Swelling  (R) Heel/Foot/Toe WDL  (L) Heel/Foot/Toe WDL          Devices In Places SCD's      Interventions In Place Pillows    Possible Skin Injury No    Pictures Uploaded Into Epic Yes  Wound Consult Placed N/A  RN Wound Prevention Protocol Ordered No

## 2025-01-08 NOTE — ASSESSMENT & PLAN NOTE
Scrotal pain as being a common complaint of patient.  Scrotal ultrasound showed normal testicles with no testicular mass or testicular torsion. Marked scrotal edema but no drainable fluid collections. Patient receieved a dose of fentanyl in the ED. Notable b/l lower extremities swelling (chronic).  - Furosemide 40 mg daily  - Ibuprofen as needed for pain

## 2025-01-08 NOTE — ED PROVIDER NOTES
ED Provider Note    CHIEF COMPLAINT  Chief Complaint   Patient presents with    Testicle Pain     Pt states testicle pain with swelling x10 days    Hand Swelling     Bilateral hand swelling x10 days      EXTERNAL RECORDS REVIEWED  External ED Note supple prior encounters most recently noted from 1/2 and 1/3 where the patient was seen for alcohol intoxication, undomiciled, coming from [facility], had ground-level falls in the last couple of days, weakness, most recent admission from 1/5 for acute hepatic encephalopathy and alcohol intoxication in the setting of chronic liver disease.  Admitted on 1/5, discharged on 1/6, patient has chronic alcohol induced cirrhosis, had lower extremity edema and scrotal swelling with concurrent alcohol intoxication.  During this most recent encounter and admission he had a CT head that was unremarkable for traumatic injury, he was started on IV Lasix and restarted on rifaximin and lactulose.  He apparently had very quick resolution of his mental status and left AMA shortly thereafter.    Recent discharge summary from hearing shows patient was admitted with anasarca, cognitive impairments, alcohol related cirrhosis and ongoing testicular pain.  He had a prior TIPS or procedure at the end of December he was found to be flu a positive.  Was admitted for alcohol withdrawal, edema in the legs had concerning for possible development of cellulitis.  Again he left AMA prior to completion of the workup T. bili elevation in the twos, chronic anemia in the eights,    HPI/ROS  LIMITATION TO HISTORY   Select: Intoxication  OUTSIDE HISTORIAN(S):  none    Jalen Vaughn is a 44 y.o. male who presents to room for concerns regarding escalating pain and discomfort that he says is worse in his testicles.  He says that this has been difficult thing for him to figure out for how long.  He continues to drink, he was in the hospital recently and said he tried to leave as he wanted to coordinate  going to [place] to see his old established liver doctor however he was unable to make it and was having more pain and discomfort and says while he was at the homeless shelter his medications including his lactulose and Bumex were taken.  He has had problems with urinary tract infections in the past and does have some burning with urination currently.  He endorses he continues to have some swelling both in his legs and his testicles and his hands which she has been dealing with for some time.  He feels like it is worse though difficult to quantify as he appears acutely intoxicated.  He denies shortness of breath or chest pain, no developing belly discomfort and denies any diarrheal illness or bowel changes.    PAST MEDICAL HISTORY   has a past medical history of Cirrhosis of liver (HCC), Hypertension, Liver disease, and Seizure (HCC).    SURGICAL HISTORY   has a past surgical history that includes appendectomy; cholecystectomy; and upper gi endoscopy,diagnosis (N/A, 10/27/2024).    FAMILY HISTORY  Family History   Problem Relation Age of Onset    Heart Disease Mother     Kidney Disease Mother     Heart Disease Father     Kidney Disease Father        SOCIAL HISTORY  Social History     Tobacco Use    Smoking status: Every Day     Types: Cigarettes    Smokeless tobacco: Former     Types: Chew   Vaping Use    Vaping status: Never Used   Substance and Sexual Activity    Alcohol use: Yes     Comment: 1 beer/day    Drug use: Not Currently    Sexual activity: Not on file     CURRENT MEDICATIONS  Home Medications       Reviewed by Ángela Juarez R.N. (Registered Nurse) on 01/07/25 at 1939  Med List Status: Partial     Medication Last Dose Status   amLODIPine (NORVASC) 5 MG Tab  Active   bumetanide (BUMEX) 2 MG tablet  Active   Calcium Carbonate Antacid 1000 MG Chew Tab  Active   carvedilol (COREG) 3.125 MG Tab  Active   carvedilol (COREG) 3.125 MG Tab  Active   escitalopram (LEXAPRO) 20 MG tablet  Active   folic acid  "(FOLVITE) 1 MG Tab  Active   gabapentin (NEURONTIN) 300 MG Cap  Active   hydrOXYzine HCl (ATARAX) 25 MG Tab  Active   lactulose 20 GM/30ML Solution  Active   omeprazole (PRILOSEC) 20 MG delayed-release capsule  Active   permethrin (ELIMITE) 5 % Cream  Active   potassium Chloride ER (K-TAB) 20 MEQ Tab CR tablet  Active   riFAXIMin (XIFAXAN) 550 MG Tab tablet  Active   spironolactone (ALDACTONE) 100 MG Tab  Active   spironolactone (ALDACTONE) 100 MG Tab  Active   vitamin D2, Ergocalciferol, (DRISDOL) 1.25 MG (48842 UT) Cap capsule  Active                  Audit from Redirected Encounters    **Home medications have not yet been reviewed for this encounter**       ALLERGIES  Allergies   Allergen Reactions    Latex Itching     PHYSICAL EXAM  VITAL SIGNS: BP (!) 157/91   Pulse 77   Temp 36.3 °C (97.4 °F) (Temporal)   Resp 18   Ht 1.93 m (6' 4\")   Wt (!) 144 kg (317 lb)   SpO2 97%   BMI 38.59 kg/m²    Genl: chronically ill appearing M, sitting in gurney comfortably, speaking slurred, appears in no acute distress   Head: NC/AT   ENT: Mucous membranes moist, posterior pharynx clear, uvula midline, nares patent bilaterally   Eyes: Normal sclera, pupils equal round reactive to light  Neck: Supple, FROM, no LAD appreciated   Pulmonary: Lungs are clear to auscultation bilaterally  Chest: No TTP  CV:  RRR, no murmur appreciated  Abdomen: soft, NT/ND; no rebound/guarding, no masses palpated  : no CVA tenderness.  Mild suprapubic discomfort, swelling and discomfort around the bilateral testicles with no obvious induration or appreciable pain or discomfort along the perineum or proximal legs.  No obvious fluctuance.  Urethral meatus appears to have some discharge from foreskin.  Musculoskeletal: Pain free ROM of the neck. Moving upper and lower extremities in spontaneous and coordinated fashion  Neuro: A&Ox4 (person, place, time, situation), speech fluent, gait steady, no focal deficits appreciated  Skin: as " "above    EKG/LABS  Labs Reviewed   CBC WITH DIFFERENTIAL - Abnormal; Notable for the following components:       Result Value    WBC 4.1 (*)     RBC 2.71 (*)     Hemoglobin 8.0 (*)     Hematocrit 24.8 (*)     RDW 65.3 (*)     Platelet Count 58 (*)     Macrocytosis 2+ (*)     All other components within normal limits   COMP METABOLIC PANEL - Abnormal; Notable for the following components:    Potassium 3.2 (*)     Glucose 105 (*)     Bun 5 (*)     Calcium 7.9 (*)     AST(SGOT) 81 (*)     Alkaline Phosphatase 148 (*)     Total Bilirubin 2.6 (*)     Albumin 2.5 (*)     Total Protein 5.2 (*)     All other components within normal limits   PROTHROMBIN TIME - Abnormal; Notable for the following components:    PT 19.7 (*)     INR 1.66 (*)     All other components within normal limits   AMMONIA - Abnormal; Notable for the following components:    Ammonia 112 (*)     All other components within normal limits   LACTIC ACID   APTT   MAGNESIUM   ESTIMATED GFR   PLATELET ESTIMATE   MORPHOLOGY   PERIPHERAL SMEAR REVIEW   IMMATURE PLT FRACTION   DIFFERENTIAL COMMENT   URINALYSIS   DIAGNOSTIC ALCOHOL     RADIOLOGY/PROCEDURES   I have independently interpreted the diagnostic imaging associated with this visit and am waiting the final reading from the radiologist.   My preliminary interpretation is as follows: Intact flow, no loculated fluid collection, global edema    Radiologist interpretation:  [id number]    (Results Pending)     COURSE & MEDICAL DECISION MAKING    ASSESSMENT, COURSE AND PLAN  Care Narrative: Patient presents emergency room for symptoms as described above.  Patient is apparently intoxicated, has a history of hepatic encephalopathy and does have some word finding difficulty and is supposed to be on a multitude of chronic medications of which she says \"stolen.\"  He is having only localizing pain and discomfort in the scrotum which appears to have swelling and his chronic edema in his lower legs and hands " appear unchanged from was previously documented.  With his extensive past medical history and medication compliance issues, I am concerned as he continues to drink, is obese, I do have concerns about the possibility of missed UTI, infectious etiology though he does not appear to have crepitus in the pelvis that would point towards any sort of emergent rapid progressive skin infection.  He apparently metabolized well with reinitiation of lactulose another treatments for his hepatic encephalopathy and left AGAINST MEDICAL ADVICE of which she has done a multitude of different occasions.  He finds it difficult to walk because of his discoordination and level of intoxication at this time.    Lab work shows leukopenia, chronic anemia, no acute changes to previously described bilirubin abnormalities, chronic AST changes.  Subtle hypocalcemia and hypokalemia with no anion gap or bicarb changes.  No EZEQUIEL, ammonia level is elevated at 112.  Findings consistent with his prior chronic liver disease    Scrotum US shows no evidence of acute fluid collection or impaired blood flow and no evidence of free air on the initial read.  Final read is pending.    This time the patient has some subtle confusion, medication noncompliance, he has changes of global anasarca and has not been taking his Bumex and has not been taking his lactulose with some subtle confusion and pneumonia level of 112.    This time the patient is shaky when standing with no focal neurological deficit, no evidence of trauma, known history of chronic liver disease with lab changes supporting this with no significant new anemia and a ammonia level that is elevated secondary to noncompliance with his prior medications.  Benefit from reinitiation of these medications, further social work consultation for helping him establish further outpatient planning.  I spoke with the [facility] family medicine who is familiar with the patient will admit in guarded condition.    FINAL  DIAGNOSIS  1. Hepatic encephalopathy (HCC)    2. Chronic alcohol abuse    3. 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 Chronic anemia      Electronically signed by: Alex Edmondson M.D., 1/7/2025 9:49 PM

## 2025-01-08 NOTE — DISCHARGE SUMMARY
[facility] DISCHARGE SUMMARY     ADMIT DATE: 1/7/2025       DISCHARGE DATE: 1/8/2025    SERVICE: [facility]  ATTENDING PHYSICIAN: Reina Edmondson M.D.   SENIOR RESIDENT: Vashti Maher M.D.    FINAL DIAGNOSES:   1. Hepatic encephalopathy (HCC)        2. Chronic alcohol abuse        3. 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 Chronic anemia             HOSPITAL COURSE:   Jalen Schmidt is a 44-year-old for male with past medical history of alcohol use disorder, alcoholic cirrhosis and hepatic encephalopathy status post TIPS and homelessness who presented to the emergency room on 1/7 due to complaint of ongoing scrotal swelling.  Patient also reported to have lost medications which included rifaximin, lactulose,  thiamine, multivitamins, and Lasix.  In the emergency room patient with notable elevation iof AST 81, ALT 32, Alk Phos 148, Total bilirubin 2.6, albumin 2.5, ammonia 112,  diagnostic alcohol 176. Vitals are reassuring.  Urinalysis indicated ocular blood.  Scrotal ultrasound indicated normal testicles with no testicular mass or torsion present but marked scrotal edema.  Patient was then admitted to our service for ongoing management of hepatic encephalopathy, and alcohol withdrawal.  Scrotum swelling as well as bilateral pitting edema is likely due to ongoing liver failure.  Patient has been admitted to the hospital for alcohol use disorder complications over 10 times in the past year.  Patient has left AMA multiple times.  On admission patient was restarted on his home medications including rifaximin, lactulose, Lasix, and thiamine.  Received a message from nursing staff at 7:23 AM that patient was going to leave as he had a bus ticket for 9 AM.  Prior to me evaluating patient patient then left AMA.  Patient was not seen this morning and physical exam was not performed.  Patient only initiated on home regimen for cirrhosis due to alcohol use disorder.    CONSULTANTS:    None    PROCEDURES:   None    IMAGING/ LABS:   [id number]   Final Result      1.  Normal testicles. No testicular mass or testicular torsion.   2.  Marked scrotal edema. No drainable fluid collections.            DISCHARGE MEDICATIONS:     Medication Reconciliation Completed     Medication List        ASK your doctor about these medications        Instructions   amLODIPine 5 MG Tabs  Commonly known as: Norvasc   Take 1 Tablet by mouth every day.  Dose: 5 mg     Antacid Ultra Strength 1000 MG Chew  Generic drug: Calcium Carbonate Antacid   Chew 1 Tablet 3 times a day with meals for 90 days.  Dose: 1,000 mg     bumetanide 2 MG tablet  Commonly known as: Bumex   Take 1 Tablet by mouth every day.  Dose: 2 mg     * carvedilol 3.125 MG Tabs  Commonly known as: Coreg   Take 1 Tablet by mouth 2 times a day with meals for 30 days.  Dose: 3.125 mg     * carvedilol 3.125 MG Tabs  Commonly known as: Coreg   Take 1 Tablet by mouth 2 times a day with meals.  Dose: 3.125 mg     escitalopram 20 MG tablet  Commonly known as: Lexapro   Take 1 Tablet by mouth every day for 90 days.  Dose: 20 mg     folic acid 1 MG Tabs  Commonly known as: Folvite   Take 1 Tablet by mouth every day for 90 days.  Dose: 1 mg     gabapentin 300 MG Caps  Commonly known as: Neurontin   Take 1 Capsule by mouth 3 times a day for 90 days.  Dose: 300 mg     hydrOXYzine HCl 25 MG Tabs  Commonly known as: Atarax   Take 1 Tablet by mouth 3 times a day as needed for Anxiety.  Dose: 25 mg     lactulose 10 g/15mL Soln   Take 45 mL by mouth 4 times a day for 90 days.  Dose: 45 mL     omeprazole 20 MG delayed-release capsule  Commonly known as: PriLOSEC   Take 1 Capsule by mouth 2 times a day for 90 days.  Dose: 20 mg     permethrin 5 % Crea  Commonly known as: Elimite   Apply to affected areas topically from head to toe, leave on overnight, and wash off in the morning.     potassium Chloride ER 20 MEQ Tbcr tablet  Commonly known as: K-Tab   Take 1 Tablet by  mouth 2 times a day for 90 days.  Dose: 20 mEq     * spironolactone 100 MG Tabs  Commonly known as: Aldactone   Take 1 Tablet by mouth every day.  Dose: 100 mg     * spironolactone 100 MG Tabs  Commonly known as: Aldactone   Take 1 Tablet by mouth every day for 90 days.  Dose: 100 mg     vitamin D2 (Ergocalciferol) 1.25 MG (28123 UT) Caps capsule  Commonly known as: Drisdol   Take 1 Capsule by mouth every 7 days for 7 doses.  Dose: 50,000 Units     Xifaxan 550 MG Tabs tablet  Generic drug: riFAXIMin   Take 1 Tablet by mouth 2 times a day for 90 days.  Dose: 550 mg           * This list has 4 medication(s) that are the same as other medications prescribed for you. Read the directions carefully, and ask your doctor or other care provider to review them with you.                   DISPOSITION: Patient left AMA        DISCHARGE LABS:    Recent Labs     01/05/25 2050 01/06/25  0413 01/07/25  2235   WBC 5.60 3.60 4.1*   RBC 3.01* 2.67* 2.71*   HEMOGLOBIN 8.9* 8.0* 8.0*   HEMATOCRIT 27.8* 24.1* 24.8*   MCV 92.3 90.1 91.5   MCH 29.6 30.0 29.5   RDW 19.5* 19.6* 65.3*   PLATELETCT 47* 68* 58*   MPV 9.4 8.0  --    NEUTSPOLYS 57.2 48.7 52.00   LYMPHOCYTES 29.9 33.4 31.30   MONOCYTES 10.4* 16.2* 13.40   EOSINOPHILS 1.7 1.3 2.90   BASOPHILS 0.8 0.4 0.20   RBCMORPHOLO  --   --  Present     Lab Results   Component Value Date    IVWABRIJ04 2961 (H) 10/22/2024    KVQSEEIM63 1357 (H) 12/18/2021    FOLATE 22.0 11/03/2024    FERRITIN 149.0 12/25/2024    FERRITIN 74.4 11/03/2024    IRON 137 12/25/2024    IRON 243 (H) 11/03/2024    TOTIRONBC 225 (L) 12/25/2024    TOTIRONBC see below 11/03/2024       Estimated GFR/CRCL = Estimated Creatinine Clearance: 262.7 mL/min (by C-G formula based on SCr of 0.57 mg/dL).  Recent Labs     01/05/25 2050 01/06/25  0413 01/06/25  1049 01/07/25 2235 01/08/25 0416   SODIUM 146* 151* 150* 139  --    POTASSIUM 3.5 3.1*  --  3.2*  --    CHLORIDE 116* 116*  --  110  --    CO2 20.0 24.0  --  20  --    BUN  "6.0 6.0  --  5*  --    CREATININE 0.50* 0.50*  --  0.57  --    CALCIUM 8.2* 7.9*  --  7.9*  --    MAGNESIUM  --   --   --  1.5  --    PHOSPHORUS 3.4  --   --   --  3.1   ALBUMIN 2.8* 2.3*  --  2.5*  --        Recent Labs     01/05/25 2050 01/06/25 0413 01/07/25 2235   ALTSGPT 35 30 32   ASTSGOT 73* 61* 81*   ALKPHOSPHAT 189* 150* 148*   TBILIRUBIN 2.9* 2.6* 2.6*   DBILIRUBIN 1.70*  --   --    ALBUMIN 2.8* 2.3* 2.5*   GLOBULIN  --   --  2.7   INR  --   --  1.66*   MACROCYTOSIS  --   --  2+*       No results for input(s): \"POCGLUCOSE\" in the last 72 hours.  Lab Results   Component Value Date    HBA1C 4.9 01/10/2022       INSTRUCTIONS:   The patient was instructed to return to the ER in the event of worsening symptoms. I have counseled the patient on the importance of compliance and the patient has agreed to proceed with all medical recommendations and follow up plan indicated above.   The patient understands that all medications come with benefits and risks. Risks may include permanent injury or death and these risks can be minimized with close reassessment and monitoring.        PCP:  Mann Johnson M.D.  Copy of discharge summary given to the patient    Vashti Maher M.D. PGY-3 [facility]    "

## 2025-01-08 NOTE — ASSESSMENT & PLAN NOTE
Patient is A&O to self place and event.  Ammonia level elevated at 112. Patient was last seen at [facility] on 01/5 for hepatic encephalopathy, responded well to Lasix and rifaximin and lactulose but left AMA upon improvement of symptoms Patient was discharged with rifaximin, however, he has not been compliant with his medication.    - Continue lactulose inpatient, 30ml TID  - Continue rifaximin 550mg BID  - Continue daily thiamine, folic acid and multivitamins

## 2025-01-08 NOTE — ASSESSMENT & PLAN NOTE
Patient with history of alcohol use disorder, alcoholic cirrhosis (s/p TIPS) and multiple ER visits and hospitalizations for alcohol use disorder, intoxication.  Check alcohol level at 176.  Hepatic encephatlopathy secondary to alcoholic cirrhosis.   - Monitor for signs of withdrawals (seizure, tremor, slurred speech, hallucination, DT). Patient is A&O to self place and event.  Ammonia level elevated at 112. Patient was last seen at [facility] on 01/5 for hepatic encephalopathy, responded well to Lasix and rifaximin and lactulose but left AMA upon improvement of symptoms Patient was discharged with rifaximin, however, he has not been compliant with his medication.    - Continue lactulose inpatient, 30ml TID  - Continue rifaximin 550mg BID  - Continue daily thiamine, folic acid and multivitamins  - CIWA protocol in place  - Continue thiamine, folic acid multivitamins daily  - Continue to monitor electrolytes, magnesium and phosphate level  - Lorazepam as needed for withdrawal symptoms  - Fall, aspiration, seizure precautions in place
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
## 2025-01-08 NOTE — ED TRIAGE NOTES
Jalen Torres Roderick  44 y.o. male  Chief Complaint   Patient presents with    Testicle Pain     Pt states testicle pain with swelling x10 days    Hand Swelling     Bilateral hand swelling x10 days      Pt to triage via ED wheelchair. Pt states he has an appointment Friday with his liver specialist.     Pt is alert, oriented, and follows commands. Pt speaking in full sentences and responds appropriately to questions. No acute distress noted in triage. Respirations are even and unlabored.     Pt to lobby and educated on triage process. Pt encouraged to alert triage staff for any changes in condition. Pt signed up for messaging updates prior to leaving triage room.    Vitals:    01/07/25 1922   BP: (!) 157/91   Pulse: 77   Resp: 18   Temp: 36.3 °C (97.4 °F)   SpO2: 97%

## 2025-01-08 NOTE — ASSESSMENT & PLAN NOTE
Decreased serum potassium of 3.2 on presentation.  - Daily potassium chloride 20 mg  - Replete as necessary  - Continue to monitor on BMP
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
## 2025-01-09 ENCOUNTER — PHARMACY VISIT (OUTPATIENT)
Dept: PHARMACY | Facility: MEDICAL CENTER | Age: 45
End: 2025-01-09
Payer: COMMERCIAL

## 2025-01-09 ENCOUNTER — APPOINTMENT (OUTPATIENT)
Dept: CARDIOLOGY | Facility: MEDICAL CENTER | Age: 45
End: 2025-01-09
Payer: COMMERCIAL

## 2025-01-09 VITALS
OXYGEN SATURATION: 91 % | HEIGHT: 76 IN | HEART RATE: 80 BPM | SYSTOLIC BLOOD PRESSURE: 160 MMHG | DIASTOLIC BLOOD PRESSURE: 90 MMHG | TEMPERATURE: 98 F | WEIGHT: 315 LBS | BODY MASS INDEX: 38.36 KG/M2 | RESPIRATION RATE: 17 BRPM

## 2025-01-09 PROBLEM — F10.930 ALCOHOL WITHDRAWAL SYNDROME WITHOUT COMPLICATION (HCC): Status: ACTIVE | Noted: 2025-01-08

## 2025-01-09 LAB
MAGNESIUM SERPL-MCNC: 1.7 MG/DL (ref 1.5–2.5)
PHOSPHATE SERPL-MCNC: 3.6 MG/DL (ref 2.5–4.5)

## 2025-01-09 PROCEDURE — 700102 HCHG RX REV CODE 250 W/ 637 OVERRIDE(OP): Mod: UD

## 2025-01-09 PROCEDURE — A9270 NON-COVERED ITEM OR SERVICE: HCPCS | Mod: UD

## 2025-01-09 PROCEDURE — 96365 THER/PROPH/DIAG IV INF INIT: CPT

## 2025-01-09 PROCEDURE — RXMED WILLOW AMBULATORY MEDICATION CHARGE

## 2025-01-09 PROCEDURE — 700101 HCHG RX REV CODE 250: Mod: UD

## 2025-01-09 PROCEDURE — 99222 1ST HOSP IP/OBS MODERATE 55: CPT | Mod: GC | Performed by: FAMILY MEDICINE

## 2025-01-09 PROCEDURE — 700111 HCHG RX REV CODE 636 W/ 250 OVERRIDE (IP): Mod: UD

## 2025-01-09 PROCEDURE — 700105 HCHG RX REV CODE 258: Mod: UD

## 2025-01-09 RX ORDER — LORAZEPAM 2 MG/ML
1 INJECTION INTRAMUSCULAR
Status: DISCONTINUED | OUTPATIENT
Start: 2025-01-08 | End: 2025-01-09 | Stop reason: HOSPADM

## 2025-01-09 RX ORDER — LORAZEPAM 1 MG/1
1 TABLET ORAL EVERY 4 HOURS PRN
Status: DISCONTINUED | OUTPATIENT
Start: 2025-01-08 | End: 2025-01-09 | Stop reason: HOSPADM

## 2025-01-09 RX ORDER — FOLIC ACID 1 MG/1
1 TABLET ORAL DAILY
Qty: 90 TABLET | Refills: 0 | Status: SHIPPED | OUTPATIENT
Start: 2025-01-10

## 2025-01-09 RX ORDER — ENOXAPARIN SODIUM 100 MG/ML
40 INJECTION SUBCUTANEOUS DAILY
Status: DISCONTINUED | OUTPATIENT
Start: 2025-01-09 | End: 2025-01-09 | Stop reason: HOSPADM

## 2025-01-09 RX ORDER — BUMETANIDE 2 MG/1
2 TABLET ORAL DAILY
Qty: 90 TABLET | Refills: 0 | Status: SHIPPED | OUTPATIENT
Start: 2025-01-09

## 2025-01-09 RX ORDER — BUMETANIDE 1 MG/1
2 TABLET ORAL
Status: DISCONTINUED | OUTPATIENT
Start: 2025-01-09 | End: 2025-01-09 | Stop reason: HOSPADM

## 2025-01-09 RX ORDER — LACTULOSE 10 G/15ML
45 SOLUTION ORAL 4 TIMES DAILY
Qty: 473 ML | Refills: 0 | Status: SHIPPED | OUTPATIENT
Start: 2025-01-09

## 2025-01-09 RX ORDER — CHLORDIAZEPOXIDE HYDROCHLORIDE 25 MG/1
25 CAPSULE, GELATIN COATED ORAL EVERY 6 HOURS
Status: DISCONTINUED | OUTPATIENT
Start: 2025-01-10 | End: 2025-01-09 | Stop reason: HOSPADM

## 2025-01-09 RX ORDER — FOLIC ACID 1 MG/1
1 TABLET ORAL DAILY
Status: DISCONTINUED | OUTPATIENT
Start: 2025-01-09 | End: 2025-01-09 | Stop reason: HOSPADM

## 2025-01-09 RX ORDER — LORAZEPAM 1 MG/1
0.5 TABLET ORAL EVERY 4 HOURS PRN
Status: DISCONTINUED | OUTPATIENT
Start: 2025-01-08 | End: 2025-01-09 | Stop reason: HOSPADM

## 2025-01-09 RX ORDER — FUROSEMIDE 40 MG/1
40 TABLET ORAL DAILY
Qty: 90 TABLET | Refills: 0 | Status: SHIPPED | OUTPATIENT
Start: 2025-01-09

## 2025-01-09 RX ORDER — ESCITALOPRAM OXALATE 10 MG/1
20 TABLET ORAL DAILY
Status: DISCONTINUED | OUTPATIENT
Start: 2025-01-09 | End: 2025-01-09 | Stop reason: HOSPADM

## 2025-01-09 RX ORDER — LORAZEPAM 2 MG/ML
1.5 INJECTION INTRAMUSCULAR
Status: DISCONTINUED | OUTPATIENT
Start: 2025-01-08 | End: 2025-01-09 | Stop reason: HOSPADM

## 2025-01-09 RX ORDER — SPIRONOLACTONE 100 MG/1
100 TABLET, FILM COATED ORAL DAILY
Qty: 90 TABLET | Refills: 0 | Status: SHIPPED | OUTPATIENT
Start: 2025-01-10

## 2025-01-09 RX ORDER — ACETAMINOPHEN 325 MG/1
650 TABLET ORAL EVERY 6 HOURS PRN
Status: DISCONTINUED | OUTPATIENT
Start: 2025-01-09 | End: 2025-01-09 | Stop reason: HOSPADM

## 2025-01-09 RX ORDER — POTASSIUM CHLORIDE 1500 MG/1
40 TABLET, EXTENDED RELEASE ORAL ONCE
Status: COMPLETED | OUTPATIENT
Start: 2025-01-09 | End: 2025-01-09

## 2025-01-09 RX ORDER — GAUZE BANDAGE 2" X 2"
100 BANDAGE TOPICAL DAILY
Status: DISCONTINUED | OUTPATIENT
Start: 2025-01-09 | End: 2025-01-09 | Stop reason: HOSPADM

## 2025-01-09 RX ORDER — LORAZEPAM 2 MG/1
2 TABLET ORAL
Status: DISCONTINUED | OUTPATIENT
Start: 2025-01-08 | End: 2025-01-09 | Stop reason: HOSPADM

## 2025-01-09 RX ORDER — CHLORDIAZEPOXIDE HYDROCHLORIDE 25 MG/1
50 CAPSULE, GELATIN COATED ORAL EVERY 6 HOURS
Status: DISCONTINUED | OUTPATIENT
Start: 2025-01-09 | End: 2025-01-09 | Stop reason: HOSPADM

## 2025-01-09 RX ORDER — LORAZEPAM 2 MG/1
4 TABLET ORAL
Status: DISCONTINUED | OUTPATIENT
Start: 2025-01-08 | End: 2025-01-09 | Stop reason: HOSPADM

## 2025-01-09 RX ORDER — LORAZEPAM 2 MG/ML
0.5 INJECTION INTRAMUSCULAR EVERY 4 HOURS PRN
Status: DISCONTINUED | OUTPATIENT
Start: 2025-01-08 | End: 2025-01-09 | Stop reason: HOSPADM

## 2025-01-09 RX ORDER — LORAZEPAM 2 MG/ML
2 INJECTION INTRAMUSCULAR
Status: DISCONTINUED | OUTPATIENT
Start: 2025-01-08 | End: 2025-01-09 | Stop reason: HOSPADM

## 2025-01-09 RX ORDER — LANOLIN ALCOHOL/MO/W.PET/CERES
100 CREAM (GRAM) TOPICAL DAILY
Qty: 90 TABLET | Refills: 0 | Status: SHIPPED | OUTPATIENT
Start: 2025-01-10

## 2025-01-09 RX ADMIN — ESCITALOPRAM OXALATE 20 MG: 10 TABLET ORAL at 06:24

## 2025-01-09 RX ADMIN — SPIRONOLACTONE 100 MG: 25 TABLET ORAL at 01:32

## 2025-01-09 RX ADMIN — CHLORDIAZEPOXIDE HYDROCHLORIDE 50 MG: 25 CAPSULE ORAL at 00:52

## 2025-01-09 RX ADMIN — GABAPENTIN 300 MG: 300 CAPSULE ORAL at 06:26

## 2025-01-09 RX ADMIN — FOLIC ACID: 5 INJECTION, SOLUTION INTRAMUSCULAR; INTRAVENOUS; SUBCUTANEOUS at 01:00

## 2025-01-09 RX ADMIN — CARVEDILOL 3.12 MG: 3.12 TABLET, FILM COATED ORAL at 07:08

## 2025-01-09 RX ADMIN — LACTULOSE 45 ML: 10 SOLUTION ORAL at 00:53

## 2025-01-09 RX ADMIN — AMLODIPINE BESYLATE 5 MG: 5 TABLET ORAL at 00:53

## 2025-01-09 RX ADMIN — GABAPENTIN 300 MG: 300 CAPSULE ORAL at 00:53

## 2025-01-09 RX ADMIN — THERA TABS 1 TABLET: TAB at 06:24

## 2025-01-09 RX ADMIN — CHLORDIAZEPOXIDE HYDROCHLORIDE 50 MG: 25 CAPSULE ORAL at 11:15

## 2025-01-09 RX ADMIN — OMEPRAZOLE 20 MG: 20 CAPSULE, DELAYED RELEASE ORAL at 00:52

## 2025-01-09 RX ADMIN — POTASSIUM CHLORIDE 40 MEQ: 1500 TABLET, EXTENDED RELEASE ORAL at 00:55

## 2025-01-09 RX ADMIN — RIFAXIMIN 550 MG: 550 TABLET ORAL at 00:53

## 2025-01-09 RX ADMIN — FOLIC ACID 1 MG: 1 TABLET ORAL at 06:24

## 2025-01-09 RX ADMIN — Medication 100 MG: at 06:24

## 2025-01-09 RX ADMIN — CHLORDIAZEPOXIDE HYDROCHLORIDE 50 MG: 25 CAPSULE ORAL at 06:24

## 2025-01-09 RX ADMIN — LACTULOSE 45 ML: 10 SOLUTION ORAL at 08:32

## 2025-01-09 ASSESSMENT — LIFESTYLE VARIABLES
TOTAL SCORE: 0
NAUSEA AND VOMITING: NO NAUSEA AND NO VOMITING
TREMOR: NO TREMOR
ORIENTATION AND CLOUDING OF SENSORIUM: ORIENTED AND CAN DO SERIAL ADDITIONS
HEADACHE, FULLNESS IN HEAD: NOT PRESENT
AUDITORY DISTURBANCES: NOT PRESENT
VISUAL DISTURBANCES: NOT PRESENT
ANXIETY: NO ANXIETY (AT EASE)
PAROXYSMAL SWEATS: NO SWEAT VISIBLE
AGITATION: NORMAL ACTIVITY
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
## 2025-01-09 NOTE — ED NOTES
Medication administered. Pt given 2 peanut butter and jelly sandwiches, anastasiya crackers, and ice chips upon request. No further needs at this time. Attempted to breathalyze pt but pt will not blow enough air into tube after multiple explanations of how to complete the test. Pt displaying no signs of alcohol withdrawal at this time.

## 2025-01-09 NOTE — ASSESSMENT & PLAN NOTE
Decompensated given patient has history of ascites, hepatic encephalopathy, hyperbilirubinemia, elevated INR. Status post TIPS procedure.  CT ab/pel from 10/17 shows cirrhosis with portal hypertension and numerous varices.    > MELD-NA of 15, <2% estimated 3-month mortality.   > EGD from 10/27 shows a large inlet patch with irregular borders distally - could be biopsied for Patel's as an outpatient.  Did not appreciate esophageal varices or gastric varices.        Plan:  - Cont home lasix  - Pt states he is established with a GI doctor

## 2025-01-09 NOTE — ED NOTES
Report received from Zoie SUAZO. Pt resting in bed, breathing even and unlabored. Pt satting 86% on RA while sleeping, placed on 2L NC and satting in the 90s.

## 2025-01-09 NOTE — ED NOTES
Per Hospitalist, pt does not want to stay. Ok per hospitalist, will provide pt with meds to bed and bus pass.

## 2025-01-09 NOTE — ASSESSMENT & PLAN NOTE
Patient with mild confusion and slurred speech upon presentation.  Ammonia level 102 and alcohol level 223.  Patient's home medications include lactulose and rifaximin, has not been taking these medications at home. Unclear of last BM.      Plan:  -Lactulose 45 mL 4 times daily, titrate for 3 loose stools  -Rifaximin 550 mg twice daily  -Neurochecks q4

## 2025-01-09 NOTE — ED NOTES
Bedside report received from off going RN/tech: GABRIELE Salgado, assumed care of patient.  POC discussed with patient. Call light within reach, all needs addressed at this time.       Fall risk interventions in place: Patient's personal possessions are with in their safe reach, Place socks on patient, Place fall risk sign on patient's door, and Accompanied to restroom (all applicable per Dora Fall risk assessment)   Continuous monitoring: Cardiac Leads, Pulse Ox, or Blood Pressure  IVF/IV medications: Not Applicable   Oxygen: Traced the line to wall oxygen  Bedside sitter: Not Applicable   Isolation: Not Applicable

## 2025-01-09 NOTE — ASSESSMENT & PLAN NOTE
Pt reports chronic anxiety, worsened recently by death of father.    - Continue home Lexapro 20 mg and Hydroxyzine 25 mg TID as needed

## 2025-01-09 NOTE — ASSESSMENT & PLAN NOTE
Patient with history of alcohol use disorder, alcoholic cirrhosis (s/p TIPS) and multiple ER visits and hospitalizations for alcohol use disorder, intoxication.  Alcohol level 223.    Plan:  - Start Librium taper  - CIWA protocol in place; Ativan per CIWA  - Continue thiamine, folic acid multivitamins daily  - Continue to monitor electrolytes, magnesium and phosphate level  - Fall, seizure precautions in place  - Tele monitoring

## 2025-01-09 NOTE — ASSESSMENT & PLAN NOTE
+Systolic murmur on exam.  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  Most recent echo from 2022 shows normal LV systolic function, ascending aorta borderline dilated with a diameter of 4.0 cm, no significant valvular disease.   - Ordered echo

## 2025-01-09 NOTE — ED TRIAGE NOTES
"Chief Complaint   Patient presents with    Scrotal Pain     PT recently discharged from hospital for treatment of scrotal swelling and returns for same concerns. PT reports scrotal swelling no worse then when initially discharged. PT does endorse ETOH use today, difficult historian.      PT arrives from [facility] with EMS. ETOH intoxicated. Difficult historian.    BP (!) 159/91   Pulse 74   Temp 36.8 °C (98.2 °F) (Temporal)   Resp 18   Ht 1.93 m (6' 4\")   Wt (!) 144 kg (317 lb)   SpO2 96%   BMI 38.59 kg/m²     "
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
## 2025-01-09 NOTE — ASSESSMENT & PLAN NOTE
Present for 10 days per pt. Scrotal ultrasound showed normal testicles with no testicular mass or testicular torsion. Marked scrotal edema but no drainable fluid collections. Notable b/l lower extremities and scrotal swelling.  - Furosemide 40 mg daily  - Tylenol as needed for pain

## 2025-01-09 NOTE — ED NOTES
RN followed up with pharmacy, pharmacy reports they are still in the process of filling prescriptions. Pt resting with even chest rise and fall, reports no needs at this time, call light available and in reach.

## 2025-01-09 NOTE — ED NOTES
Pt leaving AMA. Pt aware and understands risk of leaving AMA including worsening symptoms an/or death. Pt AO4. Ambulatory out of ED w/ steady gait. MD aware of pt leaving. Pt provided w/ prescriptions and understands education.

## 2025-01-09 NOTE — ASSESSMENT & PLAN NOTE
Chronic, slightly hypertensive in the ED.   - Continue home carvedilol, amlodipine and spironolactone

## 2025-01-09 NOTE — ED NOTES
"Pt ambulatory with steady gait to bathroom and back. Pt reporting he needs \"to catch a train by 10 and needs to leave soon\". Pt made aware of risks of leaving. Pt asked to change into gown, pt refusing to change into gown. Pt back in bed and on monitor resting with even chest rise and fall, reports no needs at this time, call light available and in reach.     "
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
## 2025-01-10 NOTE — DISCHARGE SUMMARY
[facility] Discharge Summary    Attending: Reina Edmondson  Senior Resident: Dr. Vashti Maher   Contact Number: [phone]    CHIEF COMPLAINT ON ADMISSION  Chief Complaint   Patient presents with    Scrotal Pain     PT recently discharged from hospital for treatment of scrotal swelling and returns for same concerns. PT reports scrotal swelling no worse then when initially discharged. PT does endorse ETOH use today, difficult historian.      Reason for Admission  Swelling     Admission Date  1/8/2025    CODE STATUS  Prior    HPI & HOSPITAL COURSE  Jalen Schmidt is a 44-year-old for male with past medical history of alcohol use disorder, alcoholic cirrhosis and hepatic encephalopathy status post TIPS and homelessness who presented to the emergency room on 1/9 due to complaint of ongoing scrotal swelling.  Patient also reported to have lost medications which included rifaximin, lactulose,  thiamine, multivitamins, and Lasix. Vitals are reassuring.  Diagnostic alcohol elevated at 223 normocytic anemia with hemoglobin of 8.4, platelet count of 56, ammonia 102, magnesium 1.7, phosphorous 3.6.  Urinalysis indicated occult blood still present. Patient was then admitted to our service for ongoing management of hepatic encephalopathy, and alcohol withdrawal.Scrotum swelling as well as bilateral pitting edema is likely due to ongoing liver failure. Patient has been admitted to the hospital for alcohol use disorder complications over 10 times in the past year.  Patient has left AMA multiple times. He left AMA yesterday. He at bedside reporting he is going to leave to catch a train to [place] where his liver specialist is. Discussed the risk of leaving and alcohol withdrawal. Also discussed that treating cirrhosis inpatient and setting up medications outpatient will assist patient during follow up with liver specialist. Declined medication to assist in alcohol discontinuation. He understands the risks of leaving.  Patient would like home medications filled prior to leaving for the train. Filled Bumex 2 mg, spironolactone 100mg, thiamine, multivitamin, and lactulose. Patient then left AMA.     Discharge Date  1/9/2025    Physical Exam on Day of Discharge  Physical Exam  Constitutional:       Appearance: Normal appearance.   Cardiovascular:      Rate and Rhythm: Normal rate and regular rhythm.      Pulses: Normal pulses.      Heart sounds: Normal heart sounds.   Abdominal:      General: Bowel sounds are normal. There is distension.      Tenderness: There is no guarding.   Musculoskeletal:         General: Normal range of motion.   Skin:     General: Skin is warm.   Neurological:      Mental Status: She is alert.         DISCHARGE DIAGNOSES  Principal Problem:    Alcohol withdrawal syndrome without complication (HCC) (POA: Yes)  Active Problems:    Anxiety (POA: Yes)    Hypertension (POA: Yes)    Hepatic encephalopathy secondary to alcoholic cirrhosis (POA: Yes)    Cirrhosis (HCC) (POA: Yes)    Murmur (POA: Yes)    Scrotal swelling (POA: Yes)  Resolved Problems:    * No resolved hospital problems. *      FOLLOW UP  No future appointments.  No follow-up provider specified.    MEDICATIONS ON DISCHARGE     Medication List        START taking these medications        Instructions   bumetanide 2 MG tablet  Commonly known as: Bumex   Take 1 Tablet by mouth every day.  Dose: 2 mg     furosemide 40 MG Tabs  Commonly known as: Lasix   Take 1 Tablet by mouth every day.  Dose: 40 mg     One-Daily Multi-Vitamin Tabs  Start taking on: January 10, 2025   Take 1 Tablet by mouth every day.  Dose: 1 Tablet     thiamine 100 MG tablet  Start taking on: January 10, 2025  Commonly known as: Thiamine   Take 1 Tablet by mouth every day.  Dose: 100 mg            CHANGE how you take these medications        Instructions   * folic acid 1 MG Tabs  What changed: Another medication with the same name was added. Make sure you understand how and when to take  each.  Commonly known as: Folvite   Take 1 Tablet by mouth every day for 90 days.  Dose: 1 mg     * folic acid 1 MG Tabs  Start taking on: January 10, 2025  What changed: You were already taking a medication with the same name, and this prescription was added. Make sure you understand how and when to take each.  Commonly known as: Folvite   Take 1 Tablet by mouth every day.  Dose: 1 mg     lactulose 10 g/15mL Soln  What changed: additional instructions   Take 45 mL by mouth 4 times a day. Inorder to have 2-3 bowel movements a day  Dose: 45 mL     spironolactone 100 MG Tabs  Start taking on: January 10, 2025  What changed: Another medication with the same name was removed. Continue taking this medication, and follow the directions you see here.  Commonly known as: Aldactone   Take 1 Tablet by mouth every day.  Dose: 100 mg           * This list has 2 medication(s) that are the same as other medications prescribed for you. Read the directions carefully, and ask your doctor or other care provider to review them with you.                CONTINUE taking these medications        Instructions   amLODIPine 5 MG Tabs  Commonly known as: Norvasc   Take 1 Tablet by mouth every day.  Dose: 5 mg     carvedilol 3.125 MG Tabs  Commonly known as: Coreg   Take 1 Tablet by mouth 2 times a day with meals.  Dose: 3.125 mg     escitalopram 20 MG tablet  Commonly known as: Lexapro   Take 1 Tablet by mouth every day for 90 days.  Dose: 20 mg     gabapentin 300 MG Caps  Commonly known as: Neurontin   Take 1 Capsule by mouth 3 times a day for 90 days.  Dose: 300 mg     omeprazole 20 MG delayed-release capsule  Commonly known as: PriLOSEC   Take 1 Capsule by mouth 2 times a day for 90 days.  Dose: 20 mg     vitamin D2 (Ergocalciferol) 1.25 MG (11717 UT) Caps capsule  Commonly known as: Drisdol   Take 1 Capsule by mouth every 7 days for 7 doses.  Dose: 50,000 Units     Xifaxan 550 MG Tabs tablet  Generic drug: riFAXIMin   Take 1 Tablet by  mouth 2 times a day for 90 days.  Dose: 550 mg            ASK your doctor about these medications        Instructions   hydrOXYzine HCl 25 MG Tabs  Commonly known as: Atarax   Take 1 Tablet by mouth 3 times a day as needed for Anxiety.  Dose: 25 mg              Allergies  Allergies   Allergen Reactions    Latex Itching       DIET  No orders of the defined types were placed in this encounter.      LABORATORY  Lab Results   Component Value Date    SODIUM 141 01/08/2025    POTASSIUM 3.1 (L) 01/08/2025    CHLORIDE 110 01/08/2025    CO2 21 01/08/2025    GLUCOSE 99 01/08/2025    BUN 6 (L) 01/08/2025    CREATININE 0.47 (L) 01/08/2025        Lab Results   Component Value Date    WBC 3.9 (L) 01/08/2025    HEMOGLOBIN 8.4 (L) 01/08/2025    HEMATOCRIT 26.8 (L) 01/08/2025    PLATELETCT 56 (L) 01/08/2025        Vashti Maher M.D. PGY-3 [facility]

## (undated) DEVICE — SET EXTENSION WITH 2 PORTS (48EA/CA) ***PART #2C8610 IS A SUBSTITUTE*****

## (undated) DEVICE — BUTTON ENDOSCOPY DISPOSABLE

## (undated) DEVICE — ELECTRODE 850 FOAM ADHESIVE - HYDROGEL RADIOTRNSPRNT (50/PK)

## (undated) DEVICE — TUBE CONNECTING SUCTION - CLEAR PLASTIC STERILE 72 IN (50EA/CA)

## (undated) DEVICE — FILM CASSETTE ENDO

## (undated) DEVICE — TOWEL STOP TIMEOUT SAFETY FLAG (40EA/CA)

## (undated) DEVICE — PORT AUXILLARY WATER (50EA/BX)

## (undated) DEVICE — KIT CUSTOM PROCEDURE SINGLE FOR ENDO (15/CA)

## (undated) DEVICE — WATER IRRIGATION STERILE 1000ML (12EA/CA)

## (undated) DEVICE — TUBING CLEARLINK DUO-VENT - C-FLO (48EA/CA)

## (undated) DEVICE — LACTATED RINGERS INJ 1000 ML - (14EA/CA 60CA/PF)

## (undated) DEVICE — SET LEADWIRE 5 LEAD BEDSIDE DISPOSABLE ECG (1SET OF 5/EA)

## (undated) DEVICE — SODIUM CHL IRRIGATION 0.9% 1000ML (12EA/CA)

## (undated) DEVICE — NEPTUNE 4 PORT MANIFOLD - (20/PK)

## (undated) DEVICE — SENSOR OXIMETER ADULT SPO2 RD SET (20EA/BX)

## (undated) DEVICE — BLOCK BITE MAXI DENTAL RETENTION RIM (100EA/BX)